# Patient Record
Sex: FEMALE | Race: WHITE | NOT HISPANIC OR LATINO | Employment: OTHER | URBAN - METROPOLITAN AREA
[De-identification: names, ages, dates, MRNs, and addresses within clinical notes are randomized per-mention and may not be internally consistent; named-entity substitution may affect disease eponyms.]

---

## 2018-04-30 ASSESSMENT — MIFFLIN-ST. JEOR: SCORE: 1014.36

## 2018-05-01 ENCOUNTER — ANESTHESIA - HEALTHEAST (OUTPATIENT)
Dept: SURGERY | Facility: CLINIC | Age: 68
End: 2018-05-01

## 2018-05-02 ENCOUNTER — SURGERY - HEALTHEAST (OUTPATIENT)
Dept: SURGERY | Facility: CLINIC | Age: 68
End: 2018-05-02

## 2018-05-02 ASSESSMENT — MIFFLIN-ST. JEOR: SCORE: 1041.13

## 2018-05-03 ASSESSMENT — MIFFLIN-ST. JEOR: SCORE: 1055.19

## 2018-05-04 ASSESSMENT — MIFFLIN-ST. JEOR: SCORE: 1074.69

## 2018-05-05 ASSESSMENT — MIFFLIN-ST. JEOR: SCORE: 1055.64

## 2018-05-07 ENCOUNTER — COMMUNICATION - HEALTHEAST (OUTPATIENT)
Dept: NEUROSURGERY | Facility: CLINIC | Age: 68
End: 2018-05-07

## 2018-05-07 ENCOUNTER — RECORDS - HEALTHEAST (OUTPATIENT)
Dept: LAB | Facility: CLINIC | Age: 68
End: 2018-05-07

## 2018-05-07 DIAGNOSIS — I60.9 SAH (SUBARACHNOID HEMORRHAGE) (H): ICD-10-CM

## 2018-05-07 LAB
HGB BLD-MCNC: 10.5 G/DL (ref 12–16)
MAGNESIUM SERPL-MCNC: 1.7 MG/DL (ref 1.8–2.6)
PHOSPHATE SERPL-MCNC: 3.9 MG/DL (ref 2.5–4.5)
POTASSIUM BLD-SCNC: 4 MMOL/L (ref 3.5–5)

## 2018-05-10 ENCOUNTER — RECORDS - HEALTHEAST (OUTPATIENT)
Dept: LAB | Facility: CLINIC | Age: 68
End: 2018-05-10

## 2018-05-10 LAB
ANION GAP SERPL CALCULATED.3IONS-SCNC: 10 MMOL/L (ref 5–18)
BUN SERPL-MCNC: 14 MG/DL (ref 8–28)
CALCIUM SERPL-MCNC: 9.1 MG/DL (ref 8.5–10.5)
CHLORIDE BLD-SCNC: 104 MMOL/L (ref 98–107)
CO2 SERPL-SCNC: 24 MMOL/L (ref 22–31)
CREAT SERPL-MCNC: 0.57 MG/DL (ref 0.6–1.1)
ERYTHROCYTE [DISTWIDTH] IN BLOOD BY AUTOMATED COUNT: 13.4 % (ref 11–14.5)
GFR SERPL CREATININE-BSD FRML MDRD: >60 ML/MIN/1.73M2
GLUCOSE BLD-MCNC: 75 MG/DL (ref 70–125)
HCT VFR BLD AUTO: 31.1 % (ref 35–47)
HGB BLD-MCNC: 10.6 G/DL (ref 12–16)
MAGNESIUM SERPL-MCNC: 2 MG/DL (ref 1.8–2.6)
MCH RBC QN AUTO: 33.5 PG (ref 27–34)
MCHC RBC AUTO-ENTMCNC: 34.1 G/DL (ref 32–36)
MCV RBC AUTO: 98 FL (ref 80–100)
PLATELET # BLD AUTO: 614 THOU/UL (ref 140–440)
PMV BLD AUTO: 9 FL (ref 8.5–12.5)
POTASSIUM BLD-SCNC: 4.7 MMOL/L (ref 3.5–5)
RBC # BLD AUTO: 3.16 MILL/UL (ref 3.8–5.4)
SODIUM SERPL-SCNC: 138 MMOL/L (ref 136–145)
WBC: 8.2 THOU/UL (ref 4–11)

## 2018-05-11 ENCOUNTER — SURGERY - HEALTHEAST (OUTPATIENT)
Dept: NEUROLOGY | Facility: CLINIC | Age: 68
End: 2018-05-11

## 2018-05-15 ENCOUNTER — HOSPITAL ENCOUNTER (OUTPATIENT)
Dept: MRI IMAGING | Facility: HOSPITAL | Age: 68
Discharge: HOME | End: 2018-05-15
Attending: SURGERY

## 2018-05-15 ENCOUNTER — RECORDS - HEALTHEAST (OUTPATIENT)
Dept: ADMINISTRATIVE | Facility: OTHER | Age: 68
End: 2018-05-15

## 2018-05-15 DIAGNOSIS — I60.9 SAH (SUBARACHNOID HEMORRHAGE) (H): ICD-10-CM

## 2018-05-18 ENCOUNTER — OFFICE VISIT - HEALTHEAST (OUTPATIENT)
Dept: NEUROSURGERY | Facility: CLINIC | Age: 68
End: 2018-05-18

## 2018-05-18 DIAGNOSIS — H71.90 CHOLESTEATOMA: ICD-10-CM

## 2018-05-18 ASSESSMENT — MIFFLIN-ST. JEOR: SCORE: 1018.45

## 2018-05-29 ENCOUNTER — OFFICE VISIT - HEALTHEAST (OUTPATIENT)
Dept: FAMILY MEDICINE | Facility: CLINIC | Age: 68
End: 2018-05-29

## 2018-05-29 DIAGNOSIS — S72.009A HIP FRACTURE (H): ICD-10-CM

## 2018-05-29 DIAGNOSIS — R00.0 TACHYCARDIA: ICD-10-CM

## 2018-05-29 DIAGNOSIS — K21.9 ACID REFLUX: ICD-10-CM

## 2018-05-29 DIAGNOSIS — F10.10 ALCOHOL ABUSE: ICD-10-CM

## 2018-05-29 DIAGNOSIS — E83.42 HYPOMAGNESEMIA: ICD-10-CM

## 2018-05-29 DIAGNOSIS — R05.9 COUGH: ICD-10-CM

## 2018-05-29 DIAGNOSIS — Z78.0 POST-MENOPAUSAL: ICD-10-CM

## 2018-05-29 DIAGNOSIS — F17.200 NICOTINE DEPENDENCE: ICD-10-CM

## 2018-05-29 LAB
ALBUMIN SERPL-MCNC: 3.7 G/DL (ref 3.5–5)
ALP SERPL-CCNC: 164 U/L (ref 45–120)
ALT SERPL W P-5'-P-CCNC: 10 U/L (ref 0–45)
ANION GAP SERPL CALCULATED.3IONS-SCNC: 13 MMOL/L (ref 5–18)
AST SERPL W P-5'-P-CCNC: 18 U/L (ref 0–40)
BASOPHILS # BLD AUTO: 0 THOU/UL (ref 0–0.2)
BASOPHILS NFR BLD AUTO: 1 % (ref 0–2)
BILIRUB SERPL-MCNC: 0.3 MG/DL (ref 0–1)
BUN SERPL-MCNC: 16 MG/DL (ref 8–28)
CALCIUM SERPL-MCNC: 10.4 MG/DL (ref 8.5–10.5)
CHLORIDE BLD-SCNC: 103 MMOL/L (ref 98–107)
CO2 SERPL-SCNC: 24 MMOL/L (ref 22–31)
CREAT SERPL-MCNC: 0.79 MG/DL (ref 0.6–1.1)
EOSINOPHIL # BLD AUTO: 0.1 THOU/UL (ref 0–0.4)
EOSINOPHIL NFR BLD AUTO: 2 % (ref 0–6)
ERYTHROCYTE [DISTWIDTH] IN BLOOD BY AUTOMATED COUNT: 11.7 % (ref 11–14.5)
FERRITIN SERPL-MCNC: 753 NG/ML (ref 10–130)
GFR SERPL CREATININE-BSD FRML MDRD: >60 ML/MIN/1.73M2
GLUCOSE BLD-MCNC: 87 MG/DL (ref 70–125)
HCT VFR BLD AUTO: 35.8 % (ref 35–47)
HGB BLD-MCNC: 12.1 G/DL (ref 12–16)
LYMPHOCYTES # BLD AUTO: 1.2 THOU/UL (ref 0.8–4.4)
LYMPHOCYTES NFR BLD AUTO: 26 % (ref 20–40)
MAGNESIUM SERPL-MCNC: 1.7 MG/DL (ref 1.8–2.6)
MCH RBC QN AUTO: 32.8 PG (ref 27–34)
MCHC RBC AUTO-ENTMCNC: 33.9 G/DL (ref 32–36)
MCV RBC AUTO: 97 FL (ref 80–100)
MONOCYTES # BLD AUTO: 0.5 THOU/UL (ref 0–0.9)
MONOCYTES NFR BLD AUTO: 11 % (ref 2–10)
NEUTROPHILS # BLD AUTO: 2.9 THOU/UL (ref 2–7.7)
NEUTROPHILS NFR BLD AUTO: 61 % (ref 50–70)
PLATELET # BLD AUTO: 353 THOU/UL (ref 140–440)
PMV BLD AUTO: 7.2 FL (ref 7–10)
POTASSIUM BLD-SCNC: 4.6 MMOL/L (ref 3.5–5)
PROT SERPL-MCNC: 7.4 G/DL (ref 6–8)
RBC # BLD AUTO: 3.7 MILL/UL (ref 3.8–5.4)
SODIUM SERPL-SCNC: 140 MMOL/L (ref 136–145)
VIT B12 SERPL-MCNC: 703 PG/ML (ref 213–816)
WBC: 4.8 THOU/UL (ref 4–11)

## 2018-06-05 ENCOUNTER — COMMUNICATION - HEALTHEAST (OUTPATIENT)
Dept: FAMILY MEDICINE | Facility: CLINIC | Age: 68
End: 2018-06-05

## 2018-06-06 ENCOUNTER — RECORDS - HEALTHEAST (OUTPATIENT)
Dept: ADMINISTRATIVE | Facility: OTHER | Age: 68
End: 2018-06-06

## 2018-06-12 ENCOUNTER — RECORDS - HEALTHEAST (OUTPATIENT)
Dept: ADMINISTRATIVE | Facility: OTHER | Age: 68
End: 2018-06-12

## 2018-06-19 ENCOUNTER — OFFICE VISIT - HEALTHEAST (OUTPATIENT)
Dept: FAMILY MEDICINE | Facility: CLINIC | Age: 68
End: 2018-06-19

## 2018-06-19 DIAGNOSIS — F10.10 ALCOHOL ABUSE: ICD-10-CM

## 2018-06-19 DIAGNOSIS — E83.42 HYPOMAGNESEMIA: ICD-10-CM

## 2018-06-19 DIAGNOSIS — G25.81 RESTLESS LEG SYNDROME: ICD-10-CM

## 2018-06-19 DIAGNOSIS — R79.89 ELEVATED FERRITIN: ICD-10-CM

## 2018-06-19 DIAGNOSIS — F17.200 NICOTINE DEPENDENCE, UNCOMPLICATED, UNSPECIFIED NICOTINE PRODUCT TYPE: ICD-10-CM

## 2018-06-19 DIAGNOSIS — K21.9 GASTROESOPHAGEAL REFLUX DISEASE, ESOPHAGITIS PRESENCE NOT SPECIFIED: ICD-10-CM

## 2018-06-19 DIAGNOSIS — Z12.31 VISIT FOR SCREENING MAMMOGRAM: ICD-10-CM

## 2018-06-19 DIAGNOSIS — R74.8 ALKALINE PHOSPHATASE ELEVATION: ICD-10-CM

## 2018-06-19 LAB
FERRITIN SERPL-MCNC: 359 NG/ML (ref 10–130)
IRON SATN MFR SERPL: 37 % (ref 20–50)
IRON SERPL-MCNC: 83 UG/DL (ref 42–175)
MAGNESIUM SERPL-MCNC: 1.7 MG/DL (ref 1.8–2.6)
TIBC SERPL-MCNC: 222 UG/DL (ref 313–563)
TRANSFERRIN SERPL-MCNC: 177 MG/DL (ref 212–360)

## 2018-06-22 LAB
ALP BONE SERPL-CCNC: 68 U/L (ref 0–55)
ALP LIVER SERPL-CCNC: 48 U/L (ref 0–94)
ALP OTHER SERPL-CCNC: 0 U/L
ALP SERPL-CCNC: 116 U/L (ref 40–120)

## 2018-07-01 ENCOUNTER — AMBULATORY - HEALTHEAST (OUTPATIENT)
Dept: FAMILY MEDICINE | Facility: CLINIC | Age: 68
End: 2018-07-01

## 2018-07-01 DIAGNOSIS — R79.89 ELEVATED FERRITIN: ICD-10-CM

## 2018-07-02 ENCOUNTER — COMMUNICATION - HEALTHEAST (OUTPATIENT)
Dept: FAMILY MEDICINE | Facility: CLINIC | Age: 68
End: 2018-07-02

## 2018-07-06 ENCOUNTER — COMMUNICATION - HEALTHEAST (OUTPATIENT)
Dept: FAMILY MEDICINE | Facility: CLINIC | Age: 68
End: 2018-07-06

## 2018-07-08 ENCOUNTER — COMMUNICATION - HEALTHEAST (OUTPATIENT)
Dept: FAMILY MEDICINE | Facility: CLINIC | Age: 68
End: 2018-07-08

## 2018-07-31 ENCOUNTER — RECORDS - HEALTHEAST (OUTPATIENT)
Dept: ADMINISTRATIVE | Facility: OTHER | Age: 68
End: 2018-07-31

## 2018-10-18 ENCOUNTER — OFFICE VISIT - HEALTHEAST (OUTPATIENT)
Dept: FAMILY MEDICINE | Facility: CLINIC | Age: 68
End: 2018-10-18

## 2018-10-18 DIAGNOSIS — Z72.0 TOBACCO ABUSE: ICD-10-CM

## 2018-10-18 DIAGNOSIS — J01.00 ACUTE MAXILLARY SINUSITIS, RECURRENCE NOT SPECIFIED: ICD-10-CM

## 2018-10-18 DIAGNOSIS — J02.9 SORE THROAT: ICD-10-CM

## 2018-10-18 LAB — DEPRECATED S PYO AG THROAT QL EIA: NORMAL

## 2018-10-19 LAB — GROUP A STREP BY PCR: NORMAL

## 2019-02-09 ENCOUNTER — COMMUNICATION - HEALTHEAST (OUTPATIENT)
Dept: FAMILY MEDICINE | Facility: CLINIC | Age: 69
End: 2019-02-09

## 2019-02-09 DIAGNOSIS — J32.9 SINUSITIS: ICD-10-CM

## 2019-03-06 ENCOUNTER — COMMUNICATION - HEALTHEAST (OUTPATIENT)
Dept: SCHEDULING | Facility: CLINIC | Age: 69
End: 2019-03-06

## 2019-03-06 ENCOUNTER — PATIENT OUTREACH (OUTPATIENT)
Dept: CARE COORDINATION | Facility: CLINIC | Age: 69
End: 2019-03-06

## 2019-03-06 ASSESSMENT — ACTIVITIES OF DAILY LIVING (ADL): DEPENDENT_IADLS:: CLEANING;COOKING;LAUNDRY;SHOPPING;MEAL PREPARATION;TRANSPORTATION

## 2019-03-06 NOTE — PROGRESS NOTES
Clinic Care Coordination Contact    Clinic Care Coordination Contact  OUTREACH    Referral Information:  Referral Source: ED Follow-Up  From chart review:  Namita Viera is a 69 y.o. female with a history of subarachnoid hemorrhage, alcohol abuse, tobacco abuse, and left hip fracture surgery who presents to the ED via walk-in for evaluation of a fall and shoulder pain or mechanical fall last evening.  No head trauma, loss of consciousness or anticoagulation.     Differential diagnosis includes but is not limited to contusion, hematoma, humeral head fracture, shoulder dislocation, proximal humerus fracture, others.     On examination, she is comfortable appearing in no acute distress with stable vital signs.  There is significant amount of ecchymosis over her right anterior aspect of upper extremity.  She does have no obvious deformity but there is palpable bony tenderness over the glenohumeral joint.  Distal CMS is intact.  No tenderness over the wrist, hand, digits, forearm, elbow or distal humerus.  Obtained x-ray of the right shoulder to evaluate for any acute process of the glenohumeral joint and proximal humerus.  Results show mildly impacted acute transverse 1 part fracture through the cervical neck of the humerus with probable right shoulder joint effusion..  I spoke with [facility] to discuss the imaging.  Placed patient in a sling and recommend that she follow-up with [facility] in the next several days.  Under my care, she received 10 mg of oxycodone for pain control.  Center with prescription for 10 tablets of oxycodone and discussed safety precautions which she voiced understanding of.  She agrees to follow-up, does voiced understanding of all the return precautions that we discussed as compared with the plan for discharge at this time.     At the conclusion of the encounter I discussed the results of all of the tests and the disposition. The questions were answered. The patient or  family acknowledged understanding and was agreeable with the care plan.      10:28 AM I met with the patient to gather history and perform an initial exam.  12:11 PM I rechecked the patient, updated them on x-ray results, placed her in a sling, and discussed plan for discharge.      Pertinent Labs & Imaging studies reviewed. (See chart for details)  FINAL IMPRESSION    1. Closed nondisplaced fracture of surgical neck of right humerus, unspecified fracture morphology, initial encounter          Primary Diagnosis: Injury/Fall    Chief Complaint   Patient presents with     Clinic Care Coordination - Post Hospital     DC'd from [facility] on 3/5/19        Clinical Concerns:  Current Medical Concerns:  Talked to patient who is having significant pain. Got a sling at ED, but feels it should have been something more substantial to immobilize the fracture.  She has set up appointment with [facility] on Friday. Is taking the pain medications sparingly and also taking Bufferin.  She has enough to get her through until Friday.       She tripped of a bag of recycling with a crockpot full of pork and beans. She is grateful she did not get burned.    Current Behavioral Concerns: None identified.     Education Provided to patient: Reviewed care coordination   Pain  Pain (GOAL):: No  Health Maintenance Reviewed: Due/Overdue       Medication Management:  Independent, no concerns    Functional Status:  Dependent ADLs:: Independent  Dependent IADLs:: Cleaning, Cooking, Laundry, Shopping, Meal Preparation, Transportation  Bed or wheelchair confined:: No  Mobility Status: Independent  Fallen 2 or more times in the past year?: No  Any fall with injury in the past year?: Yes    Living Situation:  Current living arrangement:: I live in a private home with family  Type of residence:: Private home - stairs    Diet/Exercise/Sleep:  Diet:: Regular  Inadequate nutrition (GOAL):: No  Food Insecurity: No  Tube Feeding: No  Exercise::  Currently not exercising  Inadequate activity/exercise (GOAL):: No  Significant changes in sleep pattern (GOAL): No    Transportation:  Transportation concerns (GOAL):: No  Transportation means:: Regular car, Family     Psychosocial:  Mental health DX:: No  Mental health management concern (GOAL):: No  Informal Support system:: Family, Spouse     Financial/Insurance:   Financial/Insurance concerns (GOAL):: No  UCARE for seniors, no financial concerns.      Resources and Interventions:  Current Resources:    ;   Community Resources: None  Supplies used at home:: None  Equipment Currently Used at Home: none    Advance Care Plan/Directive  Advanced Care Plans/Directives on file:: No  Advanced Care Plan/Directive Status: Declined Further Information    Patient/Caregiver understanding: patient and her  will with son and his ten kids.  Has the help she needs. Has support from daughter who works at [employer].  Patient will call clinic with any concerns.  Denies need for care coordination at this time.    Plan: No further outreach by this CC.    Alanna Rodriguez,   [facility]  [email or web address]  [phone]

## 2019-03-08 ENCOUNTER — RECORDS - HEALTHEAST (OUTPATIENT)
Dept: ADMINISTRATIVE | Facility: OTHER | Age: 69
End: 2019-03-08

## 2019-03-12 ENCOUNTER — RECORDS - HEALTHEAST (OUTPATIENT)
Dept: ADMINISTRATIVE | Facility: OTHER | Age: 69
End: 2019-03-12

## 2019-03-27 ENCOUNTER — RECORDS - HEALTHEAST (OUTPATIENT)
Dept: ADMINISTRATIVE | Facility: OTHER | Age: 69
End: 2019-03-27

## 2019-04-24 ENCOUNTER — RECORDS - HEALTHEAST (OUTPATIENT)
Dept: ADMINISTRATIVE | Facility: OTHER | Age: 69
End: 2019-04-24

## 2019-06-05 ENCOUNTER — RECORDS - HEALTHEAST (OUTPATIENT)
Dept: ADMINISTRATIVE | Facility: OTHER | Age: 69
End: 2019-06-05

## 2019-11-07 ENCOUNTER — COMMUNICATION - HEALTHEAST (OUTPATIENT)
Dept: FAMILY MEDICINE | Facility: CLINIC | Age: 69
End: 2019-11-07

## 2019-11-07 DIAGNOSIS — R12 HEARTBURN: ICD-10-CM

## 2019-11-27 ENCOUNTER — OFFICE VISIT - HEALTHEAST (OUTPATIENT)
Dept: FAMILY MEDICINE | Facility: CLINIC | Age: 69
End: 2019-11-27

## 2019-11-27 DIAGNOSIS — F10.11 HISTORY OF ALCOHOL ABUSE: ICD-10-CM

## 2019-11-27 DIAGNOSIS — Z12.31 VISIT FOR SCREENING MAMMOGRAM: ICD-10-CM

## 2019-11-27 DIAGNOSIS — Z23 NEED FOR INFLUENZA VACCINATION: ICD-10-CM

## 2019-11-27 DIAGNOSIS — H25.9 AGE-RELATED CATARACT OF BOTH EYES, UNSPECIFIED AGE-RELATED CATARACT TYPE: ICD-10-CM

## 2019-11-27 DIAGNOSIS — Z01.818 PREOPERATIVE EXAMINATION: ICD-10-CM

## 2019-11-27 ASSESSMENT — MIFFLIN-ST. JEOR: SCORE: 942.01

## 2019-12-05 ENCOUNTER — COMMUNICATION - HEALTHEAST (OUTPATIENT)
Dept: FAMILY MEDICINE | Facility: CLINIC | Age: 69
End: 2019-12-05

## 2020-01-08 ENCOUNTER — COMMUNICATION - HEALTHEAST (OUTPATIENT)
Dept: FAMILY MEDICINE | Facility: CLINIC | Age: 70
End: 2020-01-08

## 2020-07-06 ENCOUNTER — COMMUNICATION - HEALTHEAST (OUTPATIENT)
Dept: FAMILY MEDICINE | Facility: CLINIC | Age: 70
End: 2020-07-06

## 2020-07-06 DIAGNOSIS — R12 HEARTBURN: ICD-10-CM

## 2021-01-18 ENCOUNTER — COMMUNICATION - HEALTHEAST (OUTPATIENT)
Dept: FAMILY MEDICINE | Facility: CLINIC | Age: 71
End: 2021-01-18

## 2021-01-18 DIAGNOSIS — R12 HEARTBURN: ICD-10-CM

## 2021-01-20 ENCOUNTER — MEDICAL CORRESPONDENCE (OUTPATIENT)
Dept: HEALTH INFORMATION MANAGEMENT | Facility: CLINIC | Age: 71
End: 2021-01-20
Payer: COMMERCIAL

## 2021-04-29 ENCOUNTER — COMMUNICATION - HEALTHEAST (OUTPATIENT)
Dept: FAMILY MEDICINE | Facility: CLINIC | Age: 71
End: 2021-04-29

## 2021-05-04 ENCOUNTER — RECORDS - HEALTHEAST (OUTPATIENT)
Dept: GENERAL RADIOLOGY | Facility: CLINIC | Age: 71
End: 2021-05-04

## 2021-05-04 ENCOUNTER — OFFICE VISIT - HEALTHEAST (OUTPATIENT)
Dept: FAMILY MEDICINE | Facility: CLINIC | Age: 71
End: 2021-05-04

## 2021-05-04 DIAGNOSIS — F10.20 UNCOMPLICATED ALCOHOL DEPENDENCE (H): ICD-10-CM

## 2021-05-04 DIAGNOSIS — F17.210 CIGARETTE NICOTINE DEPENDENCE WITHOUT COMPLICATION: ICD-10-CM

## 2021-05-04 DIAGNOSIS — Z00.00 ENCOUNTER FOR ANNUAL WELLNESS EXAM IN MEDICARE PATIENT: ICD-10-CM

## 2021-05-04 DIAGNOSIS — R07.81 RIB PAIN ON LEFT SIDE: ICD-10-CM

## 2021-05-04 DIAGNOSIS — M89.9 DISORDER OF BONE AND CARTILAGE: ICD-10-CM

## 2021-05-04 DIAGNOSIS — Z13.220 LIPID SCREENING: ICD-10-CM

## 2021-05-04 DIAGNOSIS — Z72.0 TOBACCO ABUSE: ICD-10-CM

## 2021-05-04 DIAGNOSIS — Z12.11 COLON CANCER SCREENING: ICD-10-CM

## 2021-05-04 DIAGNOSIS — R05.9 COUGH: ICD-10-CM

## 2021-05-04 DIAGNOSIS — E55.9 VITAMIN D DEFICIENCY: ICD-10-CM

## 2021-05-04 DIAGNOSIS — I10 BENIGN ESSENTIAL HYPERTENSION: ICD-10-CM

## 2021-05-04 DIAGNOSIS — K21.9 GASTROESOPHAGEAL REFLUX DISEASE WITHOUT ESOPHAGITIS: ICD-10-CM

## 2021-05-04 DIAGNOSIS — M94.9 DISORDER OF BONE AND CARTILAGE: ICD-10-CM

## 2021-05-04 DIAGNOSIS — R07.81 PLEURODYNIA: ICD-10-CM

## 2021-05-04 DIAGNOSIS — R05.3 CHRONIC COUGH: ICD-10-CM

## 2021-05-04 DIAGNOSIS — Z11.59 ENCOUNTER FOR HEPATITIS C SCREENING TEST FOR LOW RISK PATIENT: ICD-10-CM

## 2021-05-04 DIAGNOSIS — Z78.0 POSTMENOPAUSAL STATUS: ICD-10-CM

## 2021-05-04 DIAGNOSIS — Z12.31 VISIT FOR SCREENING MAMMOGRAM: ICD-10-CM

## 2021-05-04 LAB
ALBUMIN SERPL-MCNC: 3.7 G/DL (ref 3.5–5)
ALP SERPL-CCNC: 103 U/L (ref 45–120)
ALT SERPL W P-5'-P-CCNC: 23 U/L (ref 0–45)
ANION GAP SERPL CALCULATED.3IONS-SCNC: 15 MMOL/L (ref 5–18)
AST SERPL W P-5'-P-CCNC: 41 U/L (ref 0–40)
BILIRUB SERPL-MCNC: 0.3 MG/DL (ref 0–1)
BUN SERPL-MCNC: 12 MG/DL (ref 8–28)
CALCIUM SERPL-MCNC: 9.4 MG/DL (ref 8.5–10.5)
CALCIUM SERPL-MCNC: 9.6 MG/DL (ref 8.5–10.5)
CHLORIDE BLD-SCNC: 100 MMOL/L (ref 98–107)
CHOLEST SERPL-MCNC: 226 MG/DL
CO2 SERPL-SCNC: 23 MMOL/L (ref 22–31)
CREAT SERPL-MCNC: 0.71 MG/DL (ref 0.6–1.1)
CREAT SERPL-MCNC: 0.74 MG/DL (ref 0.6–1.1)
ERYTHROCYTE [DISTWIDTH] IN BLOOD BY AUTOMATED COUNT: 14.4 % (ref 11–14.5)
FASTING STATUS PATIENT QL REPORTED: YES
GFR SERPL CREATININE-BSD FRML MDRD: >60 ML/MIN/1.73M2
GFR SERPL CREATININE-BSD FRML MDRD: >60 ML/MIN/1.73M2
GLUCOSE BLD-MCNC: 85 MG/DL (ref 70–125)
HCT VFR BLD AUTO: 42.4 % (ref 35–47)
HDLC SERPL-MCNC: 122 MG/DL
HGB BLD-MCNC: 14.3 G/DL (ref 12–16)
LDLC SERPL CALC-MCNC: 92 MG/DL
MCH RBC QN AUTO: 32.1 PG (ref 27–34)
MCHC RBC AUTO-ENTMCNC: 33.7 G/DL (ref 32–36)
MCV RBC AUTO: 95 FL (ref 80–100)
PHOSPHATE SERPL-MCNC: 3.6 MG/DL (ref 2.5–4.5)
PLATELET # BLD AUTO: 283 THOU/UL (ref 140–440)
PMV BLD AUTO: 9.1 FL (ref 7–10)
POTASSIUM BLD-SCNC: 4.8 MMOL/L (ref 3.5–5)
PROT SERPL-MCNC: 7.6 G/DL (ref 6–8)
PTH-INTACT SERPL-MCNC: 71 PG/ML (ref 10–86)
RBC # BLD AUTO: 4.45 MILL/UL (ref 3.8–5.4)
SODIUM SERPL-SCNC: 138 MMOL/L (ref 136–145)
TRIGL SERPL-MCNC: 58 MG/DL
WBC: 7.1 THOU/UL (ref 4–11)

## 2021-05-04 ASSESSMENT — MIFFLIN-ST. JEOR: SCORE: 892.23

## 2021-05-05 ENCOUNTER — COMMUNICATION - HEALTHEAST (OUTPATIENT)
Dept: FAMILY MEDICINE | Facility: CLINIC | Age: 71
End: 2021-05-05

## 2021-05-05 LAB
25(OH)D3 SERPL-MCNC: 4.8 NG/ML (ref 30–80)
ALBUMIN PERCENT: 56.2 % (ref 51–67)
ALBUMIN SERPL ELPH-MCNC: 4.3 G/DL (ref 3.2–4.7)
ALPHA 1 PERCENT: 3.4 % (ref 2–4)
ALPHA 2 PERCENT: 13 % (ref 5–13)
ALPHA1 GLOB SERPL ELPH-MCNC: 0.3 G/DL (ref 0.1–0.3)
ALPHA2 GLOB SERPL ELPH-MCNC: 1 G/DL (ref 0.4–0.9)
B-GLOBULIN SERPL ELPH-MCNC: 0.9 G/DL (ref 0.7–1.2)
BETA PERCENT: 11.4 % (ref 10–17)
GAMMA GLOB SERPL ELPH-MCNC: 1.2 G/DL (ref 0.6–1.4)
GAMMA GLOBULIN PERCENT: 16 % (ref 9–20)
HCV AB SERPL QL IA: NEGATIVE
PATH ICD:: ABNORMAL
PROT PATTERN SERPL ELPH-IMP: ABNORMAL
PROT SERPL-MCNC: 7.6 G/DL (ref 6–8)
REVIEWING PATHOLOGIST: ABNORMAL

## 2021-05-06 ENCOUNTER — COMMUNICATION - HEALTHEAST (OUTPATIENT)
Dept: FAMILY MEDICINE | Facility: CLINIC | Age: 71
End: 2021-05-06

## 2021-05-06 RX ORDER — ERGOCALCIFEROL 1.25 MG/1
50000 CAPSULE ORAL WEEKLY
Qty: 12 CAPSULE | Refills: 4 | Status: SHIPPED | OUTPATIENT
Start: 2021-05-06 | End: 2021-10-12

## 2021-05-13 ENCOUNTER — COMMUNICATION - HEALTHEAST (OUTPATIENT)
Dept: LAB | Facility: CLINIC | Age: 71
End: 2021-05-13

## 2021-05-13 DIAGNOSIS — I10 BENIGN ESSENTIAL HYPERTENSION: ICD-10-CM

## 2021-05-20 ENCOUNTER — RECORDS - HEALTHEAST (OUTPATIENT)
Dept: ADMINISTRATIVE | Facility: OTHER | Age: 71
End: 2021-05-20

## 2021-05-20 ENCOUNTER — AMBULATORY - HEALTHEAST (OUTPATIENT)
Dept: NURSING | Facility: CLINIC | Age: 71
End: 2021-05-20

## 2021-05-20 ENCOUNTER — AMBULATORY - HEALTHEAST (OUTPATIENT)
Dept: LAB | Facility: CLINIC | Age: 71
End: 2021-05-20

## 2021-05-20 DIAGNOSIS — I10 BENIGN ESSENTIAL HYPERTENSION: ICD-10-CM

## 2021-05-20 LAB
ANION GAP SERPL CALCULATED.3IONS-SCNC: 12 MMOL/L (ref 5–18)
BUN SERPL-MCNC: 18 MG/DL (ref 8–28)
CALCIUM SERPL-MCNC: 9.5 MG/DL (ref 8.5–10.5)
CHLORIDE BLD-SCNC: 100 MMOL/L (ref 98–107)
CO2 SERPL-SCNC: 25 MMOL/L (ref 22–31)
CREAT SERPL-MCNC: 0.8 MG/DL (ref 0.6–1.1)
GFR SERPL CREATININE-BSD FRML MDRD: >60 ML/MIN/1.73M2
GLUCOSE BLD-MCNC: 89 MG/DL (ref 70–125)
POTASSIUM BLD-SCNC: 5.4 MMOL/L (ref 3.5–5)
SODIUM SERPL-SCNC: 137 MMOL/L (ref 136–145)

## 2021-05-21 ENCOUNTER — AMBULATORY - HEALTHEAST (OUTPATIENT)
Dept: FAMILY MEDICINE | Facility: CLINIC | Age: 71
End: 2021-05-21

## 2021-05-21 DIAGNOSIS — I10 BENIGN ESSENTIAL HYPERTENSION: ICD-10-CM

## 2021-05-21 DIAGNOSIS — E87.5 HYPERKALEMIA: ICD-10-CM

## 2021-05-21 RX ORDER — LOSARTAN POTASSIUM AND HYDROCHLOROTHIAZIDE 12.5; 1 MG/1; MG/1
1 TABLET ORAL DAILY
Qty: 30 TABLET | Refills: 0 | Status: SHIPPED | OUTPATIENT
Start: 2021-05-21 | End: 2021-10-12

## 2021-05-24 ENCOUNTER — RECORDS - HEALTHEAST (OUTPATIENT)
Dept: ADMINISTRATIVE | Facility: CLINIC | Age: 71
End: 2021-05-24

## 2021-05-25 ENCOUNTER — RECORDS - HEALTHEAST (OUTPATIENT)
Dept: ADMINISTRATIVE | Facility: CLINIC | Age: 71
End: 2021-05-25

## 2021-05-26 ENCOUNTER — RECORDS - HEALTHEAST (OUTPATIENT)
Dept: ADMINISTRATIVE | Facility: CLINIC | Age: 71
End: 2021-05-26

## 2021-05-27 ENCOUNTER — RECORDS - HEALTHEAST (OUTPATIENT)
Dept: ADMINISTRATIVE | Facility: CLINIC | Age: 71
End: 2021-05-27

## 2021-05-27 ENCOUNTER — HOSPITAL ENCOUNTER (OUTPATIENT)
Dept: CT IMAGING | Facility: HOSPITAL | Age: 71
Discharge: HOME | End: 2021-05-27
Attending: FAMILY MEDICINE

## 2021-05-27 VITALS
DIASTOLIC BLOOD PRESSURE: 82 MMHG | DIASTOLIC BLOOD PRESSURE: 85 MMHG | SYSTOLIC BLOOD PRESSURE: 163 MMHG | SYSTOLIC BLOOD PRESSURE: 158 MMHG | HEART RATE: 90 BPM | HEART RATE: 98 BPM

## 2021-05-27 DIAGNOSIS — F17.210 CIGARETTE NICOTINE DEPENDENCE WITHOUT COMPLICATION: ICD-10-CM

## 2021-05-28 ENCOUNTER — RECORDS - HEALTHEAST (OUTPATIENT)
Dept: ADMINISTRATIVE | Facility: CLINIC | Age: 71
End: 2021-05-28

## 2021-05-29 ENCOUNTER — COMMUNICATION - HEALTHEAST (OUTPATIENT)
Dept: FAMILY MEDICINE | Facility: CLINIC | Age: 71
End: 2021-05-29

## 2021-05-30 ENCOUNTER — RECORDS - HEALTHEAST (OUTPATIENT)
Dept: ADMINISTRATIVE | Facility: CLINIC | Age: 71
End: 2021-05-30

## 2021-05-31 ENCOUNTER — RECORDS - HEALTHEAST (OUTPATIENT)
Dept: ADMINISTRATIVE | Facility: CLINIC | Age: 71
End: 2021-05-31

## 2021-06-01 VITALS — WEIGHT: 108.6 LBS | BODY MASS INDEX: 19.24 KG/M2

## 2021-06-01 VITALS — WEIGHT: 111 LBS | BODY MASS INDEX: 19.66 KG/M2

## 2021-06-01 VITALS — HEIGHT: 63 IN | BODY MASS INDEX: 21.26 KG/M2 | WEIGHT: 120 LBS

## 2021-06-01 VITALS — BODY MASS INDEX: 19.4 KG/M2 | WEIGHT: 109.5 LBS

## 2021-06-01 VITALS — WEIGHT: 128.2 LBS | BODY MASS INDEX: 22.71 KG/M2 | HEIGHT: 63 IN

## 2021-06-03 VITALS
DIASTOLIC BLOOD PRESSURE: 74 MMHG | HEART RATE: 91 BPM | HEIGHT: 62 IN | SYSTOLIC BLOOD PRESSURE: 140 MMHG | BODY MASS INDEX: 19.95 KG/M2 | RESPIRATION RATE: 16 BRPM | WEIGHT: 108.4 LBS | TEMPERATURE: 97.1 F

## 2021-06-03 NOTE — TELEPHONE ENCOUNTER
RN cannot approve Refill Request    RN can NOT refill this medication PCP messaged that patient is overdue for Office Visit. Last office visit: 6/19/2018 Aib Tavera MD Last Physical: Visit date not found Last MTM visit: Visit date not found Last visit same specialty: 10/18/2018 Lizet Broderick MD.  Next visit within 3 mo: Visit date not found  Next physical within 3 mo: Visit date not found      Suzy Chino, [facility] Triage/Med Refill 11/7/2019    Requested Prescriptions   Pending Prescriptions Disp Refills     omeprazole (PRILOSEC) 20 MG capsule [Pharmacy Med Name: OMEPRAZOLE DR 20 MG CAPSULE] 90 capsule 0     Sig: TAKE 1 CAPSULE BY MOUTH DAILY       GI Medications Refill Protocol Failed - 11/7/2019 11:40 AM        Failed - PCP or prescribing provider visit in last 12 or next 3 months.     Last office visit with prescriber/PCP: 6/19/2018 Abi Tavera MD OR same dept: Visit date not found OR same specialty: 10/18/2018 Lizet Broderick MD  Last physical: Visit date not found Last MTM visit: Visit date not found   Next visit within 3 mo: Visit date not found  Next physical within 3 mo: Visit date not found  Prescriber OR PCP: Abi Tavera MD  Last diagnosis associated with med order: There are no diagnoses linked to this encounter.  If protocol passes may refill for 12 months if within 3 months of last provider visit (or a total of 15 months).

## 2021-06-03 NOTE — PROGRESS NOTES
Preoperative Exam    Scheduled Procedure: Cataract Surgery  Surgery Date:  Left Eye: 12/06/19 Right Eye: 12/19/19  Surgery Location: [facility], [place], fax [phone]    Surgeon:  Dr. Adkins    Assessment/Plan:     1. Preoperative examination  Surgery scheduled for December bilateral eyes    2. Age-related cataract of both eyes, unspecified age-related cataract type    3. Visit for screening mammogram  - Mammo Screening Bilateral; Future    4. Need for influenza vaccination  - Influenza High Dose, Seasonal 65+ yrs    5. History of alcohol abuse  States she only has a drink occasionally   No current abuse     Surgical Procedure Risk: Low (reported cardiac risk generally < 1%)  Have you had prior anesthesia?: Yes  Have you or any family members had a previous anesthesia reaction:  No  Do you or any family members have a history of a clotting or bleeding disorder?: No  Cardiac Risk Assessment: no increased risk for major cardiac complications    APPROVAL GIVEN to proceed with proposed procedure, without further diagnostic evaluation    Please Note: tobacco abuse.    Functional Status: Independent  Patient plans to recover at home with family.     Subjective:      Namita Viera is a 69 y.o. female who presents for a preoperative consultation.  Has a history of cataracts in bilateral eyes; will have the surgery on 12/6 and 12/19    All other systems reviewed and are negative, other than those listed in the HPI.    Pertinent History  Do you have difficulty breathing or chest pain after walking up a flight of stairs: No  History of obstructive sleep apnea: No  Steroid use in the last 6 months: No  Frequent Aspirin/NSAID use: No  Prior Blood Transfusion: No  Prior Blood Transfusion Reaction: No  If for some reason prior to, during or after the procedure, if it is medically indicated, would you be willing to have a blood transfusion?:  There is no transfusion refusal.    Current Outpatient Medications    Medication Sig Dispense Refill     omeprazole (PRILOSEC) 20 MG capsule TAKE 1 CAPSULE BY MOUTH DAILY 90 capsule 0     fluticasone (FLONASE) 50 mcg/actuation nasal spray 2 sprays into each nostril daily. 16 g 1     gabapentin (NEURONTIN) 100 MG capsule Take 100mg (one capsule) approximately 2 hours before bedtime. 30 capsule 2     loratadine (CLARITIN) 10 mg tablet Take 1 tablet (10 mg total) by mouth daily. 30 tablet 7     metoprolol tartrate (LOPRESSOR) 25 MG tablet Take 0.5 tablets (12.5 mg total) by mouth 2 (two) times a day. 45 tablet 3     oxyCODONE (ROXICODONE) 5 MG immediate release tablet Take 1 tablet (5 mg total) by mouth every 4 (four) hours as needed for pain. 10 tablet 0     No current facility-administered medications for this visit.         No Known Allergies    Patient Active Problem List   Diagnosis     Esophageal reflux     Tobacco abuse     Ear mass     History of alcohol abuse     Age-related cataract of both eyes, unspecified age-related cataract type       Past Medical History:   Diagnosis Date     Acid reflux      Alcohol abuse      Alcohol use      Closed fracture of left femur (H)      Hip fracture requiring operative repair (H)      SAH (subarachnoid hemorrhage) (H)      Traumatic closed displaced fracture of distal end of radius        Past Surgical History:   Procedure Laterality Date     BLADDER SUSPENSION  2008     HIP FRACTURE SURGERY Left      HIP PINNING Left 5/2/2018    Procedure: INTERNAL FIXATION, FRACTURE, TROCHANTERIC, LEFT HIP, USING NAILS;  Surgeon: Adria Lambert MD;  Location: [facility];  Service:      hysterctomy  2008     TOTAL ABDOMINAL HYSTERECTOMY W/ BILATERAL SALPINGOOPHORECTOMY  2008       Social History     Socioeconomic History     Marital status:      Spouse name: Not on file     Number of children: Not on file     Years of education: Not on file     Highest education level: Not on file   Occupational History     Not on file   Social Needs      "Financial resource strain: Not on file     Food insecurity:     Worry: Not on file     Inability: Not on file     Transportation needs:     Medical: Not on file     Non-medical: Not on file   Tobacco Use     Smoking status: Current Every Day Smoker     Packs/day: 1.50     Years: 60.00     Pack years: 90.00     Last attempt to quit: 2018     Years since quittin.4     Smokeless tobacco: Never Used   Substance and Sexual Activity     Alcohol use: Yes     Alcohol/week: 28.0 standard drinks     Types: 28 Glasses of wine per week     Drug use: No     Sexual activity: Not on file   Lifestyle     Physical activity:     Days per week: Not on file     Minutes per session: Not on file     Stress: Not on file   Relationships     Social connections:     Talks on phone: Not on file     Gets together: Not on file     Attends Christian service: Not on file     Active member of club or organization: Not on file     Attends meetings of clubs or organizations: Not on file     Relationship status: Not on file     Intimate partner violence:     Fear of current or ex partner: Not on file     Emotionally abused: Not on file     Physically abused: Not on file     Forced sexual activity: Not on file   Other Topics Concern     Not on file   Social History Narrative    Lives with . Moved in with oldest son and family ( 10 kids0.        Lizet Broderick MD  10/18/2018           Patient Care Team:  Abi Tavera MD as PCP - General  Lizet Broderick MD as Assigned PCP          Objective:     Vitals:    19 0922   BP: 140/74   Pulse: 91   Resp: 16   Temp: 97.1  F (36.2  C)   TempSrc: Oral   Weight: 108 lb 6.4 oz (49.2 kg)   Height: 5' 1.5\" (1.562 m)         Physical Exam:  General Appearance:  Alert, cooperative, no distress, appears stated age   Head:  Normocephalic, without obvious abnormality, atraumatic   Eyes:  PERRL, conjunctiva/corneas clear, EOM's intact   Ears:  Normal TM's and external ear canals, both " ears   Nose: Nares normal, septum midline, mucosa normal, no drainage   Throat: Lips, mucosa, and tongue normal; teeth and gums normal   Neck: Supple, symmetrical, trachea midline, no adenopathy, thyroid: not enlarged, symmetric, no tenderness/mass/nodules   Back:   Symmetric, no curvature, ROM normal, no CVA tenderness   Lungs:   Clear to auscultation bilaterally, respirations unlabored   Chest Wall:  No tenderness or deformity   Heart:  Regular rate and rhythm, S1, S2 normal, no murmur, rub or gallop   Abdomen:   Soft, non-tender, bowel sounds active all four quadrants,  no masses, no organomegaly   Extremities: Extremities normal, atraumatic, no cyanosis or edema   Skin: Skin color, texture, turgor normal, no rashes or lesions   Lymph nodes: Cervical and supraclavicular normal   Neurologic: Normal,Coordinated, smooth gait, balance, rapid alternating movements, sensory functioning, and cranial nerves II-XII grossly intact. DTR's+2 bilaterally brachioradialis, knee, ankle.            There are no Patient Instructions on file for this visit.    No EKG    Labs:  No labs were ordered during this visit    Immunization History   Administered Date(s) Administered     Influenza Whole 11/06/2014     Influenza high dose,seasonal,PF, 65+ yrs 11/27/2019     Influenza,seasonal, Inj IIV3 10/28/2013     Pneumo Conj 13-V (2010&after) 06/24/2016     Pneumo Polysac 23-V 08/27/2013     Tdap 11/12/2007, 08/27/2013           Electronically signed by Diandra Dye CNP 11/27/19 9:25 AM

## 2021-06-04 ENCOUNTER — RECORDS - HEALTHEAST (OUTPATIENT)
Dept: ADMINISTRATIVE | Facility: OTHER | Age: 71
End: 2021-06-04

## 2021-06-04 ENCOUNTER — AMBULATORY - HEALTHEAST (OUTPATIENT)
Dept: NURSING | Facility: CLINIC | Age: 71
End: 2021-06-04

## 2021-06-04 DIAGNOSIS — I10 BENIGN ESSENTIAL HYPERTENSION: ICD-10-CM

## 2021-06-08 ENCOUNTER — COMMUNICATION - HEALTHEAST (OUTPATIENT)
Dept: FAMILY MEDICINE | Facility: CLINIC | Age: 71
End: 2021-06-08

## 2021-06-08 DIAGNOSIS — M81.0 AGE-RELATED OSTEOPOROSIS WITHOUT CURRENT PATHOLOGICAL FRACTURE: ICD-10-CM

## 2021-06-08 RX ORDER — ALENDRONATE SODIUM 70 MG/1
70 TABLET ORAL
Qty: 12 TABLET | Refills: 11 | Status: ON HOLD | OUTPATIENT
Start: 2021-06-08 | End: 2021-10-13

## 2021-06-09 NOTE — TELEPHONE ENCOUNTER
Former patient of Liang & has not established care with another provider.  Please assign refill request to covering provider per Clinic standard process.  Refill Approved    Rx renewed per Medication Renewal Policy. Medication was last renewed on 11/8/19.    Deirdre Hanson, [facility] Triage/Med Refill 7/9/2020     Requested Prescriptions   Pending Prescriptions Disp Refills     omeprazole (PRILOSEC) 20 MG capsule [Pharmacy Med Name: OMEPRAZOLE DR 20 MG CAPSULE] 90 capsule 0     Sig: TAKE 1 CAPSULE BY MOUTH DAILY       GI Medications Refill Protocol Passed - 7/6/2020 11:34 AM        Passed - PCP or prescribing provider visit in last 12 or next 3 months.     Last office visit with prescriber/PCP: 6/19/2018 Abi Tavera MD OR same dept: Visit date not found OR same specialty: 10/18/2018 Lizet Broderick MD  Last physical: Visit date not found Last MTM visit: Visit date not found   Next visit within 3 mo: Visit date not found  Next physical within 3 mo: Visit date not found  Prescriber OR PCP: Abi Tavera MD  Last diagnosis associated with med order: 1. Heartburn  - omeprazole (PRILOSEC) 20 MG capsule [Pharmacy Med Name: OMEPRAZOLE DR 20 MG CAPSULE]; TAKE 1 CAPSULE BY MOUTH DAILY  Dispense: 90 capsule; Refill: 0    If protocol passes may refill for 12 months if within 3 months of last provider visit (or a total of 15 months).

## 2021-06-14 NOTE — TELEPHONE ENCOUNTER
Former patient of Tavera & has not established care with another provider.  Please assign refill request to covering provider per Clinic standard process.      RN cannot approve Refill Request    RN can NOT refill this medication Protocol failed and NO refill given. Last office visit: Visit date not found Last Physical: Visit date not found Last MTM visit: Visit date not found Last visit same specialty: 10/18/2018 Lizet Broderick MD.  Next visit within 3 mo: Visit date not found  Next physical within 3 mo: Visit date not found      Deirdre Hanson, [facility] Connection Triage/Med Refill 1/18/2021    Requested Prescriptions   Pending Prescriptions Disp Refills     omeprazole (PRILOSEC) 20 MG capsule [Pharmacy Med Name: OMEPRAZOLE 20 MG CPDR 20 Capsule] 90 capsule 0     Sig: TAKE 1 CAPSULE BY MOUTH DAILY       GI Medications Refill Protocol Failed - 1/18/2021  9:27 AM        Failed - PCP or prescribing provider visit in last 12 or next 3 months.     Last office visit with prescriber/PCP: Visit date not found OR same dept: Visit date not found OR same specialty: 10/18/2018 Lizet Broderick MD  Last physical: Visit date not found Last MTM visit: Visit date not found   Next visit within 3 mo: Visit date not found  Next physical within 3 mo: Visit date not found  Prescriber OR PCP: Galen Peñaloza MD  Last diagnosis associated with med order: 1. Heartburn  - omeprazole (PRILOSEC) 20 MG capsule [Pharmacy Med Name: OMEPRAZOLE 20 MG CPDR 20 Capsule]; TAKE 1 CAPSULE BY MOUTH DAILY  Dispense: 90 capsule; Refill: 0    If protocol passes may refill for 12 months if within 3 months of last provider visit (or a total of 15 months).

## 2021-06-15 NOTE — TELEPHONE ENCOUNTER
Third Attempt: I mailed a letter to the patient to please call our office to schedule an [facility] Care Annual Wellness Visit or Est Car Med Check. Closing until the patient responds. OK to schedule when the patient calls back.

## 2021-06-16 PROBLEM — H93.8X9 EAR MASS: Status: ACTIVE | Noted: 2018-06-15

## 2021-06-16 PROBLEM — F10.20 ALCOHOL DEPENDENCE (H): Status: ACTIVE | Noted: 2021-05-06

## 2021-06-16 PROBLEM — R05.3 CHRONIC COUGH: Status: ACTIVE | Noted: 2021-05-06

## 2021-06-16 PROBLEM — E55.9 VITAMIN D DEFICIENCY: Status: ACTIVE | Noted: 2021-05-06

## 2021-06-16 PROBLEM — R07.81 RIB PAIN ON LEFT SIDE: Status: ACTIVE | Noted: 2021-05-06

## 2021-06-16 PROBLEM — F10.11 HISTORY OF ALCOHOL ABUSE: Status: ACTIVE | Noted: 2019-11-27

## 2021-06-16 PROBLEM — Z72.0 TOBACCO ABUSE: Chronic | Status: ACTIVE | Noted: 2018-05-01

## 2021-06-16 PROBLEM — H25.9 AGE-RELATED CATARACT OF BOTH EYES, UNSPECIFIED AGE-RELATED CATARACT TYPE: Status: ACTIVE | Noted: 2019-11-27

## 2021-06-17 NOTE — TELEPHONE ENCOUNTER
Patient would like Dr. Castro to be her primary because she see's her .  Patient has appointment with Dr. Benoit on 5/5/2021 for AWV.  Patient is ok seeing Dr. Benoit for her Annual Wellness Visit.   Patient said she got her first COVID vaccine on 4/27 or 4/28 from [facility].     Please call patient at [phone] and let her know if Dr. Castro would be her primary.

## 2021-06-17 NOTE — TELEPHONE ENCOUNTER
Patient coming in 5/20/2021 for BP check and lab. You noted:    12. Benign essential hypertension  Blood pressure elevated today.  Could be induced by tobacco use and caffeine.  I advised starting losartan.  Avoiding HCTZ for now due to urinary symptoms as well as lisinopril due to her persistent cough.  Follow-up in 2 weeks with a nurse visit for blood pressure check  - losartan (COZAAR) 50 MG tablet; Take 1 tablet (50 mg total) by mouth daily. For blood pressure  Dispense: 30 tablet; Refill: 1    Do you need just the BP check? Please place order for labs if needed, thanks!

## 2021-06-17 NOTE — PROGRESS NOTES
Assessment and Plan:      Patient has been advised of split billing requirements and indicates understanding: Yes  1. Encounter for annual wellness exam in Medicare patient  Reviewed entire health history today.  She plans on filling out advance care directive and bringing in.  Fasting lab work obtained today.  See below.  Immunizations reviewed.  Patient does not plan to do COVID-19 immunization at this time  - Comprehensive Metabolic Panel    2. Encounter for hepatitis C screening test for low risk patient     - Hepatitis C Antibody (Anti-HCV)    3. Lipid screening  -Likely has some coronary artery disease with her history of smoking.  Could consider more aggressive screening in the future  - Lipid Cascade FASTING    4. Gastroesophageal reflux disease without esophagitis  Doing well on omeprazole 20 mg daily continue current dosing  - omeprazole (PRILOSEC) 20 MG capsule; TAKE 1 CAPSULE BY MOUTH DAILY  Dispense: 90 capsule; Refill: 3    5. Visit for screening mammogram     - Mammo Screening Bilateral; Future    6. Tobacco abuse/ Cigarette nicotine dependence without complication  With patient's long-term tobacco use which she is not yet ready to quit I did advise low-dose CT for lung cancer screening and counseled patient.  She has over 70-pack-year history of smoking.  She has cut back  - CT Low Dose Lung Screening Chest; Future    8. Colon cancer screening  Reviewed last colonoscopy and last one was in 2013 so she would be due for a colonoscopy at this time which may be her last since she was supposed to follow-up in 5 years in 2018 but that was around the time of her hip fracture  - Ambulatory referral for Colonoscopy    9. Postmenopausal status   -  - DXA Bone Density Scan; Future    10. Chronic cough  No pathology was seen on chest x-ray but advised the CT scan.  Chronic cough likely due to smoking and potentially even postnasal drip.  She is interested in trialing the fluticasone and so that prescription  has been called in  - XR Ribs Left W PA Chest; Future  - fluticasone propionate (FLONASE) 50 mcg/actuation nasal spray; Instill 1 spray in each nostril bid  Dispense: 17 g; Refill: 0    11. Rib pain on left side  -No fracture seen on x-ray.  Discussed through a letter that we could consider chiropractic like adjustments or physical therapy through [facility].  If patient like a referral she should let me know  - XR Ribs Left W PA Chest; Future    12. Benign essential hypertension  Blood pressure elevated today.  Could be induced by tobacco use and caffeine.  I advised starting losartan.  Avoiding HCTZ for now due to urinary symptoms as well as lisinopril due to her persistent cough.  Follow-up in 2 weeks with a nurse visit for blood pressure check  - losartan (COZAAR) 50 MG tablet; Take 1 tablet (50 mg total) by mouth daily. For blood pressure  Dispense: 30 tablet; Refill: 1    13. Disorder of bone and cartilage  Given body stature and history of hip fracture she likely has osteoporosis.  Following labs were ordered as well as bone density scan  - HM2(CBC w/o Differential)  - Parathyroid Hormone Intact with Minerals  - Electrophoresis, Protein, Serum  - Vitamin D, Total (25-Hydroxy)    14. Uncomplicated alcohol dependence (H)   -History of alcohol dependence.  If patient is being on the she has cut back her use in half from 28 drinks per week to 14 where she has 2 glasses of wine per night.  Continue to monitor    15. Vitamin D deficiency  Patient severely deficient in vitamin D.  Her level is 4.  Recommended high-dose weekly vitamin D supplementation which we will call in  - ergocalciferol (ERGOCALCIFEROL) 1,250 mcg (50,000 unit) capsule; Take 1 capsule (50,000 Units total) by mouth once a week.  Dispense: 12 capsule; Refill: 4        The patient's current medical problems were reviewed.    I have had an Advance Directives discussion with the patient.  I have counseled the patient for tobacco cessation and the  follow up will occur  at the next visit.  The following health maintenance schedule was reviewed with the patient and provided in printed form in the after visit summary:   Health Maintenance   Topic Date Due     DEXA SCAN  Never done     COVID-19 Vaccine (1) Never done     ZOSTER VACCINES (1 of 2) Never done     MAMMOGRAM  09/10/2015     INFLUENZA VACCINE RULE BASED (Season Ended) 08/01/2021     MEDICARE ANNUAL WELLNESS VISIT  05/04/2022     FALL RISK ASSESSMENT  05/04/2022     ADVANCE CARE PLANNING  06/19/2023     TD 18+ HE  08/27/2023     COLORECTAL CANCER SCREENING  10/17/2023     LIPID  05/04/2026     HEPATITIS C SCREENING  Completed     Pneumococcal Vaccine: Pediatrics (0 to 5 Years) and At-Risk Patients (6 to 64 Years)  Completed     Pneumococcal Vaccine: 65+ Years  Completed     HEPATITIS B VACCINES  Aged Out       Subjective:   Chief Complaint: Namita Viera is an 71 y.o. female here for an Annual Wellness visit.   HPI:  Patient is here today to establish care. Previously saw Dr. Tavera but not for some time.     Pertinent hx per patientHX hip fracture after fall. TCU after.  Also hx. Also during that, suffered SAH from hitting head. No concussive symptoms. Was living with son and daughter in law and basement bathroom was a disaster- stumbled into and fell. No LOC. Says was not related to ETOH use    Also indicates had Hysterectomy for fibroids and falling bladder.     Currently she indicates ETOH is not an issue-couple glasses a wine while dinner.   - coffee in the morning couple cups.  Blood pressure is very high today    - still smokes far less than used to < 1/2 ppd. Used to be 2ppd. Wasn't hard to cut back. 70 pack year history    Moved into senior rental unit and cant smoke in house. Car 1/2 block a way.  Not sure how feels about quitting.     -has persistent cough- smoking aggravates , mild coughing. Doesn't take meds for allergies. Hauling stuff in and out of house is mildly taxing- uses cart for  bringing things in and out. She takes out garbage. No chest pain. No coughing up blood. Phlegmy clear cough.  Never worked up for copd    -had first covid shot, 2nd scheduled.   No regular exercise.   - does all the cooking   -some weight loss 7 lb but not sure why. Eats often.   - no bowel changes  -had acid reflux all life. prilosec really helps.       -no bone scan for many years.     -blood pressures been high doesn't remember being on metoprolol. Chart says was on it for etoh withdrawal    -mom hx esophageal cancer.       More recently Rib been hurting - 3 mo ago lifted giant laundry detergent containers. [employer] snap and felt it go. On left side. -was very painful and still lump there. Was taking advil 800mg every 4 hours in the morning. Now not taking any advil.   Had wrist and shoulder fracture.   Doesn't take calcium or vitamin D. Never did dexa    Having some urinary issues where doesn't make it in time.   Start dribbling. Familiar keagle exercises.  No burning or foul smell       Working on living will    Review of Systems:     Please see above.  The rest of the review of systems are negative for all systems.    Patient Care Team:  Chasity Castro DO as PCP - General (Family Medicine)  Lizet Broderick MD as Assigned PCP     Patient Active Problem List   Diagnosis     Esophageal reflux     Tobacco abuse     Ear mass     History of alcohol abuse     Age-related cataract of both eyes, unspecified age-related cataract type     Alcohol dependence (H)     Chronic cough     Rib pain on left side     Benign essential hypertension     Vitamin D deficiency     Past Medical History:   Diagnosis Date     Acid reflux      Alcohol abuse      Alcohol use      Closed fracture of left femur (H)      Hip fracture requiring operative repair (H)      SAH (subarachnoid hemorrhage) (H)      Traumatic closed displaced fracture of distal end of radius       Past Surgical History:   Procedure Laterality Date     BLADDER  SUSPENSION  2008     HIP FRACTURE SURGERY Left      HIP PINNING Left 2018    Procedure: INTERNAL FIXATION, FRACTURE, TROCHANTERIC, LEFT HIP, USING NAILS;  Surgeon: Adria Lambert MD;  Location: [facility];  Service:      hysterctomy       TOTAL ABDOMINAL HYSTERECTOMY W/ BILATERAL SALPINGOOPHORECTOMY        Family History   Problem Relation Age of Onset     Esophageal cancer Mother      Cancer Mother      Rectal cancer Sister      Rheum arthritis Brother      Cancer Sister         lump in neck.       Social History     Socioeconomic History     Marital status:      Spouse name: Not on file     Number of children: Not on file     Years of education: Not on file     Highest education level: Not on file   Occupational History     Not on file   Social Needs     Financial resource strain: Not on file     Food insecurity     Worry: Not on file     Inability: Not on file     Transportation needs     Medical: Not on file     Non-medical: Not on file   Tobacco Use     Smoking status: Current Every Day Smoker     Packs/day: 1.50     Years: 60.00     Pack years: 90.00     Last attempt to quit: 2018     Years since quittin.9     Smokeless tobacco: Never Used   Substance and Sexual Activity     Alcohol use: Yes     Alcohol/week: 14.0 standard drinks     Types: 14 Glasses of wine per week     Drug use: No     Sexual activity: Not on file   Lifestyle     Physical activity     Days per week: Not on file     Minutes per session: Not on file     Stress: Not on file   Relationships     Social connections     Talks on phone: Not on file     Gets together: Not on file     Attends [employer] service: Not on file     Active member of club or organization: Not on file     Attends meetings of clubs or organizations: Not on file     Relationship status: Not on file     Intimate partner violence     Fear of current or ex partner: Not on file     Emotionally abused: Not on file     Physically abused: Not on  "file     Forced sexual activity: Not on file   Other Topics Concern     Not on file   Social History Narrative    Lives with . Moved in with oldest son and family ( 10 kids0.        Lizet Broderick MD  10/18/2018          Current Outpatient Medications   Medication Sig Dispense Refill     omeprazole (PRILOSEC) 20 MG capsule TAKE 1 CAPSULE BY MOUTH DAILY 90 capsule 3     ergocalciferol (ERGOCALCIFEROL) 1,250 mcg (50,000 unit) capsule Take 1 capsule (50,000 Units total) by mouth once a week. 12 capsule 4     fluticasone propionate (FLONASE) 50 mcg/actuation nasal spray Instill 1 spray in each nostril bid 17 g 0     losartan (COZAAR) 50 MG tablet Take 1 tablet (50 mg total) by mouth daily. For blood pressure 30 tablet 1     No current facility-administered medications for this visit.       Objective:   Vital Signs:   Visit Vitals  /90   Pulse 99   Temp 97  F (36.1  C)   Resp 28   Ht 5' 0.25\" (1.53 m)   Wt 101 lb 12.8 oz (46.2 kg)   SpO2 96%   BMI 19.72 kg/m           VisionScreening:  No exam data present     PHYSICAL EXAM     General Appearance:    Alert, cooperative, no distress, appears older than stated age, small and thin   Head:    Normocephalic, without obvious abnormality, atraumatic   Eyes:    PERRL, conjunctiva/corneas clear, EOM's intact, fundi     benign, both eyes   Ears:    Normal TM's and external ear canals, both ears   Nose:   Nares normal, septum midline, mucosa normal, no drainage    or sinus tenderness   Throat:   Lips, mucosa, and tongue normal; teeth and gums normal   Neck:   Supple, symmetrical, trachea midline, no adenopathy;     thyroid:  no enlargement/tenderness/nodules; no carotid    bruit or JVD   Back:     Symmetric, mild kyphosis curvature, ROM normal, no CVA tenderness   Lungs:     Frequent cough (phlegm) Clear to auscultation bilaterally, respirations unlabored   Chest Wall:     tenderness and prominent deformity lower left chest wall    Heart:    Regular rate and rhythm, " S1 and S2 normal, no murmur, rub   or gallop   Breast Exam:     deferred   Abdomen:     Soft, non-tender, bowel sounds active all four quadrants,     no masses, no organomegaly   Genitalia:   deferred   Rectal:     Extremities:   Extremities normal, atraumatic, no cyanosis or edema   Pulses:   2+ and symmetric all extremities   Skin:   Skin color, texture, turgor normal, no rashes or lesions   Lymph nodes:   Cervical, supraclavicular, and axillary nodes normal   Neurologic:   CNII-XII intact, normal strength, sensation and reflexes     throughout       Assessment Results 5/4/2021   Activities of Daily Living No help needed   Instrumental Activities of Daily Living No help needed   Get Up and Go Score Less than 12 seconds   Mini Cog Total Score 5   Some recent data might be hidden     A Mini-Cog score of 0-2 suggests the possibility of dementia, score of 3-5 suggests no dementia    Identified Health Risks:     She is at risk for lack of exercise and has been provided with information to increase physical activity for the benefit of her well-being.  The patient reports that she drinks more than one alcoholic drink per day but denies binge or excessive drinking. She was counseled and given information about possible harmful effects of excessive alcohol intake.  Information on urinary incontinence and treatment options given to patient.  She is at risk for falling and has been provided with information to reduce the risk of falling at home.  Information regarding advance directives (living jiménez), including where she can download the appropriate form, was provided to the patient via the AVS.       Lung Cancer Screening pre-scan counseling Visit    The patient fits the risk profile of patients who benefit from this screening:  -The patient is >55 years old and <80 years old (55-77 years for Medicare patients)  -The patient has 70 pack year history (over 30)  -The patient has smoked within the past 15 years  -The patient  has no medical comorbidity severe enough that it would cause mortality prior to mortality due to the lung cancer attempting to be detected.    Discussion with patient regarding the harms associated with LDCT screening include false-negative and false-positive results, incidental findings, overdiagnosis, and radiation exposure were reviewed at length.   The patient understands that pursuing this screening test may result in a biopsy that was not necessary. It may also produce added stress over a nodule that is likely not cancer.    Of 100 patients who get screening, 25 will have a positive scan. Of those 25, only 1 will have cancer.  Overdiagnosis is estimated at 10% of patients-- they would not have been detected in the patient's lifetime without screening. Less than 1% of patients likely had death related to radiation exposure increase.   Average low-dose CT associated with 0.61 to 1.5 mSv. Annual background radiation exposure in the [place] averages 2.4 mSv; mammogram is 0.7mSv.    The benefits are reduction in risk of death from lung cancer. The number needed to treat is 320 (for every 320 patients who undergo screening, 1 patient will have a benefit in mortality from early detection from the screening).    Undergoing this screening implies willingness to pursue further potentially invasive testing to discover potential cancer.    All questions were answered.    The patient was counseled regarding smoking cessation and its risk for lung cancer.

## 2021-06-17 NOTE — TELEPHONE ENCOUNTER
Please call pt - I had discussed with her that Im happy for her to establish care and was going to reach out to her to see if she would like to change her appt to me on Tuesday 5/4 at 1040 or 2pm  For annual wellness visit

## 2021-06-17 NOTE — TELEPHONE ENCOUNTER
----- Message from Chasity Castro DO sent at 5/6/2021  1:26 PM CDT -----  Please call patient and let her know her vitamin D is severely deficient. I called in a prescription for high dose vitamin D capsules that she should take only ONE capsule ONCE a week.  Letter sent regarding other labs

## 2021-06-17 NOTE — PROGRESS NOTES
I met with Namita Viera at the request of Dr. Castro to recheck her blood pressure.  Blood pressure medications on the MAR were reviewed with patient.    Patient has taken all medications as per usual regimen: Yes  Patient reports tolerating them without any issues or concerns: Yes    Vitals:    05/20/21 1058 05/20/21 1105   BP: 163/85 158/82   Pulse: 90 98       After 5 minutes, the patient's blood pressure remained greater than or equal to 140/90.    Is the patient currently having any chest pain?  No  Does the patient currently have a headache?   No  Does the patient currently have any vision changes? No  Does the patient currently have any nausea? No  Does the patient currently have any abdominal pain? No    The previous encounter was reviewed.  The patient was discharged and the note will be sent to the provider for final review.      She would prefer a phone call, she does not have Internet at home.

## 2021-06-17 NOTE — ANESTHESIA CARE TRANSFER NOTE
Last vitals:   Vitals:    05/02/18 1336   BP: 128/74   Pulse: (!) 114   Resp: 20   Temp: 36.7  C (98  F)   SpO2: 98%     Patient's level of consciousness is drowsy  Spontaneous respirations: yes  Maintains airway independently: yes  Dentition unchanged: yes  Oropharynx: oropharynx clear of all foreign objects    QCDR Measures:  ASA# 20 - Surgical Safety Checklist: WHO surgical safety checklist completed prior to induction  PQRS# 430 - Adult PONV Prevention: NA - Not adult patient, not GA or 3 or more risk factors NOT present  ASA# 8 - Peds PONV Prevention: NA - Not pediatric patient, not GA or 2 or more risk factors NOT present  PQRS# 424 - Lucia-op Temp Management: 4559F - At least one body temp DOCUMENTED => 35.5C or 95.9F within required timeframe  PQRS# 426 - PACU Transfer Protocol: - Transfer of care checklist used  ASA# 14 - Acute Post-op Pain: ASA14B - Patient did NOT experience pain >= 7 out of 10

## 2021-06-17 NOTE — ANESTHESIA POSTPROCEDURE EVALUATION
Patient: Namita Viera  INTERNAL FIXATION, FRACTURE, TROCHANTERIC, LEFT HIP, USING NAILS  Anesthesia type: general    Patient location: PACU  Last vitals:   Vitals:    05/02/18 1422   BP:    Pulse: (!) 118   Resp: 16   Temp:    SpO2: 95%     Post vital signs: stable  Level of consciousness: awake and responds to simple questions  Post-anesthesia pain: pain controlled  Post-anesthesia nausea and vomiting: no  Pulmonary: unassisted, spontaneous ventilation, nasal cannula  Cardiovascular: stable and blood pressure at baseline  Hydration: adequate  Anesthetic events: no    QCDR Measures:  ASA# 11 - Lucia-op Cardiac Arrest: ASA11B - Patient did NOT experience unanticipated cardiac arrest  ASA# 12 - Lucia-op Mortality Rate: ASA12B - Patient did NOT die  ASA# 13 - PACU Re-Intubation Rate: ASA13B - Patient did NOT require a new airway mgmt  ASA# 10 - Composite Anes Safety: ASA10A - No serious adverse event    Additional Notes:

## 2021-06-18 NOTE — PATIENT INSTRUCTIONS - HE
Patient Instructions by Chasity Castro DO at 5/4/2021 10:40 AM     Author: Chasity Castro DO Service: -- Author Type: Physician    Filed: 5/4/2021 11:23 AM Encounter Date: 5/4/2021 Status: Addendum    : Chasity Castro DO (Physician)    Related Notes: Original Note by Chasity Castro DO (Physician) filed at 5/4/2021 11:21 AM       Lung Cancer Screening pre-scan counseling Visit    The patient fits the risk profile of patients who benefit from this screening:  -The patient is >55 years old and <80 years old (55-77 years for Medicare patients)  -The patient has 70  pack year history (over 30)  -The patient has smoked within the past 15 years  -The patient has no medical comorbidity severe enough that it would cause mortality prior to mortality due to the lung cancer attempting to be detected.    Discussion with patient regarding the harms associated with LDCT screening include false-negative and false-positive results, incidental findings, overdiagnosis, and radiation exposure were reviewed at length.   The patient understands that pursuing this screening test may result in a biopsy that was not necessary. It may also produce added stress over a nodule that is likely not cancer.    Of 100 patients who get screening, 25 will have a positive scan. Of those 25, only 1 will have cancer.  Overdiagnosis is estimated at 10% of patients-- they would not have been detected in the patient's lifetime without screening. Less than 1% of patients likely had death related to radiation exposure increase.   Average low-dose CT associated with 0.61 to 1.5 mSv. Annual background radiation exposure in the [place] averages 2.4 mSv; mammogram is 0.7mSv.    The benefits are reduction in risk of death from lung cancer. The number needed to treat is 320 (for every 320 patients who undergo screening, 1 patient will have a benefit in mortality from early detection from the screening).    Undergoing this  screening implies willingness to pursue further potentially invasive testing to discover potential cancer.    All questions were answered.    The patient was counseled regarding smoking cessation and its risk for lung cancer.        Recommend vitamin D 2000IU daily      Starting a new medication for blood pressure called losartan 50mg take once daily and come back for nurse visit and lab visit to repeat blood pressure check and non fasting bloodwork with your  in 2 weeks .       Start flonase 1 spray each nostril twice daily for chronic cough if from allergies    Consider magnesium at bedtime for cramps 400mg        Patient Education     Exercise for a Healthier Heart  You may wonder how you can improve the health of your heart. If youre thinking about exercise, youre on the right track. You dont need to become an athlete, but you do need a certain amount of brisk exercise to help strengthen your heart. If you have been diagnosed with a heart condition, your doctor may recommend exercise to help stabilize your condition. To help make exercise a habit, choose safe, fun activities.       Be sure to check with your health care provider before starting an exercise program.    Why exercise?  Exercising regularly offers many healthy rewards. It can help you do all of the following:    Improve your blood cholesterol levels to help prevent further heart trouble    Lower your blood pressure to help prevent a stroke or heart attack    Control diabetes, or reduce your risk of getting this disease    Improve your heart and lung function    Reach and maintain a healthy weight    Make your muscles stronger and more limber so you can stay active    Prevent falls and fractures by slowing the loss of bone mass (osteoporosis)    Manage stress better  Exercise tips  Ease into your routine. Set small goals. Then build on them.  Exercise on most days. Aim for a total of 150 or more minutes of moderate to  vigorous intensity  activity each week. Consider 40 minutes, 3 to 4 times a week. For best results, activity should last for 40 minutes on average. It is OK to work up to the 40 minute period over time. Examples of moderate-intensity activity is walking one mile in 15 minutes or 30 to 45 minutes of yard work.  Step up your daily activity level. Along with your exercise program, try being more active throughout the day. Walk instead of drive. Do more household tasks or yard work.  Choose one or more activities you enjoy. Walking is one of the easiest things you can do. You can also try swimming, riding a bike, or taking an exercise class.  Stop exercising and call your doctor if you:    Have chest pain or feel dizzy or lightheaded    Feel burning, tightness, pressure, or heaviness in your chest, neck, shoulders, back, or arms    Have unusual shortness of breath    Have increased joint or muscle pain    Have palpitations or an irregular heartbeat      [id number] [employer] [address] [place] [address]. All rights reserved. This information is not intended as a substitute for professional medical care. Always follow your healthcare professional's instructions.         Patient Education   Alcohol Use   Many people can enjoy a glass of wine or beer without any negative consequences to their health. According to the Centers for Disease Control and Prevention (CDC), having one or fewer drinks per day for women and two or fewer per day for men is considered moderate drinking.     When people drink more than moderately, it can become concerning. Excessive drinking is defined as consuming 15 drinks or more per week for men and 8 drinks or more per week for women. There are various health problems associated with excessive drinking, which include:    Damage to vital organs like the heart, brain, liver and pancreas    Harm to the digestive tract    Weaken the immune system    Higher risk for heart disease and cancer        Patient Education   Urinary Incontinence, Female (Adult)  Urinary incontinence means loss of control of the bladder. This problem affects many women, especially as they get older. If you have incontinence, you may be embarrassed to ask for help. But know that this problem can be treated.  Types of Incontinence  There are different types of incontinence. Two of the main types are described here. You can have more than one type.    Stress incontinence. With this type, urine leaks when pressure (stress) is put on the bladder. This may happen when you cough, sneeze, or laugh. Stress incontinence most often occurs because the pelvic floor muscles that support the bladder and urethra are weak. This can happen after pregnancy and vaginal childbirth or a hysterectomy. It can also be due to excess body weight or hormone changes.    Urge incontinence (also called overactive bladder). With this type, a sudden urge to urinate is felt often. This may happen even though there may not be much urine in the bladder. The need to urinate often during the night is common. Urge incontinence most often occurs because of bladder spasms. This may be due to bladder irritation or infection. Damage to bladder nerves or pelvic muscles, constipation, and certain medicines can also lead to urge incontinence.  Treatment of urinary incontinence depends on the cause. Further evaluation is needed to find the type you have. This will likely include an exam and certain tests. Based on the results, you and your healthcare provider can then plan treatment. Until a diagnosis is made, the home care tips below can help relieve symptoms.  Home care    Do pelvic floor muscle exercises, if they are prescribed. The pelvic floor muscles help support the bladder and urethra. Many women find that their symptoms improve when doing special exercises that strengthen these muscles. To do the exercises contract the muscles you would use to stop your stream of urine,  but do this when youre not urinating. Hold for 10 seconds, then relax. Repeat 10 to 20 times in a row, at least 3 times a day. Your provider may give you other instructions for how to do the exercises and how often.    Keep a bladder diary. This helps track how often and how much you urinate over a set period of time. Bring this diary with you to your next visit with the provider. The information can help your provider learn more about your bladder problem.    Lose weight, if advised to by your provider. Excess weight puts pressure on the bladder. Your provider can help you create a weight-loss plan thats right for you. This may include exercising more and making certain diet changes.    Don't consume foods and drinks that may irritate the bladder. These can include alcohol and caffeinated drinks.    Quit smoking. Smoking and other tobacco use can lead to chronic cough that strains the pelvic floor muscles. Smoking may also damage the bladder and urethra. Talk with your provider about treatments or methods you can use to quit smoking.    If drinking large amounts of fluid causes you to have symptoms, you may be advised to limit your fluid intake. You may also be advised to drink most of your fluids during the day and to limit fluids at night.    If youre worried about urine leakage or accidents, you may wear absorbent pads to catch urine. Change the pads often. This helps reduce discomfort. It may also reduce the risk of skin or bladder infections.  Follow-up care  Follow up with your healthcare provider, or as directed. It may take some to find the right treatment for your problem. Your treatment plan may include special therapies or medicines. Certain procedures or surgery may also be options. Be sure to discuss any questions you have with your provider.  When to seek medical advice  Call the healthcare provider right away if any of these occur:    Fever of 100.4 F (38 C) or higher, or as directed by your  provider    Bladder pain or fullness    Abdominal swelling    Nausea or vomiting    Back pain    Weakness, dizziness or fainting  Date Last Reviewed: 10/1/2017    [id number] [employer] [address] [place] [address]. All rights reserved. This information is not intended as a substitute for professional medical care. Always follow your healthcare professional's instructions.     Patient Education   Preventing Falls in the Home  As you get older, falls are more likely. Thats because your reaction time slows. Your muscles and joints may also get stiffer, making them less flexible. Illness, medications, and vision changes can also affect your balance. A fall could leave you unable to live on your own. To make your home safer, follow these tips:    Floors    Put nonskid pads under area rugs.    Remove throw rugs.    Replace worn floor coverings.    Tack carpets firmly to each step on carpeted stairs. Put nonskid strips on the edges of uncarpeted stairs.    Keep floors and stairs free of clutter and cords.    Arrange furniture so there are clear pathways.    Clean up any spills right away.    Bathrooms    Install grab bars in the tub or shower.    Apply nonskid strips or put a nonskid rubber mat in the tub or shower.    Sit on a bath chair to bathe.    Use bathmats with nonskid backing.    Lighting    Keep a flashlight in each room.    Put a nightlight along the pathway between the bedroom and the bathroom.    [id number] [employer] [address] [place] [address]. All rights reserved. This information is not intended as a substitute for professional medical care. Always follow your healthcare professional's instructions.         Patient Education   Understanding Advance Care Planning  Advance care planning is the process of deciding ones own future medical care. It helps ensure that if you cant speak for yourself, your wishes can still be carried out. The plan is a series  of legal documents that note a persons wishes. The documents vary by state. Advance care planning may be done when a person has a serious illness that is expected to get worse. It may be done before major surgery. And it can help you and your family be prepared in case of a major illness or injury. Advance care planning helps with making decisions at these times.       A health care proxy is a person who acts as the voice of a patient when the patient cant speak for himself or herself. The name of this role varies by state. It may be called a Durable Medical Power of  or Durable Power of  for Healthcare. It may be called an agent, surrogate, or advocate. Or it may be called a representative or decision maker. It is an official duty that is identified by a legal document. The document also varies by state.    Why Is Advance Care Planning Important?  If a person communicates their healthcare wishes:    They will be given medical care that matches their values and goals.    Their family members will not be forced to make decisions in a crisis with no guidance.  Creating a Plan  Making an advance care plan is often done in 3 steps:    Thinking about ones wishes. To create an advance care plan, you should think about what kind of medical treatment you would want if you lose the ability to communicate. Are there any situations in which you would refuse or stop treatment? Are there therapies you would want or not want? And whom do you want to make decisions for you? There are many places to learn more about how to plan for your care. Ask your doctor or  for resources.    Picking a health care proxy. This means choosing a trusted person to speak for you only when you cant speak for yourself. When you cannot make medical decisions, your proxy makes sure the instructions in your advance care plan are followed. A proxy does not make decisions based on his or her own opinions. They must put aside  those opinions and values if needed, and carry out your wishes.    Filling out the legal documents. There are several kinds of legal documents for advance care planning. Each one tells health care providers your wishes. The documents may vary by state. They must be signed and may need to be witnessed or notarized. You can cancel or change them whenever you wish. Depending on your state, the documents may include a Healthcare Proxy form, Living Will, Durable Medical Power of , Advance Directive, or others.  The Familys Role  The best help a family can give is to support their loved ones wishes. Open and honest communication is vital. Family should express any concerns they have about the patients choices while the patient can still make decisions.    7154-7013 [employer]. [address] [place] [address]. All rights reserved. This information is not intended as a substitute for professional medical care. Always follow your healthcare professional's instructions.         Also, [facility] offers a free, downloadable health care directive that allows you to share your treatment choices and personal preferences if you cannot communicate your wishes. It also allows you to appoint another person (called a health care agent) to make health care decisions if you are unable to do so. You can download an advance directive by going here: [email or web address]     Patient Education   Personalized Prevention Plan  You are due for the preventive services outlined below.  Your care team is available to assist you in scheduling these services.  If you have already completed any of these items, please share that information with your care team to update in your medical record.  Health Maintenance Due   Topic Date Due   ? HEPATITIS C SCREENING  Never done   ? DEXA SCAN  Never done   ? COVID-19 Vaccine (1) Never done   ? LIPID  Never done   ? ZOSTER VACCINES (1  of 2) Never done   ? MAMMOGRAM  09/10/2015

## 2021-06-18 NOTE — PROGRESS NOTES
ASSESSMENT & PLAN:  Patient here for hospital follow-up after a major recent hospitalization for intracranial bleeding, femur fracture, wrist fracture, all related to a fall while intoxicated, and noted to have a history of alcohol abuse.  Unfortunately, she has not been here for about 2 years, so we tried to address all the hospital follow-up issues as well as other needed issues today.  1. Nicotine dependence  Currently smoking E cigarettes.  Long smoking history 1-2 packs per day since teenage years.  Reports she was having a chronic cough that completely resolved since she stopped smoking cigarettes a couple weeks ago.  Discussed CT lung cancer screening.  Likely is going to need pulmonology consult for COPD, so defer to pulmonary.  PFTs ordered and follow-up based on those results.  - PFT Complete; Future    2. Post-menopausal  Recommend she have a bone density scan and she will likely need medication for osteoporosis.  Advised her to check with her orthopedic specialist who she will see in a couple of weeks to make sure there is no contra indication to starting the Fosamax in light of recent fractures.  - DXA Bone Density Scan; Future    3. Alcohol abuse  Offered assistance or referral to chemical dependency treatment but she declines.  She has not been drinking any alcohol since her hospitalization.  Family is supportive.  She is living with several family members who are keeping a close eye on her.  She would like to continue Seroquel as needed for sleep, prescription sent.  Liver labs during her hospitalization were normal.  Magnesium and potassium were low so we will recheck that.  She has normal renal function and she could continue with a magnesium supplement if she feels this helps with leg cramps.  - QUEtiapine (SEROQUEL) 25 MG tablet; Take 0.5 tablets (12.5 mg total) by mouth at bedtime as needed (agitation , insomnia).  Dispense: 45 tablet; Refill: 3  - Ambulatory Referral for Upper GI Endoscopy  -  HM1(CBC and Differential)  - Magnesium  - Ferritin  - Vitamin B12  - Comprehensive Metabolic Panel  - HM1 (CBC with Diff)    4. Tachycardia  Likely related to alcohol withdrawal, possibly also pain or anxiety during her hospital stay.  Heart rate is normal on a very low-dose of metoprolol.  She asks if she could try discontinuing it, which I think would be reasonable.  She lives with a relative that is a nurse who could check her pulse periodically, and if greater than 90 at rest would recommend she restart the beta-blocker.  Alternatively, if she prefers to continue on it, with a normal heart rate and blood pressure today, she could do that.  - metoprolol tartrate (LOPRESSOR) 25 MG tablet; Take 0.5 tablets (12.5 mg total) by mouth 2 (two) times a day.  Dispense: 45 tablet; Refill: 3    5. Acid reflux  Long history of acid reflux as well as smoking cigarettes and drinking alcohol on a regular basis.  Recommend she have a screening endoscopy, referral placed.  We discussed that she is also due for colonoscopy in October, so recommend she schedule both at this time.  - Ambulatory Referral for Upper GI Endoscopy    6.  Hypomagnesemia   Recheck labs today    Regarding lesion on CT and MRI, possible cholesteatoma, and patient has information to schedule with ENT and plans to do so.  She has follow-up with orthopedics for her hip fracture and follow-up with orthopedics for her wrist fracture.  Regarding intracranial bleed, she has had follow-up with neurosurgery and does not need to further follow with them.    All medications were reviewed and reconciled.    Patient Instructions   Schedule PFTs (lungs). Likely next step will be lung doctor referral, and CT lung screening can be discussed with them.    Schedule DEXA (bone density scan). Ask your orthopedic Dr. If it would be ok to start fosamax (once we know bone density scan results).      You can stop metoprolol and have your relative take your pulse (heart rate)  periodically, and restart if running greater than 90 frequently.     You will be due for a colonoscopy in the fall of 2018; last 10/17/13.    Watch your weight - if not coming back up, return here to further evaluate.     You can continue magnesium if it helps with leg cramps.      Orders Placed This Encounter   Procedures     DXA Bone Density Scan     Standing Status:   Future     Standing Expiration Date:   5/30/2019     Scheduling Instructions:      PATIENT WILL SCHEDULE?  Yes            [facility]       [phone]- [place]       [phone]- [place]       [phone]- [place]      [phone]- [place]     Order Specific Question:   Reason for Exam (Describe Symptoms):     Answer:   postmenopausal     Order Specific Question:   Can the procedure be changed per Radiologist protocol?     Answer:   Yes     Magnesium     Ferritin     Vitamin B12     Comprehensive Metabolic Panel     HM1 (CBC with Diff)     Ambulatory Referral for Upper GI Endoscopy     Referral Priority:   Routine     Referral Type:   Colon & Rectal     Referral Reason:   Evaluation and Treatment     Requested Specialty:   Gastroenterology     Number of Visits Requested:   1     PFT Complete     Standing Status:   Future     Standing Expiration Date:   5/29/2019     Medications Discontinued During This Encounter   Medication Reason     QUEtiapine (SEROQUEL) 25 MG tablet Reorder     metoprolol tartrate (LOPRESSOR) 25 MG tablet Reorder     omeprazole (PRILOSEC) 20 MG capsule Reorder       No Follow-up on file.    CHIEF COMPLAINT:  Chief Complaint   Patient presents with     Hospital Visit Follow Up     Pt here today for hospital follow up- was seen at [facility] on 4/30/18 after fall, Dx with LT hip fracture, LT wrist fracture and head bleed.- states is doing ok        HISTORY OF PRESENT ILLNESS:  Namita is a 68 y.o. female presenting to the clinic today for inpatient follow up for a fall. Namita was seen at [facility] from  4/30/18 to 5/5/18. She presents with her daughter-in-law. She fell on 4/30 while intoxicated and sustained a left hip, left wrist fracture, and SAH. After surgical repair of the left hip on 5/2, she was sent to transitional care at [facility] and later discharged home. She is doing well. Brain MRI on 5/7/18 showed reduced area of SAH and expansile lytic lesion along the right temporal bone. She has followed up with a hand surgeon and plans to schedule a follow up appointment with neurosurgery. She continues on 50 mg metoprolol daily, tolerating well.     Nicotine dependence: She has a history of smoking 1-2 ppd since her teenage years. She was told during her hospitalization at  [facility] that she may have undiagnosed COPD. No longer has a chronic cough since she quit smoking.     Alcohol abuse: She has not alcohol since the fall; had been consuming alcohol daily prior.  Daughter-in-law with her today states the family is very supportive, and watching her closely.    Low Bone Mass: She recalls having had a DXA in the past. Has not been on a medication for low bone mass before.  X-rays are in hospital stay showed demineralization, and she did just have fractures related to fall.  Likely needs to be on a medication for bone density    GERD: She does not have heartburn everyday. She takes 20 mg Prilosec as needed. Finds that it seems to helps more when she takes it daily.    Leg Cramps: She notes that since starting on a magnesium supplement, she has had fewer leg cramps. She frequently wakes up at night with leg cramps.     Health Maintenance: She is up to date on colonoscopy; last 10/17/13,  repeat every 5 years.    REVIEW OF SYSTEMS:   She lives with her family. She would like a refill of her Prilosec and Seroquel. Denies any hearing changes. Weight 2 weeks ago was 120, today 109. All other systems are negative.    PFSH:  Social: Former smoker, previously 1-2 ppd since teenage years. Currently smoking  e-cigarette.  Family: Mom with esophageal cancer, sister with anal rectal cancer.     TOBACCO USE:  History   Smoking Status     Current Every Day Smoker     Packs/day: 1.50     Years: 60.00   Smokeless Tobacco     Never Used     Comment: USES ECIG        VITALS:  Vitals:    05/29/18 0937   BP: 114/62   Patient Site: Left Arm   Patient Position: Sitting   Cuff Size: Adult Regular   Pulse: 78   Resp: 16   Temp: 97.9  F (36.6  C)   TempSrc: Oral   Weight: 109 lb 8 oz (49.7 kg)     Wt Readings from Last 3 Encounters:   05/29/18 109 lb 8 oz (49.7 kg)   05/18/18 120 lb (54.4 kg)   05/05/18 128 lb 3.2 oz (58.2 kg)     Body mass index is 19.4 kg/(m^2).    PHYSICAL EXAM:  GENERAL: Pleasant, well-appearing patient in no acute distress.  HEENT: Pupils equal round reactive to light. Sclerae and conjunctivae clear. TM clear on the right, left cannot be well visualized due to cerumen.. Oropharynx is clear with moist mucous membranes.  NECK: Supple without lymphadenopathy, no carotid bruits  CARDIOVASCULAR: Heart regular rate and rhythm without murmur normal S1-S2  ABDOMEN: Soft, nontender, nondistended.  No guarding or rebound.  No organomegaly or masses appreciated.  LUNGS: Clear to auscultation bilaterally, good air movement throughout  EXTREMITIES Warm and well-perfused without edema. Pedal pulses palpable and symmetric bilaterally  NEURO: Alert and oriented. Grossly nonfocal.  PSYCHIATRIC: Presents on time and well groomed. Normal speech and thought content. Full affect. No abnormal movements or behaviors noted.      ADDITIONAL HISTORY SUMMARIZED (2): Reviewed discharge summary from 5/5/18, reviewed 6/24/16 note regarding shoulder and hip pain.  DECISION TO OBTAIN EXTRA INFORMATION (1): None.   RADIOLOGY TESTS (1): Ordered DXA today.  LABS (1): Labs were ordered today, Reviewed CMP from 4/30/18.  MEDICINE TESTS (1): None.  INDEPENDENT REVIEW (2 each): None.      The visit lasted a total of 22 minutes face to face with the  patient. Over 50% of the time was spent counseling and educating the patient about fall.    I, Milana Casper, am scribing for and in the presence of, Dr. Tavera.    I, Dr. Tavera, personally performed the services described in this documentation, as scribed by Milana Casper in my presence, and it is both accurate and complete.    MEDICATIONS:  Current Outpatient Prescriptions   Medication Sig Dispense Refill     acetaminophen (TYLENOL) 500 MG tablet Take 2 tablets (1,000 mg total) by mouth 3 (three) times a day.  0     metoprolol tartrate (LOPRESSOR) 25 MG tablet Take 0.5 tablets (12.5 mg total) by mouth 2 (two) times a day. 45 tablet 3     omeprazole (PRILOSEC) 20 MG capsule Take 1 capsule (20 mg total) by mouth daily. 90 capsule 3     QUEtiapine (SEROQUEL) 25 MG tablet Take 0.5 tablets (12.5 mg total) by mouth at bedtime as needed (agitation , insomnia). 45 tablet 3     folic acid (FOLVITE) 1 MG tablet Take 1 tablet (1 mg total) by mouth daily.  0     nicotine (NICODERM CQ) 21 mg/24 hr Place 1 patch on the skin daily.  0     oxyCODONE (ROXICODONE) 5 MG immediate release tablet Take 1-2 tablets (5-10 mg total) by mouth every 4 (four) hours as needed for pain. 40 tablet 0     senna-docusate (PERICOLACE) 8.6-50 mg tablet Take 1 tablet by mouth 2 (two) times a day.  0     thiamine 100 MG tablet Take 1 tablet (100 mg total) by mouth daily.  0     No current facility-administered medications for this visit.        Total data points: 4

## 2021-06-18 NOTE — PROGRESS NOTES
Namita is here with family members to discuss results of MRI of brain. Pt was seen in ED on 4/30/18 for fall. Pt had a SAH and found a lystic lesion. Dr. Garnica ordered MRI of head and f/u apt. Pt here today and states she denies any HA, hearing and vision are good, speech is fluent, denies cognitive issues. She does have issues with gait but due to hip surgery on 5/2/18.   Joe,CMA

## 2021-06-18 NOTE — PROGRESS NOTES
Patient is here with a daughter and a daughter-in-law to follow-up on her head injury with subarachnoid hemorrhage.  She is doing well.  On the original CT she had a lytic lesion in the temporal bone on the right.  She is here with a follow-up MRI.  The MRI suggests that the lesion is a cholesteatoma or mucocele.  Plan referral to ENT.  The subarachnoid blood has turned into hemosiderin on the MRI.  I think the patient does not need routine follow-up visits here.  We discussed chemical dependency treatment.  Total time 15 minutes, more than 50% spent counseling and/or coordinating care.

## 2021-06-18 NOTE — PROGRESS NOTES
ASSESSMENT & PLAN:  1. Visit for screening mammogram  - Mammo Screening Bilateral; Future    2. Elevated ferritin  Follow up labs from last check  - Ferritin  - Iron and Transferrin Iron Binding Capacity    3. Hypomagnesemia  - Magnesium    4. Alcohol abuse  Continues to abstain. Encouraged to remain alcohol free.    5. Anxiety/insomnia  Continue seroquel as started inpatient, but if has RLS, would not necessarily expect it to help, Can switch to gabapentin for RLS if continued symptoms.   - QUEtiapine (SEROQUEL) 25 MG tablet; Take 1 tablet (25 mg total) by mouth at bedtime as needed (agitation , insomnia).  Dispense: 90 tablet; Refill: 3    6. Alkaline phosphatase elevation  Further characterization with lab below  - Alkaline Phosphatase, Total & Isoenzymes, Serum or Plasma    7. Nicotine dependence  Again encouraged CT lung cancer screening and discussed in detail today. I can place order when she is ready. PFTs for suspected COPD.     8. GERD, nicotine and alcohol abuse history  Again discussed that I recommend EGD. She agrees to schedule.     9. Post menopausal  Schedule dexa    Lung Cancer Screening pre-scan counseling Visit    The patient fits the risk profile of patients who benefit from this screening:  -The patient is >55 years old and <80 years old  -The patient has >75 pack year history (over 30)  -The patient has smoked within the past 15 years  -The patient has no medical comorbidity severe enough that it would cause mortality prior to mortality due to the lung cancer attempting to be detected.    Discussion with patient regarding the harms associated with LDCT screening include false-negative and false-positive results, incidental findings, overdiagnosis, and radiation exposure were reviewed at length.   The patient understands that pursuing this screening test may result in a biopsy that was not necessary. It may also produce added stress over a nodule that is likely not cancer.    Of 100 patients who  get screening, 25 will have a positive scan. Of those 25, only 1 will have cancer.  Overdiagnosis is estimated at 10% of patients-- they would not have been detected in the patient's lifetime without screening. Less than 1% of patients likely had death related to radiation exposure increase.   Average low-dose CT associated with 0.61 to 1.5 mSv. Annual background radiation exposure in the [place] averages 2.4 mSv; mammogram is 0.7mSv.    The benefits are reduction in risk of death from lung cancer. The number needed to treat is 320 (for every 320 patients who undergo screening, 1 patient will have a benefit in mortality from early detection from the screening).    Undergoing this screening implies willingness to pursue further potentially invasive testing to discover potential cancer.    All questions were answered.    The patient was counseled regarding smoking cessation and its risk for lung cancer.    The patient's results will be followed in our pulmonary registry and will be recalled based on the findings of the CT scan.    Patient Instructions   Schedule an upper endoscopy with [facility]. Also schedule your bone density scan (DXA) and mammogram.     Increase seroquel to 50mg at bedtime. Call me if this is not helping enough. We can try gabapentin 100 to 300mg 2 hours before bedtime.    I recommend you establish care with .    I recommend CT lung cancer screening, call me when you are ready to do this.      Orders Placed This Encounter   Procedures     Mammo Screening Bilateral     Standing Status:   Future     Standing Expiration Date:   9/19/2019     Order Specific Question:   Can the procedure be changed per Radiologist protocol?     Answer:   Yes     Ferritin     Iron and Transferrin Iron Binding Capacity     Magnesium     Alkaline Phosphatase, Total & Isoenzymes, Serum or Plasma     Medications Discontinued During This Encounter   Medication Reason     acetaminophen (TYLENOL) 500 MG tablet  "Therapy completed     folic acid (FOLVITE) 1 MG tablet Therapy completed     nicotine (NICODERM CQ) 21 mg/24 hr Therapy completed     senna-docusate (PERICOLACE) 8.6-50 mg tablet Therapy completed     QUEtiapine (SEROQUEL) 25 MG tablet Reorder       No Follow-up on file.    CHIEF COMPLAINT:  Chief Complaint   Patient presents with     Follow-up     Here today to discuss lab results and further testing, having trouble swallowing the Magnesium tablets        HISTORY OF PRESENT ILLNESS:  Namita is a 68 y.o. female presenting to the clinic today for fall follow up. She notes that her hip pain continues to bother her. She has been seen by orthopedics, ENT, and physical therapy since her last visit.    Nicotine dependence: She continues to smoke e-cigarettes, quit smoking cigarettes since her hospitalization. Plans to schedule with pulmonology and complete PFT. She would like to wait on lung cancer screening.     Alcohol Dependence: Still has not had alcohol since the fall.    Restless Legs: She continues on Seroquel daily, which she thought was for RLS, but started in the hospital for insomnia/anxiety/agitation. She reports she cannot \"settle down\" at night. She has urges to move her legs and walk around. She recalls having been on gabapentin for shoulder issues in the pasta dn tolerated it.    Tachycardia: This was started in the hospital. May have been secondary to alcohol withdrawal. She continues on 25 mg metoprolol daily. No side effects. At her last visit, we discussed that she could discontinue it and monitor pulse, but she decided to stay on it.     REVIEW OF SYSTEMS:   She has been having issues taking magnesium tablets as they are too chalky. She reports having issues with anemia in the past. She continues to feel dizzy with positional changes since the fall. All other systems are negative.    PFSH:  Family: Denies a family history of hemochromatosis.    TOBACCO USE:  History   Smoking Status     Former Smoker "     Packs/day: 1.50     Years: 60.00     Quit date: 5/30/2018   Smokeless Tobacco     Never Used     Comment: USES ECIG        VITALS:  Vitals:    06/19/18 0841   BP: 124/60   Patient Site: Right Arm   Patient Position: Sitting   Cuff Size: Adult Regular   Pulse: 64   Resp: 16   Temp: 97.5  F (36.4  C)   TempSrc: Oral   Weight: 111 lb (50.3 kg)     Wt Readings from Last 3 Encounters:   06/19/18 111 lb (50.3 kg)   05/29/18 109 lb 8 oz (49.7 kg)   05/18/18 120 lb (54.4 kg)     Body mass index is 19.66 kg/(m^2).    PHYSICAL EXAM:  GENERAL: Pleasant, well-appearing patient in no acute distress.   HEENT: Pupils equal round reactive to light. Sclerae and conjunctivae clear.   NECK: Supple without lymphadenopathy, no carotid bruits   CARDIOVASCULAR: Heart regular rate and rhythm without murmur normal S1-S2   ABDOMEN: Soft, nontender, nondistended.  No guarding or rebound.  No organomegaly or masses appreciated.  LUNGS: Clear to auscultation bilaterally, good air movement throughout   EXTREMITIES Warm and well-perfused without edema. Pedal pulses palpable and symmetric bilaterally   NEURO: Alert and oriented. Grossly nonfocal.   PSYCHIATRIC: Presents on time and well groomed. Normal speech and thought content. Full affect. No abnormal movements or behaviors noted.      ADDITIONAL HISTORY SUMMARIZED (2): Reviewed 6/12/18 ortho note regarding wrist fracture.  DECISION TO OBTAIN EXTRA INFORMATION (1): None.   RADIOLOGY TESTS (1): Ordered mammogram today.  LABS (1): Labs were ordered today.   MEDICINE TESTS (1): None.  INDEPENDENT REVIEW (2 each): None.       The visit lasted a total of 23 minutes face to face with the patient. Over 50% of the time was spent counseling and educating the patient about fall follow up.    IMilana, am scribing for and in the presence of, Dr. Tavera.    I, Dr. Tavera, personally performed the services described in this documentation, as scribed by Milana Casper in my presence, and it is both accurate  and complete.    MEDICATIONS:  Current Outpatient Prescriptions   Medication Sig Dispense Refill     metoprolol tartrate (LOPRESSOR) 25 MG tablet Take 0.5 tablets (12.5 mg total) by mouth 2 (two) times a day. 45 tablet 3     omeprazole (PRILOSEC) 20 MG capsule Take 1 capsule (20 mg total) by mouth daily. 90 capsule 3     oxyCODONE (ROXICODONE) 5 MG immediate release tablet Take 1-2 tablets (5-10 mg total) by mouth every 4 (four) hours as needed for pain. 40 tablet 0     QUEtiapine (SEROQUEL) 25 MG tablet Take 1 tablet (25 mg total) by mouth at bedtime as needed (agitation , insomnia). 90 tablet 3     thiamine 100 MG tablet Take 1 tablet (100 mg total) by mouth daily.  0     No current facility-administered medications for this visit.        Total data points: 4

## 2021-06-19 ENCOUNTER — COMMUNICATION - HEALTHEAST (OUTPATIENT)
Dept: FAMILY MEDICINE | Facility: CLINIC | Age: 71
End: 2021-06-19

## 2021-06-19 DIAGNOSIS — I10 BENIGN ESSENTIAL HYPERTENSION: ICD-10-CM

## 2021-06-19 RX ORDER — LOSARTAN POTASSIUM AND HYDROCHLOROTHIAZIDE 12.5; 1 MG/1; MG/1
1 TABLET ORAL DAILY
Qty: 90 TABLET | Refills: 3 | Status: ON HOLD | OUTPATIENT
Start: 2021-06-19 | End: 2021-10-13

## 2021-06-19 NOTE — LETTER
Letter by Abi Tavera MD at      Author: Abi Tavera MD Service: -- Author Type: --    Filed:  Encounter Date: 12/5/2019 Status: Signed           December 5, 2019        Namita RAMSEY Viera  [address] [place] [address]        Dear Namita,    Breast cancer is the most common type of cancer among American women. The earlier breast cancer is detected, the better the survival rate. Your best defense against breast cancer is having a screening mammogram on a regular basis. The American Cancer Society recommends that women with an average risk of breast cancer should undergo regular screening mammography starting at age 45 years.         Women aged 45 to 54 years should have a mammogram annually.     Women 55 years and older should have a mammogram at least every other year.     There may be important reasons for you to follow a more frequent screening plan or an earlier start for breast cancer screening, so please discuss your health history and your family health history with your primary care provider.       We reviewed our records and we do not see that you had a mammogram within the past two years. If you have not had a mammogram in the past two years, we strongly encourage you to call for your appointment today. For your convenience, we have included a phone number below for you to schedule your mammogram at a nearby [facility] location.      If you have had a recent mammogram or have questions about why you are receiving this letter, please contact your primary care clinic at [phone] or send a [employer] message  [email or web address]). Your clinic will answer any questions or help obtain recent mammogram reports for your chart at [facility] so we can keep record of your preventive care.       Sincerely,    Abi Tavera MD    and your primary care team at [facility]  Schedule your mammogram today at one of our 7 locations  Please call   [phone]

## 2021-06-20 ENCOUNTER — HEALTH MAINTENANCE LETTER (OUTPATIENT)
Age: 71
End: 2021-06-20

## 2021-06-20 NOTE — LETTER
Letter by Lizet Broderick MD at      Author: Lizet Broderick MD Service: -- Author Type: --    Filed:  Encounter Date: 2020 Status: Signed         Namita Viera  [address] [place] [address]      2020      Dear Namita Viera,   : 1950      This letter is in regards to the appointment that you had scheduled on 2020 at the [facility] with Dr. Lizet Broderick.     The [facility] strives to see all patients in a timely manner and we need your help to achieve this.  The above-mentioned appointment was missed and we do not have record of a cancellation by you.  Whenever possible, we request appointment cancellations at least 72 hours in advance.  This time allows us to offer the appointment to another patient in need.      If you feel you have received this letter in error, or if you need to reschedule this appointment, please call our office so that we may update our records.      Sincerely,    [facility]

## 2021-06-21 NOTE — PROGRESS NOTES
Assessment:     1. Sore throat  Rapid Strep A Screen- Throat Swab    Group A Strep, RNA Direct Detection, Throat    CANCELED: Group A Strep, RNA Direct Detection, Throat   2. Acute maxillary sinusitis, recurrence not specified     3. Tobacco abuse          Acute pharyngitis, likely   Sinusitis with post nasal drip.      About 3-5 minutes spent counseling to quit tobacco.    Plan:      Patient placed on antibiotics.  Use of OTC analgesics recommended as well as salt water gargles.  Follow up as needed.     Subjective:      Chief Complaint   Patient presents with     Sinus Problem     Pt here today to evaluate facial pain/ pressure, nasal drainage, slight productive cough(clear phlegm), ST, swollen glands x 3 d        Namita Viera is a 68 y.o. female who presents for evaluation of sore throat. Associated symptoms include clear nasal discharge, post nasal drip, sinus and nasal congestion, bilateral sinus pain and sore throat. Onset of symptoms was 4 days ago, and have been unchanged since that time. She is drinking plenty of fluids. She has not had a recent close exposure to someone with proven streptococcal pharyngitis.    She feels that it started with a sore throat that has now gone into her sinuses.  Her sinuses hurt.  She has had no fever or chills.  She does  smoke and wape.    The following portions of the patient's history were reviewed and updated as appropriate: allergies, current medications, past family history, past medical history, past social history, past surgical history and problem list.  No Known Allergies    Current Outpatient Prescriptions on File Prior to Visit   Medication Sig Dispense Refill     metoprolol tartrate (LOPRESSOR) 25 MG tablet Take 0.5 tablets (12.5 mg total) by mouth 2 (two) times a day. 45 tablet 3     omeprazole (PRILOSEC) 20 MG capsule Take 1 capsule (20 mg total) by mouth daily. 90 capsule 3     gabapentin (NEURONTIN) 100 MG capsule Take 100mg (one capsule) approximately 2  hours before bedtime. 30 capsule 2     oxyCODONE (ROXICODONE) 5 MG immediate release tablet Take 1-2 tablets (5-10 mg total) by mouth every 4 (four) hours as needed for pain. 40 tablet 0     QUEtiapine (SEROQUEL) 25 MG tablet Take 1 tablet (25 mg total) by mouth at bedtime as needed (agitation , insomnia). 90 tablet 3     thiamine 100 MG tablet Take 1 tablet (100 mg total) by mouth daily.  0     No current facility-administered medications on file prior to visit.        Patient Active Problem List   Diagnosis     Esophageal Reflux     SAH (subarachnoid hemorrhage) (H)     Closed nondisplaced intertrochanteric fracture of left femur, initial encounter (H)     Alcohol abuse     Tobacco abuse     Closed fracture of distal end of left radius     Closed hip fracture (H)     Ear mass       Past Medical History:   Diagnosis Date     Acid reflux      Alcohol use      Hip fracture requiring operative repair (H)      SAH (subarachnoid hemorrhage) (H)        Past Surgical History:   Procedure Laterality Date     BLADDER SUSPENSION  2008     HIP FRACTURE SURGERY Left      HIP PINNING Left 5/2/2018    Procedure: INTERNAL FIXATION, FRACTURE, TROCHANTERIC, LEFT HIP, USING NAILS;  Surgeon: Adria Lambert MD;  Location: [facility];  Service:      hysterctomy  2008     TOTAL ABDOMINAL HYSTERECTOMY W/ BILATERAL SALPINGOOPHORECTOMY  2008       Family History   Problem Relation Age of Onset     Esophageal cancer Mother      Rectal cancer Sister      Rheum arthritis Brother      Cancer Sister        Social History     Social History     Marital status:      Spouse name: N/A     Number of children: N/A     Years of education: N/A     Social History Main Topics     Smoking status: Former Smoker     Packs/day: 1.50     Years: 60.00     Quit date: 5/30/2018     Smokeless tobacco: Never Used      Comment: USES [employer]      Alcohol use 16.8 oz/week     28 Glasses of wine per week     Drug use: No     Sexual activity: Not Asked      Other Topics Concern     None     Social History Narrative    Lives with . Moved in with oldest son and family ( 10 kids0.        Lizet Broderick MD  10/18/2018               Review of Systems  Constitutional: negative  Cardiovascular: negative  Gastrointestinal: negative  Hematologic/lymphatic: negative      Objective:   /79 (Patient Site: Left Arm, Patient Position: Sitting, Cuff Size: Adult Regular)  Pulse 93  Temp 97.4  F (36.3  C) (Oral)   Resp 20  Wt 108 lb 9.6 oz (49.3 kg)  SpO2 97%  BMI 19.24 kg/m2      General:Healthy, alert and in no acute distress  Head:  NCAT w/o lesions or tenderness  Eyes: conjunctivae/corneas clear. PERRL, EOM's intact. Fundi benign  Ears: normal TM's and external ear canals bilateral  Sinus tender: negative  Nose: Enlarged and erythematous turbinates  Mouth: lips, mucosa, and tongue normal. Teeth and gums normal. No tonsillar endangerment ,  Mild erythema of pharynx  Neck: supple, symmetrical, trachea midline.  Lungs: clear to auscultation bilaterally  Heart: RRR, No murmurs  Abdomen: Soft NTND, No HSM, No peritoneal signs, Rebound negative, Mc Aldair's sign negative, No CVA tenderness.  Skin: No rashes        Laboratory  Strep test done. Results:negative.

## 2021-06-21 NOTE — LETTER
Letter by Chasity Castro DO at      Author: Chasity Castro DO Service: -- Author Type: --    Filed:  Encounter Date: 5/5/2021 Status: (Other)         Namita Viera  [facility] [address] [place] [address]             May 5, 2021         Dear MsEnrique Viera,    Below are the results from your recent visit:    Resulted Orders   Comprehensive Metabolic Panel   Result Value Ref Range    Sodium 138 136 - 145 mmol/L    Potassium 4.8 3.5 - 5.0 mmol/L    Chloride 100 98 - 107 mmol/L    CO2 23 22 - 31 mmol/L    Anion Gap, Calculation 15 5 - 18 mmol/L    Glucose 85 70 - 125 mg/dL    BUN 12 8 - 28 mg/dL    Creatinine 0.71 0.60 - 1.10 mg/dL    GFR MDRD Af Amer >60 >60 mL/min/1.73m2    GFR MDRD Non Af Amer >60 >60 mL/min/1.73m2    Bilirubin, Total 0.3 0.0 - 1.0 mg/dL    Calcium 9.4 8.5 - 10.5 mg/dL    Protein, Total 7.6 6.0 - 8.0 g/dL    Albumin 3.7 3.5 - 5.0 g/dL    Alkaline Phosphatase 103 45 - 120 U/L    AST 41 (H) 0 - 40 U/L    ALT 23 0 - 45 U/L    Narrative    Fasting Glucose reference range is 70-99 mg/dL per  American Diabetes Association (ADA) guidelines.   Lipid Haakon FASTING   Result Value Ref Range    Cholesterol 226 (H) <=199 mg/dL    Triglycerides 58 <=149 mg/dL    HDL Cholesterol 122 >=50 mg/dL    LDL Calculated 92 <=129 mg/dL    Patient Fasting > 8hrs? Yes    Hepatitis C Antibody (Anti-HCV)   Result Value Ref Range    Hepatitis C Ab Negative Negative   HM2(CBC w/o Differential)   Result Value Ref Range    WBC 7.1 4.0 - 11.0 thou/uL    RBC 4.45 3.80 - 5.40 mill/uL    Hemoglobin 14.3 12.0 - 16.0 g/dL    Hematocrit 42.4 35.0 - 47.0 %    MCV 95 80 - 100 fL    MCH 32.1 27.0 - 34.0 pg    MCHC 33.7 32.0 - 36.0 g/dL    RDW 14.4 11.0 - 14.5 %    Platelets 283 140 - 440 thou/uL    MPV 9.1 7.0 - 10.0 fL   Parathyroid Hormone Intact with Minerals   Result Value Ref Range    PTH 71 10 - 86 pg/mL    Calcium 9.6 8.5 - 10.5 mg/dL    Phosphorus 3.6 2.5 - 4.5 mg/dL    Creatinine 0.74 0.60 - 1.10 mg/dL    GFR MDRD  Af Amer >60 >60 mL/min/1.73m2    GFR MDRD Non Af Amer >60 >60 mL/min/1.73m2     XR Ribs Left W PA Chest  Narrative: EXAM: XR RIBS LEFT W PA CHEST  LOCATION: [facility]  DATE/TIME: 5/4/2021 11:47 AM    INDICATION: injury to left rib 3 mo ago- with ongoing pain lower ribs. eval for fx. also chronic persistent cough -eval for concerning nodules, copd changes  COMPARISON: 04/30/2018  Impression: The visualized heart and lungs are stable. Calcified granulomata are again noted in the right lower chest. No rib fractures.      Blood work is all stable and there is no evidence of any fracture of your ribs at this time.  It is possible that you significantly strained the rib and that is why it is giving you ongoing pain.  I typically would recommend doing some type of chiropractic or physical therapy like care to try to alleviate this discomfort.  I usually will put a recommendation for people to be seen at [facility] and [facility].  Please check to see if this is covered under your insurance and if so I will place the referral and you can call to schedule.  They do more chiropractic like adjustments to help readjust where the ribs need to be with gentle maneuvers  [facility]  [address], [address]  [place], [place] [address] [phone]    Please call with questions or contact us using [employer].    Sincerely,        Electronically signed by Chasity Castro,

## 2021-06-23 ENCOUNTER — HOSPITAL ENCOUNTER (OUTPATIENT)
Dept: MAMMOGRAPHY | Facility: CLINIC | Age: 71
Discharge: HOME | End: 2021-06-23
Attending: FAMILY MEDICINE
Payer: COMMERCIAL

## 2021-06-23 DIAGNOSIS — Z12.31 VISIT FOR SCREENING MAMMOGRAM: ICD-10-CM

## 2021-06-23 NOTE — TELEPHONE ENCOUNTER
Refill Approved    Rx renewed per Medication Renewal Policy. Medication was last renewed on 10/18/2018 with 2 refills.  Last office visit: 10/18/2018 with Dr LIANG Broderick @ [facility]    Sariah Mccall, [facility] Triage/Med Refill 2/9/2019     Requested Prescriptions   Pending Prescriptions Disp Refills     loratadine (CLARITIN) 10 mg tablet 30 tablet 2     Sig: Take 1 tablet (10 mg total) by mouth daily.    Antihistamine Refill Protocol Passed - 2/9/2019  9:25 PM       Passed - Patient has had office visit/physical in last year    Last office visit with prescriber/PCP: 6/19/2018 Abi Tavera MD OR same dept: 10/18/2018 Lizet Broderick MD OR same specialty: 10/18/2018 Lizet Broderick MD  Last physical: Visit date not found Last MTM visit: Visit date not found   Next visit within 3 mo: Visit date not found  Next physical within 3 mo: Visit date not found  Prescriber OR PCP: Abi Tavera MD  Last diagnosis associated with med order: There are no diagnoses linked to this encounter.  If protocol passes may refill for 12 months if within 3 months of last provider visit (or a total of 15 months).

## 2021-06-24 NOTE — TELEPHONE ENCOUNTER
Clinic Care Coordination Contact    Clinic Care Coordination Contact  OUTREACH    Referral Information:  Referral Source: ED Follow-Up  From chart review:  Namita Viera is a 69 y.o. female with a history of subarachnoid hemorrhage, alcohol abuse, tobacco abuse, and left hip fracture surgery who presents to the ED via walk-in for evaluation of a fall and shoulder pain or mechanical fall last evening.  No head trauma, loss of consciousness or anticoagulation.     Differential diagnosis includes but is not limited to contusion, hematoma, humeral head fracture, shoulder dislocation, proximal humerus fracture, others.     On examination, she is comfortable appearing in no acute distress with stable vital signs.  There is significant amount of ecchymosis over her right anterior aspect of upper extremity.  She does have no obvious deformity but there is palpable bony tenderness over the glenohumeral joint.  Distal CMS is intact.  No tenderness over the wrist, hand, digits, forearm, elbow or distal humerus.  Obtained x-ray of the right shoulder to evaluate for any acute process of the glenohumeral joint and proximal humerus.  Results show mildly impacted acute transverse 1 part fracture through the cervical neck of the humerus with probable right shoulder joint effusion..  I spoke with [facility] to discuss the imaging.  Placed patient in a sling and recommend that she follow-up with [facility] in the next several days.  Under my care, she received 10 mg of oxycodone for pain control.  Center with prescription for 10 tablets of oxycodone and discussed safety precautions which she voiced understanding of.  She agrees to follow-up, does voiced understanding of all the return precautions that we discussed as compared with the plan for discharge at this time.     At the conclusion of the encounter I discussed the results of all of the tests and the disposition. The questions were answered. The patient or  family acknowledged understanding and was agreeable with the care plan.      10:28 AM I met with the patient to gather history and perform an initial exam.  12:11 PM I rechecked the patient, updated them on x-ray results, placed her in a sling, and discussed plan for discharge.      Pertinent Labs & Imaging studies reviewed. (See chart for details)  FINAL IMPRESSION    1. Closed nondisplaced fracture of surgical neck of right humerus, unspecified fracture morphology, initial encounter          Primary Diagnosis: Injury/Fall    Chief Complaint   Patient presents with     Clinic Care Coordination - Post Hospital     DC'd from [facility] on 3/5/19        Clinical Concerns:  Current Medical Concerns:  Talked to patient who is having significant pain. Got a sling at ED, but feels it should have been something more substantial to immobilize the fracture.  She has set up appointment with [facility] on Friday. Is taking the pain medications sparingly and also taking Bufferin.  She has enough to get her through until Friday.       She tripped of a bag of recycling with a crockpot full of pork and beans. She is grateful she did not get burned.    Current Behavioral Concerns: None identified.     Education Provided to patient: Reviewed care coordination   Pain  Pain (GOAL):: No  Health Maintenance Reviewed: Due/Overdue       Medication Management:  Independent, no concerns    Functional Status:  Dependent ADLs:: Independent  Dependent IADLs:: Cleaning, Cooking, Laundry, Shopping, Meal Preparation, Transportation  Bed or wheelchair confined:: No  Mobility Status: Independent  Fallen 2 or more times in the past year?: No  Any fall with injury in the past year?: Yes    Living Situation:  Current living arrangement:: I live in a private home with family  Type of residence:: Private home - stairs    Diet/Exercise/Sleep:  Diet:: Regular  Inadequate nutrition (GOAL):: No  Food Insecurity: No  Tube Feeding: No  Exercise::  Currently not exercising  Inadequate activity/exercise (GOAL):: No  Significant changes in sleep pattern (GOAL): No    Transportation:  Transportation concerns (GOAL):: No  Transportation means:: Regular car, Family     Psychosocial:  Mental health DX:: No  Mental health management concern (GOAL):: No  Informal Support system:: Family, Spouse     Financial/Insurance:   Financial/Insurance concerns (GOAL):: No  [employer] for seniors, no financial concerns.      Resources and Interventions:  Current Resources:    ;   Community Resources: None  Supplies used at home:: None  Equipment Currently Used at Home: none    Advance Care Plan/Directive  Advanced Care Plans/Directives on file:: No  Advanced Care Plan/Directive Status: Declined Further Information    Patient/Caregiver understanding: patient and her  will with son and his ten kids.  Has the help she needs. Has support from daughter who works at [employer].  Patient will call clinic with any concerns.  Denies need for care coordination at this time.    Plan: No further outreach by this CC.    Alanna Rodriguez,   [facility]  [email or web address]  [phone]

## 2021-06-25 NOTE — TELEPHONE ENCOUNTER
Incoming call from patient wanting to talk to nurse that called her. I offered to relayed message, patient would like the nurse to call her back.

## 2021-06-25 NOTE — TELEPHONE ENCOUNTER
----- Message from Chasity Castro DO sent at 6/8/2021  7:13 AM CDT -----  Please call patient and let her know that her bone density scan shows very severe osteoporosis.  We already have put her on high-dose vitamin D that she should be taking once weekly but please ask if she would also consider taking a medication such as oral Fosamax which is a tablet she would take once weekly as well to hopefully prevent worsening of her bone density and then we would repeat a bone density scan in the next 1 to 2 years

## 2021-06-25 NOTE — TELEPHONE ENCOUNTER
Relayed results to pt, she agree's with taking another medication weekly if Dr. Castro feels this will help. Please send prescription to [facility] pharmacy on [address]

## 2021-06-25 NOTE — TELEPHONE ENCOUNTER
Left message to call back for: Results  Information to relay to patient:  LMTCB, please see message below.

## 2021-06-26 NOTE — PROGRESS NOTES
Follow Up Blood Pressure Check    Namita Viera is a 71 y.o. female recommended to follow up for blood pressure check by Chasity Castro DO. Anihypertensive medications and adherence were verified: Yes.     Reason for visit: Medication change at last visit.     Medication change at last visit: Medicaiton changed from Losartan 50 mg to Losartan-hydrochlorothiazide 100-12.5.    Today's Vitals:   Vitals:    06/04/21 1041   BP: 138/78   Pulse: 89       Home blood pressure readings brought in today:   None    Lowest blood pressure today is less than 140/90 and they deny signs or symptoms of new onset    Suzy Wynn    Current Outpatient Medications   Medication Sig Dispense Refill     losartan-hydrochlorothiazide (HYZAAR) 100-12.5 mg per tablet Take 1 tablet by mouth daily. For blood pressure 30 tablet 0     ergocalciferol (ERGOCALCIFEROL) 1,250 mcg (50,000 unit) capsule Take 1 capsule (50,000 Units total) by mouth once a week. 12 capsule 4     fluticasone propionate (FLONASE) 50 mcg/actuation nasal spray Instill 1 spray in each nostril bid 17 g 0     omeprazole (PRILOSEC) 20 MG capsule TAKE 1 CAPSULE BY MOUTH DAILY 90 capsule 3     No current facility-administered medications for this visit.

## 2021-06-26 NOTE — TELEPHONE ENCOUNTER
Refill Approved    Rx renewed per Medication Renewal Policy. Medication was last renewed on 5/21/21, last OV 5/4/21.    Judy Covington, [facility] Triage/Med Refill 6/19/2021     Requested Prescriptions   Pending Prescriptions Disp Refills     losartan-hydrochlorothiazide (HYZAAR) 100-12.5 mg per tablet [Pharmacy Med Name: LOSARTAN-HCTZ 100-12.5 MG T 100-12.5 Tablet] 30 tablet 0     Sig: TAKE 1 TABLET BY MOUTH DAILY FOR BLOOD PRESSURE       Diuretics/Combination Diuretics Refill Protocol  Passed - 6/19/2021 10:41 AM        Passed - Visit with PCP or prescribing provider visit in past 12 months     Last office visit with prescriber/PCP: Visit date not found OR same dept: Visit date not found OR same specialty: 10/18/2018 Lizet Broderick MD  Last physical: 5/4/2021 Last MTM visit: Visit date not found   Next visit within 3 mo: Visit date not found  Next physical within 3 mo: Visit date not found  Prescriber OR PCP: Chasity Castro DO  Last diagnosis associated with med order: 1. Benign essential hypertension  - losartan-hydrochlorothiazide (HYZAAR) 100-12.5 mg per tablet [Pharmacy Med Name: LOSARTAN-HCTZ 100-12.5 MG T 100-12.5 Tablet]; Take 1 tablet by mouth daily. For blood pressure  Dispense: 30 tablet; Refill: 0    If protocol passes may refill for 12 months if within 3 months of last provider visit (or a total of 15 months).             Passed - Serum Potassium in past 12 months      Lab Results   Component Value Date    Potassium 5.4 (H) 05/20/2021             Passed - Serum Sodium in past 12 months      Lab Results   Component Value Date    Sodium 137 05/20/2021             Passed - Blood pressure on file in past 12 months     BP Readings from Last 1 Encounters:   06/04/21 138/78             Passed - Serum Creatinine in past 12 months      Creatinine   Date Value Ref Range Status   05/20/2021 0.80 0.60 - 1.10 mg/dL Final

## 2021-07-03 NOTE — ANESTHESIA PREPROCEDURE EVALUATION
Anesthesia Preprocedure Evaluation by Meg Juárez MD at 5/1/2018  6:20 PM     Author: Meg Juárez MD Service: -- Author Type: Physician    Filed: 5/1/2018  9:27 PM Date of Service: 5/1/2018  6:20 PM Status: Addendum    : Meg Juárez MD (Physician)    Related Notes: Original Note by Meg Juárez MD (Physician) filed at 5/1/2018  9:26 PM       Anesthesia Evaluation      Patient summary reviewed   No history of anesthetic complications     Airway   Mallampati: II  Neck ROM: full   Pulmonary    (+) wheezes (mild expiratory wheeze left base), a smoker                         Cardiovascular - negative ROS  Exercise tolerance: > or = 4 METS  (-) murmur  ECG reviewed  Rhythm: regular  Rate: normal,    no murmur      Neuro/Psych      Comments: Post traumatic SAH, no midline shift      Endo/Other - negative ROS      GI/Hepatic/Renal    (+) GERD well controlled,        Other findings: Fell while intoxicated. Left distal radius fracture, left nondisplaced intertrochanteric fracture of left femur and small frontal SAH.    EtOH dependence, daily drinker      04/30/18 2015 CT Cervical Spine Without Contrast View Image  Impression:   CONCLUSION:  HEAD CT:  1. A small amount of presumed posttraumatic subarachnoid hemorrhage is located in the right frontal region.  2. No convincing skull fracture.  3. Mild age-related changes.  4. Indeterminate 14 mm lytic lesion involving the upper aspect of the mastoid segment of the right temporal bone.    CERVICAL SPINE CT:  1. No fracture or subluxation.  2. Degenerative changes as highlighted above.             Dental    (+) chipped, loose and poor dentition                           Anesthesia Plan  Planned anesthetic: general endotracheal  GETA because of acute SAH from head injury during fall      Consider Ketamine 0.5 mg/kg for pain, Magnesium for pain  EtOH withdrawal protocol  ASA 3   Induction: intravenous   Anesthetic plan and risks discussed with:  patient  Anesthesia plan special considerations: antiemetics,   Post-op plan: routine recovery

## 2021-07-04 NOTE — LETTER
Letter by Chasity Castro DO at      Author: Chasity Castro DO Service: -- Author Type: --    Filed:  Encounter Date: 5/29/2021 Status: (Other)         Namita Viera  [address] [place] [address]             May 29, 2021         Dear MsEnrique Viera,    Below are the results from your recent visit:    Resulted Orders   CT Low Dose Lung Screening Chest    Narrative    EXAM: LOW DOSE LUNG CANCER SCREENING CT CHEST  LOCATION: [facility]  DATE/TIME: 5/27/2021 1:20 PM    INDICATION: Lung cancer screening. History of smoking. High risk patient with greater than 30 pack year smoking history.  COMPARISON: None.    TECHNIQUE: Low-dose lung cancer screening non-contrast CT chest. Dose reduction techniques were used.     FINDINGS:  NODULES: No significant lung nodule.    LUNGS AND PLEURA: Mild emphysema. Benign calcified granulomas right lower lobe. A few tiny adjacent calcified granulomas right upper lobe anterior laterally all of no significance.    MEDIASTINUM: Normal.    CORONARY ARTERY CALCIFICATION: Moderate.    LIMITED UPPER ABDOMEN: Normal.    MUSCULOSKELETAL: Normal.      Impression    1.  Negative for lung cancer screening purposes.    LungRADS CATEGORY: 1: Negative.    RADIOLOGIST RECOMMENDATION: Continue annual screening with low-dose CT chest in 12 months .        Namita your lung cancer screening does not show any lung cancer but does show some emphysema from your smoking. This could get worse if you do not quit. If you are still coughing regularly, we could consider an inhaler that could be helpful for your daily breathing. You also have some moderate plaque on your heart arteries which is another reason to quit. If you have worsening shortness of breath or chest pain you should be evaluated right away    Please call with questions or contact us using [employer].    Sincerely,        Electronically signed by Chasity Castro DO

## 2021-07-09 ENCOUNTER — TRANSFERRED RECORDS (OUTPATIENT)
Dept: HEALTH INFORMATION MANAGEMENT | Facility: CLINIC | Age: 71
End: 2021-07-09

## 2021-07-12 ENCOUNTER — TRANSFERRED RECORDS (OUTPATIENT)
Dept: HEALTH INFORMATION MANAGEMENT | Facility: CLINIC | Age: 71
End: 2021-07-12

## 2021-07-14 PROBLEM — I60.9 SAH (SUBARACHNOID HEMORRHAGE) (H): Status: RESOLVED | Noted: 2018-04-30 | Resolved: 2019-11-27

## 2021-07-16 ENCOUNTER — AMBULATORY - HEALTHEAST (OUTPATIENT)
Dept: SCHEDULING | Facility: CLINIC | Age: 71
End: 2021-07-16

## 2021-07-16 DIAGNOSIS — Z78.0 POSTMENOPAUSAL STATUS: ICD-10-CM

## 2021-07-16 DIAGNOSIS — Z78.0 ASYMPTOMATIC MENOPAUSAL STATE: ICD-10-CM

## 2021-08-03 PROBLEM — S72.009A CLOSED HIP FRACTURE (H): Status: RESOLVED | Noted: 2018-04-30 | Resolved: 2019-11-27

## 2021-08-03 PROBLEM — S72.145A CLOSED NONDISPLACED INTERTROCHANTERIC FRACTURE OF LEFT FEMUR, INITIAL ENCOUNTER (H): Status: RESOLVED | Noted: 2018-05-01 | Resolved: 2019-11-27

## 2021-10-04 ENCOUNTER — TRANSFERRED RECORDS (OUTPATIENT)
Dept: HEALTH INFORMATION MANAGEMENT | Facility: CLINIC | Age: 71
End: 2021-10-04

## 2021-10-10 ENCOUNTER — HEALTH MAINTENANCE LETTER (OUTPATIENT)
Age: 71
End: 2021-10-10

## 2021-10-12 ENCOUNTER — HOSPITAL ENCOUNTER (OUTPATIENT)
Facility: HOSPITAL | Age: 71
Setting detail: OBSERVATION
Discharge: NURSING, REHAB OR LONG-TERM CARE | End: 2021-10-13
Attending: STUDENT IN AN ORGANIZED HEALTH CARE EDUCATION/TRAINING PROGRAM | Admitting: STUDENT IN AN ORGANIZED HEALTH CARE EDUCATION/TRAINING PROGRAM
Payer: COMMERCIAL

## 2021-10-12 ENCOUNTER — APPOINTMENT (OUTPATIENT)
Dept: CT IMAGING | Facility: HOSPITAL | Age: 71
End: 2021-10-12
Attending: STUDENT IN AN ORGANIZED HEALTH CARE EDUCATION/TRAINING PROGRAM
Payer: COMMERCIAL

## 2021-10-12 DIAGNOSIS — N30.00 ACUTE CYSTITIS WITHOUT HEMATURIA: ICD-10-CM

## 2021-10-12 DIAGNOSIS — Z72.0 TOBACCO ABUSE: ICD-10-CM

## 2021-10-12 DIAGNOSIS — S32.10XA CLOSED FRACTURE OF SACRUM, UNSPECIFIED PORTION OF SACRUM, INITIAL ENCOUNTER (H): ICD-10-CM

## 2021-10-12 DIAGNOSIS — I10 BENIGN ESSENTIAL HYPERTENSION: ICD-10-CM

## 2021-10-12 DIAGNOSIS — S32.10XD CLOSED FRACTURE OF SACRUM WITH ROUTINE HEALING, UNSPECIFIED PORTION OF SACRUM, SUBSEQUENT ENCOUNTER: Primary | ICD-10-CM

## 2021-10-12 PROBLEM — R82.81 PYURIA: Status: ACTIVE | Noted: 2021-10-12

## 2021-10-12 PROBLEM — M54.9 BACK PAIN: Status: ACTIVE | Noted: 2021-10-12

## 2021-10-12 LAB
ALBUMIN UR-MCNC: NEGATIVE MG/DL
ANION GAP SERPL CALCULATED.3IONS-SCNC: 8 MMOL/L (ref 5–18)
APPEARANCE UR: CLEAR
BACTERIA #/AREA URNS HPF: ABNORMAL /HPF
BILIRUB UR QL STRIP: NEGATIVE
BUN SERPL-MCNC: 38 MG/DL (ref 8–28)
CALCIUM SERPL-MCNC: 9.6 MG/DL (ref 8.5–10.5)
CHLORIDE BLD-SCNC: 103 MMOL/L (ref 98–107)
CO2 SERPL-SCNC: 26 MMOL/L (ref 22–31)
COLOR UR AUTO: ABNORMAL
CREAT SERPL-MCNC: 1.03 MG/DL (ref 0.6–1.1)
ERYTHROCYTE [DISTWIDTH] IN BLOOD BY AUTOMATED COUNT: 13.3 % (ref 10–15)
ETHANOL SERPL-MCNC: <10 MG/DL
GFR SERPL CREATININE-BSD FRML MDRD: 54 ML/MIN/1.73M2
GLUCOSE BLD-MCNC: 103 MG/DL (ref 70–125)
GLUCOSE UR STRIP-MCNC: NEGATIVE MG/DL
HCT VFR BLD AUTO: 33.2 % (ref 35–47)
HGB BLD-MCNC: 11 G/DL (ref 11.7–15.7)
HGB UR QL STRIP: ABNORMAL
HYALINE CASTS: 5 /LPF
INTERNAL QC CHECK POCT: NORMAL
KETONES UR STRIP-MCNC: NEGATIVE MG/DL
LEUKOCYTE ESTERASE UR QL STRIP: NEGATIVE
MCH RBC QN AUTO: 33 PG (ref 26.5–33)
MCHC RBC AUTO-ENTMCNC: 33.1 G/DL (ref 31.5–36.5)
MCV RBC AUTO: 100 FL (ref 78–100)
NITRATE UR QL: POSITIVE
OCCULT BLOOD POCT: NEGATIVE
PH UR STRIP: 5 [PH] (ref 5–7)
PLATELET # BLD AUTO: 405 10E3/UL (ref 150–450)
POTASSIUM BLD-SCNC: 3.5 MMOL/L (ref 3.5–5)
RBC # BLD AUTO: 3.33 10E6/UL (ref 3.8–5.2)
RBC URINE: <1 /HPF
SARS-COV-2 RNA RESP QL NAA+PROBE: NEGATIVE
SODIUM SERPL-SCNC: 137 MMOL/L (ref 136–145)
SP GR UR STRIP: 1.02 (ref 1–1.03)
SQUAMOUS EPITHELIAL: 1 /HPF
TEST CARD EXPIRATION DATE: NORMAL
TEST CARD LOT NUMBER: NORMAL
UROBILINOGEN UR STRIP-MCNC: <2 MG/DL
WBC # BLD AUTO: 8.2 10E3/UL (ref 4–11)
WBC URINE: 4 /HPF

## 2021-10-12 PROCEDURE — 82077 ASSAY SPEC XCP UR&BREATH IA: CPT | Performed by: STUDENT IN AN ORGANIZED HEALTH CARE EDUCATION/TRAINING PROGRAM

## 2021-10-12 PROCEDURE — 99219 PR INITIAL OBSERVATION CARE,LEVEL II: CPT | Performed by: INTERNAL MEDICINE

## 2021-10-12 PROCEDURE — G0378 HOSPITAL OBSERVATION PER HR: HCPCS

## 2021-10-12 PROCEDURE — 250N000011 HC RX IP 250 OP 636: Performed by: STUDENT IN AN ORGANIZED HEALTH CARE EDUCATION/TRAINING PROGRAM

## 2021-10-12 PROCEDURE — 36415 COLL VENOUS BLD VENIPUNCTURE: CPT | Performed by: STUDENT IN AN ORGANIZED HEALTH CARE EDUCATION/TRAINING PROGRAM

## 2021-10-12 PROCEDURE — 96375 TX/PRO/DX INJ NEW DRUG ADDON: CPT

## 2021-10-12 PROCEDURE — 87086 URINE CULTURE/COLONY COUNT: CPT | Performed by: STUDENT IN AN ORGANIZED HEALTH CARE EDUCATION/TRAINING PROGRAM

## 2021-10-12 PROCEDURE — 81001 URINALYSIS AUTO W/SCOPE: CPT | Performed by: STUDENT IN AN ORGANIZED HEALTH CARE EDUCATION/TRAINING PROGRAM

## 2021-10-12 PROCEDURE — 250N000013 HC RX MED GY IP 250 OP 250 PS 637: Performed by: INTERNAL MEDICINE

## 2021-10-12 PROCEDURE — 250N000013 HC RX MED GY IP 250 OP 250 PS 637: Performed by: STUDENT IN AN ORGANIZED HEALTH CARE EDUCATION/TRAINING PROGRAM

## 2021-10-12 PROCEDURE — 72131 CT LUMBAR SPINE W/O DYE: CPT

## 2021-10-12 PROCEDURE — 70450 CT HEAD/BRAIN W/O DYE: CPT

## 2021-10-12 PROCEDURE — 99285 EMERGENCY DEPT VISIT HI MDM: CPT | Mod: 25

## 2021-10-12 PROCEDURE — 87635 SARS-COV-2 COVID-19 AMP PRB: CPT | Performed by: STUDENT IN AN ORGANIZED HEALTH CARE EDUCATION/TRAINING PROGRAM

## 2021-10-12 PROCEDURE — 250N000011 HC RX IP 250 OP 636: Performed by: INTERNAL MEDICINE

## 2021-10-12 PROCEDURE — 96365 THER/PROPH/DIAG IV INF INIT: CPT

## 2021-10-12 PROCEDURE — 72125 CT NECK SPINE W/O DYE: CPT

## 2021-10-12 PROCEDURE — 96372 THER/PROPH/DIAG INJ SC/IM: CPT | Mod: XU | Performed by: INTERNAL MEDICINE

## 2021-10-12 PROCEDURE — C9803 HOPD COVID-19 SPEC COLLECT: HCPCS

## 2021-10-12 PROCEDURE — 85027 COMPLETE CBC AUTOMATED: CPT | Performed by: STUDENT IN AN ORGANIZED HEALTH CARE EDUCATION/TRAINING PROGRAM

## 2021-10-12 PROCEDURE — 80048 BASIC METABOLIC PNL TOTAL CA: CPT | Performed by: STUDENT IN AN ORGANIZED HEALTH CARE EDUCATION/TRAINING PROGRAM

## 2021-10-12 PROCEDURE — 82272 OCCULT BLD FECES 1-3 TESTS: CPT | Performed by: STUDENT IN AN ORGANIZED HEALTH CARE EDUCATION/TRAINING PROGRAM

## 2021-10-12 RX ORDER — GABAPENTIN 100 MG/1
100 CAPSULE ORAL 2 TIMES DAILY
Status: DISCONTINUED | OUTPATIENT
Start: 2021-10-12 | End: 2021-10-13

## 2021-10-12 RX ORDER — NICOTINE 21 MG/24HR
1 PATCH, TRANSDERMAL 24 HOURS TRANSDERMAL EVERY 24 HOURS
Status: DISCONTINUED | OUTPATIENT
Start: 2021-10-12 | End: 2021-10-13 | Stop reason: HOSPADM

## 2021-10-12 RX ORDER — FENTANYL CITRATE 50 UG/ML
50 INJECTION, SOLUTION INTRAMUSCULAR; INTRAVENOUS ONCE
Status: COMPLETED | OUTPATIENT
Start: 2021-10-12 | End: 2021-10-12

## 2021-10-12 RX ORDER — POLYETHYLENE GLYCOL 3350 17 G/17G
17 POWDER, FOR SOLUTION ORAL DAILY
Status: DISCONTINUED | OUTPATIENT
Start: 2021-10-12 | End: 2021-10-13 | Stop reason: HOSPADM

## 2021-10-12 RX ORDER — AMOXICILLIN 250 MG
1 CAPSULE ORAL 2 TIMES DAILY PRN
Status: DISCONTINUED | OUTPATIENT
Start: 2021-10-12 | End: 2021-10-13 | Stop reason: HOSPADM

## 2021-10-12 RX ORDER — OXYCODONE HYDROCHLORIDE 5 MG/1
5-10 TABLET ORAL EVERY 4 HOURS PRN
Status: DISCONTINUED | OUTPATIENT
Start: 2021-10-12 | End: 2021-10-13 | Stop reason: HOSPADM

## 2021-10-12 RX ORDER — CEFTRIAXONE 1 G/1
1 INJECTION, POWDER, FOR SOLUTION INTRAMUSCULAR; INTRAVENOUS EVERY 24 HOURS
Status: DISCONTINUED | OUTPATIENT
Start: 2021-10-12 | End: 2021-10-13

## 2021-10-12 RX ORDER — PANTOPRAZOLE SODIUM 20 MG/1
40 TABLET, DELAYED RELEASE ORAL
Status: DISCONTINUED | OUTPATIENT
Start: 2021-10-13 | End: 2021-10-13 | Stop reason: HOSPADM

## 2021-10-12 RX ORDER — ACETAMINOPHEN 325 MG/1
975 TABLET ORAL 4 TIMES DAILY
Status: DISCONTINUED | OUTPATIENT
Start: 2021-10-12 | End: 2021-10-13 | Stop reason: HOSPADM

## 2021-10-12 RX ORDER — MULTIVIT-MIN/IRON/FOLIC ACID/K 18-600-40
1000 CAPSULE ORAL DAILY
COMMUNITY

## 2021-10-12 RX ORDER — BISACODYL 10 MG
10 SUPPOSITORY, RECTAL RECTAL DAILY PRN
Status: DISCONTINUED | OUTPATIENT
Start: 2021-10-12 | End: 2021-10-13 | Stop reason: HOSPADM

## 2021-10-12 RX ORDER — LANOLIN ALCOHOL/MO/W.PET/CERES
3 CREAM (GRAM) TOPICAL
Status: DISCONTINUED | OUTPATIENT
Start: 2021-10-12 | End: 2021-10-13 | Stop reason: HOSPADM

## 2021-10-12 RX ORDER — MAGNESIUM HYDROXIDE/ALUMINUM HYDROXICE/SIMETHICONE 120; 1200; 1200 MG/30ML; MG/30ML; MG/30ML
30 SUSPENSION ORAL EVERY 4 HOURS PRN
Status: DISCONTINUED | OUTPATIENT
Start: 2021-10-12 | End: 2021-10-13 | Stop reason: HOSPADM

## 2021-10-12 RX ORDER — OXYCODONE HYDROCHLORIDE 5 MG/1
5 TABLET ORAL ONCE
Status: COMPLETED | OUTPATIENT
Start: 2021-10-12 | End: 2021-10-12

## 2021-10-12 RX ADMIN — POLYETHYLENE GLYCOL 3350 17 G: 17 POWDER, FOR SOLUTION ORAL at 20:38

## 2021-10-12 RX ADMIN — OXYCODONE HYDROCHLORIDE 5 MG: 5 TABLET ORAL at 20:40

## 2021-10-12 RX ADMIN — ACETAMINOPHEN 975 MG: 325 TABLET ORAL at 14:50

## 2021-10-12 RX ADMIN — NICOTINE 1 PATCH: 21 PATCH, EXTENDED RELEASE TRANSDERMAL at 14:52

## 2021-10-12 RX ADMIN — CEFTRIAXONE SODIUM 1 G: 1 INJECTION, POWDER, FOR SOLUTION INTRAMUSCULAR; INTRAVENOUS at 14:50

## 2021-10-12 RX ADMIN — GABAPENTIN 100 MG: 100 CAPSULE ORAL at 14:50

## 2021-10-12 RX ADMIN — GABAPENTIN 100 MG: 100 CAPSULE ORAL at 20:38

## 2021-10-12 RX ADMIN — FENTANYL CITRATE 50 MCG: 50 INJECTION INTRAMUSCULAR; INTRAVENOUS at 16:32

## 2021-10-12 RX ADMIN — OXYCODONE HYDROCHLORIDE 5 MG: 5 TABLET ORAL at 14:00

## 2021-10-12 RX ADMIN — ACETAMINOPHEN 975 MG: 325 TABLET ORAL at 20:39

## 2021-10-12 RX ADMIN — ENOXAPARIN SODIUM 40 MG: 40 INJECTION SUBCUTANEOUS at 20:40

## 2021-10-12 ASSESSMENT — ENCOUNTER SYMPTOMS
FEVER: 0
VOMITING: 0
BACK PAIN: 1
CONSTIPATION: 1
LIGHT-HEADEDNESS: 0
APPETITE CHANGE: 1
DIARRHEA: 0
WEAKNESS: 0
DIFFICULTY URINATING: 0
NUMBNESS: 0
COUGH: 1
SHORTNESS OF BREATH: 0
ABDOMINAL PAIN: 0

## 2021-10-12 ASSESSMENT — ACTIVITIES OF DAILY LIVING (ADL): DEPENDENT_IADLS:: INDEPENDENT

## 2021-10-12 ASSESSMENT — MIFFLIN-ST. JEOR
SCORE: 918.66
SCORE: 918.66

## 2021-10-12 NOTE — ED PROVIDER NOTES
NAME: Namita Viera  AGE: 71 year old female  YOB: 1950  MRN: [id number]  EVALUATION DATE & TIME: 10/12/2021  9:21 AM    PCP: Chasity Castro    ED PROVIDER: Barbie Rivas MD.      Chief Complaint   Patient presents with     Fall     Back Pain         FINAL IMPRESSION:  1. Closed fracture of sacrum, unspecified portion of sacrum, initial encounter (H)      MEDICAL DECISION MAKIN:24 AM I met with the patient, obtained history, performed an initial exam, and discussed options and plan for diagnostics and treatment here in the ED.   Pertinent Labs & Imaging studies reviewed. (See chart for details)      9:53 AM I paged [facility] for results of MRI of 10/11/2021.  9:56 AM I talked with Dr. Joseluis Valderrama, from [facility]. He was unable to see the MRI results at this time. The results may be back later today.  10:59 AM I rechecked the patient.  11:24 PM I spoke with Dr. Valderrama, [facility].  12:40 PM I spoke with Dr. Moody, hospitalist.    70 y/o F who presents with back pain. She has been struggling with this for several weeks, has been seeing [facility] and had MRI last night (results not yet back, confirmed with Dr. Valderrama on phone, results should be back later today). She had a fall in the parking lot after MRI visit and landed on her bottom, was unwitnessed but she reports not hitting her head, is not on anticoagulation. Denies lightheadedness, chest pain or other symptoms proceeding the fall. Trauma exam was performed and notable for small abrasion to right knee and some lumbar tenderness. No neurologic deficits and history/exam not suggestive of cauda equina or other emergent spinal cord process requiring repeat MRI right now. Will get CT head, L spine and C spine to make sure no new traumatic injuries. Of note patient is struggling overall at home. Her  of over 50 years recently committed suicide, she is staying with son and per daughter has been declining since then.  She has not been eating much and has difficulty getting around due to the back pain. CBC with mild drop in Hgb from prior. BMP with mild increase in creat and BUN is elevated. Did do rectal exam and no melena/blood, stool occult testing negative. CT did show bilateral sacral fracture, will need ortho consult during stay. Plan for admission for pain control, PT/OT, possible placement. Did consult care manager in the ED as well. Patient and daughter have been in agreement with plan. Discussed with hospitalist who recommends med/surg observation admission.      PPE: Provider wore gloves, N95 mask, eye protection  MEDICATIONS GIVEN IN THE EMERGENCY:  Medications   omeprazole (priLOSEC) CR capsule 20 mg (has no administration in time range)   acetaminophen (TYLENOL) tablet 975 mg (has no administration in time range)   alum & mag hydroxide-simethicone (MAALOX) suspension 30 mL (has no administration in time range)   bisacodyl (DULCOLAX) Suppository 10 mg (has no administration in time range)   magnesium hydroxide (MILK OF MAGNESIA) suspension 30 mL (has no administration in time range)   melatonin tablet 3 mg (has no administration in time range)   senna-docusate (SENOKOT-S/PERICOLACE) 8.6-50 MG per tablet 1 tablet (has no administration in time range)   sodium phosphate (FLEET ENEMA) 1 enema (has no administration in time range)   polyethylene glycol (MIRALAX) Packet 17 g (has no administration in time range)   oxyCODONE IR (ROXICODONE) half-tab 2.5-5 mg (has no administration in time range)   cefTRIAXone (ROCEPHIN) 1 g vial to attach to  mL bag for ADULTS or NS 50 mL bag for PEDS (has no administration in time range)   enoxaparin ANTICOAGULANT (LOVENOX) injection 40 mg (has no administration in time range)   oxyCODONE (ROXICODONE) tablet 5 mg (5 mg Oral Given 10/12/21 1400)       NEW PRESCRIPTIONS STARTED AT TODAY'S ER VISIT:  New Prescriptions    No medications on file       "    =================================================================    HPI    Patient information was obtained from: Patient    Use of : N/A        Namita Viera is a 71 year old female with a past medical history of chronic cough, HTN, alcohol use, and polyp of colon who presents to the ED via private car for evaluation of a fall and back pain.    Recently, the patient's  committed suicide and afterward she moved into her son's house. Since then, there has been a decline in function and increase in \"intractable pain\" to her back for the past 2 weeks. She has been having difficulty getting out of her chair and going to the bathroom due to the back pain. The pain is located in the lower back and radiates to both legs, but is intolerable in her left leg. Twice she has visited [facility] and received pain medications, and yesterday she received a MRI but results are pending. In the parking lot on the way back from [place], the patient \"tried to move\" and fell on her right shoulder and right knee. She states that she felt fine afterwards, and denies hitting her head. Previously, she has never been on medications other than omeprazole, and in the past 6 months was put on blood pressure medication. She has a chronic chunky cough. Additionally, she has a decreased appetite, and due to the blood pressure medications is having stool backed up. The patient denies lightheadedness, fever, vomiting, diarrhea, chest pain, abdominal pain, trouble urinating, leg or groin numbness, focal weakness, melena/bloody stool, urinary incontinence, or any other complaints at this time.      REVIEW OF SYSTEMS   Review of Systems   Constitutional: Positive for appetite change (loss of appetite). Negative for fever.   Respiratory: Positive for cough (chronic). Negative for shortness of breath.    Cardiovascular: Negative for chest pain.   Gastrointestinal: Positive for constipation. Negative for abdominal pain, " diarrhea and vomiting.   Genitourinary: Negative for difficulty urinating.   Musculoskeletal: Positive for back pain (that shoots down left leg).   Neurological: Negative for weakness, light-headedness and numbness (negative for numbness to legs or groin).   All other systems reviewed and are negative.       PAST MEDICAL HISTORY:  Past Medical History:   Diagnosis Date     Acid reflux      Alcohol abuse      Alcohol use      Closed fracture of left femur (H)      Hip fracture requiring operative repair (H)      SAH (subarachnoid hemorrhage) (H)      Traumatic closed displaced fracture of distal end of radius        PAST SURGICAL HISTORY:  Past Surgical History:   Procedure Laterality Date     BLADDER SUSPENSION  2008     HIP FRACTURE SURGERY Left      HIP PINNING Left 5/2/2018    Procedure: INTERNAL FIXATION, FRACTURE, TROCHANTERIC, LEFT HIP, USING NAILS;  Surgeon: Adria Lambert MD;  Location: [facility];  Service:      HYSTERECTOMY TOTAL ABDOMINAL, BILATERAL SALPINGO-OOPHORECTOMY, COMBINED  2008     OTHER SURGICAL HISTORY  2008    hysterctomy       CURRENT MEDICATIONS:      Current Facility-Administered Medications:      acetaminophen (TYLENOL) tablet 975 mg, 975 mg, Oral, 4x Daily, Josefina Moody MD     alum & mag hydroxide-simethicone (MAALOX) suspension 30 mL, 30 mL, Oral, Q4H PRN, Josefina Moody MD     bisacodyl (DULCOLAX) Suppository 10 mg, 10 mg, Rectal, Daily PRN, Josefina Moody MD     cefTRIAXone (ROCEPHIN) 1 g vial to attach to  mL bag for ADULTS or NS 50 mL bag for PEDS, 1 g, Intravenous, Q24H, Josefina Moody MD     enoxaparin ANTICOAGULANT (LOVENOX) injection 40 mg, 40 mg, Subcutaneous, Q24H, Josefina Moody MD     magnesium hydroxide (MILK OF MAGNESIA) suspension 30 mL, 30 mL, Oral, Daily PRN, Josefina Moody MD     melatonin tablet 3 mg, 3 mg, Oral, At Bedtime PRN, Josefina Moody MD     [START ON 10/13/2021] omeprazole (priLOSEC) CR capsule 20 mg, 20  mg, Oral, QAM AC, Josefina Moody MD     oxyCODONE IR (ROXICODONE) half-tab 2.5-5 mg, 2.5-5 mg, Oral, Q4H PRN, Josefina Moody MD     polyethylene glycol (MIRALAX) Packet 17 g, 17 g, Oral, Daily, Josefina Moody MD     senna-docusate (SENOKOT-S/PERICOLACE) 8.6-50 MG per tablet 1 tablet, 1 tablet, Oral, BID PRN, Josefina Moody MD     sodium phosphate (FLEET ENEMA) 1 enema, 1 enema, Rectal, Daily PRN, Josefina Moody MD    Current Outpatient Medications:      alendronate (FOSAMAX) 70 MG tablet, [ALENDRONATE (FOSAMAX) 70 MG TABLET] Take 1 tablet (70 mg total) by mouth every 7 days. Take in the morning on an empty stomach with a full glass of water 30 minutes before food, Disp: 12 tablet, Rfl: 11     losartan-hydrochlorothiazide (HYZAAR) 100-12.5 mg per tablet, [LOSARTAN-HYDROCHLOROTHIAZIDE (HYZAAR) 100-12.5 MG PER TABLET] Take 1 tablet by mouth daily. For blood pressure, Disp: 90 tablet, Rfl: 3     omeprazole (PRILOSEC) 20 MG capsule, [OMEPRAZOLE (PRILOSEC) 20 MG CAPSULE] TAKE 1 CAPSULE BY MOUTH DAILY, Disp: 90 capsule, Rfl: 3     Vitamin D, Cholecalciferol, 25 MCG (1000 UT) TABS, Take 1,000 Units by mouth daily, Disp: , Rfl:     ALLERGIES:  No Known Allergies    FAMILY HISTORY:  Family History   Problem Relation Age of Onset     Esophageal Cancer Mother      Cancer Mother      Rectal Cancer Sister      Rheumatoid Arthritis Brother      Cancer Sister         lump in neck.        SOCIAL HISTORY:   Social History     Socioeconomic History     Marital status:      Spouse name: Not on file     Number of children: Not on file     Years of education: Not on file     Highest education level: Not on file   Occupational History     Not on file   Tobacco Use     Smoking status: Current Every Day Smoker     Packs/day: 1.50     Years: 60.00     Pack years: 90.00     Last attempt to quit: 5/30/2018     Years since quitting: 3.3     Smokeless tobacco: Never Used   Substance and Sexual Activity     Alcohol  "use: Yes     Alcohol/week: 14.0 standard drinks     Drug use: No     Sexual activity: Not on file   Other Topics Concern     Not on file   Social History Narrative    Lives with . Moved in with oldest son and family ( 10 kids0.    Lizet Broderick MD  10/18/2018         Social Determinants of Health     Financial Resource Strain:      Difficulty of Paying Living Expenses:    Food Insecurity:      Worried About Running Out of Food in the Last Year:      Ran Out of Food in the Last Year:    Transportation Needs:      Lack of Transportation (Medical):      Lack of Transportation (Non-Medical):    Physical Activity:      Days of Exercise per Week:      Minutes of Exercise per Session:    Stress:      Feeling of Stress :    Social Connections:      Frequency of Communication with Friends and Family:      Frequency of Social Gatherings with Friends and Family:      Attends Mosque Services:      Active Member of Clubs or Organizations:      Attends Club or Organization Meetings:      Marital Status:    Intimate Partner Violence:      Fear of Current or Ex-Partner:      Emotionally Abused:      Physically Abused:      Sexually Abused:        PHYSICAL EXAM:    Vitals: /58   Pulse 79   Temp 98.3  F (36.8  C) (Oral)   Resp 18   Ht 1.549 m (5' 1\")   Wt 47.6 kg (105 lb)   SpO2 92%   BMI 19.84 kg/m     Constitutional: Well developed, well nourished. Comfortable appearing.   HENT: Atraumatic, no painful lumps, bumps to scalp. Mucous membranes moist, nose midline  Eyes: PERRL, mid-range pupils, conjunctiva normal.  Neck: No midline cervical spine tenderness. No midline pain with active flexion, extension, or bilateral rotation.  Respiratory: CTAB. Normal work of breathing. No abrasions, contusion or tenderness to chest wall.  Cardiovascular: Regular rate and rhythm.   Musculoskeletal: Small abrasion to the right knee. Full active pain-free ROM to bilateral shoulders, elbows, wrists, hands, hips, knees, and " ankles. No pain with palpation along joints and long bones. No pain or laxity with A/P and lateral compression of the pelvis.  Abdomen: Soft. Non-tender in all quadrants. No guarding.  Back: No midline C,T tenderness or deformity. No abrasions or bony stepoffs. Mild midline lumbar tenderness.  Neurologic: Alert & oriented x 3, cranial nerves grossly intact. Normal gross coordination. Strength and sensation intact and equal in extremities.  Rectal: done with nurse in room, normal tone, skin tags, no blood or melena seen  Psychiatric: Affect normal, cooperative.       LAB:  All pertinent labs reviewed and interpreted.  Labs Ordered and Resulted from Time of ED Arrival Up to the Time of Departure from the ED   CBC WITH PLATELETS - Abnormal; Notable for the following components:       Result Value    RBC Count 3.33 (*)     Hemoglobin 11.0 (*)     Hematocrit 33.2 (*)     All other components within normal limits   BASIC METABOLIC PANEL - Abnormal; Notable for the following components:    Urea Nitrogen 38 (*)     GFR Estimate 54 (*)     All other components within normal limits   ROUTINE UA WITH MICROSCOPIC REFLEX TO CULTURE - Abnormal; Notable for the following components:    Blood Urine 0.06 mg/dL (*)     Nitrite Urine Positive (*)     Bacteria Urine Moderate (*)     Hyaline Casts Urine 5 (*)     All other components within normal limits    Narrative:     Urine Culture ordered based on laboratory criteria   COVID-19 VIRUS (CORONAVIRUS) BY PCR - Normal    Narrative:     Testing was performed using the yadiel  SARS-CoV-2 & Influenza A/B Assay on the yadiel  Marsha  System.  This test should be ordered for the detection of SARS-COV-2 in individuals who meet SARS-CoV-2 clinical and/or epidemiological criteria. Test performance is unknown in asymptomatic patients.  This test is for in vitro diagnostic use under the FDA EUA for laboratories certified under CLIA to perform moderate and/or high complexity testing. This test has not  been FDA cleared or approved.  A negative test does not rule out the presence of PCR inhibitors in the specimen or target RNA in concentration below the limit of detection for the assay. The possibility of a false negative should be considered if the patient's recent exposure or clinical presentation suggests COVID-19.  [facility] are certified under the Clinical Laboratory Improvement Amendments of 1988 (CLIA-88) as qualified to perform moderate and/or high complexity laboratory testing.   ETHYL ALCOHOL LEVEL - Normal   OCCULT BLOOD STOOL POCT - Normal   CALL   CALL   CALL   CALL   URINE CULTURE       RADIOLOGY:  Lumbar spine CT w/o contrast   Final Result   IMPRESSION:   1.  Comminuted bilateral sacral fracture with mild presacral edema.   2.  No lumbar compression fracture. No significant spinal canal or foraminal stenosis.      Cervical spine CT w/o contrast   Final Result   IMPRESSION:   1.  No fracture or posttraumatic subluxation.   2.  No high-grade spinal canal or neural foraminal stenosis.      Head CT w/o contrast   Final Result   IMPRESSION:   1.  No CT evidence for acute intracranial process.   2.  Brain atrophy and presumed chronic microvascular ischemic changes as above.          EKG:   N/A    PROCEDURES:   Procedures       I, Abbie Lindsey, am serving as a scribe to document services personally performed by Dr. Barbie Rivas based on my observation and the provider's statements to me. I, Barbie Rivas MD attest that Abbie Lindsey is acting in a scribe capacity, has observed my performance of the services and has documented them in accordance with my direction.      Barbie Rivas M.D.  Emergency Medicine  [facility] EMERGENCY DEPARTMENT  [facility] [facility] [address]  [phone]  Dept: [phone]       Barbie Rivas MD  10/12/21 4345

## 2021-10-12 NOTE — PHARMACY-ADMISSION MEDICATION HISTORY
Pharmacy Note - Admission Medication History    Pertinent Provider Information: Patient recently received short courses of Flexeril 5 mg, Norco 5/325 mg, and a Medrol DosePak for back pain -- all have been finished over the past couple of days. Also received Ativan for MRI.     ______________________________________________________________________    Prior To Admission (PTA) med list completed and updated in EMR.       PTA Med List   Medication Sig Note Last Dose     alendronate (FOSAMAX) 70 MG tablet [ALENDRONATE (FOSAMAX) 70 MG TABLET] Take 1 tablet (70 mg total) by mouth every 7 days. Take in the morning on an empty stomach with a full glass of water 30 minutes before food 10/12/2021: Saturdays 10/9/2021 at Unknown time     losartan-hydrochlorothiazide (HYZAAR) 100-12.5 mg per tablet [LOSARTAN-HYDROCHLOROTHIAZIDE (HYZAAR) 100-12.5 MG PER TABLET] Take 1 tablet by mouth daily. For blood pressure  10/11/2021 at Unknown time     omeprazole (PRILOSEC) 20 MG capsule [OMEPRAZOLE (PRILOSEC) 20 MG CAPSULE] TAKE 1 CAPSULE BY MOUTH DAILY  10/12/2021 at Unknown time     Vitamin D, Cholecalciferol, 25 MCG (1000 UT) TABS Take 1,000 Units by mouth daily  10/12/2021 at Unknown time       Information source(s): Patient and CareEverywhere/[facility]  Method of interview communication: in-person    Summary of Changes to PTA Med List  New: cholecalciferol  Discontinued: ergocalciferol, Flonase (ineffective)    Patient was asked about OTC/herbal products specifically.  PTA med list reflects this.    Allergies were reviewed, assessed, and updated with the patient.      Patient does not use any multi-dose medications prior to admission.    The information provided in this note is only as accurate as the sources available at the time of the update(s).    Thank you for the opportunity to participate in the care of this patient.    Eula Dueñas [facility]  10/12/2021 12:20 PM

## 2021-10-12 NOTE — ED TRIAGE NOTES
Pt had fall 3 weeks ago. Back pain since then. Took vicodin this AM at home. MRI yesterday, no results yet. Pt was given 100mcg of fentanyl by EMS en route.

## 2021-10-12 NOTE — H&P
Admission History and Physical   Namita Viera, 1950, [id number]  [facility]  Back pain [M54.9]  PCP:Chasity Castro, [phone]   Admitting provider: Josefina Moody MD.    Code status:  Full Code          Extended Emergency Contact Information  Primary Emergency Contact: Garrett Viera  Address: [address] [place] [address] [id number] [place] [address]  Home Phone: [phone]  Mobile Phone: [phone]  Relation: Spouse  Secondary Emergency Contact: Tiara Slaughter   [facility]  Home Phone: [phone]  Relation: Other       Assessment and Plan  Active Problems:    Benign essential hypertension    Back pain    Closed fracture of sacrum with routine healing    Pyuria    Namita Viera is a 71 year old old female presenting with fall and back pain     Back pain worsening after fall. MRI from 10/11 still pending.   - CT today shows sacral fracture possibly new vs old.   - [facility] consulted   - prn oxycodone, scheduled tylenol   - trial of gabapentin given what sounds like radicular pain possible nerve impingement??  - PT/OT   - discussed with son and in agreement for placement TCU will tag SW    Pyuria may be adding to weakness   - Ucx pending  - will treat ceftriaxone for now     HTN pressures stable  - hold home BP meds for now   - trend vitals     COVID STATUS: negative Date:10/12/2021     VTE prophylaxis:  Enoxaparin (Lovenox) SQ  DIET: Orders Placed This Encounter      Regular Diet Adult    Drains/Lines: none  Weight bearing status: WBAT  Disposition/Barriers to discharge: pending therapy and possible placement needed   Code Status:Full Code    HPI: Namita Viera is a 71 year old old female with h/o chronic cough hypertension alcohol dependence, and polyp of colon who presents to the ED via private car for evaluation of a fall and back pain.     Recently, the patient's  committed suicide and afterward she moved into her son's house. Since then, there has been  "a decline in function and increase in \"intractable pain\" to her back for the past 2 weeks. She has been having difficulty getting out of her chair and going to the bathroom due to the back pain. The pain is located in the lower back and radiates to both legs, but is intolerable in her left leg. Twice she has visited [facility] and received pain medications, and yesterday she received a MRI. In the parking lot on the way back from [place], the patient \"tried to move\" and fell on her right shoulder and right knee. She states that she felt fine afterwards, and denies hitting her head. However today her pain is much more severe.  Previously, she has never been on medications other than omeprazole, and in the past 6 months was put on blood pressure medication. She has a chronic chunky cough. Additionally, she has a decreased appetite, and due to the blood pressure medications is having stool backed up. The patient denies lightheadedness, fever, vomiting, diarrhea, chest pain, abdominal pain, trouble urinating, urinary incontinence.  She pan shave numbness down both legs L>R and shooting pain down both as well.      Of note, the patient has a emerson in left leg.  Patients son is present and she has not had alcohol for at least 36 hours and he has been trying to wean her off.     Past Medical History:   Diagnosis Date     Acid reflux      Alcohol abuse      Alcohol use      Closed fracture of left femur (H)      Hip fracture requiring operative repair (H)      SAH (subarachnoid hemorrhage) (H)      Traumatic closed displaced fracture of distal end of radius      Past Surgical History:   Procedure Laterality Date     BLADDER SUSPENSION  2008     HIP FRACTURE SURGERY Left      HIP PINNING Left 5/2/2018    Procedure: INTERNAL FIXATION, FRACTURE, TROCHANTERIC, LEFT HIP, USING NAILS;  Surgeon: Adria Lambert MD;  Location: [facility] OR;  Service:      HYSTERECTOMY TOTAL ABDOMINAL, BILATERAL SALPINGO-OOPHORECTOMY, " COMBINED  2008     OTHER SURGICAL HISTORY  2008    hysterctomy     Family History   Problem Relation Age of Onset     Esophageal Cancer Mother      Cancer Mother      Rectal Cancer Sister      Rheumatoid Arthritis Brother      Cancer Sister         lump in neck.      Social History     Socioeconomic History     Marital status:      Spouse name: Not on file     Number of children: Not on file     Years of education: Not on file     Highest education level: Not on file   Occupational History     Not on file   Tobacco Use     Smoking status: Current Every Day Smoker     Packs/day: 1.50     Years: 60.00     Pack years: 90.00     Last attempt to quit: 5/30/2018     Years since quitting: 3.3     Smokeless tobacco: Never Used   Substance and Sexual Activity     Alcohol use: Yes     Alcohol/week: 14.0 standard drinks     Drug use: No     Sexual activity: Not on file   Other Topics Concern     Not on file   Social History Narrative    Lives with . Moved in with oldest son and family ( 10 kids0.    Lizet Broderick MD  10/18/2018         Social Determinants of Health     Financial Resource Strain:      Difficulty of Paying Living Expenses:    Food Insecurity:      Worried About Running Out of Food in the Last Year:      Ran Out of Food in the Last Year:    Transportation Needs:      Lack of Transportation (Medical):      Lack of Transportation (Non-Medical):    Physical Activity:      Days of Exercise per Week:      Minutes of Exercise per Session:    Stress:      Feeling of Stress :    Social Connections:      Frequency of Communication with Friends and Family:      Frequency of Social Gatherings with Friends and Family:      Attends Jehovah's witness Services:      Active Member of Clubs or Organizations:      Attends Club or Organization Meetings:      Marital Status:    Intimate Partner Violence:      Fear of Current or Ex-Partner:      Emotionally Abused:      Physically Abused:      Sexually Abused:      No Known  Allergies    PRIOR TO ADMISSION MEDICATIONS   (Not in a hospital admission)       REVIEW OF SYSTEMS:  12 point reviewed pertinent negatives and positives in HPI all others negative     PHYSICAL EXAM  Temp:  [98.3  F (36.8  C)] 98.3  F (36.8  C)  Pulse:  [78-96] 79  Resp:  [16-18] 18  BP: (105-178)/(52-74) 128/58  SpO2:  [92 %-100 %] 92 %  Wt Readings from Last 1 Encounters:   10/12/21 47.6 kg (105 lb)     Body mass index is 19.84 kg/m .  Physical Exam  Constitutional:       Appearance: Normal appearance.   HENT:      Head: Normocephalic and atraumatic.      Mouth/Throat:      Mouth: Mucous membranes are dry.      Pharynx: Oropharynx is clear.      Comments: Poor dentition  Eyes:      Extraocular Movements: Extraocular movements intact.      Conjunctiva/sclera: Conjunctivae normal.   Cardiovascular:      Rate and Rhythm: Normal rate and regular rhythm.      Pulses: Normal pulses.      Heart sounds: Normal heart sounds.   Pulmonary:      Effort: Pulmonary effort is normal.      Breath sounds: Normal breath sounds.      Comments: Harsh wet cough   Abdominal:      General: Bowel sounds are normal.      Palpations: Abdomen is soft.   Musculoskeletal:      Comments: No leg swelling, b/l pelvis/hip pain.  Decreased sensation bilaterally left>R   Skin:     General: Skin is warm and dry.   Neurological:      General: No focal deficit present.      Mental Status: She is alert and oriented to person, place, and time. Mental status is at baseline.   Psychiatric:         Mood and Affect: Mood normal.         Behavior: Behavior normal.         PERTINENT LABS and RADIOLOGY   Results for orders placed or performed during the hospital encounter of 10/12/21   Lumbar spine CT w/o contrast    Impression    IMPRESSION:  1.  Comminuted bilateral sacral fracture with mild presacral edema.  2.  No lumbar compression fracture. No significant spinal canal or foraminal stenosis.   Cervical spine CT w/o contrast    Impression     IMPRESSION:  1.  No fracture or posttraumatic subluxation.  2.  No high-grade spinal canal or neural foraminal stenosis.   Head CT w/o contrast    Impression    IMPRESSION:  1.  No CT evidence for acute intracranial process.  2.  Brain atrophy and presumed chronic microvascular ischemic changes as above.     Most Recent 3 CBC's:  Recent Labs   Lab Test 10/12/21  0956 05/04/21  1127 05/29/18  1050   WBC 8.2 7.1 4.8   HGB 11.0* 14.3 12.1    95 97    283 353     Most Recent 3 BMP's:  Recent Labs   Lab Test 10/12/21  0956 05/20/21  1112 05/04/21  1127    137 138   POTASSIUM 3.5 5.4* 4.8   CHLORIDE 103 100 100   CO2 26 25 23   BUN 38* 18 12   CR 1.03 0.80 0.71  0.74   ANIONGAP 8 12 15   PETER 9.6 9.5 9.4  9.6    89 85     Most Recent 2 LFT's:  Recent Labs   Lab Test 05/04/21  1127 05/29/18  1050   AST 41* 18   ALT 23 10   ALKPHOS 103 164*   BILITOTAL 0.3 0.3     Most Recent 3 INR's:  Recent Labs   Lab Test 04/30/18  1926   INR 0.95           Josefina Moody MD  [facility] Medicine Service  [phone]

## 2021-10-12 NOTE — CONSULTS
ORTHOPEDIC CONSULTATION    Consultation  Namita Viera,  1950, MRN [id number]    [facility]  Back pain [M54.9]    PCP: Chasity Castro, [phone]   Code status:  Full Code       Extended Emergency Contact Information  Primary Emergency Contact: Garrett Viera  Address: [address] [place] [address] [facility]  Home Phone: [phone]  Mobile Phone: [phone]  Relation: Spouse  Secondary Emergency Contact: Tiara Slaughter   [facility]  Home Phone: [phone]  Relation: Other         IMPRESSION:  Nondisplaced, comminuted bilateral sacral insufficiency fractures, left greater than right     PLAN:  This patient was discussed with Jesse Mckeon MD, on-call surgeon for [facility] and they are in agreement with the following plan.     No surgery is recommended at this time. The patient should remain non-weightbearing of the bilateral lower extremities and bed to chair transfers only. Anticipate continued non-operative management and progressive weight-bearing over the next 2-3 months. Anticipate recovery and rehabilitation at TCU following PT/OT recommendations.     Continue gabapentin for nerve pain symptoms. Recommend limited use of narcotic pain medications given patient's psychosocial and medical history. She has active oxycodone orders. Will order tramadol as another option. Patient believes she has tolerated this medication in the past. Can consider NSAIDs too. Ortho to sign off and follow as outpatient unless her neurovascular state changes.     We appreciate hospitalist's recommendations of myeloproliferative workup given the MRI findings. Communicated the MRI finding with on-call hospitalist Dr. Sandoval at 1825 and discussed potential work-up while patient is inpatient. He stated he will communicate this with the [facility] hospitalist tomorrow morning.     Thank you for including [facility] in the care of Namita Viera. It has been a pleasure  "participating in her care.        CHIEF COMPLAINT: Pelvis Fractures s/p fall    HISTORY OF PRESENT ILLNESS:  The patient is seen in orthopedic consultation at the request of Dr. Josefina Moody.  The patient is a pleasant 71 year old female with minimal pain with inactivity, but severe lower back and left worse than right lower extremity pain with activity, weightbearing, or lower extremity movements.  The patient reports that she had progressive symptoms over the past couple of weeks and [facility] yesterday evening. She reports difficulty ambulating yesterday evening and \"slightly fell\" on her right shoulder and knee last night, but doesn't feel she made her back worse. She describes this as a minimal incident.  The patient describes moderate discomfort to palpation of her sacrum. Additionally, she has aching and burning pain down her lateral thigh towards her calf. She denies numbness or tingling down her legs, saddle anesthesia, or new onset incontinence. The patient reports that their pain is alleviated by rest and avoiding active hip and lower extremity ROM and weightbearing and is exacerbated by weightbearing and LE ROM.      PAST MEDICAL HISTORY:    Acid reflux       Alcohol abuse       Alcohol use       Closed fracture of left femur (H)       Hip fracture requiring operative repair (H)       SAH (subarachnoid hemorrhage) (H)       Traumatic closed displaced fracture of distal end of radius        ALLERGIES:   Review of patient's allergies indicates No Known Allergies      MEDICATIONS UPON ADMISSION:  Medications were reviewed.  They include:   (Not in a hospital admission)        SOCIAL HISTORY:   she  reports that she has been smoking. She has a 90.00 pack-year smoking history. She has never used smokeless tobacco. She reports current alcohol use of about 14.0 standard drinks of alcohol per week. She reports that she does not use drugs.    FAMILY " HISTORY:  family history includes Cancer in her mother and sister; Esophageal Cancer in her mother; Rectal Cancer in her sister; Rheumatoid Arthritis in her brother.      REVIEW OF SYSTEMS:   Reviewed with patient. See HPI, otherwise negative       PHYSICAL EXAMINATION:  Vitals: Temp:  [98.3  F (36.8  C)] 98.3  F (36.8  C)  Pulse:  [] 85  Resp:  [16-18] 18  BP: (105-178)/(52-74) 130/68  SpO2:  [92 %-100 %] 98 %  General: On examination, the patient is resting comfortably, NAD, awake and alert and oriented to person, place, time, and and general circumstances   SKIN: Skin is grossly intact. There is no evidence of abnormal masses, rashes. She does have a small dime-sized abrasion to the lateral right knee.   Pulses:  dorsalis pedis and posterior tibial pulse is intact and equal bilaterally  Sensation: intact and equal bilaterally to the distal lower extremities to light touch.   Tenderness: moderate to direct palpation of the sacrum bilaterally. Non-tender groin, greater trochanter, knees, calves.   ROM: Passive hip ROM is full and non-tender bilaterally. Pt cannot tolerate active hip ROM testing due to discomfort. Passive knee, ankle ROM is full and painless. Lower extremities are stable to varus and valgus stress.   Motor: 5/5 dorsi- and plantarflexion bilaterally.         RADIOGRAPHIC EVALUATION:  Personally reviewed    EXAM: CT LUMBAR SPINE W/O CONTRAST  LOCATION: [facility]  DATE/TIME: 10/12/2021 10:07 AM     INDICATION: Chronic back pain, fall.  COMPARISON: Lumbar MRI 10/11/2021.  TECHNIQUE: Routine CT Lumbar Spine without IV contrast. Multiplanar reformats. Dose reduction techniques were used.      FINDINGS:  VERTEBRA: Mild posterior spondylolisthesis at L2-L3 with otherwise normal alignment. Diffuse osteopenia. Normal vertebral body heights. No lumbar compression fracture. Bilateral sacral fractures with indistinct fracture planes and cortical irregularity   along the ventral  sacral ala. Mild edema within the presacral soft tissues.      CANAL/FORAMINA: No canal or neural foraminal stenosis.     PARASPINAL: Calcified plaque within the abdominal aorta. Left lower lobe atelectasis.                                                                      IMPRESSION:  1.  Comminuted bilateral sacral fracture with mild presacral edema.  2.  No lumbar compression fracture. No significant spinal canal or foraminal stenosis.        MR Lumbar Spine ([id number]) Final    No Documents Attached       EXAM: MR Lumbar Spine  LOCATION: [facility]  DATE/TIME: 10/11/2021 12:00 AM    INDICATION: Low back pain.  COMPARISON: CT abdomen and pelvis 7/9/2021.  TECHNIQUE: Routine Lumbar Spine MRI without IV contrast.    FINDINGS:  Nomenclature is based on 5 lumbar type vertebral bodies. Subtle degenerative retrolisthesis L2 on L3. Otherwise normal alignment. Allowing for slight central superior endplate lower lumbar vertebral body height loss, vertebral body heights are grossly preserved. No pars defects. Mild Modic type II changes anterior superior corners of L3 and L4. Generalized heterogeneity of the marrow space without focal mass lesions. As a conservative measure, myeloproliferative workup is suggested to exclude a marrow packing replacing process.    There is STIR hyperintensity and low signal on T1-weighted images within the sacral element, left greater than right and compatible with insufficiency-type fractures. These appear new compared to 7/9/2021 CT of the abdomen and pelvis.    Generalized atrophy of the dorsal paraspinal musculature and psoas muscles, left greater than right. Visualized aorta is normal in caliber. Sigmoid diverticulosis.    Distal spinal cord is unremarkable. Conus terminates at the L1-L2 level.    T12-L1: Normal disc height. Mild loss of normal disc T2 prolongation. No disc herniation. No facet arthropathy. No spinal canal or neural foraminal stenosis. Axial  images do not extend to this level.    L1-L2: Normal disc height. Loss of normal disc T2 prolongation. No disc herniation. No facet arthropathy. No spinal canal or neural foraminal stenosis.    L2-L3: Slight retrolisthesis L2 on L3. Normal disc height. Mild loss of normal disc T2 prolongation. Trace posterior disc bulge. No facet arthropathy. No spinal canal or neural foraminal stenosis.    L3-L4: Normal disc height. Loss of normal disc T2 prolongation. Trace foraminal disc bulging. Mild facet arthropathy with shallow left-sided facet effusion. No spinal canal or neural foraminal stenosis.    L4-L5: Normal disc height. Loss of normal disc T2 prolongation. Shallow posterior disc bulge. Moderate left sided hypertrophic facet arthropathy with shallow left-sided facet effusion. No spinal canal or neural foraminal stenosis.    L5-S1: Normal disc height. Loss of normal disc T2 prolongation. Shallow posterior disc bulge. Moderate right-sided facet arthropathy. No spinal canal or neural foraminal stenosis.    IMPRESSION:  1.  Bilateral sacral insufficiency-type fractures, more evident on the left.    2.  Mild to moderate lumbar spondylosis with low-grade retrolisthesis of L2 on L3 and no significant spinal canal or neural foraminal stenosis.    3.  Generalized herniated marrow space without focal mass lesions. Although marrow heterogeneity may relate to osteoporosis, as a conservative measure, myeloproliferative workup is suggested to exclude a marrow packing replacing process.    KEY IMAGE(S):  Sagittal T2-weighted image(s): 6, 10, and 13.  Coronal T1-weighted image(s): 13.    Presence of sacral insufficiency fractures were discussed with physician assistant Michelle Malin at 10:24 AM.  This report was electronically interpreted by: Tom Downs MD on 10/12/2021 at 11:41     Ordered by: Michelle Malin Collected/Examined: 11Oct2021 12:00AM   Verification Required Stage: Final Resulted: 11Oct2021 12:00AM Last Updated:  [id number] 11:43AM Accession: [id number]     PERTINENT LABS:  Lab Results: personally reviewed.   Lab Results   Component Value Date     10/12/2021    CO2 26 10/12/2021    BUN 38 10/12/2021     Lab Results   Component Value Date    WBC 8.2 10/12/2021    HGB 11.0 10/12/2021    HCT 33.2 10/12/2021     10/12/2021     10/12/2021         Linda Hendrix PA-C  Date: 10/12/2021  Time: 5:26 PM    CC1:   Josefina Moody MD    CC2:   Chasity Castro

## 2021-10-12 NOTE — CONSULTS
Care Management Initial Consult    General Information  Assessment completed with: Patient, Children, pt, Jamie, Tiara  Type of CM/SW Visit: Initial Assessment    Primary Care Provider verified and updated as needed: Yes   Readmission within the last 30 days: no previous admission in last 30 days   Return Category: Progression of disease  Reason for Consult: discharge planning, length of stay, facility placement  Advance Care Planning: Advance Care Planning Reviewed: no concerns identified          Communication Assessment  Patient's communication style: spoken language (English or Bilingual)    Hearing Difficulty or Deaf: no   Wear Glasses or Blind: no    Cognitive  Cognitive/Neuro/Behavioral: WDL                      Living Environment:   People in home: child(hussein), adult     Current living Arrangements: house      Able to return to prior arrangements: yes       Family/Social Support:  Care provided by: self, child(hussein)  Provides care for: no one  Marital Status:   Children          Description of Support System: Supportive    Support Assessment: Adequate family and caregiver support    Current Resources:   Patient receiving home care services: No     Community Resources: DME  Equipment currently used at home: cane, straight, shower chair, wheelchair, power, walker, rolling  Supplies currently used at home: None    Employment/Financial:  Employment Status:          Financial Concerns: No concerns identified   Referral to Financial Counselor: No       Lifestyle & Psychosocial Needs:  Social Determinants of Health     Tobacco Use: High Risk     Smoking Tobacco Use: Current Every Day Smoker     Smokeless Tobacco Use: Never Used   Alcohol Use:      Frequency of Alcohol Consumption:      Average Number of Drinks:      Frequency of Binge Drinking:    Financial Resource Strain:      Difficulty of Paying Living Expenses:    Food Insecurity:      Worried About Running Out of Food in the Last Year:      Ran Out of  Food in the Last Year:    Transportation Needs:      Lack of Transportation (Medical):      Lack of Transportation (Non-Medical):    Physical Activity:      Days of Exercise per Week:      Minutes of Exercise per Session:    Stress:      Feeling of Stress :    Social Connections:      Frequency of Communication with Friends and Family:      Frequency of Social Gatherings with Friends and Family:      Attends Evangelical Services:      Active Member of Clubs or Organizations:      Attends Club or Organization Meetings:      Marital Status:    Intimate Partner Violence:      Fear of Current or Ex-Partner:      Emotionally Abused:      Physically Abused:      Sexually Abused:    Depression: Not at risk     PHQ-2 Score: 0   Housing Stability:      Unable to Pay for Housing in the Last Year:      Number of Places Lived in the Last Year:      Unstable Housing in the Last Year:        Functional Status:  Prior to admission patient needed assistance:   Dependent ADLs:: Independent, Ambulation-walker  Dependent IADLs:: Independent  Assesssment of Functional Status: Not at baseline with ADL Functioning, Not at baseline with mobility, Needs placement in a SNF/TCF for rehabilitation    Mental Health Status:  Mental Health Status: No Current Concerns       Chemical Dependency Status:                Values/Beliefs:  Spiritual, Cultural Beliefs, Evangelical Practices, Values that affect care:                 Additional Information:  MICHELLE assessed, spoke to pt, dtr, Tiara and son, Jamie. Pt lives w/sonSwapnil [phone], he will transport at discharge pending progress, referrrals sent to [facility], [facility] and [facility] for potential rehab. Pt was independent up to 2 months ago, still drives and manages household. Goal is to rehab and be able to be alone at home while son and family is at work. Discharge planning to include both Jamie and Tiara. Discussed LAURA.      Tom Anne  RN

## 2021-10-12 NOTE — PLAN OF CARE
I personally reviewed the case and discussed with Dr. Mckeon. Patient sustained bilateral sacral fractures. Patient is to remain NWB bilaterally and bed to chair transfers only. Formal consult to follow.       Linda Hendrix PA-C  [facility]  [phone]

## 2021-10-12 NOTE — ED NOTES
Bed: JNED-11  Expected date: 10/12/21  Expected time:   Means of arrival: Ambulance  Comments:  Quinn, back pain

## 2021-10-13 ENCOUNTER — APPOINTMENT (OUTPATIENT)
Dept: PHYSICAL THERAPY | Facility: HOSPITAL | Age: 71
End: 2021-10-13
Attending: INTERNAL MEDICINE
Payer: COMMERCIAL

## 2021-10-13 ENCOUNTER — LAB REQUISITION (OUTPATIENT)
Dept: LAB | Facility: CLINIC | Age: 71
End: 2021-10-13

## 2021-10-13 ENCOUNTER — APPOINTMENT (OUTPATIENT)
Dept: OCCUPATIONAL THERAPY | Facility: HOSPITAL | Age: 71
End: 2021-10-13
Attending: INTERNAL MEDICINE
Payer: COMMERCIAL

## 2021-10-13 VITALS
TEMPERATURE: 98.2 F | RESPIRATION RATE: 20 BRPM | SYSTOLIC BLOOD PRESSURE: 119 MMHG | BODY MASS INDEX: 19.83 KG/M2 | HEART RATE: 83 BPM | WEIGHT: 105 LBS | OXYGEN SATURATION: 96 % | DIASTOLIC BLOOD PRESSURE: 58 MMHG | HEIGHT: 61 IN

## 2021-10-13 DIAGNOSIS — Z20.828 CONTACT WITH AND (SUSPECTED) EXPOSURE TO OTHER VIRAL COMMUNICABLE DISEASES: ICD-10-CM

## 2021-10-13 PROBLEM — N30.00 ACUTE CYSTITIS WITHOUT HEMATURIA: Status: ACTIVE | Noted: 2021-10-13

## 2021-10-13 LAB
ANION GAP SERPL CALCULATED.3IONS-SCNC: 10 MMOL/L (ref 5–18)
BUN SERPL-MCNC: 27 MG/DL (ref 8–28)
CALCIUM SERPL-MCNC: 9.4 MG/DL (ref 8.5–10.5)
CHLORIDE BLD-SCNC: 104 MMOL/L (ref 98–107)
CO2 SERPL-SCNC: 24 MMOL/L (ref 22–31)
CREAT SERPL-MCNC: 0.77 MG/DL (ref 0.6–1.1)
ERYTHROCYTE [DISTWIDTH] IN BLOOD BY AUTOMATED COUNT: 13.2 % (ref 10–15)
GFR SERPL CREATININE-BSD FRML MDRD: 77 ML/MIN/1.73M2
GLUCOSE BLD-MCNC: 70 MG/DL (ref 70–125)
HCT VFR BLD AUTO: 33.4 % (ref 35–47)
HGB BLD-MCNC: 11.1 G/DL (ref 11.7–15.7)
INTERPRETATION: NORMAL
LACTATE SERPL-SCNC: 0.8 MMOL/L (ref 0.7–2)
MCH RBC QN AUTO: 33.1 PG (ref 26.5–33)
MCHC RBC AUTO-ENTMCNC: 33.2 G/DL (ref 31.5–36.5)
MCV RBC AUTO: 100 FL (ref 78–100)
PLATELET # BLD AUTO: 429 10E3/UL (ref 150–450)
POTASSIUM BLD-SCNC: 3.7 MMOL/L (ref 3.5–5)
RBC # BLD AUTO: 3.35 10E6/UL (ref 3.8–5.2)
SIGNIFICANT RESULTS: NORMAL
SODIUM SERPL-SCNC: 138 MMOL/L (ref 136–145)
SPECIMEN DESCRIPTION: NORMAL
TEST DETAILS, MDL: NORMAL
VIT B12 SERPL-MCNC: 547 PG/ML (ref 213–816)
WBC # BLD AUTO: 6.5 10E3/UL (ref 4–11)

## 2021-10-13 PROCEDURE — 97166 OT EVAL MOD COMPLEX 45 MIN: CPT | Mod: GO | Performed by: OCCUPATIONAL THERAPIST

## 2021-10-13 PROCEDURE — 82746 ASSAY OF FOLIC ACID SERUM: CPT | Performed by: INTERNAL MEDICINE

## 2021-10-13 PROCEDURE — G0008 ADMIN INFLUENZA VIRUS VAC: HCPCS | Performed by: INTERNAL MEDICINE

## 2021-10-13 PROCEDURE — 82607 VITAMIN B-12: CPT | Performed by: INTERNAL MEDICINE

## 2021-10-13 PROCEDURE — G0452 MOLECULAR PATHOLOGY INTERPR: HCPCS | Mod: 26 | Performed by: STUDENT IN AN ORGANIZED HEALTH CARE EDUCATION/TRAINING PROGRAM

## 2021-10-13 PROCEDURE — 250N000013 HC RX MED GY IP 250 OP 250 PS 637: Performed by: INTERNAL MEDICINE

## 2021-10-13 PROCEDURE — 80048 BASIC METABOLIC PNL TOTAL CA: CPT | Performed by: STUDENT IN AN ORGANIZED HEALTH CARE EDUCATION/TRAINING PROGRAM

## 2021-10-13 PROCEDURE — 90662 IIV NO PRSV INCREASED AG IM: CPT | Performed by: INTERNAL MEDICINE

## 2021-10-13 PROCEDURE — 36415 COLL VENOUS BLD VENIPUNCTURE: CPT | Performed by: STUDENT IN AN ORGANIZED HEALTH CARE EDUCATION/TRAINING PROGRAM

## 2021-10-13 PROCEDURE — 84165 PROTEIN E-PHORESIS SERUM: CPT | Mod: TC | Performed by: INTERNAL MEDICINE

## 2021-10-13 PROCEDURE — 81403 MOPATH PROCEDURE LEVEL 4: CPT | Performed by: INTERNAL MEDICINE

## 2021-10-13 PROCEDURE — G0378 HOSPITAL OBSERVATION PER HR: HCPCS

## 2021-10-13 PROCEDURE — 258N000003 HC RX IP 258 OP 636: Performed by: STUDENT IN AN ORGANIZED HEALTH CARE EDUCATION/TRAINING PROGRAM

## 2021-10-13 PROCEDURE — 97162 PT EVAL MOD COMPLEX 30 MIN: CPT | Mod: GP

## 2021-10-13 PROCEDURE — 99217 PR OBSERVATION CARE DISCHARGE: CPT | Performed by: INTERNAL MEDICINE

## 2021-10-13 PROCEDURE — 97535 SELF CARE MNGMENT TRAINING: CPT | Mod: GO | Performed by: OCCUPATIONAL THERAPIST

## 2021-10-13 PROCEDURE — 97110 THERAPEUTIC EXERCISES: CPT | Mod: GP

## 2021-10-13 PROCEDURE — 85027 COMPLETE CBC AUTOMATED: CPT | Performed by: STUDENT IN AN ORGANIZED HEALTH CARE EDUCATION/TRAINING PROGRAM

## 2021-10-13 PROCEDURE — 86334 IMMUNOFIX E-PHORESIS SERUM: CPT | Mod: TC | Performed by: INTERNAL MEDICINE

## 2021-10-13 PROCEDURE — 81270 JAK2 GENE: CPT | Performed by: INTERNAL MEDICINE

## 2021-10-13 PROCEDURE — 96361 HYDRATE IV INFUSION ADD-ON: CPT

## 2021-10-13 PROCEDURE — 84425 ASSAY OF VITAMIN B-1: CPT | Performed by: INTERNAL MEDICINE

## 2021-10-13 PROCEDURE — 83605 ASSAY OF LACTIC ACID: CPT | Performed by: STUDENT IN AN ORGANIZED HEALTH CARE EDUCATION/TRAINING PROGRAM

## 2021-10-13 PROCEDURE — 250N000011 HC RX IP 250 OP 636: Performed by: INTERNAL MEDICINE

## 2021-10-13 PROCEDURE — 84155 ASSAY OF PROTEIN SERUM: CPT | Performed by: INTERNAL MEDICINE

## 2021-10-13 PROCEDURE — 82306 VITAMIN D 25 HYDROXY: CPT | Performed by: INTERNAL MEDICINE

## 2021-10-13 PROCEDURE — 97530 THERAPEUTIC ACTIVITIES: CPT | Mod: GP

## 2021-10-13 PROCEDURE — 83883 ASSAY NEPHELOMETRY NOT SPEC: CPT | Performed by: INTERNAL MEDICINE

## 2021-10-13 PROCEDURE — 36415 COLL VENOUS BLD VENIPUNCTURE: CPT | Performed by: INTERNAL MEDICINE

## 2021-10-13 RX ORDER — CEFDINIR 300 MG/1
300 CAPSULE ORAL 2 TIMES DAILY
Qty: 6 CAPSULE | Refills: 0 | Status: SHIPPED | OUTPATIENT
Start: 2021-10-13 | End: 2021-10-23

## 2021-10-13 RX ORDER — GABAPENTIN 100 MG/1
100 CAPSULE ORAL 3 TIMES DAILY
Qty: 90 CAPSULE | Refills: 0 | Status: SHIPPED | OUTPATIENT
Start: 2021-10-13 | End: 2021-11-10

## 2021-10-13 RX ORDER — NICOTINE 21 MG/24HR
1 PATCH, TRANSDERMAL 24 HOURS TRANSDERMAL EVERY 24 HOURS
Qty: 7 PATCH | Refills: 0 | Status: SHIPPED | OUTPATIENT
Start: 2021-10-13 | End: 2021-10-20

## 2021-10-13 RX ORDER — OXYCODONE HYDROCHLORIDE 5 MG/1
5-10 TABLET ORAL EVERY 4 HOURS PRN
Qty: 20 TABLET | Refills: 0 | Status: SHIPPED | OUTPATIENT
Start: 2021-10-13 | End: 2021-11-10

## 2021-10-13 RX ORDER — ACETAMINOPHEN 325 MG/1
975 TABLET ORAL 4 TIMES DAILY
Qty: 168 TABLET | Refills: 0 | Status: SHIPPED | OUTPATIENT
Start: 2021-10-13 | End: 2021-10-23

## 2021-10-13 RX ORDER — POLYETHYLENE GLYCOL 3350 17 G/17G
17 POWDER, FOR SOLUTION ORAL DAILY
Qty: 510 G | Refills: 0 | Status: SHIPPED | OUTPATIENT
Start: 2021-10-13 | End: 2021-11-10

## 2021-10-13 RX ORDER — AMOXICILLIN 250 MG
1 CAPSULE ORAL 2 TIMES DAILY PRN
COMMUNITY
Start: 2021-10-13 | End: 2021-11-10

## 2021-10-13 RX ORDER — GABAPENTIN 100 MG/1
100 CAPSULE ORAL 3 TIMES DAILY
Status: DISCONTINUED | OUTPATIENT
Start: 2021-10-13 | End: 2021-10-13 | Stop reason: HOSPADM

## 2021-10-13 RX ORDER — ASPIRIN 81 MG/1
81 TABLET, CHEWABLE ORAL 2 TIMES DAILY
Qty: 28 TABLET | Refills: 0 | Status: SHIPPED | OUTPATIENT
Start: 2021-10-13 | End: 2021-10-27

## 2021-10-13 RX ORDER — CEFDINIR 300 MG/1
300 CAPSULE ORAL 2 TIMES DAILY
Qty: 6 CAPSULE | Refills: 0 | Status: SHIPPED | OUTPATIENT
Start: 2021-10-14 | End: 2021-10-13

## 2021-10-13 RX ORDER — CEFTRIAXONE 1 G/1
1 INJECTION, POWDER, FOR SOLUTION INTRAMUSCULAR; INTRAVENOUS ONCE
Status: DISCONTINUED | OUTPATIENT
Start: 2021-10-13 | End: 2021-10-13 | Stop reason: ALTCHOICE

## 2021-10-13 RX ADMIN — PANTOPRAZOLE SODIUM 40 MG: 20 TABLET, DELAYED RELEASE ORAL at 06:09

## 2021-10-13 RX ADMIN — INFLUENZA A VIRUS A/VICTORIA/2570/2019 IVR-215 (H1N1) ANTIGEN (FORMALDEHYDE INACTIVATED), INFLUENZA A VIRUS A/TASMANIA/503/2020 IVR-221 (H3N2) ANTIGEN (FORMALDEHYDE INACTIVATED), INFLUENZA B VIRUS B/PHUKET/3073/2013 ANTIGEN (FORMALDEHYDE INACTIVATED), AND INFLUENZA B VIRUS B/WASHINGTON/02/2019 ANTIGEN (FORMALDEHYDE INACTIVATED) 0.7 ML: 60; 60; 60; 60 INJECTION, SUSPENSION INTRAMUSCULAR at 12:49

## 2021-10-13 RX ADMIN — OXYCODONE HYDROCHLORIDE 5 MG: 5 TABLET ORAL at 10:44

## 2021-10-13 RX ADMIN — ACETAMINOPHEN 975 MG: 325 TABLET ORAL at 08:29

## 2021-10-13 RX ADMIN — ACETAMINOPHEN 975 MG: 325 TABLET ORAL at 12:37

## 2021-10-13 RX ADMIN — POLYETHYLENE GLYCOL 3350 17 G: 17 POWDER, FOR SOLUTION ORAL at 08:30

## 2021-10-13 RX ADMIN — GABAPENTIN 100 MG: 100 CAPSULE ORAL at 08:30

## 2021-10-13 RX ADMIN — ACETAMINOPHEN 975 MG: 325 TABLET ORAL at 15:20

## 2021-10-13 RX ADMIN — DOCUSATE SODIUM 50 MG AND SENNOSIDES 8.6 MG 1 TABLET: 8.6; 5 TABLET, FILM COATED ORAL at 12:49

## 2021-10-13 RX ADMIN — NICOTINE 1 PATCH: 21 PATCH, EXTENDED RELEASE TRANSDERMAL at 14:02

## 2021-10-13 RX ADMIN — OXYCODONE HYDROCHLORIDE 5 MG: 5 TABLET ORAL at 09:56

## 2021-10-13 RX ADMIN — GABAPENTIN 100 MG: 100 CAPSULE ORAL at 14:01

## 2021-10-13 RX ADMIN — OXYCODONE HYDROCHLORIDE 10 MG: 5 TABLET ORAL at 15:17

## 2021-10-13 RX ADMIN — SODIUM CHLORIDE, POTASSIUM CHLORIDE, SODIUM LACTATE AND CALCIUM CHLORIDE 500 ML: 600; 310; 30; 20 INJECTION, SOLUTION INTRAVENOUS at 05:02

## 2021-10-13 ASSESSMENT — ACTIVITIES OF DAILY LIVING (ADL): PREVIOUS_RESPONSIBILITIES: MEAL PREP;HOUSEKEEPING;LAUNDRY;SHOPPING;MEDICATION MANAGEMENT;FINANCES;DRIVING

## 2021-10-13 NOTE — PLAN OF CARE
PRIMARY DIAGNOSIS: GENERALIZED WEAKNESS    OUTPATIENT/OBSERVATION GOALS TO BE MET BEFORE DISCHARGE  1. Orthostatic performed: No    2. Tolerating PO medications: Yes    3. Return to near baseline physical activity: No    4. Cleared for discharge by consultants (if involved): No    Discharge Planner Nurse   Safe discharge environment identified: No  Barriers to discharge: Yes  PT/OT eval. Pain control       Entered by: Cori Daniels 10/13/2021 8:36 AM     Please review provider order for any additional goals.   Nurse to notify provider when observation goals have been met and patient is ready for discharge.

## 2021-10-13 NOTE — DISCHARGE SUMMARY
[facility] MEDICINE  DISCHARGE SUMMARY     Primary Care Physician: Chasity Castro  Admission Date: 10/12/2021   Discharge Provider: Josefina Moody MD Discharge Date: 10/13/2021   Diet:   Active Diet and Nourishment Order   Procedures     Regular Diet Adult     Advance Diet as Tolerated       Code Status: Full Code   Activity: NWB for 1-2 months, [place] [facility] to determine how long        Condition at Discharge: Stable     REASON FOR PRESENTATION(See Admission Note for Details)   Sacral fracture     PRINCIPAL & ACTIVE DISCHARGE DIAGNOSES     Active Problems:    Benign essential hypertension    Back pain    Closed fracture of sacrum with routine healing    Pyuria    Acute cystitis without hematuria      PENDING LABS     Unresulted Labs Ordered in the Past 30 Days of this Admission     Date and Time Order Name Status Description    10/13/2021  9:18 AM Protein Electrophoresis, Serum In process     10/13/2021  9:18 AM Total Protein, Serum In process     10/13/2021  8:55 AM Vitamin D Deficiency In process     10/13/2021  8:55 AM Vitamin B1 whole blood In process     10/13/2021  8:55 AM Folate In process     10/13/2021  8:55 AM Vitamin B12 In process     10/13/2021  8:55 AM Protein Immunofixation Serum In process     10/13/2021  8:55 AM Peletier and lambda light chain In process     10/12/2021  1:30 PM Urine Culture In process             PROCEDURES ( this hospitalization only)          RECOMMENDATIONS TO OUTPATIENT PROVIDER FOR F/U VISIT     Follow-up Appointments     Follow Up and recommended labs and tests      Follow up with Nursing home physician. Reassess blood pressure and   further medication management, follow-up with pending lab tests at   discharge.  The following labs/tests are recommended: BMP and CBC  [place] [facility] 1-2 weeks                 DISPOSITION     Skilled Nursing Facility    SUMMARY OF HOSPITAL COURSE:      Namita Viera is a 71 year old old female  presenting with fall and back pain      Back pain worsening after fall. MRI from 10/11 still pending.   - CT shows sacral fracture and was seen on MRI down day prior   - [facility] consulted appreciate recs, NWB ok for transfers   - prn oxycodone, scheduled tylenol   - trial of gabapentin which was beneficial and continue 100mg TID   - PT/OT   - outpatient f/u with [facility], [facility] called and updated daughter  - discussed with Daughter MRI findings and what [facility]. Afshin Harvey and recommended ordered Jak2 with reflex, Fee light chains, SPEP, serum immunofixation, Vit- D, b12, folate and did thiamine level as well.   - patient to f/u with PCP and to follow-up on labs.   - sent out on ASA 81mg BID for now given NWB with minimal activity  and reassess at TCU and with [facility].      Pyuria with positive cx, UTI   - sensitivities pending  - was started on ceftriaxone and received one dose and planned to give one more IV prior to discharge but IV removed and change to PO but this can be started at TCU this evening     HTN pressures stable off BP meds and last night pressures were lower.  - hold BP meds  - reassess at TCU and NH MD to determine if to add medications back      COVID STATUS: negative Date:10/12/2021     Discharge Medications with Med changes:     Current Discharge Medication List      START taking these medications    Details   acetaminophen (TYLENOL) 325 MG tablet Take 3 tablets (975 mg) by mouth 4 times daily for 14 days  Qty: 168 tablet, Refills: 0    Associated Diagnoses: Closed fracture of sacrum with routine healing, unspecified portion of sacrum, subsequent encounter      aspirin (ASA) 81 MG chewable tablet Take 1 tablet (81 mg) by mouth 2 times daily for 14 days  Qty: 28 tablet, Refills: 0    Comments: Further VTE prophylaxis TBD by [facility].  Associated Diagnoses: Closed fracture of sacrum with routine healing, unspecified portion of sacrum, subsequent encounter       cefdinir (OMNICEF) 300 MG capsule Take 1 capsule (300 mg) by mouth 2 times daily  Qty: 6 capsule, Refills: 0    Associated Diagnoses: Acute cystitis without hematuria      gabapentin (NEURONTIN) 100 MG capsule Take 1 capsule (100 mg) by mouth 3 times daily  Qty: 90 capsule, Refills: 0    Associated Diagnoses: Closed fracture of sacrum with routine healing, unspecified portion of sacrum, subsequent encounter      nicotine (NICODERM CQ) 21 MG/24HR 24 hr patch Place 1 patch onto the skin every 24 hours for 7 days  Qty: 7 patch, Refills: 0    Associated Diagnoses: Tobacco abuse      oxyCODONE (ROXICODONE) 5 MG tablet Take 1-2 tablets (5-10 mg) by mouth every 4 hours as needed for moderate to severe pain (for pain score 1-5 give 5mg and score of 6-10 give 10mg)  Qty: 20 tablet, Refills: 0    Associated Diagnoses: Closed fracture of sacrum with routine healing, unspecified portion of sacrum, subsequent encounter      polyethylene glycol (MIRALAX) 17 GM/Dose powder Take 17 g by mouth daily  Qty: 510 g, Refills: 0    Associated Diagnoses: Closed fracture of sacrum with routine healing, unspecified portion of sacrum, subsequent encounter      senna-docusate (SENOKOT-S/PERICOLACE) 8.6-50 MG tablet Take 1 tablet by mouth 2 times daily as needed for constipation    Associated Diagnoses: Closed fracture of sacrum with routine healing, unspecified portion of sacrum, subsequent encounter         CONTINUE these medications which have NOT CHANGED    Details   omeprazole (PRILOSEC) 20 MG capsule [OMEPRAZOLE (PRILOSEC) 20 MG CAPSULE] TAKE 1 CAPSULE BY MOUTH DAILY  Qty: 90 capsule, Refills: 3    Associated Diagnoses: Gastroesophageal reflux disease without esophagitis      Vitamin D, Cholecalciferol, 25 MCG (1000 UT) TABS Take 1,000 Units by mouth daily         STOP taking these medications       alendronate (FOSAMAX) 70 MG tablet Comments:   Reason for Stopping:         losartan-hydrochlorothiazide (HYZAAR) 100-12.5 mg per tablet  Comments:   Reason for Stopping:                     Rationale for medication changes:      See above        Consults       ORTHOPEDIC SURGERY IP CONSULT  SOCIAL WORK IP CONSULT  OCCUPATIONAL THERAPY ADULT IP CONSULT  PHYSICAL THERAPY ADULT IP CONSULT  ORTHOPEDIC SURGERY IP CONSULT  SOCIAL WORK IP CONSULT  PHYSICAL THERAPY ADULT IP CONSULT  OCCUPATIONAL THERAPY ADULT IP CONSULT  PHYSICAL THERAPY ADULT IP CONSULT  OCCUPATIONAL THERAPY ADULT IP CONSULT    Immunizations given this encounter     Most Recent Immunizations   Administered Date(s) Administered     COVID-19,PF,Pfizer 05/18/2021     Influenza (High Dose) 3 valent vaccine 11/27/2019     Influenza (IIV3) PF 10/28/2013     Influenza, Quad, High Dose, Pf, 65yr+ (Fluzone HD) 10/13/2021     Influenza, Whole Virus 11/06/2014     Pneumo Conj 13-V (2010&after) 06/24/2016     Pneumococcal 23 valent 08/27/2013     Tdap (Adacel,Boostrix) 08/27/2013           Anticoagulation Information      Recent INR results: No results for input(s): INR in the last 168 hours.  Warfarin doses (if applicable) or name of other anticoagulant: non      SIGNIFICANT IMAGING FINDINGS     Results for orders placed or performed during the hospital encounter of 10/12/21   Lumbar spine CT w/o contrast    Impression    IMPRESSION:  1.  Comminuted bilateral sacral fracture with mild presacral edema.  2.  No lumbar compression fracture. No significant spinal canal or foraminal stenosis.   Cervical spine CT w/o contrast    Impression    IMPRESSION:  1.  No fracture or posttraumatic subluxation.  2.  No high-grade spinal canal or neural foraminal stenosis.   Head CT w/o contrast    Impression    IMPRESSION:  1.  No CT evidence for acute intracranial process.  2.  Brain atrophy and presumed chronic microvascular ischemic changes as above.       SIGNIFICANT LABORATORY FINDINGS     Most Recent 3 CBC's:Recent Labs   Lab Test 10/13/21  0657 10/12/21  0956 05/04/21  1127   WBC 6.5 8.2 7.1   HGB 11.1* 11.0*  14.3    100 95    405 283     Most Recent 3 BMP's:Recent Labs   Lab Test 10/13/21  0657 10/12/21  0956 05/20/21  1112    137 137   POTASSIUM 3.7 3.5 5.4*   CHLORIDE 104 103 100   CO2 24 26 25   BUN 27 38* 18   CR 0.77 1.03 0.80   ANIONGAP 10 8 12   PETER 9.4 9.6 9.5   GLC 70 103 89     Most Recent 2 LFT's:Recent Labs   Lab Test 05/04/21  1127 05/29/18  1050   AST 41* 18   ALT 23 10   ALKPHOS 103 164*   BILITOTAL 0.3 0.3     Most Recent 3 INR's:Recent Labs   Lab Test 04/30/18  1926   INR 0.95     Most Recent 6 Bacteria Isolates From Any Culture (See EPIC Reports for Culture Details):No lab results found.        Discharge Orders        General info for SNF    Length of Stay Estimate: Short Term Care: Estimated # of Days <30  Condition at Discharge: Stable  Level of care:skilled   Rehabilitation Potential: Good  Admission H&P remains valid and up-to-date: Yes  Recent Chemotherapy: N/A  Use Nursing Home Standing Orders: Yes     Mantoux instructions    Give two-step Mantoux (PPD) Per Facility Policy Yes     Reason for your hospital stay    Sacral fracture     Intake and output    Every shift     Daily weights    Call Provider for weight gain of more than 2 pounds per day or 5 pounds per week.     Activity - Up with nursing assistance     Weight bearing status    NWB until [facility] clears for WB     Brief Discharge Instructions    Hold Blood pressure medication due to pressures stable if not low. Reassess at TCU  Hold fosamax with bone healing.    NH physician to follow-up with lab tests pending at the time of discharge.     Follow Up and recommended labs and tests    Follow up with Nursing home physician. Reassess blood pressure and further medication management, follow-up with pending lab tests at discharge.  The following labs/tests are recommended: BMP and CBC  [facility] 1-2 weeks     Full Code     Physical Therapy Adult Consult    Evaluate and treat as clinically indicated.    Reason:  Sacral  fracture, NWB assist with transfers     Occupational Therapy Adult Consult    Evaluate and treat as clinically indicated.    Reason:  NWB transfers     Fall precautions     Advance Diet as Tolerated    Follow this diet upon discharge: Orders Placed This Encounter      Regular Diet Adult       Examination   Physical Exam   Temp:  [96.3  F (35.7  C)-97.8  F (36.6  C)] 97.5  F (36.4  C)  Pulse:  [] 105  Resp:  [16-20] 16  BP: ()/(47-68) 113/61  SpO2:  [93 %-100 %] 97 %  Wt Readings from Last 1 Encounters:   10/12/21 47.6 kg (105 lb)     Physical Exam  Constitutional:       Appearance: Normal appearance.   HENT:      Head: Normocephalic and atraumatic.      Mouth/Throat:      Mouth: Mucous membranes are moist.      Comments: Poor dentition   Cardiovascular:      Rate and Rhythm: Normal rate and regular rhythm.      Pulses: Normal pulses.      Heart sounds: Normal heart sounds.   Pulmonary:      Effort: Pulmonary effort is normal.      Breath sounds: Normal breath sounds.   Abdominal:      General: Bowel sounds are normal.      Palpations: Abdomen is soft.   Musculoskeletal:      Right lower leg: No edema.      Left lower leg: No edema.      Comments: Sacral tenderness    Skin:     General: Skin is warm and dry.   Neurological:      General: No focal deficit present.      Mental Status: She is alert and oriented to person, place, and time. Mental status is at baseline.           Please see EMR for more detailed significant labs, imaging, consultant notes etc.        Josefina Moody MD  [facility]    CC:Chasity Castro

## 2021-10-13 NOTE — PROGRESS NOTES
PRIMARY DIAGNOSIS: ACUTE PAIN  OUTPATIENT/OBSERVATION GOALS TO BE MET BEFORE DISCHARGE:  1. Pain Status: 7/10    2. Return to near baseline physical activity: No    3. Cleared for discharge by consultants (if involved): No     Discharge Planner Nurse   Safe discharge environment identified:No         Entered by: Abi Evans 10/12/2021 9:38 PM     Please review provider order for any additional goals.   Nurse to notify provider when observation goals have been met and patient is ready for discharge.

## 2021-10-13 NOTE — SIGNIFICANT EVENT
"Significant Event Note    Time of event: 4:36 AM October 13, 2021    Description of event:  Called with a few blood pressures with maps less than 65 overnight.  Patient is asymptomatic according to nurse.  Did have a one-time low temp yesterday.     71-year-old woman admitted for fall and back pain.    BP (!) 89/55 (BP Location: Right arm)   Pulse 85   Temp 97.7  F (36.5  C) (Axillary)   Resp 16   Ht 1.549 m (5' 1\")   Wt 47.6 kg (105 lb)   SpO2 95%   BMI 19.84 kg/m      UA positive for signs of infection with nitrite, blood, bacteria. UCx pending.     Plan:  - LR bolus 500 ml x 1   - AM labs - CBC, BMP, lactate   - Continue IV Rocephin for now - would not adjust at this time.     Discussed with: bedside nurse    Keanu Barnes, DO     "

## 2021-10-13 NOTE — PROGRESS NOTES
[facility]      OUTPATIENT PHYSICAL THERAPY EVALUATION  PLAN OF TREATMENT FOR OUTPATIENT REHABILITATION  (COMPLETE FOR INITIAL CLAIMS ONLY)  Patient's Last Name, First Name, M.I.  YOB: 1950  Namita Viera                        Provider's Name  [facility] Medical Record No.  [id number]                               Onset Date:  10/12/21   Start of Care Date:  10/13/21      Type:     _X_PT   ___OT   ___SLP Medical Diagnosis:  Bilat sacral insufficiency fx                        PT Diagnosis:  decreased mobility 2/2 pain   Visits from SOC:  1   _________________________________________________________________________________  Plan of Treatment/Functional Goals    Planned Interventions: bed mobility training, strengthening, ROM (range of motion), transfer training, wheelchair management/propulsion training     Goals: See Physical Therapy Goals on Care Plan in [employer] electronic health record.    Therapy Frequency: Daily  Predicted Duration of Therapy Intervention: 4 days  _________________________________________________________________________________    I CERTIFY THE NEED FOR THESE SERVICES FURNISHED UNDER        THIS PLAN OF TREATMENT AND WHILE UNDER MY CARE     (Physician co-signature of this document indicates review and certification of the therapy plan).                Certification date from: 10/13/21, Certification date to: 10/20/21    Referring Physician: EDGAR Moody            Initial Assessment        See Physical Therapy evaluation dated 10/13/21 in Epic electronic health record.

## 2021-10-13 NOTE — PROGRESS NOTES
"Occupational Therapy    10/13/21 0920   Quick Adds   Type of Visit Initial Occupational Therapy Evaluation   Living Environment   People in home child(hussein), adult  (Son, DIL - both work )   Current Living Arrangements house   Home Accessibility stairs to enter home   Number of Stairs, Main Entrance 2  (Small steps )   Stair Railings, Main Entrance none   Living Environment Comments Tub/shower, TTB, GB    Self-Care   Usual Activity Tolerance good   Current Activity Tolerance moderate   Equipment Currently Used at Home tub bench;grab bar, tub/shower;walker, rolling  (FWW )   Activity/Exercise/Self-Care Comment Pt was independent with all ADL and IADL prior to fall/injury - since then she has required assist dressing and bathing from DIL.     Instrumental Activities of Daily Living (IADL)   Previous Responsibilities meal prep;housekeeping;laundry;shopping;medication management;finances;driving   Disability/Function   Hearing Difficulty or Deaf no   Concentrating, Remembering or Making Decisions Difficulty no   Difficulty Communicating no   Difficulty Eating/Swallowing no   Walking or Climbing Stairs Difficulty yes   Walking or Climbing Stairs ambulation difficulty, requires equipment   Dressing/Bathing Difficulty yes   Dressing/Bathing bathing difficulty, assistance 1 person;dressing difficulty, assistance 1 person   Toileting issues yes   Toileting Assistance   (Pain with sitting on toilet )   Doing Errands Independently Difficulty (such as shopping) yes   Fall history within last six months yes   Number of times patient has fallen within last six months 1   Change in Functional Status Since Onset of Current Illness/Injury yes   General Information   Onset of Illness/Injury or Date of Surgery 10/13/21   Referring Physician Josefina Moody MD   Patient/Family Therapy Goal Statement (OT) \"I want to drive\"   Existing Precautions/Restrictions fall  (Chair and BSC pivot t/fs only per nursing )   Cognitive Status " Examination   Orientation Status orientation to person, place and time   Affect/Mental Status (Cognitive) WFL   Follows Commands WFL   Visual Perception   Visual Impairment/Limitations WFL   Sensory   Sensory Comments Paresthesia at times in BLE    Pain Assessment   Patient Currently in Pain   (Yes, none to severe at times with mobility. )   Range of Motion Comprehensive   General Range of Motion bilateral upper extremity ROM WNL   Strength Comprehensive (MMT)   General Manual Muscle Testing (MMT) Assessment upper extremity strength deficits identified   Comment, General Manual Muscle Testing (MMT) Assessment BUE mild weakness    Coordination   Upper Extremity Coordination No deficits were identified   Bed Mobility   Bed Mobility supine-sit   Supine-Sit [place] (Bed Mobility) moderate assist (50% patient effort)   Transfers   Transfers bed-chair transfer   Transfer Skill: Bed to Chair/Chair to Bed   Bed-Chair [place] (Transfers) minimum assist (75% patient effort)  (+2 for safety )   Transfer Comments VC/TC    Balance   Balance Assessment sitting balance: static;sitting balance: dynamic;sit to stand balance;standing balance: static;standing balance: dynamic   Sitting Balance: Static WFL   Sitting Balance: Dynamic mild impairment   Sit-to-Stand Balance fair balance   Standing Balance: Static fair balance   Standing Balance: Dynamic fair balance   Activities of Daily Living   BADL Assessment lower body dressing;grooming   Lower Body Dressing Assessment   [place] Level (Lower Body Dressing) dependent (less than 25% patient effort)   Grooming Assessment   [place] Level (Grooming) supervision;set up   Clinical Impression   Criteria for Skilled Therapeutic Interventions Met (OT) yes;skilled treatment is necessary   OT Diagnosis Pain and decreased endurance for ADL    OT Problem List-Impairments impacting ADL problems related to;activity tolerance impaired;balance;mobility;range of motion  (ROM);sensation;strength;pain   Assessment of Occupational Performance 3-5 Performance Deficits   Identified Performance Deficits Pain and decreased endurance for ADL    Planned Therapy Interventions (OT) ADL retraining;IADL retraining;balance training;bed mobility training;ROM;strengthening;stretching;transfer training;home program guidelines;progressive activity/exercise;risk factor education   Clinical Decision Making Complexity (OT) moderate complexity   Therapy Frequency (OT) Daily   Predicted Duration of Therapy 7 days    Anticipated Equipment Needs Upon Discharge (OT) hip kit;reacher;raised toilet seat;commode chair;dressing equipment  (Pad/cushion for TTB and BSC or toilet )   Risk & Benefits of therapy have been explained evaluation/treatment results reviewed   OT Discharge Planning    OT Discharge Recommendation (DC Rec) Transitional Care Facility;Home with assist   OT Rationale for DC Rec TCU recommended to address barriers and promote safety and independence with ADL/IADL - If home with assist pt would require up to 2 person assist with all mobility and ADL.    Therapy Certification   Start of Care Date 10/13/21   Certification date from 10/13/21   Certification date to 10/20/21   Medical Diagnosis Nondisplaced, comminuted bilateral sacral insufficiency fractures   Total Evaluation Time (Minutes)   Total Evaluation Time (Minutes) 20

## 2021-10-13 NOTE — PROGRESS NOTES
10/13/21 1100   Quick Adds   Quick Adds Certification   Type of Visit Initial PT Evaluation   Living Environment   Living Environment Comments per OT   Self-Care   Activity/Exercise/Self-Care Comment per OT   General Information   Onset of Illness/Injury or Date of Surgery 10/12/21   Referring Physician EDGAR Moody   Patient/Family Therapy Goals Statement (PT) get back to driving   Pertinent History of Current Problem (include personal factors and/or comorbidities that impact the POC) bialt sacral insufficiency fx, L>R   Weight-Bearing Status - LLE nonweight-bearing   Weight-Bearing Status - RLE nonweight-bearing   General Observations bed/chair transfers only at this time   Cognition   Orientation Status (Cognition) oriented x 4   Affect/Mental Status (Cognition) WFL   Follows Commands (Cognition) WFL   Pain Assessment   Patient Currently in Pain Yes, see Vital Sign flowsheet   Range of Motion (ROM)   ROM Comment LE ROM tested in supine is WFL   Strength   Strength Comments BLE strength limited by pain, able to perform ROM indep   Bed Mobility   Bed Mobility rolling right;supine-sit   Rolling Right [place] (Bed Mobility) modified independence   Supine-Sit [place] (Bed Mobility) moderate assist (50% patient effort)   Bed Mobility Limitations other (see comments)  (2/2 pain)   Impairments Contributing to Impaired Bed Mobility pain   Assistive Device (Bed Mobility) bed rails   Comment (Bed Mobility) pain increases when coming from sidelying to sit, needs mod assist to come to a full sit   Transfers   Transfers sit-stand transfer;bed-chair transfer   Sit-Stand Transfer   Sit-Stand [place] (Transfers) contact guard;2 person assist   Assistive Device (Sit-Stand Transfers) walker, front-wheeled   Sit/Stand Transfer Comments pain with coming to stand, pain much less once standing   Gait/Stairs (Locomotion)   [place] Level (Gait) supervision   Assistive Device (Gait) walker, front-wheeled   Distance  in Feet (Required for LE Total Joints) 4 small steps bed to chair for transfer, no further walking at this time   Pattern (Gait) step-to   Comment (Gait/Stairs) steady, no LOB, minimal pain with standing   Balance   Balance no deficits were identified   Clinical Impression   Criteria for Skilled Therapeutic Intervention yes, treatment indicated   PT Diagnosis (PT) decreased mobility 2/2 pain   Influenced by the following impairments pain   Functional limitations due to impairments pain and WB status   Clinical Presentation Stable/Uncomplicated   Clinical Presentation Rationale presents as medically diagnosed   Clinical Decision Making (Complexity) moderate complexity   Therapy Frequency (PT) Daily   Predicted Duration of Therapy Intervention (days/wks) 4 days   Planned Therapy Interventions (PT) bed mobility training;strengthening;ROM (range of motion);transfer training;wheelchair management/propulsion training   Anticipated Equipment Needs at Discharge (PT) walker, rolling;wheelchair   Risk & Benefits of therapy have been explained care plan/treatment goals reviewed;patient   PT Discharge Planning    PT Discharge Recommendation (DC Rec) Transitional Care Facility;home with assist;home with home care physical therapy   PT Rationale for DC Rec If returning home will need assist with all OOB mobility, a wheel chair, walker and bedside commode as well as home PT   PT Brief overview of current status  pt very motivated to participate, tolerated PT well   Therapy Certification   Start of care date 10/13/21   Certification date from 10/13/21   Certification date to 10/20/21   Medical Diagnosis Bilat sacral insufficiency fx   Total Evaluation Time   Total Evaluation Time (Minutes) 10

## 2021-10-13 NOTE — PLAN OF CARE
Physical Therapy Discharge Summary    Reason for therapy discharge:    Discharged to transitional care facility.    Progress towards therapy goal(s). See goals on Care Plan in [facility] electronic health record for goal details.  Eval only    Therapy recommendation(s):    Continued therapy is recommended.  Rationale/Recommendations:  to increase strength and activity tolerance.

## 2021-10-13 NOTE — PLAN OF CARE
Problem: Adult Inpatient Plan of Care  Goal: Plan of Care Review  Outcome: Adequate for Discharge   Pt is Discharging to TCU this evening via [facility].  All questions/concerns answered.      Problem: Mobility Impairment (Orthopaedic Rehabilitation)  Goal: Optimal Mobility [place] and Safety  Outcome: Adequate for Discharge   Continue NWB to bilateral legs. Assist x 2 with walker.    Abi Evans RN

## 2021-10-13 NOTE — PLAN OF CARE
Problem: Adult Inpatient Plan of Care  Goal: Plan of Care Review  10/12/2021 2134 by Abi Evans RN  Outcome: Improving  10/12/2021 2133 by Abi Evans RN  Outcome: Improving   Pt verbalizes plan of care. Pain Control, PT/OT Eval, [facility] consult.      Problem: Mobility Impairment (Orthopaedic Rehabilitation)  Goal: Optimal Mobility Door and Safety  Outcome: Improving   Bilateral pelvic fx, NWB, ok for transfers to bed/chair with assistance. Lovenox started tonight.     T/R, pt tolerates lying on Right side as Left side is more painful. Oxycodone, Tylenol, Gabapentin with relief.    Abi Evans, CHANTELLE

## 2021-10-13 NOTE — PLAN OF CARE
Problem: Adult Inpatient Plan of Care  Goal: Absence of Hospital-Acquired Illness or Injury  Intervention: Identify and Manage Fall Risk  Recent Flowsheet Documentation  Taken 10/13/2021 0430 by Taylor Weiss, RN  Safety Promotion/Fall Prevention:   bed alarm on   clutter free environment maintained   patient and family education   room door open   safety round/check completed  Taken 10/13/2021 0030 by Taylor Weiss, RN  Safety Promotion/Fall Prevention:   bed alarm on   clutter free environment maintained   patient and family education   room door open   safety round/check completed   Patient on bedrest with commode and chair pivot transfers.  Does have an external catheter in use.  BP low over night.  HO called and gave 500 ml LR bolus, just finished and will check BP with morning vitals.

## 2021-10-13 NOTE — PLAN OF CARE
PRIMARY DIAGNOSIS: GENERALIZED WEAKNESS    OUTPATIENT/OBSERVATION GOALS TO BE MET BEFORE DISCHARGE  1. Orthostatic performed: No    2. Tolerating PO medications: Yes    3. Return to near baseline physical activity: No    4. Cleared for discharge by consultants (if involved): Yes    Discharge Planner Nurse   Safe discharge environment identified: Yes  Barriers to discharge: No  Discharging today at 5:45pm to TCU via [facility] transportation       Entered by: Cori Daniels 10/13/2021 12:45 PM     Please review provider order for any additional goals.   Nurse to notify provider when observation goals have been met and patient is ready for discharge.

## 2021-10-13 NOTE — PROGRESS NOTES
[facility]      OUTPATIENT OCCUPATIONAL THERAPY  EVALUATION  PLAN OF TREATMENT FOR OUTPATIENT REHABILITATION  (COMPLETE FOR INITIAL CLAIMS ONLY)  Patient's Last Name, First Name, M.I.  YOB: 1950  Namita Viera                          Provider's Name  [facility] Medical Record No.  [id number]                               Onset Date:  (P) 10/13/21   Start of Care Date:  10/13/21     Type:     ___PT   _X_OT   ___SLP Medical Diagnosis:  Nondisplaced, comminuted bilateral sacral insufficiency fractures                        OT Diagnosis:      Visits from SOC:  1   _________________________________________________________________________________  Plan of Treatment/Functional Goals    Planned Interventions:     Goals: See Occupational Therapy Goals on Care Plan in [employer] electronic health record.    Therapy Frequency:    Predicted Duration of Therapy Intervention:    _________________________________________________________________________________    I CERTIFY THE NEED FOR THESE SERVICES FURNISHED UNDER        THIS PLAN OF TREATMENT AND WHILE UNDER MY CARE     (Physician co-signature of this document indicates review and certification of the therapy plan).                Certification date from: 10/13/21, Certification date to: 10/20/21    Referring Physician: (P) Josefina Moody MD            Initial Assessment        See Occupational Therapy evaluation dated 10/13/21 in Epic electronic health record.

## 2021-10-13 NOTE — PROGRESS NOTES
Care Management Discharge Note    Discharge Date: 10/13/2021       Discharge Disposition: Transitional Care    Discharge Services:  TCU    Discharge DME:  n/a    Discharge Transportation: agency    Private pay costs discussed:  Transportation    PAS Confirmation Code: [id number]  Patient/family educated on Medicare website which has current facility and service quality ratings: yes    Education Provided on the Discharge Plan:  yes  Persons Notified of Discharge Plans: yes  Patient/Family in Agreement with the Plan:  yes    Handoff Referral Completed: yes    Additional Information:  Confirmed discharge plan with patient, daughter-Tiara and TCU admissions.  Patient to discharge to [facility]. PAS completed. [facility] scheduled for [id number].        Yeimy Sorto RN

## 2021-10-14 ENCOUNTER — TRANSITIONAL CARE UNIT VISIT (OUTPATIENT)
Dept: GERIATRICS | Facility: CLINIC | Age: 71
End: 2021-10-14
Payer: COMMERCIAL

## 2021-10-14 VITALS
HEART RATE: 89 BPM | HEIGHT: 60 IN | WEIGHT: 98 LBS | TEMPERATURE: 96.6 F | BODY MASS INDEX: 19.24 KG/M2 | RESPIRATION RATE: 20 BRPM | SYSTOLIC BLOOD PRESSURE: 114 MMHG | OXYGEN SATURATION: 95 % | DIASTOLIC BLOOD PRESSURE: 64 MMHG

## 2021-10-14 DIAGNOSIS — M54.50 LOW BACK PAIN, UNSPECIFIED BACK PAIN LATERALITY, UNSPECIFIED CHRONICITY, UNSPECIFIED WHETHER SCIATICA PRESENT: Primary | ICD-10-CM

## 2021-10-14 DIAGNOSIS — S32.10XD CLOSED FRACTURE OF SACRUM WITH ROUTINE HEALING, UNSPECIFIED PORTION OF SACRUM, SUBSEQUENT ENCOUNTER: ICD-10-CM

## 2021-10-14 LAB
BACTERIA UR CULT: ABNORMAL
DEPRECATED CALCIDIOL+CALCIFEROL SERPL-MC: 71 UG/L (ref 30–80)
FOLATE SERPL-MCNC: 9.6 NG/ML
KAPPA LC FREE SER-MCNC: 3.01 MG/DL (ref 0.33–1.94)
KAPPA LC FREE/LAMBDA FREE SER NEPH: 0.86 {RATIO} (ref 0.26–1.65)
LAMBDA LC FREE SERPL-MCNC: 3.48 MG/DL (ref 0.57–2.63)

## 2021-10-14 PROCEDURE — 86481 TB AG RESPONSE T-CELL SUSP: CPT

## 2021-10-14 PROCEDURE — 36415 COLL VENOUS BLD VENIPUNCTURE: CPT

## 2021-10-14 PROCEDURE — 99310 SBSQ NF CARE HIGH MDM 45: CPT | Performed by: NURSE PRACTITIONER

## 2021-10-14 RX ORDER — BISACODYL 10 MG
10 SUPPOSITORY, RECTAL RECTAL DAILY PRN
COMMUNITY
End: 2021-11-10

## 2021-10-14 RX ORDER — ACETAMINOPHEN 500 MG
1000 TABLET ORAL 4 TIMES DAILY
COMMUNITY
End: 2021-10-26

## 2021-10-14 ASSESSMENT — MIFFLIN-ST. JEOR: SCORE: 875

## 2021-10-14 NOTE — LETTER
10/14/2021        RE: Namita Viera  [address] [facility] [address]        [facility] GERIATRIC SERVICES  Chief Complaint   Patient presents with     [facility] F/U     [place] Medical Record Number:  [id number]  Place of Service where encounter took place:  [facility] ([facility]) [[id number]]  Code Status:  Full Code     HISTORY:      HPI:  Namita Viera  is 71 year old (1950) undergoing physical and occupational therapy.     Patient seen today to review labs, vital signs, pain management and to establish care.  Patient denied chest pain shortness of breath cough or congestion.  She did report constipation and no BM for approximately 1 week.  Her abdomen is soft nontender she is passing flatus and bowel sounds are active x4 quadrants.  Senna S was changed to scheduled twice daily and bisacodyl suppository every 3 days as needed if no BM.  Patient's current activity order is nonweightbearing bilateral lower extremities and she reports she is unable to follow this activity restriction.  Per staff she was up ambulating.  Staff to update orthopedics regarding her activity.  Patient was encouraged to please not ambulate until her activity restrictions were clarified.  She does report her pain 5 out of 10 with sitting and increases to 9 out of 10 when she gets up.  Her vital signs were stable.    ALLERGIES:Patient has no known allergies.    PAST MEDICAL HISTORY:   Past Medical History:   Diagnosis Date     Acid reflux      Alcohol abuse      Alcohol use      Closed fracture of left femur (H)      Hip fracture requiring operative repair (H)      SAH (subarachnoid hemorrhage) (H)      Traumatic closed displaced fracture of distal end of radius        PAST SURGICAL HISTORY:   has a past surgical history that includes other surgical history (2008); Hysterectomy total abdominal, bilateral salpingo-oophorectomy, combined (2008); Bladder Suspension (2008); Hip Fracture Surgery (Left); and Hip Pinning (Left,  5/2/2018).    FAMILY HISTORY: family history includes Cancer in her mother and sister; Esophageal Cancer in her mother; Rectal Cancer in her sister; Rheumatoid Arthritis in her brother.    SOCIAL HISTORY:  reports that she has been smoking. She has a 90.00 pack-year smoking history. She has never used smokeless tobacco. She reports current alcohol use of about 14.0 standard drinks of alcohol per week. She reports that she does not use drugs.    ROS:  Constitutional: Negative for activity change, appetite change, fatigue and fever.   HENT: Negative for congestion.    Respiratory: Negative for cough, shortness of breath and wheezing.    Cardiovascular: Negative for chest pain and leg swelling.   Gastrointestinal: Negative for abdominal distention, abdominal pain, constipation, diarrhea and nausea.   Genitourinary: Negative for dysuria.   Musculoskeletal: Negative for arthralgia. Negative for back pain.  Patient with a sacral fracture  Skin: Negative for color change and wound.   Neurological: Negative for dizziness.   Psychiatric/Behavioral: Negative for agitation, behavioral problems and confusion.     Physical Exam:  Constitutional:       Appearance: Patient is well-developed.   HENT:      Head: Normocephalic.   Eyes:      Conjunctiva/sclera: Conjunctivae normal.   Neck:      Musculoskeletal: Normal range of motion.   Cardiovascular:      Rate and Rhythm: Normal rate and regular rhythm.      Heart sounds: Normal heart sounds. No murmur.   Pulmonary:      Effort: No respiratory distress.      Breath sounds: Normal breath sounds. No wheezing or rales.   Abdominal:      General: Bowel sounds are normal. There is no distension.      Palpations: Abdomen is soft.      Tenderness: There is no abdominal tenderness.   Musculoskeletal:       Normal range of motion.     Current orders nonweightbearing bilateral lower extremities, patient noncompliant with following this activity order, orthopedics to be updated  Skin:General:  "       Skin is warm.   Neurological:         Mental Status: Patient is alert and oriented to person, place, and time.   Psychiatric:         Behavior: Behavior normal.     Vitals:/64   Pulse 89   Temp (!) 96.6  F (35.9  C)   Resp 20   Ht 1.53 m (5' 0.25\")   Wt 44.5 kg (98 lb)   SpO2 95%   BMI 18.98 kg/m   and Body mass index is 18.98 kg/m .    Lab/Diagnostic data:   Recent Results (from the past 240 hour(s))   CBC (+ platelets, no diff)    Collection Time: 10/12/21  9:56 AM   Result Value Ref Range    WBC Count 8.2 4.0 - 11.0 10e3/uL    RBC Count 3.33 (L) 3.80 - 5.20 10e6/uL    Hemoglobin 11.0 (L) 11.7 - 15.7 g/dL    Hematocrit 33.2 (L) 35.0 - 47.0 %     78 - 100 fL    MCH 33.0 26.5 - 33.0 pg    MCHC 33.1 31.5 - 36.5 g/dL    RDW 13.3 10.0 - 15.0 %    Platelet Count 405 150 - 450 10e3/uL   Basic metabolic panel    Collection Time: 10/12/21  9:56 AM   Result Value Ref Range    Sodium 137 136 - 145 mmol/L    Potassium 3.5 3.5 - 5.0 mmol/L    Chloride 103 98 - 107 mmol/L    Carbon Dioxide (CO2) 26 22 - 31 mmol/L    Anion Gap 8 5 - 18 mmol/L    Urea Nitrogen 38 (H) 8 - 28 mg/dL    Creatinine 1.03 0.60 - 1.10 mg/dL    Calcium 9.6 8.5 - 10.5 mg/dL    Glucose 103 70 - 125 mg/dL    GFR Estimate 54 (L) >60 mL/min/1.73m2   Asymptomatic COVID-19 Virus (Coronavirus) by PCR Nasopharyngeal    Collection Time: 10/12/21  9:56 AM    Specimen: Nasopharyngeal; Swab   Result Value Ref Range    SARS CoV2 PCR Negative Negative   Alcohol level blood    Collection Time: 10/12/21 10:22 AM   Result Value Ref Range    Alcohol, Blood <10 None detected mg/dL   Occult blood stool POCT    Collection Time: 10/12/21 11:00 AM   Result Value Ref Range    Occult Blood POCT Negative Negative    Internal QC Check POCT Valid Valid    Test Card Lot Number [id number]     Test Card Expiration Date 11/2,023    UA with Microscopic reflex to Culture    Collection Time: 10/12/21  1:05 PM    Specimen: Urine, Clean Catch   Result Value Ref Range    " Color Urine Light Yellow Colorless, Straw, Light Yellow, Yellow    Appearance Urine Clear Clear    Glucose Urine Negative Negative mg/dL    Bilirubin Urine Negative Negative    Ketones Urine Negative Negative mg/dL    Specific Gravity Urine 1.018 1.001 - 1.030    Blood Urine 0.06 mg/dL (A) Negative    pH Urine 5.0 5.0 - 7.0    Protein Albumin Urine Negative Negative mg/dL    Urobilinogen Urine <2.0 <2.0 mg/dL    Nitrite Urine Positive (A) Negative    Leukocyte Esterase Urine Negative Negative    Bacteria Urine Moderate (A) None Seen /HPF    RBC Urine <1 <=2 /HPF    WBC Urine 4 <=5 /HPF    Squamous Epithelials Urine 1 <=1 /HPF    Hyaline Casts Urine 5 (H) <=2 /LPF   Urine Culture    Collection Time: 10/12/21  1:05 PM    Specimen: Urine, Clean Catch   Result Value Ref Range    Culture >100,000 CFU/mL Escherichia coli (A)        Susceptibility    Escherichia coli - MALLORIE     Ampicillin <=4 Susceptible ug/mL     Cefazolin 8 Susceptible ug/mL     Cefepime <=1 Susceptible ug/mL     Ceftriaxone <=1 Susceptible ug/mL     Ciprofloxacin <=0.25 Susceptible ug/mL     Gentamicin <=2 Susceptible ug/mL     Levofloxacin <=0.5 Susceptible ug/mL     Meropenem <=0.5 Susceptible ug/mL     Nitrofurantoin <=16 Susceptible ug/mL     Tetracycline <=2 Susceptible ug/mL     Tobramycin <=2 Susceptible ug/mL     Trimethoprim/Sulfamethoxazole <=0.5 Susceptible ug/mL   CBC with platelets    Collection Time: 10/13/21  6:57 AM   Result Value Ref Range    WBC Count 6.5 4.0 - 11.0 10e3/uL    RBC Count 3.35 (L) 3.80 - 5.20 10e6/uL    Hemoglobin 11.1 (L) 11.7 - 15.7 g/dL    Hematocrit 33.4 (L) 35.0 - 47.0 %     78 - 100 fL    MCH 33.1 (H) 26.5 - 33.0 pg    MCHC 33.2 31.5 - 36.5 g/dL    RDW 13.2 10.0 - 15.0 %    Platelet Count 429 150 - 450 10e3/uL   Basic metabolic panel    Collection Time: 10/13/21  6:57 AM   Result Value Ref Range    Sodium 138 136 - 145 mmol/L    Potassium 3.7 3.5 - 5.0 mmol/L    Chloride 104 98 - 107 mmol/L    Carbon Dioxide  (CO2) 24 22 - 31 mmol/L    Anion Gap 10 5 - 18 mmol/L    Urea Nitrogen 27 8 - 28 mg/dL    Creatinine 0.77 0.60 - 1.10 mg/dL    Calcium 9.4 8.5 - 10.5 mg/dL    Glucose 70 70 - 125 mg/dL    GFR Estimate 77 >60 mL/min/1.73m2   Lactic acid whole blood    Collection Time: 10/13/21  6:57 AM   Result Value Ref Range    Lactic Acid 0.8 0.7 - 2.0 mmol/L   Kappa and lambda light chain    Collection Time: 10/13/21  9:18 AM   Result Value Ref Range    Kappa Free Light Chains 3.01 (H) 0.33 - 1.94 mg/dL    Lambda Free Light Chains 3.48 (H) 0.57 - 2.63 mg/dL    Kappa /Lambda Ratio 0.86 0.26 - 1.65   Vitamin B12    Collection Time: 10/13/21  9:18 AM   Result Value Ref Range    Vitamin B12 547 213 - 816 pg/mL   Folate    Collection Time: 10/13/21  9:18 AM   Result Value Ref Range    Folic Acid 9.6 >=3.5 ng/mL   Vitamin D Deficiency    Collection Time: 10/13/21  9:18 AM   Result Value Ref Range    Vitamin D, Total (25-Hydroxy) 71 30 - 80 ug/L     MEDICATIONS:     Review of your medicines          Accurate as of October 14, 2021 12:24 PM. If you have any questions, ask your nurse or doctor.            CONTINUE these medicines which have NOT CHANGED      Dose / Directions   * acetaminophen 500 MG tablet  Commonly known as: TYLENOL      Dose: 1,000 mg  Take 1,000 mg by mouth every 6 hours as needed for mild pain  Refills: 0     * acetaminophen 325 MG tablet  Commonly known as: TYLENOL  Used for: Closed fracture of sacrum with routine healing, unspecified portion of sacrum, subsequent encounter      Dose: 975 mg  Take 3 tablets (975 mg) by mouth 4 times daily for 14 days  Quantity: 168 tablet  Refills: 0     aspirin 81 MG chewable tablet  Commonly known as: ASA  Used for: Closed fracture of sacrum with routine healing, unspecified portion of sacrum, subsequent encounter      Dose: 81 mg  Take 1 tablet (81 mg) by mouth 2 times daily for 14 days  Quantity: 28 tablet  Refills: 0     bisacodyl 10 MG suppository  Commonly known as: DULCOLAX       Dose: 10 mg  Place 10 mg rectally daily as needed for constipation  Refills: 0     cefdinir 300 MG capsule  Commonly known as: OMNICEF  Used for: Acute cystitis without hematuria      Dose: 300 mg  Take 1 capsule (300 mg) by mouth 2 times daily  Quantity: 6 capsule  Refills: 0     gabapentin 100 MG capsule  Commonly known as: NEURONTIN  Used for: Closed fracture of sacrum with routine healing, unspecified portion of sacrum, subsequent encounter      Dose: 100 mg  Take 1 capsule (100 mg) by mouth 3 times daily  Quantity: 90 capsule  Refills: 0     nicotine 21 MG/24HR 24 hr patch  Commonly known as: NICODERM CQ  Used for: Tobacco abuse      Dose: 1 patch  Place 1 patch onto the skin every 24 hours for 7 days  Quantity: 7 patch  Refills: 0     omeprazole 20 MG DR capsule  Commonly known as: priLOSEC  Used for: Gastroesophageal reflux disease without esophagitis      [OMEPRAZOLE (PRILOSEC) 20 MG CAPSULE] TAKE 1 CAPSULE BY MOUTH DAILY  Quantity: 90 capsule  Refills: 3     oxyCODONE 5 MG tablet  Commonly known as: ROXICODONE  Used for: Closed fracture of sacrum with routine healing, unspecified portion of sacrum, subsequent encounter      Dose: 5-10 mg  Take 1-2 tablets (5-10 mg) by mouth every 4 hours as needed for moderate to severe pain (for pain score 1-5 give 5mg and score of 6-10 give 10mg)  Quantity: 20 tablet  Refills: 0     polyethylene glycol 17 GM/Dose powder  Commonly known as: MIRALAX  Used for: Closed fracture of sacrum with routine healing, unspecified portion of sacrum, subsequent encounter      Dose: 17 g  Take 17 g by mouth daily  Quantity: 510 g  Refills: 0     senna-docusate 8.6-50 MG tablet  Commonly known as: SENOKOT-S/PERICOLACE  Used for: Closed fracture of sacrum with routine healing, unspecified portion of sacrum, subsequent encounter      Dose: 1 tablet  Take 1 tablet by mouth 2 times daily as needed for constipation  Refills: 0     Vitamin D (Cholecalciferol) 25 MCG (1000 UT) Tabs      Dose:  1,000 Units  Take 1,000 Units by mouth daily  Refills: 0         * This list has 2 medication(s) that are the same as other medications prescribed for you. Read the directions carefully, and ask your doctor or other care provider to review them with you.                ASSESSMENT/PLAN  Encounter Diagnoses   Name Primary?     Low back pain, unspecified back pain laterality, unspecified chronicity, unspecified whether sciatica present Yes     Closed fracture of sacrum with routine healing, unspecified portion of sacrum, subsequent encounter      Low back pain pain control change Tylenol to extra strength Tylenol scheduled every 6 hours, oxycodone as needed, gabapentin    GERD on omeprazole    Constipation bowel meds adjusted, monitor bowel status    Physical deconditioning PT OT        Electronically signed by: Sade Andersen CNP          Sincerely,        Sade Andersen CNP

## 2021-10-14 NOTE — PROGRESS NOTES
[facility] GERIATRIC SERVICES  Chief Complaint   Patient presents with     [facility]     [place] Medical Record Number:  [id number]  Place of Service where encounter took place:  [facility] ([facility]) [[id number]]  Code Status:  Full Code     HISTORY:      HPI:  Namita Viera  is 71 year old (1950) undergoing physical and occupational therapy.     Patient seen today to review labs, vital signs, pain management and to establish care.  Patient denied chest pain shortness of breath cough or congestion.  She did report constipation and no BM for approximately 1 week.  Her abdomen is soft nontender she is passing flatus and bowel sounds are active x4 quadrants.  Senna S was changed to scheduled twice daily and bisacodyl suppository every 3 days as needed if no BM.  Patient's current activity order is nonweightbearing bilateral lower extremities and she reports she is unable to follow this activity restriction.  Per staff she was up ambulating.  Staff to update orthopedics regarding her activity.  Patient was encouraged to please not ambulate until her activity restrictions were clarified.  She does report her pain 5 out of 10 with sitting and increases to 9 out of 10 when she gets up.  Her vital signs were stable.    ALLERGIES:Patient has no known allergies.    PAST MEDICAL HISTORY:   Past Medical History:   Diagnosis Date     Acid reflux      Alcohol abuse      Alcohol use      Closed fracture of left femur (H)      Hip fracture requiring operative repair (H)      SAH (subarachnoid hemorrhage) (H)      Traumatic closed displaced fracture of distal end of radius        PAST SURGICAL HISTORY:   has a past surgical history that includes other surgical history (2008); Hysterectomy total abdominal, bilateral salpingo-oophorectomy, combined (2008); Bladder Suspension (2008); Hip Fracture Surgery (Left); and Hip Pinning (Left, 5/2/2018).    FAMILY HISTORY: family history includes Cancer in her mother and sister;  Esophageal Cancer in her mother; Rectal Cancer in her sister; Rheumatoid Arthritis in her brother.    SOCIAL HISTORY:  reports that she has been smoking. She has a 90.00 pack-year smoking history. She has never used smokeless tobacco. She reports current alcohol use of about 14.0 standard drinks of alcohol per week. She reports that she does not use drugs.    ROS:  Constitutional: Negative for activity change, appetite change, fatigue and fever.   HENT: Negative for congestion.    Respiratory: Negative for cough, shortness of breath and wheezing.    Cardiovascular: Negative for chest pain and leg swelling.   Gastrointestinal: Negative for abdominal distention, abdominal pain, constipation, diarrhea and nausea.   Genitourinary: Negative for dysuria.   Musculoskeletal: Negative for arthralgia. Negative for back pain.  Patient with a sacral fracture  Skin: Negative for color change and wound.   Neurological: Negative for dizziness.   Psychiatric/Behavioral: Negative for agitation, behavioral problems and confusion.     Physical Exam:  Constitutional:       Appearance: Patient is well-developed.   HENT:      Head: Normocephalic.   Eyes:      Conjunctiva/sclera: Conjunctivae normal.   Neck:      Musculoskeletal: Normal range of motion.   Cardiovascular:      Rate and Rhythm: Normal rate and regular rhythm.      Heart sounds: Normal heart sounds. No murmur.   Pulmonary:      Effort: No respiratory distress.      Breath sounds: Normal breath sounds. No wheezing or rales.   Abdominal:      General: Bowel sounds are normal. There is no distension.      Palpations: Abdomen is soft.      Tenderness: There is no abdominal tenderness.   Musculoskeletal:       Normal range of motion.     Current orders nonweightbearing bilateral lower extremities, patient noncompliant with following this activity order, orthopedics to be updated  Skin:General:        Skin is warm.   Neurological:         Mental Status: Patient is alert and  "oriented to person, place, and time.   Psychiatric:         Behavior: Behavior normal.     Vitals:/64   Pulse 89   Temp (!) 96.6  F (35.9  C)   Resp 20   Ht 1.53 m (5' 0.25\")   Wt 44.5 kg (98 lb)   SpO2 95%   BMI 18.98 kg/m   and Body mass index is 18.98 kg/m .    Lab/Diagnostic data:   Recent Results (from the past 240 hour(s))   CBC (+ platelets, no diff)    Collection Time: 10/12/21  9:56 AM   Result Value Ref Range    WBC Count 8.2 4.0 - 11.0 10e3/uL    RBC Count 3.33 (L) 3.80 - 5.20 10e6/uL    Hemoglobin 11.0 (L) 11.7 - 15.7 g/dL    Hematocrit 33.2 (L) 35.0 - 47.0 %     78 - 100 fL    MCH 33.0 26.5 - 33.0 pg    MCHC 33.1 31.5 - 36.5 g/dL    RDW 13.3 10.0 - 15.0 %    Platelet Count 405 150 - 450 10e3/uL   Basic metabolic panel    Collection Time: 10/12/21  9:56 AM   Result Value Ref Range    Sodium 137 136 - 145 mmol/L    Potassium 3.5 3.5 - 5.0 mmol/L    Chloride 103 98 - 107 mmol/L    Carbon Dioxide (CO2) 26 22 - 31 mmol/L    Anion Gap 8 5 - 18 mmol/L    Urea Nitrogen 38 (H) 8 - 28 mg/dL    Creatinine 1.03 0.60 - 1.10 mg/dL    Calcium 9.6 8.5 - 10.5 mg/dL    Glucose 103 70 - 125 mg/dL    GFR Estimate 54 (L) >60 mL/min/1.73m2   Asymptomatic COVID-19 Virus (Coronavirus) by PCR Nasopharyngeal    Collection Time: 10/12/21  9:56 AM    Specimen: Nasopharyngeal; Swab   Result Value Ref Range    SARS CoV2 PCR Negative Negative   Alcohol level blood    Collection Time: 10/12/21 10:22 AM   Result Value Ref Range    Alcohol, Blood <10 None detected mg/dL   Occult blood stool POCT    Collection Time: 10/12/21 11:00 AM   Result Value Ref Range    Occult Blood POCT Negative Negative    Internal QC Check POCT Valid Valid    Test Card Lot Number [id number]     Test Card Expiration Date 11/2,023    UA with Microscopic reflex to Culture    Collection Time: 10/12/21  1:05 PM    Specimen: Urine, Clean Catch   Result Value Ref Range    Color Urine Light Yellow Colorless, Straw, Light Yellow, Yellow    Appearance " Urine Clear Clear    Glucose Urine Negative Negative mg/dL    Bilirubin Urine Negative Negative    Ketones Urine Negative Negative mg/dL    Specific Gravity Urine 1.018 1.001 - 1.030    Blood Urine 0.06 mg/dL (A) Negative    pH Urine 5.0 5.0 - 7.0    Protein Albumin Urine Negative Negative mg/dL    Urobilinogen Urine <2.0 <2.0 mg/dL    Nitrite Urine Positive (A) Negative    Leukocyte Esterase Urine Negative Negative    Bacteria Urine Moderate (A) None Seen /HPF    RBC Urine <1 <=2 /HPF    WBC Urine 4 <=5 /HPF    Squamous Epithelials Urine 1 <=1 /HPF    Hyaline Casts Urine 5 (H) <=2 /LPF   Urine Culture    Collection Time: 10/12/21  1:05 PM    Specimen: Urine, Clean Catch   Result Value Ref Range    Culture >100,000 CFU/mL Escherichia coli (A)        Susceptibility    Escherichia coli - MALLORIE     Ampicillin <=4 Susceptible ug/mL     Cefazolin 8 Susceptible ug/mL     Cefepime <=1 Susceptible ug/mL     Ceftriaxone <=1 Susceptible ug/mL     Ciprofloxacin <=0.25 Susceptible ug/mL     Gentamicin <=2 Susceptible ug/mL     Levofloxacin <=0.5 Susceptible ug/mL     Meropenem <=0.5 Susceptible ug/mL     Nitrofurantoin <=16 Susceptible ug/mL     Tetracycline <=2 Susceptible ug/mL     Tobramycin <=2 Susceptible ug/mL     Trimethoprim/Sulfamethoxazole <=0.5 Susceptible ug/mL   CBC with platelets    Collection Time: 10/13/21  6:57 AM   Result Value Ref Range    WBC Count 6.5 4.0 - 11.0 10e3/uL    RBC Count 3.35 (L) 3.80 - 5.20 10e6/uL    Hemoglobin 11.1 (L) 11.7 - 15.7 g/dL    Hematocrit 33.4 (L) 35.0 - 47.0 %     78 - 100 fL    MCH 33.1 (H) 26.5 - 33.0 pg    MCHC 33.2 31.5 - 36.5 g/dL    RDW 13.2 10.0 - 15.0 %    Platelet Count 429 150 - 450 10e3/uL   Basic metabolic panel    Collection Time: 10/13/21  6:57 AM   Result Value Ref Range    Sodium 138 136 - 145 mmol/L    Potassium 3.7 3.5 - 5.0 mmol/L    Chloride 104 98 - 107 mmol/L    Carbon Dioxide (CO2) 24 22 - 31 mmol/L    Anion Gap 10 5 - 18 mmol/L    Urea Nitrogen 27 8 -  28 mg/dL    Creatinine 0.77 0.60 - 1.10 mg/dL    Calcium 9.4 8.5 - 10.5 mg/dL    Glucose 70 70 - 125 mg/dL    GFR Estimate 77 >60 mL/min/1.73m2   Lactic acid whole blood    Collection Time: 10/13/21  6:57 AM   Result Value Ref Range    Lactic Acid 0.8 0.7 - 2.0 mmol/L   Kappa and lambda light chain    Collection Time: 10/13/21  9:18 AM   Result Value Ref Range    Kappa Free Light Chains 3.01 (H) 0.33 - 1.94 mg/dL    Lambda Free Light Chains 3.48 (H) 0.57 - 2.63 mg/dL    Kappa /Lambda Ratio 0.86 0.26 - 1.65   Vitamin B12    Collection Time: 10/13/21  9:18 AM   Result Value Ref Range    Vitamin B12 547 213 - 816 pg/mL   Folate    Collection Time: 10/13/21  9:18 AM   Result Value Ref Range    Folic Acid 9.6 >=3.5 ng/mL   Vitamin D Deficiency    Collection Time: 10/13/21  9:18 AM   Result Value Ref Range    Vitamin D, Total (25-Hydroxy) 71 30 - 80 ug/L     MEDICATIONS:     Review of your medicines          Accurate as of October 14, 2021 12:24 PM. If you have any questions, ask your nurse or doctor.            CONTINUE these medicines which have NOT CHANGED      Dose / Directions   * acetaminophen 500 MG tablet  Commonly known as: TYLENOL      Dose: 1,000 mg  Take 1,000 mg by mouth every 6 hours as needed for mild pain  Refills: 0     * acetaminophen 325 MG tablet  Commonly known as: TYLENOL  Used for: Closed fracture of sacrum with routine healing, unspecified portion of sacrum, subsequent encounter      Dose: 975 mg  Take 3 tablets (975 mg) by mouth 4 times daily for 14 days  Quantity: 168 tablet  Refills: 0     aspirin 81 MG chewable tablet  Commonly known as: ASA  Used for: Closed fracture of sacrum with routine healing, unspecified portion of sacrum, subsequent encounter      Dose: 81 mg  Take 1 tablet (81 mg) by mouth 2 times daily for 14 days  Quantity: 28 tablet  Refills: 0     bisacodyl 10 MG suppository  Commonly known as: DULCOLAX      Dose: 10 mg  Place 10 mg rectally daily as needed for  constipation  Refills: 0     cefdinir 300 MG capsule  Commonly known as: OMNICEF  Used for: Acute cystitis without hematuria      Dose: 300 mg  Take 1 capsule (300 mg) by mouth 2 times daily  Quantity: 6 capsule  Refills: 0     gabapentin 100 MG capsule  Commonly known as: NEURONTIN  Used for: Closed fracture of sacrum with routine healing, unspecified portion of sacrum, subsequent encounter      Dose: 100 mg  Take 1 capsule (100 mg) by mouth 3 times daily  Quantity: 90 capsule  Refills: 0     nicotine 21 MG/24HR 24 hr patch  Commonly known as: NICODERM CQ  Used for: Tobacco abuse      Dose: 1 patch  Place 1 patch onto the skin every 24 hours for 7 days  Quantity: 7 patch  Refills: 0     omeprazole 20 MG DR capsule  Commonly known as: priLOSEC  Used for: Gastroesophageal reflux disease without esophagitis      [OMEPRAZOLE (PRILOSEC) 20 MG CAPSULE] TAKE 1 CAPSULE BY MOUTH DAILY  Quantity: 90 capsule  Refills: 3     oxyCODONE 5 MG tablet  Commonly known as: ROXICODONE  Used for: Closed fracture of sacrum with routine healing, unspecified portion of sacrum, subsequent encounter      Dose: 5-10 mg  Take 1-2 tablets (5-10 mg) by mouth every 4 hours as needed for moderate to severe pain (for pain score 1-5 give 5mg and score of 6-10 give 10mg)  Quantity: 20 tablet  Refills: 0     polyethylene glycol 17 GM/Dose powder  Commonly known as: MIRALAX  Used for: Closed fracture of sacrum with routine healing, unspecified portion of sacrum, subsequent encounter      Dose: 17 g  Take 17 g by mouth daily  Quantity: 510 g  Refills: 0     senna-docusate 8.6-50 MG tablet  Commonly known as: SENOKOT-S/PERICOLACE  Used for: Closed fracture of sacrum with routine healing, unspecified portion of sacrum, subsequent encounter      Dose: 1 tablet  Take 1 tablet by mouth 2 times daily as needed for constipation  Refills: 0     Vitamin D (Cholecalciferol) 25 MCG (1000 UT) Tabs      Dose: 1,000 Units  Take 1,000 Units by mouth daily  Refills:  0         * This list has 2 medication(s) that are the same as other medications prescribed for you. Read the directions carefully, and ask your doctor or other care provider to review them with you.                ASSESSMENT/PLAN  Encounter Diagnoses   Name Primary?     Low back pain, unspecified back pain laterality, unspecified chronicity, unspecified whether sciatica present Yes     Closed fracture of sacrum with routine healing, unspecified portion of sacrum, subsequent encounter      Low back pain pain control change Tylenol to extra strength Tylenol scheduled every 6 hours, oxycodone as needed, gabapentin    GERD on omeprazole    Constipation bowel meds adjusted, monitor bowel status    Physical deconditioning PT OT        Electronically signed by: Sade Andersen CNP

## 2021-10-14 NOTE — PLAN OF CARE
Occupational Therapy Discharge Summary    Reason for therapy discharge:    Discharged to transitional care facility.    Progress towards therapy goal(s). See goals on Care Plan in [facility] electronic health record for goal details.  Goals partially met.  Barriers to achieving goals:   discharge from facility.    Therapy recommendation(s):    Continued therapy is recommended.  Rationale/Recommendations:  Progressing toward goals/ADL limited.

## 2021-10-14 NOTE — PROGRESS NOTES
Pt discharged via [facility] at 1745. Paperwork given & belongings returned.    Abi Evans RN

## 2021-10-15 ENCOUNTER — TRANSFERRED RECORDS (OUTPATIENT)
Dept: HEALTH INFORMATION MANAGEMENT | Facility: CLINIC | Age: 71
End: 2021-10-15

## 2021-10-15 LAB
GAMMA INTERFERON BACKGROUND BLD IA-ACNC: 0.08 IU/ML
M TB IFN-G BLD-IMP: NEGATIVE
M TB IFN-G CD4+ BCKGRND COR BLD-ACNC: 2.9 IU/ML
MITOGEN IGNF BCKGRD COR BLD-ACNC: 0 IU/ML
MITOGEN IGNF BCKGRD COR BLD-ACNC: 0 IU/ML
QUANTIFERON MITOGEN: 2.98 IU/ML
QUANTIFERON NIL TUBE: 0.08 IU/ML
QUANTIFERON TB1 TUBE: 0.08 IU/ML
QUANTIFERON TB2 TUBE: 0.08
TOTAL PROTEIN SERUM FOR ELP: 6.6 G/DL (ref 6–8)

## 2021-10-18 ENCOUNTER — TELEPHONE (OUTPATIENT)
Dept: FAMILY MEDICINE | Facility: CLINIC | Age: 71
End: 2021-10-18

## 2021-10-18 DIAGNOSIS — E51.9 THIAMINE DEFICIENCY: Primary | ICD-10-CM

## 2021-10-18 LAB — VIT B1 PYROPHOSHATE BLD-SCNC: 29 NMOL/L

## 2021-10-18 RX ORDER — THIAMINE HCL 50 MG
50 TABLET ORAL DAILY
Qty: 30 TABLET | Refills: 0 | Status: SHIPPED | OUTPATIENT
Start: 2021-10-18 | End: 2021-11-10

## 2021-10-18 NOTE — PROGRESS NOTES
[facility] GERIATRIC SERVICES       Patient Namita Viera  MRN: [id number]  [facility]        Reason for Visit     Chief Complaint   Patient presents with     RECHECK     TCU F/U       Code Status     CPR/Full code     Assessment     B/L SACRAL FRACTURE COMMINUITED  PAIN MANAGEMENT  FALL  UTI ACUTE  HTN WITH HYPOTENSION  NICOTINE USE  Generalized weakness    Plan     Pt is admitted to TCU for strengthening and rehab.  Patient was evaluated by orthopedics for bilateral comminuted sacral fractures left greater than right.  Nonsurgical management recommended  Patient to be nonweightbearing bilateral lower extremities  Patient to have bed to chair transfers only  Anticipate progressive weightbearing over the next 2 to 3 months-walking with a walker  Pain management optimized.  Tylenol and oxycodone added-using oxycodone as pain is severe per pt  Can consider NSAIDs  Gabapentin for nerve pain symptoms recommended  Given MRI findings myeloproliferative work-up has been initiated  Patient may require outpatient follow-up with medical oncology for follow-up of these results versus seeing her own primary care physician  Blood pressure management done and patient taken off antihypertensives as blood pressures were stable to low in the hospital.  Would like to monitor trends.  UTI was treated with antibiotics.  Recheck labs  Continue with PT/OT    History     Patient is a very pleasant 71 year old female who is admitted to TCU  Patient came with back pain with fall.  Unfortunately CT showed comminuted bilateral sacral fractures.  Orthopedics has consulted.  They have recommended conservative treatment.  Patient will be nonweightbearing but can transfer.  Pain management optimized.  Gabapentin was added to the regimen      Past Medical & Surgical History     PAST MEDICAL HISTORY:   Past Medical History:   Diagnosis Date     Acid reflux      Alcohol abuse      Alcohol use      Closed fracture of left femur (H)      Hip  fracture requiring operative repair (H)      SAH (subarachnoid hemorrhage) (H)      Traumatic closed displaced fracture of distal end of radius       PAST SURGICAL HISTORY:   has a past surgical history that includes other surgical history (2008); Hysterectomy total abdominal, bilateral salpingo-oophorectomy, combined (2008); Bladder Suspension (2008); Hip Fracture Surgery (Left); and Hip Pinning (Left, 5/2/2018).      Past Social History     Reviewed,  reports that she has been smoking. She has a 90.00 pack-year smoking history. She has never used smokeless tobacco. She reports current alcohol use of about 14.0 standard drinks of alcohol per week. She reports that she does not use drugs.    Family History     Reviewed, and family history includes Cancer in her mother and sister; Esophageal Cancer in her mother; Rectal Cancer in her sister; Rheumatoid Arthritis in her brother.    Medication List   P  Current Outpatient Medications   Medication     acetaminophen (TYLENOL) 500 MG tablet     aspirin (ASA) 81 MG chewable tablet     bisacodyl (DULCOLAX) 10 MG suppository     gabapentin (NEURONTIN) 100 MG capsule     nicotine (NICODERM CQ) 21 MG/24HR 24 hr patch     omeprazole (PRILOSEC) 20 MG capsule     oxyCODONE (ROXICODONE) 5 MG tablet     polyethylene glycol (MIRALAX) 17 GM/Dose powder     senna-docusate (SENOKOT-S/PERICOLACE) 8.6-50 MG tablet     vitamin B1 (THIAMINE) 50 MG tablet     Vitamin D, Cholecalciferol, 25 MCG (1000 UT) TABS     acetaminophen (TYLENOL) 325 MG tablet     cefdinir (OMNICEF) 300 MG capsule     No current facility-administered medications for this visit.       Allergies     No Known Allergies    Review of Systems   A comprehensive review of 14 systems was done. Pertinent findings noted here and in history of present illness. All the rest negative.  Constitutional: Negative.  Negative for fever, chills, she has  activity change, appetite change and fatigue.   HENT: Negative for congestion and  "facial swelling.    Eyes: Negative for photophobia, redness and visual disturbance.   Respiratory: Negative for cough and chest tightness.    Cardiovascular: Negative for chest pain, palpitations and leg swelling.   Gastrointestinal: Negative for nausea, diarrhea, constipation, blood in stool and abdominal distention.   Genitourinary: Negative.    Musculoskeletal: having pain in pelvis  Skin: Negative.    Neurological: Negative for dizziness, tremors, syncope, weakness, light-headedness and headaches.   Hematological: Does not bruise/bleed easily.   Psychiatric/Behavioral: Negative.        Physical Exam   Blood pressure (!) 162/85, pulse 108, temperature 97.7  F (36.5  C), resp. rate 22, height 1.53 m (5' 0.25\"), weight 43.7 kg (96 lb 6.4 oz), SpO2 96 %.      Constitutional: Oriented to person, place, and time and appears well-developed.   HEENT:  Normocephalic and atraumatic.  Eyes: Conjunctivae and EOM are normal. Pupils are equal, round, and reactive to light. No discharge.  No scleral icterus. Nose normal. Mouth/Throat: Oropharynx is clear and moist. No oropharyngeal exudate.    NECK: Normal range of motion. Neck supple. No JVD present. No tracheal deviation present. No thyromegaly present.   CARDIOVASCULAR: Normal rate, regular rhythm and intact distal pulses.  Exam reveals no gallop and no friction rub.  Systolic murmur present.  PULMONARY: Effort normal and breath sounds normal. No respiratory distress.No Wheezing or rales.  ABDOMEN: Soft. Bowel sounds are normal. No distension and no mass.  There is no tenderness. There is no rebound and no guarding. No HSM.  MUSCULOSKELETAL: Normal range of motion. No edema and no tenderness. Mild kyphosis, no tenderness.  LYMPH NODES: Has no cervical, supraclavicular, axillary and groin adenopathy.   NEUROLOGICAL: Alert and oriented to person, place, and time. No cranial nerve deficit.  Normal muscle tone. Coordination normal.   GENITOURINARY: Deferred exam.  SKIN: Skin is " warm and dry. No rash noted. No erythema. No pallor.   EXTREMITIES: No cyanosis, no clubbing, no edema. No Deformity.  PSYCHIATRIC: Normal mood, affect and behavior.      Lab Results     Recent Results (from the past 240 hour(s))   CBC (+ platelets, no diff)    Collection Time: 10/12/21  9:56 AM   Result Value Ref Range    WBC Count 8.2 4.0 - 11.0 10e3/uL    RBC Count 3.33 (L) 3.80 - 5.20 10e6/uL    Hemoglobin 11.0 (L) 11.7 - 15.7 g/dL    Hematocrit 33.2 (L) 35.0 - 47.0 %     78 - 100 fL    MCH 33.0 26.5 - 33.0 pg    MCHC 33.1 31.5 - 36.5 g/dL    RDW 13.3 10.0 - 15.0 %    Platelet Count 405 150 - 450 10e3/uL   Basic metabolic panel    Collection Time: 10/12/21  9:56 AM   Result Value Ref Range    Sodium 137 136 - 145 mmol/L    Potassium 3.5 3.5 - 5.0 mmol/L    Chloride 103 98 - 107 mmol/L    Carbon Dioxide (CO2) 26 22 - 31 mmol/L    Anion Gap 8 5 - 18 mmol/L    Urea Nitrogen 38 (H) 8 - 28 mg/dL    Creatinine 1.03 0.60 - 1.10 mg/dL    Calcium 9.6 8.5 - 10.5 mg/dL    Glucose 103 70 - 125 mg/dL    GFR Estimate 54 (L) >60 mL/min/1.73m2   Asymptomatic COVID-19 Virus (Coronavirus) by PCR Nasopharyngeal    Collection Time: 10/12/21  9:56 AM    Specimen: Nasopharyngeal; Swab   Result Value Ref Range    SARS CoV2 PCR Negative Negative   Alcohol level blood    Collection Time: 10/12/21 10:22 AM   Result Value Ref Range    Alcohol, Blood <10 None detected mg/dL   Occult blood stool POCT    Collection Time: 10/12/21 11:00 AM   Result Value Ref Range    Occult Blood POCT Negative Negative    Internal QC Check POCT Valid Valid    Test Card Lot Number [id number]     Test Card Expiration Date 11/2,023    UA with Microscopic reflex to Culture    Collection Time: 10/12/21  1:05 PM    Specimen: Urine, Clean Catch   Result Value Ref Range    Color Urine Light Yellow Colorless, Straw, Light Yellow, Yellow    Appearance Urine Clear Clear    Glucose Urine Negative Negative mg/dL    Bilirubin Urine Negative Negative    Ketones Urine  Negative Negative mg/dL    Specific Gravity Urine 1.018 1.001 - 1.030    Blood Urine 0.06 mg/dL (A) Negative    pH Urine 5.0 5.0 - 7.0    Protein Albumin Urine Negative Negative mg/dL    Urobilinogen Urine <2.0 <2.0 mg/dL    Nitrite Urine Positive (A) Negative    Leukocyte Esterase Urine Negative Negative    Bacteria Urine Moderate (A) None Seen /HPF    RBC Urine <1 <=2 /HPF    WBC Urine 4 <=5 /HPF    Squamous Epithelials Urine 1 <=1 /HPF    Hyaline Casts Urine 5 (H) <=2 /LPF   Urine Culture    Collection Time: 10/12/21  1:05 PM    Specimen: Urine, Clean Catch   Result Value Ref Range    Culture >100,000 CFU/mL Escherichia coli (A)        Susceptibility    Escherichia coli - MALLORIE     Ampicillin <=4 Susceptible ug/mL     Cefazolin 8 Susceptible ug/mL     Cefepime <=1 Susceptible ug/mL     Ceftriaxone <=1 Susceptible ug/mL     Ciprofloxacin <=0.25 Susceptible ug/mL     Gentamicin <=2 Susceptible ug/mL     Levofloxacin <=0.5 Susceptible ug/mL     Meropenem <=0.5 Susceptible ug/mL     Nitrofurantoin <=16 Susceptible ug/mL     Tetracycline <=2 Susceptible ug/mL     Tobramycin <=2 Susceptible ug/mL     Trimethoprim/Sulfamethoxazole <=0.5 Susceptible ug/mL   CBC with platelets    Collection Time: 10/13/21  6:57 AM   Result Value Ref Range    WBC Count 6.5 4.0 - 11.0 10e3/uL    RBC Count 3.35 (L) 3.80 - 5.20 10e6/uL    Hemoglobin 11.1 (L) 11.7 - 15.7 g/dL    Hematocrit 33.4 (L) 35.0 - 47.0 %     78 - 100 fL    MCH 33.1 (H) 26.5 - 33.0 pg    MCHC 33.2 31.5 - 36.5 g/dL    RDW 13.2 10.0 - 15.0 %    Platelet Count 429 150 - 450 10e3/uL   Basic metabolic panel    Collection Time: 10/13/21  6:57 AM   Result Value Ref Range    Sodium 138 136 - 145 mmol/L    Potassium 3.7 3.5 - 5.0 mmol/L    Chloride 104 98 - 107 mmol/L    Carbon Dioxide (CO2) 24 22 - 31 mmol/L    Anion Gap 10 5 - 18 mmol/L    Urea Nitrogen 27 8 - 28 mg/dL    Creatinine 0.77 0.60 - 1.10 mg/dL    Calcium 9.4 8.5 - 10.5 mg/dL    Glucose 70 70 - 125 mg/dL    GFR  Estimate 77 >60 mL/min/1.73m2   Lactic acid whole blood    Collection Time: 10/13/21  6:57 AM   Result Value Ref Range    Lactic Acid 0.8 0.7 - 2.0 mmol/L   Kappa and lambda light chain    Collection Time: 10/13/21  9:18 AM   Result Value Ref Range    Kappa Free Light Chains 3.01 (H) 0.33 - 1.94 mg/dL    Lambda Free Light Chains 3.48 (H) 0.57 - 2.63 mg/dL    Kappa /Lambda Ratio 0.86 0.26 - 1.65   Vitamin B12    Collection Time: 10/13/21  9:18 AM   Result Value Ref Range    Vitamin B12 547 213 - 816 pg/mL   Folate    Collection Time: 10/13/21  9:18 AM   Result Value Ref Range    Folic Acid 9.6 >=3.5 ng/mL   Vitamin B1 whole blood    Collection Time: 10/13/21  9:18 AM   Result Value Ref Range    Vitamin B1 Whole Blood Level 29 (L) 70 - 180 nmol/L   Vitamin D Deficiency    Collection Time: 10/13/21  9:18 AM   Result Value Ref Range    Vitamin D, Total (25-Hydroxy) 71 30 - 80 ug/L   Total Protein, Serum    Collection Time: 10/13/21  9:18 AM   Result Value Ref Range    Total Protein Serum for ELP 6.6 6.0 - 8.0 g/dL             Imaging Results     Cervical spine CT w/o contrast    Result Date: 10/12/2021  EXAM: CT CERVICAL SPINE W/O CONTRAST LOCATION: [facility] DATE/TIME: 10/12/2021 10:06 AM INDICATION: Neck trauma (Age >= 65y) COMPARISON: 04/30/2018. TECHNIQUE: Routine CT Cervical Spine without IV contrast. Multiplanar reformats. Dose reduction techniques were used. FINDINGS: VERTEBRA: Minimal anterior spondylolisthesis at C3-C4 and C4-C5 with otherwise normal alignment. Normal vertebral body heights. Mild disc space narrowing C5-C6. Severe facet arthropathy on the left at C2-C3 and C3-C4 and on the right at C4-C5. No fracture or posttraumatic subluxation. CANAL/FORAMINA: No spinal canal stenosis. Mild left foraminal stenosis at C5-C6. No high-grade foraminal stenosis. PARASPINAL: No extraspinal abnormality.     IMPRESSION: 1.  No fracture or posttraumatic subluxation. 2.  No high-grade spinal  canal or neural foraminal stenosis.    Head CT w/o contrast    Result Date: 10/12/2021  EXAM: CT HEAD W/O CONTRAST LOCATION: [facility] DATE/TIME: 10/12/2021 10:06 AM INDICATION: Head trauma, minor (Age >= 65y). COMPARISON: Brain MRI 05/15/2018. TECHNIQUE: Routine CT Head without IV contrast. Multiplanar reformats. Dose reduction techniques were used. FINDINGS: INTRACRANIAL CONTENTS: No intracranial hemorrhage, extraaxial collection, or mass effect.  No CT evidence of acute infarct. Moderate presumed chronic small vessel ischemic changes. Mild to moderate generalized volume loss. No hydrocephalus. VISUALIZED ORBITS/SINUSES/MASTOIDS: Prior bilateral cataract surgery. Visualized portions of the orbits are otherwise unremarkable. No paranasal sinus mucosal disease. No middle ear or mastoid effusion. BONES/SOFT TISSUES: No acute abnormality.     IMPRESSION: 1.  No CT evidence for acute intracranial process. 2.  Brain atrophy and presumed chronic microvascular ischemic changes as above.    Lumbar spine CT w/o contrast    Result Date: 10/12/2021  EXAM: CT LUMBAR SPINE W/O CONTRAST LOCATION: [facility] DATE/TIME: 10/12/2021 10:07 AM INDICATION: Chronic back pain, fall. COMPARISON: Lumbar MRI 10/11/2021. TECHNIQUE: Routine CT Lumbar Spine without IV contrast. Multiplanar reformats. Dose reduction techniques were used. FINDINGS: VERTEBRA: Mild posterior spondylolisthesis at L2-L3 with otherwise normal alignment. Diffuse osteopenia. Normal vertebral body heights. No lumbar compression fracture. Bilateral sacral fractures with indistinct fracture planes and cortical irregularity along the ventral sacral ala. Mild edema within the presacral soft tissues. CANAL/FORAMINA: No canal or neural foraminal stenosis. PARASPINAL: Calcified plaque within the abdominal aorta. Left lower lobe atelectasis.     IMPRESSION: 1.  Comminuted bilateral sacral fracture with mild presacral edema. 2.   No lumbar compression fracture. No significant spinal canal or foraminal stenosis.          Electronically signed by    Jeniffer Cannon MD

## 2021-10-18 NOTE — CONFIDENTIAL NOTE
Ordered vitamin B1. Updated daughter Tiara on plan. [facility] in re[facility] and updated them on plan. Result note sent to PCP.

## 2021-10-19 ENCOUNTER — TRANSITIONAL CARE UNIT VISIT (OUTPATIENT)
Dept: GERIATRICS | Facility: CLINIC | Age: 71
End: 2021-10-19
Payer: COMMERCIAL

## 2021-10-19 VITALS
DIASTOLIC BLOOD PRESSURE: 85 MMHG | HEART RATE: 108 BPM | OXYGEN SATURATION: 96 % | RESPIRATION RATE: 22 BRPM | HEIGHT: 60 IN | BODY MASS INDEX: 18.93 KG/M2 | TEMPERATURE: 97.7 F | SYSTOLIC BLOOD PRESSURE: 162 MMHG | WEIGHT: 96.4 LBS

## 2021-10-19 DIAGNOSIS — M54.50 LOW BACK PAIN, UNSPECIFIED BACK PAIN LATERALITY, UNSPECIFIED CHRONICITY, UNSPECIFIED WHETHER SCIATICA PRESENT: Primary | ICD-10-CM

## 2021-10-19 DIAGNOSIS — I10 BENIGN ESSENTIAL HYPERTENSION: ICD-10-CM

## 2021-10-19 DIAGNOSIS — Z72.0 TOBACCO ABUSE: ICD-10-CM

## 2021-10-19 DIAGNOSIS — S32.10XD CLOSED FRACTURE OF SACRUM WITH ROUTINE HEALING, UNSPECIFIED PORTION OF SACRUM, SUBSEQUENT ENCOUNTER: ICD-10-CM

## 2021-10-19 LAB
ALBUMIN PERCENT: 56.4 % (ref 51–67)
ALBUMIN SERPL ELPH-MCNC: 3.7 G/DL (ref 3.2–4.7)
ALPHA 1 PERCENT: 3.4 % (ref 2–4)
ALPHA 2 PERCENT: 13.7 % (ref 5–13)
ALPHA1 GLOB SERPL ELPH-MCNC: 0.2 G/DL (ref 0.1–0.3)
ALPHA2 GLOB SERPL ELPH-MCNC: 0.9 G/DL (ref 0.4–0.9)
B-GLOBULIN SERPL ELPH-MCNC: 0.8 G/DL (ref 0.7–1.2)
BETA PERCENT: 12.6 % (ref 10–17)
GAMMA GLOB SERPL ELPH-MCNC: 0.9 G/DL (ref 0.6–1.4)
GAMMA GLOBULIN PERCENT: 13.9 % (ref 9–20)
PATH ICD:: ABNORMAL
PATH ICD:: NORMAL
PROT PATTERN SERPL ELPH-IMP: ABNORMAL
PROT PATTERN SERPL IFE-IMP: NORMAL
REVIEWING PATHOLOGIST: ABNORMAL
REVIEWING PATHOLOGIST: NORMAL
TOTAL PROTEIN SERUM FOR ELP (SYNCED VALUE): 6.6 G/DL

## 2021-10-19 PROCEDURE — 99305 1ST NF CARE MODERATE MDM 35: CPT | Performed by: FAMILY MEDICINE

## 2021-10-19 ASSESSMENT — MIFFLIN-ST. JEOR: SCORE: 867.74

## 2021-10-19 NOTE — LETTER
10/19/2021        RE: Namita Viera  [address] [facility] [address]        [facility] GERIATRIC SERVICES       Patient Namita Viera  MRN: [id number]  [facility]        Reason for Visit   No chief complaint on file.      Code Status     {CODE STATUS:329943}    Assessment     B/L SACRAL FRACTURE COMMINUITED  PAIN MANAGEMENT  FALL  UTI ACUTE  HTN WITH HYPOTENSION  NICOTINE USE  Generalized weakness    Plan     Pt is admitted to TCU for strengthening and rehab.  Patient was evaluated by orthopedics for bilateral comminuted sacral fractures left greater than right.  Nonsurgical management recommended  Patient to be nonweightbearing bilateral lower extremities  Patient to have bed to chair transfers only  Anticipate progressive weightbearing over the next 2 to 3 months  Pain management optimized.  Tylenol and oxycodone added  Can consider NSAIDs  Gabapentin for nerve pain symptoms recommended  Given MRI findings myeloproliferative work-up has been initiated  Patient may require outpatient follow-up with medical oncology for follow-up of these results versus seeing her own primary care physician  Blood pressure management done and patient taken off antihypertensives as blood pressures were stable to low in the hospital.  Would like to monitor trends.  UTI was treated with antibiotics.    Recheck labs  Continue with PT/OT    History     Patient is a very pleasant 71 year old female who is admitted to TCU  Patient came with back pain with fall.  Unfortunately CT showed comminuted bilateral sacral fractures.  Orthopedics has consulted.  They have recommended conservative treatment.  Patient will be nonweightbearing but can transfer.  Pain management optimized.  Gabapentin was added to the regimen      Past Medical & Surgical History     PAST MEDICAL HISTORY:   Past Medical History:   Diagnosis Date     Acid reflux      Alcohol abuse      Alcohol use      Closed fracture of left femur (H)      Hip fracture  requiring operative repair (H)      SAH (subarachnoid hemorrhage) (H)      Traumatic closed displaced fracture of distal end of radius       PAST SURGICAL HISTORY:   has a past surgical history that includes other surgical history (2008); Hysterectomy total abdominal, bilateral salpingo-oophorectomy, combined (2008); Bladder Suspension (2008); Hip Fracture Surgery (Left); and Hip Pinning (Left, 5/2/2018).      Past Social History     Reviewed,  reports that she has been smoking. She has a 90.00 pack-year smoking history. She has never used smokeless tobacco. She reports current alcohol use of about 14.0 standard drinks of alcohol per week. She reports that she does not use drugs.    Family History     Reviewed, and family history includes Cancer in her mother and sister; Esophageal Cancer in her mother; Rectal Cancer in her sister; Rheumatoid Arthritis in her brother.    Medication List   Post Discharge Medication Reconciliation Status: Post Discharge Medication Reconciliation Status: {ACO Med Rec (Provider):400602}.  Current Outpatient Medications   Medication     acetaminophen (TYLENOL) 325 MG tablet     acetaminophen (TYLENOL) 500 MG tablet     aspirin (ASA) 81 MG chewable tablet     bisacodyl (DULCOLAX) 10 MG suppository     cefdinir (OMNICEF) 300 MG capsule     gabapentin (NEURONTIN) 100 MG capsule     nicotine (NICODERM CQ) 21 MG/24HR 24 hr patch     omeprazole (PRILOSEC) 20 MG capsule     oxyCODONE (ROXICODONE) 5 MG tablet     polyethylene glycol (MIRALAX) 17 GM/Dose powder     senna-docusate (SENOKOT-S/PERICOLACE) 8.6-50 MG tablet     vitamin B1 (THIAMINE) 50 MG tablet     Vitamin D, Cholecalciferol, 25 MCG (1000 UT) TABS     No current facility-administered medications for this visit.       Allergies     No Known Allergies    Review of Systems   A comprehensive review of 14 systems was done. Pertinent findings noted here and in history of present illness. All the rest negative.  Constitutional: Negative.   Negative for fever, chills, she has  activity change, appetite change and fatigue.   HENT: Negative for congestion and facial swelling.    Eyes: Negative for photophobia, redness and visual disturbance.   Respiratory: Negative for cough and chest tightness.    Cardiovascular: Negative for chest pain, palpitations and leg swelling.   Gastrointestinal: Negative for nausea, diarrhea, constipation, blood in stool and abdominal distention.   Genitourinary: Negative.    Musculoskeletal: Negative.    Skin: Negative.    Neurological: Negative for dizziness, tremors, syncope, weakness, light-headedness and headaches.   Hematological: Does not bruise/bleed easily.   Psychiatric/Behavioral: Negative.        Physical Exam   There were no vitals taken for this visit.      Constitutional: Oriented to person, place, and time and appears well-developed.   HEENT:  Normocephalic and atraumatic.  Eyes: Conjunctivae and EOM are normal. Pupils are equal, round, and reactive to light. No discharge.  No scleral icterus. Nose normal. Mouth/Throat: Oropharynx is clear and moist. No oropharyngeal exudate.    NECK: Normal range of motion. Neck supple. No JVD present. No tracheal deviation present. No thyromegaly present.   CARDIOVASCULAR: Normal rate, regular rhythm and intact distal pulses.  Exam reveals no gallop and no friction rub.  Systolic murmur present.  PULMONARY: Effort normal and breath sounds normal. No respiratory distress.No Wheezing or rales.  ABDOMEN: Soft. Bowel sounds are normal. No distension and no mass.  There is no tenderness. There is no rebound and no guarding. No HSM.  MUSCULOSKELETAL: Normal range of motion. No edema and no tenderness. Mild kyphosis, no tenderness.  LYMPH NODES: Has no cervical, supraclavicular, axillary and groin adenopathy.   NEUROLOGICAL: Alert and oriented to person, place, and time. No cranial nerve deficit.  Normal muscle tone. Coordination normal.   GENITOURINARY: Deferred exam.  SKIN: Skin is  warm and dry. No rash noted. No erythema. No pallor.   EXTREMITIES: No cyanosis, no clubbing, no edema. No Deformity.  PSYCHIATRIC: Normal mood, affect and behavior.      Lab Results     Recent Results (from the past 240 hour(s))   CBC (+ platelets, no diff)    Collection Time: 10/12/21  9:56 AM   Result Value Ref Range    WBC Count 8.2 4.0 - 11.0 10e3/uL    RBC Count 3.33 (L) 3.80 - 5.20 10e6/uL    Hemoglobin 11.0 (L) 11.7 - 15.7 g/dL    Hematocrit 33.2 (L) 35.0 - 47.0 %     78 - 100 fL    MCH 33.0 26.5 - 33.0 pg    MCHC 33.1 31.5 - 36.5 g/dL    RDW 13.3 10.0 - 15.0 %    Platelet Count 405 150 - 450 10e3/uL   Basic metabolic panel    Collection Time: 10/12/21  9:56 AM   Result Value Ref Range    Sodium 137 136 - 145 mmol/L    Potassium 3.5 3.5 - 5.0 mmol/L    Chloride 103 98 - 107 mmol/L    Carbon Dioxide (CO2) 26 22 - 31 mmol/L    Anion Gap 8 5 - 18 mmol/L    Urea Nitrogen 38 (H) 8 - 28 mg/dL    Creatinine 1.03 0.60 - 1.10 mg/dL    Calcium 9.6 8.5 - 10.5 mg/dL    Glucose 103 70 - 125 mg/dL    GFR Estimate 54 (L) >60 mL/min/1.73m2   Asymptomatic COVID-19 Virus (Coronavirus) by PCR Nasopharyngeal    Collection Time: 10/12/21  9:56 AM    Specimen: Nasopharyngeal; Swab   Result Value Ref Range    SARS CoV2 PCR Negative Negative   Alcohol level blood    Collection Time: 10/12/21 10:22 AM   Result Value Ref Range    Alcohol, Blood <10 None detected mg/dL   Occult blood stool POCT    Collection Time: 10/12/21 11:00 AM   Result Value Ref Range    Occult Blood POCT Negative Negative    Internal QC Check POCT Valid Valid    Test Card Lot Number [id number]     Test Card Expiration Date 11/2,023    UA with Microscopic reflex to Culture    Collection Time: 10/12/21  1:05 PM    Specimen: Urine, Clean Catch   Result Value Ref Range    Color Urine Light Yellow Colorless, Straw, Light Yellow, Yellow    Appearance Urine Clear Clear    Glucose Urine Negative Negative mg/dL    Bilirubin Urine Negative Negative    Ketones Urine  Negative Negative mg/dL    Specific Gravity Urine 1.018 1.001 - 1.030    Blood Urine 0.06 mg/dL (A) Negative    pH Urine 5.0 5.0 - 7.0    Protein Albumin Urine Negative Negative mg/dL    Urobilinogen Urine <2.0 <2.0 mg/dL    Nitrite Urine Positive (A) Negative    Leukocyte Esterase Urine Negative Negative    Bacteria Urine Moderate (A) None Seen /HPF    RBC Urine <1 <=2 /HPF    WBC Urine 4 <=5 /HPF    Squamous Epithelials Urine 1 <=1 /HPF    Hyaline Casts Urine 5 (H) <=2 /LPF   Urine Culture    Collection Time: 10/12/21  1:05 PM    Specimen: Urine, Clean Catch   Result Value Ref Range    Culture >100,000 CFU/mL Escherichia coli (A)        Susceptibility    Escherichia coli - MALLORIE     Ampicillin <=4 Susceptible ug/mL     Cefazolin 8 Susceptible ug/mL     Cefepime <=1 Susceptible ug/mL     Ceftriaxone <=1 Susceptible ug/mL     Ciprofloxacin <=0.25 Susceptible ug/mL     Gentamicin <=2 Susceptible ug/mL     Levofloxacin <=0.5 Susceptible ug/mL     Meropenem <=0.5 Susceptible ug/mL     Nitrofurantoin <=16 Susceptible ug/mL     Tetracycline <=2 Susceptible ug/mL     Tobramycin <=2 Susceptible ug/mL     Trimethoprim/Sulfamethoxazole <=0.5 Susceptible ug/mL   CBC with platelets    Collection Time: 10/13/21  6:57 AM   Result Value Ref Range    WBC Count 6.5 4.0 - 11.0 10e3/uL    RBC Count 3.35 (L) 3.80 - 5.20 10e6/uL    Hemoglobin 11.1 (L) 11.7 - 15.7 g/dL    Hematocrit 33.4 (L) 35.0 - 47.0 %     78 - 100 fL    MCH 33.1 (H) 26.5 - 33.0 pg    MCHC 33.2 31.5 - 36.5 g/dL    RDW 13.2 10.0 - 15.0 %    Platelet Count 429 150 - 450 10e3/uL   Basic metabolic panel    Collection Time: 10/13/21  6:57 AM   Result Value Ref Range    Sodium 138 136 - 145 mmol/L    Potassium 3.7 3.5 - 5.0 mmol/L    Chloride 104 98 - 107 mmol/L    Carbon Dioxide (CO2) 24 22 - 31 mmol/L    Anion Gap 10 5 - 18 mmol/L    Urea Nitrogen 27 8 - 28 mg/dL    Creatinine 0.77 0.60 - 1.10 mg/dL    Calcium 9.4 8.5 - 10.5 mg/dL    Glucose 70 70 - 125 mg/dL    GFR  Estimate 77 >60 mL/min/1.73m2   Lactic acid whole blood    Collection Time: 10/13/21  6:57 AM   Result Value Ref Range    Lactic Acid 0.8 0.7 - 2.0 mmol/L   Kappa and lambda light chain    Collection Time: 10/13/21  9:18 AM   Result Value Ref Range    Kappa Free Light Chains 3.01 (H) 0.33 - 1.94 mg/dL    Lambda Free Light Chains 3.48 (H) 0.57 - 2.63 mg/dL    Kappa /Lambda Ratio 0.86 0.26 - 1.65   Vitamin B12    Collection Time: 10/13/21  9:18 AM   Result Value Ref Range    Vitamin B12 547 213 - 816 pg/mL   Folate    Collection Time: 10/13/21  9:18 AM   Result Value Ref Range    Folic Acid 9.6 >=3.5 ng/mL   Vitamin B1 whole blood    Collection Time: 10/13/21  9:18 AM   Result Value Ref Range    Vitamin B1 Whole Blood Level 29 (L) 70 - 180 nmol/L   Vitamin D Deficiency    Collection Time: 10/13/21  9:18 AM   Result Value Ref Range    Vitamin D, Total (25-Hydroxy) 71 30 - 80 ug/L   Total Protein, Serum    Collection Time: 10/13/21  9:18 AM   Result Value Ref Range    Total Protein Serum for ELP 6.6 6.0 - 8.0 g/dL             Imaging Results     Cervical spine CT w/o contrast    Result Date: 10/12/2021  EXAM: CT CERVICAL SPINE W/O CONTRAST LOCATION: [facility] DATE/TIME: 10/12/2021 10:06 AM INDICATION: Neck trauma (Age >= 65y) COMPARISON: 04/30/2018. TECHNIQUE: Routine CT Cervical Spine without IV contrast. Multiplanar reformats. Dose reduction techniques were used. FINDINGS: VERTEBRA: Minimal anterior spondylolisthesis at C3-C4 and C4-C5 with otherwise normal alignment. Normal vertebral body heights. Mild disc space narrowing C5-C6. Severe facet arthropathy on the left at C2-C3 and C3-C4 and on the right at C4-C5. No fracture or posttraumatic subluxation. CANAL/FORAMINA: No spinal canal stenosis. Mild left foraminal stenosis at C5-C6. No high-grade foraminal stenosis. PARASPINAL: No extraspinal abnormality.     IMPRESSION: 1.  No fracture or posttraumatic subluxation. 2.  No high-grade spinal  canal or neural foraminal stenosis.    Head CT w/o contrast    Result Date: 10/12/2021  EXAM: CT HEAD W/O CONTRAST LOCATION: [facility] DATE/TIME: 10/12/2021 10:06 AM INDICATION: Head trauma, minor (Age >= 65y). COMPARISON: Brain MRI 05/15/2018. TECHNIQUE: Routine CT Head without IV contrast. Multiplanar reformats. Dose reduction techniques were used. FINDINGS: INTRACRANIAL CONTENTS: No intracranial hemorrhage, extraaxial collection, or mass effect.  No CT evidence of acute infarct. Moderate presumed chronic small vessel ischemic changes. Mild to moderate generalized volume loss. No hydrocephalus. VISUALIZED ORBITS/SINUSES/MASTOIDS: Prior bilateral cataract surgery. Visualized portions of the orbits are otherwise unremarkable. No paranasal sinus mucosal disease. No middle ear or mastoid effusion. BONES/SOFT TISSUES: No acute abnormality.     IMPRESSION: 1.  No CT evidence for acute intracranial process. 2.  Brain atrophy and presumed chronic microvascular ischemic changes as above.    Lumbar spine CT w/o contrast    Result Date: 10/12/2021  EXAM: CT LUMBAR SPINE W/O CONTRAST LOCATION: [facility] DATE/TIME: 10/12/2021 10:07 AM INDICATION: Chronic back pain, fall. COMPARISON: Lumbar MRI 10/11/2021. TECHNIQUE: Routine CT Lumbar Spine without IV contrast. Multiplanar reformats. Dose reduction techniques were used. FINDINGS: VERTEBRA: Mild posterior spondylolisthesis at L2-L3 with otherwise normal alignment. Diffuse osteopenia. Normal vertebral body heights. No lumbar compression fracture. Bilateral sacral fractures with indistinct fracture planes and cortical irregularity along the ventral sacral ala. Mild edema within the presacral soft tissues. CANAL/FORAMINA: No canal or neural foraminal stenosis. PARASPINAL: Calcified plaque within the abdominal aorta. Left lower lobe atelectasis.     IMPRESSION: 1.  Comminuted bilateral sacral fracture with mild presacral edema. 2.   No lumbar compression fracture. No significant spinal canal or foraminal stenosis.          Electronically signed by    Jeniffer Cannon MD                             Sincerely,        SHOSHANA Cornelius

## 2021-10-21 LAB
INTERPRETATION: NORMAL
SIGNIFICANT RESULTS: NORMAL
SPECIMEN DESCRIPTION: NORMAL
TEST DETAILS, MDL: NORMAL

## 2021-10-22 ENCOUNTER — TRANSITIONAL CARE UNIT VISIT (OUTPATIENT)
Dept: GERIATRICS | Facility: CLINIC | Age: 71
End: 2021-10-22
Payer: COMMERCIAL

## 2021-10-22 VITALS
OXYGEN SATURATION: 98 % | SYSTOLIC BLOOD PRESSURE: 148 MMHG | RESPIRATION RATE: 20 BRPM | HEART RATE: 99 BPM | TEMPERATURE: 97.5 F | DIASTOLIC BLOOD PRESSURE: 73 MMHG | BODY MASS INDEX: 18.85 KG/M2 | HEIGHT: 60 IN | WEIGHT: 96 LBS

## 2021-10-22 DIAGNOSIS — S32.10XD CLOSED FRACTURE OF SACRUM WITH ROUTINE HEALING, UNSPECIFIED PORTION OF SACRUM, SUBSEQUENT ENCOUNTER: ICD-10-CM

## 2021-10-22 DIAGNOSIS — M54.50 LOW BACK PAIN, UNSPECIFIED BACK PAIN LATERALITY, UNSPECIFIED CHRONICITY, UNSPECIFIED WHETHER SCIATICA PRESENT: Primary | ICD-10-CM

## 2021-10-22 DIAGNOSIS — I10 BENIGN ESSENTIAL HYPERTENSION: ICD-10-CM

## 2021-10-22 PROCEDURE — 99309 SBSQ NF CARE MODERATE MDM 30: CPT | Performed by: NURSE PRACTITIONER

## 2021-10-22 RX ORDER — ACETAMINOPHEN 500 MG
1000 TABLET ORAL 4 TIMES DAILY
COMMUNITY
End: 2021-11-10

## 2021-10-22 ASSESSMENT — MIFFLIN-ST. JEOR: SCORE: 865.92

## 2021-10-22 NOTE — LETTER
10/22/2021        RE: Namita Viera  [address] [facility] [address]        [facility] GERIATRIC SERVICES  Chief Complaint   Patient presents with     RECHECK     TCU F/U     [place] Medical Record Number:  [id number]  Place of Service where encounter took place:  [facility] ([facility]) [[id number]]  Code Status:  Full Code     HISTORY:      HPI:  Namita Viera  is 71 year old (1950) undergoing physical and occupational therapy.  She is with past medical history chronic cough, GERD, back pain, essential hypertension, who presented to the emergency department with back pain and found to have a sacral fracture following a fall    Patient seen today to review labs, vital signs, pain management.   Patient denied chest pain shortness of breath cough or congestion.  She is now moving her bowels.  She was taken off her blood pressure medications during her last hospitalization due to stable blood pressures however has been intermittently tachycardic at 101-108.  She reported her back pain 8.5-9 out of 10 and staff updated to bring her pain medications.  She denied any numbness or tingling.  Her activity level has been increased.  Her vital signs were stable.  Labs were reviewed from 10/13/2021.  BMP within normal limits and hemoglobin 11.1.    ALLERGIES:Patient has no known allergies.    PAST MEDICAL HISTORY:   Past Medical History:   Diagnosis Date     Acid reflux      Alcohol abuse      Alcohol use      Closed fracture of left femur (H)      Hip fracture requiring operative repair (H)      SAH (subarachnoid hemorrhage) (H)      Traumatic closed displaced fracture of distal end of radius        PAST SURGICAL HISTORY:   has a past surgical history that includes other surgical history (2008); Hysterectomy total abdominal, bilateral salpingo-oophorectomy, combined (2008); Bladder Suspension (2008); Hip Fracture Surgery (Left); and Hip Pinning (Left, 5/2/2018).    FAMILY HISTORY: family history includes Cancer  in her mother and sister; Esophageal Cancer in her mother; Rectal Cancer in her sister; Rheumatoid Arthritis in her brother.    SOCIAL HISTORY:  reports that she has been smoking. She has a 90.00 pack-year smoking history. She has never used smokeless tobacco. She reports current alcohol use of about 14.0 standard drinks of alcohol per week. She reports that she does not use drugs.    ROS:  Constitutional: Negative for activity change, appetite change, fatigue and fever.   HENT: Negative for congestion.    Respiratory: Negative for cough, shortness of breath and wheezing.    Cardiovascular: Negative for chest pain and leg swelling.   Gastrointestinal: Negative for abdominal distention, abdominal pain, constipation, diarrhea and nausea.   Genitourinary: Negative for dysuria.   Musculoskeletal: Negative for arthralgia. Negative for back pain.  Patient with a sacral fracture  Skin: Negative for color change and wound.   Neurological: Negative for dizziness.   Psychiatric/Behavioral: Negative for agitation, behavioral problems and confusion.     Physical Exam:  Constitutional:       Appearance: Patient is well-developed.   HENT:      Head: Normocephalic.   Eyes:      Conjunctiva/sclera: Conjunctivae normal.   Neck:      Musculoskeletal: Normal range of motion.   Cardiovascular:      Rate and Rhythm: Normal rate and regular rhythm.      Heart sounds: Normal heart sounds. No murmur.   Pulmonary:      Effort: No respiratory distress.      Breath sounds: Normal breath sounds. No wheezing or rales.   Abdominal:      General: Bowel sounds are normal. There is no distension.      Palpations: Abdomen is soft.      Tenderness: There is no abdominal tenderness.   Musculoskeletal:       Normal range of motion.       Skin:General:        Skin is warm.   Neurological:         Mental Status: Patient is alert and oriented to person, place, and time.   Psychiatric:         Behavior: Behavior normal.     Vitals:BP (!) 148/73   Pulse 99  "  Temp 97.5  F (36.4  C)   Resp 20   Ht 1.53 m (5' 0.25\")   Wt 43.5 kg (96 lb)   SpO2 98%   BMI 18.59 kg/m   and Body mass index is 18.59 kg/m .    Lab/Diagnostic data:   Recent Results (from the past 240 hour(s))   Quantiferon TB Gold Plus Grey Tube    Collection Time: 10/14/21  6:23 AM    Specimen: Peripheral Blood   Result Value Ref Range    Quantiferon Nil Tube 0.08 IU/mL   Quantiferon TB Gold Plus Green Tube    Collection Time: 10/14/21  6:23 AM    Specimen: Peripheral Blood   Result Value Ref Range    Quantiferon TB1 Tube 0.08 IU/mL   Quantiferon TB Gold Plus Yellow Tube    Collection Time: 10/14/21  6:23 AM    Specimen: Peripheral Blood   Result Value Ref Range    Quantiferon TB2 Tube 0.08    Quantiferon TB Gold Plus Purple Tube    Collection Time: 10/14/21  6:23 AM    Specimen: Peripheral Blood   Result Value Ref Range    Quantiferon Mitogen 2.98 IU/mL   Quantiferon TB Gold Plus    Collection Time: 10/14/21  6:23 AM    Specimen: Peripheral Blood   Result Value Ref Range    Quantiferon-TB Gold Plus Negative Negative    TB1 Ag minus Nil Value 0.00 IU/mL    TB2 Ag minus Nil Value 0.00 IU/mL    Mitogen minus Nil Result 2.90 IU/mL    Nil Result 0.08 IU/mL     MEDICATIONS:     Review of your medicines          Accurate as of October 22, 2021 11:59 PM. If you have any questions, ask your nurse or doctor.            CONTINUE these medicines which may have CHANGED, or have new prescriptions. If we are uncertain of the size of tablets/capsules you have at home, strength may be listed as something that might have changed.      Dose / Directions   * acetaminophen 500 MG tablet  Commonly known as: TYLENOL  This may have changed: Another medication with the same name was removed. Continue taking this medication, and follow the directions you see here.  Changed by: Sade Andersen CNP      Dose: 1,000 mg  Take 1,000 mg by mouth 4 times daily  Refills: 0     * acetaminophen 500 MG tablet  Commonly known as: " TYLENOL  This may have changed: Another medication with the same name was removed. Continue taking this medication, and follow the directions you see here.  Changed by: Sade Andersen CNP      Dose: 1,000 mg  Take 1,000 mg by mouth 4 times daily  Refills: 0         * This list has 2 medication(s) that are the same as other medications prescribed for you. Read the directions carefully, and ask your doctor or other care provider to review them with you.            CONTINUE these medicines which have NOT CHANGED      Dose / Directions   aspirin 81 MG chewable tablet  Commonly known as: ASA  Used for: Closed fracture of sacrum with routine healing, unspecified portion of sacrum, subsequent encounter      Dose: 81 mg  Take 1 tablet (81 mg) by mouth 2 times daily for 14 days  Quantity: 28 tablet  Refills: 0     bisacodyl 10 MG suppository  Commonly known as: DULCOLAX      Dose: 10 mg  Place 10 mg rectally daily as needed for constipation  Refills: 0     gabapentin 100 MG capsule  Commonly known as: NEURONTIN  Used for: Closed fracture of sacrum with routine healing, unspecified portion of sacrum, subsequent encounter      Dose: 100 mg  Take 1 capsule (100 mg) by mouth 3 times daily  Quantity: 90 capsule  Refills: 0     omeprazole 20 MG DR capsule  Commonly known as: priLOSEC  Used for: Gastroesophageal reflux disease without esophagitis      [OMEPRAZOLE (PRILOSEC) 20 MG CAPSULE] TAKE 1 CAPSULE BY MOUTH DAILY  Quantity: 90 capsule  Refills: 3     oxyCODONE 5 MG tablet  Commonly known as: ROXICODONE  Used for: Closed fracture of sacrum with routine healing, unspecified portion of sacrum, subsequent encounter      Dose: 5-10 mg  Take 1-2 tablets (5-10 mg) by mouth every 4 hours as needed for moderate to severe pain (for pain score 1-5 give 5mg and score of 6-10 give 10mg)  Quantity: 20 tablet  Refills: 0     polyethylene glycol 17 GM/Dose powder  Commonly known as: MIRALAX  Used for: Closed fracture of sacrum with routine  healing, unspecified portion of sacrum, subsequent encounter      Dose: 17 g  Take 17 g by mouth daily  Quantity: 510 g  Refills: 0     senna-docusate 8.6-50 MG tablet  Commonly known as: SENOKOT-S/PERICOLACE  Used for: Closed fracture of sacrum with routine healing, unspecified portion of sacrum, subsequent encounter      Dose: 1 tablet  Take 1 tablet by mouth 2 times daily as needed for constipation  Refills: 0     vitamin B1 50 MG tablet  Commonly known as: THIAMINE  Used for: Thiamine deficiency      Dose: 50 mg  Take 1 tablet (50 mg) by mouth daily  Quantity: 30 tablet  Refills: 0     Vitamin D (Cholecalciferol) 25 MCG (1000 UT) Tabs      Dose: 1,000 Units  Take 1,000 Units by mouth daily  Refills: 0        STOP taking    cefdinir 300 MG capsule  Commonly known as: OMNICEF  Stopped by: Sade Andersen CNP               ASSESSMENT/PLAN  Encounter Diagnoses   Name Primary?     Low back pain, unspecified back pain laterality, unspecified chronicity, unspecified whether sciatica present Yes     Benign essential hypertension      Closed fracture of sacrum with routine healing, unspecified portion of sacrum, subsequent encounter      Low back pain pain Extra strength Tylenol scheduled every 6 hours, oxycodone as needed, gabapentin    GERD on omeprazole    Constipation bowel meds adjusted, monitor bowel status- resolved    Physical deconditioning PT OT        Electronically signed by: Sade Andersen CNP          Sincerely,        Sade Andersen CNP

## 2021-10-23 NOTE — PROGRESS NOTES
[facility] GERIATRIC SERVICES  Chief Complaint   Patient presents with     RECHECK     TCU F/U     [place] [facility] Record Number:  [id number]  Place of Service where encounter took place:  [facility] ([facility]) [[id number]]  Code Status:  Full Code     HISTORY:      HPI:  Namita Viera  is 71 year old (1950) undergoing physical and occupational therapy.  She is with past medical history chronic cough, GERD, back pain, essential hypertension, who presented to the emergency department with back pain and found to have a sacral fracture following a fall    Patient seen today to review labs, vital signs, pain management.   Patient denied chest pain shortness of breath cough or congestion.  She is now moving her bowels.  She was taken off her blood pressure medications during her last hospitalization due to stable blood pressures however has been intermittently tachycardic at 101-108.  She reported her back pain 8.5-9 out of 10 and staff updated to bring her pain medications.  She denied any numbness or tingling.  Her activity level has been increased.  Her vital signs were stable.  Labs were reviewed from 10/13/2021.  BMP within normal limits and hemoglobin 11.1.    ALLERGIES:Patient has no known allergies.    PAST MEDICAL HISTORY:   Past Medical History:   Diagnosis Date     Acid reflux      Alcohol abuse      Alcohol use      Closed fracture of left femur (H)      Hip fracture requiring operative repair (H)      SAH (subarachnoid hemorrhage) (H)      Traumatic closed displaced fracture of distal end of radius        PAST SURGICAL HISTORY:   has a past surgical history that includes other surgical history (2008); Hysterectomy total abdominal, bilateral salpingo-oophorectomy, combined (2008); Bladder Suspension (2008); Hip Fracture Surgery (Left); and Hip Pinning (Left, 5/2/2018).    FAMILY HISTORY: family history includes Cancer in her mother and sister; Esophageal Cancer in her mother; Rectal Cancer in her  "sister; Rheumatoid Arthritis in her brother.    SOCIAL HISTORY:  reports that she has been smoking. She has a 90.00 pack-year smoking history. She has never used smokeless tobacco. She reports current alcohol use of about 14.0 standard drinks of alcohol per week. She reports that she does not use drugs.    ROS:  Constitutional: Negative for activity change, appetite change, fatigue and fever.   HENT: Negative for congestion.    Respiratory: Negative for cough, shortness of breath and wheezing.    Cardiovascular: Negative for chest pain and leg swelling.   Gastrointestinal: Negative for abdominal distention, abdominal pain, constipation, diarrhea and nausea.   Genitourinary: Negative for dysuria.   Musculoskeletal: Negative for arthralgia. Negative for back pain.  Patient with a sacral fracture  Skin: Negative for color change and wound.   Neurological: Negative for dizziness.   Psychiatric/Behavioral: Negative for agitation, behavioral problems and confusion.     Physical Exam:  Constitutional:       Appearance: Patient is well-developed.   HENT:      Head: Normocephalic.   Eyes:      Conjunctiva/sclera: Conjunctivae normal.   Neck:      Musculoskeletal: Normal range of motion.   Cardiovascular:      Rate and Rhythm: Normal rate and regular rhythm.      Heart sounds: Normal heart sounds. No murmur.   Pulmonary:      Effort: No respiratory distress.      Breath sounds: Normal breath sounds. No wheezing or rales.   Abdominal:      General: Bowel sounds are normal. There is no distension.      Palpations: Abdomen is soft.      Tenderness: There is no abdominal tenderness.   Musculoskeletal:       Normal range of motion.       Skin:General:        Skin is warm.   Neurological:         Mental Status: Patient is alert and oriented to person, place, and time.   Psychiatric:         Behavior: Behavior normal.     Vitals:BP (!) 148/73   Pulse 99   Temp 97.5  F (36.4  C)   Resp 20   Ht 1.53 m (5' 0.25\")   Wt 43.5 kg (96 " lb)   SpO2 98%   BMI 18.59 kg/m   and Body mass index is 18.59 kg/m .    Lab/Diagnostic data:   Recent Results (from the past 240 hour(s))   Quantiferon TB Gold Plus Grey Tube    Collection Time: 10/14/21  6:23 AM    Specimen: Peripheral Blood   Result Value Ref Range    Quantiferon Nil Tube 0.08 IU/mL   Quantiferon TB Gold Plus Green Tube    Collection Time: 10/14/21  6:23 AM    Specimen: Peripheral Blood   Result Value Ref Range    Quantiferon TB1 Tube 0.08 IU/mL   Quantiferon TB Gold Plus Yellow Tube    Collection Time: 10/14/21  6:23 AM    Specimen: Peripheral Blood   Result Value Ref Range    Quantiferon TB2 Tube 0.08    Quantiferon TB Gold Plus Purple Tube    Collection Time: 10/14/21  6:23 AM    Specimen: Peripheral Blood   Result Value Ref Range    Quantiferon Mitogen 2.98 IU/mL   Quantiferon TB Gold Plus    Collection Time: 10/14/21  6:23 AM    Specimen: Peripheral Blood   Result Value Ref Range    Quantiferon-TB Gold Plus Negative Negative    TB1 Ag minus Nil Value 0.00 IU/mL    TB2 Ag minus Nil Value 0.00 IU/mL    Mitogen minus Nil Result 2.90 IU/mL    Nil Result 0.08 IU/mL     MEDICATIONS:     Review of your medicines          Accurate as of October 22, 2021 11:59 PM. If you have any questions, ask your nurse or doctor.            CONTINUE these medicines which may have CHANGED, or have new prescriptions. If we are uncertain of the size of tablets/capsules you have at home, strength may be listed as something that might have changed.      Dose / Directions   * acetaminophen 500 MG tablet  Commonly known as: TYLENOL  This may have changed: Another medication with the same name was removed. Continue taking this medication, and follow the directions you see here.  Changed by: Sade Andersen CNP      Dose: 1,000 mg  Take 1,000 mg by mouth 4 times daily  Refills: 0     * acetaminophen 500 MG tablet  Commonly known as: TYLENOL  This may have changed: Another medication with the same name was removed. Continue  taking this medication, and follow the directions you see here.  Changed by: Sade Andersen CNP      Dose: 1,000 mg  Take 1,000 mg by mouth 4 times daily  Refills: 0         * This list has 2 medication(s) that are the same as other medications prescribed for you. Read the directions carefully, and ask your doctor or other care provider to review them with you.            CONTINUE these medicines which have NOT CHANGED      Dose / Directions   aspirin 81 MG chewable tablet  Commonly known as: ASA  Used for: Closed fracture of sacrum with routine healing, unspecified portion of sacrum, subsequent encounter      Dose: 81 mg  Take 1 tablet (81 mg) by mouth 2 times daily for 14 days  Quantity: 28 tablet  Refills: 0     bisacodyl 10 MG suppository  Commonly known as: DULCOLAX      Dose: 10 mg  Place 10 mg rectally daily as needed for constipation  Refills: 0     gabapentin 100 MG capsule  Commonly known as: NEURONTIN  Used for: Closed fracture of sacrum with routine healing, unspecified portion of sacrum, subsequent encounter      Dose: 100 mg  Take 1 capsule (100 mg) by mouth 3 times daily  Quantity: 90 capsule  Refills: 0     omeprazole 20 MG DR capsule  Commonly known as: priLOSEC  Used for: Gastroesophageal reflux disease without esophagitis      [OMEPRAZOLE (PRILOSEC) 20 MG CAPSULE] TAKE 1 CAPSULE BY MOUTH DAILY  Quantity: 90 capsule  Refills: 3     oxyCODONE 5 MG tablet  Commonly known as: ROXICODONE  Used for: Closed fracture of sacrum with routine healing, unspecified portion of sacrum, subsequent encounter      Dose: 5-10 mg  Take 1-2 tablets (5-10 mg) by mouth every 4 hours as needed for moderate to severe pain (for pain score 1-5 give 5mg and score of 6-10 give 10mg)  Quantity: 20 tablet  Refills: 0     polyethylene glycol 17 GM/Dose powder  Commonly known as: MIRALAX  Used for: Closed fracture of sacrum with routine healing, unspecified portion of sacrum, subsequent encounter      Dose: 17 g  Take 17 g by  mouth daily  Quantity: 510 g  Refills: 0     senna-docusate 8.6-50 MG tablet  Commonly known as: SENOKOT-S/PERICOLACE  Used for: Closed fracture of sacrum with routine healing, unspecified portion of sacrum, subsequent encounter      Dose: 1 tablet  Take 1 tablet by mouth 2 times daily as needed for constipation  Refills: 0     vitamin B1 50 MG tablet  Commonly known as: THIAMINE  Used for: Thiamine deficiency      Dose: 50 mg  Take 1 tablet (50 mg) by mouth daily  Quantity: 30 tablet  Refills: 0     Vitamin D (Cholecalciferol) 25 MCG (1000 UT) Tabs      Dose: 1,000 Units  Take 1,000 Units by mouth daily  Refills: 0        STOP taking    cefdinir 300 MG capsule  Commonly known as: OMNICEF  Stopped by: Sade Andersen CNP               ASSESSMENT/PLAN  Encounter Diagnoses   Name Primary?     Low back pain, unspecified back pain laterality, unspecified chronicity, unspecified whether sciatica present Yes     Benign essential hypertension      Closed fracture of sacrum with routine healing, unspecified portion of sacrum, subsequent encounter      Low back pain pain Extra strength Tylenol scheduled every 6 hours, oxycodone as needed, gabapentin    GERD on omeprazole    Constipation bowel meds adjusted, monitor bowel status- resolved    Physical deconditioning PT OT        Electronically signed by: Sade Andersen CNP

## 2021-10-25 ENCOUNTER — MYC MEDICAL ADVICE (OUTPATIENT)
Dept: FAMILY MEDICINE | Facility: CLINIC | Age: 71
End: 2021-10-25

## 2021-10-26 ENCOUNTER — TELEPHONE (OUTPATIENT)
Dept: GERIATRICS | Facility: CLINIC | Age: 71
End: 2021-10-26

## 2021-10-26 ENCOUNTER — DISCHARGE SUMMARY NURSING HOME (OUTPATIENT)
Dept: GERIATRICS | Facility: CLINIC | Age: 71
End: 2021-10-26
Payer: COMMERCIAL

## 2021-10-26 VITALS
RESPIRATION RATE: 18 BRPM | WEIGHT: 96 LBS | HEART RATE: 72 BPM | DIASTOLIC BLOOD PRESSURE: 72 MMHG | TEMPERATURE: 98.2 F | HEIGHT: 60 IN | SYSTOLIC BLOOD PRESSURE: 136 MMHG | OXYGEN SATURATION: 96 % | BODY MASS INDEX: 18.85 KG/M2

## 2021-10-26 DIAGNOSIS — M54.50 LOW BACK PAIN, UNSPECIFIED BACK PAIN LATERALITY, UNSPECIFIED CHRONICITY, UNSPECIFIED WHETHER SCIATICA PRESENT: Primary | ICD-10-CM

## 2021-10-26 PROCEDURE — 99316 NF DSCHRG MGMT 30 MIN+: CPT | Performed by: NURSE PRACTITIONER

## 2021-10-26 RX ORDER — NICOTINE 21 MG/24HR
1 PATCH, TRANSDERMAL 24 HOURS TRANSDERMAL EVERY 24 HOURS
COMMUNITY
Start: 2021-10-26 | End: 2021-12-07

## 2021-10-26 ASSESSMENT — MIFFLIN-ST. JEOR: SCORE: 861.95

## 2021-10-26 NOTE — PROGRESS NOTES
[facility] Geriatric Services    Chief Complaint   Patient presents with     Discharge Summary Nursing Home     [place] Medical Record Number:  [id number]  Place of Service where encounter took place:  [facility] (SNF) [[id number]]  Code Status:  Full Code     HISTORY:      HPI:  Namita Viera  is 71 year old (1950) undergoing physical and occupational therapy.    She is with past medical history chronic cough, GERD, back pain, essential hypertension, who presented to the emergency department with back pain and found to have a sacral fracture following a fall     Patient seen today to review labs, vital signs, pain management and a face-to-face for discharge.  Patient will discharge to home on 10/28/2021 with current medications and treatments.  [facility]  Patient denied chest pain shortness of breath cough or congestion.  She is  moving her bowels.  She was taken off her blood pressure medications during her last hospitalization due to stable blood pressures however has been intermittently tachycardic at 101-108. Today she was stable at 72  Her back pain was controlled today per her report She denied any numbness or tingling.  Her activity level has been increased.    Labs reviewed    ALLERGIES:Patient has no known allergies.    PAST MEDICAL HISTORY:   Past Medical History:   Diagnosis Date     Acid reflux      Alcohol abuse      Alcohol use      Closed fracture of left femur (H)      Hip fracture requiring operative repair (H)      SAH (subarachnoid hemorrhage) (H)      Traumatic closed displaced fracture of distal end of radius        PAST SURGICAL HISTORY:   has a past surgical history that includes other surgical history (2008); Hysterectomy total abdominal, bilateral salpingo-oophorectomy, combined (2008); Bladder Suspension (2008); Hip Fracture Surgery (Left); and Hip Pinning (Left, 5/2/2018).    FAMILY HISTORY: family history includes Cancer in  her mother and sister; Esophageal Cancer in her mother; Rectal Cancer in her sister; Rheumatoid Arthritis in her brother.    SOCIAL HISTORY:  reports that she has been smoking. She has a 90.00 pack-year smoking history. She has never used smokeless tobacco. She reports current alcohol use of about 14.0 standard drinks of alcohol per week. She reports that she does not use drugs.    ROS:  Constitutional: Negative for activity change, appetite change, fatigue and fever.   HENT: Negative for congestion.    Respiratory: Negative for cough, shortness of breath and wheezing.    Cardiovascular: Negative for chest pain and leg swelling.   Gastrointestinal: Negative for abdominal distention, abdominal pain, constipation, diarrhea and nausea.   Genitourinary: Negative for dysuria.   Musculoskeletal: Negative for arthralgia. Positive  for back pain.   Skin: Negative for color change and wound.   Neurological: Negative for dizziness.   Psychiatric/Behavioral: Negative for agitation, behavioral problems and confusion.     Physical Exam:  Constitutional:       Appearance: Patient is well-developed.   HENT:      Head: Normocephalic.   Eyes:      Conjunctiva/sclera: Conjunctivae normal.   Neck:      Musculoskeletal: Normal range of motion.   Cardiovascular:      Rate and Rhythm: Normal rate and regular rhythm.      Heart sounds: Normal heart sounds. No murmur.   Pulmonary:      Effort: No respiratory distress.      Breath sounds: Normal breath sounds. No wheezing or rales.   Abdominal:      General: Bowel sounds are normal. There is no distension.      Palpations: Abdomen is soft.      Tenderness: There is no abdominal tenderness.   Musculoskeletal:       Normal range of motion.     Skin:General:        Skin is warm.   Neurological:         Mental Status: Patient is alert and oriented to person, place, and time.   Psychiatric:         Behavior: Behavior normal.     Vitals:  /72   Pulse 72   Temp 98.2  F (36.8  C)   Resp 18    Ht 1.524 m (5')   Wt 43.5 kg (96 lb)   SpO2 96%   BMI 18.75 kg/m    Body mass index is 18.75 kg/m .      MEDICATIONS:     Review of your medicines          Accurate as of October 26, 2021 11:36 AM. If you have any questions, ask your nurse or doctor.            CONTINUE these medicines which have NOT CHANGED      Dose / Directions   * acetaminophen 500 MG tablet  Commonly known as: TYLENOL      Dose: 1,000 mg  Take 1,000 mg by mouth 4 times daily  Refills: 0     * acetaminophen 500 MG tablet  Commonly known as: TYLENOL      Dose: 1,000 mg  Take 1,000 mg by mouth 4 times daily  Refills: 0     aspirin 81 MG chewable tablet  Commonly known as: ASA  Used for: Closed fracture of sacrum with routine healing, unspecified portion of sacrum, subsequent encounter      Dose: 81 mg  Take 1 tablet (81 mg) by mouth 2 times daily for 14 days  Quantity: 28 tablet  Refills: 0     bisacodyl 10 MG suppository  Commonly known as: DULCOLAX      Dose: 10 mg  Place 10 mg rectally daily as needed for constipation  Refills: 0     gabapentin 100 MG capsule  Commonly known as: NEURONTIN  Used for: Closed fracture of sacrum with routine healing, unspecified portion of sacrum, subsequent encounter      Dose: 100 mg  Take 1 capsule (100 mg) by mouth 3 times daily  Quantity: 90 capsule  Refills: 0     omeprazole 20 MG DR capsule  Commonly known as: priLOSEC  Used for: Gastroesophageal reflux disease without esophagitis      [OMEPRAZOLE (PRILOSEC) 20 MG CAPSULE] TAKE 1 CAPSULE BY MOUTH DAILY  Quantity: 90 capsule  Refills: 3     oxyCODONE 5 MG tablet  Commonly known as: ROXICODONE  Used for: Closed fracture of sacrum with routine healing, unspecified portion of sacrum, subsequent encounter      Dose: 5-10 mg  Take 1-2 tablets (5-10 mg) by mouth every 4 hours as needed for moderate to severe pain (for pain score 1-5 give 5mg and score of 6-10 give 10mg)  Quantity: 20 tablet  Refills: 0     polyethylene glycol 17 GM/Dose powder  Commonly known  as: MIRALAX  Used for: Closed fracture of sacrum with routine healing, unspecified portion of sacrum, subsequent encounter      Dose: 17 g  Take 17 g by mouth daily  Quantity: 510 g  Refills: 0     senna-docusate 8.6-50 MG tablet  Commonly known as: SENOKOT-S/PERICOLACE  Used for: Closed fracture of sacrum with routine healing, unspecified portion of sacrum, subsequent encounter      Dose: 1 tablet  Take 1 tablet by mouth 2 times daily as needed for constipation  Refills: 0     vitamin B1 50 MG tablet  Commonly known as: THIAMINE  Used for: Thiamine deficiency      Dose: 50 mg  Take 1 tablet (50 mg) by mouth daily  Quantity: 30 tablet  Refills: 0     Vitamin D (Cholecalciferol) 25 MCG (1000 UT) Tabs      Dose: 1,000 Units  Take 1,000 Units by mouth daily  Refills: 0         * This list has 2 medication(s) that are the same as other medications prescribed for you. Read the directions carefully, and ask your doctor or other care provider to review them with you.                 DISCHARGE DIAGNOSIS:  Encounter Diagnosis   Name Primary?     Low back pain, unspecified back pain laterality, unspecified chronicity, unspecified whether sciatica present Yes       MEDICAL EQUIPMENT NEEDS:  None    DISCHARGE PLAN/FACE TO FACE:  I certify that services are/were furnished while this patient was under the care of a physician and that a physician or an allowed non-physician practitioner (NPP), had a face-to-face encounter that meets the physician face-to-face encounter requirements. The encounter was in whole, or in part, related to the primary reason for home health. The patient is confined to his/her home and needs intermittent skilled nursing, physical therapy, speech-language pathology, or the continued need for occupational therapy. A plan of care has been established by a physician and is periodically reviewed by a physician.  Date of Face-to-Face Encounter: 10/26/2021    I certify that, based on my findings, the following  services are medically necessary home health services: PT OT RN    My clinical findings support the need for the above skilled services because: PT OT for continued strength and endurance following recent sacral fracture, RN for vital signs medication management    This patient is homebound because: She is deconditioned easily fatigued following recent hospitalization for sacral fracture    The patient is, or has been, under my care and I have initiated the establishment of the plan of care. This patient will be followed by a physician who will periodically review the plan of care.    Schedule follow up visit with primary care provider within 7 days to reestablish care.    Electronically signed by: Sade Andersen CNP

## 2021-10-26 NOTE — LETTER
10/26/2021        RE: Namita Viera  [address]  [facility] [address]        [facility]    Chief Complaint   Patient presents with     Discharge Summary [facility] Medical Record Number:  [id number]  Place of Service where encounter took place:  [facility] () [[id number]]  Code Status:  Full Code     HISTORY:      HPI:  Namita Viera  is 71 year old (1950) undergoing physical and occupational therapy.    She is with past medical history chronic cough, GERD, back pain, essential hypertension, who presented to the emergency department with back pain and found to have a sacral fracture following a fall     Patient seen today to review labs, vital signs, pain management and a face-to-face for discharge.  Patient will discharge to home on 10/28/2021 with current medications and treatments.  [facility]  Patient denied chest pain shortness of breath cough or congestion.  She is  moving her bowels.  She was taken off her blood pressure medications during her last hospitalization due to stable blood pressures however has been intermittently tachycardic at 101-108. Today she was stable at 72  Her back pain was controlled today per her report She denied any numbness or tingling.  Her activity level has been increased.    Labs reviewed    ALLERGIES:Patient has no known allergies.    PAST MEDICAL HISTORY:   Past Medical History:   Diagnosis Date     Acid reflux      Alcohol abuse      Alcohol use      Closed fracture of left femur (H)      Hip fracture requiring operative repair (H)      SAH (subarachnoid hemorrhage) (H)      Traumatic closed displaced fracture of distal end of radius        PAST SURGICAL HISTORY:   has a past surgical history that includes other surgical history (2008); Hysterectomy total abdominal, bilateral salpingo-oophorectomy, combined (2008); Bladder Suspension (2008); Hip Fracture Surgery (Left); and  Hip Pinning (Left, 5/2/2018).    FAMILY HISTORY: family history includes Cancer in her mother and sister; Esophageal Cancer in her mother; Rectal Cancer in her sister; Rheumatoid Arthritis in her brother.    SOCIAL HISTORY:  reports that she has been smoking. She has a 90.00 pack-year smoking history. She has never used smokeless tobacco. She reports current alcohol use of about 14.0 standard drinks of alcohol per week. She reports that she does not use drugs.    ROS:  Constitutional: Negative for activity change, appetite change, fatigue and fever.   HENT: Negative for congestion.    Respiratory: Negative for cough, shortness of breath and wheezing.    Cardiovascular: Negative for chest pain and leg swelling.   Gastrointestinal: Negative for abdominal distention, abdominal pain, constipation, diarrhea and nausea.   Genitourinary: Negative for dysuria.   Musculoskeletal: Negative for arthralgia. Positive  for back pain.   Skin: Negative for color change and wound.   Neurological: Negative for dizziness.   Psychiatric/Behavioral: Negative for agitation, behavioral problems and confusion.     Physical Exam:  Constitutional:       Appearance: Patient is well-developed.   HENT:      Head: Normocephalic.   Eyes:      Conjunctiva/sclera: Conjunctivae normal.   Neck:      Musculoskeletal: Normal range of motion.   Cardiovascular:      Rate and Rhythm: Normal rate and regular rhythm.      Heart sounds: Normal heart sounds. No murmur.   Pulmonary:      Effort: No respiratory distress.      Breath sounds: Normal breath sounds. No wheezing or rales.   Abdominal:      General: Bowel sounds are normal. There is no distension.      Palpations: Abdomen is soft.      Tenderness: There is no abdominal tenderness.   Musculoskeletal:       Normal range of motion.     Skin:General:        Skin is warm.   Neurological:         Mental Status: Patient is alert and oriented to person, place, and time.   Psychiatric:         Behavior:  Behavior normal.     Vitals:  /72   Pulse 72   Temp 98.2  F (36.8  C)   Resp 18   Ht 1.524 m (5')   Wt 43.5 kg (96 lb)   SpO2 96%   BMI 18.75 kg/m    Body mass index is 18.75 kg/m .      MEDICATIONS:     Review of your medicines          Accurate as of October 26, 2021 11:36 AM. If you have any questions, ask your nurse or doctor.            CONTINUE these medicines which have NOT CHANGED      Dose / Directions   * acetaminophen 500 MG tablet  Commonly known as: TYLENOL      Dose: 1,000 mg  Take 1,000 mg by mouth 4 times daily  Refills: 0     * acetaminophen 500 MG tablet  Commonly known as: TYLENOL      Dose: 1,000 mg  Take 1,000 mg by mouth 4 times daily  Refills: 0     aspirin 81 MG chewable tablet  Commonly known as: ASA  Used for: Closed fracture of sacrum with routine healing, unspecified portion of sacrum, subsequent encounter      Dose: 81 mg  Take 1 tablet (81 mg) by mouth 2 times daily for 14 days  Quantity: 28 tablet  Refills: 0     bisacodyl 10 MG suppository  Commonly known as: DULCOLAX      Dose: 10 mg  Place 10 mg rectally daily as needed for constipation  Refills: 0     gabapentin 100 MG capsule  Commonly known as: NEURONTIN  Used for: Closed fracture of sacrum with routine healing, unspecified portion of sacrum, subsequent encounter      Dose: 100 mg  Take 1 capsule (100 mg) by mouth 3 times daily  Quantity: 90 capsule  Refills: 0     omeprazole 20 MG DR capsule  Commonly known as: priLOSEC  Used for: Gastroesophageal reflux disease without esophagitis      [OMEPRAZOLE (PRILOSEC) 20 MG CAPSULE] TAKE 1 CAPSULE BY MOUTH DAILY  Quantity: 90 capsule  Refills: 3     oxyCODONE 5 MG tablet  Commonly known as: ROXICODONE  Used for: Closed fracture of sacrum with routine healing, unspecified portion of sacrum, subsequent encounter      Dose: 5-10 mg  Take 1-2 tablets (5-10 mg) by mouth every 4 hours as needed for moderate to severe pain (for pain score 1-5 give 5mg and score of 6-10 give  10mg)  Quantity: 20 tablet  Refills: 0     polyethylene glycol 17 GM/Dose powder  Commonly known as: MIRALAX  Used for: Closed fracture of sacrum with routine healing, unspecified portion of sacrum, subsequent encounter      Dose: 17 g  Take 17 g by mouth daily  Quantity: 510 g  Refills: 0     senna-docusate 8.6-50 MG tablet  Commonly known as: SENOKOT-S/PERICOLACE  Used for: Closed fracture of sacrum with routine healing, unspecified portion of sacrum, subsequent encounter      Dose: 1 tablet  Take 1 tablet by mouth 2 times daily as needed for constipation  Refills: 0     vitamin B1 50 MG tablet  Commonly known as: THIAMINE  Used for: Thiamine deficiency      Dose: 50 mg  Take 1 tablet (50 mg) by mouth daily  Quantity: 30 tablet  Refills: 0     Vitamin D (Cholecalciferol) 25 MCG (1000 UT) Tabs      Dose: 1,000 Units  Take 1,000 Units by mouth daily  Refills: 0         * This list has 2 medication(s) that are the same as other medications prescribed for you. Read the directions carefully, and ask your doctor or other care provider to review them with you.                 DISCHARGE DIAGNOSIS:  Encounter Diagnosis   Name Primary?     Low back pain, unspecified back pain laterality, unspecified chronicity, unspecified whether sciatica present Yes       MEDICAL EQUIPMENT NEEDS:  None    DISCHARGE PLAN/FACE TO FACE:  I certify that services are/were furnished while this patient was under the care of a physician and that a physician or an allowed non-physician practitioner (NPP), had a face-to-face encounter that meets the physician face-to-face encounter requirements. The encounter was in whole, or in part, related to the primary reason for home health. The patient is confined to his/her home and needs intermittent skilled nursing, physical therapy, speech-language pathology, or the continued need for occupational therapy. A plan of care has been established by a physician and is periodically reviewed by a  physician.  Date of Face-to-Face Encounter: 10/26/2021    I certify that, based on my findings, the following services are medically necessary home health services: PT OT RN    My clinical findings support the need for the above skilled services because: PT OT for continued strength and endurance following recent sacral fracture, RN for vital signs medication management    This patient is homebound because: She is deconditioned easily fatigued following recent hospitalization for sacral fracture    The patient is, or has been, under my care and I have initiated the establishment of the plan of care. This patient will be followed by a physician who will periodically review the plan of care.    Schedule follow up visit with primary care provider within 7 days to reestablish care.    Electronically signed by: Sade Andersen CNP        Sincerely,        Sade Andersen CNP

## 2021-10-26 NOTE — TELEPHONE ENCOUNTER
I believe this message is meant for the ordering provider.  I will send this on to them for review.  Thank you.  Michelle Salomn MD

## 2021-10-26 NOTE — TELEPHONE ENCOUNTER
[facility] Nurse Triage Telephone Note    Provider: ESTELLE Aragon  Facility: [facility]  Facility Type:  TCU    Caller: Tavon  Call Back Number: [phone]    Allergies:  No Known Allergies     Reason for call: Nurse reporting that patient had been on a nicotine patch 21mg/hr for 7 days which ended on 10/18/21.  Patient has not had any other nicotine cessation products since then and is request another patch.      Verbal Order/Direction given by Provider: Nicotine patch 14mg/hr---apply one patch daily(remove the old patch) x 6 weeks, then start a 7mg/hr patch---apply one patch daily(remove the old patch) x 2 weeks then discontinue.      Provider giving Order:  ESTELLE Aragon    Verbal Order given to: Marily Pathak RN

## 2021-10-26 NOTE — TELEPHONE ENCOUNTER
Please see my discharge summary. I had spoke with Dr Harvey, mike and not a formal consult who had recommended ordering these tests based on Ortho recs from the imaging performed. I had her following up with you to review these results for Myeloproliferative disorder and if further work-up is needed. I can not make further orders after they are discharged from the hospital or referrals. If they are suggestive of myeloproliferative disorder or it is unclear I would recommend have her follow-up with Dr. Harvey.   Please let me know if you have questions and how else I can help.

## 2021-10-29 ENCOUNTER — TRANSFERRED RECORDS (OUTPATIENT)
Dept: HEALTH INFORMATION MANAGEMENT | Facility: CLINIC | Age: 71
End: 2021-10-29
Payer: COMMERCIAL

## 2021-10-29 ENCOUNTER — MEDICAL CORRESPONDENCE (OUTPATIENT)
Dept: HEALTH INFORMATION MANAGEMENT | Facility: CLINIC | Age: 71
End: 2021-10-29
Payer: COMMERCIAL

## 2021-10-29 PROCEDURE — G0180 MD CERTIFICATION HHA PATIENT: HCPCS | Performed by: FAMILY MEDICINE

## 2021-11-03 ENCOUNTER — MEDICAL CORRESPONDENCE (OUTPATIENT)
Dept: HEALTH INFORMATION MANAGEMENT | Facility: CLINIC | Age: 71
End: 2021-11-03
Payer: COMMERCIAL

## 2021-11-09 ENCOUNTER — MEDICAL CORRESPONDENCE (OUTPATIENT)
Dept: HEALTH INFORMATION MANAGEMENT | Facility: CLINIC | Age: 71
End: 2021-11-09
Payer: COMMERCIAL

## 2021-11-10 ENCOUNTER — OFFICE VISIT (OUTPATIENT)
Dept: FAMILY MEDICINE | Facility: CLINIC | Age: 71
End: 2021-11-10
Payer: COMMERCIAL

## 2021-11-10 VITALS
OXYGEN SATURATION: 96 % | HEART RATE: 75 BPM | BODY MASS INDEX: 18.85 KG/M2 | DIASTOLIC BLOOD PRESSURE: 80 MMHG | SYSTOLIC BLOOD PRESSURE: 142 MMHG | HEIGHT: 60 IN | WEIGHT: 96 LBS

## 2021-11-10 DIAGNOSIS — S32.10XD CLOSED FRACTURE OF SACRUM WITH ROUTINE HEALING, UNSPECIFIED PORTION OF SACRUM, SUBSEQUENT ENCOUNTER: ICD-10-CM

## 2021-11-10 DIAGNOSIS — E51.9 VITAMIN B1 DEFICIENCY: ICD-10-CM

## 2021-11-10 DIAGNOSIS — M80.80XD OTHER OSTEOPOROSIS WITH CURRENT PATHOLOGICAL FRACTURE WITH ROUTINE HEALING, SUBSEQUENT ENCOUNTER: ICD-10-CM

## 2021-11-10 DIAGNOSIS — R76.8 ELEVATED SERUM IMMUNOGLOBULIN FREE LIGHT CHAINS: ICD-10-CM

## 2021-11-10 DIAGNOSIS — Z09 HOSPITAL DISCHARGE FOLLOW-UP: Primary | ICD-10-CM

## 2021-11-10 PROCEDURE — 99214 OFFICE O/P EST MOD 30 MIN: CPT | Performed by: FAMILY MEDICINE

## 2021-11-10 RX ORDER — METHION/INOS/CHOL BT/B COM/LIV 110MG-86MG
100 CAPSULE ORAL DAILY
Qty: 90 TABLET | Refills: 0 | Status: SHIPPED | OUTPATIENT
Start: 2021-11-10 | End: 2022-01-10

## 2021-11-10 RX ORDER — ACETAMINOPHEN 500 MG
1000 TABLET ORAL 3 TIMES DAILY PRN
Start: 2021-11-10 | End: 2023-05-16

## 2021-11-10 RX ORDER — OXYCODONE HYDROCHLORIDE 5 MG/1
5-10 TABLET ORAL 2 TIMES DAILY PRN
Qty: 14 TABLET | Refills: 0 | Status: SHIPPED | OUTPATIENT
Start: 2021-11-10 | End: 2022-01-10

## 2021-11-10 RX ORDER — GABAPENTIN 100 MG/1
CAPSULE ORAL
Qty: 90 CAPSULE | Refills: 0 | Status: SHIPPED | OUTPATIENT
Start: 2021-11-10 | End: 2022-09-06

## 2021-11-10 ASSESSMENT — MIFFLIN-ST. JEOR: SCORE: 861.95

## 2021-11-10 NOTE — PROGRESS NOTES
SUBJECTIVE: Namita Viera is a 71 year old female comes in for hospital follow up with her [facility]   Chief Complaint   Patient presents with     Hospital F/U     Pt was seen at [facility] and then transferred , good days and bad days, mornings are bad     DATE OF ADMISSION: 10/12/2021  DATE OF DISCHARGE: 10/13/2021  DISCHARGE DIAGNOSIS: Sacral fracture  IMPORTANT PENDING RESULTS: Jak2 with reflex, Fee light chains, SPEP, serum immunofixation, Vit- D, b12, folate and thiamine   BRIEF HOSPITAL COURSE:      Patient admitted for back pain after a fall.  CT scan showed a sacral fracture.  [facility] was consulted and recommended nonweightbearing but okay for transfers.  She was given pain control and scheduled for outpatient follow-up with [facility].  She was discharged on gabapentin, aspirin for VTE prophylaxis, as needed oxycodone along with MiraLAX and senna/docusate.  Her alendronate was stopped. Believes she is scheduled with [facility] ortho. Takes 2 oxycodones in the morning and maybe one at night. Typically uses a walker. Tylenol a couple-3 times per day.  Continues to have significant pain in the morning and is requesting more oxycodone.    She was also found to have a UTI, was started on ceftriaxone and transition to oral cefdinir.  She denies current urinary symptoms.    Hypertension: Blood pressures were normal/low while off blood pressure meds so these were held and recommended reassessment at TCU.  Discharge without losartan and hydrochlorothiazide.    Pending blood work revealed : Vitamin B1 was low, kappa free light chains and lambda free light chains were elevated    She continues to smoke cigarettes but less than 1 pack/day.  She continues to use his nicotine patches.    Reviewed and updated past medical, social, surgical hx, and famhx in EMR as appropriate.   Social History     Social History Narrative    Lives with . Moved in with oldest son and family ( 10 kids0.     Lizet Broderick MD  10/18/2018         Patient Active Problem List   Diagnosis     Esophageal reflux     Tobacco abuse     Ear mass     History of alcohol abuse     Age-related cataract of both eyes, unspecified age-related cataract type     Alcohol dependence (H)     Chronic cough     Rib pain on left side     Benign essential hypertension     Vitamin D deficiency     Back pain     Closed fracture of sacrum with routine healing     Pyuria     Acute cystitis without hematuria     Past Medical History:   Diagnosis Date     Acid reflux      Alcohol abuse      Alcohol use      Closed fracture of left femur (H)      Hip fracture requiring operative repair (H)      SAH (subarachnoid hemorrhage) (H)      Traumatic closed displaced fracture of distal end of radius      Current Outpatient Medications   Medication Sig Dispense Refill     acetaminophen (TYLENOL) 500 MG tablet Take 2 tablets (1,000 mg) by mouth 3 times daily as needed for mild pain       aspirin (ASA) 81 MG EC tablet Take 1 tablet (81 mg) by mouth daily       gabapentin (NEURONTIN) 100 MG capsule Take 300 mg in the morning, and then 100 mg in the afternoon and 100 mg at night 90 capsule 0     nicotine (NICODERM CQ) 14 MG/24HR 24 hr patch Place 1 patch onto the skin every 24 hours X 6 weeks       omeprazole (PRILOSEC) 20 MG capsule [OMEPRAZOLE (PRILOSEC) 20 MG CAPSULE] TAKE 1 CAPSULE BY MOUTH DAILY 90 capsule 3     oxyCODONE (ROXICODONE) 5 MG tablet Take 1-2 tablets (5-10 mg) by mouth 2 times daily as needed for moderate to severe pain 14 tablet 0     Thiamine HCl (VITAMIN B1) 100 MG TABS Take 100 mg by mouth daily 90 tablet 0     Vitamin D, Cholecalciferol, 25 MCG (1000 UT) TABS Take 1,000 Units by mouth daily       No Known Allergies      OBJECTIVE:     BP (!) 142/80 (BP Location: Left arm, Patient Position: Sitting, Cuff Size: Adult Regular)   Pulse 75   Ht 1.524 m (5')   Wt 43.5 kg (96 lb)   SpO2 96%   BMI 18.75 kg/m    General: Alert, mild  distress  Lungs: Good aeration bilaterally. Clear to auscultation without wheezes, rales or rhonci.  Occasional cough  Heart: regular rate and rhythm, normal S1 and S2, no murmurs  Neuro: alert, interactive moving all extremities equally  Psych: normal mood, appropriate affect    Lumbar spine CT w/o contrast     Impression     IMPRESSION:  1.  Comminuted bilateral sacral fracture with mild presacral edema.  2.  No lumbar compression fracture. No significant spinal canal or foraminal stenosis.   Cervical spine CT w/o contrast     Impression     IMPRESSION:  1.  No fracture or posttraumatic subluxation.  2.  No high-grade spinal canal or neural foraminal stenosis.   Head CT w/o contrast     Impression     IMPRESSION:  1.  No CT evidence for acute intracranial process.  2.  Brain atrophy and presumed chronic microvascular ischemic changes as above.       ASSESSMENT/ PLAN:  1. Hospital discharge follow-up  2. Closed fracture of sacrum with routine healing, unspecified portion of sacrum, subsequent encounter  Admitted after a fall and found to have a sacral fracture. Hospital notes, lab results, imagings were reviewed. Post Discharge Medication Reconciliation Status: performed.  Continues to have significant pain in the mornings so a short course of oxycodone was refilled, her morning gabapentin was also increased and we discussed scheduled Tylenol dosing for now.  Reviewed goal to transition off of oxycodone completely.  She is scheduled for follow-up with [facility].  - aspirin (ASA) 81 MG EC tablet; Take 1 tablet (81 mg) by mouth daily  - acetaminophen (TYLENOL) 500 MG tablet; Take 2 tablets (1,000 mg) by mouth 3 times daily as needed for mild pain  - gabapentin (NEURONTIN) 100 MG capsule; Take 300 mg in the morning, and then 100 mg in the afternoon and 100 mg at night  Dispense: 90 capsule; Refill: 0  - oxyCODONE (ROXICODONE) 5 MG tablet; Take 1-2 tablets (5-10 mg) by mouth 2 times daily as needed for  moderate to severe pain  Dispense: 14 tablet; Refill: 0    3. Other osteoporosis with current pathological fracture with routine healing, subsequent encounter  Alendronate discontinued at discharge to allow bone healing of fracture above.    4. Vitamin B1 deficiency  History of prior alcohol abuse.  Recent thiamine level found to be low at 29 so we will increase her thiamine to 100 mg daily.  - Oncology/Hematology Adult Referral; Future  - Thiamine HCl (VITAMIN B1) 100 MG TABS; Take 100 mg by mouth daily  Dispense: 90 tablet; Refill: 0    5. Elevated serum immunoglobulin free light chains  Based on discharge summary, it appears she had some abnormal findings on outside MRI pelvis from [facility] that prompted a [facility] hematology/oncology consult.  These pending labs were reviewed and revealed elevated kappa free light chains and lambda free light chains concerning for possible hematologic malignancy.  Reviewed these findings with the patient and her granddaughter, will also plan to call patient's daughter to further discussion per her request.  Oncology/hematology referral placed.  Patient declined follow-up blood work today.  - Oncology/Hematology Adult Referral; Future      Anahi Andersen DO

## 2021-11-10 NOTE — PATIENT INSTRUCTIONS
Tylenol 1000 mg three times daily until we transition off of oxycodone then change to as needed.    We're going to increase your morning gabapentin to help with pain    Ideally oxycodone only for morning break through pain.    Referral placed for hematology/cncology for elevated kappa free light chains and lambda free light chains    Please ensure you are scheduled for follow up with [facility]

## 2021-11-11 ENCOUNTER — PATIENT OUTREACH (OUTPATIENT)
Dept: ONCOLOGY | Facility: CLINIC | Age: 71
End: 2021-11-11
Payer: COMMERCIAL

## 2021-11-11 NOTE — PROGRESS NOTES
"New Patient Hematology / Oncology Nurse Navigator Note     Referral Date: November 11, 2021    Referring provider:   Anahi Andersen DO [facility]     Referred to: Medical Oncology  Preferred Location:  [facility]    Evaluation for :   E51.9 (ICD-10-CM) - Vitamin B1 deficiency   R76.8 (ICD-10-CM) - Elevated serum immunoglobulin free light chains   \" low vitamin B1, elevated kappa free light chains and lambda free light chains \"    Clinical History (per Nurse review of records provided):      Ortho notes / 10/11/21 -- BOOKMARKED , MRI report partially pasted below     10/13/21 labs (SPEP, K/L light chains, JAK2, CBC) -- BOOKMARKED     recent [facility] discharge summary -- BOOKMARKED    Clinical Assessment / Barriers to Care (Per Nurse):   Home address: [place], [place]   Per chart review, daughter Tiara is primary contact / support    Records Location: [facility] Everywhere   Faxed - Media tab/Scanned     Referral updates and Plan:   November 11, 2021   OUTGOING CALL to pt's daughter, Tiara: no answer left detailed VM  Introduced my role as nurse navigator with [facility] Hematology/Oncology dept and that we have recd the referral to hem/onc, provided number below to schedule.  Intake team will call in next 1-2 business days as well.  Aib Valdez, RN, BSN, OCN  Hematology/Oncology Nurse Navigator  [facility]  [phone]       "

## 2021-11-12 ENCOUNTER — MEDICAL CORRESPONDENCE (OUTPATIENT)
Dept: HEALTH INFORMATION MANAGEMENT | Facility: CLINIC | Age: 71
End: 2021-11-12
Payer: COMMERCIAL

## 2021-11-15 ENCOUNTER — TRANSFERRED RECORDS (OUTPATIENT)
Dept: HEALTH INFORMATION MANAGEMENT | Facility: CLINIC | Age: 71
End: 2021-11-15
Payer: COMMERCIAL

## 2021-11-15 ENCOUNTER — MEDICAL CORRESPONDENCE (OUTPATIENT)
Dept: HEALTH INFORMATION MANAGEMENT | Facility: CLINIC | Age: 71
End: 2021-11-15
Payer: COMMERCIAL

## 2021-11-16 NOTE — PROGRESS NOTES
RECORDS STATUS - ALL OTHER DIAGNOSIS      RECORDS RECEIVED FROM: [facility]   DATE RECEIVED: 1/10/2022   NOTES STATUS DETAILS   OFFICE NOTE from referring provider Complete [facility]   Referred by Anahi Jurado DO    OFFICE NOTE from medical oncologist N/A    DISCHARGE SUMMARY from hospital     DISCHARGE REPORT from the ER     OPERATIVE REPORT Complete 7/9/2021 Colonoscopy    MEDICATION LIST Complete [facility]   CLINICAL TRIAL TREATMENTS TO DATE     LABS     PATHOLOGY REPORTS     ANYTHING RELATED TO DIAGNOSIS Complete Labs last updated on 10/14/2021   GENONOMIC TESTING     TYPE:     IMAGING (NEED IMAGES & REPORT)     CT SCANS Complete CT Lumbar Spine 10/12/2021    CT Head 10/12/2021    CT Chest lung 5/27/2021   MRI     MAMMO     ULTRASOUND     PET

## 2021-11-17 DIAGNOSIS — Z53.9 DIAGNOSIS NOT YET DEFINED: Primary | ICD-10-CM

## 2021-11-18 ENCOUNTER — MEDICAL CORRESPONDENCE (OUTPATIENT)
Dept: HEALTH INFORMATION MANAGEMENT | Facility: CLINIC | Age: 71
End: 2021-11-18
Payer: COMMERCIAL

## 2021-11-22 ENCOUNTER — MEDICAL CORRESPONDENCE (OUTPATIENT)
Dept: HEALTH INFORMATION MANAGEMENT | Facility: CLINIC | Age: 71
End: 2021-11-22
Payer: COMMERCIAL

## 2021-11-23 ENCOUNTER — MEDICAL CORRESPONDENCE (OUTPATIENT)
Dept: HEALTH INFORMATION MANAGEMENT | Facility: CLINIC | Age: 71
End: 2021-11-23
Payer: COMMERCIAL

## 2021-11-29 ENCOUNTER — TELEPHONE (OUTPATIENT)
Dept: FAMILY MEDICINE | Facility: CLINIC | Age: 71
End: 2021-11-29
Payer: COMMERCIAL

## 2021-11-29 NOTE — TELEPHONE ENCOUNTER
kyle with [employer] faxing form that needs to be signed and faxed back to [phone].  Any physician can sign and fax.    Please update and fax

## 2021-11-30 NOTE — TELEPHONE ENCOUNTER
Incoming call from Norbert with [employer] [facility] calling to follow up on the form she refaxed yesterday for provider to sign. They are needing the form to be sign and fax back ASAP f) [phone] or if there are questions, OK to call [phone].

## 2021-12-01 ENCOUNTER — MEDICAL CORRESPONDENCE (OUTPATIENT)
Dept: HEALTH INFORMATION MANAGEMENT | Facility: CLINIC | Age: 71
End: 2021-12-01
Payer: COMMERCIAL

## 2021-12-01 NOTE — TELEPHONE ENCOUNTER
I never saw the form yesterday.  Can someone get their hands on it and have a covering provider sign

## 2021-12-03 ENCOUNTER — MEDICAL CORRESPONDENCE (OUTPATIENT)
Dept: HEALTH INFORMATION MANAGEMENT | Facility: CLINIC | Age: 71
End: 2021-12-03
Payer: COMMERCIAL

## 2022-01-10 ENCOUNTER — LAB (OUTPATIENT)
Dept: INFUSION THERAPY | Facility: HOSPITAL | Age: 72
End: 2022-01-10
Attending: FAMILY MEDICINE
Payer: COMMERCIAL

## 2022-01-10 ENCOUNTER — PRE VISIT (OUTPATIENT)
Dept: ONCOLOGY | Facility: HOSPITAL | Age: 72
End: 2022-01-10

## 2022-01-10 ENCOUNTER — MYC REFILL (OUTPATIENT)
Dept: FAMILY MEDICINE | Facility: CLINIC | Age: 72
End: 2022-01-10
Payer: COMMERCIAL

## 2022-01-10 ENCOUNTER — ONCOLOGY VISIT (OUTPATIENT)
Dept: ONCOLOGY | Facility: HOSPITAL | Age: 72
End: 2022-01-10
Attending: FAMILY MEDICINE
Payer: COMMERCIAL

## 2022-01-10 VITALS
BODY MASS INDEX: 17.55 KG/M2 | OXYGEN SATURATION: 99 % | RESPIRATION RATE: 18 BRPM | WEIGHT: 89.4 LBS | SYSTOLIC BLOOD PRESSURE: 129 MMHG | TEMPERATURE: 97.8 F | DIASTOLIC BLOOD PRESSURE: 70 MMHG | HEIGHT: 60 IN | HEART RATE: 84 BPM

## 2022-01-10 DIAGNOSIS — K21.9 GASTROESOPHAGEAL REFLUX DISEASE WITHOUT ESOPHAGITIS: ICD-10-CM

## 2022-01-10 DIAGNOSIS — R76.8 ELEVATED SERUM IMMUNOGLOBULIN FREE LIGHT CHAINS: Primary | ICD-10-CM

## 2022-01-10 DIAGNOSIS — E51.9 VITAMIN B1 DEFICIENCY: ICD-10-CM

## 2022-01-10 LAB
IGA SERPL-MCNC: 221 MG/DL (ref 65–400)
IGG SERPL-MCNC: 1049 MG/DL (ref 700–1700)
IGM SERPL-MCNC: 83 MG/DL (ref 60–280)

## 2022-01-10 PROCEDURE — G0463 HOSPITAL OUTPT CLINIC VISIT: HCPCS

## 2022-01-10 PROCEDURE — 86335 IMMUNFIX E-PHORSIS/URINE/CSF: CPT | Mod: 26 | Performed by: PATHOLOGY

## 2022-01-10 PROCEDURE — 99204 OFFICE O/P NEW MOD 45 MIN: CPT | Performed by: INTERNAL MEDICINE

## 2022-01-10 PROCEDURE — 86335 IMMUNFIX E-PHORSIS/URINE/CSF: CPT | Performed by: INTERNAL MEDICINE

## 2022-01-10 PROCEDURE — 83883 ASSAY NEPHELOMETRY NOT SPEC: CPT | Performed by: INTERNAL MEDICINE

## 2022-01-10 PROCEDURE — 36415 COLL VENOUS BLD VENIPUNCTURE: CPT | Performed by: INTERNAL MEDICINE

## 2022-01-10 PROCEDURE — 82784 ASSAY IGA/IGD/IGG/IGM EACH: CPT | Performed by: INTERNAL MEDICINE

## 2022-01-10 RX ORDER — HYDROCHLOROTHIAZIDE 25 MG/1
25 TABLET ORAL
COMMUNITY
Start: 2021-12-06 | End: 2022-02-04

## 2022-01-10 RX ORDER — MULTIVIT-MIN/IRON/FOLIC ACID/K 18-600-40
1 CAPSULE ORAL DAILY
COMMUNITY
End: 2022-09-06

## 2022-01-10 ASSESSMENT — MIFFLIN-ST. JEOR: SCORE: 832.02

## 2022-01-10 ASSESSMENT — PAIN SCALES - GENERAL: PAINLEVEL: EXTREME PAIN (8)

## 2022-01-10 NOTE — LETTER
1/10/2022         RE: Namita Viera  [address] [facility] [address]        Dear Colleague,    Thank you for referring your patient, Namita Viera, to the [facility] [place]. Please see a copy of my visit note below.    Oncology Rooming Note    January 10, 2022 3:06 PM   Namita Viera is a 71 year old female who presents for:    Chief Complaint   Patient presents with     Hematology     New Patient - Elevated serum immunoglobulin free light chains, Vitamin B1 deficiency     Initial Vitals: /70 (BP Location: Left arm, Patient Position: Sitting, Cuff Size: Adult Small)   Pulse 84   Temp 97.8  F (36.6  C) (Oral)   Resp 18   Ht 1.524 m (5')   Wt 40.6 kg (89 lb 6.4 oz)   SpO2 99%   BMI 17.46 kg/m   Estimated body mass index is 17.46 kg/m  as calculated from the following:    Height as of this encounter: 1.524 m (5').    Weight as of this encounter: 40.6 kg (89 lb 6.4 oz). Body surface area is 1.31 meters squared.  Extreme Pain (8) Comment: Data Unavailable   No LMP recorded. Patient is postmenopausal.  Allergies reviewed: Yes  Medications reviewed: Yes    Medications: Medication refills not needed today.  Pharmacy name entered into [employer]: [facility] - [address]    Clinical concerns New Patient - Elevated serum immunoglobulin free light chains, Vitamin B1 deficiency.       Elena Gallegos, [facility] Hematology and Oncology Consult Note    Patient: Namita Viera  MRN: [id number]  Date of Service: Js 10, 2022       Reason for Visit    I was consulted by [employer] for elevated serum free light chains    Assessment/Plan    Elevated kappa and lambda free light chains with normal kappa lambda ratio  Sacral insufficiency fracture      Patient had work-up with concern for a plasma cell dyscrasia versus osteoporosis after CT scans done and hospitalization for sacral insufficiency fracture.  Has recovered from the fall with  complete resolution of pain.    Review of labs shows normal kappa lambda ratio so this does not raise concern for a plasma cell dyscrasia.  Also SPEP and serum immunofixation are normal.  No other signs or symptoms of malignancy.    Reviewed all of this with patient and daughter and reassured them.    Will check quantitative immunoglobulins and urine tests to rule out a monoclonal protein.  If these are normal then I do not recommend any additional testing.    Questions answered.    Plan: Check serum quantitative immunoglobulins and urine immunofixation and urine free light chains  No follow-up in hematology                ECOG Performance    1 - Can't do physically strenuous work, but fully ambyulatory and can do light sedentary work    Encounter Diagnoses:    Problem List Items Addressed This Visit     None      Visit Diagnoses     Elevated serum immunoglobulin free light chains    -  Primary    Relevant Orders    Protein immunofixation urine    Immunoglobulin Total Light Chains, Urine    Immunoglobulins A G and M    Check Out Appointment Request    Vitamin B1 deficiency                  ______________________________________________________________________________    Staging History  Cancer Staging  No matching staging information was found for the patient.      History    Ms. Namita Viera is a 71 year old with past history of osteoporosis, hypertension, GERD who was referred for evaluation of abnormal lab test showing elevated kappa lambda free light chains.    Patient had a fall and was hospitalized for sacral insufficiency fracture.  Previously had CT scan raising some concern for either osteoporosis or malignancy.  Lab work done showed elevated kappa lambda free light chains but normal ratio.    She is feeling well now with no symptoms of concern.  Has had some weight loss.  No headaches shortness of breath or cough.  No new bone or abdominal pain.  No change with bowels.  Has had recent mammogram and  colonoscopy.  CT of the lung earlier in the year because of her history of smoking.    No personal history of malignancy.  Sister had anal cancer and mother had throat cancer.    Denies any bone pain.  Denies any numbness or tingling.  No cardiac issues.  ECOG status is 1.            Review of systems.  No fever or night sweats.  No loss of weight.  No lumps or bumps anywhere.  No unusual headaches or eyesight issues.  No dizziness.  No bleeding from the nose.  No sores in the mouth. No problems with swallowing.  No chest pain. No shortness of breath. No cough.  No abdominal pain. No nausea or vomiting.  No diarrhea or constipation.  No blood in stool or black colored stools.  No problems passing urine.  No numbness or tingling in hands or feet.  No skin rashes.  A 14 point review of systems is otherwise negative.        Past History    Past Medical History:   Diagnosis Date     Acid reflux      Alcohol abuse      Alcohol use      Closed fracture of left femur (H)      Hip fracture requiring operative repair (H)      SAH (subarachnoid hemorrhage) (H)      Traumatic closed displaced fracture of distal end of radius      Past Surgical History:   Procedure Laterality Date     BLADDER SUSPENSION  2008     HIP FRACTURE SURGERY Left      HIP PINNING Left 5/2/2018    Procedure: INTERNAL FIXATION, FRACTURE, TROCHANTERIC, LEFT HIP, USING NAILS;  Surgeon: Adria Lambert MD;  Location: [facility];  Service:      HYSTERECTOMY TOTAL ABDOMINAL, BILATERAL SALPINGO-OOPHORECTOMY, COMBINED  2008     OTHER SURGICAL HISTORY  2008    hysterctomy     Family History   Problem Relation Age of Onset     Esophageal Cancer Mother      Cancer Mother      Rectal Cancer Sister      Rheumatoid Arthritis Brother      Cancer Sister         lump in neck.      Social History     Socioeconomic History     Marital status:      Spouse name: None     Number of children: None     Years of education: None     Highest education level: None    Occupational History     None   Tobacco Use     Smoking status: Current Every Day Smoker     Packs/day: 1.50     Years: 60.00     Pack years: 90.00     Last attempt to quit: 5/30/2018     Years since quitting: 3.6     Smokeless tobacco: Never Used   Substance and Sexual Activity     Alcohol use: Yes     Alcohol/week: 14.0 standard drinks     Drug use: No     Sexual activity: None   Other Topics Concern     None   Social History Narrative    Lives with . Moved in with oldest son and family ( 10 kids0.    Lizet Broderick MD  10/18/2018         Social Determinants of Health     Financial Resource Strain: Not on file   Food Insecurity: Not on file   Transportation Needs: Not on file   Physical Activity: Not on file   Stress: Not on file   Social Connections: Not on file   Intimate Partner Violence: Not on file   Housing Stability: Not on file         Allergies    No Known Allergies        Physical Exam    /70 (BP Location: Left arm, Patient Position: Sitting, Cuff Size: Adult Small)   Pulse 84   Temp 97.8  F (36.6  C) (Oral)   Resp 18   Ht 1.524 m (5')   Wt 40.6 kg (89 lb 6.4 oz)   SpO2 99%   BMI 17.46 kg/m        GENERAL: Alert and oriented to time place and person. Seated comfortably. In no distress.    HEAD: Atraumatic and normocephalic.    EYES: RADHA, EOMI.  No pallor.  No icterus.    Oral cavity: no mucosal lesion or tonsillar enlargement.    NECK: supple. JVP normal.  No thyroid enlargement.    LYMPH NODES: No palpable, cervical, axillary or inguinal lymphadenopathy.    CHEST: clear to auscultation bilaterally.  Resonant to percussion throughout bilaterally.  Symmetrical breath movements bilaterally.    CVS: S1 and S2 are heard. Regular rate and rhythm.  No murmur or gallop or rub heard.  No peripheral edema.    ABDOMEN: Soft. Not tender. Not distended.  No palpable hepatomegaly or splenomegaly.  No other mass palpable.  Bowel sounds heard.    EXTREMITIES: Warm.    SKIN: no rash, or  bruising or purpura.  Has a full head of hair.    CNS: Nonfocal    Lab Results    DVT White count 6.5 hemoglobin 11.1 platelets 429, BMP normal serum kappa lambda light chains 3.01/3.48 with normal ratio at 0.86, SPEP and serum immunofixation are normal, B12 and folate are normal, vitamin B1 was low, CMP a few months ago was normal  Imaging Results    CT chest from May shows no evidence of malignancy.    Signed by: Jose Francisco Laurent MD      Again, thank you for allowing me to participate in the care of your patient.        Sincerely,        Jose Francisco Laurent MD

## 2022-01-10 NOTE — PROGRESS NOTES
Oncology Rooming Note    January 10, 2022 3:06 PM   Namita Viera is a 71 year old female who presents for:    Chief Complaint   Patient presents with     Hematology     New Patient - Elevated serum immunoglobulin free light chains, Vitamin B1 deficiency     Initial Vitals: /70 (BP Location: Left arm, Patient Position: Sitting, Cuff Size: Adult Small)   Pulse 84   Temp 97.8  F (36.6  C) (Oral)   Resp 18   Ht 1.524 m (5')   Wt 40.6 kg (89 lb 6.4 oz)   SpO2 99%   BMI 17.46 kg/m   Estimated body mass index is 17.46 kg/m  as calculated from the following:    Height as of this encounter: 1.524 m (5').    Weight as of this encounter: 40.6 kg (89 lb 6.4 oz). Body surface area is 1.31 meters squared.  Extreme Pain (8) Comment: Data Unavailable   No LMP recorded. Patient is postmenopausal.  Allergies reviewed: Yes  Medications reviewed: Yes    Medications: Medication refills not needed today.  Pharmacy name entered into [employer]: [facility] - [place], [place] - [address] [place] [address]    Clinical concerns New Patient - Elevated serum immunoglobulin free light chains, Vitamin B1 deficiency.       Elena Gallegos, [facility]

## 2022-01-10 NOTE — PROGRESS NOTES
[facility] Hematology and Oncology Consult Note    Patient: Namita Viera  MRN: [id number]  Date of Service: Js 10, 2022       Reason for Visit    I was consulted by [employer] for elevated serum free light chains    Assessment/Plan    Elevated kappa and lambda free light chains with normal kappa lambda ratio  Sacral insufficiency fracture      Patient had work-up with concern for a plasma cell dyscrasia versus osteoporosis after CT scans done and hospitalization for sacral insufficiency fracture.  Has recovered from the fall with complete resolution of pain.    Review of labs shows normal kappa lambda ratio so this does not raise concern for a plasma cell dyscrasia.  Also SPEP and serum immunofixation are normal.  No other signs or symptoms of malignancy.    Reviewed all of this with patient and daughter and reassured them.    Will check quantitative immunoglobulins and urine tests to rule out a monoclonal protein.  If these are normal then I do not recommend any additional testing.    Questions answered.    Plan: Check serum quantitative immunoglobulins and urine immunofixation and urine free light chains  No follow-up in hematology                ECOG Performance    1 - Can't do physically strenuous work, but fully ambyulatory and can do light sedentary work    Encounter Diagnoses:    Problem List Items Addressed This Visit     None      Visit Diagnoses     Elevated serum immunoglobulin free light chains    -  Primary    Relevant Orders    Protein immunofixation urine    Immunoglobulin Total Light Chains, Urine    Immunoglobulins A G and M    Check Out Appointment Request    Vitamin B1 deficiency                  ______________________________________________________________________________    Staging History  Cancer Staging  No matching staging information was found for the patient.      History    Ms. Namita Viera is a 71 year old with past history of osteoporosis, hypertension, GERD who was  referred for evaluation of abnormal lab test showing elevated kappa lambda free light chains.    Patient had a fall and was hospitalized for sacral insufficiency fracture.  Previously had CT scan raising some concern for either osteoporosis or malignancy.  Lab work done showed elevated kappa lambda free light chains but normal ratio.    She is feeling well now with no symptoms of concern.  Has had some weight loss.  No headaches shortness of breath or cough.  No new bone or abdominal pain.  No change with bowels.  Has had recent mammogram and colonoscopy.  CT of the lung earlier in the year because of her history of smoking.    No personal history of malignancy.  Sister had anal cancer and mother had throat cancer.    Denies any bone pain.  Denies any numbness or tingling.  No cardiac issues.  ECOG status is 1.            Review of systems.  No fever or night sweats.  No loss of weight.  No lumps or bumps anywhere.  No unusual headaches or eyesight issues.  No dizziness.  No bleeding from the nose.  No sores in the mouth. No problems with swallowing.  No chest pain. No shortness of breath. No cough.  No abdominal pain. No nausea or vomiting.  No diarrhea or constipation.  No blood in stool or black colored stools.  No problems passing urine.  No numbness or tingling in hands or feet.  No skin rashes.  A 14 point review of systems is otherwise negative.        Past History    Past Medical History:   Diagnosis Date     Acid reflux      Alcohol abuse      Alcohol use      Closed fracture of left femur (H)      Hip fracture requiring operative repair (H)      SAH (subarachnoid hemorrhage) (H)      Traumatic closed displaced fracture of distal end of radius      Past Surgical History:   Procedure Laterality Date     BLADDER SUSPENSION  2008     HIP FRACTURE SURGERY Left      HIP PINNING Left 5/2/2018    Procedure: INTERNAL FIXATION, FRACTURE, TROCHANTERIC, LEFT HIP, USING NAILS;  Surgeon: Adria Lambert MD;  Location:  [facility];  Service:      HYSTERECTOMY TOTAL ABDOMINAL, BILATERAL SALPINGO-OOPHORECTOMY, COMBINED  2008     OTHER SURGICAL HISTORY  2008    hysterctomy     Family History   Problem Relation Age of Onset     Esophageal Cancer Mother      Cancer Mother      Rectal Cancer Sister      Rheumatoid Arthritis Brother      Cancer Sister         lump in neck.      Social History     Socioeconomic History     Marital status:      Spouse name: None     Number of children: None     Years of education: None     Highest education level: None   Occupational History     None   Tobacco Use     Smoking status: Current Every Day Smoker     Packs/day: 1.50     Years: 60.00     Pack years: 90.00     Last attempt to quit: 5/30/2018     Years since quitting: 3.6     Smokeless tobacco: Never Used   Substance and Sexual Activity     Alcohol use: Yes     Alcohol/week: 14.0 standard drinks     Drug use: No     Sexual activity: None   Other Topics Concern     None   Social History Narrative    Lives with . Moved in with oldest son and family ( 10 kids0.    Lizet Broderick MD  10/18/2018         Social Determinants of Health     Financial Resource Strain: Not on file   Food Insecurity: Not on file   Transportation Needs: Not on file   Physical Activity: Not on file   Stress: Not on file   Social Connections: Not on file   Intimate Partner Violence: Not on file   Housing Stability: Not on file         Allergies    No Known Allergies        Physical Exam    /70 (BP Location: Left arm, Patient Position: Sitting, Cuff Size: Adult Small)   Pulse 84   Temp 97.8  F (36.6  C) (Oral)   Resp 18   Ht 1.524 m (5')   Wt 40.6 kg (89 lb 6.4 oz)   SpO2 99%   BMI 17.46 kg/m        GENERAL: Alert and oriented to time place and person. Seated comfortably. In no distress.    HEAD: Atraumatic and normocephalic.    EYES: RADHA, EOMI.  No pallor.  No icterus.    Oral cavity: no mucosal lesion or tonsillar enlargement.    NECK:  supple. JVP normal.  No thyroid enlargement.    LYMPH NODES: No palpable, cervical, axillary or inguinal lymphadenopathy.    CHEST: clear to auscultation bilaterally.  Resonant to percussion throughout bilaterally.  Symmetrical breath movements bilaterally.    CVS: S1 and S2 are heard. Regular rate and rhythm.  No murmur or gallop or rub heard.  No peripheral edema.    ABDOMEN: Soft. Not tender. Not distended.  No palpable hepatomegaly or splenomegaly.  No other mass palpable.  Bowel sounds heard.    EXTREMITIES: Warm.    SKIN: no rash, or bruising or purpura.  Has a full head of hair.    CNS: Nonfocal    Lab Results    DVT White count 6.5 hemoglobin 11.1 platelets 429, BMP normal serum kappa lambda light chains 3.01/3.48 with normal ratio at 0.86, SPEP and serum immunofixation are normal, B12 and folate are normal, vitamin B1 was low, CMP a few months ago was normal  Imaging Results    CT chest from May shows no evidence of malignancy.    Signed by: Jose Francisco Laurent MD

## 2022-01-12 LAB
KAPPA LC UR-MCNC: <0.9 MG/DL
KAPPA LC/LAMBDA UR: NORMAL {RATIO} (ref 0.7–6.2)
LAMBDA LC UR-MCNC: <0.7 MG/DL

## 2022-01-14 LAB
PATH ICD:: NORMAL
PROT ELPH PNL UR ELPH: NORMAL
REVIEWING PATHOLOGIST: NORMAL

## 2022-01-18 ENCOUNTER — DOCUMENTATION ONLY (OUTPATIENT)
Dept: ONCOLOGY | Facility: CLINIC | Age: 72
End: 2022-01-18
Payer: COMMERCIAL

## 2022-01-18 VITALS
DIASTOLIC BLOOD PRESSURE: 90 MMHG | HEIGHT: 60 IN | OXYGEN SATURATION: 96 % | SYSTOLIC BLOOD PRESSURE: 159 MMHG | TEMPERATURE: 97 F | HEART RATE: 98 BPM | RESPIRATION RATE: 28 BRPM | WEIGHT: 101.8 LBS | DIASTOLIC BLOOD PRESSURE: 82 MMHG | SYSTOLIC BLOOD PRESSURE: 158 MMHG | HEART RATE: 99 BPM | BODY MASS INDEX: 19.99 KG/M2

## 2022-01-18 VITALS — DIASTOLIC BLOOD PRESSURE: 78 MMHG | HEART RATE: 89 BPM | SYSTOLIC BLOOD PRESSURE: 138 MMHG

## 2022-01-18 NOTE — PROGRESS NOTES
Recent labs are reviewed and are all normal with no evidence of monoclonal protein in the urine and normal quantitative immunoglobulins.  Reviewed with patient's daughter and no additional work-up is recommended and no follow-up in oncology.

## 2022-02-04 ENCOUNTER — OFFICE VISIT (OUTPATIENT)
Dept: FAMILY MEDICINE | Facility: CLINIC | Age: 72
End: 2022-02-04
Payer: COMMERCIAL

## 2022-02-04 VITALS
HEART RATE: 77 BPM | SYSTOLIC BLOOD PRESSURE: 115 MMHG | WEIGHT: 86.4 LBS | DIASTOLIC BLOOD PRESSURE: 69 MMHG | BODY MASS INDEX: 16.87 KG/M2 | RESPIRATION RATE: 18 BRPM

## 2022-02-04 DIAGNOSIS — Z53.9 DIAGNOSIS NOT YET DEFINED: Primary | ICD-10-CM

## 2022-02-04 DIAGNOSIS — R35.1 NOCTURIA: Primary | ICD-10-CM

## 2022-02-04 DIAGNOSIS — S32.10XD CLOSED FRACTURE OF SACRUM WITH ROUTINE HEALING, UNSPECIFIED PORTION OF SACRUM, SUBSEQUENT ENCOUNTER: ICD-10-CM

## 2022-02-04 DIAGNOSIS — J43.9 PULMONARY EMPHYSEMA, UNSPECIFIED EMPHYSEMA TYPE (H): ICD-10-CM

## 2022-02-04 DIAGNOSIS — I10 BENIGN ESSENTIAL HYPERTENSION: ICD-10-CM

## 2022-02-04 DIAGNOSIS — M81.0 AGE-RELATED OSTEOPOROSIS WITHOUT CURRENT PATHOLOGICAL FRACTURE: ICD-10-CM

## 2022-02-04 DIAGNOSIS — R05.9 COUGH: ICD-10-CM

## 2022-02-04 DIAGNOSIS — D64.9 ANEMIA, UNSPECIFIED TYPE: ICD-10-CM

## 2022-02-04 DIAGNOSIS — E83.52 HYPERCALCEMIA: ICD-10-CM

## 2022-02-04 DIAGNOSIS — R05.3 CHRONIC COUGH: ICD-10-CM

## 2022-02-04 DIAGNOSIS — R25.2 LEG CRAMPING: ICD-10-CM

## 2022-02-04 DIAGNOSIS — E55.9 VITAMIN D DEFICIENCY: ICD-10-CM

## 2022-02-04 DIAGNOSIS — E51.9 VITAMIN B1 DEFICIENCY: ICD-10-CM

## 2022-02-04 DIAGNOSIS — K21.9 GASTROESOPHAGEAL REFLUX DISEASE WITHOUT ESOPHAGITIS: Chronic | ICD-10-CM

## 2022-02-04 DIAGNOSIS — Z72.0 TOBACCO ABUSE: Chronic | ICD-10-CM

## 2022-02-04 DIAGNOSIS — F10.21 ALCOHOL DEPENDENCE IN REMISSION (H): ICD-10-CM

## 2022-02-04 DIAGNOSIS — R63.4 WEIGHT LOSS: ICD-10-CM

## 2022-02-04 LAB
ERYTHROCYTE [DISTWIDTH] IN BLOOD BY AUTOMATED COUNT: 12.4 % (ref 10–15)
HCT VFR BLD AUTO: 39.8 % (ref 35–47)
HGB BLD-MCNC: 12.8 G/DL (ref 11.7–15.7)
MCH RBC QN AUTO: 32.2 PG (ref 26.5–33)
MCHC RBC AUTO-ENTMCNC: 32.2 G/DL (ref 31.5–36.5)
MCV RBC AUTO: 100 FL (ref 78–100)
PLATELET # BLD AUTO: 272 10E3/UL (ref 150–450)
RBC # BLD AUTO: 3.98 10E6/UL (ref 3.8–5.2)
WBC # BLD AUTO: 7.1 10E3/UL (ref 4–11)

## 2022-02-04 PROCEDURE — 83735 ASSAY OF MAGNESIUM: CPT | Performed by: FAMILY MEDICINE

## 2022-02-04 PROCEDURE — 82550 ASSAY OF CK (CPK): CPT | Performed by: FAMILY MEDICINE

## 2022-02-04 PROCEDURE — 83970 ASSAY OF PARATHORMONE: CPT | Performed by: FAMILY MEDICINE

## 2022-02-04 PROCEDURE — 99215 OFFICE O/P EST HI 40 MIN: CPT | Mod: 25 | Performed by: FAMILY MEDICINE

## 2022-02-04 PROCEDURE — 83550 IRON BINDING TEST: CPT | Performed by: FAMILY MEDICINE

## 2022-02-04 PROCEDURE — 91305 COVID-19,PF,PFIZER (12+ YRS): CPT | Performed by: FAMILY MEDICINE

## 2022-02-04 PROCEDURE — 0054A COVID-19,PF,PFIZER (12+ YRS): CPT | Performed by: FAMILY MEDICINE

## 2022-02-04 PROCEDURE — 80053 COMPREHEN METABOLIC PANEL: CPT | Performed by: FAMILY MEDICINE

## 2022-02-04 PROCEDURE — A4627 SPACER BAG/RESERVOIR: HCPCS | Performed by: FAMILY MEDICINE

## 2022-02-04 PROCEDURE — 36415 COLL VENOUS BLD VENIPUNCTURE: CPT | Performed by: FAMILY MEDICINE

## 2022-02-04 PROCEDURE — 85027 COMPLETE CBC AUTOMATED: CPT | Performed by: FAMILY MEDICINE

## 2022-02-04 RX ORDER — LOSARTAN POTASSIUM AND HYDROCHLOROTHIAZIDE 12.5; 1 MG/1; MG/1
1 TABLET ORAL DAILY
Qty: 90 TABLET | Refills: 4 | Status: SHIPPED | OUTPATIENT
Start: 2022-02-04 | End: 2023-05-16

## 2022-02-04 RX ORDER — ALBUTEROL SULFATE 90 UG/1
1-2 AEROSOL, METERED RESPIRATORY (INHALATION) EVERY 6 HOURS
Qty: 18 G | Refills: 11 | Status: SHIPPED | OUTPATIENT
Start: 2022-02-04 | End: 2023-02-27

## 2022-02-04 NOTE — PATIENT INSTRUCTIONS
-try cutting back on gabapentin morning dose to 100mg and continue 200mg at night. If you need extra dose at night you can take one for restless legs.    -if increased pain during day you can go back to 200mg.    -if doing fine on 1 pill in the morning, can consider stopping.     Keep an eye on weight, if still going down despite increased calories, consider upper endoscopy and CT abdomen    Will order CT scan in June for annual screening lungs

## 2022-02-04 NOTE — PROGRESS NOTES
ASSESSMENT and PLAN:     Assessment & Plan   Problem List Items Addressed This Visit     Esophageal reflux (Chronic)     Continue on omeprazole 20 mg daily.  Reflux likely not contributing to her daily cough         Tobacco abuse (Chronic)     Patient continues to smoke regularly and is not ready to quit.  We will continue to encourage at every follow-up visit         Alcohol dependence (H)     No longer endorses any alcohol use since moving in with her children.  Vitamin deficiencies were evaluated in the hospital and supplementation was recommended  -Currently on thiamine and magnesium for vitamin deficiencies as well as multivitamin.  Would advise continuing.  Has some hypomagnesemia and I would consider increasing dose         Chronic cough     Patient had no benefit from utilizing fluticasone nasal spray.  I have recommended that she start albuterol and trial that as needed for cough.  We also prescribed an AeroChamber for her to use.  With some of her mild emphysema she may benefit from a controller medication like Anoro Ellipta especially if she is needing to use her albuterol regularly         Relevant Medications    albuterol (PROAIR HFA/PROVENTIL HFA/VENTOLIN HFA) 108 (90 Base) MCG/ACT inhaler    Benign essential hypertension     Currently on losartan 100 mg with 12.5 mg of hydrochlorothiazide.  With her weight loss we will continue to monitor her blood pressure and consider dose decrease if needed         Relevant Medications    losartan-hydrochlorothiazide (HYZAAR) 100-12.5 MG tablet    Vitamin D deficiency    Relevant Orders    Vitamin D Deficiency    Closed fracture of sacrum with routine healing     Patient seems to have been healing appropriately at this time given her decreased pain.  She has already resumed her Fosamax again we discussed that this was stopped due to concern of decreased healing.  See instructions but we discussed trialing a decrease in her gabapentin that she might not be needing.  (Has mild peripheral edema that could be due to her gabapentin.  This has improved)         Hypercalcemia     Elevated calcium incidentally noted.  We will check parathyroid level.  Could be due to hydrochlorothiazide so we may need to discontinue that.           Relevant Orders    Parathyroid Hormone Intact    Anemia, unspecified type    Relevant Orders    CBC with platelets (Completed)    Iron and iron binding capacity (Completed)    Leg cramping     Encouraged increased hydration.  We will be checking iron levels which could be contributing since she does have some history of anemia.  Magnesium level will also be assessed  -Addendum magnesium low consider increased dose but waiting for them to get back to me on her current medication dosing she is taking           Relevant Orders    Comprehensive metabolic panel (BMP + Alb, Alk Phos, ALT, AST, Total. Bili, TP) (Completed)    CBC with platelets (Completed)    Magnesium (Completed)    CK total (Completed)    Age-related osteoporosis without current pathological fracture     Continue on alendronate 70 mg weekly and consider bone density scan within the next 12 months         Nocturia - Primary     Will check urinalysis again.  Discussed with family that in the hospital she was treated for a urinary tract infection so we will evaluate if this is still present and contributing to her nighttime symptoms         Relevant Orders    UA Macro with Reflex to Micro and Culture - lab collect    Emphysema of lung (H)     -Mild seen on CT imaging.  Encourage smoking cessation.  Trialing albuterol inhaler and will consider adding in Anoro Ellipta.  Does not endorse any shortness of breath with exertion          Relevant Medications    albuterol (PROAIR HFA/PROVENTIL HFA/VENTOLIN HFA) 108 (90 Base) MCG/ACT inhaler    Weight loss     Will monitor and try to add calories into diet and if continues to lose weight I have low threshold for ordering imaging given her long history of  tobacco dependence.  Does not endorse any dysphagia but does indicate she has some early satiety and an upper endoscopy may be needed           Other Visit Diagnoses     Cough        Relevant Medications    albuterol (PROAIR HFA/PROVENTIL HFA/VENTOLIN HFA) 108 (90 Base) MCG/ACT inhaler    Other Relevant Orders    SPACER BAG/RESERVOIR (Completed)           Ordering of each unique test  Prescription drug management  45 minutes spent on the date of the encounter doing chart review, history and exam, documentation and further activities per the note        Tobacco Cessation:   reports that she has been smoking cigarettes. She started smoking about 60 years ago. She has a 60.00 pack-year smoking history. She has never used smokeless tobacco.  Tobacco Cessation Action Plan: Information offered: Patient not interested at this time    See Patient Instructions  Patient Instructions   -try cutting back on gabapentin morning dose to 100mg and continue 200mg at night. If you need extra dose at night you can take one for restless legs.    -if increased pain during day you can go back to 200mg.    -if doing fine on 1 pill in the morning, can consider stopping.     Keep an eye on weight, if still going down despite increased calories, consider upper endoscopy and CT abdomen    Will order CT scan in June for annual screening lungs       No follow-ups on file.  Recommended follow-up within the next 3 to 6 months    Chasity Castro, [facility]    There are no Patient Instructions on file for this visit.    Orders Placed This Encounter   Procedures     SPACER BAG/RESERVOIR     REVIEW OF HEALTH MAINTENANCE PROTOCOL ORDERS     COVID-19,PF,PFIZER (12+ Yrs GRAY LABEL)     UA Macro with Reflex to Micro and Culture - lab collect     Comprehensive metabolic panel (BMP + Alb, Alk Phos, ALT, AST, Total. Bili, TP)     CBC with platelets     Magnesium     CK total     Iron and iron binding capacity      Parathyroid Hormone Intact     Vitamin D Deficiency     Medications Discontinued During This Encounter   Medication Reason     hydrochlorothiazide (HYDRODIURIL) 25 MG tablet        No follow-ups on file.    DATA REVIEWED:  I have reviewed most recent office notes in the chart/care everywhere as well as most recent labs and imaging that is available.      45 minutes spent on the date of the encounter doing chart review, history and exam, documentation and further activities per the note    CHIEF COMPLAINT:  Patient presents with:  RECHECK: is having some occasionally swelling in her feet       HISTORY OF PRESENT ILLNESS:  Namita Viera is a 71 year old female presents with her daughter Marcie Slaughter for ongoing evaluation of chronic medical issues and follow-up from hospitalizations.  Living with son and daughter in law. Fell  10/12/21 and broke tail bone. Thinks tripped on dog toys- was sent to [facility] No longer having tail bone pain, had a lot of pain down leg and in calves   Advil before goes to bed. Restless legs for years - advil settles down and gabapentin -2 twice daily no drowsiness.   Rarely advil during day    Pain now just in calves. Tries to stretch out.   Up 3x times  Per night to use bathroom . Some urgency during day  . Sometimes dribbles.   Dr. Laurent -elevated serum immunoglobulin. No follow up needed for plasma cell dyscrasia     -had UTI in hospital.     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 Had egg and half piece toast breakfast and salad for lunch. Taco for dinner.   Gets full quickly. Doesn't eat as much. No nausea. Takes prilosec for acid reflux.   No diarrhea or constipation. Not a sweet eater.   -stopped drinking alcohol. -was couple glasses wine a night.   -doesn't like nutritional shakes.   Wt Readings from Last 3 Encounters:   02/04/22 39.2 kg (86 lb 6.4 oz)   01/10/22  40.6 kg (89 lb 6.4 oz)   11/10/21 43.5 kg (96 lb)     Coughing a little- was given a nasal spray to help with that.   Phlegmy cough. Mostly in the morning.   No shortness of breath with exertion.   Climbing stairs hard- weakness legs. Had PT.   A lot of swelling of feet in urgent care [place] of [place].   Now on losartan with hydrochlorothiazide. 100 mg- 12.5mg    Swelling much better than was   No orthopnea    -back on fosamax now        Current Outpatient Medications:      acetaminophen (TYLENOL) 500 MG tablet, Take 2 tablets (1,000 mg) by mouth 3 times daily as needed for mild pain, Disp: , Rfl:      albuterol (PROAIR HFA/PROVENTIL HFA/VENTOLIN HFA) 108 (90 Base) MCG/ACT inhaler, Inhale 1-2 puffs into the lungs every 6 hours As needed for cough or shortness of breath or wheeze, Disp: 18 g, Rfl: 11     alendronate (FOSAMAX) 70 MG tablet, Take 70 mg by mouth every 7 days, Disp: , Rfl:      Ascorbic Acid (VITAMIN C) 500 MG CAPS, Take 1 capsule by mouth daily, Disp: , Rfl:      aspirin (ASA) 81 MG EC tablet, Take 1 tablet (81 mg) by mouth daily, Disp: , Rfl:      calcium carb-cholecalciferol (CALCIUM 500 + D3) 500-200 MG-UNIT tablet, Take by mouth daily, Disp: , Rfl:      gabapentin (NEURONTIN) 100 MG capsule, Take 300 mg in the morning, and then 100 mg in the afternoon and 100 mg at night (Patient taking differently: Taking 2 in am and 2 at night), Disp: 90 capsule, Rfl: 0     losartan-hydrochlorothiazide (HYZAAR) 100-12.5 MG tablet, Take 1 tablet by mouth daily, Disp: 90 tablet, Rfl: 4     MAGNESIUM PO, Take 1 capsule by mouth daily, Disp: , Rfl:      Multiple Vitamin (MULTI-VITAMIN DAILY PO),  multi vitamin, 0 Refill(s), Type: Maintenance, Disp: , Rfl:      omeprazole (PRILOSEC) 20 MG DR capsule, Take 1 capsule (20 mg) by mouth daily, Disp: 90 capsule, Rfl: 3     Thiamine HCl (B-1) 100 MG TABS, Take 100 mg by mouth daily, Disp: 90 tablet, Rfl: 4     Vitamin D, Cholecalciferol, 25 MCG (1000 UT) TABS, Take 1,000 Units by  mouth daily, Disp: , Rfl:     REVIEW OF SYSTEMS:    Comprehensive review of systems is negative except as noted in the HPI.     PFSH:  Tobacco use and medications reviewed below.     TOBACCO USE:  History   Smoking Status     Current Every Day Smoker     Packs/day: 1.00     Years: 60.00     Types: Cigarettes     Start date: 1/1/1962     Last attempt to quit: 5/30/2018   Smokeless Tobacco     Never Used       VITALS:  Vitals:    02/04/22 1108   BP: 115/69   Pulse: 77   Resp: 18   Weight: 39.2 kg (86 lb 6.4 oz)     Wt Readings from Last 3 Encounters:   02/04/22 39.2 kg (86 lb 6.4 oz)   01/10/22 40.6 kg (89 lb 6.4 oz)   11/10/21 43.5 kg (96 lb)       PHYSICAL EXAM:  Constitutional:  Reveals a 71 year old female in NAD.  Vitals:  Per nursing notes.  Neck:  Supple, thyroid not palpable.  Cardiac:  Regular rate and rhythm without murmurs, rubs, or gallops. Carotids without bruits. Legs with trace edema. Palpation of the radial pulse regular.  Lungs: Clear.  Respiratory effort normal.  Skin:   Without rash, bruise, or palpable lesions.  Rheumatologic: Normal joints and nails of the hands.  Neurologic:  Cranial nerves II-XII intact.     Psychiatric:  Mood appropriate, memory intact.          MEDICATIONS:  Current Outpatient Medications   Medication Sig Dispense Refill     acetaminophen (TYLENOL) 500 MG tablet Take 2 tablets (1,000 mg) by mouth 3 times daily as needed for mild pain       albuterol (PROAIR HFA/PROVENTIL HFA/VENTOLIN HFA) 108 (90 Base) MCG/ACT inhaler Inhale 1-2 puffs into the lungs every 6 hours As needed for cough or shortness of breath or wheeze 18 g 11     alendronate (FOSAMAX) 70 MG tablet Take 70 mg by mouth every 7 days       Ascorbic Acid (VITAMIN C) 500 MG CAPS Take 1 capsule by mouth daily       aspirin (ASA) 81 MG EC tablet Take 1 tablet (81 mg) by mouth daily       calcium carb-cholecalciferol (CALCIUM 500 + D3) 500-200 MG-UNIT tablet Take by mouth daily       gabapentin (NEURONTIN) 100 MG capsule  Take 300 mg in the morning, and then 100 mg in the afternoon and 100 mg at night (Patient taking differently: Taking 2 in am and 2 at night) 90 capsule 0     losartan-hydrochlorothiazide (HYZAAR) 100-12.5 MG tablet Take 1 tablet by mouth daily 90 tablet 4     MAGNESIUM PO Take 1 capsule by mouth daily       Multiple Vitamin (MULTI-VITAMIN DAILY PO)   multi vitamin, 0 Refill(s), Type: Maintenance       omeprazole (PRILOSEC) 20 MG DR capsule Take 1 capsule (20 mg) by mouth daily 90 capsule 3     Thiamine HCl (B-1) 100 MG TABS Take 100 mg by mouth daily 90 tablet 4     Vitamin D, Cholecalciferol, 25 MCG (1000 UT) TABS Take 1,000 Units by mouth daily

## 2022-02-05 LAB
ALBUMIN SERPL-MCNC: 4.3 G/DL (ref 3.5–5)
ALP SERPL-CCNC: 69 U/L (ref 45–120)
ALT SERPL W P-5'-P-CCNC: 20 U/L (ref 0–45)
ANION GAP SERPL CALCULATED.3IONS-SCNC: 13 MMOL/L (ref 5–18)
AST SERPL W P-5'-P-CCNC: 28 U/L (ref 0–40)
BILIRUB SERPL-MCNC: 0.3 MG/DL (ref 0–1)
BUN SERPL-MCNC: 10 MG/DL (ref 8–28)
CALCIUM SERPL-MCNC: 10.6 MG/DL (ref 8.5–10.5)
CHLORIDE BLD-SCNC: 98 MMOL/L (ref 98–107)
CK SERPL-CCNC: 67 U/L (ref 30–190)
CO2 SERPL-SCNC: 24 MMOL/L (ref 22–31)
CREAT SERPL-MCNC: 0.71 MG/DL (ref 0.6–1.1)
GFR SERPL CREATININE-BSD FRML MDRD: 89 ML/MIN/1.73M2
GLUCOSE BLD-MCNC: 92 MG/DL (ref 70–125)
MAGNESIUM SERPL-MCNC: 1.4 MG/DL (ref 1.8–2.6)
POTASSIUM BLD-SCNC: 4.2 MMOL/L (ref 3.5–5)
PROT SERPL-MCNC: 7.5 G/DL (ref 6–8)
SODIUM SERPL-SCNC: 135 MMOL/L (ref 136–145)

## 2022-02-06 LAB
IRON SATN MFR SERPL: 22 % (ref 15–46)
IRON SERPL-MCNC: 62 UG/DL (ref 35–180)
TIBC SERPL-MCNC: 283 UG/DL (ref 240–430)

## 2022-02-07 ENCOUNTER — LAB (OUTPATIENT)
Dept: LAB | Facility: CLINIC | Age: 72
End: 2022-02-07
Payer: COMMERCIAL

## 2022-02-07 DIAGNOSIS — R35.1 NOCTURIA: ICD-10-CM

## 2022-02-07 PROBLEM — D64.9 ANEMIA, UNSPECIFIED TYPE: Status: ACTIVE | Noted: 2022-02-07

## 2022-02-07 PROBLEM — R25.2 LEG CRAMPING: Status: ACTIVE | Noted: 2022-02-07

## 2022-02-07 PROBLEM — E83.52 HYPERCALCEMIA: Status: ACTIVE | Noted: 2022-02-07

## 2022-02-07 PROBLEM — J43.9 EMPHYSEMA OF LUNG (H): Status: ACTIVE | Noted: 2022-02-07

## 2022-02-07 PROBLEM — F10.11 HISTORY OF ALCOHOL ABUSE: Status: RESOLVED | Noted: 2019-11-27 | Resolved: 2022-02-07

## 2022-02-07 PROBLEM — N30.00 ACUTE CYSTITIS WITHOUT HEMATURIA: Status: RESOLVED | Noted: 2021-10-13 | Resolved: 2022-02-07

## 2022-02-07 PROBLEM — R63.4 WEIGHT LOSS: Status: ACTIVE | Noted: 2022-02-07

## 2022-02-07 PROBLEM — M54.9 BACK PAIN: Status: RESOLVED | Noted: 2021-10-12 | Resolved: 2022-02-07

## 2022-02-07 PROBLEM — R82.81 PYURIA: Status: RESOLVED | Noted: 2021-10-12 | Resolved: 2022-02-07

## 2022-02-07 PROBLEM — R07.81 RIB PAIN ON LEFT SIDE: Status: RESOLVED | Noted: 2021-05-06 | Resolved: 2022-02-07

## 2022-02-07 PROBLEM — M81.0 AGE-RELATED OSTEOPOROSIS WITHOUT CURRENT PATHOLOGICAL FRACTURE: Status: ACTIVE | Noted: 2022-02-07

## 2022-02-07 LAB
ALBUMIN UR-MCNC: NEGATIVE MG/DL
APPEARANCE UR: CLEAR
BILIRUB UR QL STRIP: NEGATIVE
COLOR UR AUTO: YELLOW
GLUCOSE UR STRIP-MCNC: NEGATIVE MG/DL
HGB UR QL STRIP: NEGATIVE
KETONES UR STRIP-MCNC: NEGATIVE MG/DL
LEUKOCYTE ESTERASE UR QL STRIP: NEGATIVE
NITRATE UR QL: NEGATIVE
PH UR STRIP: 6 [PH] (ref 5–8)
PTH-INTACT SERPL-MCNC: 43 PG/ML (ref 10–86)
SP GR UR STRIP: 1.01 (ref 1–1.03)
UROBILINOGEN UR STRIP-ACNC: 0.2 E.U./DL

## 2022-02-07 PROCEDURE — 81003 URINALYSIS AUTO W/O SCOPE: CPT

## 2022-02-07 PROCEDURE — 36415 COLL VENOUS BLD VENIPUNCTURE: CPT | Performed by: FAMILY MEDICINE

## 2022-02-07 PROCEDURE — 82306 VITAMIN D 25 HYDROXY: CPT | Performed by: FAMILY MEDICINE

## 2022-02-07 NOTE — ASSESSMENT & PLAN NOTE
Patient had no benefit from utilizing fluticasone nasal spray.  I have recommended that she start albuterol and trial that as needed for cough.  We also prescribed an AeroChamber for her to use.  With some of her mild emphysema she may benefit from a controller medication like Anoro Ellipta especially if she is needing to use her albuterol regularly

## 2022-02-07 NOTE — ASSESSMENT & PLAN NOTE
Patient seems to have been healing appropriately at this time given her decreased pain.  She has already resumed her Fosamax again we discussed that this was stopped due to concern of decreased healing.  See instructions but we discussed trialing a decrease in her gabapentin that she might not be needing. (Has mild peripheral edema that could be due to her gabapentin.  This has improved)

## 2022-02-07 NOTE — ASSESSMENT & PLAN NOTE
Patient continues to smoke regularly and is not ready to quit.  We will continue to encourage at every follow-up visit

## 2022-02-07 NOTE — ASSESSMENT & PLAN NOTE
Will monitor and try to add calories into diet and if continues to lose weight I have low threshold for ordering imaging given her long history of tobacco dependence.  Does not endorse any dysphagia but does indicate she has some early satiety and an upper endoscopy may be needed

## 2022-02-07 NOTE — ASSESSMENT & PLAN NOTE
Will check urinalysis again.  Discussed with family that in the hospital she was treated for a urinary tract infection so we will evaluate if this is still present and contributing to her nighttime symptoms

## 2022-02-07 NOTE — ASSESSMENT & PLAN NOTE
Elevated calcium incidentally noted.  We will check parathyroid level.  Could be due to hydrochlorothiazide so we may need to discontinue that.

## 2022-02-07 NOTE — ASSESSMENT & PLAN NOTE
Currently on losartan 100 mg with 12.5 mg of hydrochlorothiazide.  With her weight loss we will continue to monitor her blood pressure and consider dose decrease if needed

## 2022-02-07 NOTE — ASSESSMENT & PLAN NOTE
-Mild seen on CT imaging.  Encourage smoking cessation.  Trialing albuterol inhaler and will consider adding in Anoro Ellipta.  Does not endorse any shortness of breath with exertion

## 2022-02-07 NOTE — ASSESSMENT & PLAN NOTE
Encouraged increased hydration.  We will be checking iron levels which could be contributing since she does have some history of anemia.  Magnesium level will also be assessed  -Addendum magnesium low consider increased dose but waiting for them to get back to me on her current medication dosing she is taking

## 2022-02-07 NOTE — ASSESSMENT & PLAN NOTE
No longer endorses any alcohol use since moving in with her children.  Vitamin deficiencies were evaluated in the hospital and supplementation was recommended  -Currently on thiamine and magnesium for vitamin deficiencies as well as multivitamin.  Would advise continuing.  Has some hypomagnesemia and I would consider increasing dose

## 2022-02-08 LAB — DEPRECATED CALCIDIOL+CALCIFEROL SERPL-MC: 73 UG/L (ref 30–80)

## 2022-07-14 DIAGNOSIS — M81.0 AGE-RELATED OSTEOPOROSIS WITHOUT CURRENT PATHOLOGICAL FRACTURE: Primary | ICD-10-CM

## 2022-07-15 RX ORDER — ALENDRONATE SODIUM 70 MG/1
TABLET ORAL
Qty: 12 TABLET | Refills: 2 | Status: SHIPPED | OUTPATIENT
Start: 2022-07-15 | End: 2023-06-26

## 2022-07-15 NOTE — TELEPHONE ENCOUNTER
"Outpatient Medication Detail     Disp Refills Start End VIDAL   alendronate (FOSAMAX) 70 MG tablet 12 tablet 11 6/8/2021  --   Sig - Route: Take 1 tablet (70 mg total) by mouth every 7 days. Take in the morning on an empty stomach with a full glass of water 30 minutes before food - Oral   Sent to pharmacy as: alendronate 70 mg tablet (Fosamax)   E-Prescribing Status: Receipt confirmed by pharmacy (6/8/2021  9:55 AM CDT)     Last office visit provider:  2/4/22     Requested Prescriptions   Pending Prescriptions Disp Refills     alendronate (FOSAMAX) 70 MG tablet [Pharmacy Med Name: ALENDRONATE NA 70 MG TAB 70 Tablet] 12 tablet 11     Sig: TAKE 1 TABLET BY MOUTH EVERY 7 DAYS. TAKE IN THE MORNING ON AN EMPTY STOMACH WITH A FULL GLASS OF WATER 30 MINUTES BEFORE FOOD       Bisphosphonates Passed - 7/15/2022  8:01 AM        Passed - Recent (12 mo) or future (30 days) visit within the authorizing provider's specialty     Patient has had an office visit with the authorizing provider or a provider within the authorizing providers department within the previous 12 mos or has a future within next 30 days. See \"Patient Info\" tab in inbasket, or \"Choose Columns\" in Meds & Orders section of the refill encounter.              Passed - Dexa on file within past 2 years     Please review last Dexa result.           Passed - Medication is active on med list        Passed - Patient is age 18 or older        Passed - Normal serum creatinine on file within past 12 months     Recent Labs   Lab Test 02/04/22  1211   CR 0.71       Ok to refill medication if creatinine is low               Leandro Hickey RN 07/15/22 8:01 AM  "

## 2022-07-16 ENCOUNTER — HEALTH MAINTENANCE LETTER (OUTPATIENT)
Age: 72
End: 2022-07-16

## 2022-09-06 ENCOUNTER — OFFICE VISIT (OUTPATIENT)
Dept: FAMILY MEDICINE | Facility: CLINIC | Age: 72
End: 2022-09-06
Payer: COMMERCIAL

## 2022-09-06 VITALS
WEIGHT: 92.4 LBS | TEMPERATURE: 98.9 F | HEART RATE: 98 BPM | DIASTOLIC BLOOD PRESSURE: 83 MMHG | SYSTOLIC BLOOD PRESSURE: 149 MMHG | BODY MASS INDEX: 18.05 KG/M2 | OXYGEN SATURATION: 97 % | RESPIRATION RATE: 24 BRPM

## 2022-09-06 DIAGNOSIS — D64.9 ANEMIA, UNSPECIFIED TYPE: ICD-10-CM

## 2022-09-06 DIAGNOSIS — J43.9 PULMONARY EMPHYSEMA, UNSPECIFIED EMPHYSEMA TYPE (H): ICD-10-CM

## 2022-09-06 DIAGNOSIS — R25.2 LEG CRAMPING: ICD-10-CM

## 2022-09-06 DIAGNOSIS — M81.0 AGE-RELATED OSTEOPOROSIS WITHOUT CURRENT PATHOLOGICAL FRACTURE: Primary | ICD-10-CM

## 2022-09-06 DIAGNOSIS — I10 BENIGN ESSENTIAL HYPERTENSION: ICD-10-CM

## 2022-09-06 DIAGNOSIS — Z87.891 PERSONAL HISTORY OF TOBACCO USE: ICD-10-CM

## 2022-09-06 LAB
ALBUMIN SERPL BCG-MCNC: 4.4 G/DL (ref 3.5–5.2)
ALP SERPL-CCNC: 96 U/L (ref 35–104)
ALT SERPL W P-5'-P-CCNC: 17 U/L (ref 10–35)
ANION GAP SERPL CALCULATED.3IONS-SCNC: 11 MMOL/L (ref 7–15)
AST SERPL W P-5'-P-CCNC: 34 U/L (ref 10–35)
BILIRUB SERPL-MCNC: 0.3 MG/DL
BUN SERPL-MCNC: 21.7 MG/DL (ref 8–23)
CALCIUM SERPL-MCNC: 10.3 MG/DL (ref 8.8–10.2)
CHLORIDE SERPL-SCNC: 103 MMOL/L (ref 98–107)
CREAT SERPL-MCNC: 0.77 MG/DL (ref 0.51–0.95)
DEPRECATED HCO3 PLAS-SCNC: 24 MMOL/L (ref 22–29)
ERYTHROCYTE [DISTWIDTH] IN BLOOD BY AUTOMATED COUNT: 14.6 % (ref 10–15)
GFR SERPL CREATININE-BSD FRML MDRD: 80 ML/MIN/1.73M2
GLUCOSE SERPL-MCNC: 108 MG/DL (ref 70–99)
HCT VFR BLD AUTO: 34.7 % (ref 35–47)
HGB BLD-MCNC: 11.9 G/DL (ref 11.7–15.7)
MAGNESIUM SERPL-MCNC: 1.6 MG/DL (ref 1.7–2.3)
MCH RBC QN AUTO: 32.2 PG (ref 26.5–33)
MCHC RBC AUTO-ENTMCNC: 34.3 G/DL (ref 31.5–36.5)
MCV RBC AUTO: 94 FL (ref 78–100)
PLATELET # BLD AUTO: 308 10E3/UL (ref 150–450)
POTASSIUM SERPL-SCNC: 4.9 MMOL/L (ref 3.4–5.3)
PROT SERPL-MCNC: 7.6 G/DL (ref 6.4–8.3)
RBC # BLD AUTO: 3.69 10E6/UL (ref 3.8–5.2)
SODIUM SERPL-SCNC: 138 MMOL/L (ref 136–145)
WBC # BLD AUTO: 6.2 10E3/UL (ref 4–11)

## 2022-09-06 PROCEDURE — 85027 COMPLETE CBC AUTOMATED: CPT | Performed by: FAMILY MEDICINE

## 2022-09-06 PROCEDURE — 99214 OFFICE O/P EST MOD 30 MIN: CPT | Performed by: FAMILY MEDICINE

## 2022-09-06 PROCEDURE — 83735 ASSAY OF MAGNESIUM: CPT | Performed by: FAMILY MEDICINE

## 2022-09-06 PROCEDURE — 80053 COMPREHEN METABOLIC PANEL: CPT | Performed by: FAMILY MEDICINE

## 2022-09-06 PROCEDURE — G0296 VISIT TO DETERM LDCT ELIG: HCPCS | Performed by: FAMILY MEDICINE

## 2022-09-06 PROCEDURE — 36415 COLL VENOUS BLD VENIPUNCTURE: CPT | Performed by: FAMILY MEDICINE

## 2022-09-06 NOTE — PROGRESS NOTES
Lung Cancer Screening Shared Decision Making Visit     Namita Viera, a 72 year old female, is eligible for lung cancer screening    History   Smoking Status     Current Every Day Smoker     Packs/day: 1.00     Years: 60.00     Types: Cigarettes     Start date: 1/1/1962     Last attempt to quit: 5/30/2018   Smokeless Tobacco     Never Used       I have discussed with patient the risks and benefits of screening for lung cancer with low-dose CT.     The risks include:    radiation exposure: one low dose chest CT has as much ionizing radiation as about 15 chest x-rays, or 6 months of background radiation living in [place]      false positives: most findings/nodules are NOT cancer, but some might still require additional diagnostic evaluation, including biopsy    over-diagnosis: some slow growing cancers that might never have been clinically significant will be detected and treated unnecessarily     The benefit of early detection of lung cancer is contingent upon adherence to annual screening or more frequent follow up if indicated.     Furthermore, to benefit from screening, Namita must be willing and able to undergo diagnostic procedures, if indicated. Although no specific guide is available for determining severity of comorbidities, it is reasonable to withhold screening in patients who have greater mortality risk from other diseases.     We did discuss that the best way to prevent lung cancer is to not smoke.    Some patients may value a numeric estimation of lung cancer risk when evaluating if lung cancer screening is right for them, here is one calculator:    ShouldIScreen  Answers for HPI/ROS submitted by the patient on 9/6/2022  What is the reason for your visit today? : Check up.  How many servings of fruits and vegetables do you eat daily?: 2-3  On average, how many sweetened beverages do you drink each day (Examples: soda, juice, sweet tea, etc.  Do NOT count diet or artificially sweetened  beverages)?: 0  How many minutes a day do you exercise enough to make your heart beat faster?: 9 or less  How many days a week do you exercise enough to make your heart beat faster?: 3 or less  How many days per week do you miss taking your medication?: 0

## 2022-09-06 NOTE — PROGRESS NOTES
"  Assessment & Plan     Age-related osteoporosis without current pathological fracture  -Tolerating Fosamax well.  We discussed repeating bone density next year 2023.  History of fracture    Benign essential hypertension  -Blood pressure minimally elevated.  We are going to increase her losartan to 100 mg with 25 mg of HCTZ rather than 12.5 mg and follow-up with a lab nurse appointment in a month  - Magnesium; Future  - Comprehensive metabolic panel (BMP + Alb, Alk Phos, ALT, AST, Total. Bili, TP); Future  - Magnesium  - Comprehensive metabolic panel (BMP + Alb, Alk Phos, ALT, AST, Total. Bili, TP)    Anemia, unspecified type  -Patient has had previous history of anemia.  May have been nutritional deficiencies.  We will repeat levels today  - CBC with platelets; Future  - CBC with platelets    Pulmonary emphysema, unspecified emphysema type (H)  -Very well controlled per patient at this time on Spiriva and has her albuterol for as needed use.  - tiotropium (SPIRIVA RESPIMAT) 2.5 MCG/ACT inhaler; Inhale 2 puffs into the lungs daily (controller)    Personal history of tobacco use  -Patient is due for annual lung cancer screening.  Shared decision making was made see note below.  Order placed.  - Prof fee: Shared Decision Making for Lung Cancer Screening  - CT Chest Lung Cancer Scrn Low Dose wo; Future    Leg cramping  -Increase water intake.  She thinks she is taking magnesium.    Patient instructions  Go home and check if your blood pressure is hydrochlorothiazide or losartan with hydrochlorothiazide and if it is the latter then we need to increase the dose. Send me a [employer] message or Marcie      Start Spiriva inhaler unless expensive or not covered.   You will do 2 puffs every morning and eventually you should notice your breathing improving. You can use the albuterol still following if necessary but that will be your rescue inhaler that you use only if needed. Eventually you \"might\" not need it.      Consider " "condensing vitamins to one centrum silver instead but restart D if you can      Low dose lung cancer screen ordered      Tdap was ordered but I believe that we held off on this vaccine today.  It was not documented in the MAR.  Consider for next visit  Ordering of each unique test  Prescription drug management  35 minutes spent on the date of the encounter doing chart review, history and exam, documentation and further activities per the note            No follow-ups on file.    Chasity Castro [facility]    Edwin Breaux is a 72 year old, presenting for the following health issues: daughter Marcie sick.   Follow Up (Cough is better with the inhaler now.  Uses it in the morning and is good most of the day.  Has tried to increase calories.  )      History of Present Illness       Reason for visit:  Check up.    She eats 2-3 servings of fruits and vegetables daily.She consumes 0 sweetened beverage(s) daily.She exercises with enough effort to increase her heart rate 9 or less minutes per day.  She exercises with enough effort to increase her heart rate 3 or less days per week.   She is taking medications regularly.       Says doing well overall- \"Im doing fine\" .   Weight coming back up. Living alone and is cooking for self. Doing ok on own. Moved in the [facility] in [place] .  Seems to be doing okay since her 's passing last year.  Wt Readings from Last 3 Encounters:   09/06/22 41.9 kg (92 lb 6.4 oz)   02/04/22 39.2 kg (86 lb 6.4 oz)   01/10/22 40.6 kg (89 lb 6.4 oz)     Says uses inhaler every morning 3x. Is wonderful and works well and usually good for the day.     Symptoms in legs still crampy at times- 3-4 x per week . Swelling better since going down on gabapentin. Will cramp up and has to get out of bed and stand up and rock back and forth .  Doesn't drink enough water     No  Urinary symptoms but has weak bladder. Wears pad all the time.    done pelvic floor " therapy in the past and would be interested in restarting-   Not taking all her vitamins since move.   Magnesium gave diarrhea     Review of Systems   Review of Systems  Complete ROS reviewed in HPI or otherwise negative        Objective    BP (!) 149/83   Pulse 98   Temp 98.9  F (37.2  C) (Oral)   Resp 24   Wt 41.9 kg (92 lb 6.4 oz)   SpO2 97%   BMI 18.05 kg/m    Body mass index is 18.05 kg/m .  Physical Exam   GENERAL: healthy, alert and no distress. Thin.   EYES: Eyes grossly normal to inspection, PERRL and conjunctivae and sclerae normal  HENT: ear canals and TM's normal, nose and mouth without ulcers or lesions  NECK: no adenopathy, no asymmetry, masses, or scars and thyroid normal to palpation  RESP: lungs clear to auscultation - no rales, rhonchi or wheezes  CV: regular rate and rhythm, normal S1 S2, no S3 or S4, no murmur, click or rub, no peripheral edema and peripheral pulses strong  ABDOMEN: soft, nontender, no hepatosplenomegaly, no masses and bowel sounds normal  MS: no gross musculoskeletal defects noted, no edema  SKIN: no suspicious lesions or rashes  NEURO: Normal strength and tone, mentation intact and speech normal  PSYCH: mentation appears normal, affect normal/bright

## 2022-09-06 NOTE — PATIENT INSTRUCTIONS
"Go home and check if your blood pressure is hydrochlorothiazide or losartan with hydrochlorothiazide and if it is the latter then we need to increase the dose. Send me a [employer] message or Marcie     Start Spiriva inhaler unless expensive or not covered.   You will do 2 puffs every morning and eventually you should notice your breathing improving. You can use the albuterol still following if necessary but that will be your rescue inhaler that you use only if needed. Eventually you \"might\" not need it.     Consider condensing vitamins to one centrum silver instead but restart D if you can     Low dose lung cancer screen ordered         Lung Cancer Screening   Frequently Asked Questions  If you are at high-risk for lung cancer, getting screened with low-dose computed tomography (LDCT) every year can help save your life. This handout offers answers to some of the most common questions about lung cancer screening. If you have other questions, please call [phone] ([phone]).     What is it?  Lung cancer screening uses special X-ray technology to create an image of your lung tissue. The exam is quick and easy and takes less than 10 seconds. We don t give you any medicine or use any needles. You can eat before and after the exam. You don t need to change your clothes as long as the clothing on your chest doesn t contain metal. But, you do need to be able to hold your breath for at least 6 seconds during the exam.    What is the goal of lung cancer screening?  The goal of lung cancer screening is to save lives. Many times, lung cancer is not found until a person starts having physical symptoms. Lung cancer screening can help detect lung cancer in the earliest stages when it may be easier to treat.    Who should be screened for lung cancer?  We suggest lung cancer screening for anyone who is at high-risk for lung cancer. You are in the high-risk group if you:     are between the ages of 55 and 79, and   have " smoked at least 1 pack of cigarettes a day for 20 or more years, and   still smoke or have quit within the past 15 years.    However, if you have a new cough or shortness of breath, you should talk to your doctor before being screened.    Why does it matter if I have symptoms?  Certain symptoms can be a sign that you have a condition in your lungs that should be checked and treated by your doctor. These symptoms include fever, chest pain, a new or changing cough, shortness of breath that you have never felt before, coughing up blood or unexplained weight loss. Having any of these symptoms can greatly affect the results of lung cancer screening.       Should all smokers get an LDCT lung cancer screening exam?  It depends. Lung cancer screening is for a very specific group of men and women who have a history of heavy smoking over a long period of time (see  Who should be screened for lung cancer  above).  I am in the high-risk group, but have been diagnosed with cancer in the past. Is LDCT lung cancer screening right for me?  In some cases, you should not have LDCT lung screening, such as when your doctor is already following your cancer with CT scan studies. Your doctor will help you decide if LDCT lung screening is right for you.  Do I need to have a screening exam every year?  Yes. If you are in the high-risk group described earlier, you should get an LDCT lung cancer screening exam every year until you are 79, or are no longer willing or able to undergo screening and possible procedures to diagnose and treat lung cancer.  How effective is LDCT at preventing death from lung cancer?  Studies have shown that LDCT lung cancer screening can lower the risk of death from lung cancer by 20 percent in people who are at high-risk.  What are the risks?  There are some risks and limitations of LDCT lung cancer screening. We want to make sure you understand the risks and benefits, so please let us know if you have any  questions. Your doctor may want to talk with you more about these risks.   Radiation exposure: As with any exam that uses radiation, there is a very small increased risk of cancer. The amount of radiation in LDCT is small--about the same amount a person would get from a mammogram. Your doctor orders the exam when he or she feels the potential benefits outweigh the risks.   False negatives: No test is perfect, including LDCT. It is possible that you may have a medical condition, including lung cancer, that is not found during your exam. This is called a false negative result.   False positives and more testing: LDCT very often finds something in the lung that could be cancer, but in fact is not. This is called a false positive result. False positive tests often cause anxiety. To make sure these findings are not cancer, you may need to have more tests. These tests will be done only if you give us permission. Sometimes patients need a treatment that can have side effects, such as a biopsy. For more information on false positives, see  What can I expect from the results?    Findings not related to lung cancer: Your LDCT exam also takes pictures of areas of your body next to your lungs. In a very small number of cases, the CT scan will show an abnormal finding in one of these areas, such as your kidneys, adrenal glands, liver or thyroid. This finding may not be serious, but you may need more tests. Your doctor can help you decide what other tests you may need, if any.  What can I expect from the results?  About 1 out of 4 LDCT exams will find something that may need more tests. Most of the time, these findings are lung nodules. Lung nodules are very small collections of tissue in the lung. These nodules are very common, and the vast majority--more than 97 percent--are not cancer (benign). Most are normal lymph nodes or small areas of scarring from past infections.  But, if a small lung nodule is found to be cancer, the  cancer can be cured more than 90 percent of the time. To know if the nodule is cancer, we may need to get more images before your next yearly screening exam. If the nodule has suspicious features (for example, it is large, has an odd shape or grows over time), we will refer you to a specialist for further testing.  Will my doctor also get the results?  Yes. Your doctor will get a copy of your results.  Is it okay to keep smoking now that there s a cancer screening exam?  No. Tobacco is one of the strongest cancer-causing agents. It causes not only lung cancer, but other cancers and cardiovascular (heart) diseases as well. The damage caused by smoking builds over time. This means that the longer you smoke, the higher your risk of disease. While it is never too late to quit, the sooner you quit, the better.  Where can I find help to quit smoking?  The best way to prevent lung cancer is to stop smoking. If you have already quit smoking, congratulations and keep it up! For help on quitting smoking, please call [employer] at 5-724-QUITNOW (1-921.936.7186) or the [employer] at 1-691.133.5889 to find local resources near you.  One-on-one health coaching:  If you d prefer to work individually with a health care provider on tobacco cessation, we offer:     Medication Therapy Management:  Our specially trained pharmacists work closely with you and your doctor to help you quit smoking.  Call [phone] or [phone] (toll free).

## 2022-09-18 ENCOUNTER — HEALTH MAINTENANCE LETTER (OUTPATIENT)
Age: 72
End: 2022-09-18

## 2022-09-28 PROBLEM — S32.10XD CLOSED FRACTURE OF SACRUM WITH ROUTINE HEALING: Status: RESOLVED | Noted: 2021-10-12 | Resolved: 2022-09-28

## 2022-09-28 PROBLEM — R63.4 WEIGHT LOSS: Status: RESOLVED | Noted: 2022-02-07 | Resolved: 2022-09-28

## 2022-10-09 DIAGNOSIS — I10 BENIGN ESSENTIAL HYPERTENSION: ICD-10-CM

## 2022-10-10 RX ORDER — LOSARTAN POTASSIUM AND HYDROCHLOROTHIAZIDE 12.5; 1 MG/1; MG/1
1 TABLET ORAL DAILY
Qty: 90 TABLET | Refills: 4 | OUTPATIENT
Start: 2022-10-10

## 2022-10-13 ENCOUNTER — HOSPITAL ENCOUNTER (OUTPATIENT)
Dept: CT IMAGING | Facility: HOSPITAL | Age: 72
Discharge: HOME | End: 2022-10-13
Attending: FAMILY MEDICINE | Admitting: FAMILY MEDICINE
Payer: COMMERCIAL

## 2022-10-13 DIAGNOSIS — Z87.891 PERSONAL HISTORY OF TOBACCO USE: ICD-10-CM

## 2022-10-13 PROCEDURE — 71271 CT THORAX LUNG CANCER SCR C-: CPT

## 2023-02-25 DIAGNOSIS — R05.9 COUGH: ICD-10-CM

## 2023-02-27 RX ORDER — ALBUTEROL SULFATE 90 UG/1
1-2 AEROSOL, METERED RESPIRATORY (INHALATION) EVERY 6 HOURS
Qty: 18 G | Refills: 11 | Status: SHIPPED | OUTPATIENT
Start: 2023-02-27 | End: 2024-03-18

## 2023-02-27 NOTE — TELEPHONE ENCOUNTER
"Former patient of [employer] & has not established care with another provider.  Please assign refill request to covering provider per clinic standard process.    Last Written Prescription Date:  2/4/22  Last Fill Quantity: 18,  # refills: 11   Last office visit provider:  9/6/22     Requested Prescriptions   Pending Prescriptions Disp Refills     albuterol (PROAIR HFA/PROVENTIL HFA/VENTOLIN HFA) 108 (90 Base) MCG/ACT inhaler [Pharmacy Med Name: ALBUTEROL SULFATE  ( 108 (90 BAS Aerosol] 18 g 11     Sig: INHALE 1-2 PUFFS INTO THE LUNGS EVERY 6 HOURS AS NEEDED FOR COUGH OR SHORTNESS OF BREATH OR WHEEZE       Asthma Maintenance Inhalers - Anticholinergics Failed - 2/25/2023 11:36 AM        Failed - Recent (12 mo) or future (30 days) visit within the authorizing provider's specialty     Patient has had an office visit with the authorizing provider or a provider within the authorizing providers department within the previous 12 mos or has a future within next 30 days. See \"Patient Info\" tab in inbasket, or \"Choose Columns\" in Meds & Orders section of the refill encounter.              Passed - Patient is age 12 years or older        Passed - Medication is active on med list       Short-Acting Beta Agonist Inhalers Protocol  Failed - 2/25/2023 11:36 AM        Failed - Recent (12 mo) or future (30 days) visit within the authorizing provider's specialty     Patient has had an office visit with the authorizing provider or a provider within the authorizing providers department within the previous 12 mos or has a future within next 30 days. See \"Patient Info\" tab in inbasket, or \"Choose Columns\" in Meds & Orders section of the refill encounter.              Passed - Patient is age 12 or older        Passed - Medication is active on med list             Deirdre Hanson RN 02/26/23 8:27 PM  "

## 2023-05-02 ENCOUNTER — TELEPHONE (OUTPATIENT)
Dept: FAMILY MEDICINE | Facility: CLINIC | Age: 73
End: 2023-05-02
Payer: COMMERCIAL

## 2023-05-02 NOTE — TELEPHONE ENCOUNTER
Called and left message to return call. Patients appointment on 6/5 needs to be rescheduled due to provider not in clinic that afternoon. Please help pt reschedule appt. Ok to use held spot.

## 2023-05-04 NOTE — TELEPHONE ENCOUNTER
Called no answer.    Pt called back regarding reschedule - she is having her daughter call back to reschedule appt 5/2 5:20pm GILA**

## 2023-05-15 ENCOUNTER — HOSPITAL ENCOUNTER (EMERGENCY)
Facility: HOSPITAL | Age: 73
Discharge: HOME | End: 2023-05-15
Attending: EMERGENCY MEDICINE | Admitting: EMERGENCY MEDICINE
Payer: COMMERCIAL

## 2023-05-15 VITALS
TEMPERATURE: 98.2 F | OXYGEN SATURATION: 98 % | RESPIRATION RATE: 16 BRPM | WEIGHT: 96.78 LBS | BODY MASS INDEX: 18.9 KG/M2 | DIASTOLIC BLOOD PRESSURE: 64 MMHG | SYSTOLIC BLOOD PRESSURE: 136 MMHG | HEART RATE: 79 BPM

## 2023-05-15 DIAGNOSIS — N30.00 ACUTE CYSTITIS WITHOUT HEMATURIA: ICD-10-CM

## 2023-05-15 DIAGNOSIS — E87.1 HYPONATREMIA: ICD-10-CM

## 2023-05-15 DIAGNOSIS — R41.0 CONFUSION ASSOCIATED WITH INFECTION: ICD-10-CM

## 2023-05-15 DIAGNOSIS — B99.9 CONFUSION ASSOCIATED WITH INFECTION: ICD-10-CM

## 2023-05-15 LAB
ALBUMIN UR-MCNC: 10 MG/DL
ANION GAP SERPL CALCULATED.3IONS-SCNC: 14 MMOL/L (ref 7–15)
APPEARANCE UR: ABNORMAL
BACTERIA #/AREA URNS HPF: ABNORMAL /HPF
BILIRUB UR QL STRIP: NEGATIVE
BUN SERPL-MCNC: 21.9 MG/DL (ref 8–23)
CALCIUM SERPL-MCNC: 10.2 MG/DL (ref 8.8–10.2)
CHLORIDE SERPL-SCNC: 89 MMOL/L (ref 98–107)
COLOR UR AUTO: YELLOW
CREAT SERPL-MCNC: 0.65 MG/DL (ref 0.51–0.95)
DEPRECATED HCO3 PLAS-SCNC: 23 MMOL/L (ref 22–29)
ERYTHROCYTE [DISTWIDTH] IN BLOOD BY AUTOMATED COUNT: 13 % (ref 10–15)
GFR SERPL CREATININE-BSD FRML MDRD: >90 ML/MIN/1.73M2
GLUCOSE SERPL-MCNC: 123 MG/DL (ref 70–99)
GLUCOSE UR STRIP-MCNC: NEGATIVE MG/DL
HCT VFR BLD AUTO: 35 % (ref 35–47)
HGB BLD-MCNC: 12.7 G/DL (ref 11.7–15.7)
HGB UR QL STRIP: NEGATIVE
HOLD SPECIMEN: NORMAL
KETONES UR STRIP-MCNC: 20 MG/DL
LEUKOCYTE ESTERASE UR QL STRIP: ABNORMAL
MCH RBC QN AUTO: 34.4 PG (ref 26.5–33)
MCHC RBC AUTO-ENTMCNC: 36.3 G/DL (ref 31.5–36.5)
MCV RBC AUTO: 95 FL (ref 78–100)
MUCOUS THREADS #/AREA URNS LPF: PRESENT /LPF
NITRATE UR QL: POSITIVE
PH UR STRIP: 6 [PH] (ref 5–7)
PLATELET # BLD AUTO: 291 10E3/UL (ref 150–450)
POTASSIUM SERPL-SCNC: 3.9 MMOL/L (ref 3.4–5.3)
RBC # BLD AUTO: 3.69 10E6/UL (ref 3.8–5.2)
RBC URINE: 1 /HPF
SODIUM SERPL-SCNC: 126 MMOL/L (ref 136–145)
SP GR UR STRIP: 1.02 (ref 1–1.03)
SQUAMOUS EPITHELIAL: 1 /HPF
UROBILINOGEN UR STRIP-MCNC: <2 MG/DL
WBC # BLD AUTO: 5.2 10E3/UL (ref 4–11)
WBC URINE: 10 /HPF

## 2023-05-15 PROCEDURE — 85027 COMPLETE CBC AUTOMATED: CPT | Performed by: EMERGENCY MEDICINE

## 2023-05-15 PROCEDURE — 250N000011 HC RX IP 250 OP 636: Performed by: EMERGENCY MEDICINE

## 2023-05-15 PROCEDURE — 36415 COLL VENOUS BLD VENIPUNCTURE: CPT | Performed by: EMERGENCY MEDICINE

## 2023-05-15 PROCEDURE — 87186 SC STD MICRODIL/AGAR DIL: CPT | Performed by: EMERGENCY MEDICINE

## 2023-05-15 PROCEDURE — 96365 THER/PROPH/DIAG IV INF INIT: CPT

## 2023-05-15 PROCEDURE — 81001 URINALYSIS AUTO W/SCOPE: CPT | Performed by: EMERGENCY MEDICINE

## 2023-05-15 PROCEDURE — 80048 BASIC METABOLIC PNL TOTAL CA: CPT | Performed by: EMERGENCY MEDICINE

## 2023-05-15 PROCEDURE — 99284 EMERGENCY DEPT VISIT MOD MDM: CPT | Mod: 25

## 2023-05-15 RX ORDER — CEFDINIR 300 MG/1
300 CAPSULE ORAL 2 TIMES DAILY
Qty: 14 CAPSULE | Refills: 0 | Status: ON HOLD | OUTPATIENT
Start: 2023-05-15 | End: 2023-05-19

## 2023-05-15 RX ORDER — CEFTRIAXONE 1 G/1
1 INJECTION, POWDER, FOR SOLUTION INTRAMUSCULAR; INTRAVENOUS ONCE
Status: COMPLETED | OUTPATIENT
Start: 2023-05-15 | End: 2023-05-15

## 2023-05-15 RX ADMIN — CEFTRIAXONE SODIUM 1 G: 1 INJECTION, POWDER, FOR SOLUTION INTRAMUSCULAR; INTRAVENOUS at 13:35

## 2023-05-15 ASSESSMENT — ACTIVITIES OF DAILY LIVING (ADL): ADLS_ACUITY_SCORE: 33

## 2023-05-15 NOTE — ED NOTES
Patient and family member reviewed discharge.  Discussed printed discharge information.  Follow-up appts reviewed.   What s/s warrant return to the ER.  Prescriptions and how to take them.  Importance of social distancing, good hand hygiene, and proper primary care follow-up to maintain health.

## 2023-05-15 NOTE — ED TRIAGE NOTES
Arrival from home, pt became confused on Friday got lost on way to daughters house, has remained confused over weekend. Pt endorses bladder sx, burning while urination and increased frequency.      Triage Assessment     Row Name 05/15/23 1121       Triage Assessment (Adult)    Airway WDL WDL       Respiratory WDL    Respiratory WDL WDL

## 2023-05-15 NOTE — ED PROVIDER NOTES
EMERGENCY DEPARTMENT ENCOUNTER      NAME: Namita Viera  AGE: 73 year old female  YOB: 1950  MRN: [id number]  EVALUATION DATE & TIME: 5/15/2023 11:26 AM    PCP: System, Provider Not In    ED PROVIDER: Elis Sandhu MD    Chief Complaint   Patient presents with     Altered Mental Status         FINAL IMPRESSION:  1. Acute cystitis without hematuria    2. Hyponatremia    3. Confusion associated with infection          ED COURSE & MEDICAL DECISION MAKING:    Pertinent Labs & Imaging studies reviewed. (See chart for details)  73 year old female with history of HTN, emphysema who presents to the Emergency Department for evaluation of confusion over the course of the weekend with urinary frequency.  Patient has a nonfocal neuro examination.  With the urinary frequency favor simple cystitis with some associated delirium from her infection.  She does not have focal deficit to suspect CVA, TIA, mass lesion.  No referral or falls or trauma to warrant neuroimaging for ICH.  Concern for dehydration or electrolyte abnormality.    Patient placed on monitor, IV established and blood obtained.  CBC and BMP notable for sodium 126 which is new.  She is not on any new medications that may be contributing to this.  Urinalysis is nitrite positive.  Based on previous urine sample was given dose of Rocephin here.  Findings discussed with patient and family at bedside.  Due to the hyponatremia I do think she warrants close follow-up as an outpatient with her confusion though she is neurologically intact and does not appear confused here.  Will discharge to home with cefdinir and close outpatient follow-up for repeat electrolyte panel later this week.  Warning signs return to ED discussed.      ED Course as of 05/15/23 1318   Mon May 15, 2023   1234 Nitrite Urine(!): Positive   1235 Sodium(!): 126     11:30 AM I met with the patient to gather history and to perform my initial exam. I discussed the plan for care while in  the Emergency Department. PPE was worn during patient encounters.   12:47 PM I re-evaluated the patient. I updated patient and her family on lab results.   Medical Decision Making    History:    Supplemental history from: Family Member/Significant Other    External Record(s) reviewed: Prior Labs: 9/6/2022 sodium level    Work Up:    Chart documentation includes differential considered and any EKGs or imaging independently interpreted by provider, where specified.    In additional to work up documented, I considered the following work up: See MDM    External consultation:    Discussion of management with another provider: na    Complicating factors:    Care impacted by chronic illness: Chronic Lung Disease and Hypertension    Care affected by social determinants of health: Access to Medical Care and Access to Affordable Health Care    Disposition considerations: Discharge. I prescribed additional prescription strength medication(s) as charted. I considered admission, but ultimately discharged patient Neurologically intact and not confused here.        At the conclusion of the encounter I discussed the results of all of the tests and the disposition. The questions were answered. The patient or family acknowledged understanding and was agreeable with the care plan.      MEDICATIONS GIVEN IN THE EMERGENCY:  Medications   cefTRIAXone (ROCEPHIN) 1 g vial to attach to  mL bag for ADULTS or NS 50 mL bag for PEDS (has no administration in time range)       NEW PRESCRIPTIONS STARTED AT TODAY'S ER VISIT  New Prescriptions    CEFDINIR (OMNICEF) 300 MG CAPSULE    Take 1 capsule (300 mg) by mouth 2 times daily for 7 days          =================================================================    HPI    Patient information was obtained from: patient, patient's children    Use of Intrepreter: N/A       Namita Viera is a 73 year old female with pertinent medical history of tobacco abuse, alcohol dependence, emphysema of  "lung, HTN, who presents to the ED by private vehicle with family for evaluation of altered mental status. Patient reports that she got confused, went the wrong way on the highway on Saturday (2 days ago) and got lost. She states that she feels fine now. Patient reports that she lives alone and is independent. She states that she drives. Patient reports increased urinary frequency and increased urgency (which has been ongoing for \"a while.) Patient denies hematuria, abdominal pain, flank pain, back pain, speech changes, or additional medical concerns or complaints at this time.      Per patient's children, the patient is having difficulty with tracking and processing, which is new as of 2 days ago. She got lost in the neighborhood she grew up in, and is slow to process things. She seems to have different reactions to conversations than she normally would. Patient does not seem to be having delusions or hallucinations. This morning, the patient was trying to light an albuterol inhaler instead of a cigarette, and kept doing this for quite some time, until she was stopped. She also put her dinner (which she had just made) in the fridge, and said \"I don't know\" when asked why.       PAST MEDICAL HISTORY:  Past Medical History:   Diagnosis Date     Acid reflux      Alcohol abuse      Alcohol use      Closed fracture of left femur (H)      Closed fracture of sacrum with routine healing 10/12/2021     Emphysema of lung (H) 2/7/2022     Hip fracture requiring operative repair (H)      Hypertension Jan 2021     Rib pain on left side 5/6/2021     SAH (subarachnoid hemorrhage) (H)      Traumatic closed displaced fracture of distal end of radius        PAST SURGICAL HISTORY:  Past Surgical History:   Procedure Laterality Date     BLADDER SUSPENSION  2008     HIP FRACTURE SURGERY Left      HIP PINNING Left 5/2/2018    Procedure: INTERNAL FIXATION, FRACTURE, TROCHANTERIC, LEFT HIP, USING NAILS;  Surgeon: Adria Lambert MD;  " Location: [facility] OR;  Service:      HYSTERECTOMY TOTAL ABDOMINAL, BILATERAL SALPINGO-OOPHORECTOMY, COMBINED  2008     ORTHOPEDIC SURGERY  2018     OTHER SURGICAL HISTORY  2008    hysterctomy       CURRENT MEDICATIONS:    Prior to Admission Medications   Prescriptions Last Dose Informant Patient Reported? Taking?   Multiple Vitamin (MULTI-VITAMIN DAILY PO)   Yes No   Sig:   multi vitamin, 0 Refill(s), Type: Maintenance   Patient not taking: Reported on 9/6/2022   Vitamin D, Cholecalciferol, 25 MCG (1000 UT) TABS   Yes No   Sig: Take 1,000 Units by mouth daily   Patient not taking: Reported on 9/6/2022   acetaminophen (TYLENOL) 500 MG tablet   No No   Sig: Take 2 tablets (1,000 mg) by mouth 3 times daily as needed for mild pain   albuterol (PROAIR HFA/PROVENTIL HFA/VENTOLIN HFA) 108 (90 Base) MCG/ACT inhaler   No No   Sig: INHALE 1-2 PUFFS INTO THE LUNGS EVERY 6 HOURS AS NEEDED FOR COUGH OR SHORTNESS OF BREATH OR WHEEZE   alendronate (FOSAMAX) 70 MG tablet   No No   Sig: TAKE 1 TABLET BY MOUTH EVERY 7 DAYS. TAKE IN THE MORNING ON AN EMPTY STOMACH WITH A FULL GLASS OF WATER 30 MINUTES BEFORE FOOD   aspirin (ASA) 81 MG EC tablet   No No   Sig: Take 1 tablet (81 mg) by mouth daily   losartan-hydrochlorothiazide (HYZAAR) 100-12.5 MG tablet   No No   Sig: Take 1 tablet by mouth daily   losartan-hydrochlorothiazide (HYZAAR) 100-25 MG tablet   No No   Sig: Take 1 tablet by mouth daily   nicotine (NICODERM CQ) 21 MG/24HR 24 hr patch   No No   Sig: Place 1 patch onto the skin every 24 hours   omeprazole (PRILOSEC) 20 MG DR capsule   No No   Sig: TAKE 1 CAPSULE (20 MG) BY MOUTH DAILY   tiotropium (SPIRIVA RESPIMAT) 2.5 MCG/ACT inhaler   No No   Sig: Inhale 2 puffs into the lungs daily (controller)      Facility-Administered Medications: None       ALLERGIES:  No Known Allergies    FAMILY HISTORY:  Family History   Problem Relation Age of Onset     Esophageal Cancer Mother      Cancer Mother      Rectal Cancer Sister       Rheumatoid Arthritis Brother      Cancer Sister         lump in neck.        SOCIAL HISTORY:  Social History     Tobacco Use     Smoking status: Every Day     Packs/day: 1.00     Years: 60.00     Pack years: 60.00     Types: Cigarettes     Start date: 1962     Last attempt to quit: 2018     Years since quittin.9     Smokeless tobacco: Never   Substance Use Topics     Alcohol use: Yes     Alcohol/week: 14.0 standard drinks of alcohol     Comment: significantly decreased alcohol consumption in past 6 months     Drug use: No        VITALS:  Patient Vitals for the past 24 hrs:   BP Temp Temp src Pulse Resp SpO2 Weight   05/15/23 1121 (!) 172/80 98.2  F (36.8  C) Oral 78 18 99 % 43.9 kg (96 lb 12.5 oz)       PHYSICAL EXAM    General Appearance: Well-appearing, well-nourished, no acute distress, petite    Head:  Normocephalic, atraumatic  Eyes:  PERRL, conjunctiva/corneas clear, EOM's intact  ENT:  membranes are moist without pallor  Cardio:  Regular rate and rhythm  Pulm:  No respiratory distress  Back:  No CVA tenderness, normal ROM  Abdomen:  Soft, non-tender, non distended,no rebound or guarding.  Extremities: Moves extremities normally, normal gait  Skin:  Skin warm, dry, no rashes  Neuro:  Alert and oriented ×3, moving all extremities, no gross sensory defects. CN 3-12 intact. 5/5 strength bilaterally to upper and lower extremities. Alert, oriented, answers questions appropriately here.     RADIOLOGY/LABS:  Reviewed all pertinent imaging. Please see official radiology report. All pertinent labs reviewed and interpreted.    Results for orders placed or performed during the hospital encounter of 05/15/23   UA with Microscopic reflex to Culture    Specimen: Urine, Clean Catch   Result Value Ref Range    Color Urine Yellow Colorless, Straw, Light Yellow, Yellow    Appearance Urine Turbid (A) Clear    Glucose Urine Negative Negative mg/dL    Bilirubin Urine Negative Negative    Ketones Urine 20 (A)  Negative mg/dL    Specific Gravity Urine 1.020 1.001 - 1.030    Blood Urine Negative Negative    pH Urine 6.0 5.0 - 7.0    Protein Albumin Urine 10 (A) Negative mg/dL    Urobilinogen Urine <2.0 <2.0 mg/dL    Nitrite Urine Positive (A) Negative    Leukocyte Esterase Urine 250 Jenny/uL (A) Negative    Bacteria Urine Moderate (A) None Seen /HPF    Mucus Urine Present (A) None Seen /LPF    RBC Urine 1 <=2 /HPF    WBC Urine 10 (H) <=5 /HPF    Squamous Epithelials Urine 1 <=1 /HPF   CBC (+ platelets, no diff)   Result Value Ref Range    WBC Count 5.2 4.0 - 11.0 10e3/uL    RBC Count 3.69 (L) 3.80 - 5.20 10e6/uL    Hemoglobin 12.7 11.7 - 15.7 g/dL    Hematocrit 35.0 35.0 - 47.0 %    MCV 95 78 - 100 fL    MCH 34.4 (H) 26.5 - 33.0 pg    MCHC 36.3 31.5 - 36.5 g/dL    RDW 13.0 10.0 - 15.0 %    Platelet Count 291 150 - 450 10e3/uL   Basic metabolic panel   Result Value Ref Range    Sodium 126 (L) 136 - 145 mmol/L    Potassium 3.9 3.4 - 5.3 mmol/L    Chloride 89 (L) 98 - 107 mmol/L    Carbon Dioxide (CO2) 23 22 - 29 mmol/L    Anion Gap 14 7 - 15 mmol/L    Urea Nitrogen 21.9 8.0 - 23.0 mg/dL    Creatinine 0.65 0.51 - 0.95 mg/dL    Calcium 10.2 8.8 - 10.2 mg/dL    Glucose 123 (H) 70 - 99 mg/dL    GFR Estimate >90 >60 mL/min/1.73m2   Extra Red Top Tube   Result Value Ref Range    Hold Specimen JIC    Extra Green Top (Lithium Heparin) Tube   Result Value Ref Range    Hold Specimen JIC    Extra Purple Top Tube   Result Value Ref Range    Hold Specimen JIC    Extra Blue Top Tube   Result Value Ref Range    Hold Specimen JIC          The creation of this record is based on the scribe s observations of the work being performed by Elis Sandhu MD and the provider s statements to them. It was created on her behalf by Elise Gryler, a trained medical scribe. This document has been checked and approved by the attending provider.    Elis Sandhu MD  Emergency Medicine  [facility] EMERGENCY  DEPARTMENT  [address] [place] [id number]  [phone]  Dept: [phone]       Elis Sandhu MD  05/15/23 5558

## 2023-05-16 ENCOUNTER — APPOINTMENT (OUTPATIENT)
Dept: CT IMAGING | Facility: HOSPITAL | Age: 73
DRG: 480 | End: 2023-05-16
Attending: STUDENT IN AN ORGANIZED HEALTH CARE EDUCATION/TRAINING PROGRAM
Payer: COMMERCIAL

## 2023-05-16 ENCOUNTER — HOSPITAL ENCOUNTER (INPATIENT)
Facility: HOSPITAL | Age: 73
LOS: 3 days | Discharge: NURSING, REHAB OR LONG-TERM CARE | DRG: 480 | End: 2023-05-19
Attending: EMERGENCY MEDICINE | Admitting: INTERNAL MEDICINE
Payer: COMMERCIAL

## 2023-05-16 ENCOUNTER — APPOINTMENT (OUTPATIENT)
Dept: CT IMAGING | Facility: HOSPITAL | Age: 73
DRG: 480 | End: 2023-05-16
Payer: COMMERCIAL

## 2023-05-16 ENCOUNTER — APPOINTMENT (OUTPATIENT)
Dept: RADIOLOGY | Facility: HOSPITAL | Age: 73
DRG: 480 | End: 2023-05-16
Payer: COMMERCIAL

## 2023-05-16 ENCOUNTER — APPOINTMENT (OUTPATIENT)
Dept: CT IMAGING | Facility: HOSPITAL | Age: 73
DRG: 480 | End: 2023-05-16
Attending: EMERGENCY MEDICINE
Payer: COMMERCIAL

## 2023-05-16 ENCOUNTER — APPOINTMENT (OUTPATIENT)
Dept: RADIOLOGY | Facility: HOSPITAL | Age: 73
DRG: 480 | End: 2023-05-16
Attending: EMERGENCY MEDICINE
Payer: COMMERCIAL

## 2023-05-16 DIAGNOSIS — D64.9 ANEMIA, UNSPECIFIED TYPE: ICD-10-CM

## 2023-05-16 DIAGNOSIS — J43.9 PULMONARY EMPHYSEMA, UNSPECIFIED EMPHYSEMA TYPE (H): ICD-10-CM

## 2023-05-16 DIAGNOSIS — F17.200 SMOKER: ICD-10-CM

## 2023-05-16 DIAGNOSIS — E87.1 HYPONATREMIA: Primary | ICD-10-CM

## 2023-05-16 DIAGNOSIS — N30.00 ACUTE CYSTITIS WITHOUT HEMATURIA: ICD-10-CM

## 2023-05-16 DIAGNOSIS — S72.001A HIP FRACTURE, RIGHT, CLOSED, INITIAL ENCOUNTER (H): ICD-10-CM

## 2023-05-16 DIAGNOSIS — N39.0 ACUTE UTI: ICD-10-CM

## 2023-05-16 PROBLEM — J44.9 COPD (CHRONIC OBSTRUCTIVE PULMONARY DISEASE) (H): Status: ACTIVE | Noted: 2022-02-07

## 2023-05-16 PROBLEM — I10 BENIGN ESSENTIAL HYPERTENSION: Status: ACTIVE | Noted: 2021-05-06

## 2023-05-16 LAB
ANION GAP SERPL CALCULATED.3IONS-SCNC: 15 MMOL/L (ref 7–15)
BASOPHILS # BLD AUTO: 0 10E3/UL (ref 0–0.2)
BASOPHILS NFR BLD AUTO: 0 %
BUN SERPL-MCNC: 30.8 MG/DL (ref 8–23)
CALCIUM SERPL-MCNC: 9.6 MG/DL (ref 8.8–10.2)
CHLORIDE SERPL-SCNC: 97 MMOL/L (ref 98–107)
CREAT SERPL-MCNC: 0.92 MG/DL (ref 0.51–0.95)
DEPRECATED HCO3 PLAS-SCNC: 22 MMOL/L (ref 22–29)
EOSINOPHIL # BLD AUTO: 0 10E3/UL (ref 0–0.7)
EOSINOPHIL NFR BLD AUTO: 0 %
ERYTHROCYTE [DISTWIDTH] IN BLOOD BY AUTOMATED COUNT: 13.3 % (ref 10–15)
GFR SERPL CREATININE-BSD FRML MDRD: 65 ML/MIN/1.73M2
GLUCOSE SERPL-MCNC: 111 MG/DL (ref 70–99)
HCT VFR BLD AUTO: 29.3 % (ref 35–47)
HGB BLD-MCNC: 10.2 G/DL (ref 11.7–15.7)
IMM GRANULOCYTES # BLD: 0.1 10E3/UL
IMM GRANULOCYTES NFR BLD: 1 %
LYMPHOCYTES # BLD AUTO: 0.5 10E3/UL (ref 0.8–5.3)
LYMPHOCYTES NFR BLD AUTO: 5 %
MCH RBC QN AUTO: 34.3 PG (ref 26.5–33)
MCHC RBC AUTO-ENTMCNC: 34.8 G/DL (ref 31.5–36.5)
MCV RBC AUTO: 99 FL (ref 78–100)
MONOCYTES # BLD AUTO: 0.6 10E3/UL (ref 0–1.3)
MONOCYTES NFR BLD AUTO: 6 %
NEUTROPHILS # BLD AUTO: 8.8 10E3/UL (ref 1.6–8.3)
NEUTROPHILS NFR BLD AUTO: 88 %
NRBC # BLD AUTO: 0 10E3/UL
NRBC BLD AUTO-RTO: 0 /100
PLATELET # BLD AUTO: 235 10E3/UL (ref 150–450)
POTASSIUM SERPL-SCNC: 3.8 MMOL/L (ref 3.4–5.3)
RBC # BLD AUTO: 2.97 10E6/UL (ref 3.8–5.2)
SODIUM SERPL-SCNC: 134 MMOL/L (ref 136–145)
WBC # BLD AUTO: 9.9 10E3/UL (ref 4–11)

## 2023-05-16 PROCEDURE — 72170 X-RAY EXAM OF PELVIS: CPT

## 2023-05-16 PROCEDURE — 250N000011 HC RX IP 250 OP 636: Performed by: INTERNAL MEDICINE

## 2023-05-16 PROCEDURE — 250N000013 HC RX MED GY IP 250 OP 250 PS 637: Performed by: INTERNAL MEDICINE

## 2023-05-16 PROCEDURE — 82310 ASSAY OF CALCIUM: CPT

## 2023-05-16 PROCEDURE — 70450 CT HEAD/BRAIN W/O DYE: CPT

## 2023-05-16 PROCEDURE — 120N000001 HC R&B MED SURG/OB

## 2023-05-16 PROCEDURE — 250N000013 HC RX MED GY IP 250 OP 250 PS 637

## 2023-05-16 PROCEDURE — 73552 X-RAY EXAM OF FEMUR 2/>: CPT | Mod: RT

## 2023-05-16 PROCEDURE — 99285 EMERGENCY DEPT VISIT HI MDM: CPT | Mod: 25

## 2023-05-16 PROCEDURE — 73700 CT LOWER EXTREMITY W/O DYE: CPT | Mod: RT

## 2023-05-16 PROCEDURE — 96374 THER/PROPH/DIAG INJ IV PUSH: CPT

## 2023-05-16 PROCEDURE — 93010 ELECTROCARDIOGRAM REPORT: CPT | Performed by: INTERNAL MEDICINE

## 2023-05-16 PROCEDURE — 85025 COMPLETE CBC W/AUTO DIFF WBC: CPT

## 2023-05-16 PROCEDURE — 250N000011 HC RX IP 250 OP 636

## 2023-05-16 PROCEDURE — 86850 RBC ANTIBODY SCREEN: CPT | Performed by: STUDENT IN AN ORGANIZED HEALTH CARE EDUCATION/TRAINING PROGRAM

## 2023-05-16 PROCEDURE — 93005 ELECTROCARDIOGRAM TRACING: CPT

## 2023-05-16 PROCEDURE — 73562 X-RAY EXAM OF KNEE 3: CPT | Mod: RT

## 2023-05-16 PROCEDURE — 258N000003 HC RX IP 258 OP 636: Performed by: INTERNAL MEDICINE

## 2023-05-16 PROCEDURE — 72131 CT LUMBAR SPINE W/O DYE: CPT

## 2023-05-16 PROCEDURE — 86923 COMPATIBILITY TEST ELECTRIC: CPT

## 2023-05-16 PROCEDURE — 93005 ELECTROCARDIOGRAM TRACING: CPT | Performed by: INTERNAL MEDICINE

## 2023-05-16 PROCEDURE — 99223 1ST HOSP IP/OBS HIGH 75: CPT | Performed by: INTERNAL MEDICINE

## 2023-05-16 PROCEDURE — 36415 COLL VENOUS BLD VENIPUNCTURE: CPT

## 2023-05-16 RX ORDER — CEFDINIR 300 MG/1
300 CAPSULE ORAL 2 TIMES DAILY
Status: DISCONTINUED | OUTPATIENT
Start: 2023-05-16 | End: 2023-05-17

## 2023-05-16 RX ORDER — FENTANYL CITRATE 50 UG/ML
50 INJECTION, SOLUTION INTRAMUSCULAR; INTRAVENOUS
Status: COMPLETED | OUTPATIENT
Start: 2023-05-16 | End: 2023-05-16

## 2023-05-16 RX ORDER — LIDOCAINE 40 MG/G
CREAM TOPICAL
Status: DISCONTINUED | OUTPATIENT
Start: 2023-05-16 | End: 2023-05-18

## 2023-05-16 RX ORDER — ACETAMINOPHEN 650 MG/1
650 SUPPOSITORY RECTAL EVERY 6 HOURS PRN
Status: DISCONTINUED | OUTPATIENT
Start: 2023-05-16 | End: 2023-05-19 | Stop reason: HOSPADM

## 2023-05-16 RX ORDER — POLYETHYLENE GLYCOL 3350 17 G/17G
17 POWDER, FOR SOLUTION ORAL DAILY PRN
Status: DISCONTINUED | OUTPATIENT
Start: 2023-05-16 | End: 2023-05-19 | Stop reason: HOSPADM

## 2023-05-16 RX ORDER — ALBUTEROL SULFATE 90 UG/1
2 AEROSOL, METERED RESPIRATORY (INHALATION) EVERY 4 HOURS PRN
Status: DISCONTINUED | OUTPATIENT
Start: 2023-05-16 | End: 2023-05-19 | Stop reason: HOSPADM

## 2023-05-16 RX ORDER — ACETAMINOPHEN 325 MG/1
650 TABLET ORAL EVERY 6 HOURS PRN
Status: DISCONTINUED | OUTPATIENT
Start: 2023-05-16 | End: 2023-05-19 | Stop reason: HOSPADM

## 2023-05-16 RX ORDER — PANTOPRAZOLE SODIUM 40 MG/1
40 TABLET, DELAYED RELEASE ORAL
Status: DISCONTINUED | OUTPATIENT
Start: 2023-05-17 | End: 2023-05-19 | Stop reason: HOSPADM

## 2023-05-16 RX ORDER — ONDANSETRON 2 MG/ML
4 INJECTION INTRAMUSCULAR; INTRAVENOUS EVERY 6 HOURS PRN
Status: DISCONTINUED | OUTPATIENT
Start: 2023-05-16 | End: 2023-05-19 | Stop reason: HOSPADM

## 2023-05-16 RX ORDER — ASCORBIC ACID 500 MG
500 TABLET ORAL DAILY
COMMUNITY

## 2023-05-16 RX ORDER — IBUPROFEN 200 MG
200 TABLET ORAL EVERY 6 HOURS PRN
COMMUNITY

## 2023-05-16 RX ORDER — IPRATROPIUM BROMIDE AND ALBUTEROL SULFATE 2.5; .5 MG/3ML; MG/3ML
3 SOLUTION RESPIRATORY (INHALATION) EVERY 4 HOURS PRN
Status: DISCONTINUED | OUTPATIENT
Start: 2023-05-16 | End: 2023-05-16

## 2023-05-16 RX ORDER — ONDANSETRON 4 MG/1
4 TABLET, ORALLY DISINTEGRATING ORAL EVERY 6 HOURS PRN
Status: DISCONTINUED | OUTPATIENT
Start: 2023-05-16 | End: 2023-05-19 | Stop reason: HOSPADM

## 2023-05-16 RX ORDER — SODIUM CHLORIDE 9 MG/ML
INJECTION, SOLUTION INTRAVENOUS CONTINUOUS
Status: DISCONTINUED | OUTPATIENT
Start: 2023-05-16 | End: 2023-05-18

## 2023-05-16 RX ORDER — OXYCODONE HYDROCHLORIDE 5 MG/1
5 TABLET ORAL EVERY 4 HOURS PRN
Status: DISCONTINUED | OUTPATIENT
Start: 2023-05-16 | End: 2023-05-17

## 2023-05-16 RX ORDER — HYDROMORPHONE HCL IN WATER/PF 6 MG/30 ML
.2-.3 PATIENT CONTROLLED ANALGESIA SYRINGE INTRAVENOUS
Status: DISCONTINUED | OUTPATIENT
Start: 2023-05-16 | End: 2023-05-19 | Stop reason: HOSPADM

## 2023-05-16 RX ORDER — AMOXICILLIN 250 MG
2 CAPSULE ORAL 2 TIMES DAILY PRN
Status: DISCONTINUED | OUTPATIENT
Start: 2023-05-16 | End: 2023-05-19 | Stop reason: HOSPADM

## 2023-05-16 RX ORDER — IBUPROFEN 200 MG
200 TABLET ORAL EVERY 6 HOURS PRN
Status: DISCONTINUED | OUTPATIENT
Start: 2023-05-16 | End: 2023-05-19 | Stop reason: HOSPADM

## 2023-05-16 RX ORDER — BISACODYL 10 MG
10 SUPPOSITORY, RECTAL RECTAL DAILY PRN
Status: DISCONTINUED | OUTPATIENT
Start: 2023-05-16 | End: 2023-05-17

## 2023-05-16 RX ORDER — AMOXICILLIN 250 MG
1 CAPSULE ORAL 2 TIMES DAILY PRN
Status: DISCONTINUED | OUTPATIENT
Start: 2023-05-16 | End: 2023-05-19 | Stop reason: HOSPADM

## 2023-05-16 RX ORDER — LEVALBUTEROL INHALATION SOLUTION 1.25 MG/3ML
1.25 SOLUTION RESPIRATORY (INHALATION) EVERY 4 HOURS PRN
Status: DISCONTINUED | OUTPATIENT
Start: 2023-05-16 | End: 2023-05-19 | Stop reason: HOSPADM

## 2023-05-16 RX ORDER — IBUPROFEN 600 MG/1
600 TABLET, FILM COATED ORAL ONCE
Status: COMPLETED | OUTPATIENT
Start: 2023-05-16 | End: 2023-05-16

## 2023-05-16 RX ORDER — MULTIVITAMIN,THERAPEUTIC
1 TABLET ORAL DAILY
COMMUNITY

## 2023-05-16 RX ORDER — ACETAMINOPHEN 500 MG
1000 TABLET ORAL ONCE
Status: COMPLETED | OUTPATIENT
Start: 2023-05-16 | End: 2023-05-16

## 2023-05-16 RX ADMIN — HYDROMORPHONE HYDROCHLORIDE 0.2 MG: 0.2 INJECTION, SOLUTION INTRAMUSCULAR; INTRAVENOUS; SUBCUTANEOUS at 20:50

## 2023-05-16 RX ADMIN — IBUPROFEN 600 MG: 600 TABLET, FILM COATED ORAL at 13:22

## 2023-05-16 RX ADMIN — SODIUM CHLORIDE: 9 INJECTION, SOLUTION INTRAVENOUS at 19:48

## 2023-05-16 RX ADMIN — ACETAMINOPHEN 1000 MG: 500 TABLET ORAL at 13:22

## 2023-05-16 RX ADMIN — OXYCODONE HYDROCHLORIDE 5 MG: 5 TABLET ORAL at 19:26

## 2023-05-16 RX ADMIN — FENTANYL CITRATE 50 MCG: 50 INJECTION, SOLUTION INTRAMUSCULAR; INTRAVENOUS at 16:08

## 2023-05-16 RX ADMIN — CEFDINIR 300 MG: 300 CAPSULE ORAL at 20:46

## 2023-05-16 ASSESSMENT — ENCOUNTER SYMPTOMS
CONFUSION: 1
SORE THROAT: 0
ABDOMINAL PAIN: 0
SHORTNESS OF BREATH: 0
NECK PAIN: 0
COUGH: 1
COUGH: 0
HEADACHES: 0
CHILLS: 0
NUMBNESS: 0
BRUISES/BLEEDS EASILY: 0
DIARRHEA: 0
SPEECH DIFFICULTY: 1
NAUSEA: 0
ARTHRALGIAS: 1
LIGHT-HEADEDNESS: 0
VOMITING: 0
DIZZINESS: 0
BACK PAIN: 0
NECK STIFFNESS: 0
JOINT SWELLING: 0
WOUND: 0
FEVER: 0
WEAKNESS: 0
RHINORRHEA: 0

## 2023-05-16 ASSESSMENT — ACTIVITIES OF DAILY LIVING (ADL)
TOILETING_ISSUES: YES
TRANSFERRING: 1-->ASSISTANCE (EQUIPMENT/PERSON) NEEDED
CONCENTRATING,_REMEMBERING_OR_MAKING_DECISIONS_DIFFICULTY: NO
WALKING_OR_CLIMBING_STAIRS: OTHER (SEE COMMENTS)
EQUIPMENT_CURRENTLY_USED_AT_HOME: WALKER, ROLLING
CHANGE_IN_FUNCTIONAL_STATUS_SINCE_ONSET_OF_CURRENT_ILLNESS/INJURY: YES
DRESSING/BATHING: BATHING DIFFICULTY, ASSISTANCE 1 PERSON
DRESSING/BATHING_DIFFICULTY: YES
TOILETING_ASSISTANCE: TOILETING DIFFICULTY, ASSISTANCE 1 PERSON
DRESS: 0-->ASSISTANCE NEEDED (DEVELOPMENTALLY APPROPRIATE)
TOILETING: 1-->ASSISTANCE (EQUIPMENT/PERSON) NEEDED
DOING_ERRANDS_INDEPENDENTLY_DIFFICULTY: YES
ADLS_ACUITY_SCORE: 41
ADLS_ACUITY_SCORE: 33
ADLS_ACUITY_SCORE: 35
TRANSFERRING: 1-->ASSISTANCE (EQUIPMENT/PERSON) NEEDED (NOT DEVELOPMENTALLY APPROPRIATE)
NUMBER_OF_TIMES_PATIENT_HAS_FALLEN_WITHIN_LAST_SIX_MONTHS: 1
WEAR_GLASSES_OR_BLIND: NO
BATHING: 1-->ASSISTANCE NEEDED
FALL_HISTORY_WITHIN_LAST_SIX_MONTHS: YES
ADLS_ACUITY_SCORE: 35
ADLS_ACUITY_SCORE: 35
DIFFICULTY_EATING/SWALLOWING: NO
DRESS: 1-->ASSISTANCE (EQUIPMENT/PERSON) NEEDED
TOILETING: 1-->ASSISTANCE (EQUIPMENT/PERSON) NEEDED (NOT DEVELOPMENTALLY APPROPRIATE)
WALKING_OR_CLIMBING_STAIRS_DIFFICULTY: YES
ADLS_ACUITY_SCORE: 41

## 2023-05-16 NOTE — ED TRIAGE NOTES
Here yesterday with UTI causing lots of confusion. This morning got out of bed, fell hitting hip. Did not hit head, no LOC. Was not dizzy or light headed. Patient states she thought there was a wall in front of her that she went to lean on but there was no wall and she fell forward on to right hip. Son found her down. Patient reports she was down for about 1 hour. Painful to move right leg, unable to stand. Denies numbness/tingling. Not on thinners.      
Walk in

## 2023-05-16 NOTE — PHARMACY-ADMISSION MEDICATION HISTORY
Pharmacist Admission Medication History    Admission medication history is complete. The information provided in this note is only as accurate as the sources available at the time of the update.    Medication reconciliation/reorder completed by provider prior to medication history? No    Information Source(s): Patient, Family member and CareEverywhere/SureScripts via in-person    Pertinent Information: PTA Spiriva is too costly - consider ipratropium inhaler? Would have to use more often but cost could be lower?    Changes made to PTA medication list:    Added: ibu, ca, vit c    Deleted: nicotine    Changed: none    Medication Affordability:  Not including over the counter (OTC) medications, was there a time in the past 3 months when you did not take your medications as prescribed because of cost?: Yes (with inhaler costs)    Allergies reviewed with patient and updates made in EHR: yes    Medication History Completed By: Lyla Renteria [facility] 5/16/2023 3:07 PM    Prior to Admission medications    Medication Sig Last Dose Taking? Auth Provider Long Term End Date   albuterol (PROAIR HFA/PROVENTIL HFA/VENTOLIN HFA) 108 (90 Base) MCG/ACT inhaler INHALE 1-2 PUFFS INTO THE LUNGS EVERY 6 HOURS AS NEEDED FOR COUGH OR SHORTNESS OF BREATH OR WHEEZE 5/16/2023 at am Yes Denisse Klein MD Yes    alendronate (FOSAMAX) 70 MG tablet TAKE 1 TABLET BY MOUTH EVERY 7 DAYS. TAKE IN THE MORNING ON AN EMPTY STOMACH WITH A FULL GLASS OF WATER 30 MINUTES BEFORE FOOD Past Week at Sat Yes Chasity Castro MD Yes    aspirin (ASA) 81 MG EC tablet Take 1 tablet (81 mg) by mouth daily 5/15/2023 at am Yes Anahi Andersen,      calcium carbonate (OS-PETER) 1500 (600 Ca) MG tablet Take 600 mg by mouth daily 5/15/2023 at am Yes Unknown, Entered By History     cefdinir (OMNICEF) 300 MG capsule Take 1 capsule (300 mg) by mouth 2 times daily for 7 days 5/16/2023 at am Yes Elis Sandhu MD  5/22/23   ibuprofen (ADVIL/MOTRIN) 200 MG tablet Take 200  mg by mouth every 6 hours as needed for pain Unknown at prn Yes Unknown, Entered By History     losartan-hydrochlorothiazide (HYZAAR) 100-25 MG tablet Take 1 tablet by mouth daily 5/15/2023 at am Yes Chasity Castro MD Yes    multivitamin, therapeutic (THERA-VIT) TABS tablet Take 1 tablet by mouth daily 5/15/2023 at am Yes Unknown, Entered By History     omeprazole (PRILOSEC) 20 MG DR capsule TAKE 1 CAPSULE (20 MG) BY MOUTH DAILY 5/15/2023 at am Yes Rachna Benoit, DO     tiotropium (SPIRIVA RESPIMAT) 2.5 MCG/ACT inhaler Inhale 2 puffs into the lungs daily (controller) More than a month at too costly Yes Chasity Castro MD Yes    vitamin C (ASCORBIC ACID) 500 MG tablet Take 500 mg by mouth daily 5/15/2023 at am Yes Unknown, Entered By History     Vitamin D, Cholecalciferol, 25 MCG (1000 UT) TABS Take 1,000 Units by mouth daily 5/15/2023 at am Yes Unknown, Entered By History

## 2023-05-16 NOTE — ED PROVIDER NOTES
EMERGENCY DEPARTMENT ENCOUNTER      NAME: Namita Viera  AGE: 73 year old female  YOB: 1950  MRN: [id number]  EVALUATION DATE & TIME: 5/16/2023 12:35 PM    PCP: Marcel Miller    ED PROVIDER: Eula Moore M.D.        Chief Complaint   Patient presents with     Fall         FINAL IMPRESSION:    1. Hip fracture, right, closed, initial encounter (H)    2. Anemia, unspecified type    3. Acute UTI            MEDICAL DECISION MAKING:    Namita Viera is a 73 year old female with history of HTN, emphysema, and subarachnoid hemorrhage, who presents to the ER with complaints of injuries sustained during a mechanical fall.     Patient was seen in the emergency department yesterday for some confusion and found to have a UTI.  She is better today but was walking around when she went to push against the wall but unfortunate the wall was not actually where she thought it was and fell.  She sustained a right intertrochanteric fracture.  Neurovascular tact distally.  No other signs of traumatic injury and head CT unremarkable.  She denies any neck or back pain.  Plan at this time is admission to the hospital for ongoing antibiotics for UTI and surgical intervention for her hip.  Orthopedics is aware of this patient and the plan is to take her to the OR tomorrow.  She will be made n.p.o. after midnight.    Please see Jeanette Swartz PA-C note for further details.      ED COURSE:  1:59 PM I met with the patient to gather history and perform my exam. ED course and treatment discussed.    3:04 PM  Imaging consistent with a right femoral intertrochanteric fracture.  She is neurovascular tact distally on my examination.  Plan at this time will be discussion with orthopedics, admission to the hospital for likely surgical repair tomorrow.  She is otherwise hemodynamically stable.  Obvious signs of intracranial injury and patient denies hitting her head.    4:11 PM  She has been accepted to the  hospital for her hip fracture.  Orthopedics is aware and plans to take her to the OR tomorrow.  She will be made n.p.o. after midnight.  At this time reports that her pain is controlled.  Please see Jeanette Swartz PA-C note for further details.    COVID-19 PPE worn during patient evaluation:  Mask: surgical  Eye Protection: none   Gown: none   Hair cover: yes  Face shield: none  Patient wearing a mask: none      At the conclusion of the encounter the results of all of the tests and the disposition were discussed. Their questions were answered. The patient (and any family present) acknowledged understanding and were agreeable with the care plan.    CONSULTANTS:  [facility] - see Jeanette WHALEN note for details        MEDICATIONS GIVEN IN THE EMERGENCY:  Medications   ibuprofen (ADVIL/MOTRIN) tablet 600 mg (600 mg Oral $Given 5/16/23 1322)   acetaminophen (TYLENOL) tablet 1,000 mg (1,000 mg Oral $Given 5/16/23 1322)   fentaNYL (PF) (SUBLIMAZE) injection 50 mcg (50 mcg Intravenous $Given 5/16/23 1608)           NEW PRESCRIPTIONS STARTED AT TODAY'S ER VISIT:     Medication List      ASK your doctor about these medications    losartan-hydrochlorothiazide 100-25 MG tablet  Commonly known as: HYZAAR  1 tablet, Oral, DAILY  Ask about: Which instructions should I use?     multivitamin, therapeutic Tabs tablet  Ask about: Which instructions should I use?                CONDITION:  stable        DISPOSITION:  Med surg admission         =================================================================  =================================================================  TRIAGE ASSESSMENT:  Here yesterday with UTI causing lots of confusion. This morning got out of bed, fell hitting hip. Did not hit head, no LOC. Was not dizzy or light headed. Patient states she thought there was a wall in front of her that she went to lean on but there was no wall and she fell forward on to right hip. Son found her down. Patient reports she was down  "for about 1 hour. Painful to move right leg, unable to stand. Denies numbness/tingling. Not on thinners.      ED Triage Vitals   Enc Vitals Group      BP 05/16/23 1233 138/61      Pulse 05/16/23 1233 100      Resp 05/16/23 1233 24      Temp 05/16/23 1235 98.2  F (36.8  C)      Temp src 05/16/23 1235 Oral      SpO2 05/16/23 1233 98 %        ================================================================  ================================================================          I am seeing this patient along with Jeanette Swartz PAC    I, Eula Moore MD have reviewed the documentation, personally taken the patient's history, performed an exam and agree with the physical findings, diagnosis and management plan.      HPI    Patient information was obtained from: the patient    Use of Intrepreter: N/A      Namita Viera is a 73 year old female with history of HTN, emphysema, and subarachnoid hemorrhage, who presents to the ER with complaints of injuries sustained during a mechanical fall.     The patient was getting out of bed this morning, when she had an unwitnessed fall after \"reaching for a wall that was not there.\" She denies lightheadedness, dizziness, or heart palpitations prior to the fall. The patient reports landing on her right hip and knee onto a carpeted floor. She did not hit her head or lose consciousness. The patient states that it is harder for her to find the \"right words,\" but not more so than usual. No pain medications prior to ED arrival. Patient has a personal history of left hip surgery in 2008. She is currently on antibiotics for a UTI. She states that her symptoms have improved since starting this course of antibiotics.    Patient was seen in the emergency department yesterday found to have some mild hyponatremia UTI.  She was being seen for confusion.  Ultimately sent home with antibiotics.  This morning then had this mechanical fall.  Patient is alert and oriented at this time " and states she did not hit her head but landed on the right hip.  Complaining of severe right lateral hip pain.  She states she did not feel dizzy or lightheaded associated with the fall.  She did not lose consciousness before or after the fall.    Family states that she still has a little bit confusion but is deftly better than yesterday.  She has been taking her antibiotics as directed.    REVIEW OF SYSTEMS  Review of Systems   Constitutional: Negative for fever.   Respiratory: Negative for cough and shortness of breath.    Cardiovascular: Negative for chest pain.   Gastrointestinal: Negative for abdominal pain, diarrhea, nausea and vomiting.   Musculoskeletal: Negative for back pain and neck stiffness.        +right hip pain    Allergic/Immunologic: Negative for immunocompromised state.   Neurological: Negative for dizziness, weakness, light-headedness, numbness and headaches.   All other systems reviewed and are negative.        PAST MEDICAL HISTORY:  Past Medical History:   Diagnosis Date     Acid reflux      Alcohol abuse      Alcohol use      Closed fracture of left femur (H)      Closed fracture of sacrum with routine healing 10/12/2021     Emphysema of lung (H) 2/7/2022     Hip fracture requiring operative repair (H)      Hypertension Jan 2021     Rib pain on left side 5/6/2021     SAH (subarachnoid hemorrhage) (H)      Traumatic closed displaced fracture of distal end of radius          PAST SURGICAL HISTORY:  Past Surgical History:   Procedure Laterality Date     BLADDER SUSPENSION  2008     HIP FRACTURE SURGERY Left      HIP PINNING Left 5/2/2018    Procedure: INTERNAL FIXATION, FRACTURE, TROCHANTERIC, LEFT HIP, USING NAILS;  Surgeon: Adria Lambert MD;  Location: [facility];  Service:      HYSTERECTOMY TOTAL ABDOMINAL, BILATERAL SALPINGO-OOPHORECTOMY, COMBINED  2008     ORTHOPEDIC SURGERY  2018     OTHER SURGICAL HISTORY  2008    hysterctomy         CURRENT MEDICATIONS:    Prior to Admission  medications    Medication Sig Start Date End Date Taking? Authorizing Provider   acetaminophen (TYLENOL) 500 MG tablet Take 2 tablets (1,000 mg) by mouth 3 times daily as needed for mild pain 11/10/21   Anahi Andersen DO   albuterol (PROAIR HFA/PROVENTIL HFA/VENTOLIN HFA) 108 (90 Base) MCG/ACT inhaler INHALE 1-2 PUFFS INTO THE LUNGS EVERY 6 HOURS AS NEEDED FOR COUGH OR SHORTNESS OF BREATH OR WHEEZE 2/27/23   Denisse Klein MD   alendronate (FOSAMAX) 70 MG tablet TAKE 1 TABLET BY MOUTH EVERY 7 DAYS. TAKE IN THE MORNING ON AN EMPTY STOMACH WITH A FULL GLASS OF WATER 30 MINUTES BEFORE FOOD 7/15/22   Chasity Castro MD   aspirin (ASA) 81 MG EC tablet Take 1 tablet (81 mg) by mouth daily 11/10/21   Anahi Andersen DO   cefdinir (OMNICEF) 300 MG capsule Take 1 capsule (300 mg) by mouth 2 times daily for 7 days 5/15/23 5/22/23  Elis Sandhu MD   losartan-hydrochlorothiazide (HYZAAR) 100-12.5 MG tablet Take 1 tablet by mouth daily 2/4/22   Chasity Castro MD   losartan-hydrochlorothiazide (HYZAAR) 100-25 MG tablet Take 1 tablet by mouth daily 9/22/22   Chasity Castro MD   Multiple Vitamin (MULTI-VITAMIN DAILY PO)   multi vitamin, 0 Refill(s), Type: Maintenance  Patient not taking: Reported on 9/6/2022 12/6/21   Reported, Patient   nicotine (NICODERM CQ) 21 MG/24HR 24 hr patch Place 1 patch onto the skin every 24 hours 11/17/22   Chasity Castro MD   omeprazole (PRILOSEC) 20 MG DR capsule TAKE 1 CAPSULE (20 MG) BY MOUTH DAILY 1/20/23   Rachna Benoit,    tiotropium (SPIRIVA RESPIMAT) 2.5 MCG/ACT inhaler Inhale 2 puffs into the lungs daily (controller) 9/6/22   Chasity Castro MD   Vitamin D, Cholecalciferol, 25 MCG (1000 UT) TABS Take 1,000 Units by mouth daily  Patient not taking: Reported on 9/6/2022    Unknown, Entered By History         ALLERGIES:  No Known Allergies      FAMILY HISTORY:  Family History   Problem Relation Age of Onset     Esophageal Cancer Mother      Cancer Mother       Rectal Cancer Sister      Rheumatoid Arthritis Brother      Cancer Sister         lump in neck.          SOCIAL HISTORY:  Social History     Socioeconomic History     Marital status:    Tobacco Use     Smoking status: Every Day     Packs/day: 1.00     Years: 60.00     Pack years: 60.00     Types: Cigarettes     Start date: 1962     Last attempt to quit: 2018     Years since quittin.9     Smokeless tobacco: Never   Substance and Sexual Activity     Alcohol use: Yes     Alcohol/week: 14.0 standard drinks of alcohol     Comment: significantly decreased alcohol consumption in past 6 months     Drug use: No     Sexual activity: Not Currently   Other Topics Concern     Parent/sibling w/ CABG, MI or angioplasty before 65F 55M? No   Social History Narrative    Lives with . Moved in with oldest son and family ( 10 kids0.    Lizet Broderick MD  10/18/2018             VITALS:  Patient Vitals for the past 24 hrs:   BP Temp Temp src Pulse Resp SpO2   23 1500 132/64 -- -- 93 -- 97 %   23 1235 -- 98.2  F (36.8  C) Oral -- -- --   23 1233 138/61 -- -- 100 24 98 %       Wt Readings from Last 3 Encounters:   05/15/23 43.9 kg (96 lb 12.5 oz)   22 41.9 kg (92 lb 6.4 oz)   22 39.2 kg (86 lb 6.4 oz)       CrCl cannot be calculated (Unknown ideal weight.).      PHYSICAL EXAM    Constitutional:  Well developed, Well nourished, NAD, GCS 15  HENT:  Normocephalic, Atraumatic, Bilateral external ears normal, Nose normal. Neck- Supple, No stridor.   Eyes:  PERRL, EOMI, Conjunctiva normal, No discharge.  Respiratory:  Normal breath sounds, No respiratory distress, No wheezing, Speaks full sentences easily. No cough.   Cardiovascular:  Normal heart rate, Regular rhythm, No rubs, No gallops. Chest wall nontender.   GI:  No excessive obesity.  Bowel sounds normal, Soft, No tenderness, No masses, No flank tenderness. No rebound or guarding.   : deferred  Musculoskeletal: 2+ DP pulses.  No edema. No cyanosis, No clubbing. No major deformities noted. No tenderness of the CTLS spine. + Tenderness to right lateral hip with palpation.  Increased pain with any type of movement.  Patient holds flexion at the knee and internally rotated slightly due to choice.  Integument:  Warm, Dry, No erythema, No rash.  No petechiae.  Neurologic:  Alert & oriented x 3, Normal motor function, Normal sensory function, No focal deficits noted.   Psychiatric:  Affect normal, Cooperative            LAB:  All pertinent labs reviewed and interpreted.  Recent Results (from the past 24 hour(s))   Basic metabolic panel    Collection Time: 05/16/23  3:02 PM   Result Value Ref Range    Sodium 134 (L) 136 - 145 mmol/L    Potassium 3.8 3.4 - 5.3 mmol/L    Chloride 97 (L) 98 - 107 mmol/L    Carbon Dioxide (CO2) 22 22 - 29 mmol/L    Anion Gap 15 7 - 15 mmol/L    Urea Nitrogen 30.8 (H) 8.0 - 23.0 mg/dL    Creatinine 0.92 0.51 - 0.95 mg/dL    Calcium 9.6 8.8 - 10.2 mg/dL    Glucose 111 (H) 70 - 99 mg/dL    GFR Estimate 65 >60 mL/min/1.73m2   CBC with platelets and differential    Collection Time: 05/16/23  3:02 PM   Result Value Ref Range    WBC Count 9.9 4.0 - 11.0 10e3/uL    RBC Count 2.97 (L) 3.80 - 5.20 10e6/uL    Hemoglobin 10.2 (L) 11.7 - 15.7 g/dL    Hematocrit 29.3 (L) 35.0 - 47.0 %    MCV 99 78 - 100 fL    MCH 34.3 (H) 26.5 - 33.0 pg    MCHC 34.8 31.5 - 36.5 g/dL    RDW 13.3 10.0 - 15.0 %    Platelet Count 235 150 - 450 10e3/uL    % Neutrophils 88 %    % Lymphocytes 5 %    % Monocytes 6 %    % Eosinophils 0 %    % Basophils 0 %    % Immature Granulocytes 1 %    NRBCs per 100 WBC 0 <1 /100    Absolute Neutrophils 8.8 (H) 1.6 - 8.3 10e3/uL    Absolute Lymphocytes 0.5 (L) 0.8 - 5.3 10e3/uL    Absolute Monocytes 0.6 0.0 - 1.3 10e3/uL    Absolute Eosinophils 0.0 0.0 - 0.7 10e3/uL    Absolute Basophils 0.0 0.0 - 0.2 10e3/uL    Absolute Immature Granulocytes 0.1 <=0.4 10e3/uL    Absolute NRBCs 0.0 10e3/uL       Lab Results    Component Value Date    ABORH O POS 04/30/2018           RADIOLOGY:  Reviewed all pertinent imaging. Please see official radiology report.    Lumbar spine CT w/o contrast   Final Result   IMPRESSION:   1.  No fracture or posttraumatic subluxation.   2.  No high-grade spinal canal or neural foraminal stenosis.   3.  Old, healed sacral fractures.      XR Femur Right 2 Views   Final Result   IMPRESSION:    Right femur: Acute displaced intertrochanteric proximal right femur fracture, extending into the subtrochanteric region. There is mild superolateral and posterior displacement of main distal fragment and varus angulation.       Pelvis: No additional acute fracture. Stable old internal fixation of healed proximal left femur fracture. Diffuse bony demineralization. Degenerative changes in the spine.      XR Pelvis 1/2 Views   Final Result   IMPRESSION:    Right femur: Acute displaced intertrochanteric proximal right femur fracture, extending into the subtrochanteric region. There is mild superolateral and posterior displacement of main distal fragment and varus angulation.       Pelvis: No additional acute fracture. Stable old internal fixation of healed proximal left femur fracture. Diffuse bony demineralization. Degenerative changes in the spine.      Head CT w/o contrast   Final Result   IMPRESSION:   1.  No intracranial hemorrhage, mass lesions, hydrocephalus or CT evidence for an acute infarct.   2.  Mild diffuse cerebral parenchymal volume loss. Presumed chronic hypertensive/microvascular ischemic white matter changes.      XR Knee Right 3 Views    (Results Pending)         EKG:    none    PROCEDURES:  none    Medical Decision Making    History:    Supplemental history from: Documented in chart, if applicable and Family Member/Significant Other    External Record(s) reviewed: Documented in chart, if applicable.    Work Up:    Chart documentation includes differential considered and any EKGs or imaging  independently interpreted by provider, where specified.    In additional to work up documented, I considered the following work up: Documented in chart, if applicable.    External consultation:    Discussion of management with another provider: Documented in chart, if applicable    Complicating factors:    Care impacted by chronic illness: N/A    Care affected by social determinants of health: N/A    Disposition considerations: Admit.          I personally saw the patient and performed a substantive portion of the visit including all aspects of the medical decision making.      I, Cynthia Quinn, am serving as a scribe to document services personally performed by Dr. Eula Moore based on my observation and the provider's statements to me. I, Dr. Eula Moore MD attest that Cynthai Quinn is acting in a scribe capacity, has observed my performance of the services and has documented them in accordance with my direction.        Eula Moore M.D. [facility]  Emergency Medicine and Medical Toxicology  Formerly [facility] EMERGENCY DEPARTMENT  [facility] [facility] [address]  [phone]  Dept: [phone]         Eula Moore MD  05/16/23 1018

## 2023-05-16 NOTE — ED PROVIDER NOTES
EMERGENCY DEPARTMENT ENCOUNTER      NAME: Namita Viera  AGE: 73 year old female  YOB: 1950  MRN: [id number]  EVALUATION DATE & TIME: 2023 12:35 PM    PCP: System, Provider Not In    ED PROVIDER: Jeanette Swartz PA-C      Chief Complaint   Patient presents with     Fall         FINAL IMPRESSION:  1. Hip fracture, right, closed, initial encounter (H)    2. Anemia, unspecified type    3. Acute UTI          ED COURSE & MEDICAL DECISION MAKIN:44 PM I met with patient for initial interview and encounter. We also discussed plan for treatment and diagnostic interventions.   1:38 PM Staffed patient with Dr. Moore.  3:33 PM Spoke to Dr. White with [facility] regarding patient's plan for care.   4:00 PM Spoke with hospitalist, Dr. Garcia, who kindly accepts the admission.    Pertinent Labs & Imaging studies reviewed. (See chart for details)  73 year old female presents to the Emergency Department for evaluation of right hip and knee pain s/p mechanical fall earlier this afternoon. Per chart review, patient was evaluated in the ED yesterday secondary to confusion, found to have UTI as well as new hyponatremia, and discharged on oral antibiotic, urine culture still pending. Since then, patient's family reports confusion has improved, but still not back to baseline completely. Upon exam, patient is afebrile, hemodynamically stable, and in no acute distress. Lateral right hip tenderness to palpation with hip flexed at 90 degrees secondary to pain. Mild medial and lateral joint line tenderness to palpation of knee without obvious effusion or skin abnormalities. Neurovascularly intact distally. Differential diagnosis includes but not limited to fracture, dislocation, strain, contusion, osteoarthritic flare. Based on patient's presenting symptoms, laboratories and imaging were ordered.    CT head negative for acute findings. CT lumbar spine negative. XR revealed acute displaced intertrochanteric  proximal right femur fracture, extending into the subtrochanteric region. CBC revealed new normocytic anemia at 10.2, decreased from 12.7 yesterday. BMP revealed hyponatremia at 134, but much improved from 126 yesterday.     Patient was treated with ibuprofen and tylenol upon arrival with moderate improvement in symptoms. Patient later requesting stronger pain relief and was given Fentanyl with improvement. Patient was made aware of the above findings. Discussed findings with orthopedics who recommend admission and will plan for surgery in the AM. I spoke with Dr. Garcia, hospitalist, who kindly accepts the admission. The patient was stable throughout ER visit and upon transfer to the floor.    Medical Decision Making    History:    Supplemental history from: Documented in chart, if applicable and Family Member/Significant Other    External Record(s) reviewed: Documented in chart, if applicable.    Work Up:    Chart documentation includes differential considered and any EKGs or imaging independently interpreted by provider, where specified.    In additional to work up documented, I considered the following work up: Documented in chart, if applicable.    External consultation:    Discussion of management with another provider: Hospitalist and Orthopedics    Complicating factors:    Care impacted by chronic illness: Chronic Lung Disease, Hypertension and Smoking / Nicotine Use    Care affected by social determinants of health: Access to Medical Care, Access to Affordable Health Care and Alcohol Abuse and/or Recreational Drug Use    Disposition considerations: Admit.    MEDICATIONS GIVEN IN THE EMERGENCY:  Medications   lidocaine 1 % 0.1-1 mL (has no administration in time range)   lidocaine (LMX4) cream (has no administration in time range)   sodium chloride (PF) 0.9% PF flush 3 mL (has no administration in time range)   sodium chloride (PF) 0.9% PF flush 3 mL (has no administration in time range)   acetaminophen  (TYLENOL) tablet 650 mg (has no administration in time range)     Or   acetaminophen (TYLENOL) Suppository 650 mg (has no administration in time range)   melatonin tablet 1 mg (has no administration in time range)   senna-docusate (SENOKOT-S/PERICOLACE) 8.6-50 MG per tablet 1 tablet (has no administration in time range)     Or   senna-docusate (SENOKOT-S/PERICOLACE) 8.6-50 MG per tablet 2 tablet (has no administration in time range)   polyethylene glycol (MIRALAX) Packet 17 g (has no administration in time range)   bisacodyl (DULCOLAX) suppository 10 mg (has no administration in time range)   ondansetron (ZOFRAN ODT) ODT tab 4 mg (has no administration in time range)     Or   ondansetron (ZOFRAN) injection 4 mg (has no administration in time range)   oxyCODONE (ROXICODONE) tablet 5 mg (has no administration in time range)   HYDROmorphone (DILAUDID) injection 0.2-0.3 mg (has no administration in time range)   ibuprofen (ADVIL/MOTRIN) tablet 600 mg (600 mg Oral $Given 5/16/23 1322)   acetaminophen (TYLENOL) tablet 1,000 mg (1,000 mg Oral $Given 5/16/23 1322)   fentaNYL (PF) (SUBLIMAZE) injection 50 mcg (50 mcg Intravenous $Given 5/16/23 1608)       NEW PRESCRIPTIONS STARTED AT TODAY'S ER VISIT  New Prescriptions    No medications on file          =================================================================    HPI    Patient information was obtained from: Patient, Son, Daughter     Use of : N/A      Namita Viera is a 73 year old female with a pertinent history of SAH, osteopenia with left sided pelvic fracture, EtOH and tobacco dependence, emphysema of lung, and HTN who presents to this ED by private car for evaluation of a mechanical fall.    Per chart review, patient was seen at this ED yesterday for evaluation of dysuria and confusion. CBC and BMP notable for hyponatremia of 126. UA was nitrite positive. Given a dose of Rocephin while in the ED, but discharged in stable condition with outpatient  "follow-up instructions and cefdinir.     Patient reports that she was getting out of bed this morning when she had an unwitnessed fall due to \"reaching for a wall that was not there.\" She denies any lightheadedness, dizziness, or heart palpitations preceding the fall. Patient states that she landed on her right hip and knee onto a carpeted floor. She did not hit her head nor lose consciousness. Patient reports that it is harder for her to find the \"right words,\" but not more so than usual. No pain medications prior to arrival. Of note, patient underwent left hip surgery in 2008. She notes some mild improvement from her antibiotic course as well.     At present, patient denies any extremities numbness or tingling, headache, changes to vision, chest pain, shortness of breath, neck pain, back pain, worsening wheezing or cough, fever, chills, congestion, runny nose, or sore throat.    Per son and daughter, they note that patient's confusion was \"through the roof\" yesterday secondary to the UTI. However, at present, they note that the confusion has somewhat improved.     REVIEW OF SYSTEMS   Review of Systems   Constitutional: Negative for chills and fever.   HENT: Negative for congestion, rhinorrhea and sore throat.    Eyes: Negative for visual disturbance.   Respiratory: Positive for cough (at baseline). Negative for shortness of breath.    Cardiovascular: Negative for chest pain.   Musculoskeletal: Positive for arthralgias (right hip & knee). Negative for back pain, joint swelling and neck pain.   Skin: Negative for rash and wound.   Neurological: Positive for speech difficulty (\"harder to find the right words,\" at baseline). Negative for weakness, numbness and headaches.   Hematological: Does not bruise/bleed easily.   Psychiatric/Behavioral: Positive for confusion (per family, better today than yesterday).       PAST MEDICAL HISTORY:  Past Medical History:   Diagnosis Date     Acid reflux      Alcohol abuse      " Alcohol use      Closed fracture of left femur (H)      Closed fracture of sacrum with routine healing 10/12/2021     Emphysema of lung (H) 2/7/2022     Hip fracture requiring operative repair (H)      Hypertension Jan 2021     Rib pain on left side 5/6/2021     SAH (subarachnoid hemorrhage) (H)      Traumatic closed displaced fracture of distal end of radius        PAST SURGICAL HISTORY:  Past Surgical History:   Procedure Laterality Date     BLADDER SUSPENSION  2008     HIP FRACTURE SURGERY Left      HIP PINNING Left 5/2/2018    Procedure: INTERNAL FIXATION, FRACTURE, TROCHANTERIC, LEFT HIP, USING NAILS;  Surgeon: Adria Lambert MD;  Location: [facility];  Service:      HYSTERECTOMY TOTAL ABDOMINAL, BILATERAL SALPINGO-OOPHORECTOMY, COMBINED  2008     ORTHOPEDIC SURGERY  2018     OTHER SURGICAL HISTORY  2008    hysterctomy       CURRENT MEDICATIONS:    albuterol (PROAIR HFA/PROVENTIL HFA/VENTOLIN HFA) 108 (90 Base) MCG/ACT inhaler  alendronate (FOSAMAX) 70 MG tablet  aspirin (ASA) 81 MG EC tablet  calcium carbonate (OS-PETER) 1500 (600 Ca) MG tablet  cefdinir (OMNICEF) 300 MG capsule  ibuprofen (ADVIL/MOTRIN) 200 MG tablet  losartan-hydrochlorothiazide (HYZAAR) 100-25 MG tablet  multivitamin, therapeutic (THERA-VIT) TABS tablet  omeprazole (PRILOSEC) 20 MG DR capsule  tiotropium (SPIRIVA RESPIMAT) 2.5 MCG/ACT inhaler  vitamin C (ASCORBIC ACID) 500 MG tablet  Vitamin D, Cholecalciferol, 25 MCG (1000 UT) TABS        ALLERGIES:  No Known Allergies    FAMILY HISTORY:  Family History   Problem Relation Age of Onset     Esophageal Cancer Mother      Cancer Mother      Rectal Cancer Sister      Rheumatoid Arthritis Brother      Cancer Sister         lump in neck.        SOCIAL HISTORY:   Social History     Socioeconomic History     Marital status:    Tobacco Use     Smoking status: Every Day     Packs/day: 1.00     Years: 60.00     Pack years: 60.00     Types: Cigarettes     Start date: 1/1/1962     Last  attempt to quit: 2018     Years since quittin.9     Smokeless tobacco: Never   Substance and Sexual Activity     Alcohol use: Yes     Alcohol/week: 14.0 standard drinks of alcohol     Comment: significantly decreased alcohol consumption in past 6 months     Drug use: No     Sexual activity: Not Currently   Other Topics Concern     Parent/sibling w/ CABG, MI or angioplasty before 65F 55M? No   Social History Narrative    Lives with . Moved in with oldest son and family ( 10 kids0.    Lizet Broderick MD  10/18/2018           VITALS:  /57   Pulse 91   Temp 98.2  F (36.8  C) (Oral)   Resp 24   SpO2 90%     PHYSICAL EXAM    Constitutional:  Alert, in no acute distress. Cooperative.  EYES: Conjunctivae clear.   HENT:  Atraumatic, normocephalic.  Respiratory:  Respirations even, unlabored, in no acute respiratory distress. Lungs clear to auscultation bilaterally without wheeze, rhonchi, or rales. No cough. Speaks in full sentences easily.  Cardiovascular:  Regular rate and rhythm, good peripheral perfusion. No peripheral edema. No chest wall tenderness.  GI: Soft, flat, non-distended. No tenderness to palpation. No guarding, rebound, or other peritoneal signs.  Musculoskeletal: Right lower extremity: tenderness to palpation over lateral hip and greater trochanter. Medial and lateral joint line tenderness of right knee without obvious joint effusion. No overlying erythema, warmth, purulence, fluctuance, edema, ecchymosis, crepitus, or other skin changes or bony deformity. Hip held in 90 degrees flexion secondary to pain, increased pain with hip extension. ROM at knee limited secondary to pain in hip. Neurovascularly intact distally. Cap refill <2 seconds. 2+ posterior tibialis pulses bilaterally. Compartments soft.   Integument: Warm, Dry. No rash to visualized skin.   Neurologic:  Alert & oriented x4. No focal deficits noted.   Psych: Normal mood and affect.      LAB:  All pertinent labs  reviewed and interpreted.  Results for orders placed or performed during the hospital encounter of 05/16/23   Head CT w/o contrast    Impression    IMPRESSION:  1.  No intracranial hemorrhage, mass lesions, hydrocephalus or CT evidence for an acute infarct.  2.  Mild diffuse cerebral parenchymal volume loss. Presumed chronic hypertensive/microvascular ischemic white matter changes.   Lumbar spine CT w/o contrast    Impression    IMPRESSION:  1.  No fracture or posttraumatic subluxation.  2.  No high-grade spinal canal or neural foraminal stenosis.  3.  Old, healed sacral fractures.   XR Femur Right 2 Views    Impression    IMPRESSION:   Right femur: Acute displaced intertrochanteric proximal right femur fracture, extending into the subtrochanteric region. There is mild superolateral and posterior displacement of main distal fragment and varus angulation.     Pelvis: No additional acute fracture. Stable old internal fixation of healed proximal left femur fracture. Diffuse bony demineralization. Degenerative changes in the spine.   XR Pelvis 1/2 Views    Impression    IMPRESSION:   Right femur: Acute displaced intertrochanteric proximal right femur fracture, extending into the subtrochanteric region. There is mild superolateral and posterior displacement of main distal fragment and varus angulation.     Pelvis: No additional acute fracture. Stable old internal fixation of healed proximal left femur fracture. Diffuse bony demineralization. Degenerative changes in the spine.   XR Knee Right 3 Views    Impression    IMPRESSION:  1.  No evidence of fracture or joint malalignment.  2.  No significant joint effusion.  3.  Bone demineralization.   Basic metabolic panel   Result Value Ref Range    Sodium 134 (L) 136 - 145 mmol/L    Potassium 3.8 3.4 - 5.3 mmol/L    Chloride 97 (L) 98 - 107 mmol/L    Carbon Dioxide (CO2) 22 22 - 29 mmol/L    Anion Gap 15 7 - 15 mmol/L    Urea Nitrogen 30.8 (H) 8.0 - 23.0 mg/dL    Creatinine  0.92 0.51 - 0.95 mg/dL    Calcium 9.6 8.8 - 10.2 mg/dL    Glucose 111 (H) 70 - 99 mg/dL    GFR Estimate 65 >60 mL/min/1.73m2   CBC with platelets and differential   Result Value Ref Range    WBC Count 9.9 4.0 - 11.0 10e3/uL    RBC Count 2.97 (L) 3.80 - 5.20 10e6/uL    Hemoglobin 10.2 (L) 11.7 - 15.7 g/dL    Hematocrit 29.3 (L) 35.0 - 47.0 %    MCV 99 78 - 100 fL    MCH 34.3 (H) 26.5 - 33.0 pg    MCHC 34.8 31.5 - 36.5 g/dL    RDW 13.3 10.0 - 15.0 %    Platelet Count 235 150 - 450 10e3/uL    % Neutrophils 88 %    % Lymphocytes 5 %    % Monocytes 6 %    % Eosinophils 0 %    % Basophils 0 %    % Immature Granulocytes 1 %    NRBCs per 100 WBC 0 <1 /100    Absolute Neutrophils 8.8 (H) 1.6 - 8.3 10e3/uL    Absolute Lymphocytes 0.5 (L) 0.8 - 5.3 10e3/uL    Absolute Monocytes 0.6 0.0 - 1.3 10e3/uL    Absolute Eosinophils 0.0 0.0 - 0.7 10e3/uL    Absolute Basophils 0.0 0.0 - 0.2 10e3/uL    Absolute Immature Granulocytes 0.1 <=0.4 10e3/uL    Absolute NRBCs 0.0 10e3/uL       RADIOLOGY:  Reviewed all pertinent imaging. Please see official radiology report.  XR Knee Right 3 Views   Final Result   IMPRESSION:   1.  No evidence of fracture or joint malalignment.   2.  No significant joint effusion.   3.  Bone demineralization.      Lumbar spine CT w/o contrast   Final Result   IMPRESSION:   1.  No fracture or posttraumatic subluxation.   2.  No high-grade spinal canal or neural foraminal stenosis.   3.  Old, healed sacral fractures.      XR Femur Right 2 Views   Final Result   IMPRESSION:    Right femur: Acute displaced intertrochanteric proximal right femur fracture, extending into the subtrochanteric region. There is mild superolateral and posterior displacement of main distal fragment and varus angulation.       Pelvis: No additional acute fracture. Stable old internal fixation of healed proximal left femur fracture. Diffuse bony demineralization. Degenerative changes in the spine.      XR Pelvis 1/2 Views   Final Result    IMPRESSION:    Right femur: Acute displaced intertrochanteric proximal right femur fracture, extending into the subtrochanteric region. There is mild superolateral and posterior displacement of main distal fragment and varus angulation.       Pelvis: No additional acute fracture. Stable old internal fixation of healed proximal left femur fracture. Diffuse bony demineralization. Degenerative changes in the spine.      Head CT w/o contrast   Final Result   IMPRESSION:   1.  No intracranial hemorrhage, mass lesions, hydrocephalus or CT evidence for an acute infarct.   2.  Mild diffuse cerebral parenchymal volume loss. Presumed chronic hypertensive/microvascular ischemic white matter changes.      CT Hip Right w/o Contrast    (Results Pending)       I, Martin Washburn, am serving as a scribe to document services personally performed by Jeanette Swartz PA-C, based on my observation and the provider's statements to me. I, Jeanette Swartz PA-C, attest that Martin Washburn is acting in a scribe capacity, has observed my performance of the services and has documented them in accordance with my direction.      Jeanette Swartz PA-C  [facility] EMERGENCY DEPARTMENT  [address] [place] [id number]  [phone]      Jeanette Swartz PA-C  05/16/23 3325

## 2023-05-16 NOTE — ED NOTES
[facility] ED Handoff Report    ED Chief Complaint: Fall, right hip pain    ED Diagnosis:  (S72.001A) Hip fracture, right, closed, initial encounter (H)  Comment:   Plan:     (D64.9) Anemia, unspecified type  Comment:   Plan:     (N39.0) Acute UTI  Comment:   Plan:        PMH:    Past Medical History:   Diagnosis Date    Acid reflux     Alcohol abuse     Alcohol use     Closed fracture of left femur (H)     Closed fracture of sacrum with routine healing 10/12/2021    Emphysema of lung (H) 2/7/2022    Hip fracture requiring operative repair (H)     Hypertension Jan 2021    Rib pain on left side 5/6/2021    SAH (subarachnoid hemorrhage) (H)     Traumatic closed displaced fracture of distal end of radius         Code Status:  Full Code     Falls Risk: Yes Band: Applied    Current Living Situation/Residence: lives with their son or daughter     Elimination Status: Continent: Yes     Activity Level: Independent at baseline, now 2 assist/bedrest    Patients Preferred Language:  English     Needed: No    Vital Signs:  /57   Pulse 91   Temp 98.2  F (36.8  C) (Oral)   Resp 24   SpO2 90%      Pain Score: 8/10    Is the Patient Confused:  Yes    Last Food or Drink: 05/16/23    Focused Assessment:  Patient presents from home for fall and right hip pain. Patient has had increased confusion, she was seen and treated for UTI yesterday, family report the confusion has improved today but the patient fell this morning landing on her right hip. Patient reports she thought there was a wall to lean on and there. Patient has been alert and oriented x3, ABC's WNL. Family has been at bedside. Patient to be NPO at midnight, plan for surgery tomorrow, [facility].     Tests Performed: Done: Labs and Imaging    Treatments Provided:  Oral medications, IV pain medications,     Family Dynamics/Concerns: No    Family Updated On Visitor Policy: Yes    Plan of Care Communicated to Family: Yes    Who Was Updated about  Plan of Care: Son and Daughter present in ED    Belongings Checklist Done and Signed by Patient: Yes    Medications sent with patient: N/A    Covid: asymptomatic , N/A    Additional Information:     RN: Flora Todd RN   5/16/2023 5:19 PM

## 2023-05-16 NOTE — H&P
[facility]    History and Physical  Hospitalist       Date of Admission:  5/16/2023    Assessment & Plan   73 year old female with past medical hx of COPD and continues to smoke, who presents with fall and right hip pain.     Summary:    Principal Problem:    Right Intertroch Hip Fract   -- Ortho consult, surgery planned tomorrow, NPO at [place]      COPD (chronic obstructive pulmonary disease) (H)      UTI (urinary tract infection)   -- UA on 5/15/23 with 10 WBC's, is on Cefdinir, will continue   -- UC pending        Hyponatremia -- Sodium 126 on 5/15, 134 today   -- will hydrate with NS at 75 ml/hr      Smoker -- continues to smoke 1 PPD, discussed quitting   -- will give info on discharge      Benign essential hypertension      Anemia -- will check hgb in AM       Moderate Alcohol use   -- family aware of this, suggest limit to <=1 drink a day     Plan: as above, discussed with son and daughter.  Will get preop EKG, otherwise medical stable for surgery.  She is at increased risk for pneumonia given her continued smoking, but will encourage good pulm toilet postop to clear her sputum.      DVT Prophylaxis: Pneumatic Compression Devices  Code Status: Full Code    Disposition: Expected discharge in 3-4 days to TCU for rehab    Momo Roach MD  Pager: [phone]  Cell Phone:  [phone]     Primary Care Physician   [facility]    Chief Complaint   Mech fall, right hip pain    History is obtained from Patient    History of Present Illness   73 year old female with pertinent medical history of tobacco abuse, regular alcohol use, emphysema of lung, HTN -- brought to the ER after a fall, and could not get up because of right hip pain.  She was just in the ER yesterday with confusion -- sodium 126, UA with 10 WBC's and treated for a UTI and son says she is better today (confusion resolved).  She did have a left hip fracture in 2018 and did well after ORIF.       In ER, HR  104, afebrile, Hgb 10.2, sodium 134, glucose 111, and Pelvic xray shows an acute displaced intertrochanteric proximal right femur fracture.  Head and lumbar CT's unremarkable.     PAST MEDICAL HISTORY    Past Medical History:   Diagnosis Date     Acid reflux      Alcohol use      Closed fracture of left femur (H)      Closed fracture of sacrum with routine healing 10/12/2021     Emphysema of lung (H) 02/07/2022     Hip fracture requiring operative repair (H)      Hypertension 01/2021     Rib pain on left side 05/06/2021     SAH (subarachnoid hemorrhage) (H)      Traumatic closed displaced fracture of distal end of radius         PAST SURGICAL HISTORY    Past Surgical History:   Procedure Laterality Date     BLADDER SUSPENSION  2008     HYSTERECTOMY TOTAL ABDOMINAL, BILATERAL SALPINGO-OOPHORECTOMY, COMBINED N/A 2008     ORTHOPEDIC SURGERY Left 2018    Left Hip fracture, S/P ORIF        Prior to Admission Medications   Prior to Admission Medications   Prescriptions Last Dose Informant Patient Reported? Taking?   Vitamin D, Cholecalciferol, 25 MCG (1000 UT) TABS 5/15/2023 at am  Yes Yes   Sig: Take 1,000 Units by mouth daily   albuterol (PROAIR HFA/PROVENTIL HFA/VENTOLIN HFA) 108 (90 Base) MCG/ACT inhaler 5/16/2023 at am  No Yes   Sig: INHALE 1-2 PUFFS INTO THE LUNGS EVERY 6 HOURS AS NEEDED FOR COUGH OR SHORTNESS OF BREATH OR WHEEZE   alendronate (FOSAMAX) 70 MG tablet Past Week at Sat  No Yes   Sig: TAKE 1 TABLET BY MOUTH EVERY 7 DAYS. TAKE IN THE MORNING ON AN EMPTY STOMACH WITH A FULL GLASS OF WATER 30 MINUTES BEFORE FOOD   aspirin (ASA) 81 MG EC tablet 5/15/2023 at am  No Yes   Sig: Take 1 tablet (81 mg) by mouth daily   calcium carbonate (OS-PETER) 1500 (600 Ca) MG tablet 5/15/2023 at am  Yes Yes   Sig: Take 600 mg by mouth daily   cefdinir (OMNICEF) 300 MG capsule 5/16/2023 at am  No Yes   Sig: Take 1 capsule (300 mg) by mouth 2 times daily for 7 days   ibuprofen (ADVIL/MOTRIN) 200 MG tablet Unknown at prn  Yes Yes    Sig: Take 200 mg by mouth every 6 hours as needed for pain   losartan-hydrochlorothiazide (HYZAAR) 100-25 MG tablet 5/15/2023 at am  No Yes   Sig: Take 1 tablet by mouth daily   multivitamin, therapeutic (THERA-VIT) TABS tablet 5/15/2023 at am  Yes Yes   Sig: Take 1 tablet by mouth daily   omeprazole (PRILOSEC) 20 MG DR capsule 5/15/2023 at am  No Yes   Sig: TAKE 1 CAPSULE (20 MG) BY MOUTH DAILY   tiotropium (SPIRIVA RESPIMAT) 2.5 MCG/ACT inhaler More than a month at too costly  No Yes   Sig: Inhale 2 puffs into the lungs daily (controller)   vitamin C (ASCORBIC ACID) 500 MG tablet 5/15/2023 at am  Yes Yes   Sig: Take 500 mg by mouth daily      Facility-Administered Medications: None     Allergies   No Known Allergies    SOCIAL HISTORY    Social History     Social History Narrative    , 4 children, lives by herself in an apartment.  Worked as a seamstress and continues to sew.  Is full code.  (last updated 2023)       Social History     Tobacco Use     Smoking status: Every Day     Packs/day: 1.00     Years: 60.00     Pack years: 60.00     Types: Cigarettes     Start date: 1962     Last attempt to quit: 2018     Years since quittin.9     Smokeless tobacco: Never   Substance Use Topics     Alcohol use: Yes     Alcohol/week: 14.0 standard drinks of alcohol     Types: 14 Standard drinks or equivalent per week     Comment: admits to 2 glasses of wine a night     Drug use: No        FAMILY HISTORY    Family History   Problem Relation Age of Onset     Esophageal Cancer Mother      Heart Disease Father      Rectal Cancer Sister      Cancer Sister         lump in neck.      Rheumatoid Arthritis Brother         Review of Systems   The 10 point Review of Systems is negative other than noted in the HPI or here.       PHYSICAL EXAM     Temp: 98.2  F (36.8  C) Temp src: Oral BP: 106/59 Pulse: 104   Resp: 24 SpO2: 96 % O2 Device: None (Room air)    Vital Signs with Ranges  Temp:  [98.2  F (36.8  C)]  98.2  F (36.8  C)  Pulse:  [] 104  Resp:  [24] 24  BP: (106-138)/(57-96) 106/59  SpO2:  [90 %-99 %] 96 %  0 lbs 0 oz    Constitutional: Awake, alert, cooperative, no apparent distress.  Eyes: Conjunctiva and pupils examined and normal.  HEENT: Moist mucous membranes, normal dentition.  Respiratory: has loose cough and slight rhonchi bilaterally  Cardiovascular: Regular rate and rhythm, normal S1 and S2, and no murmur noted, no carotid bruits.  No ankle edema.   GI: Soft, non-distended, non-tender, normal bowel sounds.  Lymph/Hematologic: No anterior cervical, supraclavicular or axillary adenopathy.  Skin: No rashes, no cyanosis.   Musculoskeletal: No joint swelling, erythema or tenderness.  Pain with slight movement of right leg.   Neurologic: Alert, Ox3, Cranial nerves 2-12 intact, no focal weakness or numbness  Psychiatric:  No obvious anxiety or depression.    Data   Data reviewed today:  I personally reviewed the EKG tracing showing pending. .  Recent Labs   Lab 05/16/23  1502 05/15/23  1156 05/15/23  1148   WBC 9.9 5.2  --    HGB 10.2* 12.7  --    MCV 99 95  --     291  --    *  --  126*   POTASSIUM 3.8  --  3.9   CHLORIDE 97*  --  89*   CO2 22  --  23   BUN 30.8*  --  21.9   CR 0.92  --  0.65   ANIONGAP 15  --  14   PETER 9.6  --  10.2   *  --  123*       Imaging:  Recent Results (from the past 24 hour(s))   Head CT w/o contrast    Narrative    EXAM: CT HEAD W/O CONTRAST  LOCATION: [facility]  DATE/TIME: 5/16/2023 2:23 PM CDT    INDICATION: s p mechanical fall, confusion yesterday. Trauma, injury.  COMPARISON: Head CT 10/12/2021  TECHNIQUE: Routine CT Head without IV contrast. Multiplanar reformats. Dose reduction techniques were used.    FINDINGS:  INTRACRANIAL CONTENTS: No intracranial hemorrhage. Mild diffuse cerebral parenchymal volume loss. No midline shift. The basilar cisterns are patent. Mild periventricular and scattered foci of deep white matter  hypodensities involving both cerebral   hemispheres. No CT evidence for an acute infarct.    VISUALIZED ORBITS/SINUSES/MASTOIDS: Prior bilateral cataract surgery. Visualized portions of the orbits are otherwise unremarkable. No paranasal sinus mucosal disease. No middle ear or mastoid effusion.    BONES/SOFT TISSUES: No acute abnormality.      Impression    IMPRESSION:  1.  No intracranial hemorrhage, mass lesions, hydrocephalus or CT evidence for an acute infarct.  2.  Mild diffuse cerebral parenchymal volume loss. Presumed chronic hypertensive/microvascular ischemic white matter changes.   XR Pelvis 1/2 Views    Narrative    EXAM: XR PELVIS 1/2 VIEWS, XR FEMUR RIGHT 2 VIEWS  LOCATION: [facility]  DATE/TIME: 5/16/2023 2:48 PM CDT    INDICATION: s p mechanical fall, lateral hip pain  and  decreased ROM  COMPARISON: 10/04/2021      Impression    IMPRESSION:   Right femur: Acute displaced intertrochanteric proximal right femur fracture, extending into the subtrochanteric region. There is mild superolateral and posterior displacement of main distal fragment and varus angulation.     Pelvis: No additional acute fracture. Stable old internal fixation of healed proximal left femur fracture. Diffuse bony demineralization. Degenerative changes in the spine.   XR Femur Right 2 Views    Narrative    EXAM: XR PELVIS 1/2 VIEWS, XR FEMUR RIGHT 2 VIEWS  LOCATION: [facility]  DATE/TIME: 5/16/2023 2:48 PM CDT    INDICATION: s p mechanical fall, lateral hip pain  and  decreased ROM  COMPARISON: 10/04/2021      Impression    IMPRESSION:   Right femur: Acute displaced intertrochanteric proximal right femur fracture, extending into the subtrochanteric region. There is mild superolateral and posterior displacement of main distal fragment and varus angulation.     Pelvis: No additional acute fracture. Stable old internal fixation of healed proximal left femur fracture. Diffuse bony  demineralization. Degenerative changes in the spine.   Lumbar spine CT w/o contrast    Narrative    EXAM: CT LUMBAR SPINE W/O CONTRAST  LOCATION: [facility]  DATE/TIME: 5/16/2023 2:57 PM CDT    INDICATION: fall wtih pain  COMPARISON: 10/12/2021  TECHNIQUE: Routine CT Lumbar Spine without IV contrast. Multiplanar reformats. Dose reduction techniques were used.     FINDINGS:  VERTEBRA: Normal vertebral body heights and alignment. No acute fracture or posttraumatic subluxation. Chronic, remote appearing fracture deformity of the sacrum.    CANAL/FORAMINA: There is mild disc degenerative change. Moderate lumbar facet arthrosis, advanced at L5-S1. No severe spinal canal or osseous foraminal stenosis.    PARASPINAL: Partially imaged right lower lobe calcification, likely sequela of intracranial atheromatous disease.      Impression    IMPRESSION:  1.  No fracture or posttraumatic subluxation.  2.  No high-grade spinal canal or neural foraminal stenosis.  3.  Old, healed sacral fractures.   XR Knee Right 3 Views    Narrative    EXAM: XR KNEE RIGHT 3 VIEWS  LOCATION: [facility]  DATE/TIME: 5/16/2023 4:19 PM CDT    INDICATION: Right knee pain after a fall.  COMPARISON: None.      Impression    IMPRESSION:  1.  No evidence of fracture or joint malalignment.  2.  No significant joint effusion.  3.  Bone demineralization.

## 2023-05-16 NOTE — TREATMENT PLAN
Treatment Plan for Formal consult to follow:    72 yo female who presents to ED on 5/16 and found to have right intertrochanteric fracture following mechanical fall.    5/16/2023 pelvis and hip xray:  Acute displaced intertrochanteric proximal right femur fracture, extending into the subtrochanteric region. There is mild superolateral and posterior displacement of main distal fragment and varus angulation.     Admit to hospitalist service  - no OR availability tonight. Plan for right femur nail tomorrow @ 1230pm with Dr. Tucker  - medical optimization and documentation per Hospitalist team  - ok with diet, npo at midnight  - hold anticoagulation  - pain control  - formal orthopedic consult in am    Moi White MD  [facility]

## 2023-05-17 ENCOUNTER — ANESTHESIA EVENT (OUTPATIENT)
Dept: SURGERY | Facility: HOSPITAL | Age: 73
DRG: 480 | End: 2023-05-17
Payer: COMMERCIAL

## 2023-05-17 ENCOUNTER — ANESTHESIA (OUTPATIENT)
Dept: SURGERY | Facility: HOSPITAL | Age: 73
DRG: 480 | End: 2023-05-17
Payer: COMMERCIAL

## 2023-05-17 ENCOUNTER — ANCILLARY PROCEDURE (OUTPATIENT)
Dept: ULTRASOUND IMAGING | Facility: HOSPITAL | Age: 73
End: 2023-05-17
Attending: ANESTHESIOLOGY
Payer: COMMERCIAL

## 2023-05-17 ENCOUNTER — APPOINTMENT (OUTPATIENT)
Dept: RADIOLOGY | Facility: HOSPITAL | Age: 73
DRG: 480 | End: 2023-05-17
Attending: STUDENT IN AN ORGANIZED HEALTH CARE EDUCATION/TRAINING PROGRAM
Payer: COMMERCIAL

## 2023-05-17 ENCOUNTER — APPOINTMENT (OUTPATIENT)
Dept: RADIOLOGY | Facility: HOSPITAL | Age: 73
DRG: 480 | End: 2023-05-17
Payer: COMMERCIAL

## 2023-05-17 LAB
ABO/RH(D): NORMAL
ANION GAP SERPL CALCULATED.3IONS-SCNC: 13 MMOL/L (ref 7–15)
ANTIBODY SCREEN: NEGATIVE
BACTERIA UR CULT: ABNORMAL
BLD PROD TYP BPU: NORMAL
BLD PROD TYP BPU: NORMAL
BLOOD COMPONENT TYPE: NORMAL
BLOOD COMPONENT TYPE: NORMAL
BUN SERPL-MCNC: 28.6 MG/DL (ref 8–23)
CALCIUM SERPL-MCNC: 8.7 MG/DL (ref 8.8–10.2)
CHLORIDE SERPL-SCNC: 105 MMOL/L (ref 98–107)
CODING SYSTEM: NORMAL
CODING SYSTEM: NORMAL
CREAT SERPL-MCNC: 0.83 MG/DL (ref 0.51–0.95)
CROSSMATCH: NORMAL
CROSSMATCH: NORMAL
DEPRECATED HCO3 PLAS-SCNC: 20 MMOL/L (ref 22–29)
GFR SERPL CREATININE-BSD FRML MDRD: 73 ML/MIN/1.73M2
GLUCOSE SERPL-MCNC: 90 MG/DL (ref 70–99)
HGB BLD-MCNC: 7.5 G/DL (ref 11.7–15.7)
HGB BLD-MCNC: 7.9 G/DL (ref 11.7–15.7)
HOLD SPECIMEN: NORMAL
ISSUE DATE AND TIME: NORMAL
POTASSIUM SERPL-SCNC: 3.7 MMOL/L (ref 3.4–5.3)
SODIUM SERPL-SCNC: 138 MMOL/L (ref 136–145)
SPECIMEN EXPIRATION DATE: NORMAL
UNIT ABO/RH: NORMAL
UNIT ABO/RH: NORMAL
UNIT NUMBER: NORMAL
UNIT NUMBER: NORMAL
UNIT STATUS: NORMAL
UNIT STATUS: NORMAL
UNIT TYPE ISBT: 5100
UNIT TYPE ISBT: 5100

## 2023-05-17 PROCEDURE — 250N000009 HC RX 250: Performed by: ANESTHESIOLOGY

## 2023-05-17 PROCEDURE — P9045 ALBUMIN (HUMAN), 5%, 250 ML: HCPCS

## 2023-05-17 PROCEDURE — 250N000009 HC RX 250: Performed by: STUDENT IN AN ORGANIZED HEALTH CARE EDUCATION/TRAINING PROGRAM

## 2023-05-17 PROCEDURE — 250N000011 HC RX IP 250 OP 636

## 2023-05-17 PROCEDURE — 85018 HEMOGLOBIN: CPT | Performed by: INTERNAL MEDICINE

## 2023-05-17 PROCEDURE — 36415 COLL VENOUS BLD VENIPUNCTURE: CPT | Performed by: INTERNAL MEDICINE

## 2023-05-17 PROCEDURE — 360N000084 HC SURGERY LEVEL 4 W/ FLUORO, PER MIN: Performed by: STUDENT IN AN ORGANIZED HEALTH CARE EDUCATION/TRAINING PROGRAM

## 2023-05-17 PROCEDURE — 250N000011 HC RX IP 250 OP 636: Performed by: INTERNAL MEDICINE

## 2023-05-17 PROCEDURE — 999N000141 HC STATISTIC PRE-PROCEDURE NURSING ASSESSMENT: Performed by: STUDENT IN AN ORGANIZED HEALTH CARE EDUCATION/TRAINING PROGRAM

## 2023-05-17 PROCEDURE — 258N000003 HC RX IP 258 OP 636: Performed by: ANESTHESIOLOGY

## 2023-05-17 PROCEDURE — 258N000003 HC RX IP 258 OP 636

## 2023-05-17 PROCEDURE — 85018 HEMOGLOBIN: CPT

## 2023-05-17 PROCEDURE — C1713 ANCHOR/SCREW BN/BN,TIS/BN: HCPCS | Performed by: STUDENT IN AN ORGANIZED HEALTH CARE EDUCATION/TRAINING PROGRAM

## 2023-05-17 PROCEDURE — 999N000065 XR PELVIS PORT 1/2 VIEWS

## 2023-05-17 PROCEDURE — 82310 ASSAY OF CALCIUM: CPT | Performed by: INTERNAL MEDICINE

## 2023-05-17 PROCEDURE — 272N000001 HC OR GENERAL SUPPLY STERILE: Performed by: STUDENT IN AN ORGANIZED HEALTH CARE EDUCATION/TRAINING PROGRAM

## 2023-05-17 PROCEDURE — 250N000013 HC RX MED GY IP 250 OP 250 PS 637: Performed by: STUDENT IN AN ORGANIZED HEALTH CARE EDUCATION/TRAINING PROGRAM

## 2023-05-17 PROCEDURE — 250N000011 HC RX IP 250 OP 636: Performed by: STUDENT IN AN ORGANIZED HEALTH CARE EDUCATION/TRAINING PROGRAM

## 2023-05-17 PROCEDURE — 250N000013 HC RX MED GY IP 250 OP 250 PS 637

## 2023-05-17 PROCEDURE — 250N000013 HC RX MED GY IP 250 OP 250 PS 637: Performed by: HOSPITALIST

## 2023-05-17 PROCEDURE — 250N000013 HC RX MED GY IP 250 OP 250 PS 637: Performed by: INTERNAL MEDICINE

## 2023-05-17 PROCEDURE — 120N000001 HC R&B MED SURG/OB

## 2023-05-17 PROCEDURE — 999N000065 XR FEMUR PORT RIGHT 2 VIEWS: Mod: RT

## 2023-05-17 PROCEDURE — C1769 GUIDE WIRE: HCPCS | Performed by: STUDENT IN AN ORGANIZED HEALTH CARE EDUCATION/TRAINING PROGRAM

## 2023-05-17 PROCEDURE — 0QS606Z REPOSITION RIGHT UPPER FEMUR WITH INTRAMEDULLARY INTERNAL FIXATION DEVICE, OPEN APPROACH: ICD-10-PCS | Performed by: STUDENT IN AN ORGANIZED HEALTH CARE EDUCATION/TRAINING PROGRAM

## 2023-05-17 PROCEDURE — 250N000011 HC RX IP 250 OP 636: Performed by: ANESTHESIOLOGY

## 2023-05-17 PROCEDURE — 370N000017 HC ANESTHESIA TECHNICAL FEE, PER MIN: Performed by: STUDENT IN AN ORGANIZED HEALTH CARE EDUCATION/TRAINING PROGRAM

## 2023-05-17 PROCEDURE — 250N000009 HC RX 250

## 2023-05-17 PROCEDURE — 99232 SBSQ HOSP IP/OBS MODERATE 35: CPT | Performed by: HOSPITALIST

## 2023-05-17 PROCEDURE — 36415 COLL VENOUS BLD VENIPUNCTURE: CPT

## 2023-05-17 PROCEDURE — 710N000009 HC RECOVERY PHASE 1, LEVEL 1, PER MIN: Performed by: STUDENT IN AN ORGANIZED HEALTH CARE EDUCATION/TRAINING PROGRAM

## 2023-05-17 PROCEDURE — 999N000180 XR SURGERY CARM FLUORO LESS THAN 5 MIN

## 2023-05-17 DEVICE — 10MM/130 DEG TI CANN TFNA 340MM/RIGHT - STERILE
Type: IMPLANTABLE DEVICE | Site: LEG | Status: FUNCTIONAL
Brand: TFN-ADVANCE

## 2023-05-17 DEVICE — SCREW BN 36MM 5MM LCK X25 IM NL 04.045.036S: Type: IMPLANTABLE DEVICE | Site: LEG | Status: FUNCTIONAL

## 2023-05-17 DEVICE — IMP SCR SYN TFNA FENESTRATED LAG 90MM 04.038.190S: Type: IMPLANTABLE DEVICE | Site: LEG | Status: FUNCTIONAL

## 2023-05-17 RX ORDER — ONDANSETRON 4 MG/1
4 TABLET, ORALLY DISINTEGRATING ORAL EVERY 30 MIN PRN
Status: DISCONTINUED | OUTPATIENT
Start: 2023-05-17 | End: 2023-05-17 | Stop reason: HOSPADM

## 2023-05-17 RX ORDER — ACETAMINOPHEN 325 MG/1
650 TABLET ORAL EVERY 4 HOURS PRN
Status: DISCONTINUED | OUTPATIENT
Start: 2023-05-20 | End: 2023-05-17

## 2023-05-17 RX ORDER — MAGNESIUM HYDROXIDE 1200 MG/15ML
LIQUID ORAL PRN
Status: DISCONTINUED | OUTPATIENT
Start: 2023-05-17 | End: 2023-05-17 | Stop reason: HOSPADM

## 2023-05-17 RX ORDER — FENTANYL CITRATE 50 UG/ML
25 INJECTION, SOLUTION INTRAMUSCULAR; INTRAVENOUS EVERY 5 MIN PRN
Status: DISCONTINUED | OUTPATIENT
Start: 2023-05-17 | End: 2023-05-17 | Stop reason: HOSPADM

## 2023-05-17 RX ORDER — BUPIVACAINE HYDROCHLORIDE AND EPINEPHRINE 5; 5 MG/ML; UG/ML
INJECTION, SOLUTION PERINEURAL
Status: COMPLETED | OUTPATIENT
Start: 2023-05-17 | End: 2023-05-17

## 2023-05-17 RX ORDER — LIDOCAINE 40 MG/G
CREAM TOPICAL
Status: DISCONTINUED | OUTPATIENT
Start: 2023-05-17 | End: 2023-05-19 | Stop reason: HOSPADM

## 2023-05-17 RX ORDER — CEFTRIAXONE 1 G/1
1 INJECTION, POWDER, FOR SOLUTION INTRAMUSCULAR; INTRAVENOUS EVERY 24 HOURS
Status: DISCONTINUED | OUTPATIENT
Start: 2023-05-18 | End: 2023-05-19 | Stop reason: HOSPADM

## 2023-05-17 RX ORDER — SODIUM CHLORIDE, SODIUM LACTATE, POTASSIUM CHLORIDE, CALCIUM CHLORIDE 600; 310; 30; 20 MG/100ML; MG/100ML; MG/100ML; MG/100ML
INJECTION, SOLUTION INTRAVENOUS CONTINUOUS
Status: DISCONTINUED | OUTPATIENT
Start: 2023-05-17 | End: 2023-05-18

## 2023-05-17 RX ORDER — ONDANSETRON 2 MG/ML
4 INJECTION INTRAMUSCULAR; INTRAVENOUS EVERY 6 HOURS PRN
Status: DISCONTINUED | OUTPATIENT
Start: 2023-05-17 | End: 2023-05-17

## 2023-05-17 RX ORDER — ACETAMINOPHEN 325 MG/1
975 TABLET ORAL EVERY 8 HOURS
Status: DISCONTINUED | OUTPATIENT
Start: 2023-05-17 | End: 2023-05-19 | Stop reason: HOSPADM

## 2023-05-17 RX ORDER — TRANEXAMIC ACID 650 MG/1
1950 TABLET ORAL ONCE
Status: COMPLETED | OUTPATIENT
Start: 2023-05-17 | End: 2023-05-17

## 2023-05-17 RX ORDER — LEVALBUTEROL INHALATION SOLUTION 1.25 MG/3ML
1.25 SOLUTION RESPIRATORY (INHALATION) ONCE
Status: COMPLETED | OUTPATIENT
Start: 2023-05-17 | End: 2023-05-17

## 2023-05-17 RX ORDER — NALOXONE HYDROCHLORIDE 0.4 MG/ML
0.4 INJECTION, SOLUTION INTRAMUSCULAR; INTRAVENOUS; SUBCUTANEOUS
Status: DISCONTINUED | OUTPATIENT
Start: 2023-05-17 | End: 2023-05-19 | Stop reason: HOSPADM

## 2023-05-17 RX ORDER — BISACODYL 10 MG
10 SUPPOSITORY, RECTAL RECTAL DAILY PRN
Status: DISCONTINUED | OUTPATIENT
Start: 2023-05-17 | End: 2023-05-19 | Stop reason: HOSPADM

## 2023-05-17 RX ORDER — VASOPRESSIN IN 0.9 % NACL 2 UNIT/2ML
SYRINGE (ML) INTRAVENOUS PRN
Status: DISCONTINUED | OUTPATIENT
Start: 2023-05-17 | End: 2023-05-17

## 2023-05-17 RX ORDER — POLYETHYLENE GLYCOL 3350 17 G/17G
17 POWDER, FOR SOLUTION ORAL DAILY
Status: DISCONTINUED | OUTPATIENT
Start: 2023-05-18 | End: 2023-05-19 | Stop reason: HOSPADM

## 2023-05-17 RX ORDER — OXYCODONE HYDROCHLORIDE 5 MG/1
10 TABLET ORAL EVERY 4 HOURS PRN
Status: DISCONTINUED | OUTPATIENT
Start: 2023-05-17 | End: 2023-05-19 | Stop reason: HOSPADM

## 2023-05-17 RX ORDER — ONDANSETRON 4 MG/1
4 TABLET, ORALLY DISINTEGRATING ORAL EVERY 6 HOURS PRN
Status: DISCONTINUED | OUTPATIENT
Start: 2023-05-17 | End: 2023-05-17

## 2023-05-17 RX ORDER — LORAZEPAM 0.5 MG/1
0.5 TABLET ORAL 3 TIMES DAILY PRN
Status: DISCONTINUED | OUTPATIENT
Start: 2023-05-17 | End: 2023-05-19 | Stop reason: HOSPADM

## 2023-05-17 RX ORDER — NALOXONE HYDROCHLORIDE 0.4 MG/ML
0.2 INJECTION, SOLUTION INTRAMUSCULAR; INTRAVENOUS; SUBCUTANEOUS
Status: DISCONTINUED | OUTPATIENT
Start: 2023-05-17 | End: 2023-05-19 | Stop reason: HOSPADM

## 2023-05-17 RX ORDER — OXYCODONE HYDROCHLORIDE 5 MG/1
5 TABLET ORAL EVERY 4 HOURS PRN
Status: DISCONTINUED | OUTPATIENT
Start: 2023-05-17 | End: 2023-05-19 | Stop reason: HOSPADM

## 2023-05-17 RX ORDER — LIDOCAINE 40 MG/G
CREAM TOPICAL
Status: DISCONTINUED | OUTPATIENT
Start: 2023-05-17 | End: 2023-05-17 | Stop reason: HOSPADM

## 2023-05-17 RX ORDER — PROCHLORPERAZINE MALEATE 5 MG
5 TABLET ORAL EVERY 6 HOURS PRN
Status: DISCONTINUED | OUTPATIENT
Start: 2023-05-17 | End: 2023-05-19 | Stop reason: HOSPADM

## 2023-05-17 RX ORDER — CEFAZOLIN SODIUM/WATER 2 G/20 ML
2 SYRINGE (ML) INTRAVENOUS
Status: COMPLETED | OUTPATIENT
Start: 2023-05-17 | End: 2023-05-17

## 2023-05-17 RX ORDER — PROPOFOL 10 MG/ML
INJECTION, EMULSION INTRAVENOUS CONTINUOUS PRN
Status: DISCONTINUED | OUTPATIENT
Start: 2023-05-17 | End: 2023-05-17

## 2023-05-17 RX ORDER — ONDANSETRON 2 MG/ML
4 INJECTION INTRAMUSCULAR; INTRAVENOUS EVERY 30 MIN PRN
Status: DISCONTINUED | OUTPATIENT
Start: 2023-05-17 | End: 2023-05-17 | Stop reason: HOSPADM

## 2023-05-17 RX ORDER — KETAMINE HYDROCHLORIDE 10 MG/ML
INJECTION INTRAMUSCULAR; INTRAVENOUS PRN
Status: DISCONTINUED | OUTPATIENT
Start: 2023-05-17 | End: 2023-05-17

## 2023-05-17 RX ORDER — SODIUM CHLORIDE, SODIUM LACTATE, POTASSIUM CHLORIDE, CALCIUM CHLORIDE 600; 310; 30; 20 MG/100ML; MG/100ML; MG/100ML; MG/100ML
INJECTION, SOLUTION INTRAVENOUS CONTINUOUS
Status: DISCONTINUED | OUTPATIENT
Start: 2023-05-17 | End: 2023-05-17 | Stop reason: HOSPADM

## 2023-05-17 RX ORDER — FENTANYL CITRATE 50 UG/ML
INJECTION, SOLUTION INTRAMUSCULAR; INTRAVENOUS PRN
Status: DISCONTINUED | OUTPATIENT
Start: 2023-05-17 | End: 2023-05-17

## 2023-05-17 RX ORDER — NICOTINE 21 MG/24HR
1 PATCH, TRANSDERMAL 24 HOURS TRANSDERMAL DAILY
Status: DISCONTINUED | OUTPATIENT
Start: 2023-05-17 | End: 2023-05-19 | Stop reason: HOSPADM

## 2023-05-17 RX ORDER — FENTANYL CITRATE 50 UG/ML
25-100 INJECTION, SOLUTION INTRAMUSCULAR; INTRAVENOUS
Status: DISCONTINUED | OUTPATIENT
Start: 2023-05-17 | End: 2023-05-17 | Stop reason: HOSPADM

## 2023-05-17 RX ORDER — MEPERIDINE HYDROCHLORIDE 25 MG/ML
12.5 INJECTION INTRAMUSCULAR; INTRAVENOUS; SUBCUTANEOUS EVERY 5 MIN PRN
Status: DISCONTINUED | OUTPATIENT
Start: 2023-05-17 | End: 2023-05-17 | Stop reason: HOSPADM

## 2023-05-17 RX ORDER — IPRATROPIUM BROMIDE AND ALBUTEROL SULFATE 2.5; .5 MG/3ML; MG/3ML
3 SOLUTION RESPIRATORY (INHALATION)
Status: DISCONTINUED | OUTPATIENT
Start: 2023-05-17 | End: 2023-05-17 | Stop reason: ALTCHOICE

## 2023-05-17 RX ORDER — CEFAZOLIN SODIUM 1 G/3ML
1 INJECTION, POWDER, FOR SOLUTION INTRAMUSCULAR; INTRAVENOUS EVERY 8 HOURS
Status: COMPLETED | OUTPATIENT
Start: 2023-05-17 | End: 2023-05-18

## 2023-05-17 RX ORDER — BUPIVACAINE HYDROCHLORIDE 7.5 MG/ML
INJECTION, SOLUTION INTRASPINAL
Status: COMPLETED | OUTPATIENT
Start: 2023-05-17 | End: 2023-05-17

## 2023-05-17 RX ORDER — FENTANYL CITRATE 50 UG/ML
50 INJECTION, SOLUTION INTRAMUSCULAR; INTRAVENOUS EVERY 5 MIN PRN
Status: DISCONTINUED | OUTPATIENT
Start: 2023-05-17 | End: 2023-05-17 | Stop reason: HOSPADM

## 2023-05-17 RX ORDER — FOLIC ACID 1 MG/1
1 TABLET ORAL DAILY
Status: DISCONTINUED | OUTPATIENT
Start: 2023-05-17 | End: 2023-05-19 | Stop reason: HOSPADM

## 2023-05-17 RX ORDER — ENOXAPARIN SODIUM 100 MG/ML
40 INJECTION SUBCUTANEOUS EVERY 24 HOURS
Status: DISCONTINUED | OUTPATIENT
Start: 2023-05-18 | End: 2023-05-19 | Stop reason: HOSPADM

## 2023-05-17 RX ORDER — AMOXICILLIN 250 MG
1 CAPSULE ORAL 2 TIMES DAILY
Status: DISCONTINUED | OUTPATIENT
Start: 2023-05-17 | End: 2023-05-19 | Stop reason: HOSPADM

## 2023-05-17 RX ORDER — ACETAMINOPHEN 325 MG/1
975 TABLET ORAL ONCE
Status: COMPLETED | OUTPATIENT
Start: 2023-05-17 | End: 2023-05-17

## 2023-05-17 RX ORDER — BUPIVACAINE HYDROCHLORIDE AND EPINEPHRINE 2.5; 5 MG/ML; UG/ML
INJECTION, SOLUTION INFILTRATION; PERINEURAL PRN
Status: DISCONTINUED | OUTPATIENT
Start: 2023-05-17 | End: 2023-05-17 | Stop reason: HOSPADM

## 2023-05-17 RX ORDER — ONDANSETRON 2 MG/ML
INJECTION INTRAMUSCULAR; INTRAVENOUS PRN
Status: DISCONTINUED | OUTPATIENT
Start: 2023-05-17 | End: 2023-05-17

## 2023-05-17 RX ORDER — CEFTRIAXONE 1 G/1
1 INJECTION, POWDER, FOR SOLUTION INTRAMUSCULAR; INTRAVENOUS EVERY 24 HOURS
Status: DISCONTINUED | OUTPATIENT
Start: 2023-05-17 | End: 2023-05-17

## 2023-05-17 RX ORDER — CEFAZOLIN SODIUM/WATER 2 G/20 ML
2 SYRINGE (ML) INTRAVENOUS SEE ADMIN INSTRUCTIONS
Status: DISCONTINUED | OUTPATIENT
Start: 2023-05-17 | End: 2023-05-17 | Stop reason: HOSPADM

## 2023-05-17 RX ORDER — VANCOMYCIN HYDROCHLORIDE 1 G/20ML
INJECTION, POWDER, LYOPHILIZED, FOR SOLUTION INTRAVENOUS PRN
Status: DISCONTINUED | OUTPATIENT
Start: 2023-05-17 | End: 2023-05-17 | Stop reason: HOSPADM

## 2023-05-17 RX ADMIN — BUPIVACAINE HYDROCHLORIDE AND EPINEPHRINE BITARTRATE 20 ML: 5; .005 INJECTION, SOLUTION PERINEURAL at 11:11

## 2023-05-17 RX ADMIN — PHENYLEPHRINE HYDROCHLORIDE 0.5 MCG/KG/MIN: 10 INJECTION INTRAVENOUS at 13:03

## 2023-05-17 RX ADMIN — IPRATROPIUM BROMIDE 0.5 MG: 0.5 SOLUTION RESPIRATORY (INHALATION) at 10:36

## 2023-05-17 RX ADMIN — PHENYLEPHRINE HYDROCHLORIDE 100 MCG: 10 INJECTION INTRAVENOUS at 13:21

## 2023-05-17 RX ADMIN — ALBUTEROL SULFATE 2 PUFF: 90 AEROSOL, METERED RESPIRATORY (INHALATION) at 18:14

## 2023-05-17 RX ADMIN — FENTANYL CITRATE 50 MCG: 50 INJECTION, SOLUTION INTRAMUSCULAR; INTRAVENOUS at 12:52

## 2023-05-17 RX ADMIN — OXYCODONE HYDROCHLORIDE 5 MG: 5 TABLET ORAL at 03:44

## 2023-05-17 RX ADMIN — SODIUM CHLORIDE, POTASSIUM CHLORIDE, SODIUM LACTATE AND CALCIUM CHLORIDE: 600; 310; 30; 20 INJECTION, SOLUTION INTRAVENOUS at 15:35

## 2023-05-17 RX ADMIN — ACETAMINOPHEN 975 MG: 325 TABLET ORAL at 19:04

## 2023-05-17 RX ADMIN — FENTANYL CITRATE 25 MCG: 50 INJECTION, SOLUTION INTRAMUSCULAR; INTRAVENOUS at 16:48

## 2023-05-17 RX ADMIN — FENTANYL CITRATE 50 MCG: 50 INJECTION, SOLUTION INTRAMUSCULAR; INTRAVENOUS at 13:01

## 2023-05-17 RX ADMIN — HYDROMORPHONE HYDROCHLORIDE 0.2 MG: 0.2 INJECTION, SOLUTION INTRAMUSCULAR; INTRAVENOUS; SUBCUTANEOUS at 04:42

## 2023-05-17 RX ADMIN — Medication 2 G: at 12:53

## 2023-05-17 RX ADMIN — MIDAZOLAM 1 MG: 1 INJECTION INTRAMUSCULAR; INTRAVENOUS at 13:27

## 2023-05-17 RX ADMIN — MIDAZOLAM 1 MG: 1 INJECTION INTRAMUSCULAR; INTRAVENOUS at 13:00

## 2023-05-17 RX ADMIN — Medication 1 UNITS: at 13:55

## 2023-05-17 RX ADMIN — PANTOPRAZOLE SODIUM 40 MG: 40 TABLET, DELAYED RELEASE ORAL at 06:44

## 2023-05-17 RX ADMIN — OXYCODONE HYDROCHLORIDE 5 MG: 5 TABLET ORAL at 08:11

## 2023-05-17 RX ADMIN — SODIUM CHLORIDE, POTASSIUM CHLORIDE, SODIUM LACTATE AND CALCIUM CHLORIDE: 600; 310; 30; 20 INJECTION, SOLUTION INTRAVENOUS at 18:14

## 2023-05-17 RX ADMIN — ACETAMINOPHEN 975 MG: 325 TABLET ORAL at 09:54

## 2023-05-17 RX ADMIN — FENTANYL CITRATE 25 MCG: 50 INJECTION, SOLUTION INTRAMUSCULAR; INTRAVENOUS at 11:10

## 2023-05-17 RX ADMIN — ALBUMIN HUMAN: 0.05 INJECTION, SOLUTION INTRAVENOUS at 13:41

## 2023-05-17 RX ADMIN — SODIUM CHLORIDE, POTASSIUM CHLORIDE, SODIUM LACTATE AND CALCIUM CHLORIDE: 600; 310; 30; 20 INJECTION, SOLUTION INTRAVENOUS at 10:12

## 2023-05-17 RX ADMIN — KETAMINE HYDROCHLORIDE 10 MG: 10 INJECTION, SOLUTION INTRAMUSCULAR; INTRAVENOUS at 12:54

## 2023-05-17 RX ADMIN — PROPOFOL 30 MCG/KG/MIN: 10 INJECTION, EMULSION INTRAVENOUS at 13:02

## 2023-05-17 RX ADMIN — TRANEXAMIC ACID 1950 MG: 650 TABLET ORAL at 09:55

## 2023-05-17 RX ADMIN — KETAMINE HYDROCHLORIDE 20 MG: 10 INJECTION, SOLUTION INTRAMUSCULAR; INTRAVENOUS at 12:52

## 2023-05-17 RX ADMIN — MIDAZOLAM HYDROCHLORIDE 0.05 MG: 1 INJECTION, SOLUTION INTRAMUSCULAR; INTRAVENOUS at 11:10

## 2023-05-17 RX ADMIN — CEFDINIR 300 MG: 300 CAPSULE ORAL at 09:23

## 2023-05-17 RX ADMIN — FOLIC ACID 1 MG: 1 TABLET ORAL at 19:04

## 2023-05-17 RX ADMIN — Medication 50 MG: at 19:04

## 2023-05-17 RX ADMIN — SENNOSIDES AND DOCUSATE SODIUM 1 TABLET: 50; 8.6 TABLET ORAL at 20:31

## 2023-05-17 RX ADMIN — ONDANSETRON 4 MG: 2 INJECTION INTRAMUSCULAR; INTRAVENOUS at 15:42

## 2023-05-17 RX ADMIN — BUPIVACAINE HYDROCHLORIDE IN DEXTROSE 2 ML: 7.5 INJECTION, SOLUTION SUBARACHNOID at 13:10

## 2023-05-17 RX ADMIN — OXYCODONE HYDROCHLORIDE 5 MG: 5 TABLET ORAL at 22:17

## 2023-05-17 RX ADMIN — CEFAZOLIN 1 G: 1 INJECTION, POWDER, FOR SOLUTION INTRAMUSCULAR; INTRAVENOUS at 20:31

## 2023-05-17 ASSESSMENT — ACTIVITIES OF DAILY LIVING (ADL)
ADLS_ACUITY_SCORE: 45
ADLS_ACUITY_SCORE: 41
ADLS_ACUITY_SCORE: 39
ADLS_ACUITY_SCORE: 41
ADLS_ACUITY_SCORE: 39
ADLS_ACUITY_SCORE: 41
ADLS_ACUITY_SCORE: 41
ADLS_ACUITY_SCORE: 43
ADLS_ACUITY_SCORE: 41
ADLS_ACUITY_SCORE: 39
DEPENDENT_IADLS:: INDEPENDENT
ADLS_ACUITY_SCORE: 39
ADLS_ACUITY_SCORE: 39

## 2023-05-17 ASSESSMENT — COPD QUESTIONNAIRES: COPD: 1

## 2023-05-17 NOTE — CONSULTS
ORTHOPEDIC CONSULTATION    Consultation  Namita Viera,  1950, MRN [id number]    Acute UTI [N39.0]  Hip fracture, right, closed, initial encounter (H) [S72.001A]  Anemia, unspecified type [D64.9]    PCP: Marcel Miller, [phone]   Code status:  Full Code       Extended Emergency Contact Information  Primary Emergency Contact: Tiara Slaughter   [place]  Mobile Phone: [phone]  Relation: Daughter  Secondary Emergency Contact: Phillip Viera  Address: [address] [place] [address] [id number] [place] [address]  Home Phone: [phone]  Mobile Phone: [phone]  Relation: Spouse         IMPRESSION:  Acute displaced intertrochanteric proximal right femur fracture, DOI: 23     PLAN:  This patient was discussed with Dr. White and Dr. Tucker, on-call surgeon for [facility] and they are in agreement with the following plan.     -NWB, bedrest until surgery  -pain management  -plan for right femur IM nail by Dr. Tucker today at 12:30  -hgb drop today. Was 10.2 yesterday, 7.9 today. She is slightly anemic at baseline, lives around 11. Consent signed, PRBC 2 units ordered if necessary intra-operatively.  -NPO today.  -holding all anticoagulation  -she is medically optimized, EKG to be done, per medicine for surgery today.     Thank you for including [facility] in the care of Namita Viera. It has been a pleasure participating in Namita's care.      CHIEF COMPLAINT: Closed right hip fracture (H)    HISTORY OF PRESENT ILLNESS:  The patient is seen in orthopedic consultation at the request of Tiffanie Swartz ED PA-C. The patient is a 73-year-old female who presented to ED for evaluation after mechanical fall. Pertinent history of SAH, osteopenia with left sided pelvic fracture, EtOH and tobacco dependence, emphysema of lung, and HTN. She reports that she fell when getting out of bed at her son's house. She lives alone at baseline, but was staying with her son  temporarily. Denies LOC. No prior hip pain, injuries or surgeries. Today, she reports pain down the front of the leg. Denies paresthesias. She is a pack per day smoker. Daily alcohol use, ~2-3 drinks.    ALLERGIES:   Review of patient's allergies indicates No Known Allergies      MEDICATIONS UPON ADMISSION:  Medications were reviewed.  They include:   Medications Prior to Admission   Medication Sig Dispense Refill Last Dose     albuterol (PROAIR HFA/PROVENTIL HFA/VENTOLIN HFA) 108 (90 Base) MCG/ACT inhaler INHALE 1-2 PUFFS INTO THE LUNGS EVERY 6 HOURS AS NEEDED FOR COUGH OR SHORTNESS OF BREATH OR WHEEZE 18 g 11 5/16/2023 at am     alendronate (FOSAMAX) 70 MG tablet TAKE 1 TABLET BY MOUTH EVERY 7 DAYS. TAKE IN THE MORNING ON AN EMPTY STOMACH WITH A FULL GLASS OF WATER 30 MINUTES BEFORE FOOD 12 tablet 2 Past Week at Sat     aspirin (ASA) 81 MG EC tablet Take 1 tablet (81 mg) by mouth daily   5/15/2023 at am     calcium carbonate (OS-PETER) 1500 (600 Ca) MG tablet Take 600 mg by mouth daily   5/15/2023 at am     cefdinir (OMNICEF) 300 MG capsule Take 1 capsule (300 mg) by mouth 2 times daily for 7 days 14 capsule 0 5/16/2023 at am     ibuprofen (ADVIL/MOTRIN) 200 MG tablet Take 200 mg by mouth every 6 hours as needed for pain   Unknown at prn     losartan-hydrochlorothiazide (HYZAAR) 100-25 MG tablet Take 1 tablet by mouth daily 90 tablet 2 5/15/2023 at am     multivitamin, therapeutic (THERA-VIT) TABS tablet Take 1 tablet by mouth daily   5/15/2023 at am     omeprazole (PRILOSEC) 20 MG DR capsule TAKE 1 CAPSULE (20 MG) BY MOUTH DAILY 90 capsule 0 5/15/2023 at am     tiotropium (SPIRIVA RESPIMAT) 2.5 MCG/ACT inhaler Inhale 2 puffs into the lungs daily (controller) 4 g 11 More than a month at too costly     vitamin C (ASCORBIC ACID) 500 MG tablet Take 500 mg by mouth daily   5/15/2023 at am     Vitamin D, Cholecalciferol, 25 MCG (1000 UT) TABS Take 1,000 Units by mouth daily   5/15/2023 at am         SOCIAL HISTORY:   she   reports that she has been smoking cigarettes. She started smoking about 61 years ago. She has a 60.00 pack-year smoking history. She has never used smokeless tobacco. She reports current alcohol use of about 14.0 standard drinks of alcohol per week. She reports that she does not use drugs.      FAMILY HISTORY:  family history includes Cancer in her sister; Esophageal Cancer in her mother; Heart Disease in her father; Rectal Cancer in her sister; Rheumatoid Arthritis in her brother.      REVIEW OF SYSTEMS:   Reviewed with patient. See HPI, otherwise negative       PHYSICAL EXAMINATION:  Vitals: Temp:  [97.5  F (36.4  C)-98.6  F (37  C)] 97.5  F (36.4  C)  Pulse:  [] 105  Resp:  [18-24] 18  BP: (106-146)/(55-96) 146/64  SpO2:  [90 %-99 %] 98 %  General: On examination, the patient is resting comfortably, NAD, awake and alert and oriented to person, place, time, and and general circumstances   SKIN: There is no evidence of erythema, warmth, swelling, deformity, break to the skin, open wound.  Pulses:  dorsalis pedis and posterior tibial pulse is intact and equal bilaterally  Sensation: intact and equal bilaterally to the distal lower extremities.  Tenderness: TTP over anterior thigh and greater trochanter. NTTP of the femur, knee, tibia, fibula, ankle, foot. Compartment soft and non-tender.   ROM: hip and knee ROM deferred due to known injury.   Motor: TIB ANT, PF, ELH, QUAD, HF 5/5  Contralateral side= Full range of motion, Negative joint instability findings, 5/5 motor groups about the joint, Non-tender.       RADIOGRAPHIC EVALUATION:  Personally reviewed by myself and Dr. Tucker.     EXAM: CT HIP RIGHT W/O CONTRAST                                                       IMPRESSION:  1.  Acute mildly displaced comminuted intertrochanteric right femur fracture.      PERTINENT LABS:  Lab Results: personally reviewed.     Lab Results   Component Value Date    WBC 9.9 05/16/2023    HGB 7.9 05/17/2023    HCT 29.3  05/16/2023    MCV 99 05/16/2023     05/16/2023       Rolanda Kingston PA-C  Date: 5/17/2023  Time: 8:19 AM    CC1:   Momo Garcia,Hanna    CC2:   Marcel Miller

## 2023-05-17 NOTE — PLAN OF CARE
Problem: Orthopaedic Fracture  Goal: Fracture Stability  Outcome: Progressing   Goal Outcome Evaluation:       In with right hip fracture patient had been NPO since midnight, purewick in place. Patient went to surgery to repair hip around 0930

## 2023-05-17 NOTE — OP NOTE
Operative Note    Name:  Namita Viera  PCP:  Marcel Miller [place]  Procedure Date:  5/17/2023     Pre-Procedure Diagnosis:  1. Right comminuted, displaced intertrochanteric femur fracture     Post-Procedure Diagnosis:    1. Right comminuted, displaced intertrochanteric femur fracture     Procedure:  1. Open reduction, internal fixation right intertrochanteric femur fracture with long cephalomedullary nail    Surgeon: Ollie Tucker MD     Assistant: Betsey Pina PA-C    A PA-C was necessary to ensure safety of this patient and adequate progression of the procedure.    Anesthesia Type: Spinal anesthesia with fascia iliaca block + local anesthetic (10 mL 0.25% bupivacaine with epinephrine)    Estimated Blood Loss: 200 mL    Implants:   1. Synthes long TFNA 10 mm x 340 mm, 130 degree.  2. 90 mm lag screw.  3. 36 mm distal locking screw.    Complications: None apparent    Indications for procedure:   Namita is a very pleasant 73-year-old female who has a past medical history notable for COPD, ongoing tobacco use, hypertension, anemia, and history of fragility fractures including previous left intertrochanteric femur fracture status post short cephalomedullary nailing in 2018 who sustained a mechanical fall at home yesterday morning.  She states that she was recently diagnosed with a UTI (5/15/23), for which she is being treated on cefdinir.  She reports having confusion/dizziness related to her UTI, and was staying at her son's home when she sustained a mechanical fall landing onto the right hip.  She had onset of severe pain and inability to get up under her own power.  She was found down by her son, and brought to the emergency department for further evaluation.  X-rays and CT scan were obtained, which demonstrated displaced comminuted right intertrochanteric femur fracture with extension into the subtrochanteric region. She was admitted to the hospitalist team, underwent preoperative evaluation,  and has been deemed medically optimized for surgery today.    I reviewed with Namita her radiographic and clinical findings today.  We discussed the nature of her displaced right intertrochanteric femur fracture.  We discussed the pertinent anatomy around the hip and proximal femur. Preoperatively, the nature of the procedure, risks and benefits, as well as alternatives including nonsurgical management were discussed in detail with the patient. We discussed options for further evaluation and treatment, including conservative non-operative management versus surgical intervention.       From a conservative standpoint, the patient can pursue non-operative management, which would include protected weight bearing to the lower extremity, which carries a high risk of morbidity and mortality due to prolonged and diminished mobilization/ambulation and it's associated complications, which include but are not limited to blood clots (DVT/PE), skin ulcerations and pressure sores, and pneumonia. In the long term, there is also the risk of chronic pain, dysfunction, fracture nonunion, fracture malunion.     From a surgical standpoint, we discussed closed reduction vs open reduction and internal fixation with cephalomedullary nail. Given, the patient's fracture morphology with an unstable pattern involving greater trochanter and posteromedial cortex comminution with the lesser trochanteric fragment extending into the subtrochanteric region, I discussed my recommendation for placement of a long cephalomedullary nail.  I discussed the nature of surgery including the associated risks, benefits, limitations, anticipated postoperative recovery and timeline.  I did review risks of surgery including but not limited to risk of pain, bleeding, infection, blood clots (DVT, PE), wound issues, continued chronic pain in the hip, post-operative joint stiffness, painful arc of motion, difficulty with ambulation, iatrogenic fracture, damage to  nearby neurovascular structures, loss of reduction, malunion, nonunion, and need for potential future surgery. The possibility of intra-operative and/or post-operative medical complications such as anesthesia complications or reactions, respiratory and cardiovascular events, stroke, heart attack and/or death were also discussed.  We did review the typical postoperative course including postoperative stay in the hospital for pain control and work with therapy, potential need for rehab at a transitional care unit prior to discharge to home.  We did discuss given her underlying anemia and drop in hemoglobin to 7.9 today, potential need for blood products.  Reviewed rationale behind this as well as the risk and benefits, she was agreeable to blood transfusions if needed and we have 2 units available for surgery today.  We did discuss need for assistive device postoperatively and potentially long-term pending how she is progressing from a strength, mobility and stability standpoint with therapy.  The patient demonstrated an understanding of these risks as well as the benefits of surgical intervention. The patient demonstrated an understanding of the indications of surgery and all possible complications, and together we have decided to proceed with surgery. All of patients questions were answered preoperatively at which time informed consent was taken.        Procedure:   The patient was identified in the preoperative holding area. After a thorough discussion of the risks and benefits of surgery, the patient wished to proceed with surgery and written informed consent was obtained. The correct operative site was marked with indelible ink. The patient underwent a fascia iliaca nerve block by the anesthesia team with good effect. The patient was then brought to the operating room and spinal anesthesia was successfully induced by the anesthesiologist. Both feet were well padded with cast padding and placed into the traction  boots. The patient was then transferred to the fracture table and a well-padded perineal post was placed between the legs. Pre-operative antibiotics were administered prior to incision. A reduction maneuver consisting of traction, adduction and internal rotation of the left lower extremity was provided. Intraoperative x-ray were then utilized in both AP and lateral planes to assess reduction. Reduction demonstrated reasonable alignment on the AP plane, but persistent posterior displacement of shaft in relationship to the proximal fragment despite several reduction attempts involving traction, internal/external rotation, direct pressure/manipulation to the hip/thigh, and flexion through the fracture table. Given this, decision was made to proceed with open reduction to obtain an acceptable reduction. The entire femoral head was visible on provisional fluoroscopy images. The right lower extremity was then prepped and draped in the usual sterile fashion. Prior to the procedure, a timeout was performed with all operating room staff to confirm patient, procedure to be performed, laterality, and all equipment needs for surgery. All were in agreement without safety concerns.     Attention was first turned to performing initial proximal incision and dissection. A longitudinal incision was then made centered proximal to the tip of the left greater trochanter. Sharp dissection was carried down through skin and subcutaneous tissue. Deep dissection was performed with electrocautery down to the level of the iliotibial band. Hemostasis was achieved. The iliotibial band/gluteal fascia was then split in line over the tip of the greater trochanter. Dissection was carried down to the tip of the trochanter which was easily palpable. Next, a longitudinal incision was made in line with the femur centered over the level of the lesser trochanter planning to be used both for open reduction as well as lag screw placement. Skin was incised  with a 10 blade, and deep dissection was carried down to the level of the iliotibial band with electrocautery. Hemostasis was achieved. The iliotibial band was then split in line with the incision. The vastus lateralis was carefully elevated from posterior to anterior with a combination of electrocautery and a Alvarado elevator. Under fluoroscopic guidance, the Alvarado elevator was then used to mobilize soft tissue from the anterior femoral cortex at the level of the fracture site. The fracture line was then palpated, again demonstrating persistent step off with the distal fragment being posteriorly displaced. Interposed soft tissue was removed from the fracture line. A bone hook was carefully placed to over the femur and hooked onto the medial calcar. Next, the leg was manipulated with a combination of slight additional traction, external followed by internal rotation until the fracture was acceptably reduced. This was confirmed both by palpation as well as with AP and lateral fluoroscopic views. The fracture was then able to be control with direct manual manipulation with a finger over the anterior/anteromedial cortex and calcar during instrumentation.     Once satisfied with reduction, a guidepin was then placed in the greater trochanter and advanced with a wire  past the level of the lesser trochanter. AP and lateral views were used to confirm appropriate guidewire position. A hollow core reamer was then used to open the proximal canal to the level of the lesser trochanter.  The guidepin was then removed, and a ball tipped guide wire was advanced distally through the femoral medullary canal, and fluoroscopy was used to ensure appropriate positioning distally centered within the distal femur.  Given her femoral anatomy/bow, despite attempts to position the slightly bent ball tipped guide wire in a more posterior position in the distal femur, the guide wire remained anterior within the canal on lateral view, but  well centered on the AP view. To avoid potential distal nail abutment and breach of the anterior cortex, the nail was measured and intentionally shorted, with a 340 mm length felt to be most appropriate. Sequential reaming was then carefully performed with fluoroscopic guidance starting with a 8.5 mm reamer up to a 11.5 mm reamer with good cortical chatter appreciated. We then opened a 10 mm x 340 mm, 130 degree Synthes long intramedullary nail, which was assembled to the insertion jig on the back table.  We then carefully impacted the nail into position under fluoroscopic guidance.  Through the previous distal lateral incision, the barrel was advanced down to the bone. A threaded pin was then advanced with care taken to have the guide wire centered on both the AP and lateral planes, up the femoral neck, into the head. Our tip apex distance was then minimized for optimal placement. I then measured and reamed for a 90 mm lag screw and then the 90 mm lag screw was turned into position. Care was taken not to penetrate the femoral head. With direct palpation of the fracture site, as the screw was advanced, care was taken to ensure maintenance of reduction and directly controlled the proximal fragment to control head/neck rotation. Once screw was full advance, fracture was palpated and acceptable reduction was maintained. Compression was then applied. The nail set screw was then advanced, and loosened a 1/2 turn to allow for dynamic compression. Multiple AP, oblique and lateral views confirmed appropriate screw position within the femoral head, with maintenance of fracture reduction noted.     Attention was then turned to the distal femur for placement of distal interlocking screws.  Using fluoroscopy in a perfect [place] technique, a small percutaneous incision was made centered over the oblong interlocking hole.  The IT band was split with a small stab incision, and hemostat used to spread down to the lateral femoral  cortex.  With perfect [place] technique using fluoroscopy, the drill hole was then made found to be centered within the interlocking screw hole.  Screw length was then measured, and a 36 mm locking screw was placed with good purchase in the bone.  Final AP and lateral views at the distal aspect of the nail demonstrated appropriate screw positioning.     The proximal nail jig was then removed. Final AP and lateral images were obtained.The wounds were then copiously irrigated with normal sterile saline. Hemostasis was achieved. 1 gram of vancoymcin powder was placed into the wounds. The deep fascia was closed with a combination of interrupted and running 0 Vicryl. Deep adipose layer was closed with 0 Vicryl. The proximal incisions were injected with 10ml of 0.25% bupivacaine with epinephrine. Skin was closed with buried 2-0 Vicryl followed by running subcuticular Monocryl and skin glue. Sterile dressings were applied. The patient was then awakened from anesthesia without complication and transferred to a regular bed. The patient was then taken from the operating room to the PACU in stable condition. There were no apparent complications. All sponge and needle counts were correct.    POSTOPERATIVE PLAN  Activity: Up with assist and assistive device until independent  Weight bearing status: WBAT RLE.  Pain management: Transition from IV to PO as tolerated.    Antibiotics: Ancef x 24 hours, and continue with prior to admission cefdinir for UTI.  Diet: Begin with clear fluids and progress diet as tolerated.   DVT prophylaxis: lovenox 40 mg daily x 4 weeks and mechanical while in the hospital  Imaging: PACU, AP pelvis and AP/lateral femur.  Labs: Hgb this evening, repeat tomorrow.  Dressings: Keep clean, dry and intact x 3 days.   Elevation: Elevate RLE on pillows to keep above the level of the heart as much as possible.   Physical Therapy/Occupational Therapy: Eval and treat, appreciate assistance and  recommendations.  Consults: PT/OT, care coordination for disposition planning. Smoking cessation resources.   Follow-up: Clinic in 2 weeks for incision check and repeat x-rays needed.   Disposition: Pending progress with therapies, pain control on orals, and medical stability, anticipate discharge to [facility]         Ollie Tucker MD   Orthopedic Surgery   [facility]         Implant Name Type Inv. Item Serial No.  Lot No. LRB No. Used Action   IMP NAIL SYN CAN FEM PROX TFNA [id number] 130D RT 04.037.054S - [id number] [employer] IMP NAIL SYN CAN FEM PROX TFNA [id number] 130D RT 04.037.054S  [employer] [id number] Right 1 Implanted   IMP SCR SYN TFNA FENESTRATED LAG 90MM 04.038.190S - [id number] Met[employer] Hardware/[place] IMP SCR SYN TFNA FENESTRATED LAG 90MM 04.038.190S  [employer] [id number] Right 1 Implanted   SCREW BN 36MM 5MM LCK X25 IM NL 04.045.036S - [id number] Metallic Hardware/[place] SCREW BN 36MM 5MM LCK X25 IM NL 04.045.036S  [employer] [id number] Right 1 Implanted

## 2023-05-17 NOTE — ANESTHESIA PREPROCEDURE EVALUATION
Anesthesia Pre-Procedure Evaluation    Patient: Namita Viera   MRN: [id number] : 1950        Procedure : Procedure(s):  OPEN REDUCTION INTERNAL FIXATION, FRACTURE, FEMUR, USING INTRAMEDULLARY NOEL          Past Medical History:   Diagnosis Date     Acid reflux      Alcohol use      Closed fracture of left femur (H)      Closed fracture of sacrum with routine healing 10/12/2021     Emphysema of lung (H) 2022     Hip fracture requiring operative repair (H)      Hypertension 2021     Rib pain on left side 2021     SAH (subarachnoid hemorrhage) (H)      Traumatic closed displaced fracture of distal end of radius       Past Surgical History:   Procedure Laterality Date     BLADDER SUSPENSION       HYSTERECTOMY TOTAL ABDOMINAL, BILATERAL SALPINGO-OOPHORECTOMY, COMBINED N/A      ORTHOPEDIC SURGERY Left 2018    Left Hip fracture, S/P ORIF      No Known Allergies   Social History     Tobacco Use     Smoking status: Every Day     Packs/day: 1.00     Years: 60.00     Pack years: 60.00     Types: Cigarettes     Start date: 1962     Last attempt to quit: 2018     Years since quittin.9     Smokeless tobacco: Never   Vaping Use     Vaping status: Not on file   Substance Use Topics     Alcohol use: Yes     Alcohol/week: 14.0 standard drinks of alcohol     Types: 14 Standard drinks or equivalent per week     Comment: admits to 2 glasses of wine a night      Wt Readings from Last 1 Encounters:   23 45.5 kg (100 lb 5 oz)        Anesthesia Evaluation   Pt has had prior anesthetic.     No history of anesthetic complications       ROS/MED HX  ENT/Pulmonary:     (+) COPD,     Neurologic:       Cardiovascular:     (+) Dyslipidemia hypertension-----    METS/Exercise Tolerance:     Hematologic:  - neg hematologic  ROS     Musculoskeletal:       GI/Hepatic:     (+) GERD, Asymptomatic on medication,     Renal/Genitourinary:  - neg Renal ROS     Endo:  - neg endo ROS      Psychiatric/Substance Use:     (+) alcohol abuse     Infectious Disease:       Malignancy:       Other:            Physical Exam    Airway        Mallampati: II   TM distance: > 3 FB   Neck ROM: full     Respiratory Devices and Support         Dental       (+) Multiple visibly decayed, broken teeth      Cardiovascular          Rhythm and rate: regular     Pulmonary           breath sounds clear to auscultation           OUTSIDE LABS:  CBC:   Lab Results   Component Value Date    WBC 9.9 05/16/2023    WBC 5.2 05/15/2023    HGB 7.9 (L) 05/17/2023    HGB 10.2 (L) 05/16/2023    HCT 29.3 (L) 05/16/2023    HCT 35.0 05/15/2023     05/16/2023     05/15/2023     BMP:   Lab Results   Component Value Date     05/17/2023     (L) 05/16/2023    POTASSIUM 3.7 05/17/2023    POTASSIUM 3.8 05/16/2023    CHLORIDE 105 05/17/2023    CHLORIDE 97 (L) 05/16/2023    CO2 20 (L) 05/17/2023    CO2 22 05/16/2023    BUN 28.6 (H) 05/17/2023    BUN 30.8 (H) 05/16/2023    CR 0.83 05/17/2023    CR 0.92 05/16/2023    GLC 90 05/17/2023     (H) 05/16/2023     COAGS:   Lab Results   Component Value Date    PTT 26 04/30/2018    INR 0.95 04/30/2018     POC: No results found for: BGM, HCG, HCGS  HEPATIC:   Lab Results   Component Value Date    ALBUMIN 4.4 09/06/2022    PROTTOTAL 7.6 09/06/2022    ALT 17 09/06/2022    AST 34 09/06/2022    ALKPHOS 96 09/06/2022    BILITOTAL 0.3 09/06/2022     OTHER:   Lab Results   Component Value Date    LACT 0.8 10/13/2021    PETER 8.7 (L) 05/17/2023    PHOS 3.6 05/04/2021    MAG 1.6 (L) 09/06/2022    LIPASE 30 04/30/2018       Anesthesia Plan    ASA Status:  3   NPO Status:  NPO Appropriate    Anesthesia Type: Spinal.              Consents    Anesthesia Plan(s) and associated risks, benefits, and realistic alternatives discussed. Questions answered and patient/representative(s) expressed understanding.     - Discussed: Risks, Benefits and Alternatives for BOTH SEDATION and the PROCEDURE  were discussed     - Discussed with:  Patient         Postoperative Care    Pain management: Peripheral nerve block (Single Shot).        Comments:    Other Comments: Patient consented for a right fascia iliaca nerve block. Discussed risks (bleeding, infection, nerve injury) and benefits (pain relief), duration (16-24 hours), and process. Patient agreed to proceed with nerve block.             Pastora Gardiner MD

## 2023-05-17 NOTE — PLAN OF CARE
"  Problem: Plan of Care - These are the overarching goals to be used throughout the patient stay.    Goal: Plan of Care Review  Description: The Plan of Care Review/Shift note should be completed every shift.  The Outcome Evaluation is a brief statement about your assessment that the patient is improving, declining, or no change.  This information will be displayed automatically on your shift note.  Outcome: Progressing  Goal: Patient-Specific Goal (Individualized)  Description: You can add care plan individualizations to a care plan. Examples of Individualization might be:  \"Parent requests to be called daily at 9am for status\", \"I have a hard time hearing out of my right ear\", or \"Do not touch me to wake me up as it startles me\".  Outcome: Progressing  Goal: Absence of Hospital-Acquired Illness or Injury  Outcome: Progressing  Intervention: Identify and Manage Fall Risk  Recent Flowsheet Documentation  Taken 5/17/2023 0344 by Myrna Yang RN  Safety Promotion/Fall Prevention:   assistive device/personal items within reach   safety round/check completed   lighting adjusted   room door open  Taken 5/17/2023 0005 by Myrna Yang RN  Safety Promotion/Fall Prevention:   assistive device/personal items within reach   safety round/check completed   lighting adjusted   room door open  Intervention: Prevent Skin Injury  Recent Flowsheet Documentation  Taken 5/17/2023 0344 by Myrna Yang RN  Body Position:   left   weight shifting   tilted   foot of bed elevated  Taken 5/17/2023 0005 by Myrna Yang RN  Body Position:   tilted   left  Goal: Optimal Comfort and Wellbeing  Outcome: Progressing  Intervention: Monitor Pain and Promote Comfort  Recent Flowsheet Documentation  Taken 5/17/2023 0344 by Myrna Yang RN  Pain Management Interventions:   emotional support   medication (see MAR)  Taken 5/17/2023 0005 by Myrna Yang RN  Pain Management Interventions:   emotional support   medication " offered but refused  Goal: Readiness for Transition of Care  Outcome: Progressing     Problem: Risk for Delirium  Goal: Optimal Coping  Outcome: Progressing  Goal: Improved Behavioral Control  Outcome: Progressing  Goal: Improved Attention and Thought Clarity  Outcome: Progressing  Goal: Improved Sleep  Outcome: Progressing     Problem: Pain Acute  Goal: Optimal Pain Control and Function  Outcome: Progressing  Intervention: Develop Pain Management Plan  Recent Flowsheet Documentation  Taken 5/17/2023 0344 by Myrna Yang RN  Pain Management Interventions:   emotional support   medication (see MAR)  Taken 5/17/2023 0005 by Myrna Yang RN  Pain Management Interventions:   emotional support   medication offered but refused  Intervention: Prevent or Manage Pain  Recent Flowsheet Documentation  Taken 5/17/2023 0344 by Myrna Yang, RN  Medication Review/Management: medications reviewed  Taken 5/17/2023 0005 by Myrna Yang RN  Medication Review/Management: medications reviewed     Problem: Orthopaedic Fracture  Goal: Absence of Bleeding  Outcome: Progressing  Goal: Effective Bowel Elimination  Outcome: Progressing  Goal: Absence of Embolism Signs and Symptoms  Outcome: Progressing  Goal: Fracture Stability  Outcome: Progressing  Goal: Optimal Functional Ability  Outcome: Progressing  Intervention: Optimize Functional Ability  Recent Flowsheet Documentation  Taken 5/17/2023 0344 by Myrna Yang, RN  Activity Management: bedrest  Taken 5/17/2023 0005 by Myrna Yang RN  Activity Management: bedrest  Goal: Absence of Infection Signs and Symptoms  Outcome: Progressing  Goal: Effective Tissue Perfusion  Outcome: Progressing  Goal: Optimal Pain Control and Function  Outcome: Progressing  Intervention: Manage Acute Orthopaedic-Related Pain  Recent Flowsheet Documentation  Taken 5/17/2023 0344 by Myrna Yang RN  Pain Management Interventions:   emotional support   medication (see  MAR)  Taken 5/17/2023 0005 by Myrna Yang, RN  Pain Management Interventions:   emotional support   medication offered but refused  Goal: Effective Oxygenation and Ventilation  Outcome: Progressing  Intervention: Promote Airway Secretion Clearance  Recent Flowsheet Documentation  Taken 5/17/2023 0344 by Myrna Yang RN  Cough And Deep Breathing: done independently per patient  Activity Management: bedrest  Taken 5/17/2023 0005 by Myrna Yang, RN  Cough And Deep Breathing: done independently per patient  Activity Management: bedrest  Intervention: Optimize Oxygenation and Ventilation  Recent Flowsheet Documentation  Taken 5/17/2023 0344 by Myrna Yang, RN  Head of Bed (HOB) Positioning: HOB at 20-30 degrees  Taken 5/17/2023 0005 by Myrna Yang RN  Head of Bed (HOB) Positioning: HOB at 20 degrees   Goal Outcome Evaluation:       Pt a/o with VSS on room air. Pt NPO since midnight for surgery. Pt pain up to a 7, down to 6 after prn oxycodone. Prn dilaudid given with better relief. Will monitor.

## 2023-05-17 NOTE — PLAN OF CARE
Problem: Pain Acute  Goal: Optimal Pain Control and Function  Intervention: Develop Pain Management Plan  Recent Flowsheet Documentation  Taken 5/16/2023 1831 by Suzy Meek RN  Pain Management Interventions:   pillow support provided   repositioned  Intervention: Prevent or Manage Pain  Recent Flowsheet Documentation  Taken 5/16/2023 1831 by Suzy Meek, RN  Medication Review/Management: medications reviewed   Goal Outcome Evaluation:         Pain reported to right hip d/t fracture. CT to hip completed. PRN oxycodone given x1 and was effective. PRN IV dilaudid given after CT for pain 9/10 and was effective. Patient ate well for dinner. NPO after midnight tonight d/t orthopedic surgery in AM. IVF at 75mL/hr. Purwick in place.

## 2023-05-17 NOTE — ANESTHESIA POSTPROCEDURE EVALUATION
Patient: Namita Viera    Procedure: Procedure(s):  OPEN REDUCTION INTERNAL FIXATION, FRACTURE, FEMUR, USING INTRAMEDULLARY NOEL       Anesthesia Type:  Spinal    Note:  Disposition: Inpatient   Postop Pain Control: Uneventful            Sign Out: Well controlled pain   PONV: No   Neuro/Psych: Uneventful            Sign Out: Acceptable/Baseline neuro status   Airway/Respiratory: Uneventful            Sign Out: Acceptable/Baseline resp. status   CV/Hemodynamics: Uneventful            Sign Out: Acceptable CV status; No obvious hypovolemia; No obvious fluid overload   Other NRE: NONE   DID A NON-ROUTINE EVENT OCCUR? No           Last vitals:  Vitals Value Taken Time   /52 05/17/23 1715   Temp 36.2  C (97.1  F) 05/17/23 1636   Pulse 91 05/17/23 1723   Resp 23 05/17/23 1723   SpO2 100 % 05/17/23 1723   Vitals shown include unvalidated device data.    Electronically Signed By: Torin Fay MD  May 17, 2023  5:25 PM

## 2023-05-17 NOTE — BRIEF OP NOTE
[facility]    Brief Operative Note    Pre-operative diagnosis:  1. Right comminuted, displaced intertrochanteric femur fracture     Post-operative diagnosis:  1. Right comminuted, displaced intertrochanteric femur fracture     Procedure:  1. Open reduction, internal fixation right intertrochanteric femur fracture with long cephalomedullary nail     Surgeon: Ollie Tucker MD     Assistant: Betsey Cuello PA-C     Anesthesia: Spinal anesthesia with fascia iliaca block + local anesthetic (10 mL 0.25 bupivacaine with epinephrine)     Estimated Blood Loss: 200 mL     Drains: None    Specimens: None     Findings: Unstable intertrochanteric fracture pattern secondary to comminution involved greater trochanter and posteromedial cortex with large lesser trochanteric fragment with subtrochanteric extension. Closed reduction attempted, with lateral view demonstrating persistent posterior displacement of shaft in relationship to proximal fragment. Therefore, decision was made to proceed with open reduction. Interposed soft tissue was mobilized from fracture fragment, and acceptable reduction was achieved with a combination of bone hook and direct manual manipulation. Long cephalomedullary nail was placed with maintenance of acceptable reduction.     Complications: None apparent     Implants:   Implant Name Type Inv. Item Serial No.  Lot No. LRB No. Used Action   IMP NAIL SYN CAN FEM PROX TFNA [id number] 130D RT 04.037.054S - [id number] Metallic Hardware/[place] IMP NAIL SYN CAN FEM PROX TFNA [id number] 130D RT 04.037.054S  [employer] [id number] Right 1 Implanted   IMP SCR SYN TFNA FENESTRATED LAG 90MM 04.038.190S - [id number] Metallic Hardware/[place] IMP SCR SYN TFNA FENESTRATED LAG 90MM [id number]  [employer] [id number] Right 1 Implanted   SCREW BN 36MM 5MM LCK X25 IM NL 04.045.036S - [id number] Metallic Hardware/[place] SCREW BN 36MM 5MM LCK X25 IM NL [id number]  [employer]  [id number] Right 1 Implanted     Activity: Up with assist and assistive device until independent  Weight bearing status: WBAT RLE.  Pain management: Transition from IV to PO as tolerated.    Antibiotics: Ancef x 24 hours, and continue with prior to admission cefdinir for UTI.  Diet: Begin with clear fluids and progress diet as tolerated.   DVT prophylaxis: lovenox 40 mg daily x 4 weeks and mechanical while in the hospital  Imaging: PACU.  Labs: Hgb this evening, repeat tomorrow.  Dressings: Keep clean, dry and intact x 3 days.   Elevation: Elevate RLE on pillows to keep above the level of the heart as much as possible.   Physical Therapy/Occupational Therapy: Eval and treat, appreciate assistance and recommendations.  Consults: PT/OT, care coordination for disposition planning. Smoking cessation resources.   Follow-up: Clinic in 2 weeks for incision check and repeat x-rays needed.   Disposition: Pending progress with therapies, pain control on orals, and medical stability, anticipate discharge to [facility]      Ollie Tucker MD  Orthopedic Surgery

## 2023-05-17 NOTE — CONSULTS
Care Management Initial Consult    General Information  Assessment completed with: Patient, Children, Daughter  Type of CM/SW Visit: Initial Assessment    Primary Care Provider verified and updated as needed:     Readmission within the last 30 days:        Reason for Consult: discharge planning  Advance Care Planning:            Communication Assessment  Patient's communication style: spoken language (English or Bilingual)    Hearing Difficulty or Deaf: no   Wear Glasses or Blind: no    Cognitive  Cognitive/Neuro/Behavioral: WDL        Orientation: oriented x 4             Living Environment:   People in home: alone     Current living Arrangements: house      Able to return to prior arrangements:         Family/Social Support:  Care provided by: self, child(hussein)  Provides care for: no one                Description of Support System:           Current Resources:   Patient receiving home care services: No     Community Resources: None  Equipment currently used at home: walker, rolling  Supplies currently used at home: None    Employment/Financial:  Employment Status:          Financial Concerns:        Lifestyle & Psychosocial Needs:  Social Determinants of Health     Tobacco Use: High Risk (5/16/2023)    Patient History      Smoking Tobacco Use: Every Day      Smokeless Tobacco Use: Never      Passive Exposure: Not on file   Alcohol Use: Not on file   Financial Resource Strain: Not on file   Food Insecurity: Not on file   Transportation Needs: Not on file   Physical Activity: Not on file   Stress: Not on file   Social Connections: Not on file   Intimate Partner Violence: Not on file   Depression: Not at risk (9/6/2022)    PHQ-2      PHQ-2 Score: 0   Housing Stability: Not on file       Functional Status:  Prior to admission patient needed assistance:   Dependent ADLs:: Independent, Ambulation-walker  Dependent IADLs:: Independent       Mental Health Status:          Chemical Dependency Status:                 Values/Beliefs:  Spiritual, Cultural Beliefs, Religion Practices, Values that affect care:                 Additional Information:  RNCM met with patient's daughter, initial assessment completed.     Patient lives at home alone, drives, uses a walker. Family assists as needed.     Plan for surgery today.     If TCU is needed, [facility] is preferred.     CM will continue to follow care progression and aide in discharge planning as needed.     Sonal Gonzalez RN

## 2023-05-17 NOTE — ANESTHESIA CARE TRANSFER NOTE
Patient: Namita Viera    Procedure: Procedure(s):  OPEN REDUCTION INTERNAL FIXATION, FRACTURE, FEMUR, USING INTRAMEDULLARY NOEL       Diagnosis: Hip fracture, right, closed, initial encounter (H) [S72.001A]  Diagnosis Additional Information: No value filed.    Anesthesia Type:   Spinal     Note:    Oropharynx: oropharynx clear of all foreign objects and spontaneously breathing  Level of Consciousness: awake  Oxygen Supplementation: room air    Independent Airway: airway patency satisfactory and stable  Dentition: dentition unchanged  Vital Signs Stable: post-procedure vital signs reviewed and stable  Report to RN Given: handoff report given  Patient transferred to: PACU    Handoff Report: Identifed the Patient, Identified the Reponsible Provider, Reviewed the pertinent medical history, Discussed the surgical course, Reviewed Intra-OP anesthesia mangement and issues during anesthesia, Set expectations for post-procedure period and Allowed opportunity for questions and acknowledgement of understanding      Vitals:  Vitals Value Taken Time   /57    Temp 97F    Pulse 86    Resp 14    SpO2 100        Electronically Signed By: LONNIE Sharma CRNA  May 17, 2023  4:35 PM

## 2023-05-17 NOTE — INTERVAL H&P NOTE
I have reviewed the surgical (or preoperative) H&P that is linked to this encounter, reviewed the patient's labs, imaging, and independently examined the patient. There are no significant interval changes.     Subjective:   Namita is a very pleasant 73-year-old female who has a past medical history notable for COPD, ongoing tobacco use, hypertension, anemia, and history of fragility fractures including previous left intertrochanteric femur fracture status post short cephalomedullary nailing in 2018 who sustained a mechanical fall at home yesterday morning.  She states that she was recently diagnosed with a UTI (5/15/23), for which she is being treated on cefdinir.  She reports having confusion/dizziness related to her UTI, and was staying at her son's home when she sustained a mechanical fall landing onto the right hip.  She had onset of severe pain and inability to get up under her own power.  She was found by her son, and brought to the emergency department for further evaluation.  X-rays and CT scan were obtained, which demonstrated displaced right intertrochanteric femur fracture with extension into the subtrochanteric region.  She was admitted to the hospitalist team, underwent preoperative evaluation and has been deemed medically optimized for surgery today.  She was noted to have a drop in her hemoglobin from 10.2-7.9 this morning.  She denies any significant change in her symptoms since admission.  She denies history of previous surgery to the right hip.  She reports no numbness or tingling distally within the right lower extremity.  She denies issues with the right hip prior to her injury.  She states that overall she recovered well from her left intertrochanteric femur fracture, and has been living independently, not using assistive device for ambulation.    Physical Exam:   Patient is resting comfortably in the preoperative holding area.  Alert, answers questions appropriately, no acute distress.   Examination of the right lower extremity demonstrates externally rotated right lower extremity.  Skin is intact.  Mild swelling at the hip/proximal thigh, no significant ecchymosis.  Tenderness palpation over the proximal thigh/hip.  No significant tenderness distally at the thigh, knee, leg or foot/ankle.  She has normal sensation within the DP, SP, sural, saphenous, tibial nerve distributions.  She is able to actively plantarflex, dorsiflex, fire EHL and lesser toes.  Foot is warm and well-perfused with good palpable distal pulses.    Imaging:   X-rays AP pelvis and right femur from 5/16/2023, in addition to CT scan right hip from 5/6/2023 were personally reviewed.  These demonstrate acute displaced intertrochanteric proximal right femur fracture, with extension into the subtrochanteric region and displacement of the lesser trochanter.  There is mild/moderate varus angulation, shortening, and apex anterior angulation with shaft sitting posterior in relationship to proximal fragment. Comminution does extend into the greater trochanter. Left intertrochanteric femur fracture healed with short cephalomedullary nail in place.     Assessment:  73-year-old female who has a past medical history notable for COPD, ongoing tobacco use, hypertension, anemia, current UTI, and history of fragility fractures including previous left intertrochanteric femur fracture status post short cephalomedullary nailing in 2018 with displaced right intertrochanteric femur fracture from mechanical fall 5/16/23.    Plan:   I reviewed with Namita her radiographic and clinical findings today.  We discussed the nature of her displaced right intertrochanteric femur fracture.  We discussed the pertinent anatomy around the hip and proximal femur. Preoperatively, the nature of the procedure, risks and benefits, as well as alternatives including nonsurgical management were discussed in detail with the patient. We discussed options for further  evaluation and treatment, including conservative non-operative management versus surgical intervention.       From a conservative standpoint, the patient can pursue non-operative management, which would include protected weight bearing to the lower extremity, which carries a high risk of morbidity and mortality due to prolonged and diminished mobilization/ambulation and it's associated complications, which include but are not limited to blood clots (DVT/PE), skin ulcerations and pressure sores, and pneumonia. In the long term, there is also the risk of chronic pain, dysfunction, fracture nonunion, fracture malunion.     From a surgical standpoint, we discussed closed reduction vs open reduction and internal fixation with cephalomedullary nail. Given, the patient's fracture morphology with extension into the subtrochanteric region, I discussed my plan for placement of a long cephalomedullary nail.  I discussed the nature of surgery including the associated risks, benefits, limitations, anticipated postoperative recovery and timeline.  I did review risks of surgery including but not limited to risk of pain, bleeding, infection, blood clots (DVT, PE), wound issues, continued chronic pain in the hip, post-operative joint stiffness, painful arc of motion, difficulty with ambulation, iatrogenic fracture, damage to nearby neurovascular structures, loss of reduction, malunion, nonunion, and need for potential future surgery. The possibility of intra-operative and/or post-operative medical complications such as anesthesia complications or reactions, respiratory and cardiovascular events, stroke, heart attack and/or death were also discussed.  We did review the typical postoperative course including postoperative stay in the hospital for pain control and work with therapy, potential need for rehab at a transitional care unit prior to discharge to home.  We did discuss given her underlying anemia and drop in hemoglobin to 7.9  today, potential need for blood products.  Reviewed rationale behind this as well as the risk and benefits, she was agreeable to blood transfusions if needed and we have 2 units available for surgery today.  We did discuss need for assistive device postoperatively and potentially long-term pending how she is progressing from a strength, mobility and stability standpoint with therapy.  The patient demonstrated an understanding of these risks as well as the benefits of surgical intervention. The patient demonstrated an understanding of the indications of surgery and all possible complications, and together we have decided to proceed with surgery. All of patients questions were answered preoperatively at which time informed consent was taken.       Ollie Tucker MD   Orthopedic Surgery   [facility]

## 2023-05-17 NOTE — PROGRESS NOTES
Orthopedic Post Op Check:   Namita was seen in her room postoperatively.  She reports that overall she is doing well without concern, does not describe pain at this time.  She denies any numbness or tingling distally within the right lower extremity.  No reports of right knee pain, left hip/lower extremity discomfort.    Examination of the right lower extremity demonstrates dressings clean dry and intact.  No significant thigh swelling, soft compartments.  No tenderness palpation at the thigh or knee.  No effusion present at the right knee.  She demonstrates normal sensation within the DP, SP, sural, saphenous, tibial nerve distributions.  She is able to plantarflex, dorsiflex, fire EHL and lesser toes.  DP and PT pulses palpable, foot warm and well-perfused.    AP pelvis and AP/lateral femur postoperative films reviewed, demonstrates overall stable fracture alignment and implant position compared to intraoperative films.  No evidence of kelvin-implant fracture.    73-year-old female doing well postoperatively after open reduction internal fixation right intertrochanteric femur fracture with long cephalomedullary nail, no concerning findings on postoperative exam or radiographs.  Continue with plan previously outlined on postoperative note.    Ollie Tucker MD   Orthopedic Surgery   [facility]

## 2023-05-17 NOTE — PROGRESS NOTES
[facility]    Medicine Progress Note - Hospitalist Service    Date of Admission:  5/16/2023    Assessment & Plan     73-year-old female with history of COPD, hypertension and osteoporosis.  She presented to the emergency room 5/16 after falling and sustaining a right intertrochanteric fracture.  She is s/p Open reduction, internal fixation right intertrochanteric femur fracture with long cephalomedullary nail 5/17      Right hip intertrochanteric fracture  -Traumatic from fall  -S/p Open reduction, internal fixation right intertrochanteric femur fracture with long cephalomedullary nail 5/17  -Postop surgical management per orthopedics including pain control, DVT prophylaxis and activity    COPD  Tobacco use disorder  -Continue Spiriva and albuterol nebulizers as needed  -Scheduled DuoNebs  -Tobacco cessation reiterated as patient continues to smoke  -Nicotine patch offered    Anemia  -Hemoglobin 7.9 this am probably dilutional  -We will monitor hemoglobin postop at 8 PM  -Transfuse  packed red blood cells if hemoglobin less than 7    Alcohol use disorder  -Currently not exhibiting any signs of withdrawal  -We will monitor closely and initiate CIWA if any signs of withdrawal  -Thiamine and folic acid    Hyponatremia   -Resolved  -HCTZ likely contributing factor    Toxic metabolic encephalopathy  -Multifactorial from recent anesthesia and  UTI  -Urine cultures growing 100,000 CFU of gram-negative rods, previous urine cultures have grown pansensitive E. coli  -Continue ceftriaxone and await sensitivity    Osteoporosis  -Resume biphosphonate post discharge    Hypertension  -Presently controlled  -Resume PTA meds valsartan/hydrochlorothiazide         Diet:  Low-sodium diet  DVT Prophylaxis: Enoxaparin (Lovenox) SQ  Delgado Catheter: Not present  Lines: None     Cardiac Monitoring: ACTIVE order. Indication: Procedural area  Code Status: Full Code      Clinically Significant Risk Factors                   # Hypertension: Noted on problem list                 Disposition Plan      Expected Discharge Date: 05/19/2023                  Elvis Baer MD  Hospitalist Service  [facility]  Securely message with Stickybits (more info)  Text page via Brazzle[employer] Paging/Directory   ______________________________________________________________________    Interval History     Patient was seen and examined in PACU with daughter at bedside.  She stated that she felt confused and disoriented and was also hungry.  She has not recovered sensation to her right lower extremity therefore was not in any pain.  She appeared comfortable otherwise.    Physical Exam   Vital Signs: Temp: 97.5  F (36.4  C) Temp src: Oral BP: 111/54 Pulse: 88   Resp: 14 SpO2: 97 % O2 Device: None (Room air) Oxygen Delivery: 2 LPM  Weight: 100 lbs 4.95 oz      Physical Exam  HENT:      Head: Normocephalic.      Nose: Nose normal.      Mouth/Throat:      Mouth: Mucous membranes are moist.   Eyes:      Pupils: Pupils are equal, round, and reactive to light.   Cardiovascular:      Rate and Rhythm: Normal rate.   Pulmonary:      Effort: Pulmonary effort is normal.   Abdominal:      General: Abdomen is flat.   Musculoskeletal:      Cervical back: Normal range of motion.      Right hip: Tenderness present. Decreased range of motion.   Skin:     Capillary Refill: Capillary refill takes less than 2 seconds.   Neurological:      General: No focal deficit present.      Mental Status: She is alert and oriented to person, place, and time.         Medical Decision Making       45 MINUTES SPENT BY ME on the date of service doing chart review, history, exam, documentation & further activities per the note.      Data   ------------------------- PAST 24 HR DATA REVIEWED -----------------------------------------------    I have personally reviewed the following data over the past 24 hrs:    N/A  \   7.9 (L)   / N/A     138 105 28.6 (H) /  90   3.7 20 (L) 0.83  "\       Imaging results reviewed over the past 24 hrs:   Recent Results (from the past 24 hour(s))   CT Hip Right w/o Contrast    Narrative    EXAM: CT HIP RIGHT W/O CONTRAST  LOCATION: [facility]  DATE/TIME: 5/16/2023 8:32 PM CDT    INDICATION: Proximal femur fracture.  COMPARISON: Radiographs 05/16/2023.  TECHNIQUE: Noncontrast. Axial, sagittal and coronal thin-section reconstruction. Dose reduction techniques were used.     FINDINGS:     BONES:  -There is an acute mildly displaced comminuted intertrochanteric femur fracture involving the greater trochanter and lesser trochanter as well as extending into the subtrochanteric region. There is mild displacement of the lesser trochanter fracture   fragment.  - No additional fractures identified.  - Mild degenerative changes of the right hip. Additional degenerative changes in the lower lumbar spine, sacroiliac joints, and pubic symphysis.    SOFT TISSUES:  -Scattered colonic diverticuli.  - Vascular calcifications.        Impression    IMPRESSION:  1.  Acute mildly displaced comminuted intertrochanteric right femur fracture.     POC US Guidance Needle Placement    Narrative    Ultrasound was performed as guidance to an anesthesia procedure.  Click   \"PACS images\" hyperlink below to view any stored images.  For specific   procedure details, view procedure note authored by anesthesia.   XR Surgery WENDY Fluoro L/T 5 Min    Narrative    This exam was marked as non-reportable because it will not be read by a   radiologist or a [facility] non-radiologist provider.           "

## 2023-05-17 NOTE — ANESTHESIA PROCEDURE NOTES
"Intrathecal injection Procedure Note    Pre-Procedure   Staff -        Anesthesiologist:  Pastora Gardiner MD       Performed By: anesthesiologist       Location: OB       Procedure Start/Stop Times: 5/17/2023 2:44 PM and 5/17/2023 2:44 PM       Pre-Anesthestic Checklist: patient identified, IV checked, risks and benefits discussed, informed consent, monitors and equipment checked, pre-op evaluation, at physician/surgeon's request and post-op pain management  Timeout:       Correct Patient: Yes        Correct Procedure: Yes        Correct Site: Yes        Correct Position: Yes   Procedure Documentation  Procedure: intrathecal injection       Patient Position: sitting       Skin prep: Chloraprep       Insertion Site: L3-4. (left paramedian approach).       Needle Gauge: 20.        Needle Length (Inches): 4        Spinal Needle Type: Quincke       Introducer used       Introducer: 20 G       # of attempts: 1 and  # of redirects:  0    Assessment/Narrative         Paresthesias: No.       CSF fluid: clear.    Medication(s) Administered   0.75% Hyperbaric Bupivacaine (Intrathecal) - Intrathecal   2 mL - 5/17/2023 1:10:00 PM  Medication Administration Time: 5/17/2023 2:44 PM      FOR [facility] ([facility]) ONLY:   Pain Team Contact information: please page the Pain Team Via [employer]. Search \"Pain\". During daytime hours, please page the attending first. At night please page the resident first.      "

## 2023-05-18 ENCOUNTER — APPOINTMENT (OUTPATIENT)
Dept: PHYSICAL THERAPY | Facility: HOSPITAL | Age: 73
DRG: 480 | End: 2023-05-18
Payer: COMMERCIAL

## 2023-05-18 ENCOUNTER — APPOINTMENT (OUTPATIENT)
Dept: OCCUPATIONAL THERAPY | Facility: HOSPITAL | Age: 73
DRG: 480 | End: 2023-05-18
Payer: COMMERCIAL

## 2023-05-18 LAB
ANION GAP SERPL CALCULATED.3IONS-SCNC: 10 MMOL/L (ref 7–15)
ATRIAL RATE - MUSE: 101 BPM
BASOPHILS # BLD AUTO: 0 10E3/UL (ref 0–0.2)
BASOPHILS NFR BLD AUTO: 1 %
BUN SERPL-MCNC: 19.7 MG/DL (ref 8–23)
CALCIUM SERPL-MCNC: 8.5 MG/DL (ref 8.8–10.2)
CHLORIDE SERPL-SCNC: 102 MMOL/L (ref 98–107)
CREAT SERPL-MCNC: 0.74 MG/DL (ref 0.51–0.95)
DEPRECATED HCO3 PLAS-SCNC: 21 MMOL/L (ref 22–29)
DIASTOLIC BLOOD PRESSURE - MUSE: NORMAL MMHG
EOSINOPHIL # BLD AUTO: 0.1 10E3/UL (ref 0–0.7)
EOSINOPHIL NFR BLD AUTO: 2 %
ERYTHROCYTE [DISTWIDTH] IN BLOOD BY AUTOMATED COUNT: 13.4 % (ref 10–15)
GFR SERPL CREATININE-BSD FRML MDRD: 84 ML/MIN/1.73M2
GLUCOSE SERPL-MCNC: 82 MG/DL (ref 70–99)
HCT VFR BLD AUTO: 20.5 % (ref 35–47)
HGB BLD-MCNC: 6.8 G/DL (ref 11.7–15.7)
HGB BLD-MCNC: 8.8 G/DL (ref 11.7–15.7)
HOLD SPECIMEN: NORMAL
IMM GRANULOCYTES # BLD: 0 10E3/UL
IMM GRANULOCYTES NFR BLD: 0 %
INTERPRETATION ECG - MUSE: NORMAL
LYMPHOCYTES # BLD AUTO: 0.9 10E3/UL (ref 0.8–5.3)
LYMPHOCYTES NFR BLD AUTO: 15 %
MCH RBC QN AUTO: 34 PG (ref 26.5–33)
MCHC RBC AUTO-ENTMCNC: 33.2 G/DL (ref 31.5–36.5)
MCV RBC AUTO: 103 FL (ref 78–100)
MONOCYTES # BLD AUTO: 0.7 10E3/UL (ref 0–1.3)
MONOCYTES NFR BLD AUTO: 11 %
NEUTROPHILS # BLD AUTO: 4.2 10E3/UL (ref 1.6–8.3)
NEUTROPHILS NFR BLD AUTO: 71 %
NRBC # BLD AUTO: 0 10E3/UL
NRBC BLD AUTO-RTO: 0 /100
P AXIS - MUSE: 74 DEGREES
PLATELET # BLD AUTO: 179 10E3/UL (ref 150–450)
POTASSIUM SERPL-SCNC: 3.8 MMOL/L (ref 3.4–5.3)
PR INTERVAL - MUSE: 122 MS
QRS DURATION - MUSE: 80 MS
QT - MUSE: 342 MS
QTC - MUSE: 443 MS
R AXIS - MUSE: 83 DEGREES
RBC # BLD AUTO: 2 10E6/UL (ref 3.8–5.2)
SODIUM SERPL-SCNC: 133 MMOL/L (ref 136–145)
SYSTOLIC BLOOD PRESSURE - MUSE: NORMAL MMHG
T AXIS - MUSE: 70 DEGREES
VENTRICULAR RATE- MUSE: 101 BPM
WBC # BLD AUTO: 6 10E3/UL (ref 4–11)

## 2023-05-18 PROCEDURE — 94640 AIRWAY INHALATION TREATMENT: CPT

## 2023-05-18 PROCEDURE — 120N000001 HC R&B MED SURG/OB

## 2023-05-18 PROCEDURE — 99232 SBSQ HOSP IP/OBS MODERATE 35: CPT | Performed by: HOSPITALIST

## 2023-05-18 PROCEDURE — 250N000013 HC RX MED GY IP 250 OP 250 PS 637

## 2023-05-18 PROCEDURE — 85018 HEMOGLOBIN: CPT | Performed by: HOSPITALIST

## 2023-05-18 PROCEDURE — 36415 COLL VENOUS BLD VENIPUNCTURE: CPT | Performed by: HOSPITALIST

## 2023-05-18 PROCEDURE — 250N000013 HC RX MED GY IP 250 OP 250 PS 637: Performed by: HOSPITALIST

## 2023-05-18 PROCEDURE — 97535 SELF CARE MNGMENT TRAINING: CPT | Mod: GO

## 2023-05-18 PROCEDURE — 250N000011 HC RX IP 250 OP 636

## 2023-05-18 PROCEDURE — 97162 PT EVAL MOD COMPLEX 30 MIN: CPT | Mod: GP

## 2023-05-18 PROCEDURE — 250N000011 HC RX IP 250 OP 636: Performed by: INTERNAL MEDICINE

## 2023-05-18 PROCEDURE — 97166 OT EVAL MOD COMPLEX 45 MIN: CPT | Mod: GO

## 2023-05-18 PROCEDURE — 97110 THERAPEUTIC EXERCISES: CPT | Mod: GP

## 2023-05-18 PROCEDURE — P9016 RBC LEUKOCYTES REDUCED: HCPCS

## 2023-05-18 PROCEDURE — 80048 BASIC METABOLIC PNL TOTAL CA: CPT | Performed by: HOSPITALIST

## 2023-05-18 PROCEDURE — 999N000157 HC STATISTIC RCP TIME EA 10 MIN

## 2023-05-18 PROCEDURE — 250N000009 HC RX 250: Performed by: INTERNAL MEDICINE

## 2023-05-18 PROCEDURE — 94640 AIRWAY INHALATION TREATMENT: CPT | Mod: 76

## 2023-05-18 PROCEDURE — 97530 THERAPEUTIC ACTIVITIES: CPT | Mod: GP

## 2023-05-18 PROCEDURE — 250N000013 HC RX MED GY IP 250 OP 250 PS 637: Performed by: INTERNAL MEDICINE

## 2023-05-18 RX ORDER — IPRATROPIUM BROMIDE AND ALBUTEROL SULFATE 2.5; .5 MG/3ML; MG/3ML
3 SOLUTION RESPIRATORY (INHALATION)
Status: DISCONTINUED | OUTPATIENT
Start: 2023-05-18 | End: 2023-05-18

## 2023-05-18 RX ORDER — LEVALBUTEROL INHALATION SOLUTION 1.25 MG/3ML
1.25 SOLUTION RESPIRATORY (INHALATION)
Status: DISCONTINUED | OUTPATIENT
Start: 2023-05-18 | End: 2023-05-19 | Stop reason: HOSPADM

## 2023-05-18 RX ADMIN — LEVALBUTEROL HYDROCHLORIDE 1.25 MG: 1.25 SOLUTION RESPIRATORY (INHALATION) at 16:28

## 2023-05-18 RX ADMIN — CEFAZOLIN 1 G: 1 INJECTION, POWDER, FOR SOLUTION INTRAMUSCULAR; INTRAVENOUS at 05:54

## 2023-05-18 RX ADMIN — IPRATROPIUM BROMIDE 0.5 MG: 0.5 SOLUTION RESPIRATORY (INHALATION) at 16:28

## 2023-05-18 RX ADMIN — ACETAMINOPHEN 975 MG: 325 TABLET ORAL at 01:03

## 2023-05-18 RX ADMIN — FOLIC ACID 1 MG: 1 TABLET ORAL at 08:24

## 2023-05-18 RX ADMIN — ENOXAPARIN SODIUM 40 MG: 40 INJECTION SUBCUTANEOUS at 12:09

## 2023-05-18 RX ADMIN — ACETAMINOPHEN 975 MG: 325 TABLET ORAL at 17:25

## 2023-05-18 RX ADMIN — NICOTINE 1 PATCH: 21 PATCH, EXTENDED RELEASE TRANSDERMAL at 08:26

## 2023-05-18 RX ADMIN — PANTOPRAZOLE SODIUM 40 MG: 40 TABLET, DELAYED RELEASE ORAL at 05:54

## 2023-05-18 RX ADMIN — POLYETHYLENE GLYCOL 3350 17 G: 17 POWDER, FOR SOLUTION ORAL at 08:24

## 2023-05-18 RX ADMIN — OXYCODONE HYDROCHLORIDE 5 MG: 5 TABLET ORAL at 08:24

## 2023-05-18 RX ADMIN — HYDROMORPHONE HYDROCHLORIDE 0.2 MG: 0.2 INJECTION, SOLUTION INTRAMUSCULAR; INTRAVENOUS; SUBCUTANEOUS at 01:03

## 2023-05-18 RX ADMIN — CEFTRIAXONE SODIUM 1 G: 1 INJECTION, POWDER, FOR SOLUTION INTRAMUSCULAR; INTRAVENOUS at 08:24

## 2023-05-18 RX ADMIN — IPRATROPIUM BROMIDE 0.5 MG: 0.5 SOLUTION RESPIRATORY (INHALATION) at 20:56

## 2023-05-18 RX ADMIN — LEVALBUTEROL HYDROCHLORIDE 1.25 MG: 1.25 SOLUTION RESPIRATORY (INHALATION) at 20:56

## 2023-05-18 RX ADMIN — ACETAMINOPHEN 975 MG: 325 TABLET ORAL at 10:30

## 2023-05-18 RX ADMIN — OXYCODONE HYDROCHLORIDE 10 MG: 5 TABLET ORAL at 20:21

## 2023-05-18 RX ADMIN — SENNOSIDES AND DOCUSATE SODIUM 1 TABLET: 50; 8.6 TABLET ORAL at 08:25

## 2023-05-18 RX ADMIN — Medication 50 MG: at 08:26

## 2023-05-18 ASSESSMENT — ACTIVITIES OF DAILY LIVING (ADL)
ADLS_ACUITY_SCORE: 43
ADLS_ACUITY_SCORE: 43
ADLS_ACUITY_SCORE: 45
ADLS_ACUITY_SCORE: 43
ADLS_ACUITY_SCORE: 43
ADLS_ACUITY_SCORE: 45
ADLS_ACUITY_SCORE: 45
PREVIOUS_RESPONSIBILITIES: MEAL PREP;HOUSEKEEPING;LAUNDRY;SHOPPING;MEDICATION MANAGEMENT
ADLS_ACUITY_SCORE: 45
ADLS_ACUITY_SCORE: 43
ADLS_ACUITY_SCORE: 43
ADLS_ACUITY_SCORE: 45
ADLS_ACUITY_SCORE: 45

## 2023-05-18 NOTE — PROGRESS NOTES
05/18/23 9609   Appointment Info   Signing Clinician's Name / Credentials (PT) Pastora Talley DPT   Living Environment   People in Home alone   Current Living Arrangements house   Home Accessibility no concerns   Transportation Anticipated family or friend will provide;health plan transportation   Self-Care   Usual Activity Tolerance good   Current Activity Tolerance fair   Equipment Currently Used at Home walker, rolling  (FWW and 4WW)   Fall history within last six months yes   Number of times patient has fallen within last six months 1   General Information   Onset of Illness/Injury or Date of Surgery 05/16/23   Referring Physician Betsey Pina, MARLEE   Patient/Family Therapy Goals Statement (PT) go to TCU   Pertinent History of Current Problem (include personal factors and/or comorbidities that impact the POC) 73-year-old female with history of COPD, hypertension and osteoporosis.  She presented to the emergency room 5/16 after falling and sustaining a right intertrochanteric fracture.  She is s/p Open reduction, internal fixation right intertrochanteric femur fracture with long cephalomedullary nail 5/17   Existing Precautions/Restrictions fall   Strength (Manual Muscle Testing)   Strength (Manual Muscle Testing) Deficits observed during functional mobility   Bed Mobility   Bed Mobility supine-sit;sit-supine   Supine-Sit [place] (Bed Mobility) minimum assist (75% patient effort)   Sit-Supine [place] (Bed Mobility) moderate assist (50% patient effort)   Bed Mobility Limitations decreased ability to use arms for pushing/pulling;decreased ability to use legs for bridging/pushing;impaired ability to control trunk for mobility   Impairments Contributing to Impaired Bed Mobility pain   Transfers   Transfers sit-stand transfer   Sit-Stand Transfer   Sit-Stand [place] (Transfers) moderate assist (50% patient effort)   Assistive Device (Sit-Stand Transfers) walker, front-wheeled   Gait/Stairs  (Locomotion)   Comment, (Gait/Stairs) n/a- did not ambulate during eval due to fatigue.   Clinical Impression   Criteria for Skilled Therapeutic Intervention Yes, treatment indicated   PT Diagnosis (PT) Impaired functional mobility   Influenced by the following impairments Weakness, pain, fatigue, post-op   Functional limitations due to impairments Impaired strength, transfers, gait   Clinical Presentation (PT Evaluation Complexity) Stable/Uncomplicated   Clinical Presentation Rationale Presents as diagnosed   Clinical Decision Making (Complexity) moderate complexity   Planned Therapy Interventions (PT) bed mobility training;gait training;home exercise program;strengthening;transfer training   Anticipated Equipment Needs at Discharge (PT) walker, rolling   Risk & Benefits of therapy have been explained patient   PT Total Evaluation Time   PT Eval, Moderate Complexity Minutes (47105) 8   Physical Therapy Goals   PT Frequency 2x/day   PT Predicted Duration/Target Date for Goal Attainment 05/25/23   PT Goals Bed Mobility;Transfers;Gait   PT: Bed Mobility Supervision/stand-by assist;Supine to/from sit   PT: Transfers Sit to/from stand;Bed to/from chair;Assistive device  (CGA)   PT: Gait Minimal assist;Rolling walker;50 feet   PT Discharge Planning   PT Plan progress transfers, amb as rajwinder, LE ex   PT Discharge Recommendation (DC Rec) Transitional Care Facility   PT Rationale for DC Rec Unable to ambulate due to pain and fatigue.   PT Brief overview of current status Mod A with sit <> stand.   Total Session Time   Total Session Time (sum of timed and untimed services) 8       Pastora Talley, PT, DPT  5/18/2023

## 2023-05-18 NOTE — PROGRESS NOTES
[facility]    Medicine Progress Note - Hospitalist Service    Date of Admission:  5/16/2023    Assessment & Plan     73-year-old female with history of COPD, hypertension and osteoporosis.  She presented to the emergency room 5/16 after falling and sustaining a right intertrochanteric fracture.  She is s/p Open reduction, internal fixation right intertrochanteric femur fracture with long cephalomedullary nail 5/17      Right hip intertrochanteric fracture  -Traumatic from fall  -S/p Open reduction, internal fixation right intertrochanteric femur fracture with long cephalomedullary nail 5/17  -Postop surgical management per orthopedics including pain control, DVT prophylaxis and activity  -PT and OT are recommending TCU.    COPD  Tobacco use disorder  -Continue Spiriva and albuterol nebulizers as needed  -Scheduled DuoNebs  -Tobacco cessation reiterated as patient continues to smoke  -Nicotine patch offered    Acute blood loss anemia  -Multifactorial from recent surgery and possibly account component of dilutional anemia  -Hemoglobin dropped to 6.8 this morning.  -1 unit packed red blood cells 5/18  -Posttransfusion H&H  -Transfuse packed red blood cells if hemoglobin less than 7    Alcohol use disorder  -Currently not exhibiting any signs of withdrawal  -We will monitor closely and initiate CIWA if any signs of withdrawal  -Thiamine and folic acid    Hyponatremia   -Stable  -HCTZ likely contributing factor    Toxic metabolic encephalopathy  -Resolved.  -Multifactorial from recent anesthesia and  UTI    Osteoporosis  -Resume biphosphonate post discharge    Hypertension  -Presently controlled  -Resume PTA meds valsartan/hydrochlorothiazide         Diet: Advance Diet as Tolerated: Regular Diet Adult  Discharge Instruction - Regular Diet AdultLow-sodium diet  DVT Prophylaxis: Enoxaparin (Lovenox) SQ  Delgado Catheter: Not present  Lines: None     Cardiac Monitoring: None  Code Status: Full Code       Clinically Significant Risk Factors                  # Hypertension: Noted on problem list                 Disposition Plan  TBD     Expected Discharge Date: 05/20/2023    Discharge Delays: IV Medication - consider oral or Home Infusion  PT Disposition recs needed              Elvis Baer MD  Hospitalist Service  [facility]  Securely message with [employer] (more info)  Text page via [employer] Paging/Directory   ______________________________________________________________________    Interval History     Patient seen and examined this morning.  No headache, chest pain or shortness of breath.  She appears relatively comfortable.  Ambulated this morning and did relatively well although she said she was a bit shaky.  Pain is adequately managed.  Patient states that nicotine patch is helping.    Physical Exam   Vital Signs: Temp: 97.6  F (36.4  C) Temp src: Axillary BP: 122/65 Pulse: 90   Resp: 18 SpO2: 92 % O2 Device: None (Room air) Oxygen Delivery: 2 LPM  Weight: 100 lbs 4.95 oz      Physical Exam  HENT:      Head: Normocephalic.   Cardiovascular:      Rate and Rhythm: Normal rate.   Pulmonary:      Effort: Pulmonary effort is normal.   Abdominal:      General: Abdomen is flat.   Musculoskeletal:      Cervical back: Normal range of motion.      Right hip: Tenderness present. Decreased range of motion.   Neurological:      General: No focal deficit present.      Mental Status: She is alert and oriented to person, place, and time.         Medical Decision Making       45 MINUTES SPENT BY ME on the date of service doing chart review, history, exam, documentation & further activities per the note.      Data   ------------------------- PAST 24 HR DATA REVIEWED -----------------------------------------------    I have personally reviewed the following data over the past 24 hrs:    6.0  \   6.8 (LL)   / 179     133 (L) 102 19.7 /  82   3.8 21 (L) 0.74 \       Imaging results reviewed over the past  24 hrs:   Recent Results (from the past 24 hour(s))   XR Surgery WENDY Fluoro L/T 5 Min    Narrative    This exam was marked as non-reportable because it will not be read by a   radiologist or a [facility] non-radiologist provider.         XR Pelvis Port 1/2 Views    Narrative    EXAM: XR PELVIS PORT 1/2 VIEWS  LOCATION: [facility]  DATE/TIME: 5/17/2023 5:24 PM CDT    INDICATION: Status post hip surgery.  COMPARISON: None.      Impression    IMPRESSION: ORIF of right intertrochanteric fracture with gamma type nail. The similar procedure has been for performed in the past on the left. No evidence of complication.   XR Femur Port Right 2 Views    Narrative    EXAM: XR FEMUR PORT RIGHT 2 VIEWS  LOCATION: [facility]  DATE/TIME: 5/17/2023 5:26 PM CDT    INDICATION: Status post Hip surgery  COMPARISON: None.      Impression    IMPRESSION: Postoperative changes of IM emerson and screw fixation traversing an intertrochanteric fracture of the right hip. The fracture is in good anatomic alignment with some medial displacement of the fracture fragment involving the lesser trochanter.   No dislocation. Post procedural air surrounding the right hip joint.

## 2023-05-18 NOTE — PROGRESS NOTES
05/18/23 0915   Appointment Info   Signing Clinician's Name / Credentials (OT) Pao Padilla OTR/L   Living Environment   People in Home alone   Current Living Arrangements house  (1 level cottage)   Home Accessibility no concerns   Self-Care   Usual Activity Tolerance good   Current Activity Tolerance fair   Equipment Currently Used at Home grab bar, tub/shower;shower chair;walker, rolling  (also has 4WW too)   Fall history within last six months yes   Number of times patient has fallen within last six months 1   Activity/Exercise/Self-Care Comment pt I with all ADL's prior to admission   Instrumental Activities of Daily Living (IADL)   Previous Responsibilities meal prep;housekeeping;laundry;shopping;medication management   IADL Comments pt I with all household tasks prior to admission   General Information   Onset of Illness/Injury or Date of Surgery 05/16/23   Referring Physician Dr. Garcia   Patient/Family Therapy Goal Statement (OT) none stated   Additional Occupational Profile Info/Pertinent History of Current Problem Namita is a very pleasant 73-year-old female who has a past medical history notable for COPD, ongoing tobacco use, hypertension, anemia, and history of fragility fractures including previous left intertrochanteric femur fracture status post short cephalomedullary nailing in 2018 who sustained a mechanical fall at home yesterday morning.  She states that she was recently diagnosed with a UTI (5/15/23), for which she is being treated on cefdinir.  She reports having confusion/dizziness related to her UTI, and was staying at her son's home when she sustained a mechanical fall landing onto the right hip.  She had onset of severe pain and inability to get up under her own power.  She was found by her son, and brought to the emergency department for further evaluation.  X-rays and CT scan were obtained, which demonstrated displaced right intertrochanteric femur fracture with extension into  the subtrochanteric region.   Existing Precautions/Restrictions fall   Right Lower Extremity (Weight-bearing Status) weight-bearing as tolerated (WBAT)   Cognitive Status Examination   Orientation Status orientation to person, place and time   Affect/Mental Status (Cognitive) WNL   Follows Commands WNL   Pain Assessment   Patient Currently in Pain Yes, see Vital Sign flowsheet  (R leg)   Range of Motion Comprehensive   General Range of Motion no range of motion deficits identified   Strength Comprehensive (MMT)   General Manual Muscle Testing (MMT) Assessment no strength deficits identified   Transfers   Transfers bed-chair transfer;sit-stand transfer   Transfer Skill: Bed to Chair/Chair to Bed   Bed-Chair [place] (Transfers) minimum assist (75% patient effort)   Sit-Stand Transfer   Sit-Stand [place] (Transfers) contact guard;minimum assist (75% patient effort)   Sit/Stand Transfer Comments due to limited mobility at this time, pt likely to have deficits with most ADL tasks initially including toileting and dressing   Activities of Daily Living   BADL Assessment/Intervention lower body dressing;grooming   Lower Body Dressing Assessment/Training   [place] Level (Lower Body Dressing) dependent (less than 25% patient effort)  (without AE)   Grooming Assessment/Training   Position (Grooming) supported sitting   [place] Level (Grooming) supervision;set up   Clinical Impression   Criteria for Skilled Therapeutic Interventions Met (OT) Yes, treatment indicated   OT Diagnosis decreased ADL independence   Influenced by the following impairments pain, weakness   OT Problem List-Impairments impacting ADL problems related to;balance;mobility;strength;pain   Assessment of Occupational Performance 3-5 Performance Deficits   Identified Performance Deficits dressing, toileting, bed mob, trsfs   Planned Therapy Interventions (OT) ADL retraining;balance training;bed mobility training;transfer training   Clinical  Decision Making Complexity (OT) moderate complexity   Risk & Benefits of therapy have been explained evaluation/treatment results reviewed;patient   OT Total Evaluation Time   OT Eval, Moderate Complexity Minutes (67216) 14   OT Goals   Therapy Frequency (OT) Daily   OT Predicted Duration/Target Date for Goal Attainment 05/24/23   OT Goals Lower Body Dressing;Bed Mobility;Transfers;Toilet Transfer/Toileting   OT: Lower Body Dressing Minimal assist;using adaptive equipment;within precautions   OT: Bed Mobility Minimal assist   OT: Transfer Supervision/stand-by assist   OT: Toilet Transfer/Toileting Supervision/stand-by assist;cleaning and garment management   Interventions   Interventions Quick Adds Self-Care/Home Management   Self-Care/Home Management   Self-Care/Home Mgmt/ADL, Compensatory, Meal Prep Minutes (28037) 10   Symptoms Noted During/After Treatment (Meal Preparation/Planning Training) increased pain   Treatment Detail/Skilled Intervention Eval completed, treatment initiated.  Pt motivated to move and get better.  g/h completed with setup after initially being unable to complete at bedside table while standing.  Sit to stand from chair completed with multiple trials, min assist initially with increase in I'ence with initial attempts.  Only able to stand for ~ 20 sec at most with each stand, completed x 3 reps.  Dependent with LE dressing after initial attempt, gave cues for different tech but still needing total assist to correct.  Pt left in chair with call light and alarm activated.   OT Discharge Planning   OT Plan LE dressing with AE, trsfs, bed mob, mobility in room   OT Discharge Recommendation (DC Rec) Transitional Care Facility   OT Rationale for DC Rec Pt lives alone and is currently needing assist of 1 to stand, had not progressed to mobility with OT during this session.  If able to progress, would still likely need assist of 1 at all times for all tasks, mobility   OT Brief overview of current  status min assist standing, total assist LE dressin without AE, motivated   Total Session Time   Timed Code Treatment Minutes 10   Total Session Time (sum of timed and untimed services) 24

## 2023-05-18 NOTE — PLAN OF CARE
Problem: Pain Acute  Goal: Optimal Pain Control and Function  Outcome: Progressing     Problem: Orthopaedic Fracture  Goal: Absence of Bleeding  Outcome: Progressing   Goal Outcome Evaluation:                    Pt arrived to floor from surgery, eager to eat. Tray ordered.  Hip dressing CDI; CMS intact. Pt denies pain at this time.   Pt on room air, pulse ox beeping at times due to nail polish in place on patient's finger. Pt declined nicotine patch at this time.   Oncoming shift aware that pt needs to be bladder scanned as follow up from PACU bladder scan volume.

## 2023-05-18 NOTE — PROGRESS NOTES
Respiratory Therapy Note    Patient is receiving QID Atrovent/Xopenex. Patient is on RA, SpO2 94%. Pre-treatment , RR 18, Breath sounds clear. Post-treatment , RR 18 and breath sounds improved with some increased aeration, coarse in the upper lobes. Pt states using Incentive Spirometer independently. Albuterol Inhaler in room for PRN use. Continue to encourage deep breathing and coughing techniques.     Sury Palacios, RT

## 2023-05-18 NOTE — PROGRESS NOTES
Orthopaedic Surgery Progress Note   5/18/2023    Subjective: No acute events overnight.  Repeat hemoglobin this morning did drop from 7.5 yesterday evening postop to 6.8, and received 1 unit pRBC.  Repeat hemoglobin this afternoon was 8.8.  Namita reports overall she feels that she is doing well today.  Pain well controlled on current regimen, did need an oxycodone this afternoon with good response.  She reports most of her pain is up at the hip and some thigh discomfort.  No reports of knee pain or distal right lower extremity discomfort. Per her report and nursing, she has been mobilizing well, was able to work with physical therapy and ambulate with walker today.  Denies significant pain with this. Tolerating diet, no reports of nausea or vomiting. Voiding spontaneously.  Denies fever or chills, CP, SOB, lightheadedness/dizziness, numbness or tingling, motor dysfunction or weakness.  Overall no specific concerns today.     Objective: /75 (BP Location: Left arm)   Pulse 96   Temp 98.1  F (36.7  C) (Oral)   Resp 18   Wt 45.5 kg (100 lb 5 oz)   SpO2 94%   BMI 19.59 kg/m      General: NAD, alert and oriented, cooperative with exam, very pleasant.   Cardio: RRR, extremities wwp.   Respiratory: Non-labored breathing.  MSK: Focused examination of the RLE: Toes wwp, good palpable distal pulses.  Mild swelling at the hip and thigh, compartments soft and compressible.  Mild reports of soreness/tenderness to palpation over the thigh.  No significant appreciable ecchymosis, warmth or redness about the incisions or thigh.  No tenderness palpation at the knee, no knee effusion.  +EHL/FHL/GSC/TA with 5/5 strength. SILT SP/DP/Sa/Granda/T. Dressing c/d/i.     Labs:  Lab Results   Component Value Date    WBC 6.0 05/18/2023    HGB 8.8 (L) 05/18/2023     05/18/2023    INR 0.95 04/30/2018     Assessment and Plan: Namita Viera is a 73 year old female with PMH including COPD, ongoing tobacco use, hypertension,  anemia, osteoporosis on alendronate, previous left intertrochanteric femur fracture status post CMN 2018, who sustained right displaced intertrochanteric femur fracture after mechanical fall on 5/16/2023 now s/p open reduction internal fixation right intertrochanteric femur fracture with long cephalomedullary nail. Did have post operative anemia (hgb 7.5 -- 6.8 this AM) responsive to 1 unit pRBC. Otherwise doing well post-operatively.    Activity: Up with assist and assistive device until independent  Weight bearing status: WBAT RLE.  Pain management: Transition from IV to PO as tolerated.    Antibiotics: Continue with prior to admission cefdinir for UTI.  Diet: Begin with clear fluids and progress diet as tolerated.   DVT prophylaxis: lovenox 40 mg daily x 4 weeks and mechanical while in the hospital  Imaging: post op XR complete   Labs: Repeat CBC in AM, recommend transfusion if HGB <7  Dressings: Keep clean, dry and intact x 3 days.   Elevation: Elevate RLE on pillows to keep above the level of the heart as much as possible.   Physical Therapy/Occupational Therapy: Eval and treat, appreciate assistance and recommendations.  Consults: PT/OT, care coordination for disposition planning. Smoking cessation resources.  --Would recommend outpatient follow up with PCP or endocrinology for bone health management given multiple fragility fractures. Is currently on alendronate, but may require repeat DEXA and consideration for adjusting current medication regimen.    Follow-up: Clinic in 2 weeks for incision check and repeat x-rays needed.   Disposition: Pending progress with therapies, pain control on orals, and medical stability, anticipate discharge to TCU in 1-2 days.     Ollie Tucker MD  Orthopedic Surgery

## 2023-05-18 NOTE — ADDENDUM NOTE
Addendum  created 05/18/23 1426 by Pastora Gardiner MD    Clinical Note Signed, Diagnosis association updated, Intraprocedure Blocks edited, SmartForm saved

## 2023-05-18 NOTE — PROGRESS NOTES
Care Management Follow Up    Length of Stay (days): 2    Expected Discharge Date: 05/20/2023     Concerns to be Addressed:       Patient plan of care discussed at interdisciplinary rounds: Yes    Anticipated Discharge Disposition:       Anticipated Discharge Services:    Anticipated Discharge DME:      Patient/family educated on Medicare website which has current facility and service quality ratings:    Education Provided on the Discharge Plan:    Patient/Family in Agreement with the Plan:      Referrals Placed by CM/SW:    Private pay costs discussed: Not applicable    Additional Information:  RNCM met with patient at bedside. Discussed discharge recommendations. Agreeable to TCU. Strong preference for [facility], agreeable to others if unable to accept there.     Referrals sent.     CM will continue to follow care progression and aide in discharge planning as needed.     11:26 AM - [facility].     Sonal Gonzalez RN

## 2023-05-18 NOTE — PLAN OF CARE
"  Problem: Plan of Care - These are the overarching goals to be used throughout the patient stay.    Goal: Plan of Care Review  Description: The Plan of Care Review/Shift note should be completed every shift.  The Outcome Evaluation is a brief statement about your assessment that the patient is improving, declining, or no change.  This information will be displayed automatically on your shift note.  Outcome: Progressing  Goal: Patient-Specific Goal (Individualized)  Description: You can add care plan individualizations to a care plan. Examples of Individualization might be:  \"Parent requests to be called daily at 9am for status\", \"I have a hard time hearing out of my right ear\", or \"Do not touch me to wake me up as it startles me\".  Outcome: Progressing  Goal: Absence of Hospital-Acquired Illness or Injury  Outcome: Progressing  Intervention: Identify and Manage Fall Risk  Recent Flowsheet Documentation  Taken 5/18/2023 0400 by Maykel Soto RN  Safety Promotion/Fall Prevention:   assistive device/personal items within reach   safety round/check completed   lighting adjusted   room door open  Taken 5/18/2023 0001 by Maykel Soto RN  Safety Promotion/Fall Prevention:   assistive device/personal items within reach   safety round/check completed   lighting adjusted   room door open  Taken 5/17/2023 2054 by Maykel Soto RN  Safety Promotion/Fall Prevention:   assistive device/personal items within reach   safety round/check completed   lighting adjusted   room door open  Intervention: Prevent Skin Injury  Recent Flowsheet Documentation  Taken 5/18/2023 0400 by Maykel Soto RN  Body Position: position changed independently  Taken 5/18/2023 0001 by Maykel Soto RN  Body Position: position changed independently  Goal: Optimal Comfort and Wellbeing  Outcome: Progressing  Goal: Readiness for Transition of Care  Outcome: Progressing     Problem: Risk for Delirium  Goal: Optimal Coping  Outcome: " Progressing  Goal: Improved Behavioral Control  Outcome: Progressing  Goal: Improved Attention and Thought Clarity  Outcome: Progressing  Intervention: Maximize Cognitive Function  Recent Flowsheet Documentation  Taken 5/18/2023 0001 by Maykel Soto RN  Sensory Stimulation Regulation:   lighting decreased   care clustered  Reorientation Measures: clock in view  Taken 5/17/2023 2054 by Maykel Soto RN  Sensory Stimulation Regulation:   lighting decreased   care clustered  Reorientation Measures: clock in view  Goal: Improved Sleep  Outcome: Progressing     Problem: Pain Acute  Goal: Optimal Pain Control and Function  Outcome: Progressing  Intervention: Prevent or Manage Pain  Recent Flowsheet Documentation  Taken 5/18/2023 0400 by Maykel Soto RN  Medication Review/Management: medications reviewed  Taken 5/18/2023 0001 by Maykel Soto RN  Sensory Stimulation Regulation:   lighting decreased   care clustered  Medication Review/Management: medications reviewed  Taken 5/17/2023 2054 by Maykel Soto RN  Sensory Stimulation Regulation:   lighting decreased   care clustered  Medication Review/Management: medications reviewed     Problem: Orthopaedic Fracture  Goal: Absence of Bleeding  Outcome: Progressing  Goal: Effective Bowel Elimination  Outcome: Progressing  Goal: Absence of Embolism Signs and Symptoms  Outcome: Progressing  Goal: Fracture Stability  Outcome: Progressing  Goal: Optimal Functional Ability  Outcome: Progressing  Intervention: Optimize Functional Ability  Recent Flowsheet Documentation  Taken 5/18/2023 0400 by Maykel Soto RN  Activity Management: activity adjusted per tolerance  Positioning/Transfer Devices:   pillows   in use  Taken 5/18/2023 0001 by Maykel Soto RN  Activity Management: up to bedside commode  Positioning/Transfer Devices:   pillows   in use  Goal: Absence of Infection Signs and Symptoms  Outcome: Progressing  Goal: Effective Tissue  Perfusion  Outcome: Progressing  Goal: Optimal Pain Control and Function  Outcome: Progressing  Goal: Effective Oxygenation and Ventilation  Outcome: Progressing  Intervention: Promote Airway Secretion Clearance  Recent Flowsheet Documentation  Taken 5/18/2023 0400 by Maykel Soto RN  Activity Management: activity adjusted per tolerance  Taken 5/18/2023 0001 by Maykel Soto RN  Cough And Deep Breathing: done independently per patient  Activity Management: up to bedside commode  Taken 5/17/2023 2054 by Maykel Soto RN  Cough And Deep Breathing: done independently per patient  Intervention: Optimize Oxygenation and Ventilation  Recent Flowsheet Documentation  Taken 5/18/2023 0400 by Maykel Soto RN  Head of Bed (HOB) Positioning: HOB at 20-30 degrees  Taken 5/18/2023 0001 by Maykel Soto RN  Head of Bed (HOB) Positioning: HOB at 20-30 degrees   Goal Outcome Evaluation:  Namita remained hemodynamically stable overnight and pain was well managed with prn pain medications. She pivoted to the commode. Good urine output. Sites are well dressed without drainage.

## 2023-05-18 NOTE — PLAN OF CARE
Problem: Orthopaedic Fracture  Goal: Optimal Pain Control and Function  Intervention: Manage Acute Orthopaedic-Related Pain  Recent Flowsheet Documentation  Taken 5/18/2023 0824 by Abi Martinez RN  Pain Management Interventions: medication (see MAR)   Goal Outcome Evaluation:       Pain managed effectively with oxycodone and acetaminophen. Patient up with assist of one, gait belt and walker. Voiding well. Has good oral intake. Received one unit of blood due to Hgb of 6.8, re-check at 1600. Tolerated transfusion well.

## 2023-05-19 ENCOUNTER — APPOINTMENT (OUTPATIENT)
Dept: PHYSICAL THERAPY | Facility: HOSPITAL | Age: 73
DRG: 480 | End: 2023-05-19
Payer: COMMERCIAL

## 2023-05-19 VITALS
TEMPERATURE: 98.2 F | WEIGHT: 100.31 LBS | RESPIRATION RATE: 18 BRPM | BODY MASS INDEX: 19.59 KG/M2 | DIASTOLIC BLOOD PRESSURE: 76 MMHG | OXYGEN SATURATION: 95 % | SYSTOLIC BLOOD PRESSURE: 142 MMHG | HEART RATE: 94 BPM

## 2023-05-19 LAB
ANION GAP SERPL CALCULATED.3IONS-SCNC: 10 MMOL/L (ref 7–15)
BASOPHILS # BLD AUTO: 0 10E3/UL (ref 0–0.2)
BASOPHILS NFR BLD AUTO: 1 %
BUN SERPL-MCNC: 11.5 MG/DL (ref 8–23)
CALCIUM SERPL-MCNC: 8.9 MG/DL (ref 8.8–10.2)
CHLORIDE SERPL-SCNC: 104 MMOL/L (ref 98–107)
CREAT SERPL-MCNC: 0.72 MG/DL (ref 0.51–0.95)
DEPRECATED HCO3 PLAS-SCNC: 23 MMOL/L (ref 22–29)
EOSINOPHIL # BLD AUTO: 0.1 10E3/UL (ref 0–0.7)
EOSINOPHIL NFR BLD AUTO: 1 %
ERYTHROCYTE [DISTWIDTH] IN BLOOD BY AUTOMATED COUNT: 17 % (ref 10–15)
GFR SERPL CREATININE-BSD FRML MDRD: 87 ML/MIN/1.73M2
GLUCOSE SERPL-MCNC: 93 MG/DL (ref 70–99)
HCT VFR BLD AUTO: 23.3 % (ref 35–47)
HGB BLD-MCNC: 8.1 G/DL (ref 11.7–15.7)
IMM GRANULOCYTES # BLD: 0.1 10E3/UL
IMM GRANULOCYTES NFR BLD: 1 %
LYMPHOCYTES # BLD AUTO: 0.8 10E3/UL (ref 0.8–5.3)
LYMPHOCYTES NFR BLD AUTO: 12 %
MCH RBC QN AUTO: 32.8 PG (ref 26.5–33)
MCHC RBC AUTO-ENTMCNC: 34.8 G/DL (ref 31.5–36.5)
MCV RBC AUTO: 94 FL (ref 78–100)
MONOCYTES # BLD AUTO: 0.8 10E3/UL (ref 0–1.3)
MONOCYTES NFR BLD AUTO: 12 %
NEUTROPHILS # BLD AUTO: 5.1 10E3/UL (ref 1.6–8.3)
NEUTROPHILS NFR BLD AUTO: 73 %
NRBC # BLD AUTO: 0 10E3/UL
NRBC BLD AUTO-RTO: 0 /100
PLATELET # BLD AUTO: 195 10E3/UL (ref 150–450)
POTASSIUM SERPL-SCNC: 3.9 MMOL/L (ref 3.4–5.3)
RBC # BLD AUTO: 2.47 10E6/UL (ref 3.8–5.2)
SODIUM SERPL-SCNC: 137 MMOL/L (ref 136–145)
WBC # BLD AUTO: 6.9 10E3/UL (ref 4–11)

## 2023-05-19 PROCEDURE — 250N000011 HC RX IP 250 OP 636: Performed by: INTERNAL MEDICINE

## 2023-05-19 PROCEDURE — 97530 THERAPEUTIC ACTIVITIES: CPT | Mod: GP

## 2023-05-19 PROCEDURE — 94640 AIRWAY INHALATION TREATMENT: CPT | Mod: 76

## 2023-05-19 PROCEDURE — 99239 HOSP IP/OBS DSCHRG MGMT >30: CPT | Performed by: HOSPITALIST

## 2023-05-19 PROCEDURE — 250N000009 HC RX 250: Performed by: INTERNAL MEDICINE

## 2023-05-19 PROCEDURE — 250N000013 HC RX MED GY IP 250 OP 250 PS 637

## 2023-05-19 PROCEDURE — 36415 COLL VENOUS BLD VENIPUNCTURE: CPT | Performed by: HOSPITALIST

## 2023-05-19 PROCEDURE — 999N000157 HC STATISTIC RCP TIME EA 10 MIN

## 2023-05-19 PROCEDURE — 250N000013 HC RX MED GY IP 250 OP 250 PS 637: Performed by: HOSPITALIST

## 2023-05-19 PROCEDURE — 80048 BASIC METABOLIC PNL TOTAL CA: CPT | Performed by: HOSPITALIST

## 2023-05-19 PROCEDURE — 85025 COMPLETE CBC W/AUTO DIFF WBC: CPT | Performed by: HOSPITALIST

## 2023-05-19 PROCEDURE — 250N000013 HC RX MED GY IP 250 OP 250 PS 637: Performed by: INTERNAL MEDICINE

## 2023-05-19 PROCEDURE — 97116 GAIT TRAINING THERAPY: CPT | Mod: GP

## 2023-05-19 PROCEDURE — 94640 AIRWAY INHALATION TREATMENT: CPT

## 2023-05-19 RX ORDER — OXYCODONE HYDROCHLORIDE 5 MG/1
5 TABLET ORAL EVERY 6 HOURS PRN
Qty: 10 TABLET | Refills: 0 | Status: SHIPPED | OUTPATIENT
Start: 2023-05-19 | End: 2023-05-19

## 2023-05-19 RX ORDER — CEFDINIR 300 MG/1
300 CAPSULE ORAL 2 TIMES DAILY
Qty: 6 CAPSULE | Refills: 0 | Status: SHIPPED | OUTPATIENT
Start: 2023-05-19 | End: 2023-05-22

## 2023-05-19 RX ORDER — OXYCODONE HYDROCHLORIDE 5 MG/1
5 TABLET ORAL EVERY 6 HOURS PRN
Qty: 10 TABLET | Refills: 0 | Status: SHIPPED | OUTPATIENT
Start: 2023-05-19 | End: 2023-05-23

## 2023-05-19 RX ORDER — AMOXICILLIN 250 MG
1 CAPSULE ORAL 2 TIMES DAILY PRN
Qty: 30 TABLET | Refills: 0 | Status: SHIPPED | OUTPATIENT
Start: 2023-05-19 | End: 2023-06-26

## 2023-05-19 RX ORDER — ENOXAPARIN SODIUM 100 MG/ML
40 INJECTION SUBCUTANEOUS EVERY 24 HOURS
Qty: 11.2 ML | Refills: 0 | Status: SHIPPED | OUTPATIENT
Start: 2023-05-19 | End: 2024-06-27

## 2023-05-19 RX ORDER — POLYETHYLENE GLYCOL 3350 17 G/17G
17 POWDER, FOR SOLUTION ORAL DAILY
Qty: 30 EACH | Refills: 0 | Status: SHIPPED | OUTPATIENT
Start: 2023-05-20 | End: 2023-06-26

## 2023-05-19 RX ORDER — AMOXICILLIN 250 MG
1 CAPSULE ORAL 2 TIMES DAILY PRN
Qty: 30 TABLET | Refills: 0 | Status: SHIPPED | OUTPATIENT
Start: 2023-05-19 | End: 2023-05-19

## 2023-05-19 RX ORDER — ENOXAPARIN SODIUM 100 MG/ML
40 INJECTION SUBCUTANEOUS EVERY 24 HOURS
Qty: 11.2 ML | Refills: 0 | Status: SHIPPED | OUTPATIENT
Start: 2023-05-19 | End: 2023-05-19

## 2023-05-19 RX ORDER — ACETAMINOPHEN 325 MG/1
650 TABLET ORAL EVERY 6 HOURS PRN
Qty: 100 TABLET | Refills: 0 | Status: SHIPPED | OUTPATIENT
Start: 2023-05-19 | End: 2023-05-31

## 2023-05-19 RX ORDER — NICOTINE 21 MG/24HR
1 PATCH, TRANSDERMAL 24 HOURS TRANSDERMAL DAILY
Qty: 14 PATCH | Refills: 0 | Status: SHIPPED | OUTPATIENT
Start: 2023-05-20 | End: 2023-06-03

## 2023-05-19 RX ADMIN — IPRATROPIUM BROMIDE 0.5 MG: 0.5 SOLUTION RESPIRATORY (INHALATION) at 08:06

## 2023-05-19 RX ADMIN — LEVALBUTEROL HYDROCHLORIDE 1.25 MG: 1.25 SOLUTION RESPIRATORY (INHALATION) at 08:07

## 2023-05-19 RX ADMIN — ACETAMINOPHEN 975 MG: 325 TABLET ORAL at 09:41

## 2023-05-19 RX ADMIN — Medication 50 MG: at 09:41

## 2023-05-19 RX ADMIN — ACETAMINOPHEN 975 MG: 325 TABLET ORAL at 02:12

## 2023-05-19 RX ADMIN — IPRATROPIUM BROMIDE 0.5 MG: 0.5 SOLUTION RESPIRATORY (INHALATION) at 13:39

## 2023-05-19 RX ADMIN — FOLIC ACID 1 MG: 1 TABLET ORAL at 09:42

## 2023-05-19 RX ADMIN — LEVALBUTEROL HYDROCHLORIDE 1.25 MG: 1.25 SOLUTION RESPIRATORY (INHALATION) at 13:39

## 2023-05-19 RX ADMIN — OXYCODONE HYDROCHLORIDE 5 MG: 5 TABLET ORAL at 07:06

## 2023-05-19 RX ADMIN — NICOTINE 1 PATCH: 21 PATCH, EXTENDED RELEASE TRANSDERMAL at 09:40

## 2023-05-19 RX ADMIN — PANTOPRAZOLE SODIUM 40 MG: 40 TABLET, DELAYED RELEASE ORAL at 07:06

## 2023-05-19 ASSESSMENT — ACTIVITIES OF DAILY LIVING (ADL)
ADLS_ACUITY_SCORE: 45
ADLS_ACUITY_SCORE: 45
ADLS_ACUITY_SCORE: 43
ADLS_ACUITY_SCORE: 45
ADLS_ACUITY_SCORE: 43
ADLS_ACUITY_SCORE: 43
ADLS_ACUITY_SCORE: 45

## 2023-05-19 NOTE — PROGRESS NOTES
Physical Therapy Discharge Summary    Reason for therapy discharge:    Discharged to transitional care facility.    Progress towards therapy goal(s). See goals on Care Plan in [facility] electronic health record for goal details.  Goals partially met.  Barriers to achieving goals:   discharge from facility.    Therapy recommendation(s):    Continued therapy is recommended.  Rationale/Recommendations:  TCU for safety, IND, full return to PLOF.

## 2023-05-19 NOTE — PROGRESS NOTES
Orthopaedic Surgery Progress Note   5/19/2023    Subjective: No acute events overnight.  Repeat hemoglobin yesterday was 8.8, down to 8.1 this morning. She is asymptomatic.  Namita reports overall she feels that she is doing well today.  Pain well controlled on current regimen, oxycodone is sufficient she feels. Ambulating well with physical therapy, no specific concerns with this. Tolerating diet, no reports of nausea or vomiting. Voiding spontaneously.  Denies fever or chills, CP, SOB, lightheadedness/dizziness, numbness or tingling, motor dysfunction or weakness.  Overall no specific concerns today.     Objective: BP (!) 142/76 (BP Location: Left arm)   Pulse 87   Temp 98.2  F (36.8  C) (Oral)   Resp 18   Wt 45.5 kg (100 lb 5 oz)   SpO2 95%   BMI 19.59 kg/m      General: NAD, alert and oriented, cooperative with exam, very pleasant.   Cardio: RRR, extremities wwp.   Respiratory: Non-labored breathing.  MSK: Focused examination of the RLE: Toes wwp, good palpable distal pulses.  Mild swelling at the hip and thigh, compartments soft and compressible.  Mild reports of soreness/tenderness to palpation over the thigh.  No significant appreciable ecchymosis, warmth or redness about the incisions or thigh.  No tenderness palpation at the knee, no knee effusion.  +EHL/FHL/GSC/TA with 5/5 strength. SILT SP/DP/Sa/Granda/T. Dressing c/d/i.     Labs:  Lab Results   Component Value Date    WBC 6.9 05/19/2023    HGB 8.1 (L) 05/19/2023     05/19/2023    INR 0.95 04/30/2018     Assessment and Plan: Namita Viera is a 73 year old female with PMH including COPD, ongoing tobacco use, hypertension, anemia, osteoporosis on alendronate, previous left intertrochanteric femur fracture status post CMN 2018, who sustained right displaced intertrochanteric femur fracture after mechanical fall on 5/16/2023 now s/p open reduction internal fixation right intertrochanteric femur fracture with long cephalomedullary nail. Did have  post operative anemia (hgb 7.5 -- 6.8 on POD#1) responsive to 1 unit pRBC. Hemoglobin from yesterday afternoon (8.8) to this morning (8.1) is stable. Otherwise doing well post-operatively.    Activity: Up with assist and assistive device until independent  Weight bearing status: WBAT RLE.  Pain management: tolerating PO medications well.  Antibiotics: Continue with prior to admission cefdinir for UTI.  DVT prophylaxis: lovenox 40 mg daily x 4 weeks and mechanical while in the hospital  Imaging: post op XR complete   Labs: Repeat CBC in AM, recommend transfusion if HGB <7  Dressings: dressings changed today to be C/D/I. Remain in place until f/u with Dr. Tucker.   Elevation: Elevate RLE on pillows to keep above the level of the heart as much as possible.   Physical Therapy/Occupational Therapy: Eval and treat, appreciate assistance and recommendations.  Consults: PT/OT, care coordination for disposition planning. Smoking cessation resources.  --Would recommend outpatient follow up with PCP or endocrinology for bone health management given multiple fragility fractures. Is currently on alendronate, but may require repeat DEXA and consideration for adjusting current medication regimen.    Follow-up: Clinic in 2 weeks for incision check and repeat x-rays needed.   Disposition: stable to discharge to TCU today.    Rolanda Kingston PA-C /Dr. Garrett LU  [facility]  [phone]

## 2023-05-19 NOTE — PLAN OF CARE
"  Problem: Plan of Care - These are the overarching goals to be used throughout the patient stay.    Goal: Plan of Care Review  Description: The Plan of Care Review/Shift note should be completed every shift.  The Outcome Evaluation is a brief statement about your assessment that the patient is improving, declining, or no change.  This information will be displayed automatically on your shift note.  Outcome: Progressing  Goal: Patient-Specific Goal (Individualized)  Description: You can add care plan individualizations to a care plan. Examples of Individualization might be:  \"Parent requests to be called daily at 9am for status\", \"I have a hard time hearing out of my right ear\", or \"Do not touch me to wake me up as it startles me\".  Outcome: Progressing  Goal: Absence of Hospital-Acquired Illness or Injury  Outcome: Progressing  Intervention: Identify and Manage Fall Risk  Recent Flowsheet Documentation  Taken 5/19/2023 0212 by Lacy Yang, RN  Safety Promotion/Fall Prevention: activity supervised  Goal: Optimal Comfort and Wellbeing  Outcome: Progressing  Intervention: Monitor Pain and Promote Comfort  Recent Flowsheet Documentation  Taken 5/19/2023 0706 by Lacy Yang, RN  Pain Management Interventions: medication (see MAR)  Taken 5/19/2023 0212 by Lacy Yang, RN  Pain Management Interventions: medication (see MAR)  Goal: Readiness for Transition of Care  Outcome: Progressing   Goal Outcome Evaluation:         Pt with intermittent confusion. A/O x 4 on first assessment. Bed alarm sounding two hours later. Pt reports hearing a party and that she wants to grab her cigarettes and join them. Pt. Reorients easily. Oxycodone x 1 for 5/10 right hip pain.               "

## 2023-05-19 NOTE — PLAN OF CARE
The patient is receiving oxycodone and tylenol for pain control. Refused ice. Dressing is clean, dry, and intact. Hgb recheck was 8.8, recheck in the morning. Congested cough noted, incentive spirometer used. She is on a regular diet, tolerating well. Admitted passing gas.     Problem: Orthopaedic Fracture  Goal: Absence of Bleeding  Outcome: Progressing  Goal: Effective Bowel Elimination  Outcome: Progressing  Goal: Absence of Embolism Signs and Symptoms  Outcome: Progressing  Intervention: Prevent or Manage Embolism Risk  Recent Flowsheet Documentation  Taken 5/18/2023 2149 by Smitha Guerra RN  VTE Prevention/Management: SCDs (sequential compression devices) on  Goal: Fracture Stability  Outcome: Progressing  Goal: Optimal Functional Ability  Outcome: Progressing  Intervention: Optimize Functional Ability  Recent Flowsheet Documentation  Taken 5/18/2023 1731 by Smitha Guerra RN  Activity Management: (refused to get in chair for dinner) activity adjusted per tolerance  Positioning/Transfer Devices:   in use   pillows  Goal: Absence of Infection Signs and Symptoms  Outcome: Progressing  Goal: Effective Tissue Perfusion  Outcome: Progressing  Goal: Optimal Pain Control and Function  Outcome: Progressing  Intervention: Manage Acute Orthopaedic-Related Pain  Recent Flowsheet Documentation  Taken 5/18/2023 2021 by Smitha Guerra RN  Pain Management Interventions:   medication (see MAR)   emotional support  Taken 5/18/2023 1725 by Smitha Guerra RN  Pain Management Interventions:   medication (see MAR)   environmental changes   emotional support  Goal: Effective Oxygenation and Ventilation  Outcome: Progressing  Intervention: Promote Airway Secretion Clearance  Recent Flowsheet Documentation  Taken 5/18/2023 1731 by Smitha Guerra RN  Cough And Deep Breathing: done independently per patient  Activity Management: (refused to get in chair for dinner) activity adjusted per tolerance  Intervention: Optimize  Oxygenation and Ventilation  Recent Flowsheet Documentation  Taken 5/18/2023 1731 by Smitha Guerra, RN  Head of Bed (HOB) Positioning: HOB at 30-45 degrees   Goal Outcome Evaluation:

## 2023-05-19 NOTE — PROGRESS NOTES
Care Management Follow Up    Length of Stay (days): 3    Expected Discharge Date: 05/20/2023     Concerns to be Addressed:       Patient plan of care discussed at interdisciplinary rounds: Yes    Anticipated Discharge Disposition:       Anticipated Discharge Services:    Anticipated Discharge DME:      Patient/family educated on Medicare website which has current facility and service quality ratings:    Education Provided on the Discharge Plan:    Patient/Family in Agreement with the Plan:      Referrals Placed by CM/SW:    Private pay costs discussed: Not applicable    Additional Information:  Patient accepted to [facility].     Able to discharge per hospitalist, awaiting response from Ortho if patient able to discharge.     PAS Completed - [id number]    Sonal Gonzalez RN         Inpatient Rehabilitation Functional Measures Assessment    Functional Measures  KATI Eating:  [facility] Grooming: [facility] Bathing:  [facility] Upper Body Dressing:  [facility] Lower Body Dressing:  [facility] Toileting:  [facility] Bladder Management  Level of Assistance:  [place]  Frequency/Number of Accidents this Shift:  [facility] Bowel Management  Level of Assistance: [place]  Frequency/Number of Accidents this Shift: [facility] Bed/Chair/Wheelchair Transfer:  [facility] Toilet Transfer:  [facility] Tub/Shower Transfer:  [place]    Previously Documented Mode of Locomotion at Discharge: Field  KATI Expected Mode of Locomotion at Discharge: [facility] Walk/Wheelchair:  [facility] Stairs:  [facility] Comprehension:  Auditory comprehension is the usual mode. Patient does not  comprehend complex/abstract information in their primary language without  assistance from a helper. Comprehension Score = 3, Moderate Prompting. Patient  comprehends basic daily needs or ideas 50-74% of the time. Patient requires  moderate/some prompting. No assistive devices were required.  KATI Expression:  Vocal expression is the usual mode. Patient does not express  complex/abstract information in their primary language without a helper.  Expression Score = 3, Moderate Prompting. Patient expresses basic daily needs or  ideas 50-74% of the time. Patient requires moderate/some prompting. No assistive  devices were required.  KATI Social Interaction:  Social Interaction Score = 6, Modified Independent.  Patient is modified independent for social interaction, requiring:  KATI Problem Solving:  Activity was not observed.  KATI Memory:  Activity was not observed.    Therapy Mode Minutes  Occupational Therapy: [place]  Physical Therapy: [place]  Speech Language Pathology:  [place]    Signed by: Anne Nathan RN

## 2023-05-19 NOTE — DISCHARGE SUMMARY
[facility] MEDICINE  DISCHARGE SUMMARY     Primary Care Physician: Marcel Miller  Admission Date: 5/16/2023   Discharge Provider: Elvis Baer MD Discharge Date: 5/19/2023   Diet:   Active Diet and Nourishment Order   Procedures     Advance Diet as Tolerated: Regular Diet Adult     Discharge Instruction - Regular Diet Adult     Diet       Code Status: Full Code   Activity: DCACTIVITY: Activity as tolerated        Condition at Discharge: Stable       PRINCIPAL & ACTIVE DISCHARGE DIAGNOSES     Principal Problem:    Right Intertroch Hip Fract  Active Problems:    Smoker    Benign essential hypertension    Anemia, unspecified type    COPD (chronic obstructive pulmonary disease) (H)    UTI (urinary tract infection)    Acute UTI    Hip fracture, right, closed, initial encounter (H)    Hyponatremia      PENDING LABS     Unresulted Labs Ordered in the Past 30 Days of this Admission     Date and Time Order Name Status Description    5/17/2023  9:46 AM Prepare red blood cells (unit) Preliminary             PROCEDURES ( this hospitalization only)      Procedure(s):  OPEN REDUCTION INTERNAL FIXATION, FRACTURE, FEMUR, USING INTRAMEDULLARY NOEL    RECOMMENDATIONS TO OUTPATIENT PROVIDER FOR F/U VISIT     Follow-up Appointments     Follow Up Care      Please follow-up with Dr. Tucker/Betsey Pina PA-C 2 weeks   following surgery at [facility]. Call our scheduling line at   [phone] to make an appointment if you do not already have one   scheduled.         Follow Up and recommended labs and tests      Follow up with primary care provider in 7 days.  No follow up labs or   test are needed.                 DISPOSITION     Skilled Nursing Facility/TCU    SUMMARY OF HOSPITAL COURSE:      Initial presentation (Copied from [facility])    73 year old female with pertinent medical history of tobacco abuse, regular alcohol use, emphysema of lung, HTN -- brought to the ER after  a fall, and could not get up because of right hip pain.  She was just in the ER yesterday with confusion -- sodium 126, UA with 10 WBC's and treated for a UTI and son says she is better today (confusion resolved).  She did have a left hip fracture in 2018 and did well after ORIF.        In ER, , afebrile, Hgb 10.2, sodium 134, glucose 111, and Pelvic xray shows an acute displaced intertrochanteric proximal right femur fracture.  Head and lumbar CT's unremarkable    Problems addressed during hospital stay    Right hip intertrochanteric fracture  -Traumatic from fall  -S/p Open reduction, internal fixation right intertrochanteric femur fracture with long cephalomedullary nail 5/17  -Postop surgical management per orthopedics including pain control, DVT prophylaxis and activity  -Patient will be discharged to TCU today.     COPD  Tobacco use disorder  -Continue Spiriva and albuterol nebulizers as needed  -Scheduled DuoNebs  -Tobacco cessation reiterated as patient continues to smoke  -Nicotine patch offered     Acute blood loss anemia  -Multifactorial from recent surgery and possibly account component of dilutional anemia  -Hemoglobin dropped to 6.8 this morning.  -1 unit packed red blood cells 5/18, hemoglobin stable since    Alcohol use disorder  -Currently not exhibiting any signs of withdrawal  -We will monitor closely and initiate CIWA if any signs of withdrawal  -Continue prior to admission multivitamins     Hyponatremia   -Stable  -HCTZ likely contributing factor     Toxic metabolic encephalopathy  Urinary tract infection  -Resolved.  -Multifactorial from recent anesthesia and  UTI  -Urine cultures grew pansensitive E. coli  -Continue cefdinir for UTI to complete 5 to 7 days.    -Patient received IV ceftriaxone while in the hospital     Osteoporosis  -Resume biphosphonate post discharge     Hypertension  -Presently controlled  -Resume PTA meds valsartan/hydrochlorothiazide       Discharge Medications with  Med changes:     Current Discharge Medication List      START taking these medications    Details   enoxaparin ANTICOAGULANT (LOVENOX) 40 MG/0.4ML syringe Inject 0.4 mLs (40 mg) Subcutaneous every 24 hours for 28 days  Qty: 11.2 mL, Refills: 0    Associated Diagnoses: Hip fracture, right, closed, initial encounter (H)      melatonin 1 MG TABS tablet Take 1 tablet (1 mg) by mouth nightly as needed for sleep  Qty: 30 tablet, Refills: 0    Associated Diagnoses: Hip fracture, right, closed, initial encounter (H); Acute UTI; Hyponatremia; Acute cystitis without hematuria; Pulmonary emphysema, unspecified emphysema type (H); Smoker      nicotine (NICODERM CQ) 21 MG/24HR 24 hr patch Place 1 patch onto the skin daily for 14 days  Qty: 14 patch, Refills: 0    Associated Diagnoses: Hip fracture, right, closed, initial encounter (H); Acute UTI; Hyponatremia; Acute cystitis without hematuria; Pulmonary emphysema, unspecified emphysema type (H); Smoker      oxyCODONE (ROXICODONE) 5 MG tablet Take 1 tablet (5 mg) by mouth every 6 hours as needed for moderate pain  Qty: 10 tablet, Refills: 0    Associated Diagnoses: Hip fracture, right, closed, initial encounter (H); Acute UTI; Hyponatremia; Acute cystitis without hematuria; Pulmonary emphysema, unspecified emphysema type (H); Smoker      polyethylene glycol (MIRALAX) 17 g packet Take 17 g by mouth daily  Qty: 30 each, Refills: 0    Associated Diagnoses: Hip fracture, right, closed, initial encounter (H); Acute UTI; Hyponatremia; Acute cystitis without hematuria; Pulmonary emphysema, unspecified emphysema type (H); Smoker      senna-docusate (SENOKOT-S/PERICOLACE) 8.6-50 MG tablet Take 1 tablet by mouth 2 times daily as needed for constipation  Qty: 30 tablet, Refills: 0    Associated Diagnoses: Hip fracture, right, closed, initial encounter (H); Acute UTI; Hyponatremia; Acute cystitis without hematuria; Pulmonary emphysema, unspecified emphysema type (H); Smoker         CONTINUE  these medications which have CHANGED    Details   cefdinir (OMNICEF) 300 MG capsule Take 1 capsule (300 mg) by mouth 2 times daily for 3 days  Qty: 6 capsule, Refills: 0    Associated Diagnoses: Hip fracture, right, closed, initial encounter (H); Acute UTI; Hyponatremia; Acute cystitis without hematuria; Pulmonary emphysema, unspecified emphysema type (H); Smoker         CONTINUE these medications which have NOT CHANGED    Details   albuterol (PROAIR HFA/PROVENTIL HFA/VENTOLIN HFA) 108 (90 Base) MCG/ACT inhaler INHALE 1-2 PUFFS INTO THE LUNGS EVERY 6 HOURS AS NEEDED FOR COUGH OR SHORTNESS OF BREATH OR WHEEZE  Qty: 18 g, Refills: 11    Associated Diagnoses: Cough      alendronate (FOSAMAX) 70 MG tablet TAKE 1 TABLET BY MOUTH EVERY 7 DAYS. TAKE IN THE MORNING ON AN EMPTY STOMACH WITH A FULL GLASS OF WATER 30 MINUTES BEFORE FOOD  Qty: 12 tablet, Refills: 2    Comments: thank you  Associated Diagnoses: Age-related osteoporosis without current pathological fracture      aspirin (ASA) 81 MG EC tablet Take 1 tablet (81 mg) by mouth daily    Associated Diagnoses: Closed fracture of sacrum with routine healing, unspecified portion of sacrum, subsequent encounter      calcium carbonate (OS-PETER) 1500 (600 Ca) MG tablet Take 600 mg by mouth daily      ibuprofen (ADVIL/MOTRIN) 200 MG tablet Take 200 mg by mouth every 6 hours as needed for pain      losartan-hydrochlorothiazide (HYZAAR) 100-25 MG tablet Take 1 tablet by mouth daily  Qty: 90 tablet, Refills: 2    Comments: Dose change. Discontinue previous dose  Associated Diagnoses: Benign essential hypertension      multivitamin, therapeutic (THERA-VIT) TABS tablet Take 1 tablet by mouth daily      omeprazole (PRILOSEC) 20 MG DR capsule TAKE 1 CAPSULE (20 MG) BY MOUTH DAILY  Qty: 90 capsule, Refills: 0    Associated Diagnoses: Gastroesophageal reflux disease without esophagitis      tiotropium (SPIRIVA RESPIMAT) 2.5 MCG/ACT inhaler Inhale 2 puffs into the lungs daily  (controller)  Qty: 4 g, Refills: 11    Associated Diagnoses: Pulmonary emphysema, unspecified emphysema type (H)      vitamin C (ASCORBIC ACID) 500 MG tablet Take 500 mg by mouth daily      Vitamin D, Cholecalciferol, 25 MCG (1000 UT) TABS Take 1,000 Units by mouth daily                   Rationale for medication changes:              Consults       ORTHOPEDIC SURGERY IP CONSULT  CARE MANAGEMENT / SOCIAL WORK IP CONSULT  PHYSICAL THERAPY ADULT IP CONSULT  OCCUPATIONAL THERAPY ADULT IP CONSULT  PHYSICAL THERAPY ADULT IP CONSULT  OCCUPATIONAL THERAPY ADULT IP CONSULT    Immunizations given this encounter     Most Recent Immunizations   Administered Date(s) Administered     COVID-19 MONOVALENT 12+ (Pfizer) 05/18/2021     COVID-19 Monovalent 12+ (Pfizer 2022) 02/04/2022     Influenza (High Dose) 3 valent vaccine 11/27/2019     Influenza (IIV3) PF 10/28/2013     Influenza Vaccine 65+ (Fluzone HD) 10/13/2021     Influenza, Whole Virus 11/06/2014     Pneumo Conj 13-V (2010&after) 06/24/2016     Pneumococcal 23 valent 08/27/2013     TDAP (Adacel,Boostrix) 08/27/2013           Anticoagulation Information          SIGNIFICANT IMAGING FINDINGS     Results for orders placed or performed during the hospital encounter of 05/16/23   Head CT w/o contrast    Impression    IMPRESSION:  1.  No intracranial hemorrhage, mass lesions, hydrocephalus or CT evidence for an acute infarct.  2.  Mild diffuse cerebral parenchymal volume loss. Presumed chronic hypertensive/microvascular ischemic white matter changes.   Lumbar spine CT w/o contrast    Impression    IMPRESSION:  1.  No fracture or posttraumatic subluxation.  2.  No high-grade spinal canal or neural foraminal stenosis.  3.  Old, healed sacral fractures.   XR Femur Right 2 Views    Impression    IMPRESSION:   Right femur: Acute displaced intertrochanteric proximal right femur fracture, extending into the subtrochanteric region. There is mild superolateral and posterior displacement  of main distal fragment and varus angulation.     Pelvis: No additional acute fracture. Stable old internal fixation of healed proximal left femur fracture. Diffuse bony demineralization. Degenerative changes in the spine.   XR Pelvis 1/2 Views    Impression    IMPRESSION:   Right femur: Acute displaced intertrochanteric proximal right femur fracture, extending into the subtrochanteric region. There is mild superolateral and posterior displacement of main distal fragment and varus angulation.     Pelvis: No additional acute fracture. Stable old internal fixation of healed proximal left femur fracture. Diffuse bony demineralization. Degenerative changes in the spine.   XR Knee Right 3 Views    Impression    IMPRESSION:  1.  No evidence of fracture or joint malalignment.  2.  No significant joint effusion.  3.  Bone demineralization.   CT Hip Right w/o Contrast    Impression    IMPRESSION:  1.  Acute mildly displaced comminuted intertrochanteric right femur fracture.     XR Pelvis Port 1/2 Views    Impression    IMPRESSION: ORIF of right intertrochanteric fracture with gamma type nail. The similar procedure has been for performed in the past on the left. No evidence of complication.   XR Femur Port Right 2 Views    Impression    IMPRESSION: Postoperative changes of IM eemrson and screw fixation traversing an intertrochanteric fracture of the right hip. The fracture is in good anatomic alignment with some medial displacement of the fracture fragment involving the lesser trochanter.   No dislocation. Post procedural air surrounding the right hip joint.       SIGNIFICANT LABORATORY FINDINGS     Most Recent 3 CBC's:  Recent Labs   Lab Test 05/19/23  0549 05/18/23  1523 05/18/23  0548 05/17/23  0545 05/16/23  1502   WBC 6.9  --  6.0  --  9.9   HGB 8.1* 8.8* 6.8*   < > 10.2*   MCV 94  --  103*  --  99     --  179  --  235    < > = values in this interval not displayed.     Most Recent 3 BMP's:  Recent Labs   Lab Test  "05/19/23  0549 05/18/23  0548 05/17/23  0545    133* 138   POTASSIUM 3.9 3.8 3.7   CHLORIDE 104 102 105   CO2 23 21* 20*   BUN 11.5 19.7 28.6*   CR 0.72 0.74 0.83   ANIONGAP 10 10 13   PETER 8.9 8.5* 8.7*   GLC 93 82 90           Discharge Orders        Discharge Instructions    If interested in quitting smoking, there is free assistance available through the [place] [employer].  Please call them at [phone] ([phone]).  They will provide free meds, tools, and one to one coaching to help you quit smoking, and comes with free 24/7 support.     Reason for your hospital stay    Open reduction internal fixation for right femur fracture     When to call - Contact Surgeon Team    You may experience symptoms that require follow-up before your scheduled appointment. Refer to the \"Stoplight Tool\" for instructions on when to contact your Surgeon Team if you are concerned about pain control, blood clots, constipation, or if you are unable to urinate.     When to call - Reach out to Urgent Care    If you are not able to reach your Surgeon Team and you need immediate care, go to the Orthopedic Walk-in Clinic or Urgent Care at your Surgeon's office.  Do NOT go to the Emergency Room unless you have shortness of breath, chest pain, or other signs of a medical emergency.     When to call - Reasons to Call 911    Call 911 immediately if you experience sudden-onset chest pain, arm weakness/numbness, slurred speech, or shortness of breath     Discharge Instruction - Breathing exercises    Perform breathing exercises using your Incentive Spirometer 10 times per hour while awake for 2 weeks.     Symptoms - Fever Management    A low grade fever can be expected after surgery.  Use acetaminophen (TYLENOL) as needed for fever management.  Contact your Surgeon Team if you have a fever greater than 101.5 F, chills, and/or night sweats.     Symptoms - Constipation management    Constipation (hard, dry bowel " movements) is expected after surgery due to the combination of being less active, the anesthetic, and the opioid pain medication.  You can do the following to help reduce constipation:  ~  FLUIDS:  Drink clear liquids (water or Gatorade), or juice (apple/prune).  ~  DIET:  Eat a fiber rich diet.    ~  ACTIVITY:  Get up and move around several times a day.  Increase your activity as you are able.  MEDICATIONS:  Reduce the risk of constipation by starting medications before you are constipated.  You can take Miralax   (1 packet as directed) and/or a stool softener (Senokot 1-2 tablets 1-2 times a day).  If you already have constipation and these medications are not working, you can get magnesium citrate and use as directed.  If you continue to have constipation you can try an over the counter suppository or enema.  Call your Surgeon Team if it has been greater than 3 days since your last bowel movement.     Symptoms - Reduced Urine Output    Changes in the amount of fluids you drank before and after surgery may result in problems urinating.  It is important to stay well-hydrated after surgery and drink plenty of water. If it has been greater than 8 hours since you have urinated despite drinking plenty of water, call your Surgeon Team.     Activity - Exercises to prevent blood clots    Unless otherwise directed by your Surgeon team, perform the following exercises at least three times per day for the first four weeks after surgery to prevent blood clots in your legs: 1) Point and flex your feet (Ankle Pumps), 2) Move your ankle around in big circles, 3) Wiggle your toes, 4) Walk, even for short distances, several times a day, will help decrease the risk of blood clots.     Comfort and Pain Management - Pain after Surgery    Pain after surgery is normal and expected.  You will have some amount of pain for several weeks after surgery.  Your pain will improve with time.  There are several things you can do to help reduce  your pain including: rest, ice, elevation, and using pain medications as needed. Contact your Surgeon Team if you have pain that persists or worsens after surgery despite rest, ice, elevation, and taking your medication(s) as prescribed. Contact your Surgeon Team if you have new numbness, tingling, or weakness in your operative extremity.     Comfort and Pain Management - Swelling after Surgery    Swelling and/or bruising of the surgical extremity is common and may persist for several months after surgery. In addition to frequent icing and elevation, gentle compressive support with an ACE wrap or tubigrip may help with swelling. Apply compression regularly, removing at least twice daily to perform skin checks. Contact your Surgeon Team if your swelling increases and is NOT associated with an increase in your activity level, or if your swelling increases and is associated with redness and pain.     Comfort and Pain Management - Cold therapy    Ice can be used to control swelling and discomfort after surgery. Place a thin towel over your operative site and apply the ice pack overtop. Leave ice pack in place for 20 minutes, then remove for 20 minutes. Repeat this 20 minutes on/20 minutes off routine as often as tolerated.     Medication Instructions - Acetaminophen (TYLENOL) Instructions    You were discharged with acetaminophen (TYLENOL) for pain management after surgery. Acetaminophen most effectively manages pain symptoms when it is taken on a schedule without missing doses (every four, six, or eight hours). Your Provider will prescribe a safe daily dose between 3000 - 4000 mg.  Do NOT exceed this daily dose. Most patients use acetaminophen for pain control for the first four weeks after surgery.  You can wean from this medication as your pain decreases.     Medication Instructions - NSAID Instructions    You were discharged with an anti-inflammatory medication for pain management to use in combination with  "acetaminophen (TYLENOL) and the narcotic pain medication.  Take this medication exactly as directed.  You should only take one anti-inflammatory at a time.  Some common anti-inflammatories include: ibuprofen (ADVIL, MOTRIN), naproxen (ALEVE, NAPROSYN), celecoxib (CELEBREX), meloxicam (MOBIC), ketorolac (TORADOL).  Take this medication with food and water.     Medication Instructions - Opioids - Tapering Instructions    In the first three days following surgery, your symptoms may warrant use of the narcotic pain medication every four to six hours as prescribed. This is normal. As your pain symptoms improve, focus your efforts on decreasing (tapering) use of narcotic medications. The most successful tapering strategy is to first, decrease the number of tablets you take every 4-6 hours to the minimum prescribed. Then, increase the amount of time between doses.  For example:  First, taper to   or 1 tablet every 4-6 hours.  Then, taper to   or 1 tablet every 6-8 hours.  Then, taper to   or 1 tablet every 8-10 hours.  Then, taper to   or 1 tablet every 10-12 hours.  Then, taper to   or 1 tablet at bedtime.  The bedtime dose can help with comfort during sleep and is typically the last dose to be discontinued after surgery.     Follow Up Care    Please follow-up with Dr. Tucker/Betsey Pina PA-C 2 weeks following surgery at [facility]. Call our scheduling line at [phone] to make an appointment if you do not already have one scheduled.     Medication instructions - Anticoagulation - other    Take the Lovenox as prescribed for a total of 4 weeks after surgery.  This is given to help minimize your risk of blood clot     Comfort and Pain Management - LOWER Extremity Elevation    Swelling is expected for several months after surgery. This type of swelling is usually associated with gravity and activity, and can be improved with elevation.   The best way to do this is to get your \"toes above your nose\" by " "laying down and placing several pillows lengthwise under your calf and heel. When elevating your leg keep your knee completely straight. Perform this elevation as often as possible especially for the first two weeks after surgery.     Medication Instructions - Opioid Instructions (1 - 2 tablets Q 4-6 hours, MAX 6 tablets)    You were discharged with an opioid medication (hydromorphone, oxycodone, hydrocodone, or tramadol). This medication should only be taken for breakthrough pain that is not controlled with acetaminophen (TYLENOL). If you rate your pain less than 3 you do not need this medication.  Pain rating 0-3:  You do not need this medication.  Pain rating 4-6:  Take 1 tablet every 4-6 hours as needed  Pain rating 7-10:  Take 2 tablets every 4-6 hours as needed.  Do not exceed 6 tablets per day     Activity - Total Hip Arthroplasty    Refer to the [facility] \"Your Guide to Total Joint Replacement\" for recommendations on activities and Exercises.     Return to Driving    Return to driving - Driving is NOT permitted until directed by your provider. Under no circumstance are you permitted to drive while using narcotic pain medications.     Dressing / Wound Care - Wound    You have a clean dressing on your surgical wound. Dressing change instructions as follows: dressing will be removed at your follow-up appointment. Contact your Surgeon Team if you have increased redness, warmth around the surgical wound, and/or drainage from the surgical wound.     Dressing / Wound Care - NO Tub Bathing    Tub bathing, swimming, or any other activities that will cause your incision to be submerged in water should be avoided until allowed by your Surgeon.     No precautions    No precautions directed by your Provider.  You may perform range of motion activities as tolerated.     Weight bearing as tolerated    Weight bearing as tolerated on your operative extremity.     Dressing / Wound care - Shower with wound/dressing " covered    You must COVER your dressing or incision with saran wrap (or any other non-permeable covering) to allow the incision to remain dry while showering.  You may shower 3 days after surgery as long as the surgical wound stays dry. Continue to cover your dressing or incision for showering until your first office visit.  You are strictly prohibited from soaking   or submerging the surgical wound underwater.     General info for SNF    Length of Stay Estimate: Short Term Care: Estimated # of Days <30  Condition at Discharge: Stable  Level of care:skilled   Rehabilitation Potential: Good  Admission H&P remains valid and up-to-date: Yes  Recent Chemotherapy: N/A  Use Nursing Home Standing Orders: Yes     Mantoux instructions    Give two-step Mantoux (PPD) Per Facility Policy Yes     Follow Up and recommended labs and tests    Follow up with primary care provider in 7 days.  No follow up labs or test are needed.     Reason for your hospital stay    Right hip fracture     Full Code     Physical Therapy Adult Consult    Evaluate and treat as clinically indicated.    Reason: Right hip fracture     Occupational Therapy Adult Consult    Evaluate and treat as clinically indicated.    Reason: Right hip fracture     Fall precautions     Crutches DME    DME Documentation: Describe the reason for need to support medical necessity: Impaired gait status post hip surgery. I, the undersigned, certify that the above prescribed supplies are medically necessary for this patient and is both reasonable and necessary in reference to accepted standards of medical practice in the treatment of this patient's condition and is not prescribed as a convenience.     [facility] DME    DME Documentation: Describe the reason for need to support medical necessity: Impaired gait status post hip surgery. I, the undersigned, certify that the above prescribed supplies are medically necessary for this patient and is both reasonable and necessary in reference  to accepted standards of medical practice in the treatment of this patient's condition and is not prescribed as a convenience.     [facility]    : DME Documentation: Describe the reason for need to support medical necessity: Impaired gait status post hip surgery. I, the undersigned, certify that the above prescribed supplies are medically necessary for this patient and is both reasonable and necessary in reference to accepted standards of medical practice in the treatment of this patient's condition and is not prescribed as a convenience.     Discharge Instruction - Regular Diet Adult    Return to your pre-surgery diet unless instructed otherwise     Diet    Follow this diet upon discharge: Orders Placed This Encounter      Advance Diet as Tolerated: Regular Diet Adult      Discharge Instruction - Regular Diet Adult       Examination   Physical Exam   Temp:  [97.8  F (36.6  C)-98.3  F (36.8  C)] 98.2  F (36.8  C)  Pulse:  [] 87  Resp:  [16-18] 18  BP: (133-152)/(60-78) 142/76  FiO2 (%):  [21 %] 21 %  SpO2:  [90 %-97 %] 95 %  Wt Readings from Last 1 Encounters:   05/16/23 45.5 kg (100 lb 5 oz)       Physical Exam  HENT:      Head: Normocephalic.   Cardiovascular:      Rate and Rhythm: Normal rate.   Pulmonary:      Effort: Pulmonary effort is normal.   Abdominal:      General: Abdomen is flat.   Neurological:      General: No focal deficit present.      Mental Status: She is alert.             Please see EMR for more detailed significant labs, imaging, consultant notes etc.    I, Elvis Baer MD, personally saw the patient today and spent greater than 30 minutes discharging this patient.    Elvis Baer MD  [facility]    CC:Angela, Marcel Laguna

## 2023-05-19 NOTE — PROGRESS NOTES
Care Management Discharge Note    Discharge Date: 05/19/2023       Discharge Disposition: Transitional Care    Discharge Services: None    Discharge DME: None    Discharge Transportation: family or friend will provide, health plan transportation    Private pay costs discussed: Not applicable    Does the patient's insurance plan have a 3 day qualifying hospital stay waiver?  Yes   Will the waiver be used for post-acute placement? No    PAS Confirmation Code: [id number]  Patient/family educated on Medicare website which has current facility and service quality ratings: yes    Education Provided on the Discharge Plan: Yes  Persons Notified of Discharge Plans: Patient/ Family/ Care Team/ Facility  Patient/Family in Agreement with the Plan: yes    Handoff Referral Completed: Yes    Additional Information:  Patient to discharge to [facility] transport 230PM. Orders sent via Epic Fax.     Sonal Gonzalez RN

## 2023-05-19 NOTE — PROGRESS NOTES
RESPIRATORY CARE NOTE     Patient Name: Namita Viera  Today's Date: 5/19/2023       Pt is currently on RA and stable with adequate saturations. Bs: slightly rhonchi and decreased. No wheezes auscultated. Pt does have congested occasionally productive cough. Will continue to follow.     Rodrigo Braswell RRT

## 2023-05-19 NOTE — PLAN OF CARE
Occupational Therapy Discharge Summary    Reason for therapy discharge:    Discharged to transitional care facility.    Progress towards therapy goal(s). See goals on Care Plan in [facility] electronic health record for goal details.  Goals partially met.  Barriers to achieving goals:   discharge from facility.    Therapy recommendation(s):    Continued therapy is recommended.  Rationale/Recommendations:  to improve ADL independence.

## 2023-05-20 NOTE — PROGRESS NOTES
[facility] GERIATRIC SERVICES  Chief Complaint   Patient presents with     Hospitals in [place]/U     [place] Medical Record Number:  [id number]  Place of Service where encounter took place:  [facility] ([facility]) [[id number]]  Code Status:  Full Code     HISTORY:      HPI:  Namita Viera  is 73 year old (1950) undergoing physical and occupational therapy.  She  Is with pertinent medical history of tobacco abuse, regular alcohol use, emphysema of lung, HTN -- brought to the ER after a fall, and could not get up because of right hip pain.  She was just in the ER yesterday with confusion -- sodium 126, UA with 10 WBC's and treated for a UTI and son says she is better today (confusion resolved).  She did have a left hip fracture in 2018 and did well after ORIF.        In ER,  Pelvic xray shows an acute displaced intertrochanteric proximal right femur fracture and underwent a ORIF.  Head and lumbar CT's unremarkable    Today she was seen to review vital signs, labs, routine visit and to establish care.  She denies chest pain shortness of breath cough congestion constipation or diarrhea.  She did complete cefdinir for UTI.  Right hip with Tegaderm dressing and noted to have bruising down her thigh.  She reports her pain is comfortable with sitting but does report it gets up to 7-8 out of 10 with movement.  Labs reviewed last hemoglobin 8.1 which is currently down from 8 point 8 repeat ordered labs for 5/23/2023.  BMP within normal limits.  She is tolerating a regular diet    ALLERGIES:Patient has no known allergies.    PAST MEDICAL HISTORY:   Past Medical History:   Diagnosis Date     Acid reflux      Alcohol use      Closed fracture of left femur (H)      Closed fracture of sacrum with routine healing 10/12/2021     Emphysema of lung (H) 02/07/2022     Hip fracture requiring operative repair (H)      Hypertension 01/2021     Rib pain on left side 05/06/2021     SAH (subarachnoid hemorrhage) (H)      Traumatic closed  displaced fracture of distal end of radius        PAST SURGICAL HISTORY:   has a past surgical history that includes Hysterectomy total abdominal, bilateral salpingo-oophorectomy, combined (N/A, 2008); Bladder Suspension (2008); orthopedic surgery (Left, 2018); and Open reduction internal fixation rodding intramedullary femur (Right, 5/17/2023).    FAMILY HISTORY: family history includes Cancer in her sister; Esophageal Cancer in her mother; Heart Disease in her father; Rectal Cancer in her sister; Rheumatoid Arthritis in her brother.    SOCIAL HISTORY:  reports that she has been smoking cigarettes. She started smoking about 61 years ago. She has a 60.00 pack-year smoking history. She has never used smokeless tobacco. She reports current alcohol use of about 14.0 standard drinks of alcohol per week. She reports that she does not use drugs.    ROS:  Constitutional: Negative for activity change, appetite change, fatigue and fever.   HENT: Negative for congestion.    Respiratory: Negative for cough, shortness of breath and wheezing.    Cardiovascular: Negative for chest pain and leg swelling.   Gastrointestinal: Negative for abdominal distention, abdominal pain, constipation, diarrhea and nausea.   Genitourinary: Negative for dysuria.   Musculoskeletal: Negative for arthralgia. Negative for back pain.  Surgical incision right hip with bruising  Skin: Negative for color change and wound.  Surgical incision right hip  Neurological: Negative for dizziness.   Psychiatric/Behavioral: Negative for agitation, behavioral problems and confusion.     Physical Exam:  Constitutional:       Appearance: Patient is well-developed.   HENT:      Head: Normocephalic.   Eyes:      Conjunctiva/sclera: Conjunctivae normal.   Neck:      Musculoskeletal: Normal range of motion.   Cardiovascular:      Rate and Rhythm: Normal rate and regular rhythm.      Heart sounds: Normal heart sounds. No murmur.   Pulmonary:      Effort: No respiratory  distress.      Breath sounds: Normal breath sounds. No wheezing or rales.   Abdominal:      General: Bowel sounds are normal. There is no distension.      Palpations: Abdomen is soft.      Tenderness: There is no abdominal tenderness.   Musculoskeletal:       Normal range of motion.     Right hip surgical wound denies calf pain  Skin:General:        Skin is warm.   Neurological:         Mental Status: Patient is alert and oriented to person, place, and time.   Psychiatric:         Behavior: Behavior normal.     Vitals:/79   Pulse 109   Temp (!) 96.7  F (35.9  C)   Resp 18   Ht 1.524 m (5')   Wt 47.7 kg (105 lb 3.2 oz)   SpO2 97%   BMI 20.55 kg/m   and Body mass index is 20.55 kg/m .    Lab/Diagnostic data:   Recent Results (from the past 240 hour(s))   Basic metabolic panel    Collection Time: 05/15/23 11:48 AM   Result Value Ref Range    Sodium 126 (L) 136 - 145 mmol/L    Potassium 3.9 3.4 - 5.3 mmol/L    Chloride 89 (L) 98 - 107 mmol/L    Carbon Dioxide (CO2) 23 22 - 29 mmol/L    Anion Gap 14 7 - 15 mmol/L    Urea Nitrogen 21.9 8.0 - 23.0 mg/dL    Creatinine 0.65 0.51 - 0.95 mg/dL    Calcium 10.2 8.8 - 10.2 mg/dL    Glucose 123 (H) 70 - 99 mg/dL    GFR Estimate >90 >60 mL/min/1.73m2   Extra Red Top Tube    Collection Time: 05/15/23 11:48 AM   Result Value Ref Range    Hold Specimen J[facility]    Extra Green Top (Lithium Heparin) Tube    Collection Time: 05/15/23 11:48 AM   Result Value Ref Range    Hold Specimen JIC    Extra Purple Top Tube    Collection Time: 05/15/23 11:48 AM   Result Value Ref Range    Hold Specimen JIC    Extra Blue Top Tube    Collection Time: 05/15/23 11:48 AM   Result Value Ref Range    Hold Specimen JIC    UA with Microscopic reflex to Culture    Collection Time: 05/15/23 11:56 AM    Specimen: Urine, Clean Catch   Result Value Ref Range    Color Urine Yellow Colorless, Straw, Light Yellow, Yellow    Appearance Urine Turbid (A) Clear    Glucose Urine Negative Negative mg/dL     Bilirubin Urine Negative Negative    Ketones Urine 20 (A) Negative mg/dL    Specific Gravity Urine 1.020 1.001 - 1.030    Blood Urine Negative Negative    pH Urine 6.0 5.0 - 7.0    Protein Albumin Urine 10 (A) Negative mg/dL    Urobilinogen Urine <2.0 <2.0 mg/dL    Nitrite Urine Positive (A) Negative    Leukocyte Esterase Urine 250 Jenny/uL (A) Negative    Bacteria Urine Moderate (A) None Seen /HPF    Mucus Urine Present (A) None Seen /LPF    RBC Urine 1 <=2 /HPF    WBC Urine 10 (H) <=5 /HPF    Squamous Epithelials Urine 1 <=1 /HPF   CBC (+ platelets, no diff)    Collection Time: 05/15/23 11:56 AM   Result Value Ref Range    WBC Count 5.2 4.0 - 11.0 10e3/uL    RBC Count 3.69 (L) 3.80 - 5.20 10e6/uL    Hemoglobin 12.7 11.7 - 15.7 g/dL    Hematocrit 35.0 35.0 - 47.0 %    MCV 95 78 - 100 fL    MCH 34.4 (H) 26.5 - 33.0 pg    MCHC 36.3 31.5 - 36.5 g/dL    RDW 13.0 10.0 - 15.0 %    Platelet Count 291 150 - 450 10e3/uL   Urine Culture    Collection Time: 05/15/23 11:56 AM    Specimen: Urine, Clean Catch   Result Value Ref Range    Culture >100,000 CFU/mL Escherichia coli (A)        Susceptibility    Escherichia coli - MALLORIE     Ampicillin 4 Susceptible ug/mL     Ampicillin/ Sulbactam <=2 Susceptible ug/mL     Piperacillin/Tazobactam <=4 Susceptible ug/mL     Cefazolin* <=4 Susceptible ug/mL      * Cefazolin MALLORIE breakpoints are for the treatment of uncomplicated urinary tract infections. For the treatment of systemic infections, please contact the laboratory for additional testing.     Cefoxitin <=4 Susceptible ug/mL     Ceftazidime <=1 Susceptible ug/mL     Ceftriaxone <=1 Susceptible ug/mL     Cefepime <=1 Susceptible ug/mL     Gentamicin <=1 Susceptible ug/mL     Tobramycin <=1 Susceptible ug/mL     Ciprofloxacin <=0.25 Susceptible ug/mL     Levofloxacin <=0.12 Susceptible ug/mL     Nitrofurantoin <=16 Susceptible ug/mL     Trimethoprim/Sulfamethoxazole <=1/19 Susceptible ug/mL   Basic metabolic panel    Collection Time:  05/16/23  3:02 PM   Result Value Ref Range    Sodium 134 (L) 136 - 145 mmol/L    Potassium 3.8 3.4 - 5.3 mmol/L    Chloride 97 (L) 98 - 107 mmol/L    Carbon Dioxide (CO2) 22 22 - 29 mmol/L    Anion Gap 15 7 - 15 mmol/L    Urea Nitrogen 30.8 (H) 8.0 - 23.0 mg/dL    Creatinine 0.92 0.51 - 0.95 mg/dL    Calcium 9.6 8.8 - 10.2 mg/dL    Glucose 111 (H) 70 - 99 mg/dL    GFR Estimate 65 >60 mL/min/1.73m2   CBC with platelets and differential    Collection Time: 05/16/23  3:02 PM   Result Value Ref Range    WBC Count 9.9 4.0 - 11.0 10e3/uL    RBC Count 2.97 (L) 3.80 - 5.20 10e6/uL    Hemoglobin 10.2 (L) 11.7 - 15.7 g/dL    Hematocrit 29.3 (L) 35.0 - 47.0 %    MCV 99 78 - 100 fL    MCH 34.3 (H) 26.5 - 33.0 pg    MCHC 34.8 31.5 - 36.5 g/dL    RDW 13.3 10.0 - 15.0 %    Platelet Count 235 150 - 450 10e3/uL    % Neutrophils 88 %    % Lymphocytes 5 %    % Monocytes 6 %    % Eosinophils 0 %    % Basophils 0 %    % Immature Granulocytes 1 %    NRBCs per 100 WBC 0 <1 /100    Absolute Neutrophils 8.8 (H) 1.6 - 8.3 10e3/uL    Absolute Lymphocytes 0.5 (L) 0.8 - 5.3 10e3/uL    Absolute Monocytes 0.6 0.0 - 1.3 10e3/uL    Absolute Eosinophils 0.0 0.0 - 0.7 10e3/uL    Absolute Basophils 0.0 0.0 - 0.2 10e3/uL    Absolute Immature Granulocytes 0.1 <=0.4 10e3/uL    Absolute NRBCs 0.0 10e3/uL   Adult Type and Screen    Collection Time: 05/16/23  3:02 PM   Result Value Ref Range    ABO/RH(D) O POS     Antibody Screen Negative Negative    SPECIMEN EXPIRATION DATE [id number]    ECG 12-LEAD WITH MUSE (LHE)    Collection Time: 05/16/23  7:48 PM   Result Value Ref Range    Systolic Blood Pressure  mmHg    Diastolic Blood Pressure  mmHg    Ventricular Rate 101 BPM    Atrial Rate 101 BPM    CT Interval 122 ms    QRS Duration 80 ms     ms    QTc 443 ms    P Axis 74 degrees    R AXIS 83 degrees    T Axis 70 degrees    Interpretation ECG       Sinus tachycardia  Low voltage QRS  Possible Inferior infarct , age undetermined  Abnormal ECG  When  compared with ECG of 02-MAY-2018 11:48,  Borderline criteria for Inferior infarct are now Present     Basic metabolic panel    Collection Time: 05/17/23  5:45 AM   Result Value Ref Range    Sodium 138 136 - 145 mmol/L    Potassium 3.7 3.4 - 5.3 mmol/L    Chloride 105 98 - 107 mmol/L    Carbon Dioxide (CO2) 20 (L) 22 - 29 mmol/L    Anion Gap 13 7 - 15 mmol/L    Urea Nitrogen 28.6 (H) 8.0 - 23.0 mg/dL    Creatinine 0.83 0.51 - 0.95 mg/dL    Calcium 8.7 (L) 8.8 - 10.2 mg/dL    Glucose 90 70 - 99 mg/dL    GFR Estimate 73 >60 mL/min/1.73m2   Hemoglobin    Collection Time: 05/17/23  5:45 AM   Result Value Ref Range    Hemoglobin 7.9 (L) 11.7 - 15.7 g/dL   Prepare red blood cells (unit)    Collection Time: 05/17/23  9:46 AM   Result Value Ref Range    Blood Component Type Red Blood Cells     Product Code B3103E32     Unit Status Released     Unit Number [id number]     CROSSMATCH Compatible     CODING SYSTEM [id number]     UNIT ABO/RH O+     UNIT TYPE ISBT 5100    Prepare red blood cells (unit)    Collection Time: 05/17/23  9:46 AM   Result Value Ref Range    Blood Component Type Red Blood Cells     Product Code Q7344O77     Unit Status Transfused     Unit Number [id number]     CROSSMATCH Compatible     CODING SYSTEM [id number]     ISSUE DATE AND TIME [id number]     UNIT ABO/RH O+     UNIT TYPE ISBT 5100    Hemoglobin    Collection Time: 05/17/23  8:10 PM   Result Value Ref Range    Hemoglobin 7.5 (L) 11.7 - 15.7 g/dL   Extra Green Top (Lithium Heparin) Tube    Collection Time: 05/17/23  8:28 PM   Result Value Ref Range    Hold Specimen [facility]    Basic metabolic panel    Collection Time: 05/18/23  5:48 AM   Result Value Ref Range    Sodium 133 (L) 136 - 145 mmol/L    Potassium 3.8 3.4 - 5.3 mmol/L    Chloride 102 98 - 107 mmol/L    Carbon Dioxide (CO2) 21 (L) 22 - 29 mmol/L    Anion Gap 10 7 - 15 mmol/L    Urea Nitrogen 19.7 8.0 - 23.0 mg/dL    Creatinine 0.74 0.51 - 0.95 mg/dL    Calcium 8.5 (L) 8.8 - 10.2 mg/dL     Glucose 82 70 - 99 mg/dL    GFR Estimate 84 >60 mL/min/1.73m2   CBC with platelets and differential    Collection Time: 05/18/23  5:48 AM   Result Value Ref Range    WBC Count 6.0 4.0 - 11.0 10e3/uL    RBC Count 2.00 (L) 3.80 - 5.20 10e6/uL    Hemoglobin 6.8 (LL) 11.7 - 15.7 g/dL    Hematocrit 20.5 (L) 35.0 - 47.0 %     (H) 78 - 100 fL    MCH 34.0 (H) 26.5 - 33.0 pg    MCHC 33.2 31.5 - 36.5 g/dL    RDW 13.4 10.0 - 15.0 %    Platelet Count 179 150 - 450 10e3/uL    % Neutrophils 71 %    % Lymphocytes 15 %    % Monocytes 11 %    % Eosinophils 2 %    % Basophils 1 %    % Immature Granulocytes 0 %    NRBCs per 100 WBC 0 <1 /100    Absolute Neutrophils 4.2 1.6 - 8.3 10e3/uL    Absolute Lymphocytes 0.9 0.8 - 5.3 10e3/uL    Absolute Monocytes 0.7 0.0 - 1.3 10e3/uL    Absolute Eosinophils 0.1 0.0 - 0.7 10e3/uL    Absolute Basophils 0.0 0.0 - 0.2 10e3/uL    Absolute Immature Granulocytes 0.0 <=0.4 10e3/uL    Absolute NRBCs 0.0 10e3/uL   Hemoglobin    Collection Time: 05/18/23  3:23 PM   Result Value Ref Range    Hemoglobin 8.8 (L) 11.7 - 15.7 g/dL   Extra Red Top Tube    Collection Time: 05/18/23  3:23 PM   Result Value Ref Range    Hold Specimen [facility]    Basic metabolic panel    Collection Time: 05/19/23  5:49 AM   Result Value Ref Range    Sodium 137 136 - 145 mmol/L    Potassium 3.9 3.4 - 5.3 mmol/L    Chloride 104 98 - 107 mmol/L    Carbon Dioxide (CO2) 23 22 - 29 mmol/L    Anion Gap 10 7 - 15 mmol/L    Urea Nitrogen 11.5 8.0 - 23.0 mg/dL    Creatinine 0.72 0.51 - 0.95 mg/dL    Calcium 8.9 8.8 - 10.2 mg/dL    Glucose 93 70 - 99 mg/dL    GFR Estimate 87 >60 mL/min/1.73m2   CBC with platelets and differential    Collection Time: 05/19/23  5:49 AM   Result Value Ref Range    WBC Count 6.9 4.0 - 11.0 10e3/uL    RBC Count 2.47 (L) 3.80 - 5.20 10e6/uL    Hemoglobin 8.1 (L) 11.7 - 15.7 g/dL    Hematocrit 23.3 (L) 35.0 - 47.0 %    MCV 94 78 - 100 fL    MCH 32.8 26.5 - 33.0 pg    MCHC 34.8 31.5 - 36.5 g/dL    RDW 17.0 (H)  10.0 - 15.0 %    Platelet Count 195 150 - 450 10e3/uL    % Neutrophils 73 %    % Lymphocytes 12 %    % Monocytes 12 %    % Eosinophils 1 %    % Basophils 1 %    % Immature Granulocytes 1 %    NRBCs per 100 WBC 0 <1 /100    Absolute Neutrophils 5.1 1.6 - 8.3 10e3/uL    Absolute Lymphocytes 0.8 0.8 - 5.3 10e3/uL    Absolute Monocytes 0.8 0.0 - 1.3 10e3/uL    Absolute Eosinophils 0.1 0.0 - 0.7 10e3/uL    Absolute Basophils 0.0 0.0 - 0.2 10e3/uL    Absolute Immature Granulocytes 0.1 <=0.4 10e3/uL    Absolute NRBCs 0.0 10e3/uL       MEDICATIONS:     Review of your medicines          Accurate as of May 22, 2023  2:30 PM. If you have any questions, ask your nurse or doctor.            CONTINUE these medicines which have NOT CHANGED      Dose / Directions   acetaminophen 325 MG tablet  Commonly known as: TYLENOL  Used for: Hip fracture, right, closed, initial encounter (H)      Dose: 650 mg  Take 2 tablets (650 mg) by mouth every 6 hours as needed for mild pain or other (and adjunct with moderate or severe pain or per patient request)  Quantity: 100 tablet  Refills: 0     albuterol 108 (90 Base) MCG/ACT inhaler  Commonly known as: PROAIR HFA/PROVENTIL HFA/VENTOLIN HFA  Used for: Cough      Dose: 1-2 puff  INHALE 1-2 PUFFS INTO THE LUNGS EVERY 6 HOURS AS NEEDED FOR COUGH OR SHORTNESS OF BREATH OR WHEEZE  Quantity: 18 g  Refills: 11     alendronate 70 MG tablet  Commonly known as: FOSAMAX  Used for: Age-related osteoporosis without current pathological fracture      TAKE 1 TABLET BY MOUTH EVERY 7 DAYS. TAKE IN THE MORNING ON AN EMPTY STOMACH WITH A FULL GLASS OF WATER 30 MINUTES BEFORE FOOD  Quantity: 12 tablet  Refills: 2     aspirin 81 MG EC tablet  Commonly known as: ASA  Used for: Closed fracture of sacrum with routine healing, unspecified portion of sacrum, subsequent encounter      Dose: 81 mg  Take 1 tablet (81 mg) by mouth daily  Refills: 0     calcium carbonate 1500 (600 Ca) MG tablet  Commonly known as: OS-PETER       Dose: 600 mg  Take 600 mg by mouth daily  Refills: 0     enoxaparin ANTICOAGULANT 40 MG/0.4ML syringe  Commonly known as: LOVENOX  Used for: Hip fracture, right, closed, initial encounter (H)      Dose: 40 mg  Inject 0.4 mLs (40 mg) Subcutaneous every 24 hours  Quantity: 11.2 mL  Refills: 0     ibuprofen 200 MG tablet  Commonly known as: ADVIL/MOTRIN      Dose: 200 mg  Take 200 mg by mouth every 6 hours as needed for pain  Refills: 0     losartan-hydrochlorothiazide 100-25 MG tablet  Commonly known as: HYZAAR  Used for: Benign essential hypertension      Dose: 1 tablet  Take 1 tablet by mouth daily  Quantity: 90 tablet  Refills: 2     melatonin 1 MG Tabs tablet  Used for: Hip fracture, right, closed, initial encounter (H), Acute UTI, Hyponatremia, Acute cystitis without hematuria, Pulmonary emphysema, unspecified emphysema type (H), Smoker      Dose: 1 mg  Take 1 tablet (1 mg) by mouth nightly as needed for sleep  Quantity: 30 tablet  Refills: 0     multivitamin, therapeutic Tabs tablet      Dose: 1 tablet  Take 1 tablet by mouth daily  Refills: 0     nicotine 21 MG/24HR 24 hr patch  Commonly known as: NICODERM CQ  Used for: Hip fracture, right, closed, initial encounter (H), Acute UTI, Hyponatremia, Acute cystitis without hematuria, Pulmonary emphysema, unspecified emphysema type (H), Smoker      Dose: 1 patch  Place 1 patch onto the skin daily for 14 days  Quantity: 14 patch  Refills: 0     omeprazole 20 MG DR capsule  Commonly known as: priLOSEC  Used for: Gastroesophageal reflux disease without esophagitis      Dose: 20 mg  TAKE 1 CAPSULE (20 MG) BY MOUTH DAILY  Quantity: 90 capsule  Refills: 0     oxyCODONE 5 MG tablet  Commonly known as: ROXICODONE  Indication: Acute Pain, Chronic Pain  Used for: Hip fracture, right, closed, initial encounter (H), Acute UTI, Hyponatremia, Acute cystitis without hematuria, Pulmonary emphysema, unspecified emphysema type (H), Smoker      Dose: 5 mg  Take 1 tablet (5 mg) by mouth  every 6 hours as needed for moderate pain  Quantity: 10 tablet  Refills: 0     polyethylene glycol 17 g packet  Commonly known as: MIRALAX  Used for: Hip fracture, right, closed, initial encounter (H), Acute UTI, Hyponatremia, Acute cystitis without hematuria, Pulmonary emphysema, unspecified emphysema type (H), Smoker      Dose: 17 g  Take 17 g by mouth daily  Quantity: 30 each  Refills: 0     senna-docusate 8.6-50 MG tablet  Commonly known as: SENOKOT-S/PERICOLACE  Used for: Hip fracture, right, closed, initial encounter (H), Acute UTI, Hyponatremia, Acute cystitis without hematuria, Pulmonary emphysema, unspecified emphysema type (H), Smoker      Dose: 1 tablet  Take 1 tablet by mouth 2 times daily as needed for constipation  Quantity: 30 tablet  Refills: 0     tiotropium 2.5 MCG/ACT inhaler  Commonly known as: SPIRIVA RESPIMAT  Used for: Pulmonary emphysema, unspecified emphysema type (H)      Dose: 2 puff  Inhale 2 puffs into the lungs daily (controller)  Quantity: 4 g  Refills: 11     vitamin C 500 MG tablet  Commonly known as: ASCORBIC ACID      Dose: 500 mg  Take 500 mg by mouth daily  Refills: 0     Vitamin D3 25 mcg (1000 units) tablet  Commonly known as: CHOLECALCIFEROL      Dose: 1,000 Units  Take 1,000 Units by mouth daily  Refills: 0        STOP taking    cefdinir 300 MG capsule  Commonly known as: OMNICEF  Stopped by: Sade Andersen CNP               ASSESSMENT/PLAN  Encounter Diagnoses   Name Primary?     Hip fracture, right, closed, initial encounter (H) Yes     Anemia, unspecified type      Pain management      Physical deconditioning      Right femur fracture status post ORIF, follow-up with orthopedics pain control    Anemia last hemoglobin 8.1 monitor labs    Pain management scheduled Tylenol, as needed ibuprofen as needed oxycodone    Shortness of breath as needed albuterol inhaler    Osteoporosis on alendronate, continue calcium carbonate     hypertension on losartan  potassium-hydrochlorothiazide    Insomnia continue melatonin    Nicotine abuse currently on nicotine patches    GERD continue omeprazole    Physical deconditioning PT OT     anticoagulation management continue enoxaparin subcu      Electronically signed by: Sade Andersen CNP

## 2023-05-22 ENCOUNTER — LAB REQUISITION (OUTPATIENT)
Dept: LAB | Facility: CLINIC | Age: 73
End: 2023-05-22
Payer: COMMERCIAL

## 2023-05-22 ENCOUNTER — TRANSITIONAL CARE UNIT VISIT (OUTPATIENT)
Dept: GERIATRICS | Facility: CLINIC | Age: 73
End: 2023-05-22
Payer: COMMERCIAL

## 2023-05-22 VITALS
RESPIRATION RATE: 18 BRPM | TEMPERATURE: 96.7 F | BODY MASS INDEX: 20.65 KG/M2 | SYSTOLIC BLOOD PRESSURE: 133 MMHG | WEIGHT: 105.2 LBS | DIASTOLIC BLOOD PRESSURE: 79 MMHG | HEIGHT: 60 IN | HEART RATE: 109 BPM | OXYGEN SATURATION: 97 %

## 2023-05-22 DIAGNOSIS — R52 PAIN MANAGEMENT: ICD-10-CM

## 2023-05-22 DIAGNOSIS — S72.001A HIP FRACTURE, RIGHT, CLOSED, INITIAL ENCOUNTER (H): Primary | ICD-10-CM

## 2023-05-22 DIAGNOSIS — D64.9 ANEMIA, UNSPECIFIED: ICD-10-CM

## 2023-05-22 DIAGNOSIS — R53.81 PHYSICAL DECONDITIONING: ICD-10-CM

## 2023-05-22 DIAGNOSIS — D64.9 ANEMIA, UNSPECIFIED TYPE: ICD-10-CM

## 2023-05-22 PROCEDURE — 99310 SBSQ NF CARE HIGH MDM 45: CPT | Performed by: NURSE PRACTITIONER

## 2023-05-22 NOTE — PROGRESS NOTES
[facility] GERIATRIC SERVICES       Patient Namita Viera  MRN: [id number]        Reason for Visit     Chief Complaint   Patient presents with     RECHECK     INITIAL       Code Status     CPR/Full code     Assessment     RT hip intertrochanteric fracture s/p ORIF  H/o of mechanical fall/ recurrent falls  COPD  TOBACCO use disorder  ETOH use disorder  Hyponatremia  ABLA status post 1 unit of blood transfusion  Acute metabolic encephalopathy  Hypertension  Osteoporosis  Generalized weakness    Plan     Pt is admitted to TCU for strengthening and rehab.  Pt admitted to TCU after elective hip surgery  Date of procedure- 5/17/23  Continue with incisional cares  WBAT  Follow-up with orthopedics as scheduled  Cont PT / OT for strengthening/ gait retraining  Pain management optimized  Tylenol scheduled 1 g every 8 hours  Continue narcotics prn-on oxycodone q6hrs prn  Also has Motrin and sparing use advised in light of her being on Lovenox as well as aspirin  Advised aggressive icing  On lovenox for dvt prophylaxis  Duration to be determined by orthopedics  Tubigrip stockings recommended for management of swelling of the lower extremities     Recheck labs done today -cbc shows  Hg >8  This was a fragility fracture.  She is already on calcium vitamin D and Fosamax she will continue with the same  Outpatient follow-up with her primary and orthopedics to discuss what needs to be done further  She has ongoing nicotine addiction but has refused a nicotine patch  Advised patient to discuss this with nursing  Has COPD with an occasional wheeze on exam continue with her inhalers  Pressures are noted to be 100/69 hold parameters given for her lisinopril/HCTZ  Continue with PT/OT    History     Patient is a very pleasant 73 year old female who is admitted to TCU  Patient presented post fall and noted to have a right hip intertrochanteric fracture.  She underwent surgical repair on 5/17/2023.  Postoperatively has been discharged to  the TCU.  She has COPD and continues to smoke  Also has alcohol use disorder and has been recommended to not continue.  She has hyponatremia which was felt to be stable discharged to the TCU for strengthening    Past Medical & Surgical History     PAST MEDICAL HISTORY:   Past Medical History:   Diagnosis Date     Acid reflux      Alcohol use      Closed fracture of left femur (H)      Closed fracture of sacrum with routine healing 10/12/2021     Emphysema of lung (H) 02/07/2022     Hip fracture requiring operative repair (H)      Hypertension 01/2021     Rib pain on left side 05/06/2021     SAH (subarachnoid hemorrhage) (H)      Traumatic closed displaced fracture of distal end of radius       PAST SURGICAL HISTORY:   has a past surgical history that includes Hysterectomy total abdominal, bilateral salpingo-oophorectomy, combined (N/A, 2008); Bladder Suspension (2008); orthopedic surgery (Left, 2018); and Open reduction internal fixation rodding intramedullary femur (Right, 5/17/2023).      Past Social History     Reviewed,  reports that she has been smoking cigarettes. She started smoking about 61 years ago. She has a 60.00 pack-year smoking history. She has never used smokeless tobacco. She reports current alcohol use of about 14.0 standard drinks of alcohol per week. She reports that she does not use drugs.    Family History     Reviewed, and family history includes Cancer in her sister; Esophageal Cancer in her mother; Heart Disease in her father; Rectal Cancer in her sister; Rheumatoid Arthritis in her brother.    Medication List     Current Outpatient Medications   Medication     acetaminophen (TYLENOL) 325 MG tablet     albuterol (PROAIR HFA/PROVENTIL HFA/VENTOLIN HFA) 108 (90 Base) MCG/ACT inhaler     alendronate (FOSAMAX) 70 MG tablet     aspirin (ASA) 81 MG EC tablet     calcium carbonate (OS-PETER) 1500 (600 Ca) MG tablet     enoxaparin ANTICOAGULANT (LOVENOX) 40 MG/0.4ML syringe     ibuprofen  (ADVIL/MOTRIN) 200 MG tablet     losartan-hydrochlorothiazide (HYZAAR) 100-25 MG tablet     melatonin 1 MG TABS tablet     multivitamin, therapeutic (THERA-VIT) TABS tablet     nicotine (NICODERM CQ) 21 MG/24HR 24 hr patch     omeprazole (PRILOSEC) 20 MG DR capsule     oxyCODONE (ROXICODONE) 5 MG tablet     polyethylene glycol (MIRALAX) 17 g packet     senna-docusate (SENOKOT-S/PERICOLACE) 8.6-50 MG tablet     tiotropium (SPIRIVA RESPIMAT) 2.5 MCG/ACT inhaler     vitamin C (ASCORBIC ACID) 500 MG tablet     Vitamin D, Cholecalciferol, 25 MCG (1000 UT) TABS     No current facility-administered medications for this visit.      MED REC REQUIRED  Post Medication Reconciliation Status: discharge medications reconciled, continue medications without change       Allergies     No Known Allergies    Review of Systems   A comprehensive review of 14 systems was done. Pertinent findings noted here and in history of present illness. All the rest negative.  Constitutional: Negative.  Negative for fever, chills, she has  activity change, appetite change and fatigue.   HENT: Negative for congestion and facial swelling.    Eyes: Negative for photophobia, redness and visual disturbance.   Respiratory: Negative for cough and chest tightness.  Has an occasional wheeze from her COPD has ongoing smoking issues  Cardiovascular: Negative for chest pain, palpitations and leg swelling.   Gastrointestinal: Negative for nausea, diarrhea, constipation, blood in stool and abdominal distention.   Genitourinary: Negative.    Musculoskeletal: Reporting minimal pain at rest however she is noticing decreased endurance and increased fatigue with minimal ambulation  Skin: Negative.    Neurological: Negative for dizziness, tremors, syncope, weakness, light-headedness and headaches.   Hematological: Does not bruise/bleed easily.   Psychiatric/Behavioral: Negative.        Physical Exam   BP (!) 158/74   Pulse 94   Temp 97.3  F (36.3  C)   Resp 18   Ht  "1.549 m (5' 1\")   Wt 47.2 kg (104 lb)   SpO2 99%   BMI 19.65 kg/m       Constitutional: Oriented to person, place, and time and appears well-developed.   HEENT:  Normocephalic and atraumatic.  Eyes: Conjunctivae and EOM are normal. Pupils are equal, round, and reactive to light. No discharge.  No scleral icterus. Nose normal. Mouth/Throat: Oropharynx is clear and moist. No oropharyngeal exudate.    NECK: Normal range of motion. Neck supple. No JVD present. No tracheal deviation present. No thyromegaly present.   CARDIOVASCULAR: Normal rate, regular rhythm and intact distal pulses.  Exam reveals no gallop and no friction rub.  Systolic murmur present.  PULMONARY: Effort normal and breath sounds normal. No respiratory distress occasional expiratory  wheezing or rales.  ABDOMEN: Soft. Bowel sounds are normal. No distension and no mass.  There is no tenderness. There is no rebound and no guarding. No HSM.  MUSCULOSKELETAL: Normal range of motion. Mild kyphosis, no tenderness.  LYMPH NODES: Has no cervical, supraclavicular, axillary and groin adenopathy.   NEUROLOGICAL: Alert and oriented to person, place, and time. No cranial nerve deficit.  Normal muscle tone. Coordination normal.   GENITOURINARY: Deferred exam.  SKIN: Skin is warm and dry. No rash noted. No erythema. No pallor.   EXTREMITIES: No cyanosis, no clubbing, no edema. No Deformity.  Right hip surgical incision is intact  PSYCHIATRIC: Normal mood, affect and behavior.      Lab Results     Recent Results (from the past 240 hour(s))   Basic metabolic panel    Collection Time: 05/15/23 11:48 AM   Result Value Ref Range    Sodium 126 (L) 136 - 145 mmol/L    Potassium 3.9 3.4 - 5.3 mmol/L    Chloride 89 (L) 98 - 107 mmol/L    Carbon Dioxide (CO2) 23 22 - 29 mmol/L    Anion Gap 14 7 - 15 mmol/L    Urea Nitrogen 21.9 8.0 - 23.0 mg/dL    Creatinine 0.65 0.51 - 0.95 mg/dL    Calcium 10.2 8.8 - 10.2 mg/dL    Glucose 123 (H) 70 - 99 mg/dL    GFR Estimate >90 >60 " mL/min/1.73m2   Extra Red Top Tube    Collection Time: 05/15/23 11:48 AM   Result Value Ref Range    Hold Specimen [facility]    Extra Green Top (Lithium Heparin) Tube    Collection Time: 05/15/23 11:48 AM   Result Value Ref Range    Hold Specimen [facility]    Extra Purple Top Tube    Collection Time: 05/15/23 11:48 AM   Result Value Ref Range    Hold Specimen [facility]    Extra Blue Top Tube    Collection Time: 05/15/23 11:48 AM   Result Value Ref Range    Hold Specimen [facility]    UA with Microscopic reflex to Culture    Collection Time: 05/15/23 11:56 AM    Specimen: Urine, Clean Catch   Result Value Ref Range    Color Urine Yellow Colorless, Straw, Light Yellow, Yellow    Appearance Urine Turbid (A) Clear    Glucose Urine Negative Negative mg/dL    Bilirubin Urine Negative Negative    Ketones Urine 20 (A) Negative mg/dL    Specific Gravity Urine 1.020 1.001 - 1.030    Blood Urine Negative Negative    pH Urine 6.0 5.0 - 7.0    Protein Albumin Urine 10 (A) Negative mg/dL    Urobilinogen Urine <2.0 <2.0 mg/dL    Nitrite Urine Positive (A) Negative    Leukocyte Esterase Urine 250 Jenny/uL (A) Negative    Bacteria Urine Moderate (A) None Seen /HPF    Mucus Urine Present (A) None Seen /LPF    RBC Urine 1 <=2 /HPF    WBC Urine 10 (H) <=5 /HPF    Squamous Epithelials Urine 1 <=1 /HPF   CBC (+ platelets, no diff)    Collection Time: 05/15/23 11:56 AM   Result Value Ref Range    WBC Count 5.2 4.0 - 11.0 10e3/uL    RBC Count 3.69 (L) 3.80 - 5.20 10e6/uL    Hemoglobin 12.7 11.7 - 15.7 g/dL    Hematocrit 35.0 35.0 - 47.0 %    MCV 95 78 - 100 fL    MCH 34.4 (H) 26.5 - 33.0 pg    MCHC 36.3 31.5 - 36.5 g/dL    RDW 13.0 10.0 - 15.0 %    Platelet Count 291 150 - 450 10e3/uL   Urine Culture    Collection Time: 05/15/23 11:56 AM    Specimen: Urine, Clean Catch   Result Value Ref Range    Culture >100,000 CFU/mL Escherichia coli (A)        Susceptibility    Escherichia coli - MALLORIE     Ampicillin 4 Susceptible ug/mL     Ampicillin/ Sulbactam <=2 Susceptible  ug/mL     Piperacillin/Tazobactam <=4 Susceptible ug/mL     Cefazolin* <=4 Susceptible ug/mL      * Cefazolin MALLORIE breakpoints are for the treatment of uncomplicated urinary tract infections. For the treatment of systemic infections, please contact the laboratory for additional testing.     Cefoxitin <=4 Susceptible ug/mL     Ceftazidime <=1 Susceptible ug/mL     Ceftriaxone <=1 Susceptible ug/mL     Cefepime <=1 Susceptible ug/mL     Gentamicin <=1 Susceptible ug/mL     Tobramycin <=1 Susceptible ug/mL     Ciprofloxacin <=0.25 Susceptible ug/mL     Levofloxacin <=0.12 Susceptible ug/mL     Nitrofurantoin <=16 Susceptible ug/mL     Trimethoprim/Sulfamethoxazole <=1/19 Susceptible ug/mL   Basic metabolic panel    Collection Time: 05/16/23  3:02 PM   Result Value Ref Range    Sodium 134 (L) 136 - 145 mmol/L    Potassium 3.8 3.4 - 5.3 mmol/L    Chloride 97 (L) 98 - 107 mmol/L    Carbon Dioxide (CO2) 22 22 - 29 mmol/L    Anion Gap 15 7 - 15 mmol/L    Urea Nitrogen 30.8 (H) 8.0 - 23.0 mg/dL    Creatinine 0.92 0.51 - 0.95 mg/dL    Calcium 9.6 8.8 - 10.2 mg/dL    Glucose 111 (H) 70 - 99 mg/dL    GFR Estimate 65 >60 mL/min/1.73m2   CBC with platelets and differential    Collection Time: 05/16/23  3:02 PM   Result Value Ref Range    WBC Count 9.9 4.0 - 11.0 10e3/uL    RBC Count 2.97 (L) 3.80 - 5.20 10e6/uL    Hemoglobin 10.2 (L) 11.7 - 15.7 g/dL    Hematocrit 29.3 (L) 35.0 - 47.0 %    MCV 99 78 - 100 fL    MCH 34.3 (H) 26.5 - 33.0 pg    MCHC 34.8 31.5 - 36.5 g/dL    RDW 13.3 10.0 - 15.0 %    Platelet Count 235 150 - 450 10e3/uL    % Neutrophils 88 %    % Lymphocytes 5 %    % Monocytes 6 %    % Eosinophils 0 %    % Basophils 0 %    % Immature Granulocytes 1 %    NRBCs per 100 WBC 0 <1 /100    Absolute Neutrophils 8.8 (H) 1.6 - 8.3 10e3/uL    Absolute Lymphocytes 0.5 (L) 0.8 - 5.3 10e3/uL    Absolute Monocytes 0.6 0.0 - 1.3 10e3/uL    Absolute Eosinophils 0.0 0.0 - 0.7 10e3/uL    Absolute Basophils 0.0 0.0 - 0.2 10e3/uL     Absolute Immature Granulocytes 0.1 <=0.4 10e3/uL    Absolute NRBCs 0.0 10e3/uL   Adult Type and Screen    Collection Time: 05/16/23  3:02 PM   Result Value Ref Range    ABO/RH(D) O POS     Antibody Screen Negative Negative    SPECIMEN EXPIRATION DATE [id number]    ECG 12-LEAD WITH MUSE (L[facility])    Collection Time: 05/16/23  7:48 PM   Result Value Ref Range    Systolic Blood Pressure  mmHg    Diastolic Blood Pressure  mmHg    Ventricular Rate 101 BPM    Atrial Rate 101 BPM    MA Interval 122 ms    QRS Duration 80 ms     ms    QTc 443 ms    P Axis 74 degrees    R AXIS 83 degrees    T Axis 70 degrees    Interpretation ECG       Sinus tachycardia  Low voltage QRS  Possible Inferior infarct , age undetermined  Abnormal ECG  When compared with ECG of 02-MAY-2018 11:48,  Borderline criteria for Inferior infarct are now Present     Basic metabolic panel    Collection Time: 05/17/23  5:45 AM   Result Value Ref Range    Sodium 138 136 - 145 mmol/L    Potassium 3.7 3.4 - 5.3 mmol/L    Chloride 105 98 - 107 mmol/L    Carbon Dioxide (CO2) 20 (L) 22 - 29 mmol/L    Anion Gap 13 7 - 15 mmol/L    Urea Nitrogen 28.6 (H) 8.0 - 23.0 mg/dL    Creatinine 0.83 0.51 - 0.95 mg/dL    Calcium 8.7 (L) 8.8 - 10.2 mg/dL    Glucose 90 70 - 99 mg/dL    GFR Estimate 73 >60 mL/min/1.73m2   Hemoglobin    Collection Time: 05/17/23  5:45 AM   Result Value Ref Range    Hemoglobin 7.9 (L) 11.7 - 15.7 g/dL   Prepare red blood cells (unit)    Collection Time: 05/17/23  9:46 AM   Result Value Ref Range    Blood Component Type Red Blood Cells     Product Code U2736I64     Unit Status Released     Unit Number [id number]     CROSSMATCH Compatible     CODING SYSTEM [id number]     UNIT ABO/RH O+     UNIT TYPE ISBT 5100    Prepare red blood cells (unit)    Collection Time: 05/17/23  9:46 AM   Result Value Ref Range    Blood Component Type Red Blood Cells     Product Code C1284E40     Unit Status Transfused     Unit Number [id number]     CROSSMATCH  Compatible     CODING SYSTEM [id number]     ISSUE DATE AND TIME [id number]     UNIT ABO/RH O+     UNIT TYPE ISBT 5100    Hemoglobin    Collection Time: 05/17/23  8:10 PM   Result Value Ref Range    Hemoglobin 7.5 (L) 11.7 - 15.7 g/dL   Extra Green Top (Lithium Heparin) Tube    Collection Time: 05/17/23  8:28 PM   Result Value Ref Range    Hold Specimen [facility]    Basic metabolic panel    Collection Time: 05/18/23  5:48 AM   Result Value Ref Range    Sodium 133 (L) 136 - 145 mmol/L    Potassium 3.8 3.4 - 5.3 mmol/L    Chloride 102 98 - 107 mmol/L    Carbon Dioxide (CO2) 21 (L) 22 - 29 mmol/L    Anion Gap 10 7 - 15 mmol/L    Urea Nitrogen 19.7 8.0 - 23.0 mg/dL    Creatinine 0.74 0.51 - 0.95 mg/dL    Calcium 8.5 (L) 8.8 - 10.2 mg/dL    Glucose 82 70 - 99 mg/dL    GFR Estimate 84 >60 mL/min/1.73m2   CBC with platelets and differential    Collection Time: 05/18/23  5:48 AM   Result Value Ref Range    WBC Count 6.0 4.0 - 11.0 10e3/uL    RBC Count 2.00 (L) 3.80 - 5.20 10e6/uL    Hemoglobin 6.8 (LL) 11.7 - 15.7 g/dL    Hematocrit 20.5 (L) 35.0 - 47.0 %     (H) 78 - 100 fL    MCH 34.0 (H) 26.5 - 33.0 pg    MCHC 33.2 31.5 - 36.5 g/dL    RDW 13.4 10.0 - 15.0 %    Platelet Count 179 150 - 450 10e3/uL    % Neutrophils 71 %    % Lymphocytes 15 %    % Monocytes 11 %    % Eosinophils 2 %    % Basophils 1 %    % Immature Granulocytes 0 %    NRBCs per 100 WBC 0 <1 /100    Absolute Neutrophils 4.2 1.6 - 8.3 10e3/uL    Absolute Lymphocytes 0.9 0.8 - 5.3 10e3/uL    Absolute Monocytes 0.7 0.0 - 1.3 10e3/uL    Absolute Eosinophils 0.1 0.0 - 0.7 10e3/uL    Absolute Basophils 0.0 0.0 - 0.2 10e3/uL    Absolute Immature Granulocytes 0.0 <=0.4 10e3/uL    Absolute NRBCs 0.0 10e3/uL   Hemoglobin    Collection Time: 05/18/23  3:23 PM   Result Value Ref Range    Hemoglobin 8.8 (L) 11.7 - 15.7 g/dL   Extra Red Top Tube    Collection Time: 05/18/23  3:23 PM   Result Value Ref Range    Hold Specimen [facility]    Basic metabolic panel    Collection  Time: 05/19/23  5:49 AM   Result Value Ref Range    Sodium 137 136 - 145 mmol/L    Potassium 3.9 3.4 - 5.3 mmol/L    Chloride 104 98 - 107 mmol/L    Carbon Dioxide (CO2) 23 22 - 29 mmol/L    Anion Gap 10 7 - 15 mmol/L    Urea Nitrogen 11.5 8.0 - 23.0 mg/dL    Creatinine 0.72 0.51 - 0.95 mg/dL    Calcium 8.9 8.8 - 10.2 mg/dL    Glucose 93 70 - 99 mg/dL    GFR Estimate 87 >60 mL/min/1.73m2   CBC with platelets and differential    Collection Time: 05/19/23  5:49 AM   Result Value Ref Range    WBC Count 6.9 4.0 - 11.0 10e3/uL    RBC Count 2.47 (L) 3.80 - 5.20 10e6/uL    Hemoglobin 8.1 (L) 11.7 - 15.7 g/dL    Hematocrit 23.3 (L) 35.0 - 47.0 %    MCV 94 78 - 100 fL    MCH 32.8 26.5 - 33.0 pg    MCHC 34.8 31.5 - 36.5 g/dL    RDW 17.0 (H) 10.0 - 15.0 %    Platelet Count 195 150 - 450 10e3/uL    % Neutrophils 73 %    % Lymphocytes 12 %    % Monocytes 12 %    % Eosinophils 1 %    % Basophils 1 %    % Immature Granulocytes 1 %    NRBCs per 100 WBC 0 <1 /100    Absolute Neutrophils 5.1 1.6 - 8.3 10e3/uL    Absolute Lymphocytes 0.8 0.8 - 5.3 10e3/uL    Absolute Monocytes 0.8 0.0 - 1.3 10e3/uL    Absolute Eosinophils 0.1 0.0 - 0.7 10e3/uL    Absolute Basophils 0.0 0.0 - 0.2 10e3/uL    Absolute Immature Granulocytes 0.1 <=0.4 10e3/uL    Absolute NRBCs 0.0 10e3/uL   CBC with platelets    Collection Time: 05/23/23  5:41 AM   Result Value Ref Range    WBC Count 5.5 4.0 - 11.0 10e3/uL    RBC Count 2.70 (L) 3.80 - 5.20 10e6/uL    Hemoglobin 8.6 (L) 11.7 - 15.7 g/dL    Hematocrit 26.4 (L) 35.0 - 47.0 %    MCV 98 78 - 100 fL    MCH 31.9 26.5 - 33.0 pg    MCHC 32.6 31.5 - 36.5 g/dL    RDW 16.8 (H) 10.0 - 15.0 %    Platelet Count 465 (H) 150 - 450 10e3/uL                 Electronically signed by    Jeniffer Cannon MD

## 2023-05-22 NOTE — LETTER
5/22/2023        RE: Namita Viera  [address]  [place] [address]        [facility]  Chief Complaint   Patient presents with     [facility] F/U     [facility] Medical Record Number:  [id number]  Place of Service where encounter took place:  [facility] ([facility]) [[id number]]  Code Status:  Full Code     HISTORY:      HPI:  Namita Viera  is 73 year old (1950) undergoing physical and occupational therapy.  She  Is with pertinent medical history of tobacco abuse, regular alcohol use, emphysema of lung, HTN -- brought to the ER after a fall, and could not get up because of right hip pain.  She was just in the ER yesterday with confusion -- sodium 126, UA with 10 WBC's and treated for a UTI and son says she is better today (confusion resolved).  She did have a left hip fracture in 2018 and did well after ORIF.        In ER,  Pelvic xray shows an acute displaced intertrochanteric proximal right femur fracture and underwent a ORIF.  Head and lumbar CT's unremarkable    Today she was seen to review vital signs, labs, routine visit and to establish care.  She denies chest pain shortness of breath cough congestion constipation or diarrhea.  She did complete cefdinir for UTI.  Right hip with Tegaderm dressing and noted to have bruising down her thigh.  She reports her pain is comfortable with sitting but does report it gets up to 7-8 out of 10 with movement.  Labs reviewed last hemoglobin 8.1 which is currently down from 8 point 8 repeat ordered labs for 5/23/2023.  BMP within normal limits.  She is tolerating a regular diet    ALLERGIES:Patient has no known allergies.    PAST MEDICAL HISTORY:   Past Medical History:   Diagnosis Date     Acid reflux      Alcohol use      Closed fracture of left femur (H)      Closed fracture of sacrum with routine healing 10/12/2021     Emphysema of lung (H) 02/07/2022     Hip fracture requiring operative repair (H)      Hypertension 01/2021     Rib  pain on left side 05/06/2021     SAH (subarachnoid hemorrhage) (H)      Traumatic closed displaced fracture of distal end of radius        PAST SURGICAL HISTORY:   has a past surgical history that includes Hysterectomy total abdominal, bilateral salpingo-oophorectomy, combined (N/A, 2008); Bladder Suspension (2008); orthopedic surgery (Left, 2018); and Open reduction internal fixation rodding intramedullary femur (Right, 5/17/2023).    FAMILY HISTORY: family history includes Cancer in her sister; Esophageal Cancer in her mother; Heart Disease in her father; Rectal Cancer in her sister; Rheumatoid Arthritis in her brother.    SOCIAL HISTORY:  reports that she has been smoking cigarettes. She started smoking about 61 years ago. She has a 60.00 pack-year smoking history. She has never used smokeless tobacco. She reports current alcohol use of about 14.0 standard drinks of alcohol per week. She reports that she does not use drugs.    ROS:  Constitutional: Negative for activity change, appetite change, fatigue and fever.   HENT: Negative for congestion.    Respiratory: Negative for cough, shortness of breath and wheezing.    Cardiovascular: Negative for chest pain and leg swelling.   Gastrointestinal: Negative for abdominal distention, abdominal pain, constipation, diarrhea and nausea.   Genitourinary: Negative for dysuria.   Musculoskeletal: Negative for arthralgia. Negative for back pain.  Surgical incision right hip with bruising  Skin: Negative for color change and wound.  Surgical incision right hip  Neurological: Negative for dizziness.   Psychiatric/Behavioral: Negative for agitation, behavioral problems and confusion.     Physical Exam:  Constitutional:       Appearance: Patient is well-developed.   HENT:      Head: Normocephalic.   Eyes:      Conjunctiva/sclera: Conjunctivae normal.   Neck:      Musculoskeletal: Normal range of motion.   Cardiovascular:      Rate and Rhythm: Normal rate and regular rhythm.       Heart sounds: Normal heart sounds. No murmur.   Pulmonary:      Effort: No respiratory distress.      Breath sounds: Normal breath sounds. No wheezing or rales.   Abdominal:      General: Bowel sounds are normal. There is no distension.      Palpations: Abdomen is soft.      Tenderness: There is no abdominal tenderness.   Musculoskeletal:       Normal range of motion.     Right hip surgical wound denies calf pain  Skin:General:        Skin is warm.   Neurological:         Mental Status: Patient is alert and oriented to person, place, and time.   Psychiatric:         Behavior: Behavior normal.     Vitals:/79   Pulse 109   Temp (!) 96.7  F (35.9  C)   Resp 18   Ht 1.524 m (5')   Wt 47.7 kg (105 lb 3.2 oz)   SpO2 97%   BMI 20.55 kg/m   and Body mass index is 20.55 kg/m .    Lab/Diagnostic data:   Recent Results (from the past 240 hour(s))   Basic metabolic panel    Collection Time: 05/15/23 11:48 AM   Result Value Ref Range    Sodium 126 (L) 136 - 145 mmol/L    Potassium 3.9 3.4 - 5.3 mmol/L    Chloride 89 (L) 98 - 107 mmol/L    Carbon Dioxide (CO2) 23 22 - 29 mmol/L    Anion Gap 14 7 - 15 mmol/L    Urea Nitrogen 21.9 8.0 - 23.0 mg/dL    Creatinine 0.65 0.51 - 0.95 mg/dL    Calcium 10.2 8.8 - 10.2 mg/dL    Glucose 123 (H) 70 - 99 mg/dL    GFR Estimate >90 >60 mL/min/1.73m2   Extra Red Top Tube    Collection Time: 05/15/23 11:48 AM   Result Value Ref Range    Hold Specimen [facility]    Extra Green Top (Lithium Heparin) Tube    Collection Time: 05/15/23 11:48 AM   Result Value Ref Range    Hold Specimen [facility]    Extra Purple Top Tube    Collection Time: 05/15/23 11:48 AM   Result Value Ref Range    Hold Specimen [facility]    Extra Blue Top Tube    Collection Time: 05/15/23 11:48 AM   Result Value Ref Range    Hold Specimen J[facility]    UA with Microscopic reflex to Culture    Collection Time: 05/15/23 11:56 AM    Specimen: Urine, Clean Catch   Result Value Ref Range    Color Urine Yellow Colorless, Straw, Light Yellow, Yellow     Appearance Urine Turbid (A) Clear    Glucose Urine Negative Negative mg/dL    Bilirubin Urine Negative Negative    Ketones Urine 20 (A) Negative mg/dL    Specific Gravity Urine 1.020 1.001 - 1.030    Blood Urine Negative Negative    pH Urine 6.0 5.0 - 7.0    Protein Albumin Urine 10 (A) Negative mg/dL    Urobilinogen Urine <2.0 <2.0 mg/dL    Nitrite Urine Positive (A) Negative    Leukocyte Esterase Urine 250 Jenny/uL (A) Negative    Bacteria Urine Moderate (A) None Seen /HPF    Mucus Urine Present (A) None Seen /LPF    RBC Urine 1 <=2 /HPF    WBC Urine 10 (H) <=5 /HPF    Squamous Epithelials Urine 1 <=1 /HPF   CBC (+ platelets, no diff)    Collection Time: 05/15/23 11:56 AM   Result Value Ref Range    WBC Count 5.2 4.0 - 11.0 10e3/uL    RBC Count 3.69 (L) 3.80 - 5.20 10e6/uL    Hemoglobin 12.7 11.7 - 15.7 g/dL    Hematocrit 35.0 35.0 - 47.0 %    MCV 95 78 - 100 fL    MCH 34.4 (H) 26.5 - 33.0 pg    MCHC 36.3 31.5 - 36.5 g/dL    RDW 13.0 10.0 - 15.0 %    Platelet Count 291 150 - 450 10e3/uL   Urine Culture    Collection Time: 05/15/23 11:56 AM    Specimen: Urine, Clean Catch   Result Value Ref Range    Culture >100,000 CFU/mL Escherichia coli (A)        Susceptibility    Escherichia coli - MALLORIE     Ampicillin 4 Susceptible ug/mL     Ampicillin/ Sulbactam <=2 Susceptible ug/mL     Piperacillin/Tazobactam <=4 Susceptible ug/mL     Cefazolin* <=4 Susceptible ug/mL      * Cefazolin MALLORIE breakpoints are for the treatment of uncomplicated urinary tract infections. For the treatment of systemic infections, please contact the laboratory for additional testing.     Cefoxitin <=4 Susceptible ug/mL     Ceftazidime <=1 Susceptible ug/mL     Ceftriaxone <=1 Susceptible ug/mL     Cefepime <=1 Susceptible ug/mL     Gentamicin <=1 Susceptible ug/mL     Tobramycin <=1 Susceptible ug/mL     Ciprofloxacin <=0.25 Susceptible ug/mL     Levofloxacin <=0.12 Susceptible ug/mL     Nitrofurantoin <=16 Susceptible ug/mL      Trimethoprim/Sulfamethoxazole <=1/19 Susceptible ug/mL   Basic metabolic panel    Collection Time: 05/16/23  3:02 PM   Result Value Ref Range    Sodium 134 (L) 136 - 145 mmol/L    Potassium 3.8 3.4 - 5.3 mmol/L    Chloride 97 (L) 98 - 107 mmol/L    Carbon Dioxide (CO2) 22 22 - 29 mmol/L    Anion Gap 15 7 - 15 mmol/L    Urea Nitrogen 30.8 (H) 8.0 - 23.0 mg/dL    Creatinine 0.92 0.51 - 0.95 mg/dL    Calcium 9.6 8.8 - 10.2 mg/dL    Glucose 111 (H) 70 - 99 mg/dL    GFR Estimate 65 >60 mL/min/1.73m2   CBC with platelets and differential    Collection Time: 05/16/23  3:02 PM   Result Value Ref Range    WBC Count 9.9 4.0 - 11.0 10e3/uL    RBC Count 2.97 (L) 3.80 - 5.20 10e6/uL    Hemoglobin 10.2 (L) 11.7 - 15.7 g/dL    Hematocrit 29.3 (L) 35.0 - 47.0 %    MCV 99 78 - 100 fL    MCH 34.3 (H) 26.5 - 33.0 pg    MCHC 34.8 31.5 - 36.5 g/dL    RDW 13.3 10.0 - 15.0 %    Platelet Count 235 150 - 450 10e3/uL    % Neutrophils 88 %    % Lymphocytes 5 %    % Monocytes 6 %    % Eosinophils 0 %    % Basophils 0 %    % Immature Granulocytes 1 %    NRBCs per 100 WBC 0 <1 /100    Absolute Neutrophils 8.8 (H) 1.6 - 8.3 10e3/uL    Absolute Lymphocytes 0.5 (L) 0.8 - 5.3 10e3/uL    Absolute Monocytes 0.6 0.0 - 1.3 10e3/uL    Absolute Eosinophils 0.0 0.0 - 0.7 10e3/uL    Absolute Basophils 0.0 0.0 - 0.2 10e3/uL    Absolute Immature Granulocytes 0.1 <=0.4 10e3/uL    Absolute NRBCs 0.0 10e3/uL   Adult Type and Screen    Collection Time: 05/16/23  3:02 PM   Result Value Ref Range    ABO/RH(D) O POS     Antibody Screen Negative Negative    SPECIMEN EXPIRATION DATE [id number]    ECG 12-LEAD WITH MUSE ([facility])    Collection Time: 05/16/23  7:48 PM   Result Value Ref Range    Systolic Blood Pressure  mmHg    Diastolic Blood Pressure  mmHg    Ventricular Rate 101 BPM    Atrial Rate 101 BPM    AK Interval 122 ms    QRS Duration 80 ms     ms    QTc 443 ms    P Axis 74 degrees    R AXIS 83 degrees    T Axis 70 degrees    Interpretation ECG        Sinus tachycardia  Low voltage QRS  Possible Inferior infarct , age undetermined  Abnormal ECG  When compared with ECG of 02-MAY-2018 11:48,  Borderline criteria for Inferior infarct are now Present     Basic metabolic panel    Collection Time: 05/17/23  5:45 AM   Result Value Ref Range    Sodium 138 136 - 145 mmol/L    Potassium 3.7 3.4 - 5.3 mmol/L    Chloride 105 98 - 107 mmol/L    Carbon Dioxide (CO2) 20 (L) 22 - 29 mmol/L    Anion Gap 13 7 - 15 mmol/L    Urea Nitrogen 28.6 (H) 8.0 - 23.0 mg/dL    Creatinine 0.83 0.51 - 0.95 mg/dL    Calcium 8.7 (L) 8.8 - 10.2 mg/dL    Glucose 90 70 - 99 mg/dL    GFR Estimate 73 >60 mL/min/1.73m2   Hemoglobin    Collection Time: 05/17/23  5:45 AM   Result Value Ref Range    Hemoglobin 7.9 (L) 11.7 - 15.7 g/dL   Prepare red blood cells (unit)    Collection Time: 05/17/23  9:46 AM   Result Value Ref Range    Blood Component Type Red Blood Cells     Product Code U0506K61     Unit Status Released     Unit Number [id number]     CROSSMATCH Compatible     CODING SYSTEM UJHF566     UNIT ABO/RH O+     UNIT TYPE ISBT 5100    Prepare red blood cells (unit)    Collection Time: 05/17/23  9:46 AM   Result Value Ref Range    Blood Component Type Red Blood Cells     Product Code W4864A34     Unit Status Transfused     Unit Number [id number]     CROSSMATCH Compatible     CODING SYSTEM [id number]     ISSUE DATE AND TIME [id number]     UNIT ABO/RH O+     UNIT TYPE ISBT 5100    Hemoglobin    Collection Time: 05/17/23  8:10 PM   Result Value Ref Range    Hemoglobin 7.5 (L) 11.7 - 15.7 g/dL   Extra Green Top (Lithium Heparin) Tube    Collection Time: 05/17/23  8:28 PM   Result Value Ref Range    Hold Specimen [facility]    Basic metabolic panel    Collection Time: 05/18/23  5:48 AM   Result Value Ref Range    Sodium 133 (L) 136 - 145 mmol/L    Potassium 3.8 3.4 - 5.3 mmol/L    Chloride 102 98 - 107 mmol/L    Carbon Dioxide (CO2) 21 (L) 22 - 29 mmol/L    Anion Gap 10 7 - 15 mmol/L    Urea Nitrogen  19.7 8.0 - 23.0 mg/dL    Creatinine 0.74 0.51 - 0.95 mg/dL    Calcium 8.5 (L) 8.8 - 10.2 mg/dL    Glucose 82 70 - 99 mg/dL    GFR Estimate 84 >60 mL/min/1.73m2   CBC with platelets and differential    Collection Time: 05/18/23  5:48 AM   Result Value Ref Range    WBC Count 6.0 4.0 - 11.0 10e3/uL    RBC Count 2.00 (L) 3.80 - 5.20 10e6/uL    Hemoglobin 6.8 (LL) 11.7 - 15.7 g/dL    Hematocrit 20.5 (L) 35.0 - 47.0 %     (H) 78 - 100 fL    MCH 34.0 (H) 26.5 - 33.0 pg    MCHC 33.2 31.5 - 36.5 g/dL    RDW 13.4 10.0 - 15.0 %    Platelet Count 179 150 - 450 10e3/uL    % Neutrophils 71 %    % Lymphocytes 15 %    % Monocytes 11 %    % Eosinophils 2 %    % Basophils 1 %    % Immature Granulocytes 0 %    NRBCs per 100 WBC 0 <1 /100    Absolute Neutrophils 4.2 1.6 - 8.3 10e3/uL    Absolute Lymphocytes 0.9 0.8 - 5.3 10e3/uL    Absolute Monocytes 0.7 0.0 - 1.3 10e3/uL    Absolute Eosinophils 0.1 0.0 - 0.7 10e3/uL    Absolute Basophils 0.0 0.0 - 0.2 10e3/uL    Absolute Immature Granulocytes 0.0 <=0.4 10e3/uL    Absolute NRBCs 0.0 10e3/uL   Hemoglobin    Collection Time: 05/18/23  3:23 PM   Result Value Ref Range    Hemoglobin 8.8 (L) 11.7 - 15.7 g/dL   Extra Red Top Tube    Collection Time: 05/18/23  3:23 PM   Result Value Ref Range    Hold Specimen [facility]    Basic metabolic panel    Collection Time: 05/19/23  5:49 AM   Result Value Ref Range    Sodium 137 136 - 145 mmol/L    Potassium 3.9 3.4 - 5.3 mmol/L    Chloride 104 98 - 107 mmol/L    Carbon Dioxide (CO2) 23 22 - 29 mmol/L    Anion Gap 10 7 - 15 mmol/L    Urea Nitrogen 11.5 8.0 - 23.0 mg/dL    Creatinine 0.72 0.51 - 0.95 mg/dL    Calcium 8.9 8.8 - 10.2 mg/dL    Glucose 93 70 - 99 mg/dL    GFR Estimate 87 >60 mL/min/1.73m2   CBC with platelets and differential    Collection Time: 05/19/23  5:49 AM   Result Value Ref Range    WBC Count 6.9 4.0 - 11.0 10e3/uL    RBC Count 2.47 (L) 3.80 - 5.20 10e6/uL    Hemoglobin 8.1 (L) 11.7 - 15.7 g/dL    Hematocrit 23.3 (L) 35.0 - 47.0 %     MCV 94 78 - 100 fL    MCH 32.8 26.5 - 33.0 pg    MCHC 34.8 31.5 - 36.5 g/dL    RDW 17.0 (H) 10.0 - 15.0 %    Platelet Count 195 150 - 450 10e3/uL    % Neutrophils 73 %    % Lymphocytes 12 %    % Monocytes 12 %    % Eosinophils 1 %    % Basophils 1 %    % Immature Granulocytes 1 %    NRBCs per 100 WBC 0 <1 /100    Absolute Neutrophils 5.1 1.6 - 8.3 10e3/uL    Absolute Lymphocytes 0.8 0.8 - 5.3 10e3/uL    Absolute Monocytes 0.8 0.0 - 1.3 10e3/uL    Absolute Eosinophils 0.1 0.0 - 0.7 10e3/uL    Absolute Basophils 0.0 0.0 - 0.2 10e3/uL    Absolute Immature Granulocytes 0.1 <=0.4 10e3/uL    Absolute NRBCs 0.0 10e3/uL       MEDICATIONS:     Review of your medicines          Accurate as of May 22, 2023  2:30 PM. If you have any questions, ask your nurse or doctor.            CONTINUE these medicines which have NOT CHANGED      Dose / Directions   acetaminophen 325 MG tablet  Commonly known as: TYLENOL  Used for: Hip fracture, right, closed, initial encounter (H)      Dose: 650 mg  Take 2 tablets (650 mg) by mouth every 6 hours as needed for mild pain or other (and adjunct with moderate or severe pain or per patient request)  Quantity: 100 tablet  Refills: 0     albuterol 108 (90 Base) MCG/ACT inhaler  Commonly known as: PROAIR HFA/PROVENTIL HFA/VENTOLIN HFA  Used for: Cough      Dose: 1-2 puff  INHALE 1-2 PUFFS INTO THE LUNGS EVERY 6 HOURS AS NEEDED FOR COUGH OR SHORTNESS OF BREATH OR WHEEZE  Quantity: 18 g  Refills: 11     alendronate 70 MG tablet  Commonly known as: FOSAMAX  Used for: Age-related osteoporosis without current pathological fracture      TAKE 1 TABLET BY MOUTH EVERY 7 DAYS. TAKE IN THE MORNING ON AN EMPTY STOMACH WITH A FULL GLASS OF WATER 30 MINUTES BEFORE FOOD  Quantity: 12 tablet  Refills: 2     aspirin 81 MG EC tablet  Commonly known as: ASA  Used for: Closed fracture of sacrum with routine healing, unspecified portion of sacrum, subsequent encounter      Dose: 81 mg  Take 1 tablet (81 mg) by mouth  daily  Refills: 0     calcium carbonate 1500 (600 Ca) MG tablet  Commonly known as: OS-PETER      Dose: 600 mg  Take 600 mg by mouth daily  Refills: 0     enoxaparin ANTICOAGULANT 40 MG/0.4ML syringe  Commonly known as: LOVENOX  Used for: Hip fracture, right, closed, initial encounter (H)      Dose: 40 mg  Inject 0.4 mLs (40 mg) Subcutaneous every 24 hours  Quantity: 11.2 mL  Refills: 0     ibuprofen 200 MG tablet  Commonly known as: ADVIL/MOTRIN      Dose: 200 mg  Take 200 mg by mouth every 6 hours as needed for pain  Refills: 0     losartan-hydrochlorothiazide 100-25 MG tablet  Commonly known as: HYZAAR  Used for: Benign essential hypertension      Dose: 1 tablet  Take 1 tablet by mouth daily  Quantity: 90 tablet  Refills: 2     melatonin 1 MG Tabs tablet  Used for: Hip fracture, right, closed, initial encounter (H), Acute UTI, Hyponatremia, Acute cystitis without hematuria, Pulmonary emphysema, unspecified emphysema type (H), Smoker      Dose: 1 mg  Take 1 tablet (1 mg) by mouth nightly as needed for sleep  Quantity: 30 tablet  Refills: 0     multivitamin, therapeutic Tabs tablet      Dose: 1 tablet  Take 1 tablet by mouth daily  Refills: 0     nicotine 21 MG/24HR 24 hr patch  Commonly known as: NICODERM CQ  Used for: Hip fracture, right, closed, initial encounter (H), Acute UTI, Hyponatremia, Acute cystitis without hematuria, Pulmonary emphysema, unspecified emphysema type (H), Smoker      Dose: 1 patch  Place 1 patch onto the skin daily for 14 days  Quantity: 14 patch  Refills: 0     omeprazole 20 MG DR capsule  Commonly known as: priLOSEC  Used for: Gastroesophageal reflux disease without esophagitis      Dose: 20 mg  TAKE 1 CAPSULE (20 MG) BY MOUTH DAILY  Quantity: 90 capsule  Refills: 0     oxyCODONE 5 MG tablet  Commonly known as: ROXICODONE  Indication: Acute Pain, Chronic Pain  Used for: Hip fracture, right, closed, initial encounter (H), Acute UTI, Hyponatremia, Acute cystitis without hematuria, Pulmonary  emphysema, unspecified emphysema type (H), Smoker      Dose: 5 mg  Take 1 tablet (5 mg) by mouth every 6 hours as needed for moderate pain  Quantity: 10 tablet  Refills: 0     polyethylene glycol 17 g packet  Commonly known as: MIRALAX  Used for: Hip fracture, right, closed, initial encounter (H), Acute UTI, Hyponatremia, Acute cystitis without hematuria, Pulmonary emphysema, unspecified emphysema type (H), Smoker      Dose: 17 g  Take 17 g by mouth daily  Quantity: 30 each  Refills: 0     senna-docusate 8.6-50 MG tablet  Commonly known as: SENOKOT-S/PERICOLACE  Used for: Hip fracture, right, closed, initial encounter (H), Acute UTI, Hyponatremia, Acute cystitis without hematuria, Pulmonary emphysema, unspecified emphysema type (H), Smoker      Dose: 1 tablet  Take 1 tablet by mouth 2 times daily as needed for constipation  Quantity: 30 tablet  Refills: 0     tiotropium 2.5 MCG/ACT inhaler  Commonly known as: SPIRIVA RESPIMAT  Used for: Pulmonary emphysema, unspecified emphysema type (H)      Dose: 2 puff  Inhale 2 puffs into the lungs daily (controller)  Quantity: 4 g  Refills: 11     vitamin C 500 MG tablet  Commonly known as: ASCORBIC ACID      Dose: 500 mg  Take 500 mg by mouth daily  Refills: 0     Vitamin D3 25 mcg (1000 units) tablet  Commonly known as: CHOLECALCIFEROL      Dose: 1,000 Units  Take 1,000 Units by mouth daily  Refills: 0        STOP taking    cefdinir 300 MG capsule  Commonly known as: OMNICEF  Stopped by: Sade Andersen CNP               ASSESSMENT/PLAN  Encounter Diagnoses   Name Primary?     Hip fracture, right, closed, initial encounter (H) Yes     Anemia, unspecified type      Pain management      Physical deconditioning      Right femur fracture status post ORIF, follow-up with orthopedics pain control    Anemia last hemoglobin 8.1 monitor labs    Pain management scheduled Tylenol, as needed ibuprofen as needed oxycodone    Shortness of breath as needed albuterol inhaler    Osteoporosis on  alendronate, continue calcium carbonate     hypertension on losartan potassium-hydrochlorothiazide    Insomnia continue melatonin    Nicotine abuse currently on nicotine patches    GERD continue omeprazole    Physical deconditioning PT OT     anticoagulation management continue enoxaparin subcu      Electronically signed by: Sade Andersen CNP      Sincerely,        Sade Andersen CNP

## 2023-05-23 ENCOUNTER — TRANSITIONAL CARE UNIT VISIT (OUTPATIENT)
Dept: GERIATRICS | Facility: CLINIC | Age: 73
End: 2023-05-23
Payer: COMMERCIAL

## 2023-05-23 ENCOUNTER — TELEPHONE (OUTPATIENT)
Dept: GERIATRICS | Facility: CLINIC | Age: 73
End: 2023-05-23

## 2023-05-23 VITALS
OXYGEN SATURATION: 99 % | HEIGHT: 61 IN | HEART RATE: 94 BPM | TEMPERATURE: 97.3 F | SYSTOLIC BLOOD PRESSURE: 158 MMHG | WEIGHT: 104 LBS | RESPIRATION RATE: 18 BRPM | BODY MASS INDEX: 19.63 KG/M2 | DIASTOLIC BLOOD PRESSURE: 74 MMHG

## 2023-05-23 DIAGNOSIS — N39.0 ACUTE UTI: ICD-10-CM

## 2023-05-23 DIAGNOSIS — J43.9 PULMONARY EMPHYSEMA, UNSPECIFIED EMPHYSEMA TYPE (H): ICD-10-CM

## 2023-05-23 DIAGNOSIS — I10 BENIGN ESSENTIAL HYPERTENSION: ICD-10-CM

## 2023-05-23 DIAGNOSIS — Z72.0 TOBACCO ABUSE: ICD-10-CM

## 2023-05-23 DIAGNOSIS — R52 PAIN MANAGEMENT: ICD-10-CM

## 2023-05-23 DIAGNOSIS — E87.1 HYPONATREMIA: ICD-10-CM

## 2023-05-23 DIAGNOSIS — S72.001A HIP FRACTURE, RIGHT, CLOSED, INITIAL ENCOUNTER (H): Primary | ICD-10-CM

## 2023-05-23 DIAGNOSIS — F17.200 SMOKER: ICD-10-CM

## 2023-05-23 DIAGNOSIS — N30.00 ACUTE CYSTITIS WITHOUT HEMATURIA: ICD-10-CM

## 2023-05-23 DIAGNOSIS — F10.21 ALCOHOL DEPENDENCE IN REMISSION (H): ICD-10-CM

## 2023-05-23 DIAGNOSIS — D64.9 ANEMIA, UNSPECIFIED TYPE: ICD-10-CM

## 2023-05-23 DIAGNOSIS — S72.001A HIP FRACTURE, RIGHT, CLOSED, INITIAL ENCOUNTER (H): ICD-10-CM

## 2023-05-23 LAB
ERYTHROCYTE [DISTWIDTH] IN BLOOD BY AUTOMATED COUNT: 16.8 % (ref 10–15)
HCT VFR BLD AUTO: 26.4 % (ref 35–47)
HGB BLD-MCNC: 8.6 G/DL (ref 11.7–15.7)
MCH RBC QN AUTO: 31.9 PG (ref 26.5–33)
MCHC RBC AUTO-ENTMCNC: 32.6 G/DL (ref 31.5–36.5)
MCV RBC AUTO: 98 FL (ref 78–100)
PLATELET # BLD AUTO: 465 10E3/UL (ref 150–450)
RBC # BLD AUTO: 2.7 10E6/UL (ref 3.8–5.2)
WBC # BLD AUTO: 5.5 10E3/UL (ref 4–11)

## 2023-05-23 PROCEDURE — 85027 COMPLETE CBC AUTOMATED: CPT | Mod: ORL | Performed by: NURSE PRACTITIONER

## 2023-05-23 PROCEDURE — 99305 1ST NF CARE MODERATE MDM 35: CPT | Performed by: FAMILY MEDICINE

## 2023-05-23 PROCEDURE — P9604 ONE-WAY ALLOW PRORATED TRIP: HCPCS | Mod: ORL | Performed by: NURSE PRACTITIONER

## 2023-05-23 PROCEDURE — 36415 COLL VENOUS BLD VENIPUNCTURE: CPT | Mod: ORL | Performed by: NURSE PRACTITIONER

## 2023-05-23 NOTE — TELEPHONE ENCOUNTER
[facility] Note     Provider: Jeniffer Cannon MD  Facility: [facility]  Facility Type:  TCU    No Known Allergies    Labs Reviewed by provider: Heme 2     Verbal Order/Direction given by Provider: No new orders.      Provider giving Order:  Jeniffer Cannon MD    Verbal Order given to: Kary([phone])    Aydin Pathak RN

## 2023-05-23 NOTE — LETTER
5/23/2023        RE: Namita Viera  [address]  [place] [address]        M [facility]       Patient Namita Viera  MRN: [id number]        Reason for Visit     Chief Complaint   Patient presents with     RECHECK     INITIAL       Code Status     CPR/Full code     Assessment     RT hip intertrochanteric fracture s/p ORIF  H/o of mechanical fall/ recurrent falls  COPD  TOBACCO use disorder  ETOH use disorder  Hyponatremia  ABLA status post 1 unit of blood transfusion  Acute metabolic encephalopathy  Hypertension  Osteoporosis  Generalized weakness    Plan     Pt is admitted to TCU for strengthening and rehab.  Pt admitted to TCU after elective hip surgery  Date of procedure- 5/17/23  Continue with incisional cares  WBAT  Follow-up with orthopedics as scheduled  Cont PT / OT for strengthening/ gait retraining  Pain management optimized  Tylenol scheduled 1 g every 8 hours  Continue narcotics prn-on oxycodone q6hrs prn  Also has Motrin and sparing use advised in light of her being on Lovenox as well as aspirin  Advised aggressive icing  On lovenox for dvt prophylaxis  Duration to be determined by orthopedics  Tubigrip stockings recommended for management of swelling of the lower extremities     Recheck labs done today -cbc shows  Hg >8  This was a fragility fracture.  She is already on calcium vitamin D and Fosamax she will continue with the same  Outpatient follow-up with her primary and orthopedics to discuss what needs to be done further  She has ongoing nicotine addiction but has refused a nicotine patch  Advised patient to discuss this with nursing  Has COPD with an occasional wheeze on exam continue with her inhalers  Pressures are noted to be 100/69 hold parameters given for her lisinopril/HCTZ  Continue with PT/OT    History     Patient is a very pleasant 73 year old female who is admitted to TCU  Patient presented post fall and noted to have a right hip intertrochanteric  fracture.  She underwent surgical repair on 5/17/2023.  Postoperatively has been discharged to the TCU.  She has COPD and continues to smoke  Also has alcohol use disorder and has been recommended to not continue.  She has hyponatremia which was felt to be stable discharged to the TCU for strengthening    Past Medical & Surgical History     PAST MEDICAL HISTORY:   Past Medical History:   Diagnosis Date     Acid reflux      Alcohol use      Closed fracture of left femur (H)      Closed fracture of sacrum with routine healing 10/12/2021     Emphysema of lung (H) 02/07/2022     Hip fracture requiring operative repair (H)      Hypertension 01/2021     Rib pain on left side 05/06/2021     SAH (subarachnoid hemorrhage) (H)      Traumatic closed displaced fracture of distal end of radius       PAST SURGICAL HISTORY:   has a past surgical history that includes Hysterectomy total abdominal, bilateral salpingo-oophorectomy, combined (N/A, 2008); Bladder Suspension (2008); orthopedic surgery (Left, 2018); and Open reduction internal fixation rodding intramedullary femur (Right, 5/17/2023).      Past Social History     Reviewed,  reports that she has been smoking cigarettes. She started smoking about 61 years ago. She has a 60.00 pack-year smoking history. She has never used smokeless tobacco. She reports current alcohol use of about 14.0 standard drinks of alcohol per week. She reports that she does not use drugs.    Family History     Reviewed, and family history includes Cancer in her sister; Esophageal Cancer in her mother; Heart Disease in her father; Rectal Cancer in her sister; Rheumatoid Arthritis in her brother.    Medication List     Current Outpatient Medications   Medication     acetaminophen (TYLENOL) 325 MG tablet     albuterol (PROAIR HFA/PROVENTIL HFA/VENTOLIN HFA) 108 (90 Base) MCG/ACT inhaler     alendronate (FOSAMAX) 70 MG tablet     aspirin (ASA) 81 MG EC tablet     calcium carbonate (OS-PETER) 1500 (600 Ca)  MG tablet     enoxaparin ANTICOAGULANT (LOVENOX) 40 MG/0.4ML syringe     ibuprofen (ADVIL/MOTRIN) 200 MG tablet     losartan-hydrochlorothiazide (HYZAAR) 100-25 MG tablet     melatonin 1 MG TABS tablet     multivitamin, therapeutic (THERA-VIT) TABS tablet     nicotine (NICODERM CQ) 21 MG/24HR 24 hr patch     omeprazole (PRILOSEC) 20 MG DR capsule     oxyCODONE (ROXICODONE) 5 MG tablet     polyethylene glycol (MIRALAX) 17 g packet     senna-docusate (SENOKOT-S/PERICOLACE) 8.6-50 MG tablet     tiotropium (SPIRIVA RESPIMAT) 2.5 MCG/ACT inhaler     vitamin C (ASCORBIC ACID) 500 MG tablet     Vitamin D, Cholecalciferol, 25 MCG (1000 UT) TABS     No current facility-administered medications for this visit.      MED REC REQUIRED  Post Medication Reconciliation Status: discharge medications reconciled, continue medications without change       Allergies     No Known Allergies    Review of Systems   A comprehensive review of 14 systems was done. Pertinent findings noted here and in history of present illness. All the rest negative.  Constitutional: Negative.  Negative for fever, chills, she has  activity change, appetite change and fatigue.   HENT: Negative for congestion and facial swelling.    Eyes: Negative for photophobia, redness and visual disturbance.   Respiratory: Negative for cough and chest tightness.  Has an occasional wheeze from her COPD has ongoing smoking issues  Cardiovascular: Negative for chest pain, palpitations and leg swelling.   Gastrointestinal: Negative for nausea, diarrhea, constipation, blood in stool and abdominal distention.   Genitourinary: Negative.    Musculoskeletal: Reporting minimal pain at rest however she is noticing decreased endurance and increased fatigue with minimal ambulation  Skin: Negative.    Neurological: Negative for dizziness, tremors, syncope, weakness, light-headedness and headaches.   Hematological: Does not bruise/bleed easily.   Psychiatric/Behavioral: Negative.   "      Physical Exam   BP (!) 158/74   Pulse 94   Temp 97.3  F (36.3  C)   Resp 18   Ht 1.549 m (5' 1\")   Wt 47.2 kg (104 lb)   SpO2 99%   BMI 19.65 kg/m       Constitutional: Oriented to person, place, and time and appears well-developed.   HEENT:  Normocephalic and atraumatic.  Eyes: Conjunctivae and EOM are normal. Pupils are equal, round, and reactive to light. No discharge.  No scleral icterus. Nose normal. Mouth/Throat: Oropharynx is clear and moist. No oropharyngeal exudate.    NECK: Normal range of motion. Neck supple. No JVD present. No tracheal deviation present. No thyromegaly present.   CARDIOVASCULAR: Normal rate, regular rhythm and intact distal pulses.  Exam reveals no gallop and no friction rub.  Systolic murmur present.  PULMONARY: Effort normal and breath sounds normal. No respiratory distress occasional expiratory  wheezing or rales.  ABDOMEN: Soft. Bowel sounds are normal. No distension and no mass.  There is no tenderness. There is no rebound and no guarding. No HSM.  MUSCULOSKELETAL: Normal range of motion. Mild kyphosis, no tenderness.  LYMPH NODES: Has no cervical, supraclavicular, axillary and groin adenopathy.   NEUROLOGICAL: Alert and oriented to person, place, and time. No cranial nerve deficit.  Normal muscle tone. Coordination normal.   GENITOURINARY: Deferred exam.  SKIN: Skin is warm and dry. No rash noted. No erythema. No pallor.   EXTREMITIES: No cyanosis, no clubbing, no edema. No Deformity.  Right hip surgical incision is intact  PSYCHIATRIC: Normal mood, affect and behavior.      Lab Results     Recent Results (from the past 240 hour(s))   Basic metabolic panel    Collection Time: 05/15/23 11:48 AM   Result Value Ref Range    Sodium 126 (L) 136 - 145 mmol/L    Potassium 3.9 3.4 - 5.3 mmol/L    Chloride 89 (L) 98 - 107 mmol/L    Carbon Dioxide (CO2) 23 22 - 29 mmol/L    Anion Gap 14 7 - 15 mmol/L    Urea Nitrogen 21.9 8.0 - 23.0 mg/dL    Creatinine 0.65 0.51 - 0.95 mg/dL    " Calcium 10.2 8.8 - 10.2 mg/dL    Glucose 123 (H) 70 - 99 mg/dL    GFR Estimate >90 >60 mL/min/1.73m2   Extra Red Top Tube    Collection Time: 05/15/23 11:48 AM   Result Value Ref Range    Hold Specimen [facility]    Extra Green Top (Lithium Heparin) Tube    Collection Time: 05/15/23 11:48 AM   Result Value Ref Range    Hold Specimen [facility]    Extra Purple Top Tube    Collection Time: 05/15/23 11:48 AM   Result Value Ref Range    Hold Specimen [facility]    Extra Blue Top Tube    Collection Time: 05/15/23 11:48 AM   Result Value Ref Range    Hold Specimen [facility]    UA with Microscopic reflex to Culture    Collection Time: 05/15/23 11:56 AM    Specimen: Urine, Clean Catch   Result Value Ref Range    Color Urine Yellow Colorless, Straw, Light Yellow, Yellow    Appearance Urine Turbid (A) Clear    Glucose Urine Negative Negative mg/dL    Bilirubin Urine Negative Negative    Ketones Urine 20 (A) Negative mg/dL    Specific Gravity Urine 1.020 1.001 - 1.030    Blood Urine Negative Negative    pH Urine 6.0 5.0 - 7.0    Protein Albumin Urine 10 (A) Negative mg/dL    Urobilinogen Urine <2.0 <2.0 mg/dL    Nitrite Urine Positive (A) Negative    Leukocyte Esterase Urine 250 Jenny/uL (A) Negative    Bacteria Urine Moderate (A) None Seen /HPF    Mucus Urine Present (A) None Seen /LPF    RBC Urine 1 <=2 /HPF    WBC Urine 10 (H) <=5 /HPF    Squamous Epithelials Urine 1 <=1 /HPF   CBC (+ platelets, no diff)    Collection Time: 05/15/23 11:56 AM   Result Value Ref Range    WBC Count 5.2 4.0 - 11.0 10e3/uL    RBC Count 3.69 (L) 3.80 - 5.20 10e6/uL    Hemoglobin 12.7 11.7 - 15.7 g/dL    Hematocrit 35.0 35.0 - 47.0 %    MCV 95 78 - 100 fL    MCH 34.4 (H) 26.5 - 33.0 pg    MCHC 36.3 31.5 - 36.5 g/dL    RDW 13.0 10.0 - 15.0 %    Platelet Count 291 150 - 450 10e3/uL   Urine Culture    Collection Time: 05/15/23 11:56 AM    Specimen: Urine, Clean Catch   Result Value Ref Range    Culture >100,000 CFU/mL Escherichia coli (A)        Susceptibility    Escherichia  coli - MALLORIE     Ampicillin 4 Susceptible ug/mL     Ampicillin/ Sulbactam <=2 Susceptible ug/mL     Piperacillin/Tazobactam <=4 Susceptible ug/mL     Cefazolin* <=4 Susceptible ug/mL      * Cefazolin MALLORIE breakpoints are for the treatment of uncomplicated urinary tract infections. For the treatment of systemic infections, please contact the laboratory for additional testing.     Cefoxitin <=4 Susceptible ug/mL     Ceftazidime <=1 Susceptible ug/mL     Ceftriaxone <=1 Susceptible ug/mL     Cefepime <=1 Susceptible ug/mL     Gentamicin <=1 Susceptible ug/mL     Tobramycin <=1 Susceptible ug/mL     Ciprofloxacin <=0.25 Susceptible ug/mL     Levofloxacin <=0.12 Susceptible ug/mL     Nitrofurantoin <=16 Susceptible ug/mL     Trimethoprim/Sulfamethoxazole <=1/19 Susceptible ug/mL   Basic metabolic panel    Collection Time: 05/16/23  3:02 PM   Result Value Ref Range    Sodium 134 (L) 136 - 145 mmol/L    Potassium 3.8 3.4 - 5.3 mmol/L    Chloride 97 (L) 98 - 107 mmol/L    Carbon Dioxide (CO2) 22 22 - 29 mmol/L    Anion Gap 15 7 - 15 mmol/L    Urea Nitrogen 30.8 (H) 8.0 - 23.0 mg/dL    Creatinine 0.92 0.51 - 0.95 mg/dL    Calcium 9.6 8.8 - 10.2 mg/dL    Glucose 111 (H) 70 - 99 mg/dL    GFR Estimate 65 >60 mL/min/1.73m2   CBC with platelets and differential    Collection Time: 05/16/23  3:02 PM   Result Value Ref Range    WBC Count 9.9 4.0 - 11.0 10e3/uL    RBC Count 2.97 (L) 3.80 - 5.20 10e6/uL    Hemoglobin 10.2 (L) 11.7 - 15.7 g/dL    Hematocrit 29.3 (L) 35.0 - 47.0 %    MCV 99 78 - 100 fL    MCH 34.3 (H) 26.5 - 33.0 pg    MCHC 34.8 31.5 - 36.5 g/dL    RDW 13.3 10.0 - 15.0 %    Platelet Count 235 150 - 450 10e3/uL    % Neutrophils 88 %    % Lymphocytes 5 %    % Monocytes 6 %    % Eosinophils 0 %    % Basophils 0 %    % Immature Granulocytes 1 %    NRBCs per 100 WBC 0 <1 /100    Absolute Neutrophils 8.8 (H) 1.6 - 8.3 10e3/uL    Absolute Lymphocytes 0.5 (L) 0.8 - 5.3 10e3/uL    Absolute Monocytes 0.6 0.0 - 1.3 10e3/uL     Absolute Eosinophils 0.0 0.0 - 0.7 10e3/uL    Absolute Basophils 0.0 0.0 - 0.2 10e3/uL    Absolute Immature Granulocytes 0.1 <=0.4 10e3/uL    Absolute NRBCs 0.0 10e3/uL   Adult Type and Screen    Collection Time: 05/16/23  3:02 PM   Result Value Ref Range    ABO/RH(D) O POS     Antibody Screen Negative Negative    SPECIMEN EXPIRATION DATE [id number]    ECG 12-LEAD WITH MUSE ([facility])    Collection Time: 05/16/23  7:48 PM   Result Value Ref Range    Systolic Blood Pressure  mmHg    Diastolic Blood Pressure  mmHg    Ventricular Rate 101 BPM    Atrial Rate 101 BPM    SD Interval 122 ms    QRS Duration 80 ms     ms    QTc 443 ms    P Axis 74 degrees    R AXIS 83 degrees    T Axis 70 degrees    Interpretation ECG       Sinus tachycardia  Low voltage QRS  Possible Inferior infarct , age undetermined  Abnormal ECG  When compared with ECG of 02-MAY-2018 11:48,  Borderline criteria for Inferior infarct are now Present     Basic metabolic panel    Collection Time: 05/17/23  5:45 AM   Result Value Ref Range    Sodium 138 136 - 145 mmol/L    Potassium 3.7 3.4 - 5.3 mmol/L    Chloride 105 98 - 107 mmol/L    Carbon Dioxide (CO2) 20 (L) 22 - 29 mmol/L    Anion Gap 13 7 - 15 mmol/L    Urea Nitrogen 28.6 (H) 8.0 - 23.0 mg/dL    Creatinine 0.83 0.51 - 0.95 mg/dL    Calcium 8.7 (L) 8.8 - 10.2 mg/dL    Glucose 90 70 - 99 mg/dL    GFR Estimate 73 >60 mL/min/1.73m2   Hemoglobin    Collection Time: 05/17/23  5:45 AM   Result Value Ref Range    Hemoglobin 7.9 (L) 11.7 - 15.7 g/dL   Prepare red blood cells (unit)    Collection Time: 05/17/23  9:46 AM   Result Value Ref Range    Blood Component Type Red Blood Cells     Product Code I8072R64     Unit Status Released     Unit Number [id number]     CROSSMATCH Compatible     CODING SYSTEM [id number]     UNIT ABO/RH O+     UNIT TYPE ISBT 5100    Prepare red blood cells (unit)    Collection Time: 05/17/23  9:46 AM   Result Value Ref Range    Blood Component Type Red Blood Cells     Product  Code X8195K41     Unit Status Transfused     Unit Number [id number]     CROSSMATCH Compatible     CODING SYSTEM [id number]     ISSUE DATE AND TIME [id number]     UNIT ABO/RH O+     UNIT TYPE ISBT 5100    Hemoglobin    Collection Time: 05/17/23  8:10 PM   Result Value Ref Range    Hemoglobin 7.5 (L) 11.7 - 15.7 g/dL   Extra Green Top (Lithium Heparin) Tube    Collection Time: 05/17/23  8:28 PM   Result Value Ref Range    Hold Specimen [facility]    Basic metabolic panel    Collection Time: 05/18/23  5:48 AM   Result Value Ref Range    Sodium 133 (L) 136 - 145 mmol/L    Potassium 3.8 3.4 - 5.3 mmol/L    Chloride 102 98 - 107 mmol/L    Carbon Dioxide (CO2) 21 (L) 22 - 29 mmol/L    Anion Gap 10 7 - 15 mmol/L    Urea Nitrogen 19.7 8.0 - 23.0 mg/dL    Creatinine 0.74 0.51 - 0.95 mg/dL    Calcium 8.5 (L) 8.8 - 10.2 mg/dL    Glucose 82 70 - 99 mg/dL    GFR Estimate 84 >60 mL/min/1.73m2   CBC with platelets and differential    Collection Time: 05/18/23  5:48 AM   Result Value Ref Range    WBC Count 6.0 4.0 - 11.0 10e3/uL    RBC Count 2.00 (L) 3.80 - 5.20 10e6/uL    Hemoglobin 6.8 (LL) 11.7 - 15.7 g/dL    Hematocrit 20.5 (L) 35.0 - 47.0 %     (H) 78 - 100 fL    MCH 34.0 (H) 26.5 - 33.0 pg    MCHC 33.2 31.5 - 36.5 g/dL    RDW 13.4 10.0 - 15.0 %    Platelet Count 179 150 - 450 10e3/uL    % Neutrophils 71 %    % Lymphocytes 15 %    % Monocytes 11 %    % Eosinophils 2 %    % Basophils 1 %    % Immature Granulocytes 0 %    NRBCs per 100 WBC 0 <1 /100    Absolute Neutrophils 4.2 1.6 - 8.3 10e3/uL    Absolute Lymphocytes 0.9 0.8 - 5.3 10e3/uL    Absolute Monocytes 0.7 0.0 - 1.3 10e3/uL    Absolute Eosinophils 0.1 0.0 - 0.7 10e3/uL    Absolute Basophils 0.0 0.0 - 0.2 10e3/uL    Absolute Immature Granulocytes 0.0 <=0.4 10e3/uL    Absolute NRBCs 0.0 10e3/uL   Hemoglobin    Collection Time: 05/18/23  3:23 PM   Result Value Ref Range    Hemoglobin 8.8 (L) 11.7 - 15.7 g/dL   Extra Red Top Tube    Collection Time: 05/18/23  3:23 PM    Result Value Ref Range    Hold Specimen [facility]    Basic metabolic panel    Collection Time: 05/19/23  5:49 AM   Result Value Ref Range    Sodium 137 136 - 145 mmol/L    Potassium 3.9 3.4 - 5.3 mmol/L    Chloride 104 98 - 107 mmol/L    Carbon Dioxide (CO2) 23 22 - 29 mmol/L    Anion Gap 10 7 - 15 mmol/L    Urea Nitrogen 11.5 8.0 - 23.0 mg/dL    Creatinine 0.72 0.51 - 0.95 mg/dL    Calcium 8.9 8.8 - 10.2 mg/dL    Glucose 93 70 - 99 mg/dL    GFR Estimate 87 >60 mL/min/1.73m2   CBC with platelets and differential    Collection Time: 05/19/23  5:49 AM   Result Value Ref Range    WBC Count 6.9 4.0 - 11.0 10e3/uL    RBC Count 2.47 (L) 3.80 - 5.20 10e6/uL    Hemoglobin 8.1 (L) 11.7 - 15.7 g/dL    Hematocrit 23.3 (L) 35.0 - 47.0 %    MCV 94 78 - 100 fL    MCH 32.8 26.5 - 33.0 pg    MCHC 34.8 31.5 - 36.5 g/dL    RDW 17.0 (H) 10.0 - 15.0 %    Platelet Count 195 150 - 450 10e3/uL    % Neutrophils 73 %    % Lymphocytes 12 %    % Monocytes 12 %    % Eosinophils 1 %    % Basophils 1 %    % Immature Granulocytes 1 %    NRBCs per 100 WBC 0 <1 /100    Absolute Neutrophils 5.1 1.6 - 8.3 10e3/uL    Absolute Lymphocytes 0.8 0.8 - 5.3 10e3/uL    Absolute Monocytes 0.8 0.0 - 1.3 10e3/uL    Absolute Eosinophils 0.1 0.0 - 0.7 10e3/uL    Absolute Basophils 0.0 0.0 - 0.2 10e3/uL    Absolute Immature Granulocytes 0.1 <=0.4 10e3/uL    Absolute NRBCs 0.0 10e3/uL   CBC with platelets    Collection Time: 05/23/23  5:41 AM   Result Value Ref Range    WBC Count 5.5 4.0 - 11.0 10e3/uL    RBC Count 2.70 (L) 3.80 - 5.20 10e6/uL    Hemoglobin 8.6 (L) 11.7 - 15.7 g/dL    Hematocrit 26.4 (L) 35.0 - 47.0 %    MCV 98 78 - 100 fL    MCH 31.9 26.5 - 33.0 pg    MCHC 32.6 31.5 - 36.5 g/dL    RDW 16.8 (H) 10.0 - 15.0 %    Platelet Count 465 (H) 150 - 450 10e3/uL                 Electronically signed by    Jeniffer Cannon MD                           Sincerely,        SHOSHANA Cornelius

## 2023-05-24 RX ORDER — OXYCODONE HYDROCHLORIDE 5 MG/1
5 TABLET ORAL EVERY 6 HOURS PRN
Qty: 30 TABLET | Refills: 0 | Status: SHIPPED | OUTPATIENT
Start: 2023-05-24 | End: 2023-05-26

## 2023-05-26 ENCOUNTER — LAB REQUISITION (OUTPATIENT)
Dept: LAB | Facility: CLINIC | Age: 73
End: 2023-05-26
Payer: COMMERCIAL

## 2023-05-26 DIAGNOSIS — N30.00 ACUTE CYSTITIS WITHOUT HEMATURIA: ICD-10-CM

## 2023-05-26 DIAGNOSIS — S72.001A HIP FRACTURE, RIGHT, CLOSED, INITIAL ENCOUNTER (H): ICD-10-CM

## 2023-05-26 DIAGNOSIS — F17.200 SMOKER: ICD-10-CM

## 2023-05-26 DIAGNOSIS — E87.1 HYPONATREMIA: ICD-10-CM

## 2023-05-26 DIAGNOSIS — N39.0 URINARY TRACT INFECTION, SITE NOT SPECIFIED: ICD-10-CM

## 2023-05-26 DIAGNOSIS — J43.9 PULMONARY EMPHYSEMA, UNSPECIFIED EMPHYSEMA TYPE (H): ICD-10-CM

## 2023-05-26 DIAGNOSIS — N39.0 ACUTE UTI: ICD-10-CM

## 2023-05-26 LAB
ALBUMIN UR-MCNC: NEGATIVE MG/DL
APPEARANCE UR: CLEAR
BILIRUB UR QL STRIP: NEGATIVE
COLOR UR AUTO: YELLOW
GLUCOSE UR STRIP-MCNC: NEGATIVE MG/DL
HGB UR QL STRIP: NEGATIVE
HYALINE CASTS: 1 /LPF
KETONES UR STRIP-MCNC: NEGATIVE MG/DL
LEUKOCYTE ESTERASE UR QL STRIP: NEGATIVE
MUCOUS THREADS #/AREA URNS LPF: PRESENT /LPF
NITRATE UR QL: NEGATIVE
PH UR STRIP: 7 [PH] (ref 5–7)
RBC URINE: <1 /HPF
SP GR UR STRIP: 1.02 (ref 1–1.03)
SQUAMOUS EPITHELIAL: 2 /HPF
UROBILINOGEN UR STRIP-MCNC: NORMAL MG/DL
WBC URINE: 1 /HPF

## 2023-05-26 PROCEDURE — 81001 URINALYSIS AUTO W/SCOPE: CPT | Mod: ORL | Performed by: NURSE PRACTITIONER

## 2023-05-30 RX ORDER — OXYCODONE HYDROCHLORIDE 5 MG/1
5 TABLET ORAL EVERY 6 HOURS PRN
Qty: 60 TABLET | Refills: 0 | Status: SHIPPED | OUTPATIENT
Start: 2023-05-30 | End: 2023-06-26

## 2023-05-31 ENCOUNTER — DISCHARGE SUMMARY NURSING HOME (OUTPATIENT)
Dept: GERIATRICS | Facility: CLINIC | Age: 73
End: 2023-05-31
Payer: COMMERCIAL

## 2023-05-31 VITALS
DIASTOLIC BLOOD PRESSURE: 64 MMHG | HEART RATE: 77 BPM | HEIGHT: 61 IN | BODY MASS INDEX: 18.86 KG/M2 | RESPIRATION RATE: 18 BRPM | OXYGEN SATURATION: 94 % | WEIGHT: 99.9 LBS | SYSTOLIC BLOOD PRESSURE: 134 MMHG | TEMPERATURE: 64.4 F

## 2023-05-31 DIAGNOSIS — R53.81 PHYSICAL DECONDITIONING: ICD-10-CM

## 2023-05-31 DIAGNOSIS — D64.9 ANEMIA, UNSPECIFIED TYPE: ICD-10-CM

## 2023-05-31 DIAGNOSIS — S72.001A HIP FRACTURE, RIGHT, CLOSED, INITIAL ENCOUNTER (H): Primary | ICD-10-CM

## 2023-05-31 DIAGNOSIS — R52 PAIN MANAGEMENT: ICD-10-CM

## 2023-05-31 PROCEDURE — 99316 NF DSCHRG MGMT 30 MIN+: CPT | Performed by: NURSE PRACTITIONER

## 2023-05-31 RX ORDER — ACETAMINOPHEN 500 MG
1000 TABLET ORAL 3 TIMES DAILY
COMMUNITY
End: 2023-06-26

## 2023-05-31 NOTE — LETTER
5/31/2023        RE: Namita Viera  [address]  [place] [address]        [facility]  Chief Complaint   Patient presents with     Discharge Summary [facility] Medical Record Number:  [id number]  Place of Service where encounter took place:  [facility] ([facility]) [[id number]]  Code Status:  Full Code     HISTORY:      HPI:  Namita Viera  is 73 year old (1950) undergoing physical and occupational therapy.  She  Is with pertinent medical history of tobacco abuse, regular alcohol use, emphysema of lung, HTN -- brought to the ER after a fall, and could not get up because of right hip pain.  She was just in the ER yesterday with confusion -- sodium 126, UA with 10 WBC's and treated for a UTI and son says she is better today (confusion resolved).  She did have a left hip fracture in 2018 and did well after ORIF.        In ER,  Pelvic xray shows an acute displaced intertrochanteric proximal right femur fracture and underwent a ORIF.  Head and lumbar CT's unremarkable    Today she was seen to review vital signs, labs, routine visit and a face-to-face for discharge.  She will discharge to home on 6/3/2023 with current medications and treatments.  She will have home care services PT OT home health aide she denies chest pain shortness of breath cough congestion constipation or diarrhea.  She did complete cefdinir for UTI.  Right hip healing well clean dry and intact open to air.  She is on enoxaparin which will end on 6/16/2023.  She does feel that she can self administer the injections and staff to teach her prior to discharge.  She is on a nicotine patch that is to end on 6/3/2023 however she reports she does not want renewed or taper to a lower dose.  She will follow-up with orthopedics today.  Labs reviewed last hemoglobin 8.6 which is up from 8.1.      ALLERGIES:Patient has no known allergies.    PAST MEDICAL HISTORY:   Past Medical History:   Diagnosis Date      Acid reflux      Alcohol use      Closed fracture of left femur (H)      Closed fracture of sacrum with routine healing 10/12/2021     Emphysema of lung (H) 02/07/2022     Hip fracture requiring operative repair (H)      Hypertension 01/2021     Rib pain on left side 05/06/2021     SAH (subarachnoid hemorrhage) (H)      Traumatic closed displaced fracture of distal end of radius        PAST SURGICAL HISTORY:   has a past surgical history that includes Hysterectomy total abdominal, bilateral salpingo-oophorectomy, combined (N/A, 2008); Bladder Suspension (2008); orthopedic surgery (Left, 2018); and Open reduction internal fixation rodding intramedullary femur (Right, 5/17/2023).    FAMILY HISTORY: family history includes Cancer in her sister; Esophageal Cancer in her mother; Heart Disease in her father; Rectal Cancer in her sister; Rheumatoid Arthritis in her brother.    SOCIAL HISTORY:  reports that she has been smoking cigarettes. She started smoking about 61 years ago. She has a 60.00 pack-year smoking history. She has never used smokeless tobacco. She reports current alcohol use of about 14.0 standard drinks of alcohol per week. She reports that she does not use drugs.    ROS:  Constitutional: Negative for activity change, appetite change, fatigue and fever.   HENT: Negative for congestion.    Respiratory: Negative for cough, shortness of breath and wheezing.    Cardiovascular: Negative for chest pain and leg swelling.   Gastrointestinal: Negative for abdominal distention, abdominal pain, constipation, diarrhea and nausea.   Genitourinary: Negative for dysuria.   Musculoskeletal: Negative for arthralgia. Negative for back pain.  Surgical incision right hip healing well clean dry and intact open to air  Skin: Negative for color change and wound.  Surgical incision right hip  Neurological: Negative for dizziness.   Psychiatric/Behavioral: Negative for agitation, behavioral problems and confusion.     Physical  "Exam:  Constitutional:       Appearance: Patient is well-developed.   HENT:      Head: Normocephalic.   Eyes:      Conjunctiva/sclera: Conjunctivae normal.   Neck:      Musculoskeletal: Normal range of motion.   Cardiovascular:      Rate and Rhythm: Normal rate and regular rhythm.      Heart sounds: Normal heart sounds. No murmur.   Pulmonary:      Effort: No respiratory distress.      Breath sounds: Normal breath sounds. No wheezing or rales.   Abdominal:      General: Bowel sounds are normal. There is no distension.      Palpations: Abdomen is soft.      Tenderness: There is no abdominal tenderness.   Musculoskeletal:       Normal range of motion.     Right hip surgical wound denies calf pain  Skin:General:        Skin is warm.   Neurological:         Mental Status: Patient is alert and oriented to person, place, and time.   Psychiatric:         Behavior: Behavior normal.     Vitals:/64   Pulse 77   Temp (!) 64.4  F (18  C)   Resp 18   Ht 1.549 m (5' 1\")   Wt 45.3 kg (99 lb 14.4 oz)   SpO2 94%   BMI 18.88 kg/m   and Body mass index is 18.88 kg/m .    Lab/Diagnostic data:   Recent Results (from the past 240 hour(s))   CBC with platelets    Collection Time: 05/23/23  5:41 AM   Result Value Ref Range    WBC Count 5.5 4.0 - 11.0 10e3/uL    RBC Count 2.70 (L) 3.80 - 5.20 10e6/uL    Hemoglobin 8.6 (L) 11.7 - 15.7 g/dL    Hematocrit 26.4 (L) 35.0 - 47.0 %    MCV 98 78 - 100 fL    MCH 31.9 26.5 - 33.0 pg    MCHC 32.6 31.5 - 36.5 g/dL    RDW 16.8 (H) 10.0 - 15.0 %    Platelet Count 465 (H) 150 - 450 10e3/uL   UA with Microscopic reflex to Culture    Collection Time: 05/26/23 12:10 AM    Specimen: Urine, NOS   Result Value Ref Range    Color Urine Yellow Colorless, Straw, Light Yellow, Yellow    Appearance Urine Clear Clear    Glucose Urine Negative Negative mg/dL    Bilirubin Urine Negative Negative    Ketones Urine Negative Negative mg/dL    Specific Gravity Urine 1.019 1.003 - 1.035    Blood Urine Negative " Negative    pH Urine 7.0 5.0 - 7.0    Protein Albumin Urine Negative Negative mg/dL    Urobilinogen Urine Normal Normal, 2.0 mg/dL    Nitrite Urine Negative Negative    Leukocyte Esterase Urine Negative Negative    Mucus Urine Present (A) None Seen /LPF    RBC Urine <1 <=2 /HPF    WBC Urine 1 <=5 /HPF    Squamous Epithelials Urine 2 (H) <=1 /HPF    Hyaline Casts Urine 1 <=2 /LPF       MEDICATIONS:     Review of your medicines          Accurate as of May 31, 2023  9:29 AM. If you have any questions, ask your nurse or doctor.            CONTINUE these medicines which may have CHANGED, or have new prescriptions. If we are uncertain of the size of tablets/capsules you have at home, strength may be listed as something that might have changed.      Dose / Directions   acetaminophen 500 MG tablet  Commonly known as: TYLENOL  This may have changed: Another medication with the same name was removed. Continue taking this medication, and follow the directions you see here.  Changed by: Sade Andersen CNP      Dose: 1,000 mg  Take 1,000 mg by mouth 3 times daily  Refills: 0        CONTINUE these medicines which have NOT CHANGED      Dose / Directions   albuterol 108 (90 Base) MCG/ACT inhaler  Commonly known as: PROAIR HFA/PROVENTIL HFA/VENTOLIN HFA  Used for: Cough      Dose: 1-2 puff  INHALE 1-2 PUFFS INTO THE LUNGS EVERY 6 HOURS AS NEEDED FOR COUGH OR SHORTNESS OF BREATH OR WHEEZE  Quantity: 18 g  Refills: 11     alendronate 70 MG tablet  Commonly known as: FOSAMAX  Used for: Age-related osteoporosis without current pathological fracture      TAKE 1 TABLET BY MOUTH EVERY 7 DAYS. TAKE IN THE MORNING ON AN EMPTY STOMACH WITH A FULL GLASS OF WATER 30 MINUTES BEFORE FOOD  Quantity: 12 tablet  Refills: 2     aspirin 81 MG EC tablet  Commonly known as: ASA  Used for: Closed fracture of sacrum with routine healing, unspecified portion of sacrum, subsequent encounter      Dose: 81 mg  Take 1 tablet (81 mg) by mouth daily  Refills: 0      calcium carbonate 1500 (600 Ca) MG tablet  Commonly known as: OS-PETER      Dose: 600 mg  Take 600 mg by mouth daily  Refills: 0     enoxaparin ANTICOAGULANT 40 MG/0.4ML syringe  Commonly known as: LOVENOX  Used for: Hip fracture, right, closed, initial encounter (H)      Dose: 40 mg  Inject 0.4 mLs (40 mg) Subcutaneous every 24 hours  Quantity: 11.2 mL  Refills: 0     ibuprofen 200 MG tablet  Commonly known as: ADVIL/MOTRIN      Dose: 200 mg  Take 200 mg by mouth every 6 hours as needed for pain  Refills: 0     losartan-hydrochlorothiazide 100-25 MG tablet  Commonly known as: HYZAAR  Used for: Benign essential hypertension      Dose: 1 tablet  Take 1 tablet by mouth daily  Quantity: 90 tablet  Refills: 2     melatonin 1 MG Tabs tablet  Used for: Hip fracture, right, closed, initial encounter (H), Acute UTI, Hyponatremia, Acute cystitis without hematuria, Pulmonary emphysema, unspecified emphysema type (H), Smoker      Dose: 1 mg  Take 1 tablet (1 mg) by mouth nightly as needed for sleep  Quantity: 30 tablet  Refills: 0     multivitamin, therapeutic Tabs tablet      Dose: 1 tablet  Take 1 tablet by mouth daily  Refills: 0     nicotine 21 MG/24HR 24 hr patch  Commonly known as: NICODERM CQ  Used for: Hip fracture, right, closed, initial encounter (H), Acute UTI, Hyponatremia, Acute cystitis without hematuria, Pulmonary emphysema, unspecified emphysema type (H), Smoker      Dose: 1 patch  Place 1 patch onto the skin daily for 14 days  Quantity: 14 patch  Refills: 0     omeprazole 20 MG DR capsule  Commonly known as: priLOSEC  Used for: Gastroesophageal reflux disease without esophagitis      Dose: 20 mg  TAKE 1 CAPSULE (20 MG) BY MOUTH DAILY  Quantity: 90 capsule  Refills: 0     oxyCODONE 5 MG tablet  Commonly known as: ROXICODONE  Indication: Acute Pain, Chronic Pain  Used for: Hip fracture, right, closed, initial encounter (H), Acute UTI, Hyponatremia, Acute cystitis without hematuria, Pulmonary emphysema,  unspecified emphysema type (H), Smoker      Dose: 5 mg  Take 1 tablet (5 mg) by mouth every 6 hours as needed for moderate pain  Quantity: 60 tablet  Refills: 0     polyethylene glycol 17 g packet  Commonly known as: MIRALAX  Used for: Hip fracture, right, closed, initial encounter (H), Acute UTI, Hyponatremia, Acute cystitis without hematuria, Pulmonary emphysema, unspecified emphysema type (H), Smoker      Dose: 17 g  Take 17 g by mouth daily  Quantity: 30 each  Refills: 0     senna-docusate 8.6-50 MG tablet  Commonly known as: SENOKOT-S/PERICOLACE  Used for: Hip fracture, right, closed, initial encounter (H), Acute UTI, Hyponatremia, Acute cystitis without hematuria, Pulmonary emphysema, unspecified emphysema type (H), Smoker      Dose: 1 tablet  Take 1 tablet by mouth 2 times daily as needed for constipation  Quantity: 30 tablet  Refills: 0     tiotropium 2.5 MCG/ACT inhaler  Commonly known as: SPIRIVA RESPIMAT  Used for: Pulmonary emphysema, unspecified emphysema type (H)      Dose: 2 puff  Inhale 2 puffs into the lungs daily (controller)  Quantity: 4 g  Refills: 11     vitamin C 500 MG tablet  Commonly known as: ASCORBIC ACID      Dose: 500 mg  Take 500 mg by mouth daily  Refills: 0     Vitamin D3 25 mcg (1000 units) tablet  Commonly known as: CHOLECALCIFEROL      Dose: 1,000 Units  Take 1,000 Units by mouth daily  Refills: 0            ASSESSMENT/PLAN  Encounter Diagnoses   Name Primary?     Hip fracture, right, closed, initial encounter (H) Yes     Pain management      Anemia, unspecified type      Physical deconditioning      Right femur fracture status post ORIF, follow-up with orthopedics pain control    Anemia last hemoglobin 8.6 up from 8.1    Pain management scheduled Tylenol, as needed ibuprofen as needed oxycodone    Shortness of breath as needed albuterol inhaler    Osteoporosis on alendronate, continue calcium carbonate     hypertension on losartan potassium-hydrochlorothiazide    Insomnia continue  melatonin    Nicotine abuse currently on nicotine patches they will end on 6/3/2023 and she does not want them renewed or tapered    GERD continue omeprazole    Physical deconditioning she will have home care services PT OT home health aide    anticoagulation management continue enoxaparin subcu to end on June 16, 2023      DISCHARGE PLAN/FACE TO FACE:  I certify that services are/were furnished while this patient was under the care of a physician and that a physician or an allowed non-physician practitioner (NPP), had a face-to-face encounter that meets the physician face-to-face encounter requirements. The encounter was in whole, or in part, related to the primary reason for home health. The patient is confined to his/her home and needs intermittent skilled nursing, physical therapy, speech-language pathology, or the continued need for occupational therapy. A plan of care has been established by a physician and is periodically reviewed by a physician.  Date of Face-to-Face Encounter: 5/31/23    I certify that, based on my findings, the following services are medically necessary home health services: PT/OT/HHA    My clinical findings support the need for the above skilled services because: PT OT for continued strength and endurance, home health aide to assist with activities of daily living.    This patient is homebound because: She is deconditioned easily fatigued following recent right ORIF of the femur.  She will continue home therapy for strengthening    The patient is, or has been, under my care and I have initiated the establishment of the plan of care. This patient will be followed by a physician who will periodically review the plan of care.    Schedule follow up visit with primary care provider within 7 days to reestablish care.    Electronically signed by: Sade Andersen CNP              Sincerely,        Sade Andersen CNP

## 2023-05-31 NOTE — PROGRESS NOTES
[facility] GERIATRIC SERVICES  Chief Complaint   Patient presents with     Discharge Summary [facility] Medical Record Number:  [id number]  Place of Service where encounter took place:  [facility] ([facility]) [[id number]]  Code Status:  Full Code     HISTORY:      HPI:  Namita Viera  is 73 year old (1950) undergoing physical and occupational therapy.  She  Is with pertinent medical history of tobacco abuse, regular alcohol use, emphysema of lung, HTN -- brought to the ER after a fall, and could not get up because of right hip pain.  She was just in the ER yesterday with confusion -- sodium 126, UA with 10 WBC's and treated for a UTI and son says she is better today (confusion resolved).  She did have a left hip fracture in 2018 and did well after ORIF.        In ER,  Pelvic xray shows an acute displaced intertrochanteric proximal right femur fracture and underwent a ORIF.  Head and lumbar CT's unremarkable    Today she was seen to review vital signs, labs, routine visit and a face-to-face for discharge.  She will discharge to home on 6/3/2023 with current medications and treatments.  She will have home care services PT OT home health aide she denies chest pain shortness of breath cough congestion constipation or diarrhea.  She did complete cefdinir for UTI.  Right hip healing well clean dry and intact open to air.  She is on enoxaparin which will end on 6/16/2023.  She does feel that she can self administer the injections and staff to teach her prior to discharge.  She is on a nicotine patch that is to end on 6/3/2023 however she reports she does not want renewed or taper to a lower dose.  She will follow-up with orthopedics today.  Labs reviewed last hemoglobin 8.6 which is up from 8.1.      ALLERGIES:Patient has no known allergies.    PAST MEDICAL HISTORY:   Past Medical History:   Diagnosis Date     Acid reflux      Alcohol use      Closed fracture of left femur (H)      Closed fracture of  sacrum with routine healing 10/12/2021     Emphysema of lung (H) 02/07/2022     Hip fracture requiring operative repair (H)      Hypertension 01/2021     Rib pain on left side 05/06/2021     SAH (subarachnoid hemorrhage) (H)      Traumatic closed displaced fracture of distal end of radius        PAST SURGICAL HISTORY:   has a past surgical history that includes Hysterectomy total abdominal, bilateral salpingo-oophorectomy, combined (N/A, 2008); Bladder Suspension (2008); orthopedic surgery (Left, 2018); and Open reduction internal fixation rodding intramedullary femur (Right, 5/17/2023).    FAMILY HISTORY: family history includes Cancer in her sister; Esophageal Cancer in her mother; Heart Disease in her father; Rectal Cancer in her sister; Rheumatoid Arthritis in her brother.    SOCIAL HISTORY:  reports that she has been smoking cigarettes. She started smoking about 61 years ago. She has a 60.00 pack-year smoking history. She has never used smokeless tobacco. She reports current alcohol use of about 14.0 standard drinks of alcohol per week. She reports that she does not use drugs.    ROS:  Constitutional: Negative for activity change, appetite change, fatigue and fever.   HENT: Negative for congestion.    Respiratory: Negative for cough, shortness of breath and wheezing.    Cardiovascular: Negative for chest pain and leg swelling.   Gastrointestinal: Negative for abdominal distention, abdominal pain, constipation, diarrhea and nausea.   Genitourinary: Negative for dysuria.   Musculoskeletal: Negative for arthralgia. Negative for back pain.  Surgical incision right hip healing well clean dry and intact open to air  Skin: Negative for color change and wound.  Surgical incision right hip  Neurological: Negative for dizziness.   Psychiatric/Behavioral: Negative for agitation, behavioral problems and confusion.     Physical Exam:  Constitutional:       Appearance: Patient is well-developed.   HENT:      Head:  "Normocephalic.   Eyes:      Conjunctiva/sclera: Conjunctivae normal.   Neck:      Musculoskeletal: Normal range of motion.   Cardiovascular:      Rate and Rhythm: Normal rate and regular rhythm.      Heart sounds: Normal heart sounds. No murmur.   Pulmonary:      Effort: No respiratory distress.      Breath sounds: Normal breath sounds. No wheezing or rales.   Abdominal:      General: Bowel sounds are normal. There is no distension.      Palpations: Abdomen is soft.      Tenderness: There is no abdominal tenderness.   Musculoskeletal:       Normal range of motion.     Right hip surgical wound denies calf pain  Skin:General:        Skin is warm.   Neurological:         Mental Status: Patient is alert and oriented to person, place, and time.   Psychiatric:         Behavior: Behavior normal.     Vitals:/64   Pulse 77   Temp (!) 64.4  F (18  C)   Resp 18   Ht 1.549 m (5' 1\")   Wt 45.3 kg (99 lb 14.4 oz)   SpO2 94%   BMI 18.88 kg/m   and Body mass index is 18.88 kg/m .    Lab/Diagnostic data:   Recent Results (from the past 240 hour(s))   CBC with platelets    Collection Time: 05/23/23  5:41 AM   Result Value Ref Range    WBC Count 5.5 4.0 - 11.0 10e3/uL    RBC Count 2.70 (L) 3.80 - 5.20 10e6/uL    Hemoglobin 8.6 (L) 11.7 - 15.7 g/dL    Hematocrit 26.4 (L) 35.0 - 47.0 %    MCV 98 78 - 100 fL    MCH 31.9 26.5 - 33.0 pg    MCHC 32.6 31.5 - 36.5 g/dL    RDW 16.8 (H) 10.0 - 15.0 %    Platelet Count 465 (H) 150 - 450 10e3/uL   UA with Microscopic reflex to Culture    Collection Time: 05/26/23 12:10 AM    Specimen: Urine, NOS   Result Value Ref Range    Color Urine Yellow Colorless, Straw, Light Yellow, Yellow    Appearance Urine Clear Clear    Glucose Urine Negative Negative mg/dL    Bilirubin Urine Negative Negative    Ketones Urine Negative Negative mg/dL    Specific Gravity Urine 1.019 1.003 - 1.035    Blood Urine Negative Negative    pH Urine 7.0 5.0 - 7.0    Protein Albumin Urine Negative Negative mg/dL    " Urobilinogen Urine Normal Normal, 2.0 mg/dL    Nitrite Urine Negative Negative    Leukocyte Esterase Urine Negative Negative    Mucus Urine Present (A) None Seen /LPF    RBC Urine <1 <=2 /HPF    WBC Urine 1 <=5 /HPF    Squamous Epithelials Urine 2 (H) <=1 /HPF    Hyaline Casts Urine 1 <=2 /LPF       MEDICATIONS:     Review of your medicines          Accurate as of May 31, 2023  9:29 AM. If you have any questions, ask your nurse or doctor.            CONTINUE these medicines which may have CHANGED, or have new prescriptions. If we are uncertain of the size of tablets/capsules you have at home, strength may be listed as something that might have changed.      Dose / Directions   acetaminophen 500 MG tablet  Commonly known as: TYLENOL  This may have changed: Another medication with the same name was removed. Continue taking this medication, and follow the directions you see here.  Changed by: Sade Andersen CNP      Dose: 1,000 mg  Take 1,000 mg by mouth 3 times daily  Refills: 0        CONTINUE these medicines which have NOT CHANGED      Dose / Directions   albuterol 108 (90 Base) MCG/ACT inhaler  Commonly known as: PROAIR HFA/PROVENTIL HFA/VENTOLIN HFA  Used for: Cough      Dose: 1-2 puff  INHALE 1-2 PUFFS INTO THE LUNGS EVERY 6 HOURS AS NEEDED FOR COUGH OR SHORTNESS OF BREATH OR WHEEZE  Quantity: 18 g  Refills: 11     alendronate 70 MG tablet  Commonly known as: FOSAMAX  Used for: Age-related osteoporosis without current pathological fracture      TAKE 1 TABLET BY MOUTH EVERY 7 DAYS. TAKE IN THE MORNING ON AN EMPTY STOMACH WITH A FULL GLASS OF WATER 30 MINUTES BEFORE FOOD  Quantity: 12 tablet  Refills: 2     aspirin 81 MG EC tablet  Commonly known as: ASA  Used for: Closed fracture of sacrum with routine healing, unspecified portion of sacrum, subsequent encounter      Dose: 81 mg  Take 1 tablet (81 mg) by mouth daily  Refills: 0     calcium carbonate 1500 (600 Ca) MG tablet  Commonly known as: OS-PETER      Dose: 600  mg  Take 600 mg by mouth daily  Refills: 0     enoxaparin ANTICOAGULANT 40 MG/0.4ML syringe  Commonly known as: LOVENOX  Used for: Hip fracture, right, closed, initial encounter (H)      Dose: 40 mg  Inject 0.4 mLs (40 mg) Subcutaneous every 24 hours  Quantity: 11.2 mL  Refills: 0     ibuprofen 200 MG tablet  Commonly known as: ADVIL/MOTRIN      Dose: 200 mg  Take 200 mg by mouth every 6 hours as needed for pain  Refills: 0     losartan-hydrochlorothiazide 100-25 MG tablet  Commonly known as: HYZAAR  Used for: Benign essential hypertension      Dose: 1 tablet  Take 1 tablet by mouth daily  Quantity: 90 tablet  Refills: 2     melatonin 1 MG Tabs tablet  Used for: Hip fracture, right, closed, initial encounter (H), Acute UTI, Hyponatremia, Acute cystitis without hematuria, Pulmonary emphysema, unspecified emphysema type (H), Smoker      Dose: 1 mg  Take 1 tablet (1 mg) by mouth nightly as needed for sleep  Quantity: 30 tablet  Refills: 0     multivitamin, therapeutic Tabs tablet      Dose: 1 tablet  Take 1 tablet by mouth daily  Refills: 0     nicotine 21 MG/24HR 24 hr patch  Commonly known as: NICODERM CQ  Used for: Hip fracture, right, closed, initial encounter (H), Acute UTI, Hyponatremia, Acute cystitis without hematuria, Pulmonary emphysema, unspecified emphysema type (H), Smoker      Dose: 1 patch  Place 1 patch onto the skin daily for 14 days  Quantity: 14 patch  Refills: 0     omeprazole 20 MG DR capsule  Commonly known as: priLOSEC  Used for: Gastroesophageal reflux disease without esophagitis      Dose: 20 mg  TAKE 1 CAPSULE (20 MG) BY MOUTH DAILY  Quantity: 90 capsule  Refills: 0     oxyCODONE 5 MG tablet  Commonly known as: ROXICODONE  Indication: Acute Pain, Chronic Pain  Used for: Hip fracture, right, closed, initial encounter (H), Acute UTI, Hyponatremia, Acute cystitis without hematuria, Pulmonary emphysema, unspecified emphysema type (H), Smoker      Dose: 5 mg  Take 1 tablet (5 mg) by mouth every 6  hours as needed for moderate pain  Quantity: 60 tablet  Refills: 0     polyethylene glycol 17 g packet  Commonly known as: MIRALAX  Used for: Hip fracture, right, closed, initial encounter (H), Acute UTI, Hyponatremia, Acute cystitis without hematuria, Pulmonary emphysema, unspecified emphysema type (H), Smoker      Dose: 17 g  Take 17 g by mouth daily  Quantity: 30 each  Refills: 0     senna-docusate 8.6-50 MG tablet  Commonly known as: SENOKOT-S/PERICOLACE  Used for: Hip fracture, right, closed, initial encounter (H), Acute UTI, Hyponatremia, Acute cystitis without hematuria, Pulmonary emphysema, unspecified emphysema type (H), Smoker      Dose: 1 tablet  Take 1 tablet by mouth 2 times daily as needed for constipation  Quantity: 30 tablet  Refills: 0     tiotropium 2.5 MCG/ACT inhaler  Commonly known as: SPIRIVA RESPIMAT  Used for: Pulmonary emphysema, unspecified emphysema type (H)      Dose: 2 puff  Inhale 2 puffs into the lungs daily (controller)  Quantity: 4 g  Refills: 11     vitamin C 500 MG tablet  Commonly known as: ASCORBIC ACID      Dose: 500 mg  Take 500 mg by mouth daily  Refills: 0     Vitamin D3 25 mcg (1000 units) tablet  Commonly known as: CHOLECALCIFEROL      Dose: 1,000 Units  Take 1,000 Units by mouth daily  Refills: 0            ASSESSMENT/PLAN  Encounter Diagnoses   Name Primary?     Hip fracture, right, closed, initial encounter (H) Yes     Pain management      Anemia, unspecified type      Physical deconditioning      Right femur fracture status post ORIF, follow-up with orthopedics pain control    Anemia last hemoglobin 8.6 up from 8.1    Pain management scheduled Tylenol, as needed ibuprofen as needed oxycodone    Shortness of breath as needed albuterol inhaler    Osteoporosis on alendronate, continue calcium carbonate     hypertension on losartan potassium-hydrochlorothiazide    Insomnia continue melatonin    Nicotine abuse currently on nicotine patches they will end on 6/3/2023 and she  does not want them renewed or tapered    GERD continue omeprazole    Physical deconditioning she will have home care services PT OT home health aide    anticoagulation management continue enoxaparin subcu to end on June 16, 2023      DISCHARGE PLAN/FACE TO FACE:  I certify that services are/were furnished while this patient was under the care of a physician and that a physician or an allowed non-physician practitioner (NPP), had a face-to-face encounter that meets the physician face-to-face encounter requirements. The encounter was in whole, or in part, related to the primary reason for home health. The patient is confined to his/her home and needs intermittent skilled nursing, physical therapy, speech-language pathology, or the continued need for occupational therapy. A plan of care has been established by a physician and is periodically reviewed by a physician.  Date of Face-to-Face Encounter: 5/31/23    I certify that, based on my findings, the following services are medically necessary home health services: PT/OT/HHA    My clinical findings support the need for the above skilled services because: PT OT for continued strength and endurance, home health aide to assist with activities of daily living.    This patient is homebound because: She is deconditioned easily fatigued following recent right ORIF of the femur.  She will continue home therapy for strengthening    The patient is, or has been, under my care and I have initiated the establishment of the plan of care. This patient will be followed by a physician who will periodically review the plan of care.    Schedule follow up visit with primary care provider within 7 days to reestablish care.    Electronically signed by: Sade Andersen CNP

## 2023-06-16 ENCOUNTER — TRANSFERRED RECORDS (OUTPATIENT)
Dept: HEALTH INFORMATION MANAGEMENT | Facility: CLINIC | Age: 73
End: 2023-06-16
Payer: COMMERCIAL

## 2023-06-25 DIAGNOSIS — I10 BENIGN ESSENTIAL HYPERTENSION: ICD-10-CM

## 2023-06-25 DIAGNOSIS — K21.9 GASTROESOPHAGEAL REFLUX DISEASE WITHOUT ESOPHAGITIS: ICD-10-CM

## 2023-06-26 ENCOUNTER — OFFICE VISIT (OUTPATIENT)
Dept: FAMILY MEDICINE | Facility: CLINIC | Age: 73
End: 2023-06-26
Payer: COMMERCIAL

## 2023-06-26 VITALS
HEART RATE: 102 BPM | SYSTOLIC BLOOD PRESSURE: 121 MMHG | WEIGHT: 95.2 LBS | BODY MASS INDEX: 17.97 KG/M2 | HEIGHT: 61 IN | DIASTOLIC BLOOD PRESSURE: 71 MMHG | OXYGEN SATURATION: 96 %

## 2023-06-26 DIAGNOSIS — S72.001D CLOSED FRACTURE OF RIGHT HIP WITH ROUTINE HEALING, SUBSEQUENT ENCOUNTER: ICD-10-CM

## 2023-06-26 DIAGNOSIS — M81.0 AGE-RELATED OSTEOPOROSIS WITHOUT CURRENT PATHOLOGICAL FRACTURE: ICD-10-CM

## 2023-06-26 DIAGNOSIS — E55.9 VITAMIN D DEFICIENCY: ICD-10-CM

## 2023-06-26 DIAGNOSIS — Z78.0 POSTMENOPAUSAL STATUS: ICD-10-CM

## 2023-06-26 DIAGNOSIS — K21.9 GASTROESOPHAGEAL REFLUX DISEASE WITHOUT ESOPHAGITIS: ICD-10-CM

## 2023-06-26 DIAGNOSIS — G47.00 PERSISTENT INSOMNIA: ICD-10-CM

## 2023-06-26 DIAGNOSIS — I10 BENIGN ESSENTIAL HYPERTENSION: ICD-10-CM

## 2023-06-26 DIAGNOSIS — Z09 HOSPITAL DISCHARGE FOLLOW-UP: Primary | ICD-10-CM

## 2023-06-26 DIAGNOSIS — N30.00 ACUTE CYSTITIS WITHOUT HEMATURIA: ICD-10-CM

## 2023-06-26 DIAGNOSIS — J43.9 PULMONARY EMPHYSEMA, UNSPECIFIED EMPHYSEMA TYPE (H): ICD-10-CM

## 2023-06-26 DIAGNOSIS — F17.200 SMOKER: ICD-10-CM

## 2023-06-26 DIAGNOSIS — E87.1 HYPONATREMIA: ICD-10-CM

## 2023-06-26 DIAGNOSIS — D62 ANEMIA DUE TO BLOOD LOSS, ACUTE: ICD-10-CM

## 2023-06-26 DIAGNOSIS — Z12.31 VISIT FOR SCREENING MAMMOGRAM: ICD-10-CM

## 2023-06-26 LAB — HGB BLD-MCNC: 10.4 G/DL (ref 11.7–15.7)

## 2023-06-26 PROCEDURE — 82306 VITAMIN D 25 HYDROXY: CPT | Performed by: FAMILY MEDICINE

## 2023-06-26 PROCEDURE — 99214 OFFICE O/P EST MOD 30 MIN: CPT | Performed by: FAMILY MEDICINE

## 2023-06-26 PROCEDURE — 36415 COLL VENOUS BLD VENIPUNCTURE: CPT | Performed by: FAMILY MEDICINE

## 2023-06-26 PROCEDURE — 85018 HEMOGLOBIN: CPT | Performed by: FAMILY MEDICINE

## 2023-06-26 RX ORDER — ALENDRONATE SODIUM 70 MG/1
TABLET ORAL
Qty: 12 TABLET | Refills: 3 | Status: SHIPPED | OUTPATIENT
Start: 2023-06-26 | End: 2024-07-07

## 2023-06-26 RX ORDER — LOSARTAN POTASSIUM AND HYDROCHLOROTHIAZIDE 25; 100 MG/1; MG/1
1 TABLET ORAL DAILY
Qty: 90 TABLET | Refills: 3 | Status: ON HOLD | OUTPATIENT
Start: 2023-06-26 | End: 2024-07-02

## 2023-06-26 RX ORDER — LOSARTAN POTASSIUM AND HYDROCHLOROTHIAZIDE 25; 100 MG/1; MG/1
1 TABLET ORAL DAILY
Qty: 90 TABLET | Refills: 2 | OUTPATIENT
Start: 2023-06-26

## 2023-06-26 NOTE — PATIENT INSTRUCTIONS
The Benefits of Living Tobacco-Free  What do you want to improve in your life by quitting tobacco? Whether you smoke cigarettes, e-cigarettes, cigars, or a pipe, or use smokeless tobacco, there are many reasons to quit. Check off some reasons you want to be tobacco-free.  Health benefits  ___  I want to breathe without coughing or feeling short of breath.  ___  I want to reduce my risk for lung cancer, oral cancer, heart disease, bone problems such as osteoporosis and fractures, and chronic lung disease. Or reduce my risk for a serious lung injury called EVALI that may happen from vaping or using e-cigarettes.  ___  I want to have fewer wrinkles and softer skin.  ___  I want to improve my sense of taste and smell.  ___  For pregnant women: I want to reduce my risk for a miscarriage, stillbirth,  birth, or a low-birth-weight baby.  Personal benefits  ___  I want to be able to walk, go up stairs, and exercise without becoming out of breath.  ___  I want to feel more in control of my life.  ___  I want to have better-smelling hair, clothes, home, and car.  ___  I want to save time by not having to take smoke breaks, buy tobacco products, or hunt for a light.  ___  I want to have whiter teeth and fresher breath.  Family benefits  ___  I want to reduce my child's respiratory tract infections.  ___  I want to set a healthy example for my child.  ___  I want to have the energy needed to play with my children and not become out-of-breath  ___  I want to reduce my family s cancer risk.  Financial benefits  ___  I want to save hundreds of dollars each year that would be spent on tobacco products.  ___  I want to save money on medical bills.  ___  I want to save money on life, health, and car insurance premiums.  How much do you spend?  A tobacco habit is expensive. Do you know how much you spend on tobacco each year? Fill in the blanks below to find out:  A. How much do you pay for 1 pack of cigarettes, 1 cigar, 1  pouch of pipe tobacco, or 1 can of smokeless tobacco? $________  B. How many packs, cigars, pouches, or cans do you use each day? _______________  C. Multiply answer A with answer B:___________________  D. Multiply answer C with 365: $___________________. This is your yearly cost of using tobacco.  Besides the cost of buying tobacco, there are other costs. These include:    Costs of cleaning clothing, furniture, and car    Replacement costs for clothing and furniture    Medical costs for tobacco-related illnesses    Missed days of work because of illness    Higher health, life, and car insurance premiums  Get help    QuitNow, www.cdc.gov/tobacco/quit_smoking/, 800-QUIT-NOW (220-519-3738).    QuitLine, www.smokefree.gov, 877-44U-QUIT (698-405-0313).    [employer] last reviewed this educational content on 4/1/2020 2000-2022 [employer]. All rights reserved. This information is not intended as a substitute for professional medical care. Always follow your healthcare professional's instructions.

## 2023-06-26 NOTE — TELEPHONE ENCOUNTER
Duplicate request refused. Signed 6/26/2023    Mayda Chavarria RN  [facility]  June 26, 2023, 12:43 PM

## 2023-06-26 NOTE — TELEPHONE ENCOUNTER
Duplicate request refused. Signed 6/26/2023    Mayda Chavarria RN  [facility]  June 26, 2023, 12:45 PM

## 2023-06-27 LAB — DEPRECATED CALCIDIOL+CALCIFEROL SERPL-MC: 67 UG/L (ref 20–75)

## 2023-07-30 ENCOUNTER — HEALTH MAINTENANCE LETTER (OUTPATIENT)
Age: 73
End: 2023-07-30

## 2023-10-08 ENCOUNTER — HEALTH MAINTENANCE LETTER (OUTPATIENT)
Age: 73
End: 2023-10-08

## 2024-03-15 DIAGNOSIS — R05.9 COUGH: ICD-10-CM

## 2024-03-18 RX ORDER — ALBUTEROL SULFATE 90 UG/1
1-2 AEROSOL, METERED RESPIRATORY (INHALATION) EVERY 6 HOURS
Qty: 18 G | Refills: 1 | Status: SHIPPED | OUTPATIENT
Start: 2024-03-18 | End: 2024-06-17

## 2024-06-15 DIAGNOSIS — R05.9 COUGH: ICD-10-CM

## 2024-06-17 ENCOUNTER — MYC REFILL (OUTPATIENT)
Dept: FAMILY MEDICINE | Facility: CLINIC | Age: 74
End: 2024-06-17
Payer: COMMERCIAL

## 2024-06-17 DIAGNOSIS — R05.9 COUGH: ICD-10-CM

## 2024-06-17 RX ORDER — ALBUTEROL SULFATE 90 UG/1
1-2 AEROSOL, METERED RESPIRATORY (INHALATION) EVERY 6 HOURS
Qty: 18 G | Refills: 0 | Status: SHIPPED | OUTPATIENT
Start: 2024-06-17

## 2024-06-17 RX ORDER — ALBUTEROL SULFATE 90 UG/1
1-2 AEROSOL, METERED RESPIRATORY (INHALATION) EVERY 6 HOURS
Qty: 18 G | Refills: 1 | OUTPATIENT
Start: 2024-06-17

## 2024-06-27 ENCOUNTER — APPOINTMENT (OUTPATIENT)
Dept: CT IMAGING | Facility: HOSPITAL | Age: 74
DRG: 871 | End: 2024-06-27
Attending: EMERGENCY MEDICINE
Payer: COMMERCIAL

## 2024-06-27 ENCOUNTER — APPOINTMENT (OUTPATIENT)
Dept: ULTRASOUND IMAGING | Facility: HOSPITAL | Age: 74
DRG: 871 | End: 2024-06-27
Attending: INTERNAL MEDICINE
Payer: COMMERCIAL

## 2024-06-27 ENCOUNTER — APPOINTMENT (OUTPATIENT)
Dept: RADIOLOGY | Facility: HOSPITAL | Age: 74
DRG: 871 | End: 2024-06-27
Attending: INTERNAL MEDICINE
Payer: COMMERCIAL

## 2024-06-27 ENCOUNTER — APPOINTMENT (OUTPATIENT)
Dept: RADIOLOGY | Facility: HOSPITAL | Age: 74
DRG: 871 | End: 2024-06-27
Attending: EMERGENCY MEDICINE
Payer: COMMERCIAL

## 2024-06-27 ENCOUNTER — HOSPITAL ENCOUNTER (INPATIENT)
Facility: HOSPITAL | Age: 74
LOS: 7 days | Discharge: NURSING, REHAB OR LONG-TERM CARE | DRG: 871 | End: 2024-07-04
Attending: EMERGENCY MEDICINE | Admitting: INTERNAL MEDICINE
Payer: COMMERCIAL

## 2024-06-27 DIAGNOSIS — Z78.9 ALCOHOL USE: ICD-10-CM

## 2024-06-27 DIAGNOSIS — J96.02 ACUTE RESPIRATORY FAILURE WITH HYPERCAPNIA (H): ICD-10-CM

## 2024-06-27 DIAGNOSIS — S32.9XXA CLOSED NONDISPLACED FRACTURE OF PELVIS, UNSPECIFIED PART OF PELVIS, INITIAL ENCOUNTER (H): ICD-10-CM

## 2024-06-27 DIAGNOSIS — R41.82 ALTERED MENTAL STATUS, UNSPECIFIED ALTERED MENTAL STATUS TYPE: ICD-10-CM

## 2024-06-27 DIAGNOSIS — S72.001A HIP FRACTURE, RIGHT, CLOSED, INITIAL ENCOUNTER (H): ICD-10-CM

## 2024-06-27 DIAGNOSIS — Z71.6 ENCOUNTER FOR TOBACCO USE CESSATION COUNSELING: ICD-10-CM

## 2024-06-27 DIAGNOSIS — N39.0 ACUTE UTI: ICD-10-CM

## 2024-06-27 DIAGNOSIS — L24.A2 IRRITANT CONTACT DERMATITIS DUE TO FECAL, URINARY OR DUAL INCONTINENCE: Primary | ICD-10-CM

## 2024-06-27 DIAGNOSIS — S22.49XA CLOSED FRACTURE OF MULTIPLE RIBS, UNSPECIFIED LATERALITY, INITIAL ENCOUNTER: ICD-10-CM

## 2024-06-27 DIAGNOSIS — E87.6 HYPOKALEMIA: ICD-10-CM

## 2024-06-27 DIAGNOSIS — J69.0 ASPIRATION PNEUMONIA, UNSPECIFIED ASPIRATION PNEUMONIA TYPE, UNSPECIFIED LATERALITY, UNSPECIFIED PART OF LUNG (H): ICD-10-CM

## 2024-06-27 DIAGNOSIS — I26.99 OTHER ACUTE PULMONARY EMBOLISM WITHOUT ACUTE COR PULMONALE (H): ICD-10-CM

## 2024-06-27 DIAGNOSIS — J44.1 COPD EXACERBATION (H): ICD-10-CM

## 2024-06-27 LAB
ALBUMIN SERPL BCG-MCNC: 4.2 G/DL (ref 3.5–5.2)
ALBUMIN UR-MCNC: 30 MG/DL
ALP SERPL-CCNC: 127 U/L (ref 40–150)
ALT SERPL W P-5'-P-CCNC: 22 U/L (ref 0–50)
ANION GAP SERPL CALCULATED.3IONS-SCNC: 18 MMOL/L (ref 7–15)
APPEARANCE UR: CLEAR
AST SERPL W P-5'-P-CCNC: 28 U/L (ref 0–45)
BASE EXCESS BLDV CALC-SCNC: -0.8 MMOL/L (ref -3–3)
BASE EXCESS BLDV CALC-SCNC: -2.1 MMOL/L (ref -3–3)
BASE EXCESS BLDV CALC-SCNC: -2.7 MMOL/L (ref -3–3)
BASE EXCESS BLDV CALC-SCNC: -4 MMOL/L (ref -3–3)
BASE EXCESS BLDV CALC-SCNC: 1.5 MMOL/L (ref -3–3)
BASOPHILS # BLD AUTO: 0 10E3/UL (ref 0–0.2)
BASOPHILS NFR BLD AUTO: 0 %
BILIRUB SERPL-MCNC: 0.3 MG/DL
BILIRUB UR QL STRIP: NEGATIVE
BUN SERPL-MCNC: 37.2 MG/DL (ref 8–23)
CA-I BLD-MCNC: 5.5 MG/DL (ref 4.4–5.2)
CALCIUM SERPL-MCNC: 11.2 MG/DL (ref 8.8–10.2)
CHLORIDE SERPL-SCNC: 98 MMOL/L (ref 98–107)
CK SERPL-CCNC: 31 U/L (ref 26–192)
COLOR UR AUTO: ABNORMAL
CREAT SERPL-MCNC: 1.11 MG/DL (ref 0.51–0.95)
DEPRECATED HCO3 PLAS-SCNC: 29 MMOL/L (ref 22–29)
EGFRCR SERPLBLD CKD-EPI 2021: 51 ML/MIN/1.73M2
EOSINOPHIL # BLD AUTO: 0 10E3/UL (ref 0–0.7)
EOSINOPHIL NFR BLD AUTO: 0 %
ERYTHROCYTE [DISTWIDTH] IN BLOOD BY AUTOMATED COUNT: 14.4 % (ref 10–15)
ETHANOL SERPL-MCNC: <0.01 G/DL
FLUAV RNA SPEC QL NAA+PROBE: NEGATIVE
FLUBV RNA RESP QL NAA+PROBE: NEGATIVE
GLUCOSE BLDC GLUCOMTR-MCNC: 167 MG/DL (ref 70–99)
GLUCOSE BLDC GLUCOMTR-MCNC: 210 MG/DL (ref 70–99)
GLUCOSE SERPL-MCNC: 121 MG/DL (ref 70–99)
GLUCOSE UR STRIP-MCNC: NEGATIVE MG/DL
HBA1C MFR BLD: 4.7 %
HCO3 BLDV-SCNC: 23 MMOL/L (ref 21–28)
HCO3 BLDV-SCNC: 26 MMOL/L (ref 21–28)
HCO3 BLDV-SCNC: 26 MMOL/L (ref 21–28)
HCO3 BLDV-SCNC: 28 MMOL/L (ref 21–28)
HCO3 BLDV-SCNC: 31 MMOL/L (ref 21–28)
HCT VFR BLD AUTO: 39.8 % (ref 35–47)
HGB BLD-MCNC: 13.2 G/DL (ref 11.7–15.7)
HGB UR QL STRIP: NEGATIVE
HOLD SPECIMEN: NORMAL
HYALINE CASTS: 3 /LPF
IMM GRANULOCYTES # BLD: 0 10E3/UL
IMM GRANULOCYTES NFR BLD: 0 %
KETONES UR STRIP-MCNC: 10 MG/DL
LACTATE SERPL-SCNC: 0.9 MMOL/L (ref 0.7–2)
LEUKOCYTE ESTERASE UR QL STRIP: NEGATIVE
LYMPHOCYTES # BLD AUTO: 0.4 10E3/UL (ref 0.8–5.3)
LYMPHOCYTES NFR BLD AUTO: 5 %
MAGNESIUM SERPL-MCNC: 2.1 MG/DL (ref 1.7–2.3)
MCH RBC QN AUTO: 32.9 PG (ref 26.5–33)
MCHC RBC AUTO-ENTMCNC: 33.2 G/DL (ref 31.5–36.5)
MCV RBC AUTO: 99 FL (ref 78–100)
MONOCYTES # BLD AUTO: 0.6 10E3/UL (ref 0–1.3)
MONOCYTES NFR BLD AUTO: 8 %
NEUTROPHILS # BLD AUTO: 7.4 10E3/UL (ref 1.6–8.3)
NEUTROPHILS NFR BLD AUTO: 87 %
NITRATE UR QL: NEGATIVE
NRBC # BLD AUTO: 0 10E3/UL
NRBC BLD AUTO-RTO: 0 /100
NT-PROBNP SERPL-MCNC: 909 PG/ML (ref 0–1800)
O2/TOTAL GAS SETTING VFR VENT: 0 %
O2/TOTAL GAS SETTING VFR VENT: 30 %
O2/TOTAL GAS SETTING VFR VENT: 40 %
OXYHGB MFR BLDV: 48 % (ref 70–75)
OXYHGB MFR BLDV: 49 % (ref 70–75)
OXYHGB MFR BLDV: 58 % (ref 70–75)
OXYHGB MFR BLDV: 63 % (ref 70–75)
OXYHGB MFR BLDV: 72 % (ref 70–75)
PCO2 BLDV: 50 MM HG (ref 40–50)
PCO2 BLDV: 64 MM HG (ref 40–50)
PCO2 BLDV: 69 MM HG (ref 40–50)
PCO2 BLDV: 80 MM HG (ref 40–50)
PCO2 BLDV: 89 MM HG (ref 40–50)
PH BLDV: 7.14 [PH] (ref 7.32–7.43)
PH BLDV: 7.15 [PH] (ref 7.32–7.43)
PH BLDV: 7.18 [PH] (ref 7.32–7.43)
PH BLDV: 7.22 [PH] (ref 7.32–7.43)
PH BLDV: 7.27 [PH] (ref 7.32–7.43)
PH UR STRIP: 6 [PH] (ref 5–7)
PLATELET # BLD AUTO: 341 10E3/UL (ref 150–450)
PO2 BLDV: 30 MM HG (ref 25–47)
PO2 BLDV: 32 MM HG (ref 25–47)
PO2 BLDV: 36 MM HG (ref 25–47)
PO2 BLDV: 41 MM HG (ref 25–47)
PO2 BLDV: 41 MM HG (ref 25–47)
POTASSIUM BLD-SCNC: 2.6 MMOL/L (ref 3.4–5.3)
POTASSIUM SERPL-SCNC: 2.6 MMOL/L (ref 3.4–5.3)
POTASSIUM SERPL-SCNC: 3 MMOL/L (ref 3.4–5.3)
PROT SERPL-MCNC: 7.9 G/DL (ref 6.4–8.3)
RADIOLOGIST FLAGS: ABNORMAL
RBC # BLD AUTO: 4.01 10E6/UL (ref 3.8–5.2)
RBC URINE: 2 /HPF
RSV RNA SPEC NAA+PROBE: NEGATIVE
SAO2 % BLDV: 49.2 % (ref 70–75)
SAO2 % BLDV: 50.2 % (ref 70–75)
SAO2 % BLDV: 59.5 % (ref 70–75)
SAO2 % BLDV: 64.5 % (ref 70–75)
SAO2 % BLDV: 73 % (ref 70–75)
SARS-COV-2 RNA RESP QL NAA+PROBE: NEGATIVE
SODIUM SERPL-SCNC: 145 MMOL/L (ref 135–145)
SP GR UR STRIP: 1.01 (ref 1–1.03)
SQUAMOUS EPITHELIAL: 3 /HPF
TROPONIN T SERPL HS-MCNC: 49 NG/L
TROPONIN T SERPL HS-MCNC: 52 NG/L
UFH PPP CHRO-ACNC: 0.99 IU/ML
UROBILINOGEN UR STRIP-MCNC: <2 MG/DL
WBC # BLD AUTO: 8.5 10E3/UL (ref 4–11)
WBC URINE: 1 /HPF

## 2024-06-27 PROCEDURE — 250N000009 HC RX 250: Performed by: EMERGENCY MEDICINE

## 2024-06-27 PROCEDURE — 36415 COLL VENOUS BLD VENIPUNCTURE: CPT | Performed by: EMERGENCY MEDICINE

## 2024-06-27 PROCEDURE — 82077 ASSAY SPEC XCP UR&BREATH IA: CPT | Performed by: EMERGENCY MEDICINE

## 2024-06-27 PROCEDURE — 258N000003 HC RX IP 258 OP 636: Performed by: EMERGENCY MEDICINE

## 2024-06-27 PROCEDURE — 82565 ASSAY OF CREATININE: CPT | Performed by: INTERNAL MEDICINE

## 2024-06-27 PROCEDURE — 81001 URINALYSIS AUTO W/SCOPE: CPT | Performed by: EMERGENCY MEDICINE

## 2024-06-27 PROCEDURE — 93970 EXTREMITY STUDY: CPT

## 2024-06-27 PROCEDURE — 85520 HEPARIN ASSAY: CPT | Performed by: INTERNAL MEDICINE

## 2024-06-27 PROCEDURE — 250N000011 HC RX IP 250 OP 636: Performed by: INTERNAL MEDICINE

## 2024-06-27 PROCEDURE — 94660 CPAP INITIATION&MGMT: CPT

## 2024-06-27 PROCEDURE — 83880 ASSAY OF NATRIURETIC PEPTIDE: CPT | Performed by: EMERGENCY MEDICINE

## 2024-06-27 PROCEDURE — 84484 ASSAY OF TROPONIN QUANT: CPT | Performed by: INTERNAL MEDICINE

## 2024-06-27 PROCEDURE — 82330 ASSAY OF CALCIUM: CPT | Performed by: INTERNAL MEDICINE

## 2024-06-27 PROCEDURE — 258N000003 HC RX IP 258 OP 636: Performed by: INTERNAL MEDICINE

## 2024-06-27 PROCEDURE — 87149 DNA/RNA DIRECT PROBE: CPT | Performed by: EMERGENCY MEDICINE

## 2024-06-27 PROCEDURE — 99291 CRITICAL CARE FIRST HOUR: CPT | Mod: 25

## 2024-06-27 PROCEDURE — 87086 URINE CULTURE/COLONY COUNT: CPT | Performed by: INTERNAL MEDICINE

## 2024-06-27 PROCEDURE — 94640 AIRWAY INHALATION TREATMENT: CPT | Mod: 76

## 2024-06-27 PROCEDURE — 250N000013 HC RX MED GY IP 250 OP 250 PS 637: Performed by: INTERNAL MEDICINE

## 2024-06-27 PROCEDURE — 84132 ASSAY OF SERUM POTASSIUM: CPT | Performed by: INTERNAL MEDICINE

## 2024-06-27 PROCEDURE — 83605 ASSAY OF LACTIC ACID: CPT | Performed by: EMERGENCY MEDICINE

## 2024-06-27 PROCEDURE — 250N000009 HC RX 250: Performed by: INTERNAL MEDICINE

## 2024-06-27 PROCEDURE — 272N000272 HC CONTINUOUS NEBULIZER MICRO PUMP

## 2024-06-27 PROCEDURE — 999N000157 HC STATISTIC RCP TIME EA 10 MIN

## 2024-06-27 PROCEDURE — 250N000013 HC RX MED GY IP 250 OP 250 PS 637: Performed by: EMERGENCY MEDICINE

## 2024-06-27 PROCEDURE — 36415 COLL VENOUS BLD VENIPUNCTURE: CPT | Performed by: INTERNAL MEDICINE

## 2024-06-27 PROCEDURE — 96375 TX/PRO/DX INJ NEW DRUG ADDON: CPT

## 2024-06-27 PROCEDURE — 250N000011 HC RX IP 250 OP 636: Performed by: EMERGENCY MEDICINE

## 2024-06-27 PROCEDURE — 82805 BLOOD GASES W/O2 SATURATION: CPT | Performed by: INTERNAL MEDICINE

## 2024-06-27 PROCEDURE — 70450 CT HEAD/BRAIN W/O DYE: CPT

## 2024-06-27 PROCEDURE — 87186 SC STD MICRODIL/AGAR DIL: CPT | Performed by: EMERGENCY MEDICINE

## 2024-06-27 PROCEDURE — 84484 ASSAY OF TROPONIN QUANT: CPT | Performed by: EMERGENCY MEDICINE

## 2024-06-27 PROCEDURE — 84450 TRANSFERASE (AST) (SGOT): CPT | Performed by: EMERGENCY MEDICINE

## 2024-06-27 PROCEDURE — 93005 ELECTROCARDIOGRAM TRACING: CPT | Performed by: EMERGENCY MEDICINE

## 2024-06-27 PROCEDURE — 82247 BILIRUBIN TOTAL: CPT | Performed by: EMERGENCY MEDICINE

## 2024-06-27 PROCEDURE — 72170 X-RAY EXAM OF PELVIS: CPT

## 2024-06-27 PROCEDURE — 71045 X-RAY EXAM CHEST 1 VIEW: CPT

## 2024-06-27 PROCEDURE — 71275 CT ANGIOGRAPHY CHEST: CPT

## 2024-06-27 PROCEDURE — HZ2ZZZZ DETOXIFICATION SERVICES FOR SUBSTANCE ABUSE TREATMENT: ICD-10-PCS | Performed by: STUDENT IN AN ORGANIZED HEALTH CARE EDUCATION/TRAINING PROGRAM

## 2024-06-27 PROCEDURE — 83735 ASSAY OF MAGNESIUM: CPT | Performed by: INTERNAL MEDICINE

## 2024-06-27 PROCEDURE — 36569 INSJ PICC 5 YR+ W/O IMAGING: CPT

## 2024-06-27 PROCEDURE — 99291 CRITICAL CARE FIRST HOUR: CPT | Performed by: INTERNAL MEDICINE

## 2024-06-27 PROCEDURE — 94640 AIRWAY INHALATION TREATMENT: CPT

## 2024-06-27 PROCEDURE — 73552 X-RAY EXAM OF FEMUR 2/>: CPT | Mod: LT

## 2024-06-27 PROCEDURE — 73060 X-RAY EXAM OF HUMERUS: CPT | Mod: LT

## 2024-06-27 PROCEDURE — 250N000012 HC RX MED GY IP 250 OP 636 PS 637: Performed by: INTERNAL MEDICINE

## 2024-06-27 PROCEDURE — 96365 THER/PROPH/DIAG IV INF INIT: CPT

## 2024-06-27 PROCEDURE — 200N000001 HC R&B ICU

## 2024-06-27 PROCEDURE — 87637 SARSCOV2&INF A&B&RSV AMP PRB: CPT | Performed by: STUDENT IN AN ORGANIZED HEALTH CARE EDUCATION/TRAINING PROGRAM

## 2024-06-27 PROCEDURE — 82805 BLOOD GASES W/O2 SATURATION: CPT | Performed by: EMERGENCY MEDICINE

## 2024-06-27 PROCEDURE — 99292 CRITICAL CARE ADDL 30 MIN: CPT

## 2024-06-27 PROCEDURE — 73030 X-RAY EXAM OF SHOULDER: CPT | Mod: LT

## 2024-06-27 PROCEDURE — 272N000452 HC KIT SHRLOCK 5FR POWER PICC TRIPLE LUMEN

## 2024-06-27 PROCEDURE — 99223 1ST HOSP IP/OBS HIGH 75: CPT | Performed by: INTERNAL MEDICINE

## 2024-06-27 PROCEDURE — 5A09357 ASSISTANCE WITH RESPIRATORY VENTILATION, LESS THAN 24 CONSECUTIVE HOURS, CONTINUOUS POSITIVE AIRWAY PRESSURE: ICD-10-PCS | Performed by: STUDENT IN AN ORGANIZED HEALTH CARE EDUCATION/TRAINING PROGRAM

## 2024-06-27 PROCEDURE — 82550 ASSAY OF CK (CPK): CPT | Performed by: INTERNAL MEDICINE

## 2024-06-27 PROCEDURE — 83036 HEMOGLOBIN GLYCOSYLATED A1C: CPT | Performed by: INTERNAL MEDICINE

## 2024-06-27 PROCEDURE — 85025 COMPLETE CBC W/AUTO DIFF WBC: CPT | Performed by: EMERGENCY MEDICINE

## 2024-06-27 RX ORDER — IPRATROPIUM BROMIDE AND ALBUTEROL SULFATE 2.5; .5 MG/3ML; MG/3ML
3 SOLUTION RESPIRATORY (INHALATION)
Status: DISCONTINUED | OUTPATIENT
Start: 2024-06-27 | End: 2024-06-30

## 2024-06-27 RX ORDER — CEFAZOLIN SODIUM 1 G/50ML
750 SOLUTION INTRAVENOUS ONCE
Status: COMPLETED | OUTPATIENT
Start: 2024-06-27 | End: 2024-06-27

## 2024-06-27 RX ORDER — LIDOCAINE 50 MG/G
OINTMENT TOPICAL 4 TIMES DAILY
Status: DISCONTINUED | OUTPATIENT
Start: 2024-06-27 | End: 2024-07-04 | Stop reason: HOSPADM

## 2024-06-27 RX ORDER — LIDOCAINE 40 MG/G
CREAM TOPICAL
Status: DISCONTINUED | OUTPATIENT
Start: 2024-06-27 | End: 2024-07-04 | Stop reason: HOSPADM

## 2024-06-27 RX ORDER — NICOTINE POLACRILEX 4 MG
15-30 LOZENGE BUCCAL
Status: DISCONTINUED | OUTPATIENT
Start: 2024-06-27 | End: 2024-07-04 | Stop reason: HOSPADM

## 2024-06-27 RX ORDER — PIPERACILLIN SODIUM, TAZOBACTAM SODIUM 3; .375 G/15ML; G/15ML
3.38 INJECTION, POWDER, LYOPHILIZED, FOR SOLUTION INTRAVENOUS ONCE
Status: COMPLETED | OUTPATIENT
Start: 2024-06-27 | End: 2024-06-27

## 2024-06-27 RX ORDER — NOREPINEPHRINE BITARTRATE 0.02 MG/ML
.01-.6 INJECTION, SOLUTION INTRAVENOUS CONTINUOUS
Status: DISCONTINUED | OUTPATIENT
Start: 2024-06-27 | End: 2024-06-30

## 2024-06-27 RX ORDER — PIPERACILLIN SODIUM, TAZOBACTAM SODIUM 3; .375 G/15ML; G/15ML
3.38 INJECTION, POWDER, LYOPHILIZED, FOR SOLUTION INTRAVENOUS EVERY 8 HOURS
Status: DISCONTINUED | OUTPATIENT
Start: 2024-06-27 | End: 2024-06-27

## 2024-06-27 RX ORDER — ONDANSETRON 2 MG/ML
4 INJECTION INTRAMUSCULAR; INTRAVENOUS EVERY 6 HOURS PRN
Status: DISCONTINUED | OUTPATIENT
Start: 2024-06-27 | End: 2024-07-04 | Stop reason: HOSPADM

## 2024-06-27 RX ORDER — IPRATROPIUM BROMIDE AND ALBUTEROL SULFATE 2.5; .5 MG/3ML; MG/3ML
3 SOLUTION RESPIRATORY (INHALATION)
Status: DISCONTINUED | OUTPATIENT
Start: 2024-06-27 | End: 2024-06-27

## 2024-06-27 RX ORDER — DEXTROSE MONOHYDRATE 25 G/50ML
25-50 INJECTION, SOLUTION INTRAVENOUS
Status: DISCONTINUED | OUTPATIENT
Start: 2024-06-27 | End: 2024-07-04 | Stop reason: HOSPADM

## 2024-06-27 RX ORDER — IPRATROPIUM BROMIDE AND ALBUTEROL SULFATE 2.5; .5 MG/3ML; MG/3ML
3 SOLUTION RESPIRATORY (INHALATION) ONCE
Status: COMPLETED | OUTPATIENT
Start: 2024-06-27 | End: 2024-06-27

## 2024-06-27 RX ORDER — IOPAMIDOL 755 MG/ML
75 INJECTION, SOLUTION INTRAVASCULAR ONCE
Status: COMPLETED | OUTPATIENT
Start: 2024-06-27 | End: 2024-06-27

## 2024-06-27 RX ORDER — NYSTATIN 100000 U/G
CREAM TOPICAL 2 TIMES DAILY
Status: DISCONTINUED | OUTPATIENT
Start: 2024-06-27 | End: 2024-07-04 | Stop reason: HOSPADM

## 2024-06-27 RX ORDER — HEPARIN SODIUM 10000 [USP'U]/100ML
0-5000 INJECTION, SOLUTION INTRAVENOUS CONTINUOUS
Status: DISCONTINUED | OUTPATIENT
Start: 2024-06-27 | End: 2024-06-30

## 2024-06-27 RX ORDER — ALBUTEROL SULFATE 0.83 MG/ML
2.5 SOLUTION RESPIRATORY (INHALATION)
Status: COMPLETED | OUTPATIENT
Start: 2024-06-27 | End: 2024-07-01

## 2024-06-27 RX ORDER — POTASSIUM CHLORIDE 7.45 MG/ML
10 INJECTION INTRAVENOUS
Status: COMPLETED | OUTPATIENT
Start: 2024-06-27 | End: 2024-06-28

## 2024-06-27 RX ORDER — AMOXICILLIN 250 MG
2 CAPSULE ORAL 2 TIMES DAILY PRN
Status: DISCONTINUED | OUTPATIENT
Start: 2024-06-27 | End: 2024-07-04 | Stop reason: HOSPADM

## 2024-06-27 RX ORDER — METHYLPREDNISOLONE SODIUM SUCCINATE 125 MG/2ML
125 INJECTION, POWDER, LYOPHILIZED, FOR SOLUTION INTRAMUSCULAR; INTRAVENOUS ONCE
Status: COMPLETED | OUTPATIENT
Start: 2024-06-27 | End: 2024-06-27

## 2024-06-27 RX ORDER — LIDOCAINE 4 G/G
2 PATCH TOPICAL
Status: DISCONTINUED | OUTPATIENT
Start: 2024-06-27 | End: 2024-07-01

## 2024-06-27 RX ORDER — POTASSIUM CHLORIDE 7.45 MG/ML
10 INJECTION INTRAVENOUS
Status: COMPLETED | OUTPATIENT
Start: 2024-06-27 | End: 2024-06-27

## 2024-06-27 RX ORDER — NICOTINE 21 MG/24HR
1 PATCH, TRANSDERMAL 24 HOURS TRANSDERMAL DAILY
Status: DISCONTINUED | OUTPATIENT
Start: 2024-06-27 | End: 2024-07-04 | Stop reason: HOSPADM

## 2024-06-27 RX ORDER — NYSTATIN 100000 U/G
CREAM TOPICAL ONCE
Status: COMPLETED | OUTPATIENT
Start: 2024-06-27 | End: 2024-06-27

## 2024-06-27 RX ORDER — AMOXICILLIN 250 MG
1 CAPSULE ORAL 2 TIMES DAILY PRN
Status: DISCONTINUED | OUTPATIENT
Start: 2024-06-27 | End: 2024-07-04 | Stop reason: HOSPADM

## 2024-06-27 RX ORDER — CEFTRIAXONE 1 G/1
1 INJECTION, POWDER, FOR SOLUTION INTRAMUSCULAR; INTRAVENOUS EVERY 24 HOURS
Status: DISCONTINUED | OUTPATIENT
Start: 2024-06-27 | End: 2024-07-02

## 2024-06-27 RX ORDER — LIDOCAINE 40 MG/G
CREAM TOPICAL
Status: ACTIVE | OUTPATIENT
Start: 2024-06-27 | End: 2024-06-30

## 2024-06-27 RX ORDER — ONDANSETRON 4 MG/1
4 TABLET, ORALLY DISINTEGRATING ORAL EVERY 6 HOURS PRN
Status: DISCONTINUED | OUTPATIENT
Start: 2024-06-27 | End: 2024-07-04 | Stop reason: HOSPADM

## 2024-06-27 RX ORDER — METHYLPREDNISOLONE SODIUM SUCCINATE 125 MG/2ML
60 INJECTION, POWDER, LYOPHILIZED, FOR SOLUTION INTRAMUSCULAR; INTRAVENOUS EVERY 6 HOURS
Status: DISCONTINUED | OUTPATIENT
Start: 2024-06-27 | End: 2024-06-29

## 2024-06-27 RX ADMIN — LIDOCAINE: 50 OINTMENT TOPICAL at 23:44

## 2024-06-27 RX ADMIN — NYSTATIN: 100000 CREAM TOPICAL at 20:53

## 2024-06-27 RX ADMIN — MICONAZOLE NITRATE ANTIFUNGAL POWDER: 2 POWDER TOPICAL at 23:44

## 2024-06-27 RX ADMIN — POTASSIUM CHLORIDE 10 MEQ: 7.46 INJECTION, SOLUTION INTRAVENOUS at 16:44

## 2024-06-27 RX ADMIN — HEPARIN SODIUM 800 UNITS/HR: 10000 INJECTION, SOLUTION INTRAVENOUS at 15:13

## 2024-06-27 RX ADMIN — METHYLPREDNISOLONE SODIUM SUCCINATE 62.5 MG: 125 INJECTION, POWDER, FOR SOLUTION INTRAMUSCULAR; INTRAVENOUS at 20:53

## 2024-06-27 RX ADMIN — SODIUM CHLORIDE 750 MG: 9 INJECTION, SOLUTION INTRAVENOUS at 16:22

## 2024-06-27 RX ADMIN — POTASSIUM CHLORIDE 10 MEQ: 7.46 INJECTION, SOLUTION INTRAVENOUS at 22:59

## 2024-06-27 RX ADMIN — IPRATROPIUM BROMIDE AND ALBUTEROL SULFATE 3 ML: .5; 3 SOLUTION RESPIRATORY (INHALATION) at 16:00

## 2024-06-27 RX ADMIN — IPRATROPIUM BROMIDE AND ALBUTEROL SULFATE 3 ML: .5; 3 SOLUTION RESPIRATORY (INHALATION) at 21:10

## 2024-06-27 RX ADMIN — PIPERACILLIN AND TAZOBACTAM 3.38 G: 3; .375 INJECTION, POWDER, FOR SOLUTION INTRAVENOUS at 15:25

## 2024-06-27 RX ADMIN — LIDOCAINE 2 PATCH: 4 PATCH TOPICAL at 20:49

## 2024-06-27 RX ADMIN — POTASSIUM CHLORIDE 10 MEQ: 7.46 INJECTION, SOLUTION INTRAVENOUS at 21:25

## 2024-06-27 RX ADMIN — IOPAMIDOL 75 ML: 755 INJECTION, SOLUTION INTRAVENOUS at 14:18

## 2024-06-27 RX ADMIN — NYSTATIN: 100000 CREAM TOPICAL at 14:53

## 2024-06-27 RX ADMIN — IPRATROPIUM BROMIDE AND ALBUTEROL SULFATE 3 ML: .5; 3 SOLUTION RESPIRATORY (INHALATION) at 13:20

## 2024-06-27 RX ADMIN — POTASSIUM CHLORIDE 10 MEQ: 7.46 INJECTION, SOLUTION INTRAVENOUS at 15:24

## 2024-06-27 RX ADMIN — INSULIN ASPART 2 UNITS: 100 INJECTION, SOLUTION INTRAVENOUS; SUBCUTANEOUS at 23:40

## 2024-06-27 RX ADMIN — AZITHROMYCIN MONOHYDRATE 500 MG: 500 INJECTION, POWDER, LYOPHILIZED, FOR SOLUTION INTRAVENOUS at 21:30

## 2024-06-27 RX ADMIN — INSULIN ASPART 1 UNITS: 100 INJECTION, SOLUTION INTRAVENOUS; SUBCUTANEOUS at 19:31

## 2024-06-27 RX ADMIN — METHYLPREDNISOLONE SODIUM SUCCINATE 125 MG: 125 INJECTION, POWDER, FOR SOLUTION INTRAMUSCULAR; INTRAVENOUS at 13:37

## 2024-06-27 RX ADMIN — POTASSIUM CHLORIDE 10 MEQ: 7.46 INJECTION, SOLUTION INTRAVENOUS at 14:26

## 2024-06-27 RX ADMIN — CEFTRIAXONE SODIUM 1 G: 1 INJECTION, POWDER, FOR SOLUTION INTRAMUSCULAR; INTRAVENOUS at 20:37

## 2024-06-27 RX ADMIN — FOLIC ACID: 5 INJECTION, SOLUTION INTRAMUSCULAR; INTRAVENOUS; SUBCUTANEOUS at 17:44

## 2024-06-27 RX ADMIN — LIDOCAINE HYDROCHLORIDE 2 ML: 10 INJECTION, SOLUTION EPIDURAL; INFILTRATION; INTRACAUDAL; PERINEURAL at 21:09

## 2024-06-27 RX ADMIN — IPRATROPIUM BROMIDE AND ALBUTEROL SULFATE 3 ML: .5; 3 SOLUTION RESPIRATORY (INHALATION) at 13:26

## 2024-06-27 RX ADMIN — POTASSIUM CHLORIDE 10 MEQ: 7.46 INJECTION, SOLUTION INTRAVENOUS at 20:37

## 2024-06-27 ASSESSMENT — ACTIVITIES OF DAILY LIVING (ADL)
ADLS_ACUITY_SCORE: 38
ADLS_ACUITY_SCORE: 42
DEPENDENT_IADLS:: INDEPENDENT
ADLS_ACUITY_SCORE: 42
ADLS_ACUITY_SCORE: 38
ADLS_ACUITY_SCORE: 51
ADLS_ACUITY_SCORE: 38
ADLS_ACUITY_SCORE: 51
ADLS_ACUITY_SCORE: 38

## 2024-06-27 ASSESSMENT — COLUMBIA-SUICIDE SEVERITY RATING SCALE - C-SSRS
1. IN THE PAST MONTH, HAVE YOU WISHED YOU WERE DEAD OR WISHED YOU COULD GO TO SLEEP AND NOT WAKE UP?: NO
6. HAVE YOU EVER DONE ANYTHING, STARTED TO DO ANYTHING, OR PREPARED TO DO ANYTHING TO END YOUR LIFE?: NO
2. HAVE YOU ACTUALLY HAD ANY THOUGHTS OF KILLING YOURSELF IN THE PAST MONTH?: NO

## 2024-06-27 NOTE — CONSULTS
CRITICAL CARE CONSULT:    Reason for Consult:  Acute respiratory failure with hypercapnia and hypoxia    Assessment/Plan:  74 year old female active smoker with a history of alcohol dependence, extensive emphysema with no PFTs on record, kyphosis, osteoporosis, hip fractures s/p bilateral ORIF, GERD, HTN, insomnia, protein-calorie malnutrition, subarachnoid hemorrhage, vitamin D deficiency, E coli UTI presented on 6/27 with acute encephalopathy, hypoxia, found to be agitated, respiratory acidosis, segmental PE, acute multifocal bronchitis/pneumonia most prominent in LLL, admitted to ICU on BiPAP.    PULMONARY/RESPIRATORY:  Acute respirator failure with hypercapnia and hypoxia  Tobacco dependence  Pulmonary embolism  Likely severe COPD by clinical history, CT, presentation. No PFTs on record, on albuterol alone. Continues to smoke heavily per family. Now with acute segmental PE, extensive biapical emphysema, bronchial wall thickening with endoluminal mucus/debris most prominent LLL with some evidence of pneumonia. VBG not much improved with BiPAP, steroids, and nebs.  Discussing code status with family  Continue AVAPS  Recheck VBG at 2000  If not improving may need intubation  IV methylpred  Nebulized ipratropium-albuterol  Antibiotics as below  Heparin drip  Place PICC to monitor VBG    CARDIOVASCULAR:  Peripheral edema  Right atrial enlargement  Left posterior fascicular block  Significant pitting edema at ankle. Albumin normal at 4.2. KATLIN on ECG. Risk of WHO group 3 pulmonary HTN. NT-proBNP normal.  TTE  Avoid excess IV fluids, risk of pulmonary HTN  Consider judicious diuresis when more stable    NEUROLOGIC:  Acute encephalopathy  Alcohol dependence  Drinks at least 6 glasses of wine per family. Isolated herself more in recent months. Likely septic and hypercapnic encephalopathy, risk of withdrawal.  Monitor for withdrawal  Intubated if loss of airway reflexes    GASTROINTESTINAL:  Protein-calorie  malnutrition  Likely pulmonary cachexia, alcoholism.  Monitor  NPO currently with respiratory status    MSK:  Rib fractures left 10th and 12th ribs.    RENAL/GENITOURINARY:  CARLOS  Hypokalemia  Baseline Cr 0.7, currently 1.1. Risk of rhabdo, was down at home for unknown duration. Risk of UTI. Possible sepsis/ATN, though also with bilateral ankle edema. Low intake likely leading to hypo-K.  Received initial fluid resuscitation  Avoid excess IV fluids  Check CK and UA-reflex UC  Monitor I/O  K and Mg replacement protocols    INFECTIOUS DISEASE:  Acute bronchitis/CAP  Risk of UTI  Decubitus ulcers  Candidiasis  Ceftriaxone and azithromycin  Check UA  Sputum culture if able  Nystatin cream to perineum/sacrum  WOC consult    HEMATOLOGIC/ONCOLOGIC:  Anemia  Chronic disease, alcohol dependence (high MCV).  Monitor with restrictive transfusion threshold    ENDOCRINE:  No acute issues.  FSBG with sliding scale aspart    Checklist:  FEN: NPO  VTE ppx: heparin drip  GI ppx: PPI  Bowel regimen: prn senna-doc, poly gly  VAP ppx: NA  Lines/tube-size:  Will place PICC  Will place bradley  Skin: perineal and sacral ulcerations and erythema c/w decubitus ulcers and candidiasis, WOC consulted   PT/OT/SLP, early mobility: as able  Code Status: DNR, okay to intubate  Communication: Communicated with patient's daughter, Tiara, and two sons in person. They are continuing to discuss whether Namita would want to be intubated for short-term trial of invasive mechanical ventilation.  Disposition: The patient is critically ill requiring BiPAP, at high risk of decompensation.    Restraints  Not indicated    Raj Coker MD  Pulmonary and Critical Care Medicine  [facility]  Office [phone]  he/him    History of Present Illness:  74 year old female active smoker with a history of alcohol dependence, extensive emphysema with no PFTs on record, kyphosis, osteoporosis, hip fractures s/p bilateral ORIF, GERD, HTN, insomnia,  "protein-calorie malnutrition, subarachnoid hemorrhage, vitamin D deficiency, E coli UTI presented on 6/27 with acute encephalopathy, hypoxia, found to be agitated, respiratory acidosis, segmental PE, acute multifocal bronchitis/pneumonia most prominent in LLL, admitted to ICU on BiPAP. Recent months isolating herself, drinks at least 6 glasses of wine daily per family. Smokes heavily on daily basis. Found down at home, altered mental status, SpO2 60s, brought to ED, found to have acute respirator failure with hypercapnia and hypoxia, started on BiPAP, small PE, endoluminal mucus LLL bronchi, extensive emphysema. Started on steroids, antibiotics, heparin, nebs admitted to ICU.    Medical & Surgical History:  active smoker with a history of alcohol dependence, extensive emphysema with no PFTs on record, kyphosis, osteoporosis, hip fractures s/p bilateral ORIF, GERD, HTN, insomnia, protein-calorie malnutrition, subarachnoid hemorrhage, vitamin D deficiency, E coli UTI       Family & Social History:  Reviewed in the electronic medical record    Medications:  Reviewed in the electronic medical record.     Allergies:  Reviewed in the electronic medical record.     Physical Exam:  Vent settings for last 24 hours:  FiO2 (%): 30 %  Resp: (!) 55      BP (!) 85/50   Pulse 74   Temp 97.3  F (36.3  C) (Rectal)   Resp (!) 55   Ht 1.549 m (5' 1\")   Wt 43.1 kg (95 lb)   SpO2 90%   BMI 17.95 kg/m      Intake/Output last 3 shifts:  No intake/output data recorded.  Intake/Output this shift:  No intake/output data recorded.    Physical Exam  Physical Exam:  Gen: on BiPAP, tolerating, able to vocalize but a bit agitated, cachectic  HEENT: NT, no ANTON  CV: RRR, no m/g/r  Resp: diminished bilaterally with prolonged expiratory phase  Abd: soft, nontender, BS+  Skin: ulcerations over sacrum/buttocks with erythematous rash c/w candidasis  Ext: muscle atrophy/cachexia, moderate pitting edema bilateral ankles  Neuro: PERRL, nonfocal " "exam, somewhat agitated, able to follow commands and respond but orientation difficult with BiPAP mask    IMAGING:      All pertinent vital signs, ventilator settings, I&Os, laboratory, microbiology, ECGs, & imaging data has been personally reviewed. Total critical care time, excluding procedures, was 60 Minutes    Clinically Significant Risk Factors Present on Admission        # Hypokalemia: Lowest K = 2.6 mmol/L in last 2 days, will replace as needed    # Hypercalcemia: Highest Ca = 11.2 mg/dL in last 2 days, will monitor as appropriate      # Drug Induced Platelet Defect: home medication list includes an antiplatelet medication   # Hypertension: Noted on problem list   # Non-Invasive mechanical ventilation: current O2 Device: BiPAP/CPAP  # Acute hypoxic respiratory failure: continue supplemental O2 as needed            # Cachexia: Estimated body mass index is 17.95 kg/m  as calculated from the following:    Height as of this encounter: 1.549 m (5' 1\").    Weight as of this encounter: 43.1 kg (95 lb).                          "

## 2024-06-27 NOTE — H&P
[facility]    History and Physical - Hospitalist Service       Date of Admission:  6/27/2024    Assessment & Plan      Namita Viera is a 74 year old female with PMH of COPD and emphysema not on home O2, ongoing tobacco and alcohol use, malnutrition, right hip fracture s/p ORIF in 5/2023, who was brought in with encephalopathy and hypoxia O2 sats in the 60s.  Workup remarkable for probable aspiration pneumonia, small subsegmental PE, severe hypokalemia, rib fractures.  Requiring NIPPV for respiratory failure with hypercapnia    Acute respiratory failure with hypoxia and hypercapnia.  In the setting of COPD exacerbation and pneumonia, likely aspiration pneumonia.  Continue IV Zosyn, scheduled and as needed nebulizers, IV steroids.  Requiring NIPPV.  Repeat VBG without significant improvement, BiPAP settings adjusted, will get another VBG in 2 hours.  Has been restless and trying to pull BiPAP off.  Low threshold to admit to ICU and consider Precedex drip for sedation.  Family was strongly encouraged to discuss goals of care including intubation.    Acute encephalopathy.  Probably multifactorial due to infection, EtOH use, nutritional deficiencies, but mostly hypercapnia.   Add banana bag tonight.  Will need oral vitamin replacements once able to take oral medications  Small subsegmental right lower lobe PE.  IV heparin for now.  Venous Dopplers of the lower extremities to evaluate for DVT  Severe hypokalemia.  Replacement ordered.  Monitor on telemetry.  Magnesium within normal limits  Hypercalcemia.  Corrected calcium level is 11.  IVF tonight, recheck tomorrow   Displaced left posterolateral 10th rib fracture, nondisplaced left 12th rib fracture.  Lidocaine patch ordered  Severe T9 compression fracture with mild retropulsion new since 10/13/2022.  Unable to assess if patient has any pain.  Consider neurosurgery evaluation  Severe left shoulder and hip pain.  History of fall.  Will x-ray  "to rule out fractures  Severe malnutrition.  RD consult  Tobacco use.  Nicotine patch ordered  Alcohol abuse.  Monitor for withdrawal symptoms.  Would use benzodiazepines very cautiously.   Sacral pressure injury.  WOC RN consult  Generalized weakness.  PT/OT consult.  Will need TCU.  Family is considering increased level of care after TCU discharge.   consult placed       Diet: NPO for Medical/Clinical Reasons Except for: No Exceptions  DVT Prophylaxis: On IV heparin  Delgado Catheter: Not present  Lines: None     Cardiac Monitoring: ACTIVE order. Indication: Electrolyte Imbalance (24 hours)- Magnesium <1.3 mg/ml; Potassium < =2.8 or > 5.5 mg/ml  Code Status: Full Code    Clinically Significant Risk Factors Present on Admission        # Hypokalemia: Lowest K = 2.6 mmol/L in last 2 days, will replace as needed    # Hypercalcemia: Highest Ca = 11.2 mg/dL in last 2 days, will monitor as appropriate      # Drug Induced Platelet Defect: home medication list includes an antiplatelet medication   # Hypertension: Noted on problem list   # Non-Invasive mechanical ventilation: current O2 Device: BiPAP/CPAP  # Acute hypoxic respiratory failure: continue supplemental O2 as needed            # Cachexia: Estimated body mass index is 17.95 kg/m  as calculated from the following:    Height as of this encounter: 1.549 m (5' 1\").    Weight as of this encounter: 43.1 kg (95 lb).              Disposition Plan     Medically Ready for Discharge: Anticipated in 2-4 Days           Di Martínez MD  Hospitalist Service  [facility]  Securely message with [employer] (more info)  Text page via [employer] Call Paging/Directory     ______________________________________________________________________    Chief Complaint   Found down, hypoxic    Most of the history obtained from son and daughter at bedside as patient remains drowsy    History of Present Illness   Namita Viera is a 74 year old female with PMH of " COPD and emphysema not on home O2, tobacco and alcohol use, malnutrition, right hip fracture s/p ORIF in 5/2023, traumatic SAH about 5 years ago who was brought in by EMS after family could not get in touch with the patient for a few days.  EMS found patient on a  with O2 sats in the 60s which improved with only couple liters of supplemental O2.      Family reports patient has longstanding history of alcohol use, she drinks about 6 glasses of wine daily.  They are not aware of alcohol withdrawal symptoms in the past but they are not sure if she ever stopped drinking.  She smokes daily.  Has very poor oral intake.  Has not been moving much at home but has been getting out to buy wine and cigarettes.  She is not on home O2.  Daughter believes she has not been taking her medications consistently.  Daughter is not aware of any recent fevers, chills, worsening cough.  Patient admits having had a fall.  Grimaces upon touching her left shoulder and left hip.      In the ER patient was noted to have increased work of breathing and started on NIPPV.  Labs significant for potassium 2.6, creatinine 1.11, calcium 11.2, anion gap of 18, troponin 49, .  Normal WBC and lactate.  CT chest VBG: pH 7.14, CO2 89, pO2 41, O2 sat 64%, bicarb 31.  CT chest shows small acute subsegmental RLL PE, extensive intraluminal debris with bronchial plugging within bilateral lower lobes, fine tree-in-bud opacities within LLL with adjacent consolidative opacities, displaced fracture of the left 10th rib, nondisplaced fracture of the left 12th rib, severe T9 compression fracture with mild retropulsion which is new since 10/13/2022.       Past Medical History    Past Medical History:   Diagnosis Date    Acid reflux     Alcohol use     Closed fracture of left femur (H)     Closed fracture of sacrum with routine healing 10/12/2021    Emphysema of lung (H) 02/07/2022    Hip fracture requiring operative repair (H)     Hypertension 01/2021     Rib pain on left side 05/06/2021    SAH (subarachnoid hemorrhage) (H)     Traumatic closed displaced fracture of distal end of radius        Past Surgical History   Past Surgical History:   Procedure Laterality Date    BLADDER SUSPENSION  2008    HYSTERECTOMY TOTAL ABDOMINAL, BILATERAL SALPINGO-OOPHORECTOMY, COMBINED N/A 2008    OPEN REDUCTION INTERNAL FIXATION RODDING INTRAMEDULLARY FEMUR Right 5/17/2023    Procedure: OPEN REDUCTION INTERNAL FIXATION, FRACTURE, FEMUR, USING INTRAMEDULLARY NOEL;  Surgeon: Ollie Tucker MD;  [facility]: [facility]    ORTHOPEDIC SURGERY Left 2018    Left Hip fracture, S/P ORIF       Prior to Admission Medications   Prior to Admission Medications   Prescriptions Last Dose Informant Patient Reported? Taking?   Vitamin D, Cholecalciferol, 25 MCG (1000 UT) TABS Past Month  Yes Yes   Sig: Take 1,000 Units by mouth daily   albuterol (PROAIR HFA/PROVENTIL HFA/VENTOLIN HFA) 108 (90 Base) MCG/ACT inhaler Past Week at prn  No Yes   Sig: INHALE 1-2 PUFFS INTO THE LUNGS EVERY 6 HOURS AS NEEDED FOR COUGH OR SHORTNESS OF BREATH OR WHEEZE   alendronate (FOSAMAX) 70 MG tablet Past Month at pt not taking currently  No Yes   Sig: TAKE 1 TABLET BY MOUTH EVERY 7 DAYS. TAKE IN THE MORNING ON AN EMPTY STOMACH WITH A FULL GLASS OF WATER 30 MINUTES BEFORE FOOD   aspirin (ASA) 81 MG EC tablet Past Month  No Yes   Sig: Take 1 tablet (81 mg) by mouth daily   calcium carbonate (OS-PETER) 1500 (600 Ca) MG tablet Past Month  Yes Yes   Sig: Take 600 mg by mouth daily   ibuprofen (ADVIL/MOTRIN) 200 MG tablet Unknown at prn  Yes Yes   Sig: Take 200 mg by mouth every 6 hours as needed for pain   losartan-hydrochlorothiazide (HYZAAR) 100-25 MG tablet Past Week at 3 days ago  No Yes   Sig: Take 1 tablet by mouth daily   melatonin 1 MG TABS tablet Unknown at prn  No Yes   Sig: Take 1 tablet (1 mg) by mouth nightly as needed for sleep   multivitamin, therapeutic (THERA-VIT) TABS tablet Past Month  Yes Yes    Sig: Take 1 tablet by mouth daily   omeprazole (PRILOSEC) 20 MG DR capsule Past Week at 3 days ago  No Yes   Sig: Take 1 capsule (20 mg) by mouth daily   vitamin C (ASCORBIC ACID) 500 MG tablet Past Month  Yes Yes   Sig: Take 500 mg by mouth daily      Facility-Administered Medications: None        Review of Systems    The 10 point Review of Systems is negative other than noted in the HPI or here.     Social History   I have reviewed this patient's social history and updated it with pertinent information if needed.  Social History   Longstanding history of tobacco use, still smokes  Per family drinks up to 6 glasses of wine daily   Lives alone    Physical Exam   Vital Signs: Temp: 97.3  F (36.3  C) Temp src: Rectal BP: 104/52 Pulse: 87   Resp: (!) 37 SpO2: 92 % O2 Device: BiPAP/CPAP Oxygen Delivery: 2 LPM  Weight: 95 lbs 0 oz    General Appearance: Elderly frail and malnourished lady in mild respiratory distress, drowsy, restless, on BiPAP  Respiratory: Mild tachypnea, coarse breath sounds with wheezing bilaterally  Cardiovascular: Regular rate and rhythm.  Normal S1-S2, no murmur.  Trace pedal edema  GI: Abdomen soft and nontender  Skin: Pressure injury over sacrum  MSK: Grimaces when touching left shoulder and left hip, decreased range of motion in the left shoulder and left hip  Neuro: Drowsy, knows she is in the hospital    Medical Decision Making       80 MINUTES SPENT BY ME on the date of service doing chart review, history, exam, documentation & further activities per the note.  MANAGEMENT DISCUSSED with the following over the past 24 hours: Patient, family at bedside, RT, RN       Data     I have personally reviewed the following data over the past 24 hrs:    8.5  \   13.2   / 341     145 98 37.2 (H) /  121 (H)   2.6 (LL) 29 1.11 (H) \     ALT: 22 AST: 28 AP: 127 TBILI: 0.3   ALB: 4.2 TOT PROTEIN: 7.9 LIPASE: N/A     Trop: 49 (H) BNP: 909     Procal: N/A CRP: N/A Lactic Acid: 0.9         Imaging results  reviewed over the past 24 hrs:   Recent Results (from the past 24 hour(s))   XR Chest Port 1 View    Narrative    EXAM: XR CHEST PORT 1 VIEW  LOCATION: [facility]  DATE: 6/27/2024    INDICATION: hypoxia  COMPARISON: Low-dose lung cancer CT 10/13/2022 and older studies, chest x-ray  5/4/2021    Impression    IMPRESSION: Lungs are hyperexpanded consistent with COPD.    Incidental calcified granuloma in the right lower lobe.     There is another ovoid opacity just inferior to the right chest lead along the margin of the scapula which corresponds to the old fracture deformity of the right sixth rib.    No signs of pneumonia or failure. Heart and pulmonary vascularity are normal.   CT Head w/o Contrast    Narrative    EXAM: CT HEAD W/O CONTRAST  LOCATION: [facility]  DATE: 6/27/2024    INDICATION: Altered mental status  COMPARISON: 5/16/2023.  TECHNIQUE: Routine CT Head without IV contrast. Multiplanar reformats. Dose reduction techniques were used.    FINDINGS:  INTRACRANIAL CONTENTS: No intracranial hemorrhage, extraaxial collection, or mass effect.  No CT evidence of acute infarct. Mild presumed chronic small vessel ischemic changes. Mild generalized volume loss. No hydrocephalus. Bilateral carotid siphon and   vertebral artery V4 segment atherosclerotic calcifications.    VISUALIZED ORBITS/SINUSES/MASTOIDS: Prior bilateral cataract surgery. Visualized portions of the orbits are otherwise unremarkable. No paranasal sinus mucosal disease. No middle ear or mastoid effusion.    BONES/SOFT TISSUES: No acute abnormality.      Impression    IMPRESSION:  1.  No CT evidence for acute intracranial process.  2.  Brain atrophy and presumed chronic microvascular ischemic changes as above.   CT Chest Pulmonary Embolism w Contrast   Result Value    Radiologist flags New diagnosis of pulmonary embolism (AA)    Narrative    EXAM: CT CHEST PULMONARY EMBOLISM W CONTRAST  LOCATION:   [facility]  DATE: 6/27/2024    INDICATION: Hypoxia. Respiratory failure.   COMPARISON: CT chest 10/13/2022.   TECHNIQUE: CT chest pulmonary angiogram during arterial phase injection of IV contrast. Multiplanar reformats and MIP reconstructions were performed. Dose reduction techniques were used.   CONTRAST: [id number] 75ml     FINDINGS:    ANGIOGRAM CHEST: Pulmonary arteries are normal caliber. Single small subsegmental pulmonary embolism within the right lower lobe (4/#189). No additional pulmonary emboli. No evidence of right heart strain. Thoracic aorta is normal in caliber with   moderate mixed calcified and noncalcified atherosclerotic plaque. No evidence of acute aortic pathology.    LUNGS AND PLEURA: Moderate upper lobe predominant centrilobular emphysema. Mild diffuse bronchial wall thickening with scattered areas of mild bronchiectasis. Extensive intraluminal debris with scattered areas of bronchial plugging within the bilateral   lower lobe airways, more extensive on the left. Fine tree-in-bud opacities within the left lower lobe, with adjacent linear consolidative opacities. Scattered small pulmonary nodules, calcified granulomas, and calcified pleural plaques are unchanged.     MEDIASTINUM/AXILLAE: Normal cardiac size. No significant pericardial effusion. Small hiatal hernia. Scattered small calcified mediastinal and right hilar lymph nodes. No enlarged thoracic lymph node.     CORONARY ARTERY CALCIFICATION: Moderate.    UPPER ABDOMEN: No acute abnormality.     MUSCULOSKELETAL: Diffusely demineralized bones. Chronic severe T7 and mild T6 compression fractures are unchanged. Severe T9 compression fracture with mild retropulsion into the spinal canal is new since the prior exam. The compression fractures result   in focal kyphosis of the spine. Nondisplaced displaced fracture of the left 12th rib. Displaced fracture of the left posterolateral 10th rib. Multiple additional remote  healed left rib fractures are unchanged.       Impression    IMPRESSION:    1.  Small acute subsegmental right lower lobe pulmonary embolism. No evidence of right heart strain.     2.  Extensive intraluminal debris with scattered areas of bronchial plugging within the bilateral lower lobe airways, more extensive on the left. There are also fine tree-in-bud opacities within the left lower lobe with adjacent linear consolidative   opacities. Aspiration and early aspiration pneumonia is possible.     3.  Displaced fracture of the left posterolateral 10th rib. Nondisplaced fracture of the left 12th rib. No pneumothorax.    4.  Severe T9 compression fracture with mild retropulsion into the spinal canal is new since 10/13/2022.    [Critical Result: New diagnosis of pulmonary embolism]    Finding was identified on 6/27/2024 2:47 PM CDT.     Dr. Oliveira was contacted by me on 6/27/2024 2:51 PM CDT and verbalized understanding of the critical result.

## 2024-06-27 NOTE — PROGRESS NOTES
Respiratory care note:    Per EMS patient found hypoxic with sats in the 60's at home, pt does not wear home oxygen, hx of emphysema.  Upon arrival pt sats 99% on 6L oxymask. Pt is confused with diminished lung sounds bilaterally, very little air movement.  Gave nebulizers with some improvement and placed on bipap.      Venous Blood Gas  Recent Labs   Lab 06/27/24  1330   PHV 7.14*   PCO2V 89*   PO2V 41   HCO3V 31*   KRISTIN 1.5   O2PER 40     Plan is for RN to redraw VBG 1 hour post bipap.     Pt was transported to CT on bipap without incident.      ADDENDUM:    Repeat VBG:  Venous Blood Gas  Recent Labs   Lab 06/27/24  1523 06/27/24  1330   PHV 7.15* 7.14*   PCO2V 80* 89*   PO2V 32 41   HCO3V 28 31*   KRISTIN -0.8 1.5   O2PER 30 40     MD at bedside. Increased ipap/RR for greater minute ventilation on bipap, noticed pt  was having low volumes when she was resting.  Changed to AVAPS with a target VT of 350.    Repeat gas in 1 hour    Eula Reyes RT on 6/27/2024 at 2:27 PM

## 2024-06-27 NOTE — MEDICATION SCRIBE - ADMISSION MEDICATION HISTORY
Medication Scribe Admission Medication History    Admission medication history is complete. The information provided in this note is only as accurate as the sources available at the time of the update.    Information Source(s): Family member and CareEverywhere/SureScripts via in-person    Pertinent Information: pt daughter at bedside provide med rec  Per daughter pt hasn't been taking her daily med's nor OTC supplements  as prescribed, hasn't had most med's over a month hs supply at home     Changes made to PTA medication list:  Added: None  Deleted: None  Changed: None    Allergies reviewed with patient and updates made in EHR: yes    Medication History Completed By: JUDI SHELBY 6/27/2024 1:57 PM    PTA Med List   Medication Sig Last Dose    albuterol (PROAIR HFA/PROVENTIL HFA/VENTOLIN HFA) 108 (90 Base) MCG/ACT inhaler INHALE 1-2 PUFFS INTO THE LUNGS EVERY 6 HOURS AS NEEDED FOR COUGH OR SHORTNESS OF BREATH OR WHEEZE Past Week at prn    alendronate (FOSAMAX) 70 MG tablet TAKE 1 TABLET BY MOUTH EVERY 7 DAYS. TAKE IN THE MORNING ON AN EMPTY STOMACH WITH A FULL GLASS OF WATER 30 MINUTES BEFORE FOOD Past Month at pt not taking currently    aspirin (ASA) 81 MG EC tablet Take 1 tablet (81 mg) by mouth daily Past Month    calcium carbonate (OS-PETER) 1500 (600 Ca) MG tablet Take 600 mg by mouth daily Past Month    ibuprofen (ADVIL/MOTRIN) 200 MG tablet Take 200 mg by mouth every 6 hours as needed for pain Unknown at prn    losartan-hydrochlorothiazide (HYZAAR) 100-25 MG tablet Take 1 tablet by mouth daily Past Week at 3 days ago    melatonin 1 MG TABS tablet Take 1 tablet (1 mg) by mouth nightly as needed for sleep Unknown at prn    multivitamin, therapeutic (THERA-VIT) TABS tablet Take 1 tablet by mouth daily Past Month    omeprazole (PRILOSEC) 20 MG DR capsule Take 1 capsule (20 mg) by mouth daily Past Week at 3 days ago    vitamin C (ASCORBIC ACID) 500 MG tablet Take 500 mg by mouth daily Past Month    Vitamin D,  Cholecalciferol, 25 MCG (1000 UT) TABS Take 1,000 Units by mouth daily Past Month

## 2024-06-27 NOTE — PHARMACY-VANCOMYCIN DOSING SERVICE
Pharmacy Vancomycin Initial Note  Date of Service 2024  Patient's  1950  74 year old, female    Indication: Aspiration Pneumonia    Current estimated CrCl = Estimated Creatinine Clearance: 29.3 mL/min (A) (based on SCr of 1.11 mg/dL (H)).    Creatinine for last 3 days  2024:  1:30 PM Creatinine 1.11 mg/dL    Recent Vancomycin Level(s) for last 3 days  No results found for requested labs within last 3 days.      Vancomycin IV Administrations (past 72 hours)        No vancomycin orders with administrations in past 72 hours.                    Nephrotoxins and other renal medications (From now, onward)      Start     Dose/Rate Route Frequency Ordered Stop    24 1530  vancomycin (VANCOCIN) 750 mg in sodium chloride 0.9 % 250 mL intermittent infusion         750 mg  over 60 Minutes Intravenous ONCE 24 1455      24 1500  piperacillin-tazobactam (ZOSYN) 3.375 g vial to attach to  mL bag         3.375 g  over 30 Minutes Intravenous ONCE 24 1451              Contrast Orders - past 72 hours (72h ago, onward)      Start     Dose/Rate Route Frequency Stop    24 1430  iopamidol (ISOVUE-370) solution 75 mL         75 mL Intravenous ONCE 24 1418               Plan:  Give vancomycin 750 mg (17.4 mg/kg) IV Once in ED.   Re-consult pharmacy upon admission for continued vancomycin dosing, if indicated.     Thank you,  Addie Coleman, [facility]

## 2024-06-27 NOTE — ED TRIAGE NOTES
Pt found by family at home, altered mental status, EMS found pt to be 60% on room air, now 93% on 4 lpm, known etoh abuse.

## 2024-06-27 NOTE — CONSULTS
"Care Management Initial Consult    General Information  Assessment completed with: Children (pt on bipap and just taken to CT at this time so I am speaking with her son.), son Phillip  Type of CM/SW Visit: Initial Assessment    Primary Care Provider verified and updated as needed: Yes   Readmission within the last 30 days: no previous admission in last 30 days      Reason for Consult: discharge planning, substance use concerns  Advance Care Planning: Advance Care Planning Reviewed: no concerns identified          Communication Assessment  Patient's communication style: spoken language (English or Bilingual)                  Living Environment:   People in home: alone     Current living Arrangements: house (\"[facility]. It is all 1 level living\".)      Able to return to prior arrangements: other (see comments) (unknown at this time)       Family/Social Support:  Care provided by: self  Provides care for: no one  Marital Status:   Children          Description of Support System: Supportive, Involved    Support Assessment: Adequate family and caregiver support, Adequate social supports    Current Resources:   Patient receiving home care services: No     Community Resources: None (per son, \"she has been offered help before for things like housekeeping and home care and [employer] insurance to do an eval for needs, but she always refuses the help\".)  Equipment currently used at home: cane, straight, walker, rolling (\"She uses her cane inside her house. She also has a 4WW, but doesn't use it much. It is too heavy for her to lift into the car\".)  Supplies currently used at home: None    Employment/Financial:  Employment Status: retired (\"retired seamstress\")     Employment/ Comments: \"no  history\"  Financial Concerns:     Referral to Financial Worker: No       Does the patient's insurance plan have a 3 day qualifying hospital stay waiver?  Yes     Which insurance plan 3 day waiver is available? " "Alternative insurance waiver    Will the waiver be used for post-acute placement? Undetermined at this time      Functional Status:  Prior to admission patient needed assistance:   Dependent ADLs:: Ambulation-cane, Ambulation-walker, Independent  Dependent IADLs:: Independent (per son, \"her kids don't think she should be driving, but she still does. I don't know if she drives very much anymore. I took away her keys, but she got a  to make new keys\".)  Assesssment of Functional Status: Not at baseline with ADL Functioning, Not at baseline with mobility, Not at  functional baseline    Mental Health Status:          Chemical Dependency Status:  Chemical Dependency Status: Current Concern (per son, \"she has been offered help with her drinking, but she refuses the help in the past\".)             Values/Beliefs:  Spiritual, Cultural Beliefs, Cheondoism Practices, Values that affect care:                 Additional Information:  Namita lives in a Cottage alone. It is \"[facility]. It is all 1 level living\".    \"She uses her cane inside her house. She also has a 4WW, but doesn't use it much. It is too heavy for her to lift into the car\".    She is normally independent with ADLs and IADLs. Per son Phillip, \"her kids don't think she should be driving, but she still does. I don't know if she drives very much anymore. I took away her keys, but she got a  to make new keys\".     Unknown discharge needs at this time. May need CD resources. Has refused \"home care help, housekeeping help, and other resources, and CD help in the past\".     Tiara daughter [phone].     Family to transport at discharge.    CM to follow for medical progression of care, discharge recommendations, and final discharge plan.    Denisse Jeffries RN    "

## 2024-06-27 NOTE — ED PROVIDER NOTES
EMERGENCY DEPARTMENT ENCOUNTER     NAME: Namita Viera   AGE: 74 year old female   YOB: 1950   MRN: [id number]   EVALUATION DATE & TIME: 6/27/2024  1:10 PM   PCP: Anahi Andersen     Chief Complaint   Patient presents with    Altered Mental Status   :    FINAL IMPRESSION       1. Acute respiratory failure with hypercapnia (H)    2. COPD exacerbation (H)    3. Hypokalemia    4. Altered mental status, unspecified altered mental status type    5. Other acute pulmonary embolism without acute cor pulmonale (H)    6. Aspiration pneumonia, unspecified aspiration pneumonia type, unspecified laterality, unspecified part of lung (H)           ED COURSE & MEDICAL DECISION MAKING      Pertinent Labs & Imaging studies reviewed. (See chart for details)   74 year old female  presents to the Emergency Department for evaluation of altered mental status and difficulty breathing. Initial Vitals Reviewed. Initial exam notable for thin patient who looks chronically unwell and disheveled, does have some yeast visible around her buttocks and what looks to be a developing pressure wound, who is very altered but will wake up and nod to questioning.  She looks more encephalopathic and I do not see any external signs of trauma.  Family arrived and notes that they have not heard from her in several days and they have not seen her this week.  In the past, they have recommended a higher level of care for her or living with family but she has declined this noting that she has a history of alcohol abuse and tobacco abuse and wants to continue these lifestyle freedoms.  When EMS found her today, she was initially hypoxic in the 60s but improved very quickly on oxygen.  When I saw her, she has almost no air movement with respiratory distress and tachypnea and I trialed an albuterol neb but without significant improvement quickly and I put her on BiPAP.  Initial VBG does show respiratory acidosis with a pH of 7.1 and a CO2 of nearly  90.  I do think this is a COPD exacerbation and I have given some steroids and additional nebs while on BiPAP.  She has not had any further hypoxia and is actually on 21% FiO2.  I also considered whether mental status was a component of alcohol intoxication but her alcohol level is negative today which suggest that she may have been in worsening respiratory status for some time as typically she does drink daily.  There are no signs of seizures, tachycardia or tremors and she does not look to be in withdrawal currently but this will need to be monitored.  With the hypoxia I did end up getting a CT of her chest and I was notified by radiologist that she has a very small subsegmental pulmonary embolus.  I do not think this is the cause of her respiratory failure but we will treat with heparin as she is now hospitalized and bedbound as well.  He also commented that it looks like she has an aspiration pneumonia so I wonder if she started to get encephalopathic and then this led to aspiration as well.  I have ordered blood cultures and started antibiotics.  Vital signs of remained generally stable, and she will wake up and answer questioning.  At this time, plan to discuss the case with hospitalist for admission.  Notably, I did get a CT of the head to rule out trauma as the source of encephalopathy and this is negative.           At the conclusion of the encounter I discussed the results of all of the tests and the disposition. The questions were answered. The patient or family acknowledged understanding and was agreeable with the care plan.     90 minutes critical care time, see procedure note below for details if relevant    Medical Decision Making    History:  Supplemental history from: Family Member/Significant Other and EMS  External Record(s) reviewed: Outpatient Record: Office visit related to Hospital follow-up 6/26/2023    Work Up:  Chart documentation includes differential considered and any EKGs or imaging  independently interpreted by provider, where specified.  In additional to work up documented, I considered the following work up: Documented in chart, if applicable.    External consultation:  Discussion of management with another provider: Hospitalist and Radiology (regarding imaging)    Complicating factors:  Care impacted by chronic illness: Chronic Lung Disease  Care affected by social determinants of health: Access to Medical Care, Alcohol Abuse and/or Recreational Drug Use, and No Support for Care at Home    Disposition considerations: Admit.        MEDICATIONS GIVEN IN THE EMERGENCY:   Medications   albuterol (PROVENTIL) neb solution 2.5 mg (has no administration in time range)   methylPREDNISolone sodium succinate (solu-MEDROL) injection 125 mg (has no administration in time range)   ipratropium - albuterol 0.5 mg/2.5 mg/3 mL (DUONEB) neb solution 3 mL (3 mLs Nebulization $Given 6/27/24 1320)   ipratropium - albuterol 0.5 mg/2.5 mg/3 mL (DUONEB) neb solution 3 mL (3 mLs Nebulization $Given 6/27/24 1326)      NEW PRESCRIPTIONS STARTED AT TODAY'S ER VISIT   New Prescriptions    No medications on file     ================================================================   HISTORY OF PRESENT ILLNESS       Patient information was obtained from: EMS   Use of Intrepreter: N/A   Namita Viera is a 74 year old female with history of COPD, alcohol abuse who presents after being found by family on the ground.  Most of the history is from EMS because patient really cannot tell us anything.  They note that they were called to the house after family found her on the floor.  It sounds like the last time family had seen her was 5 days ago.  She does have a history of alcohol abuse and nods to answer that she had drinks yesterday but denies any today.  She denies a fall.  For EMS she was found to have an initial oxygen in the 60s and was placed on oxygen and improved.  She is unable to give much other  history.    ================================================================        PAST HISTORY     PAST MEDICAL HISTORY:   Past Medical History:   Diagnosis Date    Acid reflux     Alcohol use     Closed fracture of left femur (H)     Closed fracture of sacrum with routine healing 10/12/2021    Emphysema of lung (H) 02/07/2022    Hip fracture requiring operative repair (H)     Hypertension 01/2021    Rib pain on left side 05/06/2021    SAH (subarachnoid hemorrhage) (H)     Traumatic closed displaced fracture of distal end of radius       PAST SURGICAL HISTORY:   Past Surgical History:   Procedure Laterality Date    BLADDER SUSPENSION  2008    HYSTERECTOMY TOTAL ABDOMINAL, BILATERAL SALPINGO-OOPHORECTOMY, COMBINED N/A 2008    OPEN REDUCTION INTERNAL FIXATION RODDING INTRAMEDULLARY FEMUR Right 5/17/2023    Procedure: OPEN REDUCTION INTERNAL FIXATION, FRACTURE, FEMUR, USING INTRAMEDULLARY NOEL;  Surgeon: Ollie Tucker MD;  Location: [facility]    ORTHOPEDIC SURGERY Left 2018    Left Hip fracture, S/P ORIF      CURRENT MEDICATIONS:   albuterol (PROAIR HFA/PROVENTIL HFA/VENTOLIN HFA) 108 (90 Base) MCG/ACT inhaler  alendronate (FOSAMAX) 70 MG tablet  aspirin (ASA) 81 MG EC tablet  calcium carbonate (OS-PETER) 1500 (600 Ca) MG tablet  enoxaparin ANTICOAGULANT (LOVENOX) 40 MG/0.4ML syringe  ibuprofen (ADVIL/MOTRIN) 200 MG tablet  losartan-hydrochlorothiazide (HYZAAR) 100-25 MG tablet  melatonin 1 MG TABS tablet  multivitamin, therapeutic (THERA-VIT) TABS tablet  omeprazole (PRILOSEC) 20 MG DR capsule  tiotropium (SPIRIVA RESPIMAT) 2.5 MCG/ACT inhaler  vitamin C (ASCORBIC ACID) 500 MG tablet  Vitamin D, Cholecalciferol, 25 MCG (1000 UT) TABS      ALLERGIES:   No Known Allergies   FAMILY HISTORY:   Family History   Problem Relation Age of Onset    Esophageal Cancer Mother     Heart Disease Father     Rectal Cancer Sister     Cancer Sister         lump in neck.     Rheumatoid Arthritis Brother       SOCIAL  "HISTORY:   Social History     Socioeconomic History    Marital status:     Number of children: 4   Tobacco Use    Smoking status: Every Day     Current packs/day: 0.00     Average packs/day: 1 pack/day for 60.0 years (60.0 ttl pk-yrs)     Types: Cigarettes     Start date: 1962     Last attempt to quit: 2018     Years since quittin.0    Smokeless tobacco: Never   Substance and Sexual Activity    Alcohol use: Yes     Alcohol/week: 14.0 standard drinks of alcohol     Types: 14 Standard drinks or equivalent per week     Comment: admits to 2 glasses of wine a night    Drug use: No    Sexual activity: Not Currently   Other Topics Concern    Parent/sibling w/ CABG, MI or angioplasty before 65F 55M? No   Social History Narrative    , 4 children, lives by herself in an apartment.  Worked as a seamstress and continues to sew.  Is full code.  (last updated 2023)      Social Determinants of Health      Received from [facility] [employer] [facility], [facility] [employer] [facility]    Social Connections        VITALS  Patient Vitals for the past 24 hrs:   BP Pulse Resp SpO2   24 1319 119/59 -- -- --   24 1312 -- 99 (!) 53 100 %        ================================================================    PHYSICAL EXAM     VITAL SIGNS: /58   Pulse 87   Temp 97.3  F (36.3  C) (Rectal)   Resp (!) 51   Ht 1.549 m (5' 1\")   Wt 43.1 kg (95 lb)   SpO2 100%   BMI 17.95 kg/m     Constitutional:  Awake, tachypneic, drowsy but arousable and will nod to answer some questions but will not speak  HENT:  Atraumatic, oropharynx without exudate or erythema, membranes dry  Lymph:  No adenopathy  Eyes: EOM intact, PERRL, no injection  Neck: Supple  Respiratory: Significantly diminished breath sounds bilaterally, with minimal air movement on inspiration and almost no air movement heard on expiration.  Tachypnea with respiratory rate of 53, by time of arrival here oxygen " in the low 90s on 2 L by facemask  Cardiovascular:  Regular rate and rhythm, single S1 and S2   GI:  Soft, nontender, nondistended, no rebound or guarding   Musculoskeletal:  Moves all extremities, 1+ lower extremity edema, no deformities    Skin: Upper extremities appear warm, lower extremities appear cool to the touch and not well-perfused with delayed capillary refill  Neurologic: Patient drowsy but arousable, can tell us her birthdate but not much else and is not speaking, no focal deficits noted       ================================================================  LAB       All pertinent labs reviewed and interpreted.   Labs Ordered and Resulted from Time of ED Arrival to Time of ED Departure   TROPONIN T, HIGH SENSITIVITY - Abnormal       Result Value    Troponin T, High Sensitivity 49 (*)    BLOOD GAS VENOUS - Abnormal    pH Venous 7.14 (*)     pCO2 Venous 89 (*)     pO2 Venous 41      Bicarbonate Venous 31 (*)     Base Excess/Deficit Venous 1.5      FIO2 40      Oxyhemoglobin Venous 63 (*)     O2 Sat, Venous 64.5 (*)    COMPREHENSIVE METABOLIC PANEL - Abnormal    Sodium 145      Potassium 2.6 (*)     Carbon Dioxide (CO2) 29      Anion Gap 18 (*)     Urea Nitrogen 37.2 (*)     Creatinine 1.11 (*)     GFR Estimate 51 (*)     Calcium 11.2 (*)     Chloride 98      Glucose 121 (*)     Alkaline Phosphatase 127      AST 28      ALT 22      Protein Total 7.9      Albumin 4.2      Bilirubin Total 0.3     CBC WITH PLATELETS AND DIFFERENTIAL - Abnormal    WBC Count 8.5      RBC Count 4.01      Hemoglobin 13.2      Hematocrit 39.8      MCV 99      MCH 32.9      MCHC 33.2      RDW 14.4      Platelet Count 341      % Neutrophils 87      % Lymphocytes 5      % Monocytes 8      % Eosinophils 0      % Basophils 0      % Immature Granulocytes 0      NRBCs per 100 WBC 0      Absolute Neutrophils 7.4      Absolute Lymphocytes 0.4 (*)     Absolute Monocytes 0.6      Absolute Eosinophils 0.0      Absolute Basophils 0.0       Absolute Immature Granulocytes 0.0      Absolute NRBCs 0.0     NT PROBNP INPATIENT - Normal    N terminal Pro BNP Inpatient 909     ETHYL ALCOHOL LEVEL - Normal    Alcohol ethyl <0.01     LACTIC ACID WHOLE BLOOD WITH 1X REPEAT IN 2 HR WHEN >2 - Normal    Lactic Acid, Initial 0.9     INFLUENZA A/B, RSV, & SARS-COV2 PCR - Normal    Influenza A PCR Negative      Influenza B PCR Negative      RSV PCR Negative      SARS CoV2 PCR Negative     ROUTINE UA WITH MICROSCOPIC REFLEX TO CULTURE   BLOOD GAS VENOUS   HEPARIN UNFRACTIONATED ANTI XA LEVEL        ===============================================================  RADIOLOGY       Reviewed all pertinent imaging. Please see official radiology report.   CT Head w/o Contrast   Final Result   IMPRESSION:   1.  No CT evidence for acute intracranial process.   2.  Brain atrophy and presumed chronic microvascular ischemic changes as above.      XR Chest Port 1 View   Final Result   IMPRESSION: Lungs are hyperexpanded consistent with COPD.      Incidental calcified granuloma in the right lower lobe.       There is another ovoid opacity just inferior to the right chest lead along the margin of the scapula which corresponds to the old fracture deformity of the right sixth rib.      No signs of pneumonia or failure. Heart and pulmonary vascularity are normal.      CT Chest Pulmonary Embolism w Contrast    (Results Pending)         ================================================================  EKG     EKG read interpreted by me shows motion artifact and study with a rate of 100, normal axis, QTc 451 with no overt ST or T wave changes since May 2023 when the appearance was similar    I have independently reviewed and interpreted the EKG(s) documented above.     ================================================================  PROCEDURES     Critical Care  Performed by: Tanya Oliveira MD  Authorized by: Tanya Oliveira MD  Total critical care time: 90 minutes  Critical care time was  exclusive of separately billable procedures and treating other patients.  Critical care was necessary to treat or prevent imminent or life-threatening deterioration of the following conditions: Acute respiratory failure, pulmonary embolus, aspiration pneumonia, encephalopathy  Critical care was time spent personally by me on the following activities: development of treatment plan with patient or surrogate, discussions with consultants, examination of patient, evaluation of patient's response to treatment, obtaining history from patient or surrogate, ordering and performing treatments and interventions, ordering and review of laboratory studies, ordering and review of radiographic studies and re-evaluation of patient's condition, this excludes any separately billable procedures.          Tanya Oliveira M.D.   Emergency Medicine   [facility] EMERGENCY DEPARTMENT  [address] [place] [id number] [phone]  Dept: [phone]        Tanya Oliveira MD  06/27/24 8823

## 2024-06-27 NOTE — ED NOTES
D: Pt family concerned about new left arm pain, concerned that is it from the recent fall.  A: Hospitalist notified  R: Primary RN to follow up.

## 2024-06-27 NOTE — ED NOTES
"[facility] ED Handoff Report    ED Chief Complaint: AMS    ED Diagnosis:  (J96.02) Acute respiratory failure with hypercapnia (H)  Comment: COPD exacerbation  Plan: BiPAP    (J44.1) COPD exacerbation (H)  Comment:  Plan: monitor and treat hypercapnia    (E87.6) Hypokalemia  Comment: 2.6 K+  Plan: K+ infusion (has currently 3 bumps)    (R41.82) Altered mental status, unspecified altered mental status type  Comment: due to probable long term at home hypoxia  Plan: monitor via RT, assessment    (I26.99) Other acute pulmonary embolism without acute cor pulmonale (H)  Comment: Right lower lobe  Plan: Heparrin continuous    (J69.0) Aspiration pneumonia, unspecified aspiration pneumonia type, unspecified laterality, unspecified part of lung (H)  Comment:   Plan: Abx infusion       PMH:    Past Medical History:   Diagnosis Date    Acid reflux     Alcohol use     Closed fracture of left femur (H)     Closed fracture of sacrum with routine healing 10/12/2021    Emphysema of lung (H) 02/07/2022    Hip fracture requiring operative repair (H)     Hypertension 01/2021    Rib pain on left side 05/06/2021    SAH (subarachnoid hemorrhage) (H)     Traumatic closed displaced fracture of distal end of radius         Code Status:  Full Code     Falls Risk: Yes Band: Applied    Current Living Situation/Residence: lives alone     Elimination Status: Continent: No Purewick in place    Activity Level: Total assist/lift    Patients Preferred Language:  English     Needed: No    Vital Signs:  /52   Pulse 87   Temp 97.3  F (36.3  C) (Rectal)   Resp (!) 37   Ht 1.549 m (5' 1\")   Wt 43.1 kg (95 lb)   SpO2 92%   BMI 17.95 kg/m       Cardiac Rhythm: NSR, regular rhythm    Pain Score: Patient is unable to quantify. Grimaces at moments with left side/arm movements    Is the Patient Confused:  Yes  currently alert to self; baseline AOx4    Last Food or Drink:  unknown  at     Focused Assessment: initial stats 60s O2 " room air, lungs sounds diminished bilateral, on BiPAP 30FiO2,sats 95%. Regular rhythm and rate. Soft pressure in upper 80s to 100s intermittedly with MAPs above 65. +1 dorsi pulses, excoriation on buttocks and low back, hx of alcohol abuse and withdrawal, AMS and alert to self only. Respiratory acidosis with 2nd VBG 7.15pH/80CO2.     Tests Performed: Done: Labs and Imaging    Treatments Provided:  Heparin, Zosyn, Vanco, K+    Family Dynamics/Concerns: No    Family Updated On Visitor Policy: Yes    Plan of Care Communicated to Family: Yes    Who Was Updated about Plan of Care: Tiara - daughter    Belongings Checklist Done and Signed by Patient: Yes    Medications sent with patient: Nystatin cream    Covid: symptomatic, negative    Additional Information:     RN: Sergei Rodriguez RN   6/27/2024 4:59 PM

## 2024-06-27 NOTE — ED NOTES
Bed: JNED-01  Expected date: 6/27/24  Expected time: 12:58 PM  Means of arrival:   Comments:  Alt mental status/o2 sats 60% ra up to 90s on 3 liters

## 2024-06-27 NOTE — PROGRESS NOTES
Per RN report, patient keeps pulling BiPAP mask off.    Concerned about impending respiratory failure.    Discussed with intensivist Dr. Coker, will admit to ICU.  [facility] will sign off at this time.  Please reconsult once patient is ready to transfer from ICU.      Di Martínez MD

## 2024-06-28 ENCOUNTER — APPOINTMENT (OUTPATIENT)
Dept: CARDIOLOGY | Facility: HOSPITAL | Age: 74
DRG: 871 | End: 2024-06-28
Attending: INTERNAL MEDICINE
Payer: COMMERCIAL

## 2024-06-28 LAB
ALBUMIN SERPL BCG-MCNC: 3.1 G/DL (ref 3.5–5.2)
ALP SERPL-CCNC: 80 U/L (ref 40–150)
ALT SERPL W P-5'-P-CCNC: 14 U/L (ref 0–50)
ANION GAP SERPL CALCULATED.3IONS-SCNC: 12 MMOL/L (ref 7–15)
ANION GAP SERPL CALCULATED.3IONS-SCNC: 14 MMOL/L (ref 7–15)
ANION GAP SERPL CALCULATED.3IONS-SCNC: 17 MMOL/L (ref 7–15)
AST SERPL W P-5'-P-CCNC: 18 U/L (ref 0–45)
ATRIAL RATE - MUSE: 100 BPM
BASE EXCESS BLDV CALC-SCNC: -4.6 MMOL/L (ref -3–3)
BASE EXCESS BLDV CALC-SCNC: -4.7 MMOL/L (ref -3–3)
BASE EXCESS BLDV CALC-SCNC: -5.5 MMOL/L (ref -3–3)
BILIRUB SERPL-MCNC: 0.2 MG/DL
BUN SERPL-MCNC: 32.1 MG/DL (ref 8–23)
BUN SERPL-MCNC: 33 MG/DL (ref 8–23)
BUN SERPL-MCNC: 34.6 MG/DL (ref 8–23)
CALCIUM SERPL-MCNC: 9.3 MG/DL (ref 8.8–10.2)
CALCIUM SERPL-MCNC: 9.3 MG/DL (ref 8.8–10.2)
CALCIUM SERPL-MCNC: 9.4 MG/DL (ref 8.8–10.2)
CHLORIDE SERPL-SCNC: 105 MMOL/L (ref 98–107)
CHLORIDE SERPL-SCNC: 109 MMOL/L (ref 98–107)
CHLORIDE SERPL-SCNC: 109 MMOL/L (ref 98–107)
CREAT SERPL-MCNC: 1.06 MG/DL (ref 0.51–0.95)
CREAT SERPL-MCNC: 1.12 MG/DL (ref 0.51–0.95)
CREAT SERPL-MCNC: 1.24 MG/DL (ref 0.51–0.95)
DEPRECATED HCO3 PLAS-SCNC: 20 MMOL/L (ref 22–29)
DEPRECATED HCO3 PLAS-SCNC: 21 MMOL/L (ref 22–29)
DEPRECATED HCO3 PLAS-SCNC: 21 MMOL/L (ref 22–29)
DIASTOLIC BLOOD PRESSURE - MUSE: 81 MMHG
EGFRCR SERPLBLD CKD-EPI 2021: 45 ML/MIN/1.73M2
EGFRCR SERPLBLD CKD-EPI 2021: 51 ML/MIN/1.73M2
EGFRCR SERPLBLD CKD-EPI 2021: 54 ML/MIN/1.73M2
ERYTHROCYTE [DISTWIDTH] IN BLOOD BY AUTOMATED COUNT: 14.5 % (ref 10–15)
GLUCOSE BLDC GLUCOMTR-MCNC: 126 MG/DL (ref 70–99)
GLUCOSE BLDC GLUCOMTR-MCNC: 127 MG/DL (ref 70–99)
GLUCOSE BLDC GLUCOMTR-MCNC: 146 MG/DL (ref 70–99)
GLUCOSE BLDC GLUCOMTR-MCNC: 161 MG/DL (ref 70–99)
GLUCOSE BLDC GLUCOMTR-MCNC: 164 MG/DL (ref 70–99)
GLUCOSE SERPL-MCNC: 154 MG/DL (ref 70–99)
GLUCOSE SERPL-MCNC: 215 MG/DL (ref 70–99)
GLUCOSE SERPL-MCNC: 222 MG/DL (ref 70–99)
HCO3 BLDV-SCNC: 20 MMOL/L (ref 21–28)
HCO3 BLDV-SCNC: 21 MMOL/L (ref 21–28)
HCO3 BLDV-SCNC: 22 MMOL/L (ref 21–28)
HCT VFR BLD AUTO: 29.2 % (ref 35–47)
HGB BLD-MCNC: 9.7 G/DL (ref 11.7–15.7)
INTERPRETATION ECG - MUSE: NORMAL
MAGNESIUM SERPL-MCNC: 1.7 MG/DL (ref 1.7–2.3)
MAGNESIUM SERPL-MCNC: 2.8 MG/DL (ref 1.7–2.3)
MCH RBC QN AUTO: 33 PG (ref 26.5–33)
MCHC RBC AUTO-ENTMCNC: 33.2 G/DL (ref 31.5–36.5)
MCV RBC AUTO: 99 FL (ref 78–100)
O2/TOTAL GAS SETTING VFR VENT: 23 %
O2/TOTAL GAS SETTING VFR VENT: 25 %
O2/TOTAL GAS SETTING VFR VENT: 30 %
OXYHGB MFR BLDV: 70 % (ref 70–75)
OXYHGB MFR BLDV: 72 % (ref 70–75)
OXYHGB MFR BLDV: 73 % (ref 70–75)
P AXIS - MUSE: 78 DEGREES
PCO2 BLDV: 34 MM HG (ref 40–50)
PCO2 BLDV: 40 MM HG (ref 40–50)
PCO2 BLDV: 47 MM HG (ref 40–50)
PH BLDV: 7.28 [PH] (ref 7.32–7.43)
PH BLDV: 7.33 [PH] (ref 7.32–7.43)
PH BLDV: 7.37 [PH] (ref 7.32–7.43)
PLATELET # BLD AUTO: 277 10E3/UL (ref 150–450)
PO2 BLDV: 37 MM HG (ref 25–47)
PO2 BLDV: 38 MM HG (ref 25–47)
PO2 BLDV: 40 MM HG (ref 25–47)
POTASSIUM SERPL-SCNC: 3.4 MMOL/L (ref 3.4–5.3)
POTASSIUM SERPL-SCNC: 3.5 MMOL/L (ref 3.4–5.3)
POTASSIUM SERPL-SCNC: 3.8 MMOL/L (ref 3.4–5.3)
POTASSIUM SERPL-SCNC: 3.9 MMOL/L (ref 3.4–5.3)
POTASSIUM SERPL-SCNC: 4.2 MMOL/L (ref 3.4–5.3)
PR INTERVAL - MUSE: 124 MS
PROT SERPL-MCNC: 5.6 G/DL (ref 6.4–8.3)
QRS DURATION - MUSE: 94 MS
QT - MUSE: 350 MS
QTC - MUSE: 451 MS
R AXIS - MUSE: 176 DEGREES
RBC # BLD AUTO: 2.94 10E6/UL (ref 3.8–5.2)
SAO2 % BLDV: 71.3 % (ref 70–75)
SAO2 % BLDV: 73.4 % (ref 70–75)
SAO2 % BLDV: 73.8 % (ref 70–75)
SODIUM SERPL-SCNC: 141 MMOL/L (ref 135–145)
SODIUM SERPL-SCNC: 143 MMOL/L (ref 135–145)
SODIUM SERPL-SCNC: 144 MMOL/L (ref 135–145)
SYSTOLIC BLOOD PRESSURE - MUSE: 144 MMHG
T AXIS - MUSE: 47 DEGREES
UFH PPP CHRO-ACNC: 0.27 IU/ML
UFH PPP CHRO-ACNC: 0.84 IU/ML
UFH PPP CHRO-ACNC: 0.87 IU/ML
VENTRICULAR RATE- MUSE: 100 BPM
WBC # BLD AUTO: 11.2 10E3/UL (ref 4–11)

## 2024-06-28 PROCEDURE — 250N000009 HC RX 250: Performed by: INTERNAL MEDICINE

## 2024-06-28 PROCEDURE — 250N000011 HC RX IP 250 OP 636: Performed by: INTERNAL MEDICINE

## 2024-06-28 PROCEDURE — 82805 BLOOD GASES W/O2 SATURATION: CPT | Performed by: INTERNAL MEDICINE

## 2024-06-28 PROCEDURE — 200N000001 HC R&B ICU

## 2024-06-28 PROCEDURE — 94640 AIRWAY INHALATION TREATMENT: CPT

## 2024-06-28 PROCEDURE — 999N000157 HC STATISTIC RCP TIME EA 10 MIN

## 2024-06-28 PROCEDURE — 94640 AIRWAY INHALATION TREATMENT: CPT | Mod: 76

## 2024-06-28 PROCEDURE — 258N000003 HC RX IP 258 OP 636: Performed by: INTERNAL MEDICINE

## 2024-06-28 PROCEDURE — 93306 TTE W/DOPPLER COMPLETE: CPT | Mod: 26 | Performed by: INTERNAL MEDICINE

## 2024-06-28 PROCEDURE — 94660 CPAP INITIATION&MGMT: CPT

## 2024-06-28 PROCEDURE — 84132 ASSAY OF SERUM POTASSIUM: CPT | Performed by: INTERNAL MEDICINE

## 2024-06-28 PROCEDURE — G0463 HOSPITAL OUTPT CLINIC VISIT: HCPCS | Mod: 25

## 2024-06-28 PROCEDURE — 85520 HEPARIN ASSAY: CPT | Performed by: INTERNAL MEDICINE

## 2024-06-28 PROCEDURE — 250N000013 HC RX MED GY IP 250 OP 250 PS 637: Performed by: INTERNAL MEDICINE

## 2024-06-28 PROCEDURE — 83735 ASSAY OF MAGNESIUM: CPT | Performed by: INTERNAL MEDICINE

## 2024-06-28 PROCEDURE — 80053 COMPREHEN METABOLIC PANEL: CPT | Performed by: INTERNAL MEDICINE

## 2024-06-28 PROCEDURE — C9113 INJ PANTOPRAZOLE SODIUM, VIA: HCPCS | Performed by: INTERNAL MEDICINE

## 2024-06-28 PROCEDURE — 85027 COMPLETE CBC AUTOMATED: CPT | Performed by: INTERNAL MEDICINE

## 2024-06-28 PROCEDURE — 255N000002 HC RX 255 OP 636: Performed by: INTERNAL MEDICINE

## 2024-06-28 PROCEDURE — 999N000208 ECHOCARDIOGRAM COMPLETE

## 2024-06-28 RX ORDER — THIAMINE HYDROCHLORIDE 100 MG/ML
100 INJECTION, SOLUTION INTRAMUSCULAR; INTRAVENOUS DAILY
Status: DISCONTINUED | OUTPATIENT
Start: 2024-06-28 | End: 2024-06-29

## 2024-06-28 RX ORDER — ACETAMINOPHEN 325 MG/1
325 TABLET ORAL EVERY 6 HOURS PRN
Status: DISCONTINUED | OUTPATIENT
Start: 2024-06-28 | End: 2024-07-04 | Stop reason: HOSPADM

## 2024-06-28 RX ORDER — MAGNESIUM SULFATE HEPTAHYDRATE 40 MG/ML
2 INJECTION, SOLUTION INTRAVENOUS ONCE
Status: COMPLETED | OUTPATIENT
Start: 2024-06-28 | End: 2024-06-28

## 2024-06-28 RX ORDER — FOLIC ACID 5 MG/ML
1 INJECTION, SOLUTION INTRAMUSCULAR; INTRAVENOUS; SUBCUTANEOUS DAILY
Status: DISCONTINUED | OUTPATIENT
Start: 2024-06-28 | End: 2024-06-29

## 2024-06-28 RX ORDER — POTASSIUM CHLORIDE 7.45 MG/ML
10 INJECTION INTRAVENOUS
Status: COMPLETED | OUTPATIENT
Start: 2024-06-28 | End: 2024-06-28

## 2024-06-28 RX ORDER — THIAMINE HYDROCHLORIDE 100 MG/ML
100 INJECTION, SOLUTION INTRAMUSCULAR; INTRAVENOUS DAILY
Status: DISCONTINUED | OUTPATIENT
Start: 2024-06-28 | End: 2024-06-28

## 2024-06-28 RX ORDER — FOLIC ACID 1 MG/1
1 TABLET ORAL DAILY
Status: DISCONTINUED | OUTPATIENT
Start: 2024-06-28 | End: 2024-06-28

## 2024-06-28 RX ORDER — FOLIC ACID 5 MG/ML
1 INJECTION, SOLUTION INTRAMUSCULAR; INTRAVENOUS; SUBCUTANEOUS DAILY
Status: DISCONTINUED | OUTPATIENT
Start: 2024-06-28 | End: 2024-06-28

## 2024-06-28 RX ADMIN — CEFTRIAXONE SODIUM 1 G: 1 INJECTION, POWDER, FOR SOLUTION INTRAMUSCULAR; INTRAVENOUS at 20:10

## 2024-06-28 RX ADMIN — IPRATROPIUM BROMIDE AND ALBUTEROL SULFATE 3 ML: .5; 3 SOLUTION RESPIRATORY (INHALATION) at 01:30

## 2024-06-28 RX ADMIN — MICONAZOLE NITRATE ANTIFUNGAL POWDER: 2 POWDER TOPICAL at 08:22

## 2024-06-28 RX ADMIN — SODIUM CHLORIDE 250 ML: 9 INJECTION, SOLUTION INTRAVENOUS at 16:55

## 2024-06-28 RX ADMIN — POTASSIUM CHLORIDE 10 MEQ: 7.46 INJECTION, SOLUTION INTRAVENOUS at 03:08

## 2024-06-28 RX ADMIN — AZITHROMYCIN DIHYDRATE 250 MG: 500 INJECTION, POWDER, LYOPHILIZED, FOR SOLUTION INTRAVENOUS at 17:43

## 2024-06-28 RX ADMIN — METHYLPREDNISOLONE SODIUM SUCCINATE 62.5 MG: 125 INJECTION, POWDER, FOR SOLUTION INTRAMUSCULAR; INTRAVENOUS at 08:21

## 2024-06-28 RX ADMIN — IPRATROPIUM BROMIDE AND ALBUTEROL SULFATE 3 ML: .5; 3 SOLUTION RESPIRATORY (INHALATION) at 07:40

## 2024-06-28 RX ADMIN — MICONAZOLE NITRATE ANTIFUNGAL POWDER: 2 POWDER TOPICAL at 20:12

## 2024-06-28 RX ADMIN — INSULIN ASPART 1 UNITS: 100 INJECTION, SOLUTION INTRAVENOUS; SUBCUTANEOUS at 16:04

## 2024-06-28 RX ADMIN — MAGNESIUM SULFATE HEPTAHYDRATE 2 G: 40 INJECTION, SOLUTION INTRAVENOUS at 08:21

## 2024-06-28 RX ADMIN — LIDOCAINE: 50 OINTMENT TOPICAL at 15:58

## 2024-06-28 RX ADMIN — METHYLPREDNISOLONE SODIUM SUCCINATE 62.5 MG: 125 INJECTION, POWDER, FOR SOLUTION INTRAMUSCULAR; INTRAVENOUS at 20:10

## 2024-06-28 RX ADMIN — LIDOCAINE 2 PATCH: 4 PATCH TOPICAL at 20:11

## 2024-06-28 RX ADMIN — POTASSIUM CHLORIDE 10 MEQ: 7.46 INJECTION, SOLUTION INTRAVENOUS at 00:04

## 2024-06-28 RX ADMIN — THIAMINE HYDROCHLORIDE 100 MG: 100 INJECTION, SOLUTION INTRAMUSCULAR; INTRAVENOUS at 13:24

## 2024-06-28 RX ADMIN — METHYLPREDNISOLONE SODIUM SUCCINATE 62.5 MG: 125 INJECTION, POWDER, FOR SOLUTION INTRAMUSCULAR; INTRAVENOUS at 13:24

## 2024-06-28 RX ADMIN — POTASSIUM CHLORIDE 10 MEQ: 7.46 INJECTION, SOLUTION INTRAVENOUS at 04:06

## 2024-06-28 RX ADMIN — FOLIC ACID 1 MG: 5 INJECTION, SOLUTION INTRAMUSCULAR; INTRAVENOUS; SUBCUTANEOUS at 14:10

## 2024-06-28 RX ADMIN — IPRATROPIUM BROMIDE AND ALBUTEROL SULFATE 3 ML: .5; 3 SOLUTION RESPIRATORY (INHALATION) at 20:00

## 2024-06-28 RX ADMIN — METHYLPREDNISOLONE SODIUM SUCCINATE 62.5 MG: 125 INJECTION, POWDER, FOR SOLUTION INTRAMUSCULAR; INTRAVENOUS at 02:13

## 2024-06-28 RX ADMIN — NYSTATIN: 100000 CREAM TOPICAL at 20:12

## 2024-06-28 RX ADMIN — ACETAMINOPHEN 325 MG: 325 TABLET ORAL at 18:05

## 2024-06-28 RX ADMIN — NOREPINEPHRINE BITARTRATE 0.03 MCG/KG/MIN: 0.02 INJECTION, SOLUTION INTRAVENOUS at 02:06

## 2024-06-28 RX ADMIN — LIDOCAINE: 50 OINTMENT TOPICAL at 20:12

## 2024-06-28 RX ADMIN — NYSTATIN: 100000 CREAM TOPICAL at 08:22

## 2024-06-28 RX ADMIN — INSULIN ASPART 1 UNITS: 100 INJECTION, SOLUTION INTRAVENOUS; SUBCUTANEOUS at 20:11

## 2024-06-28 RX ADMIN — PERFLUTREN 2 ML: 6.52 INJECTION, SUSPENSION INTRAVENOUS at 09:30

## 2024-06-28 RX ADMIN — PANTOPRAZOLE SODIUM 40 MG: 40 INJECTION, POWDER, FOR SOLUTION INTRAVENOUS at 08:21

## 2024-06-28 RX ADMIN — LIDOCAINE: 50 OINTMENT TOPICAL at 11:52

## 2024-06-28 RX ADMIN — LIDOCAINE: 50 OINTMENT TOPICAL at 08:22

## 2024-06-28 RX ADMIN — INSULIN ASPART 1 UNITS: 100 INJECTION, SOLUTION INTRAVENOUS; SUBCUTANEOUS at 09:05

## 2024-06-28 RX ADMIN — IPRATROPIUM BROMIDE AND ALBUTEROL SULFATE 3 ML: .5; 3 SOLUTION RESPIRATORY (INHALATION) at 13:40

## 2024-06-28 ASSESSMENT — ACTIVITIES OF DAILY LIVING (ADL)
ADLS_ACUITY_SCORE: 44
ADLS_ACUITY_SCORE: 54
ADLS_ACUITY_SCORE: 44
ADLS_ACUITY_SCORE: 53
ADLS_ACUITY_SCORE: 49
ADLS_ACUITY_SCORE: 44
ADLS_ACUITY_SCORE: 49
ADLS_ACUITY_SCORE: 44
ADLS_ACUITY_SCORE: 44
ADLS_ACUITY_SCORE: 49
ADLS_ACUITY_SCORE: 53
ADLS_ACUITY_SCORE: 53
ADLS_ACUITY_SCORE: 49
ADLS_ACUITY_SCORE: 53
ADLS_ACUITY_SCORE: 44
ADLS_ACUITY_SCORE: 53
ADLS_ACUITY_SCORE: 44
ADLS_ACUITY_SCORE: 44
ADLS_ACUITY_SCORE: 49
ADLS_ACUITY_SCORE: 44

## 2024-06-28 NOTE — PROGRESS NOTES
"Care Management Follow Up    Length of Stay (days): 1    Expected Discharge Date:  pending    Anticipated Discharge Plan:  TBD     Transportation: TBD    PT Recommendations:    OT Recommendations:        Barriers to Discharge: medical stability, pulm status-IV antibiotic and steroids, bipap, therapy orders and eval when appropriate    Prior Living Situation:  Patient lives in [facility] alone, 1 level living, ambulates with a cane inside her house. She also has a 4WW, but doesn't use it much. It is too heavy for her to lift into the car. She is normally independent with ADLs and IADLs. Per son Phillip, \"her kids don't think she should be driving, but she still does. I don't know if she drives very much anymore. I took away her keys, but she got a  to make new keys\".     Advanced Directive on File: no HCD     Patient/Spokesperson Updated: No    Additional Information:  Medical:  Patient with history of alcohol dependence, extensive emphysema with no PFTs on record, kyphosis, osteoporosis, hip fractures s/p bilateral ORIF, GERD, HTN, insomnia, protein-calorie malnutrition, subarachnoid hemorrhage, vitamin D deficiency, E coli UTI presented on 6/27 with acute encephalopathy, hypoxia, found to be agitated, respiratory acidosis, segmental PE, acute multifocal bronchitis/pneumonia most prominent in LLL, admitted to ICU on BiPAP.       6/28/24:  Patient is not medically stable to initiate discharge planning.  She will need PT/OT orders and eval when appropriate.       Yeimy Sorto RN        "

## 2024-06-28 NOTE — CONSULTS
CLINICAL NUTRITION SERVICES - ASSESSMENT NOTE     Nutrition Prescription    RECOMMENDATIONS FOR MDs/PROVIDERS TO ORDER:      Malnutrition Status:    Severe in acute    Recommendations already ordered by Registered Dietitian (RD):  None at this time    Future/Additional Recommendations:       REASON FOR ASSESSMENT  Namita Viera is a/an 74 year old female assessed by the dietitian for Provider Order - malnutrition    NUTRITION HISTORY  History of alcohol dependence, extensive emphysema with no PFTs on record, kyphosis, osteoporosis, hip fractures s/p bilateral ORIF, GERD, HTN, insomnia, protein-calorie malnutrition, subarachnoid hemorrhage, vitamin D deficiency, E coli UTI presented on 6/27 with acute encephalopathy, hypoxia, found to be agitated, respiratory acidosis, segmental PE, acute multifocal bronchitis/pneumonia most prominent in LLL, admitted to ICU on BiPAP.   Met patient and patient's son.  Patient lives alone.  She reports that she snacks during the day and eats one meal at supper, a frozen dinner.  Patient does not like sweet supplements.      CURRENT NUTRITION ORDERS  Diet: regular-diet just advanced per MD.    LABS  Labs reviewed    MEDICATIONS  Current Facility-Administered Medications   Medication Dose Route Frequency Provider Last Rate Last Admin    azithromycin (ZITHROMAX) 250 mg in sodium chloride 0.9 % 250 mL intermittent infusion  250 mg Intravenous Q24H Raj Coker MD        cefTRIAXone (ROCEPHIN) 1 g vial to attach to  mL bag for ADULTS or NS 50 mL bag for PEDS  1 g Intravenous Q24H Raj Coker MD   1 g at 06/27/24 2037    folic acid injection 1 mg  1 mg Intravenous Daily Felipe Ji MD   1 mg at 06/28/24 1410    insulin aspart (NovoLOG) injection (RAPID ACTING)  1-7 Units Subcutaneous Q4H Raj Coker MD   1 Units at 06/28/24 0905    ipratropium - albuterol 0.5 mg/2.5 mg/3 mL (DUONEB) neb solution 3 mL  3 mL Nebulization Q6H John Paul  Raj Ryan MD   3 mL at 06/28/24 1340    Lidocaine (LIDOCARE) 4 % Patch 2 patch  2 patch Transdermal Q24h Di Martínez MD   2 patch at 06/27/24 2049    lidocaine (XYLOCAINE) 5 % ointment   Topical 4x Daily Di Martínez MD   Given at 06/28/24 1152    methylPREDNISolone sodium succinate (solu-MEDROL) injection 62.5 mg  62.5 mg Intravenous Q6H Di Martínez MD   62.5 mg at 06/28/24 1324    miconazole (MICATIN) 2 % powder   Topical BID Di Martínez MD   Given at 06/28/24 0822    nicotine (NICODERM CQ) 14 MG/24HR 24 hr patch 1 patch  1 patch Transdermal Daily Di Martínez MD        nystatin (MYCOSTATIN) cream   Topical BID Raj Coker MD   Given at 06/28/24 0822    pantoprazole (PROTONIX) IV push injection 40 mg  40 mg Intravenous Daily with breakfast Raj Coker MD   40 mg at 06/28/24 0821    sodium chloride (PF) 0.9% PF flush 3 mL  3 mL Intracatheter Q8H Di Martínez MD   3 mL at 06/28/24 0822    thiamine (B-1) injection 100 mg  100 mg Intravenous Daily Felipe Ji MD   100 mg at 06/28/24 1324      Current Facility-Administered Medications   Medication Dose Route Frequency Provider Last Rate Last Admin    heparin 25,000 units in 0.45% NaCl 250 mL ANTICOAGULANT infusion  0-5,000 Units/hr Intravenous Continuous Tanya Oliveira MD   Paused at 06/28/24 1403    norepinephrine (LEVOPHED) 4 mg in  mL infusion PREMIX  0.01-0.6 mcg/kg/min Intravenous Continuous Zane Crabtree MD 4.8 mL/hr at 06/28/24 1200 0.03 mcg/kg/min at 06/28/24 1200    Patient is already receiving anticoagulation with heparin, enoxaparin (LOVENOX), warfarin (COUMADIN)  or other anticoagulant medication   Does not apply Continuous PRN Di Martínez MD          Current Facility-Administered Medications   Medication Dose Route Frequency Provider Last Rate Last Admin    albuterol (PROVENTIL) neb solution 2.5 mg  2.5 mg Nebulization Once PRN Tee  "Tanya Gandhi MD        glucose gel 15-30 g  15-30 g Oral Q15 Min PRN Raj Coker MD        Or    dextrose 50 % injection 25-50 mL  25-50 mL Intravenous Q15 Min PRN Raj Coker MD        Or    glucagon injection 1 mg  1 mg Subcutaneous Q15 Min PRN Raj Coker MD        lidocaine (LMX4) cream   Topical Q1H PRN Di Martínez MD        lidocaine (LMX4) cream   Topical Q1H PRN Raj Coker MD        lidocaine 1 % 0.1-1 mL  0.1-1 mL Other Q1H PRN Di Martínez MD        lidocaine 1 % 0.1-5 mL  0.1-5 mL Other Q1H PRN Raj Coker MD   2 mL at 06/27/24 2109    ondansetron (ZOFRAN ODT) ODT tab 4 mg  4 mg Oral Q6H PRN Di Martínez MD        Or    ondansetron (ZOFRAN) injection 4 mg  4 mg Intravenous Q6H PRDi Mendoza MD        Patient is already receiving anticoagulation with heparin, enoxaparin (LOVENOX), warfarin (COUMADIN)  or other anticoagulant medication   Does not apply Continuous PRN Di Martínez MD        senna-docusate (SENOKOT-S/PERICOLACE) 8.6-50 MG per tablet 1 tablet  1 tablet Oral BID PRN Di Martínez MD        Or    senna-docusate (SENOKOT-S/PERICOLACE) 8.6-50 MG per tablet 2 tablet  2 tablet Oral BID PRDi Mendoza MD        sodium chloride (PF) 0.9% PF flush 10-40 mL  10-40 mL Intracatheter Once PRN Raj Coker MD        sodium chloride (PF) 0.9% PF flush 3 mL  3 mL Intracatheter q1 min prDi Mendoza MD          Medications reviewed    ANTHROPOMETRICS  Height: 154.9 cm (5' 1\")  Most Recent Weight: 40.3 kg (88 lb 13.5 oz)    BMI: Underweight BMI <18.5  Weight History:   12/05/23 : 42.9 kg (94 lb 9.6 oz)   06/26/23 : 43.2 kg (95 lb 3.2 oz)   05/31/23 : 45.3 kg (99 lb 14.4 oz)   05/23/23 : 47.2 kg (104 lb)   05/22/23 : 47.7 kg (105 lb 3.2 oz)   05/16/23 : 45.5 kg (100 lb 5 oz)   05/15/23 : 43.9 kg (96 lb 12.5 oz)   Gradual wt loss noted.     Dosing Weight: 40.3 kg    ASSESSED NUTRITION " NEEDS  Estimated Energy Needs: 1200+ kcals/day (30+ kcals/kg )  Justification: Repletion and Underweight  Estimated Protein Needs: 48+ grams protein/day (1.2+ grams of pro/kg)  Justification: Increased needs  Estimated Fluid Needs: 1200+ mL/day (1 mL/kcal)   Justification: Maintenance    PHYSICAL FINDINGS  See malnutrition section below.  Buttocks injury, contact dermatitis, WOC RN following.      MALNUTRITION:  % Weight Loss:  Weight loss does not meet criteria for malnutrition gradual wt loss noted.  % Intake:  </= 75% for >/= 1 month (severe malnutrition)  Subcutaneous Fat Loss:  Orbital region mild depletion  Muscle Loss:  Clavicle bone region moderate depletion and Acromion bone region moderate depletion  Fluid Retention:  None noted    Malnutrition Diagnosis: Severe malnutrition  In Context of:  Acute illness or injury    NUTRITION DIAGNOSIS  Malnutrition related to acute illness as evidenced by decreased intake, loss of lean body mass      INTERVENTIONS  Implementation  Nutrition Education: we discussed protein foods to include at meals for healing/repletion.   Pt declines supplements.  Starting po diet this afternoon.     Goals  No wt loss  Patient to consume % of nutritionally adequate meals three times per day, or the equivalent with supplements/snacks.     Monitoring/Evaluation  Progress toward goals will be monitored and evaluated per protocol.

## 2024-06-28 NOTE — PROCEDURES
"PICC Line Insertion Procedure Note  Pt. Name: Namita Viera  MRN:        [id number]    Procedure: Insertion of a  triple Lumen  5 fr  Bard SOLO (valved) Power PICC, Lot number [id number]    Indications: Vasopressor,acute access    Contraindications : none    Procedure Details     Patient identified with 2 identifiers and \"Time Out\" conducted.  .     Central line insertion bundle followed: hand hygiene performed prior to procedure, site cleansed with cholraprep, hat, mask, sterile gloves, sterile gown worn, patient draped with maximum barrier head to toe drape, sterile field maintained.    The vein was assessed and found to be compressible and of adequate size.     Lidocaine 1% 2 ml administered sq to the insertion site. A 5 Fr PICC was inserted into the basilic vein of the left arm with ultrasound guidance. 1 attempt(s) required to access vein.   Catheter threaded without difficulty. Good blood return noted.    Modified Seldinger Technique used for insertion.    The 8 sharps that are included in the PICC insertion kit were accounted for and disposed of in the sharps container prior to breakdown of the sterile field.    Catheter secured with Statlock, biopatch and Tegaderm dressing applied.    Findings:    Total catheter length  42 cm, with 0 cm exposed. Mid upper arm circumference is 19 cm. Catheter was flushed with 30 cc NS. Patient  tolerated procedure well.    Tip placement verified by 3CG technology. Tip placement in the SVC/RA junction.    CLABSI prevention brochure left at bedside.    Patient's primary RN notified PICC is ready for use.      Comments:  Unsuccessful PICC placement right arm. Catheter met resistance at subclavian area.          Nael Woods PICC RN  [facility] - [facility]      "

## 2024-06-28 NOTE — PLAN OF CARE
Goal Outcome Evaluation:      Plan of Care Reviewed With: patient    Overall Patient Progress: [facility] - ICU    RN Progress Note:            Pertinent Assessments:      Please refer to flowsheet rows for full assessment     Resist cares. Keeps eye shut. Refused to speak. Not following commands. Moans and screams a little with repositioning. On lidocaine patch and ointment for pain.           Key Events - This Shift:     Inadequate Urine output. Had  only 190 ml for 8 hours. Creatinine 1.12.    Tolerated BIPAP at 30 % FIO2 overnight . VBG improving. Placed on 2lpm/NC at  06:00 for a skin break.    Titrating Levophed gtt to keep MAP>65. Currently at 0.03 mcg/kg/min.  Heparin gtt on hold for one hour per Anti Xa . Will restart at 500 units/hr at 07:21.    Replacing Potasium per protocol.                   Barriers to Discharge / Downgrade:     On Pressor, Guarded respiratory function.           Problem: Risk for Delirium  Goal: Improved Attention and Thought Clarity  Outcome: Progressing  Intervention: Maximize Cognitive Function  Recent Flowsheet Documentation  Taken 6/28/2024 0400 by Haydee Cordoba RN  Reorientation Measures: reorientation provided  Taken 6/28/2024 0000 by Haydee Cordoba RN  Reorientation Measures: reorientation provided     Problem: Comorbidity Management  Goal: Maintenance of COPD Symptom Control  Outcome: Progressing  Intervention: Maintain COPD Symptom Control  Recent Flowsheet Documentation  Taken 6/28/2024 0400 by Haydee Cordoba RN  Medication Review/Management:   medications reviewed   high-risk medications identified  Taken 6/28/2024 0000 by Haydee Cordoba RN  Medication Review/Management:   medications reviewed   high-risk medications identified

## 2024-06-28 NOTE — CONSULTS
[facility] Nurse Inpatient Assessment     Consulted for: Buttocks, sacrum    Summary:     Patient History (according to provider note(s):      Namita Viera is a 74 year old female with PMH of COPD and emphysema not on home O2, ongoing tobacco and alcohol use, malnutrition, right hip fracture s/p ORIF in 5/2023, who was brought in with encephalopathy and hypoxia O2 sats in the 60s.  Workup remarkable for probable aspiration pneumonia, small subsegmental PE, severe hypokalemia, rib fractures.  Requiring NIPPV for respiratory failure with hypercapnia     Assessment:      Skin Injury Location: Buttocks      6/28    Last photo: 6/28  Skin injury due to: Irritant contact dermatitis due to fecal, urinary or dual incontinence   Skin history and plan of care:   satellite lesions and likely outline of a brief or pull up  Affected area:      Skin assessment: Denudement, Erosion of epidermis, Erythema, and Rash     Measurements (length x width x depth, in cm) 8.5  x 4  x  0.1 cm  to main denudement to sacrococcygeal region     Color: pink and red     Temperature  warm     Drainage: slight .      Color: serosanguinous      Odor: mild  Pain: severe, Irritable or crying out at intervals, tension to hands, feet and body, and facial expression of distress, tender and burning  Pain interventions prior to dressing change: slow and gentle cares   Treatment goal: Heal , Infection control/prevention, Protection, and Promote epidermal migration  STATUS: initial assessment  Supplies ordered: ordered vashe and triad; antifungal products already at bedside    Daughter at bedside.      Treatment Plan:     Buttocks  Soak irritated open skin with vashe moistened gauze for 2-5 minutes, pat dry  Apply AF powder to buttocks, then cover with TRIAD to open skin on buttocks  Once soiled, gently wipe away soiled TRIAD only, do not scrub all off  Resoak with Vashe moistened gauze  Apply additional TRIAD to keep skin  covered and protected  Apply BID + PRN after each kelvin cares    Orders: Written    RECOMMEND PRIMARY TEAM ORDER: None, at this time  Education provided: plan of care, Moisture management, and Hygiene  Discussed plan of care with: Patient, Family, and Nurse  [facility] nurse follow-up plan: twice weekly  Notify WO if wound(s) deteriorate.  Nursing to notify the Provider(s) and re-consult the WO Nurse if new skin concern.    DATA:     Current support surface: Standard  Low air loss (ELMIRA pump, Isolibrium, Pulsate)  Containment of urine/stool: Incontinence Protocol  BMI: Body mass index is 16.79 kg/m .   Active diet order: Orders Placed This Encounter      NPO for Medical/Clinical Reasons Except for: No Exceptions     Output: I/O last 3 completed shifts:  In: 1940.12 [I.V.:1940.12]  Out: 290 [Urine:290]     Labs:   Recent Labs   Lab 06/28/24  0524 06/27/24  1902   ALBUMIN 3.1*  --    HGB 9.7*  --    WBC 11.2*  --    A1C  --  4.7     Pressure injury risk assessment:   Sensory Perception: 2-->very limited  Moisture: 3-->occasionally moist  Activity: 1-->bedfast  Mobility: 2-->very limited  Nutrition: 1-->very poor  Friction and Shear: 1-->problem  Gigi Score: 10    THEO Quevedo RN CWOCN  Pager no longer in use, please contact through Obvious group: [facility]

## 2024-06-28 NOTE — PLAN OF CARE
Goal Outcome Evaluation:  [facility] - ICU    RN Progress Note:            Pertinent Assessments:      Please refer to flowsheet rows for full assessment     Patient has been off of bipap for 4 hours, currently on 2L NC.  Patient alert/oriented occasionally confused, but redirectable. VSS with support of levophed, titration to meet goal.  Patient does have pain with repositioning, mainly from her skin on her bottom.  WOC nurse seen and assessed.  Bottom has been cleansed with cream/powder applied.  Family has been at bedside today, will continue to give supportive care to patient and family.             Key Events - This Shift:       Transitioning from bipap to NC, patient is more arousable and able to answer questions appropriately.        ALLI SAT (Sedation Awakening Trial): For use ONLY if intubated             Barriers to Discharge / Downgrade:     Levophed

## 2024-06-28 NOTE — PROGRESS NOTES
[facility]:  CRITICAL PROGRESS NOTE     Date / Time of Admission:  6/27/2024  1:10 PM    Namita Viera  Female, 74 year old, 1950  MRN: [id number]  Bed: -27  Code: No CPR- Pre-arrest intubation OK (no ACP docs)         Key event since admission   Uneventful night. Was sleeping when I rounded, no distress on AVAPS. Still on low dose norepi  Hypercapnia resolving        Assessment/Plan:   history of alcohol dependence, extensive emphysema with no PFTs on record, kyphosis, osteoporosis, hip fractures s/p bilateral ORIF, GERD, HTN, insomnia, protein-calorie malnutrition, subarachnoid hemorrhage, vitamin D deficiency, E coli UTI presented on 6/27 with acute encephalopathy, hypoxia, found to be agitated, respiratory acidosis, segmental PE, acute multifocal bronchitis/pneumonia most prominent in LLL, admitted to ICU on BiPAP.     PULMONARY/RESPIRATORY:  Acute respirator failure with hypercapnia and hypoxia  Tobacco dependence  Pulmonary embolism  Possible bronchitis/CAP  Likely severe COPD by clinical history, CT, presentation. No PFTs on record, on albuterol alone. Continues to smoke heavily per family. Found to have acute segmental PE, extensive biapical emphysema, bronchial wall thickening with endoluminal mucus/debris most prominent LLL with some evidence of pneumonia. VBG not much improved with NIV, steroids, and nebs.  Previous provider discussed code status with family yesterday  Continue AVAPS but can during the day alternate with HFNC so can get a break and eat  Recheck VBG  as needed  Monitor for need for intubation  IV methylpred  Nebulized ipratropium-albuterol  Antibiotics as below  Heparin drip     CARDIOVASCULAR:  Peripheral edema  Right atrial enlargement  Left posterior fascicular block  Hypotension (sepsis?)  Significant pitting edema at ankle. Albumin normal at 4.2. KATLIN on ECG. Risk of WHO group 3 pulmonary HTN. NT-proBNP normal.  TTE result pending  Avoid excess IV  fluids, risk of pulmonary HTN  Consider judicious diuresis when more stable and echo findings available  Wean norepi as able     Intake/Output Summary (Last 24 hours) at 6/28/2024 0957  Last data filed at 6/28/2024 0800  Gross per 24 hour   Intake 2047.55 ml   Output 340 ml   Net 1707.55 ml         NEUROLOGIC:  Acute encephalopathy, sleeping on BIPAP this am   Alcohol dependence  Drinks at least 6 glasses of wine per family. Isolated herself more in recent months. Likely septic and hypercapnic encephalopathy, risk of withdrawal.  Monitor for withdrawal  Intubated if loss of airway reflexes but is improving      GASTROINTESTINAL:  Protein-calorie malnutrition  Likely pulmonary cachexia, alcoholism.  Monitor  Was NPO currently with respiratory status, will try to start feeding if MS allows as hypercapnia resolving and decreased risk  of need for intubation     MSK:  Rib fractures left 10th and 12th ribs.     RENAL/GENITOURINARY:  CARLOS  Hypokalemia  Baseline Cr 0.7, currently 1.18.   NO rhabdo, was down at home for unknown duration.   Risk of UTI. Possible sepsis/ATN, though also with bilateral ankle edema. Low intake likely leading to hypo-K.  Received initial fluid resuscitation  Avoid excess IV fluids and nephrotoxic drugs  UA-reflex UC in progress  Monitor I/O  K and Mg replacement protocols     INFECTIOUS DISEASE:  Acute bronchitis/CAP  Possible UTI  Decubitus ulcers  Candidiasis  Ceftriaxone and azithromycin  Check UA  Sputum culture if able  Nystatin cream to perineum/sacrum  WOC consult     HEMATOLOGIC/ONCOLOGIC:  Leukocytosis likely from steroids  Anemia  Chronic disease, alcohol dependence (high MCV).  Monitor with restrictive transfusion threshold  Monitor CBC     ENDOCRINE:  No acute issues.  FSBG with sliding scale aspart     Checklist:  FEN: NPO  VTE ppx: heparin drip  GI ppx: PPI  Bowel regimen: prn senna-doc, poly gly  VAP ppx: NA  Lines/tube-size:  Will place PICC  Will place bradley  Skin: perineal and  "sacral ulcerations and erythema c/w decubitus ulcers and candidiasis, WOC consulted   PT/OT/SLP, early mobility: as able  Code Status: DNR, okay to intubate  Communication: Communicated with patient's daughter, Tiara, and two sons in person. They are continuing to discuss whether Namita would want to be intubated for short-term trial of invasive mechanical ventilation.  Disposition: The patient is critically ill requiring BiPAP, at high risk of decompensation.     Restraints  Not indicated     Felipe Ji MD MD  Pulmonary and Critical Care Medicine  [facility]  Vocera:    Risk Factors Present on Admission:  Clinically Significant Risk Factors Present on Admission        # Hypokalemia: Lowest K = 2.6 mmol/L in last 2 days, will replace as needed      # Hypercalcemia: Highest Ca = 11.2 mg/dL in last 2 days, will monitor as appropriate    # Hypoalbuminemia: Lowest albumin = 3.1 g/dL at 6/28/2024  5:24 AM, will monitor as appropriate     # Drug Induced Platelet Defect: home medication list includes an antiplatelet medication       # Hypertension: Noted on problem list     # Circulatory Shock: required vasopressors within past 24 hours    # Acute Respiratory Failure: Documented O2 saturation < 91%.  Continue supplemental oxygen as needed     # Anemia: based on hgb <11        #Precipitous drop in Hgb/Hct: Lowest Hgb this hospitalization: 9.7 g/dL. Will continue to monitor and treat/transfuse as appropriate.     # Cachexia: Estimated body mass index is 16.79 kg/m  as calculated from the following:    Height as of this encounter: 1.549 m (5' 1\").    Weight as of this encounter: 40.3 kg (88 lb 13.5 oz).                   Code Status:  No CPR- Pre-arrest intubation OK     This patient is considered critically ill and requires ICU level of care due to hypoxic and hypercapnic resp failure   Total Critical Care time, not including separate billable procedure time: 35 minutes.    Felipe Ji MD  [facility] [place] [facility]/Critical Care   06/28/2024   7:38 AM             Allergies/Medications:   Allergies:   No Known Allergies    OUTPATIENT Medications:  Prior to Admission medications    Medication Sig Start Date End Date Taking? Authorizing Provider   albuterol (PROAIR HFA/PROVENTIL HFA/VENTOLIN HFA) 108 (90 Base) MCG/ACT inhaler INHALE 1-2 PUFFS INTO THE LUNGS EVERY 6 HOURS AS NEEDED FOR COUGH OR SHORTNESS OF BREATH OR WHEEZE 6/17/24  Yes Denisse Klein MD   alendronate (FOSAMAX) 70 MG tablet TAKE 1 TABLET BY MOUTH EVERY 7 DAYS. TAKE IN THE MORNING ON AN EMPTY STOMACH WITH A FULL GLASS OF WATER 30 MINUTES BEFORE FOOD 6/26/23  Yes Anahi Andersen,    aspirin (ASA) 81 MG EC tablet Take 1 tablet (81 mg) by mouth daily 11/10/21  Yes Anahi Andersen DO   calcium carbonate (OS-PETER) 1500 (600 Ca) MG tablet Take 600 mg by mouth daily   Yes Unknown, Entered By History   ibuprofen (ADVIL/MOTRIN) 200 MG tablet Take 200 mg by mouth every 6 hours as needed for pain   Yes Unknown, Entered By History   losartan-hydrochlorothiazide (HYZAAR) 100-25 MG tablet Take 1 tablet by mouth daily 6/26/23  Yes Anahi Andersen DO   melatonin 1 MG TABS tablet Take 1 tablet (1 mg) by mouth nightly as needed for sleep 6/26/23  Yes Anahi Andersen DO   multivitamin, therapeutic (THERA-VIT) TABS tablet Take 1 tablet by mouth daily   Yes Unknown, Entered By History   omeprazole (PRILOSEC) 20 MG DR capsule Take 1 capsule (20 mg) by mouth daily 6/26/23  Yes Anahi Andersen DO   vitamin C (ASCORBIC ACID) 500 MG tablet Take 500 mg by mouth daily   Yes Unknown, Entered By History   Vitamin D, Cholecalciferol, 25 MCG (1000 UT) TABS Take 1,000 Units by mouth daily   Yes Unknown, Entered By History        INPATIENT Medications: Continuous Infusions:  Current Facility-Administered Medications   Medication Dose Route Frequency Provider Last Rate Last Admin    heparin 25,000 units in 0.45% NaCl 250 mL ANTICOAGULANT infusion  0-5,000 Units/hr Intravenous  Continuous Tanya Oliveira MD 5 mL/hr at 06/28/24 0721 500 Units/hr at 06/28/24 0721    norepinephrine (LEVOPHED) 4 mg in  mL infusion PREMIX  0.01-0.6 mcg/kg/min Intravenous Continuous Zane Crabtree MD 4.8 mL/hr at 06/28/24 0645 0.03 mcg/kg/min at 06/28/24 0645    Patient is already receiving anticoagulation with heparin, enoxaparin (LOVENOX), warfarin (COUMADIN)  or other anticoagulant medication   Does not apply Continuous PRN Di Martínez MD           INPATIENT Medications: Scheduled Medications:  Current Facility-Administered Medications   Medication Dose Route Frequency Provider Last Rate Last Admin    azithromycin (ZITHROMAX) 250 mg in sodium chloride 0.9 % 250 mL intermittent infusion  250 mg Intravenous Q24H Raj Coker MD        cefTRIAXone (ROCEPHIN) 1 g vial to attach to  mL bag for ADULTS or NS 50 mL bag for PEDS  1 g Intravenous Q24H Raj Coker MD   1 g at 06/27/24 2037    insulin aspart (NovoLOG) injection (RAPID ACTING)  1-7 Units Subcutaneous Q4H Raj Coker MD   2 Units at 06/27/24 2340    ipratropium - albuterol 0.5 mg/2.5 mg/3 mL (DUONEB) neb solution 3 mL  3 mL Nebulization Q6H Raj Coker MD   3 mL at 06/28/24 0130    Lidocaine (LIDOCARE) 4 % Patch 2 patch  2 patch Transdermal Q24h Di Martínez MD   2 patch at 06/27/24 2049    lidocaine (XYLOCAINE) 5 % ointment   Topical 4x Daily Di Martínez MD   Given at 06/27/24 2344    magnesium sulfate 2 g in 50 mL sterile water intermittent infusion  2 g Intravenous Once Raj Coker MD        methylPREDNISolone sodium succinate (solu-MEDROL) injection 62.5 mg  62.5 mg Intravenous Q6H Di Martínez MD   62.5 mg at 06/28/24 0213    miconazole (MICATIN) 2 % powder   Topical BID Di Martínez MD   Given at 06/27/24 6389    nicotine (NICODERM CQ) 14 MG/24HR 24 hr patch 1 patch  1 patch Transdermal Daily Di Martínez MD        nystatin  "(MYCOSTATIN) cream   Topical BID Raj Coker MD   Given at 06/27/24 2053    pantoprazole (PROTONIX) IV push injection 40 mg  40 mg Intravenous Daily with breakfast Raj Coker MD        sodium chloride (PF) 0.9% PF flush 3 mL  3 mL Intracatheter Q8H Di Martínez MD   3 mL at 06/28/24 0218           Subjective:   Sleeping so will differ hx to later today. Discussed with nurse events ovenight  Geremias cold and has BH in place and felt better. Was not hypothermic.          Objective:   Vitals:  BP 98/53   Pulse 84   Temp 97.6  F (36.4  C) (Axillary)   Resp 20   Ht 1.549 m (5' 1\")   Wt 40.3 kg (88 lb 13.5 oz)   SpO2 93%   BMI 16.79 kg/m    Vent:   FiO2 (%): 30 %  Resp: 20    Intake/Output Summary (Last 24 hours) at 6/28/2024 1014  Last data filed at 6/28/2024 0800  Gross per 24 hour   Intake 2047.55 ml   Output 340 ml   Net 1707.55 ml       GEN: NAD on BIpap  HEENT: Normocephalic, atraumatic.  Extraoccular eye movements intact, anicteric sclera.   NECK: Supple.    PULM: Non-labored breathing.  Diminished bS with prolonged expiration **   CVS: Regular rate and rhythm.  Normal S1, S2.  No rubs, murmurs, or gallops.    ABDOMEN: Normoactive bowel sounds.  Non-tender to palpation.  Non-distended.    EXTREMITES:  No clubbing, cyanosis, ++ LE edema.    NEURO: Sleeping  Intake/Output: I/O last 3 completed shifts:  In: 1940.12 [I.V.:1940.12]  Out: 290 [Urine:290]        Pertinent Studies:     I have personally reviewed the following data over the past 24 hrs:    11.2 (H)  \   9.7 (L)   / 277     144 109 (H) 33.0 (H) /  154 (H)   4.2 21 (L) 1.12 (H) \     ALT: 14 AST: 18 AP: 80 TBILI: 0.2   ALB: 3.1 (L) TOT PROTEIN: 5.6 (L) LIPASE: N/A     Trop: 52 (H) BNP: 909     TSH: N/A T4: N/A A1C: 4.7     Procal: N/A CRP: N/A Lactic Acid: 0.9         Imaging results reviewed over the past 24 hrs:   Recent Results (from the past 24 hour(s))   XR Chest Port 1 View    Narrative    EXAM: XR CHEST PORT 1 " VIEW  LOCATION: [facility]  DATE: 6/27/2024    INDICATION: hypoxia  COMPARISON: Low-dose lung cancer CT 10/13/2022 and older studies, chest x-ray  5/4/2021    Impression    IMPRESSION: Lungs are hyperexpanded consistent with COPD.    Incidental calcified granuloma in the right lower lobe.     There is another ovoid opacity just inferior to the right chest lead along the margin of the scapula which corresponds to the old fracture deformity of the right sixth rib.    No signs of pneumonia or failure. Heart and pulmonary vascularity are normal.   CT Head w/o Contrast    Narrative    EXAM: CT HEAD W/O CONTRAST  LOCATION: [facility]  DATE: 6/27/2024    INDICATION: Altered mental status  COMPARISON: 5/16/2023.  TECHNIQUE: Routine CT Head without IV contrast. Multiplanar reformats. Dose reduction techniques were used.    FINDINGS:  INTRACRANIAL CONTENTS: No intracranial hemorrhage, extraaxial collection, or mass effect.  No CT evidence of acute infarct. Mild presumed chronic small vessel ischemic changes. Mild generalized volume loss. No hydrocephalus. Bilateral carotid siphon and   vertebral artery V4 segment atherosclerotic calcifications.    VISUALIZED ORBITS/SINUSES/MASTOIDS: Prior bilateral cataract surgery. Visualized portions of the orbits are otherwise unremarkable. No paranasal sinus mucosal disease. No middle ear or mastoid effusion.    BONES/SOFT TISSUES: No acute abnormality.      Impression    IMPRESSION:  1.  No CT evidence for acute intracranial process.  2.  Brain atrophy and presumed chronic microvascular ischemic changes as above.   CT Chest Pulmonary Embolism w Contrast   Result Value    Radiologist flags New diagnosis of pulmonary embolism (AA)    Narrative    EXAM: CT CHEST PULMONARY EMBOLISM W CONTRAST  LOCATION: [facility]  DATE: 6/27/2024    INDICATION: Hypoxia. Respiratory failure.   COMPARISON: CT chest 10/13/2022.    TECHNIQUE: CT chest pulmonary angiogram during arterial phase injection of IV contrast. Multiplanar reformats and MIP reconstructions were performed. Dose reduction techniques were used.   CONTRAST: [id number] 75ml     FINDINGS:    ANGIOGRAM CHEST: Pulmonary arteries are normal caliber. Single small subsegmental pulmonary embolism within the right lower lobe (4/#189). No additional pulmonary emboli. No evidence of right heart strain. Thoracic aorta is normal in caliber with   moderate mixed calcified and noncalcified atherosclerotic plaque. No evidence of acute aortic pathology.    LUNGS AND PLEURA: Moderate upper lobe predominant centrilobular emphysema. Mild diffuse bronchial wall thickening with scattered areas of mild bronchiectasis. Extensive intraluminal debris with scattered areas of bronchial plugging within the bilateral   lower lobe airways, more extensive on the left. Fine tree-in-bud opacities within the left lower lobe, with adjacent linear consolidative opacities. Scattered small pulmonary nodules, calcified granulomas, and calcified pleural plaques are unchanged.     MEDIASTINUM/AXILLAE: Normal cardiac size. No significant pericardial effusion. Small hiatal hernia. Scattered small calcified mediastinal and right hilar lymph nodes. No enlarged thoracic lymph node.     CORONARY ARTERY CALCIFICATION: Moderate.    UPPER ABDOMEN: No acute abnormality.     MUSCULOSKELETAL: Diffusely demineralized bones. Chronic severe T7 and mild T6 compression fractures are unchanged. Severe T9 compression fracture with mild retropulsion into the spinal canal is new since the prior exam. The compression fractures result   in focal kyphosis of the spine. Nondisplaced displaced fracture of the left 12th rib. Displaced fracture of the left posterolateral 10th rib. Multiple additional remote healed left rib fractures are unchanged.       Impression    IMPRESSION:    1.  Small acute subsegmental right lower lobe pulmonary  embolism. No evidence of right heart strain.     2.  Extensive intraluminal debris with scattered areas of bronchial plugging within the bilateral lower lobe airways, more extensive on the left. There are also fine tree-in-bud opacities within the left lower lobe with adjacent linear consolidative   opacities. Aspiration and early aspiration pneumonia is possible.     3.  Displaced fracture of the left posterolateral 10th rib. Nondisplaced fracture of the left 12th rib. No pneumothorax.    4.  Severe T9 compression fracture with mild retropulsion into the spinal canal is new since 10/13/2022.    [Critical Result: New diagnosis of pulmonary embolism]    Finding was identified on 6/27/2024 2:47 PM CDT.     Dr. Oliveira was contacted by me on 6/27/2024 2:51 PM CDT and verbalized understanding of the critical result.   XR Femur Left 2 Views    Narrative    EXAM: XR FEMUR LEFT 2 VIEWS, XR PELVIS 1/2 VIEWS  LOCATION: [facility]  DATE: 6/27/2024    INDICATION: Pain after a fall  COMPARISON: 5/2/2018      Impression    IMPRESSION:   Left femur: Previous internal fixation of healed proximal left femur fracture. No acute left femur fracture..     Pelvis: Acute displaced fractures of the left superior and inferior pubic rami. Diffuse bony demineralization. Internally fixated likely healed proximal right femur fracture. Degenerative changes in the lumbar spine.   XR Pelvis 1/2 Views    Narrative    EXAM: XR FEMUR LEFT 2 VIEWS, XR PELVIS 1/2 VIEWS  LOCATION: [facility]  DATE: 6/27/2024    INDICATION: Pain after a fall  COMPARISON: 5/2/2018      Impression    IMPRESSION:   Left femur: Previous internal fixation of healed proximal left femur fracture. No acute left femur fracture..     Pelvis: Acute displaced fractures of the left superior and inferior pubic rami. Diffuse bony demineralization. Internally fixated likely healed proximal right femur fracture. Degenerative changes in  the lumbar spine.   XR Humerus Left G/E 2 Views    Narrative    EXAM: XR SHOULDER LEFT 2 VIEWS, XR HUMERUS LEFT G/E 2 VIEWS  LOCATION: [facility]  DATE: 6/27/2024    INDICATION: Severe pain after a fall  COMPARISON: None.      Impression    IMPRESSION:   Shoulder: No acute fracture or dislocation. Diffuse bony demineralization. Mild calcification projecting near the greater tuberosity could represent chondrocalcinosis or hydroxyapatite deposition. Age indeterminate mid thoracic compression fractures.    Humerus: No acute fracture. Diffuse bony demineralization.   XR Shoulder Left 2 Views    Narrative    EXAM: XR SHOULDER LEFT 2 VIEWS, XR HUMERUS LEFT G/E 2 VIEWS  LOCATION: [facility]  DATE: 6/27/2024    INDICATION: Severe pain after a fall  COMPARISON: None.      Impression    IMPRESSION:   Shoulder: No acute fracture or dislocation. Diffuse bony demineralization. Mild calcification projecting near the greater tuberosity could represent chondrocalcinosis or hydroxyapatite deposition. Age indeterminate mid thoracic compression fractures.    Humerus: No acute fracture. Diffuse bony demineralization.   US Lower Extremity Venous Duplex Bilateral    Narrative    EXAM: US LOWER EXTREMITY VENOUS DUPLEX BILATERAL  LOCATION: [facility]  DATE: 6/27/2024    INDICATION: Has PE, rule out DVT  COMPARISON: None.  TECHNIQUE: Venous Duplex ultrasound of bilateral lower extremities with and without compression, augmentation and duplex. Color flow and spectral Doppler with waveform analysis performed.    FINDINGS: Exam includes the common femoral, femoral, popliteal veins as well as segmentally visualized deep calf veins and greater saphenous vein.     RIGHT: No deep vein thrombosis. No superficial thrombophlebitis. No popliteal cyst.    LEFT: No deep vein thrombosis. No superficial thrombophlebitis. No popliteal cyst.      Impression    IMPRESSION:  No deep  venous thrombosis in the bilateral lower extremities.     Recent Labs   Lab 06/28/24  0524 06/28/24  0403 06/28/24  0229 06/27/24  2340 06/27/24  2334 06/27/24  1523 06/27/24  1330   WBC 11.2*  --   --   --   --   --  8.5   HGB 9.7*  --   --   --   --   --  13.2   MCV 99  --   --   --   --   --  99     --   --   --   --   --  341     --   --   --  143  --  145   POTASSIUM 4.2  --  3.4  --  3.5   < > 2.6*   CHLORIDE 109*  --   --   --  105  --  98   CO2 21*  --   --   --  21*  --  29   BUN 33.0*  --   --   --  34.6*  --  37.2*   CR 1.12*  --   --   --  1.06*  --  1.11*   ANIONGAP 14  --   --   --  17*  --  18*   PETER 9.4  --   --   --  9.3  --  11.2*   * 127*  --  210* 215*   < > 121*   ALBUMIN 3.1*  --   --   --   --   --  4.2   PROTTOTAL 5.6*  --   --   --   --   --  7.9   BILITOTAL 0.2  --   --   --   --   --  0.3   ALKPHOS 80  --   --   --   --   --  127   ALT 14  --   --   --   --   --  22   AST 18  --   --   --   --   --  28    < > = values in this interval not displayed.       Most Recent 3 CBC's:  Recent Labs   Lab Test 06/28/24 0524 06/27/24  1330 06/26/23  1237 05/23/23  0541   WBC 11.2* 8.5  --  5.5   HGB 9.7* 13.2 10.4* 8.6*   MCV 99 99  --  98    341  --  465*     Most Recent 2 LFT's:  Recent Labs   Lab Test 06/28/24  0524 06/27/24  1330   AST 18 28   ALT 14 22   ALKPHOS 80 127   BILITOTAL 0.2 0.3     Most Recent 3 BNP's:  Recent Labs   Lab Test 06/27/24  1330   NTBNPI 909      Latest Reference Range & Units 06/28/24 05:24   FIO2  30   Ph Venous 7.32 - 7.43  7.28 (L)   PCO2 Venous 40 - 50 mm Hg 47   PO2 Venous 25 - 47 mm Hg 40   O2 Sat, Venous 70.0 - 75.0 % 73.8   Bicarbonate Venous 21 - 28 mmol/L 22   Base Excess Venous -3.0 - 3.0 mmol/L -4.6 (L)   Oxyhemoglobin Venous 70 - 75 % 73      Latest Reference Range & Units 06/28/24 08:37   FIO2  25   Ph Venous 7.32 - 7.43  7.33   PCO2 Venous 40 - 50 mm Hg 40   PO2 Venous 25 - 47 mm Hg 38   O2 Sat, Venous 70.0 - 75.0 % 73.4    Bicarbonate Venous 21 - 28 mmol/L 21   Base Excess Venous -3.0 - 3.0 mmol/L -4.7 (L)   Oxyhemoglobin Venous 70 - 75 % 72   (L): Data is abnormally low      Most Recent 6 Bacteria Isolates From Any Culture (See EPIC Reports for Culture Details):No lab results found.  Most Recent ABG:No lab results found.  Total time critical care was 45 min    Felipe Ji MD

## 2024-06-28 NOTE — DISCHARGE INSTRUCTIONS
WOC DISCHARGE INSTRUCTIONS:  Buttocks  Soak irritated open skin with vashe moistened gauze for 2-5 minutes, pat dry  Apply AF powder to buttocks, then cover with TRIAD to open skin on buttocks  Once soiled, gently wipe away soiled TRIAD only, do not scrub all off  Resoak with Vashe moistened gauze  Apply additional TRIAD to keep skin covered and protected  Apply BID + PRN after each kelvin cares

## 2024-06-28 NOTE — CONSULTS
SPIRITUAL HEALTH SERVICES Progress Note  [facility], ICU    Saw pt Namita Viera per consult order/family request for [facility] anointing.  visited with patient's daughter, Tiara, who shared about Namita's hospitalization and medical history.     Namita comes from [employer] [place] background; no current connection to shake[employer] community. Family inquiring about  visit for sacrament of anointing. I let family know that our hospital  will be available on Saturday, referral made.     Plan of Care -  will attempt to visit on Saturday to offer sacrament of anointing.  staff remains available to offer support as needed.     Jeff Pa MDiv, [facility]  /Manager Spiritual Health Services  [phone]       Spiritual Health Services is available 24/7 for emergent requests and consults, either by paging the on-call  or by entering an ASAP/STAT consult in [employer], which will also page the on-call .

## 2024-06-28 NOTE — PLAN OF CARE
[facility] - ICU    RN Progress Note:            Pertinent Assessments:      Please refer to flowsheet rows for full assessment     Oriented x3. Moves all extremities weakly, patient is very stiff. Follows commands.    NSR. BP low at times. Pulses palpable but weak. Edema in lower extremities.    Diminished lung sounds. BiPAP at 30% AVAPS. Weak cough.    Hypoactive bowel sounds.    Delgado in place.    Skin on bottom is very red and irritated, mepilex in place, awaiting cream and powder.           Key Events - This Shift:       Heparin infusing at 600.     Lidocaine patches applied for pain management. Patient is in pain with movement.    PICC was placed.    BP started to drop at the end of my shift, levo was ordered.    pH is improving from VBGs.             Barriers to Discharge / Downgrade:     Levo         Point of Contact Update YES-OR-NO: Yes  Name:Tiara  Phone Number:see chart  Summary of Conversation: updated Tiara on plan for the night and how the patient was doing. Told her that we would call with any big changes.        Problem: Gas Exchange Impaired  Goal: Optimal Gas Exchange  Outcome: Progressing  Intervention: Optimize Oxygenation and Ventilation  Recent Flowsheet Documentation  Taken 6/27/2024 2200 by Dionne Hinton, RN  Head of Bed (HOB) Positioning: HOB at 20-30 degrees  Taken 6/27/2024 2000 by Dionne Hinton, RN  Head of Bed (HOB) Positioning: HOB at 20-30 degrees     Problem: Pain Acute  Goal: Optimal Pain Control and Function  Outcome: Progressing  Intervention: Prevent or Manage Pain  Recent Flowsheet Documentation  Taken 6/27/2024 2000 by Dionne Hinton, RN  Sensory Stimulation Regulation:   care clustered   lighting decreased   quiet environment promoted  Sleep/Rest Enhancement:   awakenings minimized   comfort measures   noise level reduced   regular sleep/rest pattern promoted   relaxation techniques promoted   room darkened  Medication Review/Management:    medications reviewed   high-risk medications identified  Intervention: Optimize Psychosocial Wellbeing  Recent Flowsheet Documentation  Taken 6/27/2024 2000 by Dionne Hinton, RN  Supportive Measures: relaxation techniques promoted     Problem: Comorbidity Management  Goal: Maintenance of COPD Symptom Control  Outcome: Progressing  Intervention: Maintain COPD Symptom Control  Recent Flowsheet Documentation  Taken 6/27/2024 2000 by Dionne Hinton, RN  Supportive Measures: relaxation techniques promoted  Medication Review/Management:   medications reviewed   high-risk medications identified     Problem: Adult Inpatient Plan of Care  Goal: Absence of Hospital-Acquired Illness or Injury  Intervention: Prevent Skin Injury  Recent Flowsheet Documentation  Taken 6/27/2024 2200 by Dionne Hinton, RN  Body Position:   turned   right   legs elevated  Taken 6/27/2024 2000 by Dionne Hinton, RN  Body Position:   turned   supine, legs elevated  Skin Protection:   incontinence pads utilized   silicone foam dressing in place   skin to device areas padded   skin to skin areas padded   transparent dressing maintained  Device Skin Pressure Protection:   absorbent pad utilized/changed   pressure points protected   skin-to-device areas padded   skin-to-skin areas padded   tubing/devices free from skin contact

## 2024-06-29 ENCOUNTER — APPOINTMENT (OUTPATIENT)
Dept: OCCUPATIONAL THERAPY | Facility: HOSPITAL | Age: 74
DRG: 871 | End: 2024-06-29
Attending: INTERNAL MEDICINE
Payer: COMMERCIAL

## 2024-06-29 ENCOUNTER — APPOINTMENT (OUTPATIENT)
Dept: PHYSICAL THERAPY | Facility: HOSPITAL | Age: 74
DRG: 871 | End: 2024-06-29
Attending: INTERNAL MEDICINE
Payer: COMMERCIAL

## 2024-06-29 LAB
ANION GAP SERPL CALCULATED.3IONS-SCNC: 12 MMOL/L (ref 7–15)
BACTERIA UR CULT: NO GROWTH
BASOPHILS # BLD AUTO: 0 10E3/UL (ref 0–0.2)
BASOPHILS NFR BLD AUTO: 0 %
BUN SERPL-MCNC: 32.2 MG/DL (ref 8–23)
CALCIUM SERPL-MCNC: 9.1 MG/DL (ref 8.8–10.2)
CHLORIDE SERPL-SCNC: 109 MMOL/L (ref 98–107)
CREAT SERPL-MCNC: 1.21 MG/DL (ref 0.51–0.95)
DEPRECATED HCO3 PLAS-SCNC: 20 MMOL/L (ref 22–29)
EGFRCR SERPLBLD CKD-EPI 2021: 46 ML/MIN/1.73M2
ENTEROCOCCUS FAECALIS: NOT DETECTED
ENTEROCOCCUS FAECIUM: NOT DETECTED
EOSINOPHIL # BLD AUTO: 0 10E3/UL (ref 0–0.7)
EOSINOPHIL NFR BLD AUTO: 0 %
ERYTHROCYTE [DISTWIDTH] IN BLOOD BY AUTOMATED COUNT: 14.8 % (ref 10–15)
GLUCOSE BLDC GLUCOMTR-MCNC: 136 MG/DL (ref 70–99)
GLUCOSE BLDC GLUCOMTR-MCNC: 141 MG/DL (ref 70–99)
GLUCOSE BLDC GLUCOMTR-MCNC: 142 MG/DL (ref 70–99)
GLUCOSE BLDC GLUCOMTR-MCNC: 143 MG/DL (ref 70–99)
GLUCOSE BLDC GLUCOMTR-MCNC: 165 MG/DL (ref 70–99)
GLUCOSE BLDC GLUCOMTR-MCNC: 168 MG/DL (ref 70–99)
GLUCOSE SERPL-MCNC: 159 MG/DL (ref 70–99)
HCT VFR BLD AUTO: 25.4 % (ref 35–47)
HGB BLD-MCNC: 8.7 G/DL (ref 11.7–15.7)
IMM GRANULOCYTES # BLD: 0.2 10E3/UL
IMM GRANULOCYTES NFR BLD: 1 %
LISTERIA SPECIES (DETECTED/NOT DETECTED): NOT DETECTED
LYMPHOCYTES # BLD AUTO: 0.3 10E3/UL (ref 0.8–5.3)
LYMPHOCYTES NFR BLD AUTO: 2 %
MAGNESIUM SERPL-MCNC: 2.4 MG/DL (ref 1.7–2.3)
MCH RBC QN AUTO: 33.5 PG (ref 26.5–33)
MCHC RBC AUTO-ENTMCNC: 34.3 G/DL (ref 31.5–36.5)
MCV RBC AUTO: 98 FL (ref 78–100)
MONOCYTES # BLD AUTO: 0.4 10E3/UL (ref 0–1.3)
MONOCYTES NFR BLD AUTO: 2 %
NEUTROPHILS # BLD AUTO: 17.1 10E3/UL (ref 1.6–8.3)
NEUTROPHILS NFR BLD AUTO: 95 %
NRBC # BLD AUTO: 0 10E3/UL
NRBC BLD AUTO-RTO: 0 /100
PLATELET # BLD AUTO: 250 10E3/UL (ref 150–450)
POTASSIUM SERPL-SCNC: 3.5 MMOL/L (ref 3.4–5.3)
RBC # BLD AUTO: 2.6 10E6/UL (ref 3.8–5.2)
SODIUM SERPL-SCNC: 141 MMOL/L (ref 135–145)
STAPHYLOCOCCUS AUREUS: NOT DETECTED
STAPHYLOCOCCUS EPIDERMIDIS: NOT DETECTED
STAPHYLOCOCCUS LUGDUNENSIS: NOT DETECTED
STAPHYLOCOCCUS SPECIES: DETECTED
STREPTOCOCCUS AGALACTIAE: NOT DETECTED
STREPTOCOCCUS ANGINOSUS GROUP: NOT DETECTED
STREPTOCOCCUS PNEUMONIAE: NOT DETECTED
STREPTOCOCCUS PYOGENES: NOT DETECTED
STREPTOCOCCUS SPECIES: NOT DETECTED
UFH PPP CHRO-ACNC: 0.59 IU/ML
UFH PPP CHRO-ACNC: 0.6 IU/ML
UFH PPP CHRO-ACNC: 0.66 IU/ML
WBC # BLD AUTO: 18 10E3/UL (ref 4–11)

## 2024-06-29 PROCEDURE — 85520 HEPARIN ASSAY: CPT | Performed by: INTERNAL MEDICINE

## 2024-06-29 PROCEDURE — 94640 AIRWAY INHALATION TREATMENT: CPT | Mod: 76

## 2024-06-29 PROCEDURE — 250N000011 HC RX IP 250 OP 636: Performed by: INTERNAL MEDICINE

## 2024-06-29 PROCEDURE — 999N000157 HC STATISTIC RCP TIME EA 10 MIN

## 2024-06-29 PROCEDURE — 250N000013 HC RX MED GY IP 250 OP 250 PS 637: Performed by: INTERNAL MEDICINE

## 2024-06-29 PROCEDURE — 83735 ASSAY OF MAGNESIUM: CPT | Performed by: INTERNAL MEDICINE

## 2024-06-29 PROCEDURE — 999N000287 HC ICU ADULT ROUNDING, EACH 10 MINS

## 2024-06-29 PROCEDURE — 85025 COMPLETE CBC W/AUTO DIFF WBC: CPT | Performed by: INTERNAL MEDICINE

## 2024-06-29 PROCEDURE — 87040 BLOOD CULTURE FOR BACTERIA: CPT | Performed by: INTERNAL MEDICINE

## 2024-06-29 PROCEDURE — 250N000011 HC RX IP 250 OP 636: Performed by: EMERGENCY MEDICINE

## 2024-06-29 PROCEDURE — 36415 COLL VENOUS BLD VENIPUNCTURE: CPT | Performed by: INTERNAL MEDICINE

## 2024-06-29 PROCEDURE — 97166 OT EVAL MOD COMPLEX 45 MIN: CPT | Mod: GO

## 2024-06-29 PROCEDURE — 80048 BASIC METABOLIC PNL TOTAL CA: CPT | Performed by: INTERNAL MEDICINE

## 2024-06-29 PROCEDURE — 250N000009 HC RX 250: Performed by: INTERNAL MEDICINE

## 2024-06-29 PROCEDURE — 99291 CRITICAL CARE FIRST HOUR: CPT | Performed by: INTERNAL MEDICINE

## 2024-06-29 PROCEDURE — 200N000001 HC R&B ICU

## 2024-06-29 PROCEDURE — 87641 MR-STAPH DNA AMP PROBE: CPT | Performed by: INTERNAL MEDICINE

## 2024-06-29 PROCEDURE — 97162 PT EVAL MOD COMPLEX 30 MIN: CPT | Mod: GP

## 2024-06-29 PROCEDURE — 258N000003 HC RX IP 258 OP 636: Performed by: INTERNAL MEDICINE

## 2024-06-29 RX ORDER — CEFAZOLIN SODIUM 1 G/50ML
750 SOLUTION INTRAVENOUS ONCE
Status: COMPLETED | OUTPATIENT
Start: 2024-06-29 | End: 2024-06-29

## 2024-06-29 RX ORDER — VANCOMYCIN HYDROCHLORIDE 500 MG/10ML
500 INJECTION, POWDER, LYOPHILIZED, FOR SOLUTION INTRAVENOUS
Status: DISCONTINUED | OUTPATIENT
Start: 2024-06-30 | End: 2024-06-30

## 2024-06-29 RX ORDER — METHYLPREDNISOLONE SODIUM SUCCINATE 125 MG/2ML
60 INJECTION, POWDER, LYOPHILIZED, FOR SOLUTION INTRAMUSCULAR; INTRAVENOUS EVERY 12 HOURS
Status: DISCONTINUED | OUTPATIENT
Start: 2024-06-29 | End: 2024-06-30

## 2024-06-29 RX ORDER — POTASSIUM CHLORIDE 7.45 MG/ML
10 INJECTION INTRAVENOUS ONCE
Status: COMPLETED | OUTPATIENT
Start: 2024-06-29 | End: 2024-06-29

## 2024-06-29 RX ORDER — FOLIC ACID 1 MG/1
1 TABLET ORAL DAILY
Status: DISCONTINUED | OUTPATIENT
Start: 2024-06-30 | End: 2024-07-04 | Stop reason: HOSPADM

## 2024-06-29 RX ORDER — MIDODRINE HYDROCHLORIDE 2.5 MG/1
2.5 TABLET ORAL
Status: DISCONTINUED | OUTPATIENT
Start: 2024-06-29 | End: 2024-07-04 | Stop reason: HOSPADM

## 2024-06-29 RX ORDER — PANTOPRAZOLE SODIUM 40 MG/1
40 TABLET, DELAYED RELEASE ORAL
Status: DISCONTINUED | OUTPATIENT
Start: 2024-06-30 | End: 2024-07-04 | Stop reason: HOSPADM

## 2024-06-29 RX ADMIN — PANTOPRAZOLE SODIUM 40 MG: 40 INJECTION, POWDER, FOR SOLUTION INTRAVENOUS at 08:25

## 2024-06-29 RX ADMIN — POTASSIUM CHLORIDE 10 MEQ: 7.46 INJECTION, SOLUTION INTRAVENOUS at 04:58

## 2024-06-29 RX ADMIN — METHYLPREDNISOLONE SODIUM SUCCINATE 62.5 MG: 125 INJECTION, POWDER, FOR SOLUTION INTRAMUSCULAR; INTRAVENOUS at 08:25

## 2024-06-29 RX ADMIN — HEPARIN SODIUM 550 UNITS/HR: 10000 INJECTION, SOLUTION INTRAVENOUS at 00:19

## 2024-06-29 RX ADMIN — MICONAZOLE NITRATE ANTIFUNGAL POWDER: 2 POWDER TOPICAL at 20:41

## 2024-06-29 RX ADMIN — NYSTATIN: 100000 CREAM TOPICAL at 08:28

## 2024-06-29 RX ADMIN — CARBIDOPA AND LEVODOPA 2.5 MG: 50; 200 TABLET, EXTENDED RELEASE ORAL at 12:54

## 2024-06-29 RX ADMIN — INSULIN ASPART 1 UNITS: 100 INJECTION, SOLUTION INTRAVENOUS; SUBCUTANEOUS at 08:33

## 2024-06-29 RX ADMIN — LIDOCAINE: 50 OINTMENT TOPICAL at 08:29

## 2024-06-29 RX ADMIN — IPRATROPIUM BROMIDE AND ALBUTEROL SULFATE 3 ML: .5; 3 SOLUTION RESPIRATORY (INHALATION) at 01:18

## 2024-06-29 RX ADMIN — INSULIN ASPART 1 UNITS: 100 INJECTION, SOLUTION INTRAVENOUS; SUBCUTANEOUS at 20:27

## 2024-06-29 RX ADMIN — MICONAZOLE NITRATE ANTIFUNGAL POWDER: 2 POWDER TOPICAL at 08:28

## 2024-06-29 RX ADMIN — THIAMINE HYDROCHLORIDE 100 MG: 100 INJECTION, SOLUTION INTRAMUSCULAR; INTRAVENOUS at 08:26

## 2024-06-29 RX ADMIN — METHYLPREDNISOLONE SODIUM SUCCINATE 62.5 MG: 125 INJECTION, POWDER, FOR SOLUTION INTRAMUSCULAR; INTRAVENOUS at 02:24

## 2024-06-29 RX ADMIN — FOLIC ACID 1 MG: 5 INJECTION, SOLUTION INTRAMUSCULAR; INTRAVENOUS; SUBCUTANEOUS at 08:26

## 2024-06-29 RX ADMIN — SODIUM CHLORIDE 750 MG: 9 INJECTION, SOLUTION INTRAVENOUS at 22:42

## 2024-06-29 RX ADMIN — IPRATROPIUM BROMIDE AND ALBUTEROL SULFATE 3 ML: .5; 3 SOLUTION RESPIRATORY (INHALATION) at 19:11

## 2024-06-29 RX ADMIN — CARBIDOPA AND LEVODOPA 2.5 MG: 50; 200 TABLET, EXTENDED RELEASE ORAL at 17:35

## 2024-06-29 RX ADMIN — NYSTATIN: 100000 CREAM TOPICAL at 20:50

## 2024-06-29 RX ADMIN — METHYLPREDNISOLONE SODIUM SUCCINATE 62.5 MG: 125 INJECTION, POWDER, FOR SOLUTION INTRAMUSCULAR; INTRAVENOUS at 20:30

## 2024-06-29 RX ADMIN — LIDOCAINE: 50 OINTMENT TOPICAL at 12:53

## 2024-06-29 RX ADMIN — ACETAMINOPHEN 325 MG: 325 TABLET ORAL at 22:18

## 2024-06-29 RX ADMIN — INSULIN ASPART 1 UNITS: 100 INJECTION, SOLUTION INTRAVENOUS; SUBCUTANEOUS at 03:54

## 2024-06-29 RX ADMIN — LIDOCAINE: 50 OINTMENT TOPICAL at 20:41

## 2024-06-29 RX ADMIN — LIDOCAINE: 50 OINTMENT TOPICAL at 17:36

## 2024-06-29 RX ADMIN — LIDOCAINE 2 PATCH: 4 PATCH TOPICAL at 20:41

## 2024-06-29 RX ADMIN — AZITHROMYCIN DIHYDRATE 250 MG: 500 INJECTION, POWDER, LYOPHILIZED, FOR SOLUTION INTRAVENOUS at 17:44

## 2024-06-29 RX ADMIN — CEFTRIAXONE SODIUM 1 G: 1 INJECTION, POWDER, FOR SOLUTION INTRAMUSCULAR; INTRAVENOUS at 20:34

## 2024-06-29 RX ADMIN — IPRATROPIUM BROMIDE AND ALBUTEROL SULFATE 3 ML: .5; 3 SOLUTION RESPIRATORY (INHALATION) at 07:55

## 2024-06-29 ASSESSMENT — ACTIVITIES OF DAILY LIVING (ADL)
ADLS_ACUITY_SCORE: 44
ADLS_ACUITY_SCORE: 45
ADLS_ACUITY_SCORE: 44
ADLS_ACUITY_SCORE: 44
ADLS_ACUITY_SCORE: 45
ADLS_ACUITY_SCORE: 45
ADLS_ACUITY_SCORE: 44
ADLS_ACUITY_SCORE: 45
ADLS_ACUITY_SCORE: 45
ADLS_ACUITY_SCORE: 44
ADLS_ACUITY_SCORE: 45
ADLS_ACUITY_SCORE: 45
ADLS_ACUITY_SCORE: 44
ADLS_ACUITY_SCORE: 45
ADLS_ACUITY_SCORE: 44
ADLS_ACUITY_SCORE: 45
ADLS_ACUITY_SCORE: 44
ADLS_ACUITY_SCORE: 45

## 2024-06-29 NOTE — PLAN OF CARE
Problem: Adult Inpatient Plan of Care  Goal: Plan of Care Review  Description: The Plan of Care Review/Shift note should be completed every shift.  The Outcome Evaluation is a brief statement about your assessment that the patient is improving, declining, or no change.  This information will be displayed automatically on your shift  note.  Outcome: Progressing     Patient up in chair for couple hours, butt is very sore and activity she has overall body aches. Poor appetite today, for lunch and dinner but did eat all her breakfast.     BP improved after starting midodrine, levophed off at 1245. May downgrade tomorrow if can stay off IV pressors.     Low urine output 275ml in 12 hr period. Patient is drinking well.

## 2024-06-29 NOTE — PLAN OF CARE
"  Problem: Adult Inpatient Plan of Care  Goal: Plan of Care Review  Description: The Plan of Care Review/Shift note should be completed every shift.  The Outcome Evaluation is a brief statement about your assessment that the patient is improving, declining, or no change.  This information will be displayed automatically on your shift  note.  Outcome: Progressing     Problem: Risk for Delirium  Goal: Optimal Coping  Outcome: Progressing  Intervention: Optimize Psychosocial Adjustment to Delirium  Recent Flowsheet Documentation  Taken 6/29/2024 0000 by Robert Cohen RN  Supportive Measures:   active listening utilized   relaxation techniques promoted  Taken 6/28/2024 2000 by Robert Cohen RN  Supportive Measures:   active listening utilized   relaxation techniques promoted     Problem: Comorbidity Management  Goal: Maintenance of COPD Symptom Control  Outcome: Progressing  Intervention: Maintain COPD Symptom Control  Recent Flowsheet Documentation  Taken 6/29/2024 0000 by Robert Cohen RN  Supportive Measures:   active listening utilized   relaxation techniques promoted  Medication Review/Management: medications reviewed  Taken 6/28/2024 2000 by Robert Cohen RN  Supportive Measures:   active listening utilized   relaxation techniques promoted  Medication Review/Management: medications reviewed   Goal Outcome Evaluation:       [facility] - ICU    RN Progress Note:            Pertinent Assessments:      Please refer to flowsheet rows for full assessment     Alert and oriented to person,place and situation. Vitals stable. Buttocks/ coccyx very denuded socked with Vashe and applied TRIAD as per order.             Key Events - This Shift:       Pt removed and refused wearing BIPAP Dr. Fry  came to bedside discussed and explained the indication of wearing BIPAP but patient still refused stated that \" she is claustrophobic \" Lungs sounds diminished/course on 2 litter of oxygen/ NC " armando mid-high 90's. Will continue to monitor.                      Barriers to Discharge / Downgrade:     Levo drip.

## 2024-06-29 NOTE — PROGRESS NOTES
SPIRITUAL [facility] Progress Note    Saw pt Namita Viera and offered Yarsanism sacrament of anointing for the healing of the sick.     Fr. Mike Trotter

## 2024-06-29 NOTE — PROGRESS NOTES
Patient refused wearing BIPAP; nebs given. BS diminished/coarse. Pt is currently on 2 lpm O2 by nasal cannula.     Danny Ornelas, RT

## 2024-06-29 NOTE — PROGRESS NOTES
06/29/24 1300   Appointment Info   Signing Clinician's Name / Credentials (PT) Terri King,PT   Living Environment   People in Home alone   Current Living Arrangements apartment   Home Accessibility no concerns   Self-Care   Equipment Currently Used at Home cane, straight;walker, rolling;other (see comments)  ([employer]-iinside and casne outside)   Fall history within last six months yes   Number of times patient has fallen within last six months 1   Activity/Exercise/Self-Care Comment I ADLs, IADLS, drives   General Information   Onset of Illness/Injury or Date of Surgery 06/27/24   Referring Physician Dr. Di Martínez   Patient/Family Therapy Goals Statement (PT) none stated   Pertinent History of Current Problem (include personal factors and/or comorbidities that impact the POC) Namita Viera is a 74 year old female with PMH of COPD and emphysema not on home O2, tobacco and alcohol use, malnutrition, right hip fracture s/p ORIF in 5/2023, traumatic SAH about 5 years ago who was brought in by EMS after family could not get in touch with the patient for a few days.  EMS found patient on a  with O2 sats in the 60s which improved with only couple liters of supplemental O2.       Family reports patient has longstanding history of alcohol use, she drinks about 6 glasses of wine daily.  They are not aware of alcohol withdrawal symptoms in the past but they are not sure if she ever stopped drinking.  She smokes daily.  Has very poor oral intake.  Has not been moving much at home but has been getting out to buy wine and cigarettes.  She is not on home O2.  Daughter believes she has not been taking her medications consistently.  Daughter is not aware of any recent fevers, chills, worsening cough.  Patient admits having had a fall.  Grimaces upon touching her left shoulder and left hip.   Existing Precautions/Restrictions other (see comments)  (pt in ICU, multiple lines)   General Observations pt in bed  agreeable to therapy   Cognition   Affect/Mental Status (Cognition) WFL   Pain Assessment   Patient Currently in Pain Yes, see Vital Sign flowsheet  (back, buttocks)   Range of Motion (ROM)   ROM Comment BLEs limited due to pain   Strength (Manual Muscle Testing)   Strength Comments BLE s limited due to pain   Bed Mobility   Bed Mobility Limitations decreased ability to use legs for bridging/pushing   Impairments Contributing to Impaired Bed Mobility pain;decreased strength   Assistive Device (Bed Mobility) bed rails;draw sheet;other (see comments)  (HOB elevated)   Comment, (Bed Mobility) mod Ax2 due to pain   Transfers   Impairments Contributing to Impaired Transfers pain;decreased strength   Comment, (Transfers) mod Ax1 arm in arm   Gait/Stairs (Locomotion)   [place] Level (Gait) minimum assist (75% patient effort);2 person assist   Assistive Device (Gait) other (see comments)  (arm in arm)   Distance in Feet (Gait) 3'   Pattern (Gait) step-to   Deviations/Abnormal Patterns (Gait) antalgic;weight shifting decreased   Comment, (Gait/Stairs) pt shakey due to pain   Balance   Balance Comments unsteady   Clinical Impression   Criteria for Skilled Therapeutic Intervention Yes, treatment indicated   PT Diagnosis (PT) decreased functional mobility   Influenced by the following impairments pain, weakness   Functional limitations due to impairments bed mob, transfers ,gait   Clinical Presentation (PT Evaluation Complexity) evolving   Clinical Presentation Rationale presents as medically diagnosed   Clinical Decision Making (Complexity) moderate complexity   Planned Therapy Interventions (PT) bed mobility training;gait training;transfer training;strengthening   Risk & Benefits of therapy have been explained evaluation/treatment results reviewed   PT Total Evaluation Time   PT Eval, Moderate Complexity Minutes (17881) 18   Physical Therapy Goals   PT Frequency 5x/week   PT Predicted Duration/Target Date for Goal  Attainment 07/06/24   PT Goals Bed Mobility;Transfers;Gait   PT: Bed Mobility Minimal assist;Supine to/from sit   PT: Transfers Sit to/from stand;Bed to/from chair;Assistive device  (CGA)   PT: Gait Minimal assist;Rolling walker;100 feet   PT Discharge Planning   PT Plan LEex, bedmob, transfers, gait   PT Discharge Recommendation (DC Rec) Transitional Care Facility   PT Rationale for DC Rec pt limited mobility due to pain and weakness   PT Brief overview of current status pt mod Ax2 bedmob and transfer to chair arm in arm 3'   PT Equipment Needed at Discharge walker, rolling   Total Session Time   Total Session Time (sum of timed and untimed services) 18

## 2024-06-29 NOTE — PROGRESS NOTES
"Critical Care Progress Note      06/29/2024    Name: Namita Viera MRN#: [id number]   Age: 74 year old YOB: 1950     Hsptl Day# 2  ICU DAY #    MV DAY #             Problem List:   Principal Problem:    Acute respiratory failure with hypercapnia (H)  Active Problems:    Hypokalemia    COPD exacerbation (H)    Altered mental status, unspecified altered mental status type    Other acute pulmonary embolism without acute cor pulmonale (H)    Aspiration pneumonia, unspecified aspiration pneumonia type, unspecified laterality, unspecified part of lung (H)    Clinically Significant Risk Factors        # Hypokalemia: Lowest K = 2.6 mmol/L in last 2 days, will replace as needed    # Hypercalcemia: Highest Ca = 11.2 mg/dL in last 2 days, will monitor as appropriate    # Hypoalbuminemia: Lowest albumin = 3.1 g/dL at 6/28/2024  5:24 AM, will monitor as appropriate     # Hypertension: Noted on problem list           #Precipitous drop in Hgb/Hct: Lowest Hgb this hospitalization: 8.7 g/dL. Will continue to monitor and treat/transfuse as appropriate.     # Cachexia: Estimated body mass index is 17.41 kg/m  as calculated from the following:    Height as of this encounter: 1.549 m (5' 1\").    Weight as of this encounter: 41.8 kg (92 lb 2.4 oz)., PRESENT ON ADMISSION  # Severe Malnutrition: based on nutrition assessment, PRESENT ON ADMISSION         Code Status: No CPR- Pre-arrest intubation OK                    Summary/Hospital Course:     history of alcohol dependence, extensive emphysema with no PFTs on record, kyphosis, osteoporosis, hip fractures s/p bilateral ORIF, GERD, HTN, insomnia, protein-calorie malnutrition, subarachnoid hemorrhage, vitamin D deficiency, E coli UTI presented on 6/27 with acute encephalopathy, hypoxia, found to be agitated, respiratory acidosis, segmental PE, acute multifocal bronchitis/pneumonia most prominent in LLL, admitted to ICU on BiPAP.       Assessment and plan :       I have " personally reviewed the daily labs, imaging studies, cultures and discussed the case with referring physician and consulting physicians.     My assessment and plan by system for this patient is as follows:    Neurology/Psychiatry:   Alcohol dependence, drinks at least 6 glasses of wine per day  Continue thiamine/folic acid    Cardiovascular:   Small PE on heparin drip    Still requiring low-dose norepinephrine  Start midodrine      Pulmonary/Ventilator Management:   Acute respiratory failure with hypercapnia and hypoxia  Bronchitis/community-acquired pneumonia  Pulmonary embolism  OPD exacerbation    Continue bronchodilators  Decrease steroids to every 12 hours  Continue antibiotics    Probably aspiration pneumonia based on intraluminal debris and tree-in-bud opacities    Refused BiPAP overnight.  Switch to as needed    GI and Nutrition :   Protein calorie malnutrition      Renal/Fluids/Electrolytes:   CARLOS  Creatinine seems to have plateaued    - monitor function and electrolytes as needed with replacement per ICU protocols.  - generally avoid nephrotoxic agents such as NSAID, IV contrast unless specifically required  - adjust medications as needed for renal clearance  - follow I/O's as appropriate.    Infectious Disease:   Continue ceftriaxone and azithromycin for acute bronchitis/community-acquired pneumonia versus aspiration pneumonia    Endocrine:     Plan  - ICU insulin protocol, goal sugar <180      Hematology/Oncology:   Macrocytic anemia  Leukocytosis secondary to steroids      ICU Prophylaxis:   1. DVT: Hep drip  3. Stress Ulcer: PPI           Key Medications:     Current Facility-Administered Medications   Medication Dose Route Frequency Provider Last Rate Last Admin    azithromycin (ZITHROMAX) 250 mg in sodium chloride 0.9 % 250 mL intermittent infusion  250 mg Intravenous Q24H Raj Coker MD   250 mg at 06/28/24 5810    cefTRIAXone (ROCEPHIN) 1 g vial to attach to  mL bag for ADULTS or  NS 50 mL bag for PEDS  1 g Intravenous Q24H Raj Coker MD   1 g at 06/28/24 2010    [START ON 6/30/2024] folic acid (FOLVITE) tablet 1 mg  1 mg Oral Daily Di Martínez MD        insulin aspart (NovoLOG) injection (RAPID ACTING)  1-7 Units Subcutaneous Q4H Raj Coker MD   1 Units at 06/29/24 0833    ipratropium - albuterol 0.5 mg/2.5 mg/3 mL (DUONEB) neb solution 3 mL  3 mL Nebulization Q6H Raj Coker MD   3 mL at 06/29/24 0755    Lidocaine (LIDOCARE) 4 % Patch 2 patch  2 patch Transdermal Q24h Di Martínez MD   2 patch at 06/28/24 2011    lidocaine (XYLOCAINE) 5 % ointment   Topical 4x Daily Di Martínez MD   Given at 06/29/24 0829    methylPREDNISolone sodium succinate (solu-MEDROL) injection 62.5 mg  62.5 mg Intravenous Q6H Di Martínez MD   62.5 mg at 06/29/24 0825    miconazole (MICATIN) 2 % powder   Topical BID Di Martínez MD   Given at 06/29/24 0828    nicotine (NICODERM CQ) 14 MG/24HR 24 hr patch 1 patch  1 patch Transdermal Daily Di Martínez MD        nystatin (MYCOSTATIN) cream   Topical BID Raj Coker MD   Given at 06/29/24 0828    [START ON 6/30/2024] pantoprazole (PROTONIX) EC tablet 40 mg  40 mg Oral QAM AC Di Martínez MD        sodium chloride (PF) 0.9% PF flush 3 mL  3 mL Intracatheter Q8H Di Martínez MD   3 mL at 06/29/24 0826    [START ON 6/30/2024] thiamine (B-1) tablet 100 mg  100 mg Oral Daily Di Martínez MD         Current Facility-Administered Medications   Medication Dose Route Frequency Provider Last Rate Last Admin    heparin 25,000 units in 0.45% NaCl 250 mL ANTICOAGULANT infusion  0-5,000 Units/hr Intravenous Continuous Tanya Oliveira MD 5.5 mL/hr at 06/29/24 0900 550 Units/hr at 06/29/24 0900    norepinephrine (LEVOPHED) 4 mg in  mL infusion PREMIX  0.01-0.6 mcg/kg/min Intravenous Continuous Zane Crabtree MD 1.6 mL/hr at 06/29/24 1030 0.01  mcg/kg/min at 06/29/24 1030    Patient is already receiving anticoagulation with heparin, enoxaparin (LOVENOX), warfarin (COUMADIN)  or other anticoagulant medication   Does not apply Continuous PRN Di Martínez MD                   Physical Examination:   Temp:  [97.3  F (36.3  C)-99.1  F (37.3  C)] 97.9  F (36.6  C)  Pulse:  [] 90  Resp:  [15-80] 32  BP: ()/(43-65) 97/56  FiO2 (%):  [25 %] 25 %  SpO2:  [87 %-100 %] 96 %    Intake/Output Summary (Last 24 hours) at 6/29/2024 1056  Last data filed at 6/29/2024 0900  Gross per 24 hour   Intake 1946.16 ml   Output 650 ml   Net 1296.16 ml     Wt Readings from Last 4 Encounters:   06/29/24 41.8 kg (92 lb 2.4 oz)   06/26/23 43.2 kg (95 lb 3.2 oz)   05/31/23 45.3 kg (99 lb 14.4 oz)   05/23/23 47.2 kg (104 lb)     BP - Mean:  [58-88] 73  FiO2 (%): 25 %  Resp: (!) 32    Recent Labs   Lab 06/28/24  1757 06/28/24  0837 06/28/24  0524 06/27/24  2334   O2PER 23 25 30 30       GEN: no acute distress   HEENT: head ncat, sclera anicteric, OP patent, trachea midline   PULM: Bilateral rhonchi  CV/COR: RRR S1S2 no gallop,  No rub, no murmur  ABD: soft nontender, hypoactive bowel sounds, no mass  EXT: Lower extremity edema  NEURO: Awake.  Answers questions  SKIN: no obvious rash  LINES: clean, dry intact         Data:   All data and imaging reviewed     ROUTINE ICU LABS (Last four results)  CMP  Recent Labs   Lab 06/29/24  0833 06/29/24  0354 06/29/24  0350 06/29/24  0004 06/28/24  2007 06/28/24  1749 06/28/24  1604 06/28/24  1332 06/28/24  0836 06/28/24  0524 06/27/24  2340 06/27/24  2334 06/27/24  1523 06/27/24  1330   NA  --   --  141  --   --  141  --   --   --  144  --  143  --  145   POTASSIUM  --   --  3.5  --   --  3.9  --  3.8  --  4.2   < > 3.5   < > 2.6*   CHLORIDE  --   --  109*  --   --  109*  --   --   --  109*  --  105  --  98   CO2  --   --  20*  --   --  20*  --   --   --  21*  --  21*  --  29   ANIONGAP  --   --  12  --   --  12  --   --   --  14   "--  17*  --  18*   * 168* 159* 136*   < > 222*   < >  --    < > 154*   < > 215*   < > 121*   BUN  --   --  32.2*  --   --  32.1*  --   --   --  33.0*  --  34.6*  --  37.2*   CR  --   --  1.21*  --   --  1.24*  --   --   --  1.12*  --  1.06*  --  1.11*   GFRESTIMATED  --   --  46*  --   --  45*  --   --   --  51*  --  54*  --  51*   PETER  --   --  9.1  --   --  9.3  --   --   --  9.4  --  9.3  --  11.2*   MAG  --   --  2.4*  --   --   --   --  2.8*  --  1.7  --   --   --  2.1   PROTTOTAL  --   --   --   --   --   --   --   --   --  5.6*  --   --   --  7.9   ALBUMIN  --   --   --   --   --   --   --   --   --  3.1*  --   --   --  4.2   BILITOTAL  --   --   --   --   --   --   --   --   --  0.2  --   --   --  0.3   ALKPHOS  --   --   --   --   --   --   --   --   --  80  --   --   --  127   AST  --   --   --   --   --   --   --   --   --  18  --   --   --  28   ALT  --   --   --   --   --   --   --   --   --  14  --   --   --  22    < > = values in this interval not displayed.     CBC  Recent Labs   Lab 06/29/24  0350 06/28/24  0524 06/27/24  1330   WBC 18.0* 11.2* 8.5   RBC 2.60* 2.94* 4.01   HGB 8.7* 9.7* 13.2   HCT 25.4* 29.2* 39.8   MCV 98 99 99   MCH 33.5* 33.0 32.9   MCHC 34.3 33.2 33.2   RDW 14.8 14.5 14.4    277 341     INRNo lab results found in last 7 days.  Arterial Blood Gas  Recent Labs   Lab 06/28/24  1757 06/28/24  0837 06/28/24  0524 06/27/24  2334   O2PER 23 25 30 30       All cultures:  No results for input(s): \"CULT\" in the last 168 hours.  No results found for this or any previous visit (from the past 24 hour(s)).      Billing: This patient is critically ill: Yes. Total critical care time today 33 min exclusive of procedures or teaching.          "

## 2024-06-29 NOTE — PROGRESS NOTES
Occupational Therapy        06/29/24 1301   Appointment Info   Signing Clinician's Name / Credentials (OT) Loni Ireland OTR/L   Living Environment   People in Home alone   Current Living Arrangements apartment   Home Accessibility no concerns   Self-Care   Equipment Currently Used at Home cane, straight;walker, rolling;other (see comments)  (uses 4WW inside, cane outside)   Fall history within last six months yes   Number of times patient has fallen within last six months 1   Activity/Exercise/Self-Care Comment independent with all ADLs/IADLs   General Information   Onset of Illness/Injury or Date of Surgery 06/27/24   Referring Physician Di Martínez MD   Patient/Family Therapy Goal Statement (OT) get home   Additional Occupational Profile Info/Pertinent History of Current Problem history of alcohol dependence, extensive emphysema with no PFTs on record, kyphosis, osteoporosis, hip fractures s/p bilateral ORIF, GERD, HTN, insomnia, protein-calorie malnutrition, subarachnoid hemorrhage, vitamin D deficiency, E coli UTI presented on 6/27 with acute encephalopathy, hypoxia, found to be agitated, respiratory acidosis, segmental PE, acute multifocal bronchitis/pneumonia most prominent in LLL, admitted to ICU on BiPAP.   Existing Precautions/Restrictions fall   Cognitive Status Examination   Orientation Status orientation to person, place and time   Range of Motion Comprehensive   General Range of Motion no range of motion deficits identified   Strength Comprehensive (MMT)   Comment, General Manual Muscle Testing (MMT) Assessment genealized weakness   Bed Mobility   Bed Mobility supine-sit   Supine-Sit [place] (Bed Mobility) moderate assist (50% patient effort);2 person assist   Transfers   Transfers sit-stand transfer   Sit-Stand Transfer   Sit-Stand [place] (Transfers) minimum assist (75% patient effort);2 person assist   Sit/Stand Transfer Comments Patient completed pivot to chair with Min A x2.  Patient unable to tolerate sitting @ EOB due to wound on buttock   Activities of Daily Living   BADL Assessment/Intervention lower body dressing   Lower Body Dressing Assessment/Training   [place] Level (Lower Body Dressing) dependent (less than 25% patient effort)   Clinical Impression   Criteria for Skilled Therapeutic Interventions Met (OT) Yes, treatment indicated   OT Diagnosis Decreased ADL indpendence   OT Problem List-Impairments impacting ADL problems related to;activity tolerance impaired;balance;pain   Assessment of Occupational Performance 3-5 Performance Deficits   Identified Performance Deficits trsfs, bed mobility, dressing, endurance   Planned Therapy Interventions (OT) ADL retraining;balance training;bed mobility training;transfer training   Clinical Decision Making Complexity (OT) detailed assessment/moderate complexity   Risk & Benefits of therapy have been explained evaluation/treatment results reviewed;care plan/treatment goals reviewed   OT Total Evaluation Time   OT Eval, Moderate Complexity Minutes (57921) 20   OT Goals   Therapy Frequency (OT) 5 times/week   OT Predicted Duration/Target Date for Goal Attainment 07/06/24   OT Goals Transfers;Toilet Transfer/Toileting;Lower Body Dressing   OT: Lower Body Dressing Minimal assist   OT: Transfer Minimal assist   OT: Toilet Transfer/Toileting Minimal assist;toilet transfer;cleaning and garment management   OT Discharge Planning   OT Plan Trsfs, standing g/h, bed mob   OT Discharge Recommendation (DC Rec) Transitional Care Facility   OT Rationale for DC Rec Patient lives alone and currently requiring assistance with all ADLs.   OT Brief overview of current status Assist of 2 for bed mobility, only pivoting   Total Session Time   Total Session Time (sum of timed and untimed services) 20

## 2024-06-29 NOTE — PROGRESS NOTES
Brief ICU Note    Pt refusing nocturnal NIV support.  Notes claustrophobia.  Pt admit with hypercapnic failure requiring NIV/AVAPS past 24 hrs.   Currently on nasal cannula no distress, sats appropriate and adequate ventilation on exam.  Discussed indication and rationale for use and will continue to monitor off. If clinical change - check VBG and re-evaluate need    Fabricio Fry MD

## 2024-06-30 ENCOUNTER — APPOINTMENT (OUTPATIENT)
Dept: CT IMAGING | Facility: HOSPITAL | Age: 74
DRG: 871 | End: 2024-06-30
Attending: STUDENT IN AN ORGANIZED HEALTH CARE EDUCATION/TRAINING PROGRAM
Payer: COMMERCIAL

## 2024-06-30 LAB
CREAT SERPL-MCNC: 1.14 MG/DL (ref 0.51–0.95)
EGFRCR SERPLBLD CKD-EPI 2021: 50 ML/MIN/1.73M2
ERYTHROCYTE [DISTWIDTH] IN BLOOD BY AUTOMATED COUNT: 15.1 % (ref 10–15)
GLUCOSE BLDC GLUCOMTR-MCNC: 109 MG/DL (ref 70–99)
GLUCOSE BLDC GLUCOMTR-MCNC: 120 MG/DL (ref 70–99)
GLUCOSE BLDC GLUCOMTR-MCNC: 127 MG/DL (ref 70–99)
GLUCOSE BLDC GLUCOMTR-MCNC: 134 MG/DL (ref 70–99)
GLUCOSE BLDC GLUCOMTR-MCNC: 139 MG/DL (ref 70–99)
GLUCOSE BLDC GLUCOMTR-MCNC: 146 MG/DL (ref 70–99)
HCT VFR BLD AUTO: 24.5 % (ref 35–47)
HGB BLD-MCNC: 8.1 G/DL (ref 11.7–15.7)
MAGNESIUM SERPL-MCNC: 1.9 MG/DL (ref 1.7–2.3)
MCH RBC QN AUTO: 32.1 PG (ref 26.5–33)
MCHC RBC AUTO-ENTMCNC: 33.1 G/DL (ref 31.5–36.5)
MCV RBC AUTO: 97 FL (ref 78–100)
MRSA DNA SPEC QL NAA+PROBE: NEGATIVE
PLATELET # BLD AUTO: 231 10E3/UL (ref 150–450)
POTASSIUM SERPL-SCNC: 3.6 MMOL/L (ref 3.4–5.3)
RBC # BLD AUTO: 2.52 10E6/UL (ref 3.8–5.2)
SA TARGET DNA: NEGATIVE
UFH PPP CHRO-ACNC: 0.84 IU/ML
UFH PPP CHRO-ACNC: 0.94 IU/ML
WBC # BLD AUTO: 15.5 10E3/UL (ref 4–11)

## 2024-06-30 PROCEDURE — 272N000202 HC AEROBIKA WITH MANOMETER

## 2024-06-30 PROCEDURE — 999N000157 HC STATISTIC RCP TIME EA 10 MIN

## 2024-06-30 PROCEDURE — 250N000011 HC RX IP 250 OP 636: Performed by: INTERNAL MEDICINE

## 2024-06-30 PROCEDURE — 250N000009 HC RX 250: Performed by: INTERNAL MEDICINE

## 2024-06-30 PROCEDURE — 99233 SBSQ HOSP IP/OBS HIGH 50: CPT | Performed by: INTERNAL MEDICINE

## 2024-06-30 PROCEDURE — 94150 VITAL CAPACITY TEST: CPT

## 2024-06-30 PROCEDURE — 250N000013 HC RX MED GY IP 250 OP 250 PS 637: Performed by: STUDENT IN AN ORGANIZED HEALTH CARE EDUCATION/TRAINING PROGRAM

## 2024-06-30 PROCEDURE — 250N000011 HC RX IP 250 OP 636: Performed by: EMERGENCY MEDICINE

## 2024-06-30 PROCEDURE — 82565 ASSAY OF CREATININE: CPT | Performed by: INTERNAL MEDICINE

## 2024-06-30 PROCEDURE — 94640 AIRWAY INHALATION TREATMENT: CPT | Mod: 76

## 2024-06-30 PROCEDURE — 250N000013 HC RX MED GY IP 250 OP 250 PS 637: Performed by: INTERNAL MEDICINE

## 2024-06-30 PROCEDURE — 120N000004 HC R&B MS OVERFLOW

## 2024-06-30 PROCEDURE — 99207 PR APP CREDIT; MD BILLING SHARED VISIT: CPT | Mod: FS | Performed by: NURSE PRACTITIONER

## 2024-06-30 PROCEDURE — 85014 HEMATOCRIT: CPT | Performed by: EMERGENCY MEDICINE

## 2024-06-30 PROCEDURE — 84132 ASSAY OF SERUM POTASSIUM: CPT | Performed by: INTERNAL MEDICINE

## 2024-06-30 PROCEDURE — 94799 UNLISTED PULMONARY SVC/PX: CPT

## 2024-06-30 PROCEDURE — 99221 1ST HOSP IP/OBS SF/LOW 40: CPT | Mod: FS | Performed by: SURGERY

## 2024-06-30 PROCEDURE — 72192 CT PELVIS W/O DYE: CPT

## 2024-06-30 PROCEDURE — 258N000003 HC RX IP 258 OP 636: Performed by: INTERNAL MEDICINE

## 2024-06-30 PROCEDURE — 99418 PROLNG IP/OBS E/M EA 15 MIN: CPT | Performed by: STUDENT IN AN ORGANIZED HEALTH CARE EDUCATION/TRAINING PROGRAM

## 2024-06-30 PROCEDURE — 83735 ASSAY OF MAGNESIUM: CPT | Performed by: INTERNAL MEDICINE

## 2024-06-30 PROCEDURE — 99223 1ST HOSP IP/OBS HIGH 75: CPT | Performed by: STUDENT IN AN ORGANIZED HEALTH CARE EDUCATION/TRAINING PROGRAM

## 2024-06-30 PROCEDURE — 85520 HEPARIN ASSAY: CPT | Performed by: INTERNAL MEDICINE

## 2024-06-30 RX ORDER — IPRATROPIUM BROMIDE AND ALBUTEROL SULFATE 2.5; .5 MG/3ML; MG/3ML
3 SOLUTION RESPIRATORY (INHALATION)
Status: DISCONTINUED | OUTPATIENT
Start: 2024-06-30 | End: 2024-07-03

## 2024-06-30 RX ORDER — NALOXONE HYDROCHLORIDE 0.4 MG/ML
0.4 INJECTION, SOLUTION INTRAMUSCULAR; INTRAVENOUS; SUBCUTANEOUS
Status: DISCONTINUED | OUTPATIENT
Start: 2024-06-30 | End: 2024-07-04 | Stop reason: HOSPADM

## 2024-06-30 RX ORDER — VANCOMYCIN HYDROCHLORIDE 500 MG/10ML
500 INJECTION, POWDER, LYOPHILIZED, FOR SOLUTION INTRAVENOUS
Status: DISCONTINUED | OUTPATIENT
Start: 2024-06-30 | End: 2024-07-02

## 2024-06-30 RX ORDER — POTASSIUM CHLORIDE 750 MG/1
10 TABLET, EXTENDED RELEASE ORAL ONCE
Status: COMPLETED | OUTPATIENT
Start: 2024-06-30 | End: 2024-06-30

## 2024-06-30 RX ORDER — NALOXONE HYDROCHLORIDE 0.4 MG/ML
0.2 INJECTION, SOLUTION INTRAMUSCULAR; INTRAVENOUS; SUBCUTANEOUS
Status: DISCONTINUED | OUTPATIENT
Start: 2024-06-30 | End: 2024-07-04 | Stop reason: HOSPADM

## 2024-06-30 RX ORDER — MAGNESIUM SULFATE HEPTAHYDRATE 40 MG/ML
2 INJECTION, SOLUTION INTRAVENOUS ONCE
Status: COMPLETED | OUTPATIENT
Start: 2024-06-30 | End: 2024-06-30

## 2024-06-30 RX ORDER — METHYLPREDNISOLONE SODIUM SUCCINATE 125 MG/2ML
60 INJECTION, POWDER, LYOPHILIZED, FOR SOLUTION INTRAMUSCULAR; INTRAVENOUS EVERY 24 HOURS
Status: DISCONTINUED | OUTPATIENT
Start: 2024-07-01 | End: 2024-07-01

## 2024-06-30 RX ORDER — METHOCARBAMOL 500 MG/1
500 TABLET, FILM COATED ORAL EVERY 6 HOURS PRN
Status: ACTIVE | OUTPATIENT
Start: 2024-06-30 | End: 2024-07-03

## 2024-06-30 RX ORDER — MAGNESIUM OXIDE 400 MG/1
400 TABLET ORAL EVERY 4 HOURS
Status: DISCONTINUED | OUTPATIENT
Start: 2024-06-30 | End: 2024-06-30

## 2024-06-30 RX ADMIN — CARBIDOPA AND LEVODOPA 2.5 MG: 50; 200 TABLET, EXTENDED RELEASE ORAL at 16:41

## 2024-06-30 RX ADMIN — HEPARIN SODIUM 550 UNITS/HR: 10000 INJECTION, SOLUTION INTRAVENOUS at 02:57

## 2024-06-30 RX ADMIN — NYSTATIN: 100000 CREAM TOPICAL at 20:06

## 2024-06-30 RX ADMIN — AZITHROMYCIN DIHYDRATE 250 MG: 500 INJECTION, POWDER, LYOPHILIZED, FOR SOLUTION INTRAVENOUS at 18:36

## 2024-06-30 RX ADMIN — PANTOPRAZOLE SODIUM 40 MG: 40 TABLET, DELAYED RELEASE ORAL at 08:17

## 2024-06-30 RX ADMIN — LIDOCAINE 2 PATCH: 4 PATCH TOPICAL at 19:45

## 2024-06-30 RX ADMIN — APIXABAN 10 MG: 5 TABLET, FILM COATED ORAL at 20:06

## 2024-06-30 RX ADMIN — MICONAZOLE NITRATE ANTIFUNGAL POWDER: 2 POWDER TOPICAL at 19:45

## 2024-06-30 RX ADMIN — VANCOMYCIN HYDROCHLORIDE 500 MG: 500 INJECTION, POWDER, LYOPHILIZED, FOR SOLUTION INTRAVENOUS at 16:43

## 2024-06-30 RX ADMIN — LIDOCAINE: 50 OINTMENT TOPICAL at 16:37

## 2024-06-30 RX ADMIN — LIDOCAINE: 50 OINTMENT TOPICAL at 08:22

## 2024-06-30 RX ADMIN — LIDOCAINE: 50 OINTMENT TOPICAL at 19:45

## 2024-06-30 RX ADMIN — LIDOCAINE: 50 OINTMENT TOPICAL at 12:09

## 2024-06-30 RX ADMIN — CARBIDOPA AND LEVODOPA 2.5 MG: 50; 200 TABLET, EXTENDED RELEASE ORAL at 12:09

## 2024-06-30 RX ADMIN — MICONAZOLE NITRATE ANTIFUNGAL POWDER: 2 POWDER TOPICAL at 08:22

## 2024-06-30 RX ADMIN — METHYLPREDNISOLONE SODIUM SUCCINATE 62.5 MG: 125 INJECTION, POWDER, FOR SOLUTION INTRAMUSCULAR; INTRAVENOUS at 08:18

## 2024-06-30 RX ADMIN — IPRATROPIUM BROMIDE AND ALBUTEROL SULFATE 3 ML: .5; 3 SOLUTION RESPIRATORY (INHALATION) at 09:57

## 2024-06-30 RX ADMIN — ONDANSETRON 4 MG: 2 INJECTION INTRAMUSCULAR; INTRAVENOUS at 09:46

## 2024-06-30 RX ADMIN — HYDROMORPHONE HYDROCHLORIDE 1 MG: 2 TABLET ORAL at 15:04

## 2024-06-30 RX ADMIN — POTASSIUM CHLORIDE 10 MEQ: 750 TABLET, EXTENDED RELEASE ORAL at 08:22

## 2024-06-30 RX ADMIN — CEFTRIAXONE SODIUM 1 G: 1 INJECTION, POWDER, FOR SOLUTION INTRAMUSCULAR; INTRAVENOUS at 19:42

## 2024-06-30 RX ADMIN — THIAMINE HCL TAB 100 MG 100 MG: 100 TAB at 08:17

## 2024-06-30 RX ADMIN — CARBIDOPA AND LEVODOPA 2.5 MG: 50; 200 TABLET, EXTENDED RELEASE ORAL at 08:17

## 2024-06-30 RX ADMIN — FOLIC ACID 1 MG: 1 TABLET ORAL at 08:17

## 2024-06-30 RX ADMIN — APIXABAN 10 MG: 5 TABLET, FILM COATED ORAL at 13:38

## 2024-06-30 RX ADMIN — MAGNESIUM SULFATE HEPTAHYDRATE 2 G: 40 INJECTION, SOLUTION INTRAVENOUS at 05:00

## 2024-06-30 RX ADMIN — ACETAMINOPHEN 325 MG: 325 TABLET ORAL at 15:04

## 2024-06-30 RX ADMIN — NYSTATIN: 100000 CREAM TOPICAL at 08:22

## 2024-06-30 ASSESSMENT — ACTIVITIES OF DAILY LIVING (ADL)
ADLS_ACUITY_SCORE: 47
ADLS_ACUITY_SCORE: 45
ADLS_ACUITY_SCORE: 45
ADLS_ACUITY_SCORE: 47
ADLS_ACUITY_SCORE: 45
ADLS_ACUITY_SCORE: 47
ADLS_ACUITY_SCORE: 45
ADLS_ACUITY_SCORE: 47
ADLS_ACUITY_SCORE: 45
ADLS_ACUITY_SCORE: 47

## 2024-06-30 NOTE — CONSULTS
[facility]  Consult Note - Hospitalist Service  Date of Admission:  6/27/2024    Assessment & Plan   Namita Viera is a 74 year old female admitted on 6/27/2024 with acute hypoxic respiratory failure secondary to COPDE complicated by PE and neumonia. She was initially admitted to ICU for shock, now weaned off pressors.  Case is complicated by falls and several rib as well as pubic rami fractures.    Acute hypoxic and hypercapnic respiratory failure  COPD exacerbation  CAP  -CT scan done on admission showing scattered areas of bronchial plugging and tree-in-bud opacities with adjacent consolidative opacities  -Question aspiration, will consult speech therapy  -Continue COPD cares including azithromycin, ceftriaxone, DuoNebs, and IV steroids  -Continue tapering IV steroids, currently on 62.5 mg IV methylprednisolone daily  -Continue GI prophylaxis with PTA PPI  -Patient refusing BiPAP at night, implications discussed  -Palliative consult for goals of care and code status   -PT/ OT recommending TCU    Septic shock   -weaned off norepinephrine, on low dose midodrine  -continue midodrine for now, titrate as appropriate to keep MAP >65-70    ?Bacteremia  -1/2 bottles of gram positive cocci 6/27/24 likely contaminant   -MRSA screen negative   -on IV vancomycin pending repeat blood cx results, will discontinue if cx negative     Pulmonary embolism  -continue Eliquis dosing for PE  -TTE completed 6/27/24 without RV dysfunction     Anemia  -Hemoglobin acutely dropped on hospital day 1, has been relatively stable since without any additional active signs and symptoms of bleed  -likely secondary to acute illness and alcohol use given high-normal MCV  -continue to monitor     CARLOS  -Creatinine last checked 1 year ago at 0.72.  Peaked at 1.2 during this admission and currently improved at 1.14  -Continue management as noted above  -encourage oral intake    Alcohol use and dependence  -Drinks  approximately 6 glasses of wine per day  -CIWA scores have been 0  -continue vitamin supplements   -defers chem dep    Displaced left 10th rib fracture  Nondisplaced left 12th rib fracture  -rib fracture protocol placed  -surgery consul per protocol, recs appreciated     T9 compression fracture   -no red flag symptoms at the moment   -monitor pain, careful use of opiates   -consider neurosurgery consult pending course    Acute left inferior and superior pubic rami fractures   -ortho consult     Tobacco use  -discussed cessation  -continue nicotine patch     Sacral injury  -WOC    Chronic conditions  GERD: PTA PPI  Osteoporosis: Resume home Fosamax on discharge  HTN: Holding home antihypertensives in setting of shock and midodrine.  Restart when appropriate when off midodrine     Clinically Significant Risk Factors              # Hypoalbuminemia: Lowest albumin = 3.1 g/dL at 6/28/2024  5:24 AM, will monitor as appropriate     # Hypertension: Noted on problem list           #Precipitous drop in Hgb/Hct: Lowest Hgb this hospitalization: 8.1 g/dL. Will continue to monitor and treat/transfuse as appropriate.      # Severe Malnutrition: based on nutrition assessment, PRESENT ON ADMISSION          Farhan Dewitt DO  Hospitalist Service  Securely message with [employer] (more info)  Text page via [facility] Paging/Directory   ______________________________________________________________________    Chief Complaint   Found down, hypoxic    History is obtained from the patient    History of Present Illness   Namita Viera is a 74 year old female who was initially admitted on 6/27/24 for the above complaint.  Please see initial H&P for full detail.  Patient was admitted with acute respiratory failure with hypoxia and hypercapnia in the setting of COPD exacerbation.  Case was complicated by encephalopathy and patient ultimately needed transfer to ICU.  She has since been hemodynamically stable.  On encounter, patient states that  she does not remember much of the previous few days.  She is currently without complaint outside of ongoing pain throughout her body.      Past Medical History    Past Medical History:   Diagnosis Date    Acid reflux     Alcohol use     Closed fracture of left femur (H)     Closed fracture of sacrum with routine healing 10/12/2021    Emphysema of lung (H) 2022    Hip fracture requiring operative repair (H)     Hypertension 2021    Rib pain on left side 2021    SAH (subarachnoid hemorrhage) (H)     Traumatic closed displaced fracture of distal end of radius        Past Surgical History   Past Surgical History:   Procedure Laterality Date    BLADDER SUSPENSION      HYSTERECTOMY TOTAL ABDOMINAL, BILATERAL SALPINGO-OOPHORECTOMY, COMBINED N/A     OPEN REDUCTION INTERNAL FIXATION RODDING INTRAMEDULLARY FEMUR Right 2023    Procedure: OPEN REDUCTION INTERNAL FIXATION, FRACTURE, FEMUR, USING INTRAMEDULLARY NOEL;  Surgeon: Ollie Tucker MD;  Location: [facility] [place] OR    ORTHOPEDIC SURGERY Left 2018    Left Hip fracture, S/P ORIF    PICC TRIPLE LUMEN PLACEMENT  2024       Medications   I have reviewed this patient's current medications       Social History   I have reviewed this patient's social history and updated it with pertinent information if needed.  Social History     Tobacco Use    Smoking status: Every Day     Current packs/day: 0.00     Average packs/day: 1 pack/day for 60.0 years (60.0 ttl pk-yrs)     Types: Cigarettes     Start date: 1962     Last attempt to quit: 2018     Years since quittin.0    Smokeless tobacco: Never   Substance Use Topics    Alcohol use: Yes     Alcohol/week: 14.0 standard drinks of alcohol     Types: 14 Standard drinks or equivalent per week     Comment: admits to 2 glasses of wine a night    Drug use: No         Family History   I have reviewed this patient's family history and updated it with pertinent information if  needed.  Family History   Problem Relation Age of Onset    Esophageal Cancer Mother     Heart Disease Father     Rectal Cancer Sister     Cancer Sister         lump in neck.     Rheumatoid Arthritis Brother          Allergies   No Known Allergies     Physical Exam   Vital Signs: Temp: 98.4  F (36.9  C) Temp src: Oral BP: 94/54 Pulse: 87   Resp: 21 SpO2: 95 % O2 Device: Nasal cannula Oxygen Delivery: 1 LPM  Weight: 96 lbs 8.98 oz    General Appearance: Mildly anxious appearing, nontoxic and nondiaphoretic  Respiratory: Scattered wheezes, no rales or rhonchi, nasal cannula in place and not using accessory muscles for breathing  Cardiovascular: Regular rate and rhythm  GI: Mild pain without any rebound or guarding, no masses, no hepatosplenomegaly  Other: No focal deficits noted, strength in bilateral lower extremities is 5 out of 5, sensation intact bilaterally, pulses equal in all 4 extremities    Medical Decision Making       90 MINUTES SPENT BY ME on the date of service doing chart review, history, exam, documentation & further activities per the note.      Data     I have personally reviewed the following data over the past 24 hrs:    15.5 (H)  \   8.1 (L)   / 231     N/A N/A N/A /  127 (H)   3.6 N/A 1.14 (H) \       Imaging results reviewed over the past 24 hrs:   Recent Results (from the past 24 hour(s))   CT Pelvis Bone wo Contrast    Narrative    EXAM: CT PELVIS BONE WITHOUT CONTRAST  LOCATION: [facility]  DATE: 06/30/2024    INDICATION: Left superior inferior pubic rami fractures, evaluate posterior pelvis.  COMPARISON: 06/27/2024.  TECHNIQUE: CT scan of the pelvis was performed without IV contrast. Multiplanar reformats were obtained. Dose reduction techniques were used.  CONTRAST: None.    FINDINGS:   Bones are demineralized. Bilateral ORIF changes involving the proximal femurs again seen. There are residual bony deformities and bone formation bilaterally, unchanged. There is solid  bony bridging across the left proximal femoral fracture. Incomplete   healing of the right proximal femoral fracture with portions of the fracture plane still visible.    Subacute nondisplaced stress fracture of the left iliac crest where there is sclerosis and remodeling/incomplete healing. Acute to subacute nondisplaced left sacral alar fracture which extends across midline through S2. There is probably some focal   involvement of the right sacral ala as well.    Again seen are mildly displaced comminuted fractures of the left inferior pubic ramus as well as the left superior pubic ramus near the parasymphyseal region.     Soft tissues about the pelvis show subcutaneous edema without a well-defined hematoma or fluid collection. Intrapelvic contents show a Delgado catheter within a decompressed bladder. Colonic diverticulosis without evidence of diverticulitis.   Atherosclerotic vascular calcifications.      Impression    IMPRESSION:  1.  Acute comminuted mildly displaced left pubic rami fractures.    2.  Acute to subacute, left greater than right sacral alar fractures without significant displacement. Midline extending across S2.    3.  Subacute healing nondisplaced insufficiency fracture left iliac crest.    4.  Bilateral proximal femoral internal fixation changes. There is no loss of reduction. Incomplete healing on the right side.    5.  Bone demineralization.

## 2024-06-30 NOTE — PHARMACY-VANCOMYCIN DOSING SERVICE
Pharmacy Vancomycin Initial Note  Date of Service 2024  Patient's  1950  74 year old, female    Indication: Bacteremia    Current estimated CrCl = Estimated Creatinine Clearance: 26.1 mL/min (A) (based on SCr of 1.21 mg/dL (H)).    Creatinine for last 3 days  2024:  1:30 PM Creatinine 1.11 mg/dL; 11:34 PM Creatinine 1.06 mg/dL  2024:  5:24 AM Creatinine 1.12 mg/dL;  5:49 PM Creatinine 1.24 mg/dL  2024:  3:50 AM Creatinine 1.21 mg/dL    Recent Vancomycin Level(s) for last 3 days  No results found for requested labs within last 3 days.      Vancomycin IV Administrations (past 72 hours)                     vancomycin (VANCOCIN) 750 mg in sodium chloride 0.9 % 250 mL intermittent infusion (mg) 750 mg New Bag 24 1622                    Nephrotoxins and other renal medications (From now, onward)      Start     Dose/Rate Route Frequency Ordered Stop    24 1200  vancomycin (VANCOCIN) 500 mg vial to attach to  mL bag         500 mg  over 1 Hours Intravenous EVERY 18 HOURS 24 2200  vancomycin (VANCOCIN) 750 mg in sodium chloride 0.9 % 250 mL intermittent infusion         750 mg  over 60 Minutes Intravenous ONCE 24 2330  norepinephrine (LEVOPHED) 4 mg in  mL infusion PREMIX         0.01-0.6 mcg/kg/min × 43.1 kg  1.6-97 mL/hr  Intravenous CONTINUOUS 24 2302              Contrast Orders - past 72 hours (72h ago, onward)      Start     Dose/Rate Route Frequency Stop    24 0930  perflutren lipid microsphere (DEFINITY) injection SUSP 2 mL         2 mL Intravenous ONCE 24 0930    24 1430  iopamidol (ISOVUE-370) solution 75 mL         75 mL Intravenous ONCE 24 1418            [employer] Prediction of Planned Initial Vancomycin Regimen  Loading dose: 750 mg once  Regimen: 500 mg IV every 18 hours.  Start time: 04:22 on 2024  Exposure target: AUC24 (range)400-600 mg/L.hr   AUC24,ss: 460  mg/L.hr  Probability of AUC24 > 400: 67 %  Ctrough,ss: 15.5 mg/L  Probability of Ctrough,ss > 20: 24 %  Probability of nephrotoxicity (Lodise EMERY 2009): 11 %        Plan:  Start vancomycin  750 mg IV once then 500 q18h.   Vancomycin monitoring method: AUC  Vancomycin therapeutic monitoring goal: 400-600 mg*h/L  Pharmacy will check vancomycin levels as appropriate in 1-3 Days.    Scr ordered for 6/30 and 7/1.    Lyla Renteria, RPH

## 2024-06-30 NOTE — PLAN OF CARE
Problem: Adult Inpatient Plan of Care  Goal: Plan of Care Review  Description: The Plan of Care Review/Shift note should be completed every shift.  The Outcome Evaluation is a brief statement about your assessment that the patient is improving, declining, or no change.  This information will be displayed automatically on your shift  note.  Outcome: Progressing    Patient more painful today and fatigued, falls asleep frequently, poor appetite, only wants to drink coffee. 3 loose stools today, 2 large loose and 1 small incontinent stool. Skin is very painful to touch when cleaning, using triad paste to protect skin.     Reposition every 2 hours for comfort and skin integrity. Patient refused to get up in chair today, states she doesn't feel good. C/O nausea today given zofran once.     Downgraded out of ICU. Vitals stable. Requiring 2 L NC today since she's been sleeping.

## 2024-06-30 NOTE — PLAN OF CARE
Ortho Brief Note    Consult received.     74 year old female admitted with COPD exacerbation, aspiration pneumonia, and small PE. Found to have left superior and inferior pubic rami fractures on pelvic XR as well as bilateral healed proximal femoral fractures with IMN in place.     I have ordered CT pelvis to evaluate the posterior pelvic for injury. Recommend bedrest until CT scan obtained. Will update weightbearing status based on results.     Full consult note to follow.     Keith New MD  [facility]      Addendeum: CT scan reviewed. Patient with subacute sacral fracture (some sclerosis and early healing) and healing iliac crest fracture. Recommend WBAT, protected with a walker. Patient will be evaluated in the AM with full consult note to follow. Please page with any urgent issues.     Keith New MD  [facility]

## 2024-06-30 NOTE — PROGRESS NOTES
Critical Care Progress Note      06/30/2024    Name: Namita Viera MRN#: [id number]   Age: 74 year old YOB: 1950     Hsptl Day# 3  ICU DAY #    MV DAY #             Problem List:   Principal Problem:    Acute respiratory failure with hypercapnia (H)  Active Problems:    Hypokalemia    COPD exacerbation (H)    Altered mental status, unspecified altered mental status type    Other acute pulmonary embolism without acute cor pulmonale (H)    Aspiration pneumonia, unspecified aspiration pneumonia type, unspecified laterality, unspecified part of lung (H)    Clinically Significant Risk Factors              # Hypoalbuminemia: Lowest albumin = 3.1 g/dL at 6/28/2024  5:24 AM, will monitor as appropriate     # Hypertension: Noted on problem list           #Precipitous drop in Hgb/Hct: Lowest Hgb this hospitalization: 8.1 g/dL. Will continue to monitor and treat/transfuse as appropriate.      # Severe Malnutrition: based on nutrition assessment, PRESENT ON ADMISSION         Code Status: No CPR- Pre-arrest intubation OK                    Summary/Hospital Course:     history of alcohol dependence, extensive emphysema with no PFTs on record, kyphosis, osteoporosis, hip fractures s/p bilateral ORIF, GERD, HTN, insomnia, protein-calorie malnutrition, subarachnoid hemorrhage, vitamin D deficiency, E coli UTI presented on 6/27 with acute encephalopathy, hypoxia, found to be agitated, respiratory acidosis, segmental PE, acute multifocal bronchitis/pneumonia most prominent in LLL, admitted to ICU on BiPAP.       Assessment and plan :       I have personally reviewed the daily labs, imaging studies, cultures and discussed the case with referring physician and consulting physicians.     My assessment and plan by system for this patient is as follows:    Neurology/Psychiatry:   Alcohol dependence, drinks at least 6 glasses of wine per day  Continue thiamine/folic acid    Cardiovascular:   Small PE on heparin drip.   Transition to apixaban    Started midodrine.  Came off norepinephrine      Pulmonary/Ventilator Management:   Acute respiratory failure with hypercapnia and hypoxia  Bronchitis/community-acquired pneumonia  Pulmonary embolism  COPD exacerbation    Continue bronchodilators  Decrease steroids to every 24 hours  Continue antibiotics    Probably aspiration pneumonia based on intraluminal debris and tree-in-bud opacities    Refused BiPAP overnight.  Switch to as needed    GI and Nutrition :   Protein calorie malnutrition      Renal/Fluids/Electrolytes:   CARLOS  Creatinine seems to have plateaued slowly improving    - monitor function and electrolytes as needed with replacement per ICU protocols.  - generally avoid nephrotoxic agents such as NSAID, IV contrast unless specifically required  - adjust medications as needed for renal clearance  - follow I/O's as appropriate.    Infectious Disease:   Continue ceftriaxone and azithromycin for acute bronchitis/community-acquired pneumonia versus aspiration pneumonia    Last evening 1 out of 2 blood cultures from 6/27 turn positive for coag negative staph according to pharmacy.  Most likely contaminant but will wait for the 6/29 cultures to finalize before stopping vancomycin.    Endocrine:     Plan  - ICU insulin protocol, goal sugar <180      Hematology/Oncology:   Macrocytic anemia  Leukocytosis secondary to steroids      ICU Prophylaxis:   1. DVT: Hep drip.  Transition to apixaban  3. Stress Ulcer: PPI           Key Medications:     Current Facility-Administered Medications   Medication Dose Route Frequency Provider Last Rate Last Admin    apixaban ANTICOAGULANT (ELIQUIS) tablet 10 mg  10 mg Oral BID Sandeep Escobar MD        Followed by    [START ON 7/7/2024] apixaban ANTICOAGULANT (ELIQUIS) tablet 5 mg  5 mg Oral BID Sandeep Escobar MD        azithromycin (ZITHROMAX) 250 mg in sodium chloride 0.9 % 250 mL intermittent infusion  250 mg Intravenous Q24H John Paul  Raj Ryan MD   250 mg at 06/29/24 1744    cefTRIAXone (ROCEPHIN) 1 g vial to attach to  mL bag for ADULTS or NS 50 mL bag for PEDS  1 g Intravenous Q24H Raj Coker MD   1 g at 06/29/24 2034    folic acid (FOLVITE) tablet 1 mg  1 mg Oral Daily Di Martínez MD   1 mg at 06/30/24 0817    insulin aspart (NovoLOG) injection (RAPID ACTING)  1-7 Units Subcutaneous Q4H Raj Coker MD   1 Units at 06/29/24 2027    ipratropium - albuterol 0.5 mg/2.5 mg/3 mL (DUONEB) neb solution 3 mL  3 mL Nebulization 2 times daily Sandeep Escobar MD        Lidocaine (LIDOCARE) 4 % Patch 2 patch  2 patch Transdermal Q24h Di Martínez MD   2 patch at 06/29/24 2041    lidocaine (XYLOCAINE) 5 % ointment   Topical 4x Daily Di Martínez MD   Given at 06/30/24 1209    [START ON 7/1/2024] methylPREDNISolone sodium succinate (solu-MEDROL) injection 62.5 mg  62.5 mg Intravenous Q24H Sandeep Escobar MD        miconazole (MICATIN) 2 % powder   Topical BID Di Martínez MD   Given at 06/30/24 0822    midodrine (PROAMATINE) tablet 2.5 mg  2.5 mg Oral TID w/meals Sandeep Escobar MD   2.5 mg at 06/30/24 1209    nicotine (NICODERM CQ) 14 MG/24HR 24 hr patch 1 patch  1 patch Transdermal Daily Di Martínez MD        nystatin (MYCOSTATIN) cream   Topical BID Raj Coker MD   Given at 06/30/24 0822    pantoprazole (PROTONIX) EC tablet 40 mg  40 mg Oral QAM AC Di Martínez MD   40 mg at 06/30/24 0817    sodium chloride (PF) 0.9% PF flush 3 mL  3 mL Intracatheter Q8H Di Martínez MD   3 mL at 06/30/24 0023    thiamine (B-1) tablet 100 mg  100 mg Oral Daily Di Martínez MD   100 mg at 06/30/24 0817     Current Facility-Administered Medications   Medication Dose Route Frequency Provider Last Rate Last Admin    Patient is already receiving anticoagulation with heparin, enoxaparin (LOVENOX), warfarin (COUMADIN)  or other anticoagulant medication    Does not apply Continuous PRN Di Martínez MD                   Physical Examination:   Temp:  [98.2  F (36.8  C)-98.8  F (37.1  C)] (P) 98.4  F (36.9  C)  Pulse:  [74-98] 82  Resp:  [14-33] 16  BP: ()/(47-64) 97/53  SpO2:  [88 %-100 %] 93 %    Intake/Output Summary (Last 24 hours) at 6/29/2024 1056  Last data filed at 6/29/2024 0900  Gross per 24 hour   Intake 1946.16 ml   Output 650 ml   Net 1296.16 ml     Wt Readings from Last 4 Encounters:   06/30/24 43.8 kg (96 lb 9 oz)   06/26/23 43.2 kg (95 lb 3.2 oz)   05/31/23 45.3 kg (99 lb 14.4 oz)   05/23/23 47.2 kg (104 lb)     BP - Mean:  [67-84] 72  Resp: 16    Recent Labs   Lab 06/28/24  1757 06/28/24  0837 06/28/24  0524 06/27/24  2334   O2PER 23 25 30 30       GEN: no acute distress   HEENT: head ncat, sclera anicteric, OP patent, trachea midline   PULM: Bilateral rhonchi  CV/COR: RRR S1S2 no gallop,  No rub, no murmur  ABD: soft nontender, hypoactive bowel sounds, no mass  EXT: Lower extremity edema  NEURO: Awake.  Answers questions  SKIN: no obvious rash  LINES: clean, dry intact         Data:   All data and imaging reviewed     ROUTINE ICU LABS (Last four results)  CMP  Recent Labs   Lab 06/30/24  1141 06/30/24  0758 06/30/24  0412 06/30/24  0411 06/30/24  0022 06/29/24  0354 06/29/24  0350 06/28/24 2007 06/28/24  1749 06/28/24  1604 06/28/24  1332 06/28/24  0836 06/28/24  0524 06/27/24  2340 06/27/24  2334 06/27/24  1523 06/27/24  1330   NA  --   --   --   --   --   --  141  --  141  --   --   --  144  --  143  --  145   POTASSIUM  --   --  3.6  --   --   --  3.5  --  3.9  --  3.8  --  4.2   < > 3.5   < > 2.6*   CHLORIDE  --   --   --   --   --   --  109*  --  109*  --   --   --  109*  --  105  --  98   CO2  --   --   --   --   --   --  20*  --  20*  --   --   --  21*  --  21*  --  29   ANIONGAP  --   --   --   --   --   --  12  --  12  --   --   --  14  --  17*  --  18*   * 109*  --  120* 139*   < > 159*   < > 222*   < >  --    < > 154*    "< > 215*   < > 121*   BUN  --   --   --   --   --   --  32.2*  --  32.1*  --   --   --  33.0*  --  34.6*  --  37.2*   CR  --   --  1.14*  --   --   --  1.21*  --  1.24*  --   --   --  1.12*  --  1.06*  --  1.11*   GFRESTIMATED  --   --  50*  --   --   --  46*  --  45*  --   --   --  51*  --  54*  --  51*   PETER  --   --   --   --   --   --  9.1  --  9.3  --   --   --  9.4  --  9.3  --  11.2*   MAG  --   --  1.9  --   --   --  2.4*  --   --   --  2.8*  --  1.7  --   --   --  2.1   PROTTOTAL  --   --   --   --   --   --   --   --   --   --   --   --  5.6*  --   --   --  7.9   ALBUMIN  --   --   --   --   --   --   --   --   --   --   --   --  3.1*  --   --   --  4.2   BILITOTAL  --   --   --   --   --   --   --   --   --   --   --   --  0.2  --   --   --  0.3   ALKPHOS  --   --   --   --   --   --   --   --   --   --   --   --  80  --   --   --  127   AST  --   --   --   --   --   --   --   --   --   --   --   --  18  --   --   --  28   ALT  --   --   --   --   --   --   --   --   --   --   --   --  14  --   --   --  22    < > = values in this interval not displayed.     CBC  Recent Labs   Lab 24  0412 24  0350 24  0524 24  1330   WBC 15.5* 18.0* 11.2* 8.5   RBC 2.52* 2.60* 2.94* 4.01   HGB 8.1* 8.7* 9.7* 13.2   HCT 24.5* 25.4* 29.2* 39.8   MCV 97 98 99 99   MCH 32.1 33.5* 33.0 32.9   MCHC 33.1 34.3 33.2 33.2   RDW 15.1* 14.8 14.5 14.4    250 277 341     INRNo lab results found in last 7 days.  Arterial Blood Gas  Recent Labs   Lab 24  1757 24  0837 24  0524 24  2334   O2PER 23 25 30 30       All cultures:  No results for input(s): \"CULT\" in the last 168 hours.  No results found for this or any previous visit (from the past 24 hour(s)).      Billin minutes spent with the patient     "

## 2024-06-30 NOTE — CONSULTS
"General Surgery Consultation  Namita Viera MRN# [id number]   Age/Sex: 74 year old female YOB: 1950     Reason for consult: 1. Irritant contact dermatitis due to fecal, urinary or dual incontinence [L24.A2]    2. Acute respiratory failure with hypercapnia (H)    3. COPD exacerbation (H)    4. Hypokalemia    5. Altered mental status, unspecified altered mental status type    6. Other acute pulmonary embolism without acute cor pulmonale (H)    7. Aspiration pneumonia, unspecified aspiration pneumonia type, unspecified laterality, unspecified part of lung (H)            Requesting physician: Dr. Dewitt                   Assessment and Plan:   Assessment:  Rib fractures (2) of unknown etiology without pneumothorax. Patient is hospital day 3. Pain control is achieved.    Plan:  Follow rib fracture protocol  Surgery will sign off          Chief Complaint:     Chief Complaint   Patient presents with    Altered Mental Status        History is obtained from the patient and electronic health record    HPI:   Namita Viera is a 74 year old female who we are consulted on hospital day 3 for rib fractures. Patient was brought to ED with encephalopathy and respiratory failure. She was admitted to ICU and treated for acute respiratory failure in the setting oc COPD, pneumonia and PE. She was found to have fractures of the ;eft 10 th and 12 th ribs. Patient does not remember falling prior to her arrival at the ED but states, \"I do fall a lot\". Patient has no complaints and does not remember the details surrounding her current situation.          Past Medical History:     Past Medical History:   Diagnosis Date    Acid reflux     Alcohol use     Closed fracture of left femur (H)     Closed fracture of sacrum with routine healing 10/12/2021    Emphysema of lung (H) 02/07/2022    Hip fracture requiring operative repair (H)     Hypertension 01/2021    Rib pain on left side 05/06/2021    SAH (subarachnoid " hemorrhage) (H)     Traumatic closed displaced fracture of distal end of radius               Past Surgical History:     Past Surgical History:   Procedure Laterality Date    BLADDER SUSPENSION  2008    HYSTERECTOMY TOTAL ABDOMINAL, BILATERAL SALPINGO-OOPHORECTOMY, COMBINED N/A 2008    OPEN REDUCTION INTERNAL FIXATION RODDING INTRAMEDULLARY FEMUR Right 5/17/2023    Procedure: OPEN REDUCTION INTERNAL FIXATION, FRACTURE, FEMUR, USING INTRAMEDULLARY NOEL;  Surgeon: Ollie Tucker MD;  Location: [facility]    ORTHOPEDIC SURGERY Left 2018    Left Hip fracture, S/P ORIF    PICC TRIPLE LUMEN PLACEMENT  6/27/2024             Social History:    reports that she has been smoking cigarettes. She started smoking about 62 years ago. She has a 60 pack-year smoking history. She has never used smokeless tobacco. She reports current alcohol use of about 14.0 standard drinks of alcohol per week. She reports that she does not use drugs.           Family History:     Family History   Problem Relation Age of Onset    Esophageal Cancer Mother     Heart Disease Father     Rectal Cancer Sister     Cancer Sister         lump in neck.     Rheumatoid Arthritis Brother               Allergies:   No Known Allergies           Medications:     Prior to Admission medications    Medication Sig Start Date End Date Taking? Authorizing Provider   albuterol (PROAIR HFA/PROVENTIL HFA/VENTOLIN HFA) 108 (90 Base) MCG/ACT inhaler INHALE 1-2 PUFFS INTO THE LUNGS EVERY 6 HOURS AS NEEDED FOR COUGH OR SHORTNESS OF BREATH OR WHEEZE 6/17/24  Yes Denisse Klein MD   alendronate (FOSAMAX) 70 MG tablet TAKE 1 TABLET BY MOUTH EVERY 7 DAYS. TAKE IN THE MORNING ON AN EMPTY STOMACH WITH A FULL GLASS OF WATER 30 MINUTES BEFORE FOOD 6/26/23  Yes Anahi Andersen, DO   aspirin (ASA) 81 MG EC tablet Take 1 tablet (81 mg) by mouth daily 11/10/21  Yes Anahi Andersen, DO   calcium carbonate (OS-PETER) 1500 (600 Ca) MG tablet Take 600 mg by mouth daily   Yes  Unknown, Entered By History   ibuprofen (ADVIL/MOTRIN) 200 MG tablet Take 200 mg by mouth every 6 hours as needed for pain   Yes Unknown, Entered By History   losartan-hydrochlorothiazide (HYZAAR) 100-25 MG tablet Take 1 tablet by mouth daily 6/26/23  Yes Anahi Andersen DO   melatonin 1 MG TABS tablet Take 1 tablet (1 mg) by mouth nightly as needed for sleep 6/26/23  Yes Anahi Andersen DO   multivitamin, therapeutic (THERA-VIT) TABS tablet Take 1 tablet by mouth daily   Yes Unknown, Entered By History   omeprazole (PRILOSEC) 20 MG DR capsule Take 1 capsule (20 mg) by mouth daily 6/26/23  Yes Anahi Andersen DO   vitamin C (ASCORBIC ACID) 500 MG tablet Take 500 mg by mouth daily   Yes Unknown, Entered By History   Vitamin D, Cholecalciferol, 25 MCG (1000 UT) TABS Take 1,000 Units by mouth daily   Yes Unknown, Entered By History              Review of Systems:   The Review of Systems is negative other than noted in the HPI            Physical Exam:   Patient Vitals for the past 24 hrs:   BP Temp Temp src Pulse Resp SpO2 Weight   06/30/24 1500 106/56 -- -- 89 24 (!) 87 % --   06/30/24 1400 96/58 -- -- 86 24 98 % --   06/30/24 1300 94/54 -- -- 87 21 95 % --   06/30/24 1200 97/53 98.4  F (36.9  C) Oral 82 16 93 % --   06/30/24 1100 99/51 -- -- 80 14 95 % --   06/30/24 1000 116/63 -- -- 90 20 95 % --   06/30/24 0957 -- -- -- 81 18 98 % --   06/30/24 0900 114/58 -- -- 84 19 98 % --   06/30/24 0800 100/55 98.4  F (36.9  C) Oral 78 22 98 % --   06/30/24 0700 99/54 -- -- 77 25 97 % --   06/30/24 0647 -- -- -- 75 28 97 % --   06/30/24 0600 94/53 -- -- 77 (!) 33 93 % --   06/30/24 0542 -- -- -- 79 (!) 33 (!) 89 % --   06/30/24 0500 98/57 -- -- 75 (!) 33 97 % --   06/30/24 0400 103/55 98.2  F (36.8  C) Oral 87 25 94 % 43.8 kg (96 lb 9 oz)   06/30/24 0300 107/57 -- -- 83 26 98 % --   06/30/24 0200 104/54 -- -- 90 24 92 % --   06/30/24 0100 98/54 -- -- 79 18 91 % --   06/30/24 0000 91/47 98.5  F (36.9  C) Oral 74 17 92 % --    06/29/24 2300 100/55 -- -- 82 22 92 % --   06/29/24 2204 -- -- -- 90 30 (!) 88 % --   06/29/24 2200 104/55 -- -- 81 19 91 % --   06/29/24 2100 125/59 -- -- 98 26 93 % --   06/29/24 2000 113/60 98.6  F (37  C) Oral 86 21 94 % --   06/29/24 1911 -- -- -- -- -- (P) 93 % --   06/29/24 1900 110/64 -- -- 83 24 92 % --   06/29/24 1800 116/56 -- -- 95 30 91 % --   06/29/24 1700 116/57 -- -- 96 26 91 % --          Intake/Output Summary (Last 24 hours) at 6/30/2024 1631  Last data filed at 6/30/2024 1400  Gross per 24 hour   Intake 1590.66 ml   Output 635 ml   Net 955.66 ml      Constitutional:   awake, alert, cooperative, no apparent distress, and appears stated age       Eyes:   PERRL, conjunctiva/corneas clear, EOM's intact; no scleral edema or icterus noted        ENT:   Normocephalic, without obvious abnormality, atraumatic, Lips, mucosa, and tongue normal          Lungs:   Normal respiratory effort, no accessory muscle use     Chest wall: not tender, not bruised and no crepitus noted    Cardiovascular:   Regular rate and rhythm       Abdomen:   Soft, not tender, not distended       Musculoskeletal:   No obvious swelling, bruising or deformity       Skin:   Skin color and texture normal for patient, multiple extremities with bruises             Data:         All imaging studies reviewed by me.    Results for orders placed or performed during the hospital encounter of 06/27/24 (from the past 24 hour(s))   Heparin Unfractionated Anti Xa Level   Result Value Ref Range    Anti Xa Unfractionated Heparin 0.66 For Reference Range, See Comment IU/mL    Narrative    Therapeutic Range: UFH: 0.25-0.50 IU/mL for low intensity dosing,  0.30-0.70 IU/mL for high intensity dosing DVT and PE.  This test is not validated for other direct factor X inhibitors (e.g. rivaroxaban, apixaban, edoxaban, betrixaban, fondaparinux) and should not be used for monitoring of other medications.   Glucose by meter   Result Value Ref Range    GLUCOSE BY  METER POCT 143 (H) 70 - 99 mg/dL   Blood Culture Hand, Right    Specimen: Hand, Right; Blood   Result Value Ref Range    Culture No growth after 12 hours     Narrative    Only an Aerobic Blood Culture Bottle was collected, interpret results with caution.       Blood Culture Line, venous    Specimen: Line, venous; Blood   Result Value Ref Range    Culture No growth after 12 hours    MRSA MSSA PCR, Nasal Swab    Specimen: Nares, Bilateral; Swab   Result Value Ref Range    MRSA Target DNA Negative Negative    SA Target DNA Negative     Narrative    The [employer]  Xpert SA Nasal Complete assay performed in the [employer] is a qualitative in vitro diagnostic test designed for rapid detection of Staphylococcus aureus (SA) and methicillin-resistant Staphylococcus aureus (MRSA) from nasal swabs in patients at risk for nasal colonization. The test utilizes automated real-time polymerase chain reaction (PCR) to detect MRSA/SA DNA. The Xpert SA Nasal Complete assay is intended to aid in the prevention and control of MRSA/SA infections in healthcare settings. The assay is not intended to diagnose, guide or monitor treatment for MRSA/SA infections, or provide results of susceptibility to methicillin. A negative result does not preclude MRSA/SA nasal colonization.    Glucose by meter   Result Value Ref Range    GLUCOSE BY METER POCT 139 (H) 70 - 99 mg/dL   Glucose by meter   Result Value Ref Range    GLUCOSE BY METER POCT 120 (H) 70 - 99 mg/dL   CBC with platelets   Result Value Ref Range    WBC Count 15.5 (H) 4.0 - 11.0 10e3/uL    RBC Count 2.52 (L) 3.80 - 5.20 10e6/uL    Hemoglobin 8.1 (L) 11.7 - 15.7 g/dL    Hematocrit 24.5 (L) 35.0 - 47.0 %    MCV 97 78 - 100 fL    MCH 32.1 26.5 - 33.0 pg    MCHC 33.1 31.5 - 36.5 g/dL    RDW 15.1 (H) 10.0 - 15.0 %    Platelet Count 231 150 - 450 10e3/uL   Potassium   Result Value Ref Range    Potassium 3.6 3.4 - 5.3 mmol/L   Heparin Unfractionated Anti Xa Level   Result Value Ref  Range    Anti Xa Unfractionated Heparin 0.84 For Reference Range, See Comment IU/mL    Narrative    Therapeutic Range: UFH: 0.25-0.50 IU/mL for low intensity dosing,  0.30-0.70 IU/mL for high intensity dosing DVT and PE.  This test is not validated for other direct factor X inhibitors (e.g. rivaroxaban, apixaban, edoxaban, betrixaban, fondaparinux) and should not be used for monitoring of other medications.   Creatinine   Result Value Ref Range    Creatinine 1.14 (H) 0.51 - 0.95 mg/dL    GFR Estimate 50 (L) >60 mL/min/1.73m2   Magnesium   Result Value Ref Range    Magnesium 1.9 1.7 - 2.3 mg/dL   Glucose by meter   Result Value Ref Range    GLUCOSE BY METER POCT 109 (H) 70 - 99 mg/dL   Glucose by meter   Result Value Ref Range    GLUCOSE BY METER POCT 127 (H) 70 - 99 mg/dL   Heparin Unfractionated Anti Xa Level   Result Value Ref Range    Anti Xa Unfractionated Heparin 0.94 For Reference Range, See Comment IU/mL    Narrative    Therapeutic Range: UFH: 0.25-0.50 IU/mL for low intensity dosing,  0.30-0.70 IU/mL for high intensity dosing DVT and PE.  This test is not validated for other direct factor X inhibitors (e.g. rivaroxaban, apixaban, edoxaban, betrixaban, fondaparinux) and should not be used for monitoring of other medications.   CT Pelvis Bone wo Contrast    Narrative    EXAM: CT PELVIS BONE WITHOUT CONTRAST  LOCATION: [facility]  DATE: 06/30/2024    INDICATION: Left superior inferior pubic rami fractures, evaluate posterior pelvis.  COMPARISON: 06/27/2024.  TECHNIQUE: CT scan of the pelvis was performed without IV contrast. Multiplanar reformats were obtained. Dose reduction techniques were used.  CONTRAST: None.    FINDINGS:   Bones are demineralized. Bilateral ORIF changes involving the proximal femurs again seen. There are residual bony deformities and bone formation bilaterally, unchanged. There is solid bony bridging across the left proximal femoral fracture. Incomplete   healing  of the right proximal femoral fracture with portions of the fracture plane still visible.    Subacute nondisplaced stress fracture of the left iliac crest where there is sclerosis and remodeling/incomplete healing. Acute to subacute nondisplaced left sacral alar fracture which extends across midline through S2. There is probably some focal   involvement of the right sacral ala as well.    Again seen are mildly displaced comminuted fractures of the left inferior pubic ramus as well as the left superior pubic ramus near the parasymphyseal region.     Soft tissues about the pelvis show subcutaneous edema without a well-defined hematoma or fluid collection. Intrapelvic contents show a Delgado catheter within a decompressed bladder. Colonic diverticulosis without evidence of diverticulitis.   Atherosclerotic vascular calcifications.      Impression    IMPRESSION:  1.  Acute comminuted mildly displaced left pubic rami fractures.    2.  Acute to subacute, left greater than right sacral alar fractures without significant displacement. Midline extending across S2.    3.  Subacute healing nondisplaced insufficiency fracture left iliac crest.    4.  Bilateral proximal femoral internal fixation changes. There is no loss of reduction. Incomplete healing on the right side.    5.  Bone demineralization.            Anita Davila, LONNIE CNP

## 2024-06-30 NOTE — PLAN OF CARE
Problem: Adult Inpatient Plan of Care  Goal: Plan of Care Review    Outcome: Progressing       Goal Outcome Evaluation:  [facility] - ICU    RN Progress Note:            Pertinent Assessments:      Please refer to flowsheet rows for full assessment     VSS. Afebrile. Alert and oriented x4. Generalized soreness, PRN tylenol given.            Key Events - This Shift:       Loose stools this shift. Low urine out put Dr Deutsch notified. Vancomycin started. Continued on Heparin gtt as ordered. Low SpO2 when asleep, placed on NC at bedtime. Electrolytes replaced per protocol.    Critical lab: Positive blood culture from 6/27 Gram positive cocci in clusters.             Barriers to Discharge / Downgrade:     Heparin gtt

## 2024-07-01 ENCOUNTER — APPOINTMENT (OUTPATIENT)
Dept: PHYSICAL THERAPY | Facility: HOSPITAL | Age: 74
DRG: 871 | End: 2024-07-01
Payer: COMMERCIAL

## 2024-07-01 ENCOUNTER — APPOINTMENT (OUTPATIENT)
Dept: SPEECH THERAPY | Facility: HOSPITAL | Age: 74
DRG: 871 | End: 2024-07-01
Attending: STUDENT IN AN ORGANIZED HEALTH CARE EDUCATION/TRAINING PROGRAM
Payer: COMMERCIAL

## 2024-07-01 LAB
ANION GAP SERPL CALCULATED.3IONS-SCNC: 3 MMOL/L (ref 7–15)
BUN SERPL-MCNC: 25.4 MG/DL (ref 8–23)
CALCIUM SERPL-MCNC: 8.3 MG/DL (ref 8.8–10.2)
CHLORIDE SERPL-SCNC: 107 MMOL/L (ref 98–107)
CREAT SERPL-MCNC: 1.06 MG/DL (ref 0.51–0.95)
DEPRECATED HCO3 PLAS-SCNC: 23 MMOL/L (ref 22–29)
EGFRCR SERPLBLD CKD-EPI 2021: 54 ML/MIN/1.73M2
ERYTHROCYTE [DISTWIDTH] IN BLOOD BY AUTOMATED COUNT: 15.1 % (ref 10–15)
GLUCOSE BLDC GLUCOMTR-MCNC: 131 MG/DL (ref 70–99)
GLUCOSE BLDC GLUCOMTR-MCNC: 153 MG/DL (ref 70–99)
GLUCOSE BLDC GLUCOMTR-MCNC: 85 MG/DL (ref 70–99)
GLUCOSE SERPL-MCNC: 95 MG/DL (ref 70–99)
HCT VFR BLD AUTO: 26.1 % (ref 35–47)
HGB BLD-MCNC: 8.7 G/DL (ref 11.7–15.7)
MAGNESIUM SERPL-MCNC: 2.4 MG/DL (ref 1.7–2.3)
MCH RBC QN AUTO: 33 PG (ref 26.5–33)
MCHC RBC AUTO-ENTMCNC: 33.3 G/DL (ref 31.5–36.5)
MCV RBC AUTO: 99 FL (ref 78–100)
PLATELET # BLD AUTO: 205 10E3/UL (ref 150–450)
POTASSIUM SERPL-SCNC: 3.7 MMOL/L (ref 3.4–5.3)
RBC # BLD AUTO: 2.64 10E6/UL (ref 3.8–5.2)
SODIUM SERPL-SCNC: 133 MMOL/L (ref 135–145)
WBC # BLD AUTO: 12.5 10E3/UL (ref 4–11)

## 2024-07-01 PROCEDURE — 99232 SBSQ HOSP IP/OBS MODERATE 35: CPT | Performed by: STUDENT IN AN ORGANIZED HEALTH CARE EDUCATION/TRAINING PROGRAM

## 2024-07-01 PROCEDURE — 250N000013 HC RX MED GY IP 250 OP 250 PS 637: Performed by: INTERNAL MEDICINE

## 2024-07-01 PROCEDURE — 120N000004 HC R&B MS OVERFLOW

## 2024-07-01 PROCEDURE — 97110 THERAPEUTIC EXERCISES: CPT | Mod: GP

## 2024-07-01 PROCEDURE — 250N000009 HC RX 250: Performed by: INTERNAL MEDICINE

## 2024-07-01 PROCEDURE — 92610 EVALUATE SWALLOWING FUNCTION: CPT | Mod: GN

## 2024-07-01 PROCEDURE — 94640 AIRWAY INHALATION TREATMENT: CPT | Mod: 76

## 2024-07-01 PROCEDURE — 80048 BASIC METABOLIC PNL TOTAL CA: CPT | Performed by: INTERNAL MEDICINE

## 2024-07-01 PROCEDURE — G0463 HOSPITAL OUTPT CLINIC VISIT: HCPCS

## 2024-07-01 PROCEDURE — 250N000011 HC RX IP 250 OP 636: Performed by: INTERNAL MEDICINE

## 2024-07-01 PROCEDURE — 85027 COMPLETE CBC AUTOMATED: CPT | Performed by: INTERNAL MEDICINE

## 2024-07-01 PROCEDURE — 250N000009 HC RX 250: Performed by: EMERGENCY MEDICINE

## 2024-07-01 PROCEDURE — 999N000157 HC STATISTIC RCP TIME EA 10 MIN

## 2024-07-01 PROCEDURE — 99223 1ST HOSP IP/OBS HIGH 75: CPT | Performed by: NURSE PRACTITIONER

## 2024-07-01 PROCEDURE — 83735 ASSAY OF MAGNESIUM: CPT | Performed by: INTERNAL MEDICINE

## 2024-07-01 PROCEDURE — 94640 AIRWAY INHALATION TREATMENT: CPT

## 2024-07-01 PROCEDURE — 258N000003 HC RX IP 258 OP 636: Performed by: INTERNAL MEDICINE

## 2024-07-01 PROCEDURE — 97530 THERAPEUTIC ACTIVITIES: CPT | Mod: GP

## 2024-07-01 PROCEDURE — 250N000013 HC RX MED GY IP 250 OP 250 PS 637: Performed by: STUDENT IN AN ORGANIZED HEALTH CARE EDUCATION/TRAINING PROGRAM

## 2024-07-01 RX ORDER — LIDOCAINE 4 G/G
2 PATCH TOPICAL
Status: DISCONTINUED | OUTPATIENT
Start: 2024-07-02 | End: 2024-07-04 | Stop reason: HOSPADM

## 2024-07-01 RX ORDER — PREDNISONE 20 MG/1
40 TABLET ORAL DAILY
Status: COMPLETED | OUTPATIENT
Start: 2024-07-02 | End: 2024-07-03

## 2024-07-01 RX ORDER — POTASSIUM CHLORIDE 750 MG/1
10 TABLET, EXTENDED RELEASE ORAL ONCE
Status: COMPLETED | OUTPATIENT
Start: 2024-07-01 | End: 2024-07-01

## 2024-07-01 RX ADMIN — PANTOPRAZOLE SODIUM 40 MG: 40 TABLET, DELAYED RELEASE ORAL at 08:21

## 2024-07-01 RX ADMIN — NYSTATIN: 100000 CREAM TOPICAL at 08:21

## 2024-07-01 RX ADMIN — METHYLPREDNISOLONE SODIUM SUCCINATE 62.5 MG: 125 INJECTION, POWDER, FOR SOLUTION INTRAMUSCULAR; INTRAVENOUS at 08:20

## 2024-07-01 RX ADMIN — FOLIC ACID 1 MG: 1 TABLET ORAL at 08:19

## 2024-07-01 RX ADMIN — NYSTATIN: 100000 CREAM TOPICAL at 20:53

## 2024-07-01 RX ADMIN — LIDOCAINE: 50 OINTMENT TOPICAL at 20:49

## 2024-07-01 RX ADMIN — APIXABAN 10 MG: 5 TABLET, FILM COATED ORAL at 08:19

## 2024-07-01 RX ADMIN — ALBUTEROL SULFATE 2.5 MG: 2.5 SOLUTION RESPIRATORY (INHALATION) at 05:27

## 2024-07-01 RX ADMIN — LIDOCAINE: 50 OINTMENT TOPICAL at 08:27

## 2024-07-01 RX ADMIN — AZITHROMYCIN DIHYDRATE 250 MG: 500 INJECTION, POWDER, LYOPHILIZED, FOR SOLUTION INTRAVENOUS at 18:42

## 2024-07-01 RX ADMIN — MICONAZOLE NITRATE ANTIFUNGAL POWDER: 2 POWDER TOPICAL at 20:49

## 2024-07-01 RX ADMIN — CARBIDOPA AND LEVODOPA 2.5 MG: 50; 200 TABLET, EXTENDED RELEASE ORAL at 11:21

## 2024-07-01 RX ADMIN — APIXABAN 10 MG: 5 TABLET, FILM COATED ORAL at 20:48

## 2024-07-01 RX ADMIN — ACETAMINOPHEN 325 MG: 325 TABLET ORAL at 02:56

## 2024-07-01 RX ADMIN — IPRATROPIUM BROMIDE AND ALBUTEROL SULFATE 3 ML: .5; 3 SOLUTION RESPIRATORY (INHALATION) at 19:41

## 2024-07-01 RX ADMIN — LIDOCAINE: 50 OINTMENT TOPICAL at 11:20

## 2024-07-01 RX ADMIN — THIAMINE HCL TAB 100 MG 100 MG: 100 TAB at 08:22

## 2024-07-01 RX ADMIN — ACETAMINOPHEN 325 MG: 325 TABLET ORAL at 14:42

## 2024-07-01 RX ADMIN — ONDANSETRON 4 MG: 4 TABLET, ORALLY DISINTEGRATING ORAL at 05:50

## 2024-07-01 RX ADMIN — MICONAZOLE NITRATE ANTIFUNGAL POWDER: 2 POWDER TOPICAL at 08:20

## 2024-07-01 RX ADMIN — LIDOCAINE: 50 OINTMENT TOPICAL at 16:26

## 2024-07-01 RX ADMIN — CEFTRIAXONE SODIUM 1 G: 1 INJECTION, POWDER, FOR SOLUTION INTRAMUSCULAR; INTRAVENOUS at 20:53

## 2024-07-01 RX ADMIN — IPRATROPIUM BROMIDE AND ALBUTEROL SULFATE 3 ML: .5; 3 SOLUTION RESPIRATORY (INHALATION) at 09:13

## 2024-07-01 RX ADMIN — VANCOMYCIN HYDROCHLORIDE 500 MG: 500 INJECTION, POWDER, LYOPHILIZED, FOR SOLUTION INTRAVENOUS at 11:21

## 2024-07-01 RX ADMIN — CARBIDOPA AND LEVODOPA 2.5 MG: 50; 200 TABLET, EXTENDED RELEASE ORAL at 16:39

## 2024-07-01 RX ADMIN — POTASSIUM CHLORIDE 10 MEQ: 750 TABLET, EXTENDED RELEASE ORAL at 08:27

## 2024-07-01 RX ADMIN — CARBIDOPA AND LEVODOPA 2.5 MG: 50; 200 TABLET, EXTENDED RELEASE ORAL at 08:21

## 2024-07-01 ASSESSMENT — ACTIVITIES OF DAILY LIVING (ADL)
ADLS_ACUITY_SCORE: 47
ADLS_ACUITY_SCORE: 46
ADLS_ACUITY_SCORE: 47
ADLS_ACUITY_SCORE: 46
ADLS_ACUITY_SCORE: 47
ADLS_ACUITY_SCORE: 46
ADLS_ACUITY_SCORE: 47
ADLS_ACUITY_SCORE: 46
ADLS_ACUITY_SCORE: 47
ADLS_ACUITY_SCORE: 46
ADLS_ACUITY_SCORE: 46
ADLS_ACUITY_SCORE: 47
ADLS_ACUITY_SCORE: 47
ADLS_ACUITY_SCORE: 46

## 2024-07-01 NOTE — PROGRESS NOTES
Speech-Language Pathology: Clinical Swallow Evaluation     07/01/24 0905   Appointment Info   Signing Clinician's Name / Credentials (SLP) Kyra Andersen MA CCC-SLP   General Information   Onset of Illness/Injury or Date of Surgery 06/27/24   Referring Physician Dr. Dewitt   Pertinent History of Current Problem Per EMR: 74 year old female admitted on 6/27/2024 with acute hypoxic respiratory failure secondary to COPDE complicated by PE and neumonia. She was initially admitted to ICU for shock, now weaned off pressors.  Case is complicated by falls and several rib as well as pubic rami fractures   General Observations Alert and cooperative   Type of Evaluation   Type of Evaluation Swallow Evaluation   Oral Motor   Oral Musculature generally intact   Mucosal Quality good   Dentition (Oral Motor)   Dentition (Oral Motor) adequate dentition   Facial Symmetry (Oral Motor)   Facial Symmetry (Oral Motor) WNL   Lip Function (Oral Motor)   Lip Range of Motion (Oral Motor) WNL   Lip Strength (Oral Motor) WNL   Comment, Lip Function (Oral Motor) Raised, blue/black sore or bruise noted on lower lip. Patient reports this baseline and not acute.   Tongue Function (Oral Motor)   Tongue Strength (Oral Motor) WNL   Tongue Coordination/Speed (Oral Motor) WNL   Tongue ROM (Oral Motor) WNL   Jaw Function (Oral Motor)   Jaw Function (Oral Motor) WNL   Vocal Quality/Secretion Management (Oral Motor)   Vocal Quality (Oral Motor) WNL   Secretion Management (Oral Motor) WNL   General Swallowing Observations   Past History of Dysphagia None per EMR, RN, or patient report. Patient denies any issues and reports she has experience with family members. She reported she is not interested in pursuing a Video Swallow Study at this time.   Comment, General Swallowing Observations Chest imaging: Extensive intraluminal debris with scattered areas of bronchial plugging within the bilateral lower lobe airways, more extensive on the left. There are also  fine tree-in-bud opacities within the left lower lobe with adjacent linear consolidative   opacities. Aspiration and early aspiration pneumonia is possible.   Current Diet/Method of Nutritional Intake (General Swallowing Observations, NIS) regular diet;thin liquids (level 0)   Swallowing Evaluation Clinical swallow evaluation   Clinical Swallow Evaluation   Clinical Swallow Evaluation Textures Trialed thin liquids;solid foods   Clinical Swallow Eval: Thin Liquid Texture Trial   Mode of Presentation, Thin Liquids cup;straw   Oral Phase of Swallow WFL   Pharyngeal Phase of Swallow intact   Diagnostic Statement No overt s/s aspiration   Clinical Swallow Evaluation: Solid Food Texture Trial   Mode of Presentation self-fed   Oral Phase WFL   Pharyngeal Phase intact   Diagnostic Statement No overt s/s aspiration   Esophageal Phase of Swallow   Patient reports or presents with symptoms of esophageal dysphagia No   Swallowing Recommendations   Diet Consistency Recommendations regular diet;thin liquids (level 0)   Recommended Feeding/Eating Techniques (Swallow Eval) maintain upright sitting position for eating   Medication Administration Recommendations, Swallowing (SLP) Per patient preference   Instrumental Assessment Recommendations   (MD may consider VFSS if needed clinically;)   Comment, Swallowing Recommendations No s/s aspiration or overt signs of pharyngeal dysphagia. Low concern for chronic or ongoing aspiration per this BSS. Chest imaging may be suspicious and could warrant VFSS if needed to rule out silent aspiration pending medical respiratory status.   Clinical Impression   Criteria for Skilled Therapeutic Interventions Met (SLP Eval) Evaluation only   SLP Diagnosis Dysphagia   Risks & Benefits of therapy have been explained evaluation/treatment results reviewed;care plan/treatment goals reviewed;participants voiced agreement with care plan;participants included;patient   Clinical Impression Comments Patient  participated in Clinical Swallow Evaluation. No s/s aspiration with any intake. Oral motor was WFL. Mastication was adequate. Hyolaryngeal movement was present. Recommend diet listed above. No anticipated SLP needs at this time. Please contact SLP with any questions or concerns.   SLP Total Evaluation Time   Eval: oral/pharyngeal swallow function, clinical swallow Minutes (30721) 20   SLP Discharge Planning   SLP Discharge Recommendation other (see comments)  (per team)   SLP Rationale for DC Rec No ST needs anticipated post-d/c   SLP Brief overview of current status  Recommend regular textures and thin liquids. No anticipated SLP needs at this time. Please contact SLP with any questions or concerns. Consider VFSS if needed pending medical and respiratory status. Patient did decline this at this time.   Total Session Time   Total Session Time (sum of timed and untimed services) 20

## 2024-07-01 NOTE — CONSULTS
"Palliative Care Consultation Note  [facility]      Patient: Namita Viera  Date of Admission:  6/27/2024    Requesting Clinician / Team: Hospital medicine  Reason for consult:   Goals of care  CODE STATUS     Recommendations & Counseling     GOALS OF CARE:   Life-prolonging with limits   She is hopeful for continued improvement in her pain with movement and that she can return back home shortly.  She is not interested in invasive procedures, NIPPV or \"aggressive \"measures unless it were to greatly improve her quality of life.  She assents to changing her CODE STATUS to DNR/DNI    ADVANCE CARE PLANNING:  No health care directive on file. Per  informed consent policy, next of kin should be involved if patient becomes unable.  Healthcare directive was printed off and given to Namita and her sister today.  There is no POLST form on file, defer to patient and/or next of kin for decisions   Code status: No CPR- Pre-arrest intubation OK    DECISION MAKING:  Patient's decision making preferences: shared with support from loved ones  Patient has decision-making capacity today for complex decisions: Intact     MEDICAL MANAGEMENT:   We are not actively managing symptoms at this time.    PSYCHOSOCIAL/SPIRITUAL SUPPORT:  Family   Friends   [place] community: [facility]     Palliative Care will continue to follow. Thank you for the consult and allowing us to aid in the care of Namita Viera.    These recommendations have been discussed with bedside and primary team.    Ashwin Story NP  [facility], Palliative Care  Securely message with the Vocera Web Console (learn more here) or  Text page via [employer] Paging/Directory     Chart documentation was completed, in part, with [employer] voice-recognition software. Even though reviewed, some grammatical, spelling, and word errors may remain.    Assessment      Namita Viera is a 74 year old female with PMH of COPD and emphysema not on home O2, ongoing " tobacco and alcohol use, malnutrition, right hip fracture s/p ORIF in 5/2023, who was brought in with encephalopathy and hypoxia O2 sats in the 60s.  Workup remarkable for probable aspiration pneumonia, small subsegmental PE, severe hypokalemia, rib fractures.  Requiring NIPPV for respiratory failure with hypercapnia     Today, the patient was seen for:  Role introduction, rapport building, goals of care support    History of Present Illness   Met with Namita and her Sister Marissa.   Introduced the role of palliative care as an interdisciplinary team that cares for patients with serious illness to help support symptom management, communication, coping for patients and their families as well as support with medical decision making.    Prognosis, Goals, & Planning:   Functional Status just prior to this current hospitalization:  Outpatient Palliative Performance Score (PPS) 80%  Some evidence of disease. Full/normal ambulation, self-care & LOC; normal activity/work w/effort; normal or reduced intake.  ECOG2 (Ambulatory and capable of all selfcare but unable to carry out any work activities; may need help with IADLs up and about > 50% of waking hours)    Prognosis, Goals, and/or Advance Care Planning:  Discussed what continuing restorative/life-prolonging care entails, including continued (re)admissions to the hospital, continuing with preventative and primary care, seeing disease/organ-specific specialties for medical treatment in hopes to prolong life for as long as possible.  Shared that with any treatment, there will be risks and benefits that may affect overall quality of life.    Explained where continued care can be done (home care, private pay, skilled nursing facilities as options), but shared that care-management will assist with specifics.  Adilson shared today that she has had experience with transitional care in the past and is not interested in returning to any transitional care.    Education provided on  the role of palliative care while continuing with disease treatments (support/coping, symptom management).  We discussed that if/when disease treatments are no longer effective or when their quality of life is too negatively impacted, patient may elect to focus more solely on quality-of-life measures and stop disease treatment.    Education provided on transition to comfort-focused goals of care would be including discontinuation of IV fluids, cardiac monitoring, labs, tube feeding, TPN, etc. and other interventions that do not directly promote comfort.  Education given that current treatments that promote comfort and quality of life are continued. Anticipatory guidance was given regarding feeding, hunger, fluids at end of life. We discussed utilization of medications to ease air hunger, agitation and restlessness. Discussed that this process is very purposeful in terms of ensuring patient is as comfortable as possible and that family wishes are honored.    Code Status was addressed today:   Yes, We discussed potential risks and rationale of attempting cardiac resuscitation, intubation, and mechanical ventilation.  We also discussed probability of survival as well as quality of life implications.  Based on this discussion, patient or surrogate response/decision: Affirmed DNR/DNI            Coping, Meaning, & Spirituality:   Mood, coping, and/or meaning in the context of serious illness were addressed today: Yes    Social:   Living situation:lives alone  Important relationships/caregivers: She has a large family with many siblings she also has 4 children of her own as well as 17+ grandchildren and many more great-grandchildren    Medications:  Reviewed this patient's medication profile and medications from this hospitalization.   [place] Board of Pharmacy Data Base Reviewed: No    ROS:  Comprehensive ROS is reviewed and is negative except as here & per HPI:     Physical Exam   Vital Signs with Ranges  Temp:  [97.2   F (36.2  C)-98.1  F (36.7  C)] 97.2  F (36.2  C)  Pulse:  [67-89] 82  Resp:  [18-24] 18  BP: ()/(54-59) 103/56  SpO2:  [87 %-100 %] 92 %  Wt Readings from Last 10 Encounters:   07/01/24 45.6 kg (100 lb 9.6 oz)   06/26/23 43.2 kg (95 lb 3.2 oz)   05/31/23 45.3 kg (99 lb 14.4 oz)   05/23/23 47.2 kg (104 lb)   05/22/23 47.7 kg (105 lb 3.2 oz)   05/16/23 45.5 kg (100 lb 5 oz)   05/15/23 43.9 kg (96 lb 12.5 oz)   09/06/22 41.9 kg (92 lb 6.4 oz)   02/04/22 39.2 kg (86 lb 6.4 oz)   01/10/22 40.6 kg (89 lb 6.4 oz)     100 lbs 9.6 oz    Physical Exam  Vitals and nursing note reviewed.   Constitutional:       General: She is not in acute distress.  HENT:      Head: Normocephalic.      Mouth/Throat:      Mouth: Mucous membranes are moist.      Pharynx: Oropharynx is clear.   Eyes:      Extraocular Movements: Extraocular movements intact.      Conjunctiva/sclera: Conjunctivae normal.      Pupils: Pupils are equal, round, and reactive to light.   Cardiovascular:      Rate and Rhythm: Normal rate and regular rhythm.      Pulses: Normal pulses.   Pulmonary:      Effort: Pulmonary effort is normal. No respiratory distress.      Breath sounds: No wheezing.   Abdominal:      General: Abdomen is flat. There is no distension.      Tenderness: There is no abdominal tenderness.   Musculoskeletal:         General: Normal range of motion.   Skin:     General: Skin is warm and dry.      Capillary Refill: Capillary refill takes less than 2 seconds.   Neurological:      General: No focal deficit present.      Mental Status: She is alert. Mental status is at baseline.   Psychiatric:         Mood and Affect: Mood normal.         Thought Content: Thought content normal.           Data reviewed:  Results for orders placed or performed during the hospital encounter of 06/27/24 (from the past 24 hour(s))   CT Pelvis Bone wo Contrast    Narrative    EXAM: CT PELVIS BONE WITHOUT CONTRAST  LOCATION: [facility]  DATE:  06/30/2024    INDICATION: Left superior inferior pubic rami fractures, evaluate posterior pelvis.  COMPARISON: 06/27/2024.  TECHNIQUE: CT scan of the pelvis was performed without IV contrast. Multiplanar reformats were obtained. Dose reduction techniques were used.  CONTRAST: None.    FINDINGS:   Bones are demineralized. Bilateral ORIF changes involving the proximal femurs again seen. There are residual bony deformities and bone formation bilaterally, unchanged. There is solid bony bridging across the left proximal femoral fracture. Incomplete   healing of the right proximal femoral fracture with portions of the fracture plane still visible.    Subacute nondisplaced stress fracture of the left iliac crest where there is sclerosis and remodeling/incomplete healing. Acute to subacute nondisplaced left sacral alar fracture which extends across midline through S2. There is probably some focal   involvement of the right sacral ala as well.    Again seen are mildly displaced comminuted fractures of the left inferior pubic ramus as well as the left superior pubic ramus near the parasymphyseal region.     Soft tissues about the pelvis show subcutaneous edema without a well-defined hematoma or fluid collection. Intrapelvic contents show a Delgado catheter within a decompressed bladder. Colonic diverticulosis without evidence of diverticulitis.   Atherosclerotic vascular calcifications.      Impression    IMPRESSION:  1.  Acute comminuted mildly displaced left pubic rami fractures.    2.  Acute to subacute, left greater than right sacral alar fractures without significant displacement. Midline extending across S2.    3.  Subacute healing nondisplaced insufficiency fracture left iliac crest.    4.  Bilateral proximal femoral internal fixation changes. There is no loss of reduction. Incomplete healing on the right side.    5.  Bone demineralization.       Glucose by meter   Result Value Ref Range    GLUCOSE BY METER POCT 146 (H)  70 - 99 mg/dL   Glucose by meter   Result Value Ref Range    GLUCOSE BY METER POCT 134 (H) 70 - 99 mg/dL   Magnesium   Result Value Ref Range    Magnesium 2.4 (H) 1.7 - 2.3 mg/dL   Basic metabolic panel   Result Value Ref Range    Sodium 133 (L) 135 - 145 mmol/L    Potassium 3.7 3.4 - 5.3 mmol/L    Chloride 107 98 - 107 mmol/L    Carbon Dioxide (CO2) 23 22 - 29 mmol/L    Anion Gap 3 (L) 7 - 15 mmol/L    Urea Nitrogen 25.4 (H) 8.0 - 23.0 mg/dL    Creatinine 1.06 (H) 0.51 - 0.95 mg/dL    GFR Estimate 54 (L) >60 mL/min/1.73m2    Calcium 8.3 (L) 8.8 - 10.2 mg/dL    Glucose 95 70 - 99 mg/dL   CBC with platelets   Result Value Ref Range    WBC Count 12.5 (H) 4.0 - 11.0 10e3/uL    RBC Count 2.64 (L) 3.80 - 5.20 10e6/uL    Hemoglobin 8.7 (L) 11.7 - 15.7 g/dL    Hematocrit 26.1 (L) 35.0 - 47.0 %    MCV 99 78 - 100 fL    MCH 33.0 26.5 - 33.0 pg    MCHC 33.3 31.5 - 36.5 g/dL    RDW 15.1 (H) 10.0 - 15.0 %    Platelet Count 205 150 - 450 10e3/uL   Glucose by meter   Result Value Ref Range    GLUCOSE BY METER POCT 85 70 - 99 mg/dL       Medical Decision Making       80 MINUTES SPENT BY ME on the date of service doing chart review, history, exam, documentation & further activities per the note.

## 2024-07-01 NOTE — CONSULTS
7/1- test claim for Eliquis/Xarelto- Both medications covered $282 for 30 days supply- meeting a deductible and once met the cost will be $47for 30 days supply. First 30 day vouchers avail in the discharge pharmacy if needed.   Thank you for allowing me to help with your patient  Shalini Eric [facility]  Pharmacy Discharge Liaison [facility]

## 2024-07-01 NOTE — PROGRESS NOTES
[facility] Nurse Inpatient Assessment     Consulted for: 7/1 new consult entered for sacrum, Buttocks, sacrum    Summary: 7/1 Assessed patient for new consult, consult now d/c'd    Patient History (according to provider note(s):      Namita Viera is a 74 year old female with PMH of COPD and emphysema not on home O2, ongoing tobacco and alcohol use, malnutrition, right hip fracture s/p ORIF in 5/2023, who was brought in with encephalopathy and hypoxia O2 sats in the 60s.  Workup remarkable for probable aspiration pneumonia, small subsegmental PE, severe hypokalemia, rib fractures.  Requiring NIPPV for respiratory failure with hypercapnia     Assessment:      Skin Injury Location: Buttocks       6/28 7/1    Last photo: 7/1  Skin injury due to: Irritant contact dermatitis due to fecal, urinary or dual incontinence   Skin history and plan of care:   satellite lesions and likely outline of a brief or pull up  Affected area:      Skin assessment: Denudement, Erosion of epidermis, Erythema, and Rash     Measurements (length x width x depth, in cm) 8.5  x 4  x  0.1 cm  to main denudement to sacrococcygeal region     Color: pink and red     Temperature  warm     Drainage: slight .      Color: serosanguinous      Odor: mild  Pain: severe, Irritable or crying out at intervals, tension to hands, feet and body, and facial expression of distress, tender and burning  Pain interventions prior to dressing change: slow and gentle cares   Treatment goal: Heal , Infection control/prevention, Protection, and Promote epidermal migration  STATUS: evolving and healing  Supplies ordered: ordered vashe and triad; antifungal products already at bedside    Daughter at bedside.      Treatment Plan:     Buttocks  Soak irritated open skin with vashe moistened gauze for 2-5 minutes, pat  dry  Apply AF powder to buttocks, then cover with TRIAD to open skin on buttocks  Once soiled, gently wipe away soiled TRIAD only, do not scrub all off  Resoak with Vashe moistened gauze  Apply additional TRIAD to keep skin covered and protected  Apply BID + PRN after each kelvin cares    Orders: Reviewed    RECOMMEND PRIMARY TEAM ORDER: None, at this time  Education provided: plan of care, Moisture management, and Hygiene  Discussed plan of care with: Patient, Family, and Nurse  [facility] nurse follow-up plan: twice weekly  Notify [facility] if wound(s) deteriorate.  Nursing to notify the Provider(s) and re-consult the [facility] Nurse if new skin concern.    DATA:     Current support surface: Standard  Low air loss (ELMIRA pump, Isolibrium, Pulsate)  Containment of urine/stool: Incontinence Protocol  BMI: Body mass index is 19.01 kg/m .   Active diet order: Orders Placed This Encounter      Regular Diet Adult     Output: I/O last 3 completed shifts:  In: 741.5 [P.O.:460; I.V.:281.5]  Out: 675 [Urine:675]     Labs:   Recent Labs   Lab 07/01/24  0517 06/29/24  0350 06/28/24  0524 06/27/24  1902   ALBUMIN  --   --  3.1*  --    HGB 8.7*   < > 9.7*  --    WBC 12.5*   < > 11.2*  --    A1C  --   --   --  4.7    < > = values in this interval not displayed.     Pressure injury risk assessment:   Sensory Perception: 4-->no impairment  Moisture: 4-->rarely moist  Activity: 2-->chairfast  Mobility: 2-->very limited  Nutrition: 4-->excellent  Friction and Shear: 2-->potential problem  Gigi Score: 18    BLAINE MayerN RN CWOCN  Pager no longer in use, please contact through [employer] group: [facility] [employer]

## 2024-07-01 NOTE — PROGRESS NOTES
[facility]    Medicine Progress Note - Hospitalist Service    Date of Admission:  6/27/2024    Assessment & Plan   Namita Viera is a 74 year old female admitted on 6/27/2024 with acute hypoxic respiratory failure secondary to COPDE complicated by PE and neumonia. She was initially admitted to ICU for shock, now weaned off pressors.  Case is complicated by falls and several rib as well as pubic rami fractures.     Acute hypoxic and hypercapnic respiratory failure  COPD exacerbation  CAP  -CT scan done on admission showing scattered areas of bronchial plugging and tree-in-bud opacities with adjacent consolidative opacities  -Question aspiration, s/p SLP eval. No s/s of aspiration or pharyngeal dysphagia. Continue regular textures with thi liquids   -Continue COPD cares including azithromycin, ceftriaxone, DuoNebs  -will transition to PO steroids  -Continue GI prophylaxis with PTA PPI  -Patient refusing BiPAP at night, implications discussed  -PT/ OT recommending TCU  -Palliative consult for goals of care and code status     Septic shock   -weaned off norepinephrine, on low dose midodrine  -continue midodrine for now, titrate as appropriate to keep MAP >65-70  -has PICC in place, plan to remove within next day or two     ?Bacteremia, presumed contaminant   -1/2 bottles of gram positive cocci 6/27/24 likely contaminant   -MRSA screen negative   -on IV vancomycin pending repeat blood cx results, will discontinue if cx negative   -remove PICC pending cultures      Pulmonary embolism  -continue Eliquis dosing for PE  -TTE completed 6/27/24 without RV dysfunction      Anemia  -Hemoglobin acutely dropped on hospital day 1, has been stable since without any additional active signs and symptoms of bleed  -likely secondary to acute illness and alcohol use given high-normal MCV  -continue to monitor      CARLOS  -Creatinine last checked 1 year ago at 0.72.  Peaked at 1.2 during this admission and  currently improving  -Continue management as noted above  -encourage oral intake     Alcohol use and dependence  -Drinks approximately 6 glasses of wine per day  -CIWA scores have been 0, will discontinue orders   -continue vitamin supplements   -defers chem dep     Displaced left 10th rib fracture  Nondisplaced left 12th rib fracture  -rib fracture protocol placed  -surgery consul per protocol, signed off 6/20/24 and recommending continued conservative management and rib fracture protocol      T9 compression fracture   -no red flag symptoms at the moment   -monitor pain, careful use of opiates   -consider neurosurgery consult pending course     Acute left inferior and superior pubic rami fractures   -ortho consulted, recommending conservative management and outpatient follow-up      Tobacco use  -discussed cessation  -continue nicotine patch      Sacral injury  -WOC     Chronic conditions  GERD: PTA PPI  Osteoporosis: Resume home Fosamax on discharge  HTN: Holding home antihypertensives in setting of shock and midodrine.  Restart when appropriate when off midodrine          Diet: Regular Diet Adult    DVT Prophylaxis: Eliquis  Delgado Catheter: PRESENT, indication: Wound deterioration and failed external collection device  Lines: PRESENT      PICC 06/27/24 Triple Lumen Left Basilic Vasopressor,acute Access-Site Assessment: WDL      Cardiac Monitoring: ACTIVE order. Indication: Electrolyte Imbalance (24 hours)- Magnesium <1.3 mg/ml; Potassium < =2.8 or > 5.5 mg/ml  Code Status: No CPR- Pre-arrest intubation OK      Clinically Significant Risk Factors              # Hypoalbuminemia: Lowest albumin = 3.1 g/dL at 6/28/2024  5:24 AM, will monitor as appropriate     # Hypertension: Noted on problem list           #Precipitous drop in Hgb/Hct: Lowest Hgb this hospitalization: 8.1 g/dL. Will continue to monitor and treat/transfuse as appropriate.      # Severe Malnutrition: based on nutrition assessment, PRESENT ON ADMISSION           Disposition Plan     Medically Ready for Discharge: Anticipated Tomorrow             Farhan Dewitt DO  Hospitalist Service  [facility]  Securely message with [employer] (more info)  Text page via [employer] Paging/Directory   ______________________________________________________________________    Interval History   No acute overnight events. Patient not currently in pain this AM. Slept well, denies issues with breathing. No acute complaints. She states she either wants to remain here or go home, apprehensive for TCU but risks were discussed and she is in understanding. States she wants to be comfortable as opposed to safe overall. Palliative was consulted which was re-discussed with patient today.     Physical Exam   Vital Signs: Temp: 97.2  F (36.2  C) Temp src: Oral BP: 103/56 Pulse: 82   Resp: 18 SpO2: 92 % O2 Device: Nasal cannula Oxygen Delivery: 3 LPM  Weight: 100 lbs 9.6 oz    General Appearance:  no acute distress, nontoxic and nondiaphoretic  Respiratory: no wheezing, no rales or rhonchi, nasal cannula in place and not using accessory muscles for breathing  Cardiovascular: Regular rate and rhythm  GI: Mild pain without any rebound or guarding, no masses, no hepatosplenomegaly  Other:  No focal deficits noted, strength in bilateral lower extremities is 5 out of 5, sensation intact bilaterally, pulses equal in all 4 extremities    Medical Decision Making       45 MINUTES SPENT BY ME on the date of service doing chart review, history, exam, documentation & further activities per the note.      Data     I have personally reviewed the following data over the past 24 hrs:    12.5 (H)  \   8.7 (L)   / 205     133 (L) 107 25.4 (H) /  85   3.7 23 1.06 (H) \       Imaging results reviewed over the past 24 hrs:   Recent Results (from the past 24 hour(s))   CT Pelvis Bone wo Contrast    Narrative    EXAM: CT PELVIS BONE WITHOUT CONTRAST  LOCATION: [facility]  DATE:  06/30/2024    INDICATION: Left superior inferior pubic rami fractures, evaluate posterior pelvis.  COMPARISON: 06/27/2024.  TECHNIQUE: CT scan of the pelvis was performed without IV contrast. Multiplanar reformats were obtained. Dose reduction techniques were used.  CONTRAST: None.    FINDINGS:   Bones are demineralized. Bilateral ORIF changes involving the proximal femurs again seen. There are residual bony deformities and bone formation bilaterally, unchanged. There is solid bony bridging across the left proximal femoral fracture. Incomplete   healing of the right proximal femoral fracture with portions of the fracture plane still visible.    Subacute nondisplaced stress fracture of the left iliac crest where there is sclerosis and remodeling/incomplete healing. Acute to subacute nondisplaced left sacral alar fracture which extends across midline through S2. There is probably some focal   involvement of the right sacral ala as well.    Again seen are mildly displaced comminuted fractures of the left inferior pubic ramus as well as the left superior pubic ramus near the parasymphyseal region.     Soft tissues about the pelvis show subcutaneous edema without a well-defined hematoma or fluid collection. Intrapelvic contents show a Delgado catheter within a decompressed bladder. Colonic diverticulosis without evidence of diverticulitis.   Atherosclerotic vascular calcifications.      Impression    IMPRESSION:  1.  Acute comminuted mildly displaced left pubic rami fractures.    2.  Acute to subacute, left greater than right sacral alar fractures without significant displacement. Midline extending across S2.    3.  Subacute healing nondisplaced insufficiency fracture left iliac crest.    4.  Bilateral proximal femoral internal fixation changes. There is no loss of reduction. Incomplete healing on the right side.    5.  Bone demineralization.

## 2024-07-01 NOTE — PLAN OF CARE
Problem: Adult Inpatient Plan of Care  Goal: Plan of Care Review  Description: The Plan of Care Review/Shift note should be completed every shift.  The Outcome Evaluation is a brief statement about your assessment that the patient is improving, declining, or no change.  This information will be displayed automatically on your shift  note.  Outcome: Progressing   Goal Outcome Evaluation:    Alert and oriented with some forgetfulness. Very fatigue, poor appetite.  Vitals stable, BG stable. Some general pain from ribs fx, hips fx, and arthritis pain. Pt declined pain meds. Skin cares done. No BM this evening. Low urine output, MD was aware of it.     Winifred Mariee RN

## 2024-07-01 NOTE — PROGRESS NOTES
Care Management Follow Up    Length of Stay (days): 4    Expected Discharge Date: 07/02/2024     Concerns to be Addressed: discharge planning,      Patient plan of care discussed at interdisciplinary rounds: Yes    Anticipated Discharge Disposition:  TCU     Anticipated Discharge Services:    Anticipated Discharge DME:      Patient/family educated on Medicare website which has current facility and service quality ratings:    Education Provided on the Discharge Plan:    Patient/Family in Agreement with the Plan:      Referrals Placed by CM/SW:  none at this time  Private pay costs discussed: Not applicable    Additional Information:  Social history per previous notes:  Pt lives alone at [facility]. Ambulates with a cane and walker. Independent with ADLs and IADLs at baseline.    Therapy recommendation is TCU. CM went to pt room to discuss this and pt was very receptive of going to TCU. She did want CM to discuss TCU location with her daughter Tiara. CM then tried to call daughter no answer LVM to call back to discuss discharge planning.    Cori Bradley RN

## 2024-07-01 NOTE — PLAN OF CARE
Problem: Gas Exchange Impaired  Goal: Optimal Gas Exchange  Outcome: Progressing  Intervention: Optimize Oxygenation and Ventilation  Recent Flowsheet Documentation  Taken 7/1/2024 0000 by Namita Plaza RN  Head of Bed (HOB) Positioning: HOB at 20-30 degrees  Patient c/o discomfort with repositioning. States pain is related to excoriated buttocks. Barrier cream applied, scant amt of serous and pink drainage. Turned Q 2 hours from side to side.   Oxygen at 4L, lungs sound dim.  At approx 0500 oxygen need to 5-7 L. Lungs are course with exp wheezes. Denies SOB. Gave albuterol neb. Lungs sound somewhat coarse without wheezes after, oxygen now at 3L NC. Has congested non productive cough.

## 2024-07-01 NOTE — PROGRESS NOTES
Patient is alert/oriented; was refusing treatment. Shallow breathing and increased O2 need noted. Prn is given. Deep breath and coughing encouraged.     Danny Ornelas, RT

## 2024-07-01 NOTE — PROGRESS NOTES
"CLINICAL NUTRITION SERVICES - BRIEF NOTE      EVALUATION OF THE PROGRESS TOWARD GOALS   Diet: Regular  Eating 25% at meals.      NEW FINDINGS   Patient reports that she is eating well, she likes real food and does not want supplements.   Last BM x4 on 6/30/24.  Labs noted sodium 133 (L), magnesium 2.4 (H)    ANTHROPOMETRICS  Height: 154.9 cm (5' 1\")  Admit wt 43.1 kg (95 lb) 6/27/24.  Most Recent Weight: 45.6 kg (100 lb 9.6 oz)      INTERVENTIONS  Implementation  Patient declines supplements.  RD assisted patient with meal order.        "

## 2024-07-01 NOTE — CONSULTS
ORTHOPEDIC CONSULTATION    Consultation  Namita Viera,  1950, MRN [id number]    Hypokalemia [E87.6]  COPD exacerbation (H) [J44.1]  Acute respiratory failure with hypercapnia (H) [J96.02]  Altered mental status, unspecified altered mental status type [R41.82]  Other acute pulmonary embolism without acute cor pulmonale (H) [I26.99]  Aspiration pneumonia, unspecified aspiration pneumonia type, unspecified laterality, unspecified part of lung (H) [J69.0]    PCP: Anahi Andersen, [phone]   Code status:  No CPR- Pre-arrest intubation OK       Extended Emergency Contact Information  Primary Emergency Contact: Tiara Slaughter   [place]  Mobile Phone: [phone]  Relation: Daughter  Secondary Emergency Contact: Phillip Viera  Address: [address] [place] [address]  Home Phone: [phone]  Mobile Phone: [phone]  Relation: Son         IMPRESSION:  Left acute mildly displaced inferior and superior pubic rami fractures  Bilateral nondisplaced sacral ala fracture  Subacute nondisplaced left iliac crest stress fracture     PLAN:  This patient was discussed with Dr. New, on-call surgeon for [facility] and they are in agreement with the following plan.   -No indication for urgent surgical intervention at this time.  The pelvic fracture can be treated nonoperatively  -Protected weight-bear as tolerated with a walker and activity as tolerated with assistance as needed  -PT/OT for disposition recommendations  -Pain regimen per primary team  -Patient may follow-up with [facility] in 2 weeks as an outpatient for repeat xrays and further management of the fracture.  We would recommend that they follow-up with a bone density specialist, may see Dr. Simeon at [facility] for bone density evaluation    Ortho will sign off. Please feel free to reach out with any further questions or concerns.       Thank you for including [facility] in the care of  Namita Viera. It has been a pleasure participating in their care.        CHIEF COMPLAINT: Acute respiratory failure with hypercapnia (H)    HISTORY OF PRESENT ILLNESS:  The patient is seen in orthopedic consultation at the request of Farhan Dewitt DO for pelvic fractures.  The patient is a 74 year old female     Today patient is experiencing pain in the left hip and pelvis.  Initially during my visit she notes that she does not have any pain at this time but does exhibit some pain mostly around her left hip and pelvis area during the exam.  She has a fall but unfortunately does not remember the fall itself.  She fell while at her independent living facility and was brought into the emergency department for further evaluation.  Radiographs demonstrated pelvic fracture, CT scan also redemonstrates the pelvic fracture in addition to sacral ala fractures.  She states that she has been out of bed since being in the hospital but feels like she has some weakness.  As far she can recall she did not have much pain when she was weightbearing and ambulating here in the hospital.  She does report a history of osteoporosis    ALLERGIES:   Review of patient's allergies indicates No Known Allergies      MEDICATIONS UPON ADMISSION:  Medications were reviewed.  They include:   Medications Prior to Admission   Medication Sig Dispense Refill Last Dose    albuterol (PROAIR HFA/PROVENTIL HFA/VENTOLIN HFA) 108 (90 Base) MCG/ACT inhaler INHALE 1-2 PUFFS INTO THE LUNGS EVERY 6 HOURS AS NEEDED FOR COUGH OR SHORTNESS OF BREATH OR WHEEZE 18 g 0 Past Week at prn    alendronate (FOSAMAX) 70 MG tablet TAKE 1 TABLET BY MOUTH EVERY 7 DAYS. TAKE IN THE MORNING ON AN EMPTY STOMACH WITH A FULL GLASS OF WATER 30 MINUTES BEFORE FOOD 12 tablet 3 Past Month at pt not taking currently    aspirin (ASA) 81 MG EC tablet Take 1 tablet (81 mg) by mouth daily   Past Month    calcium carbonate (OS-PETER) 1500 (600 Ca) MG tablet Take 600 mg by mouth daily    "Past Month    ibuprofen (ADVIL/MOTRIN) 200 MG tablet Take 200 mg by mouth every 6 hours as needed for pain   Unknown at prn    losartan-hydrochlorothiazide (HYZAAR) 100-25 MG tablet Take 1 tablet by mouth daily 90 tablet 3 Past Week at 3 days ago    melatonin 1 MG TABS tablet Take 1 tablet (1 mg) by mouth nightly as needed for sleep 90 tablet 3 Unknown at prn    multivitamin, therapeutic (THERA-VIT) TABS tablet Take 1 tablet by mouth daily   Past Month    omeprazole (PRILOSEC) 20 MG DR capsule Take 1 capsule (20 mg) by mouth daily 90 capsule 3 Past Week at 3 days ago    vitamin C (ASCORBIC ACID) 500 MG tablet Take 500 mg by mouth daily   Past Month    Vitamin D, Cholecalciferol, 25 MCG (1000 UT) TABS Take 1,000 Units by mouth daily   Past Month         SOCIAL HISTORY:   she  reports that she has been smoking cigarettes. She started smoking about 62 years ago. She has a 60 pack-year smoking history. She has never used smokeless tobacco. She reports current alcohol use of about 14.0 standard drinks of alcohol per week. She reports that she does not use drugs.      FAMILY HISTORY:  family history includes Cancer in her sister; Esophageal Cancer in her mother; Heart Disease in her father; Rectal Cancer in her sister; Rheumatoid Arthritis in her brother.      REVIEW OF SYSTEMS:   Reviewed with patient. See HPI, otherwise negative       PHYSICAL EXAMINATION:  Vitals: /56 (BP Location: Left arm)   Pulse 82   Temp 97.2  F (36.2  C) (Oral)   Resp 18   Ht 1.549 m (5' 1\")   Wt 45.6 kg (100 lb 9.6 oz)   SpO2 92%   BMI 19.01 kg/m    General: On examination, the patient is resting comfortably, NAD, awake, and alert and oriented to person, place, time, and, and general circumstances   SKIN: There is no evidence of erythema, warmth, swelling, deformity, crepitus, break to the skin, open wound.  Pulses:  Dorsalis pedis pulse is intact and equal bilaterally  Sensation: intact and equal bilaterally to the distal lower " extremities.  Tenderness:  NTTP of the SI joints, lumbar spinous processes, or greater trochanters.  ROM:  DF/PF intact, wiggles toes, 5/5 strength       RADIOGRAPHIC EVALUATION:  Personally reviewed  EXAM: CT PELVIS BONE WITHOUT CONTRAST  LOCATION: [facility]  DATE: 06/30/2024     INDICATION: Left superior inferior pubic rami fractures, evaluate posterior pelvis.  COMPARISON: 06/27/2024.  TECHNIQUE: CT scan of the pelvis was performed without IV contrast. Multiplanar reformats were obtained. Dose reduction techniques were used.  CONTRAST: None.     FINDINGS:   Bones are demineralized. Bilateral ORIF changes involving the proximal femurs again seen. There are residual bony deformities and bone formation bilaterally, unchanged. There is solid bony bridging across the left proximal femoral fracture. Incomplete   healing of the right proximal femoral fracture with portions of the fracture plane still visible.     Subacute nondisplaced stress fracture of the left iliac crest where there is sclerosis and remodeling/incomplete healing. Acute to subacute nondisplaced left sacral alar fracture which extends across midline through S2. There is probably some focal   involvement of the right sacral ala as well.     Again seen are mildly displaced comminuted fractures of the left inferior pubic ramus as well as the left superior pubic ramus near the parasymphyseal region.      Soft tissues about the pelvis show subcutaneous edema without a well-defined hematoma or fluid collection. Intrapelvic contents show a Delgado catheter within a decompressed bladder. Colonic diverticulosis without evidence of diverticulitis.   Atherosclerotic vascular calcifications.                                                                      IMPRESSION:  1.  Acute comminuted mildly displaced left pubic rami fractures.     2.  Acute to subacute, left greater than right sacral alar fractures without significant  displacement. Midline extending across S2.     3.  Subacute healing nondisplaced insufficiency fracture left iliac crest.     4.  Bilateral proximal femoral internal fixation changes. There is no loss of reduction. Incomplete healing on the right side.     5.  Bone demineralization.    PERTINENT LABS:  Personally reviewed  Recent Labs   Lab Test 07/01/24  0517 05/02/18  1516 04/30/18  1926   INR  --   --  0.95   HGB 8.7*   < > 15.3      < > 279    < > = values in this interval not displayed.         KELLI CARDONA PA-C  Date: 7/1/2024  Time: 9:39 AM  [facility]    CC1:   Farhan Dewitt DO

## 2024-07-01 NOTE — PLAN OF CARE
Problem: Adult Inpatient Plan of Care  Goal: Absence of Hospital-Acquired Illness or Injury  Intervention: Identify and Manage Fall Risk  Recent Flowsheet Documentation  Taken 7/1/2024 0800 by Gertrudis Jeffries RN  Safety Promotion/Fall Prevention:   activity supervised   lighting adjusted   room near nurse's station   room organization consistent   room door open  Intervention: Prevent Skin Injury  Recent Flowsheet Documentation  Taken 7/1/2024 0800 by Gertrudis Jeffries RN  Body Position: turned  Taken 7/1/2024 0727 by Gertrudis Jeffries RN  Body Position:   turned   sitting up in bed  Intervention: Prevent Infection  Recent Flowsheet Documentation  Taken 7/1/2024 0800 by Gertrudis Jeffries RN  Infection Prevention:   equipment surfaces disinfected   hand hygiene promoted  Goal: Optimal Comfort and Wellbeing  Intervention: Provide Person-Centered Care  Recent Flowsheet Documentation  Taken 7/1/2024 0800 by Gertrudis Jeffries RN  Trust Relationship/Rapport:   care explained   reassurance provided   questions encouraged   questions answered   Goal Outcome Evaluation:       Denying pain when lying in bed some with movement.  Updated daughter- eventual plan will be TCU and from there daughter would like some Assisted Living do to the falls pt has been having and living alone

## 2024-07-02 ENCOUNTER — APPOINTMENT (OUTPATIENT)
Dept: PHYSICAL THERAPY | Facility: HOSPITAL | Age: 74
DRG: 871 | End: 2024-07-02
Payer: COMMERCIAL

## 2024-07-02 ENCOUNTER — APPOINTMENT (OUTPATIENT)
Dept: OCCUPATIONAL THERAPY | Facility: HOSPITAL | Age: 74
DRG: 871 | End: 2024-07-02
Payer: COMMERCIAL

## 2024-07-02 LAB
BACTERIA BLD CULT: ABNORMAL
BACTERIA BLD CULT: ABNORMAL
GLUCOSE BLDC GLUCOMTR-MCNC: 112 MG/DL (ref 70–99)
GLUCOSE BLDC GLUCOMTR-MCNC: 126 MG/DL (ref 70–99)
GLUCOSE BLDC GLUCOMTR-MCNC: 149 MG/DL (ref 70–99)
GLUCOSE BLDC GLUCOMTR-MCNC: 94 MG/DL (ref 70–99)
MAGNESIUM SERPL-MCNC: 2 MG/DL (ref 1.7–2.3)
POTASSIUM SERPL-SCNC: 4.4 MMOL/L (ref 3.4–5.3)

## 2024-07-02 PROCEDURE — 84132 ASSAY OF SERUM POTASSIUM: CPT | Performed by: STUDENT IN AN ORGANIZED HEALTH CARE EDUCATION/TRAINING PROGRAM

## 2024-07-02 PROCEDURE — 97535 SELF CARE MNGMENT TRAINING: CPT | Mod: GO

## 2024-07-02 PROCEDURE — 250N000011 HC RX IP 250 OP 636: Performed by: INTERNAL MEDICINE

## 2024-07-02 PROCEDURE — 83735 ASSAY OF MAGNESIUM: CPT | Performed by: STUDENT IN AN ORGANIZED HEALTH CARE EDUCATION/TRAINING PROGRAM

## 2024-07-02 PROCEDURE — 99231 SBSQ HOSP IP/OBS SF/LOW 25: CPT | Performed by: NURSE PRACTITIONER

## 2024-07-02 PROCEDURE — 97116 GAIT TRAINING THERAPY: CPT | Mod: GP

## 2024-07-02 PROCEDURE — 94640 AIRWAY INHALATION TREATMENT: CPT | Mod: 76

## 2024-07-02 PROCEDURE — 999N000157 HC STATISTIC RCP TIME EA 10 MIN

## 2024-07-02 PROCEDURE — 94640 AIRWAY INHALATION TREATMENT: CPT

## 2024-07-02 PROCEDURE — 250N000013 HC RX MED GY IP 250 OP 250 PS 637: Performed by: INTERNAL MEDICINE

## 2024-07-02 PROCEDURE — 94799 UNLISTED PULMONARY SVC/PX: CPT

## 2024-07-02 PROCEDURE — 250N000012 HC RX MED GY IP 250 OP 636 PS 637: Performed by: STUDENT IN AN ORGANIZED HEALTH CARE EDUCATION/TRAINING PROGRAM

## 2024-07-02 PROCEDURE — 250N000013 HC RX MED GY IP 250 OP 250 PS 637: Performed by: STUDENT IN AN ORGANIZED HEALTH CARE EDUCATION/TRAINING PROGRAM

## 2024-07-02 PROCEDURE — 120N000004 HC R&B MS OVERFLOW

## 2024-07-02 PROCEDURE — 94150 VITAL CAPACITY TEST: CPT

## 2024-07-02 PROCEDURE — 250N000009 HC RX 250: Performed by: INTERNAL MEDICINE

## 2024-07-02 RX ORDER — LIDOCAINE 4 G/G
2 PATCH TOPICAL EVERY 24 HOURS
Status: SHIPPED
Start: 2024-07-02

## 2024-07-02 RX ORDER — NICOTINE 21 MG/24HR
1 PATCH, TRANSDERMAL 24 HOURS TRANSDERMAL DAILY
Status: SHIPPED
Start: 2024-07-03

## 2024-07-02 RX ORDER — HYDROMORPHONE HYDROCHLORIDE 2 MG/1
1 TABLET ORAL EVERY 4 HOURS PRN
Qty: 10 TABLET | Refills: 0 | Status: SHIPPED | OUTPATIENT
Start: 2024-07-02 | End: 2024-08-12

## 2024-07-02 RX ORDER — PREDNISONE 20 MG/1
TABLET ORAL
Status: SHIPPED
Start: 2024-07-02 | End: 2024-07-04

## 2024-07-02 RX ORDER — MAGNESIUM SULFATE HEPTAHYDRATE 40 MG/ML
2 INJECTION, SOLUTION INTRAVENOUS ONCE
Status: COMPLETED | OUTPATIENT
Start: 2024-07-02 | End: 2024-07-02

## 2024-07-02 RX ORDER — FOLIC ACID 1 MG/1
1 TABLET ORAL DAILY
Status: SHIPPED
Start: 2024-07-03

## 2024-07-02 RX ORDER — LANOLIN ALCOHOL/MO/W.PET/CERES
100 CREAM (GRAM) TOPICAL DAILY
Status: SHIPPED
Start: 2024-07-03

## 2024-07-02 RX ORDER — PANTOPRAZOLE SODIUM 40 MG/1
40 TABLET, DELAYED RELEASE ORAL
Status: SHIPPED
Start: 2024-07-03 | End: 2024-07-02

## 2024-07-02 RX ORDER — MIDODRINE HYDROCHLORIDE 2.5 MG/1
2.5 TABLET ORAL
Status: SHIPPED
Start: 2024-07-02 | End: 2024-07-05 | Stop reason: DRUGHIGH

## 2024-07-02 RX ADMIN — CARBIDOPA AND LEVODOPA 2.5 MG: 50; 200 TABLET, EXTENDED RELEASE ORAL at 08:07

## 2024-07-02 RX ADMIN — MICONAZOLE NITRATE ANTIFUNGAL POWDER: 2 POWDER TOPICAL at 20:26

## 2024-07-02 RX ADMIN — CARBIDOPA AND LEVODOPA 2.5 MG: 50; 200 TABLET, EXTENDED RELEASE ORAL at 18:29

## 2024-07-02 RX ADMIN — IPRATROPIUM BROMIDE AND ALBUTEROL SULFATE 3 ML: .5; 3 SOLUTION RESPIRATORY (INHALATION) at 21:03

## 2024-07-02 RX ADMIN — NYSTATIN: 100000 CREAM TOPICAL at 08:15

## 2024-07-02 RX ADMIN — LIDOCAINE 2 PATCH: 4 PATCH TOPICAL at 08:08

## 2024-07-02 RX ADMIN — CARBIDOPA AND LEVODOPA 2.5 MG: 50; 200 TABLET, EXTENDED RELEASE ORAL at 11:56

## 2024-07-02 RX ADMIN — MAGNESIUM SULFATE HEPTAHYDRATE 2 G: 40 INJECTION, SOLUTION INTRAVENOUS at 06:01

## 2024-07-02 RX ADMIN — IPRATROPIUM BROMIDE AND ALBUTEROL SULFATE 3 ML: .5; 3 SOLUTION RESPIRATORY (INHALATION) at 07:43

## 2024-07-02 RX ADMIN — FOLIC ACID 1 MG: 1 TABLET ORAL at 08:08

## 2024-07-02 RX ADMIN — PANTOPRAZOLE SODIUM 40 MG: 40 TABLET, DELAYED RELEASE ORAL at 06:36

## 2024-07-02 RX ADMIN — APIXABAN 10 MG: 5 TABLET, FILM COATED ORAL at 08:08

## 2024-07-02 RX ADMIN — LIDOCAINE: 50 OINTMENT TOPICAL at 18:31

## 2024-07-02 RX ADMIN — LIDOCAINE: 50 OINTMENT TOPICAL at 20:27

## 2024-07-02 RX ADMIN — LIDOCAINE: 50 OINTMENT TOPICAL at 11:56

## 2024-07-02 RX ADMIN — MICONAZOLE NITRATE ANTIFUNGAL POWDER: 2 POWDER TOPICAL at 08:15

## 2024-07-02 RX ADMIN — ONDANSETRON 4 MG: 2 INJECTION INTRAMUSCULAR; INTRAVENOUS at 04:40

## 2024-07-02 RX ADMIN — APIXABAN 10 MG: 5 TABLET, FILM COATED ORAL at 20:26

## 2024-07-02 RX ADMIN — LIDOCAINE: 50 OINTMENT TOPICAL at 08:15

## 2024-07-02 RX ADMIN — VANCOMYCIN HYDROCHLORIDE 500 MG: 500 INJECTION, POWDER, LYOPHILIZED, FOR SOLUTION INTRAVENOUS at 04:43

## 2024-07-02 RX ADMIN — PREDNISONE 40 MG: 20 TABLET ORAL at 08:07

## 2024-07-02 RX ADMIN — THIAMINE HCL TAB 100 MG 100 MG: 100 TAB at 08:08

## 2024-07-02 RX ADMIN — NYSTATIN: 100000 CREAM TOPICAL at 20:27

## 2024-07-02 ASSESSMENT — ACTIVITIES OF DAILY LIVING (ADL)
ADLS_ACUITY_SCORE: 46

## 2024-07-02 NOTE — DISCHARGE SUMMARY
[facility]  Hospitalist Discharge Summary      Date of Admission:  6/27/2024  Date of Discharge:  7/4/2024  Discharging Provider: Farhan Dewitt DO  Discharge Service: Hospitalist Service    Discharge Diagnoses   Principal Problem:    Acute respiratory failure with hypercapnia (H)  Active Problems:    Hypokalemia    COPD exacerbation (H)    Altered mental status, unspecified altered mental status type    Other acute pulmonary embolism without acute cor pulmonale (H)    Aspiration pneumonia, unspecified aspiration pneumonia type, unspecified laterality, unspecified part of lung (H)        Clinically Significant Risk Factors     # Severe Malnutrition: based on nutrition assessment      Follow-ups Needed After Discharge   Follow-up Appointments     Follow Up Care      Please follow-up with a hip surgeon in 2 weeks at [facility].   Call our scheduling line at [phone] to make an appointment, if you do   not already have one scheduled.        Follow Up and recommended labs and tests      Follow up with Nursing home physician.  No follow up labs or test are   needed.  Follow-up with [facility] in 2 weeks. Would recommend that they   follow-up with a bone density specialist, may see Dr. Simeon at [facility] for bone density evaluation.            Unresulted Labs Ordered in the Past 30 Days of this Admission       Date and Time Order Name Status Description    6/29/2024  9:26 PM Blood Culture Line, venous Preliminary     6/29/2024  9:26 PM Blood Culture Hand, Right Preliminary         These results will be followed up by nursing home physician, [facility] [place]    Discharge Disposition   Discharged to home  Condition at discharge: Stable    Hospital Course   Namita Viera is a 74 year old female admitted on 6/27/2024 with acute hypoxic respiratory failure secondary to COPDE complicated by PE and neumonia. She was initially admitted to ICU for shock, now weaned off pressors.   Case is complicated by falls and several rib as well as pubic rami fractures.     Acute hypoxic and hypercapnic respiratory failure  COPD exacerbation  CAP  Pulmonary embolism  -CT scan done on admission showing scattered areas of bronchial plugging and tree-in-bud opacities with adjacent consolidative opacities  -Question aspiration, s/p SLP eval. No s/s of aspiration or pharyngeal dysphagia. Continue regular textures with thin liquids   -Continue steroid taper  -has since completed antibiotics for COPD   -Continue GI prophylaxis with PTA PPI  -blood sugars have been at goal with steroids, monitor at TCU and follow-up for further management   -See management for pulmonary embolism as noted below  -Patient refusing BiPAP at night, implications discussed  -Palliative consult for goals of care and code status. Patient wanting to switch code to DNR     Septic shock   -weaned off norepinephrine, on low dose midodrine which will be continued on discharge   -holding home HTN meds on discharge, follow-up for BP evaluation and medication titration   -removed PICC 7/2/24     ?Bacteremia, presumed contaminant   -1/2 bottles of gram positive cocci 6/27/24 likely contaminant   -MRSA screen negative   -monitor off antibiotics      Pulmonary embolism  -continue Eliquis dosing for PE  -TTE completed 6/27/24 without RV dysfunction  -Positional hypoxia likely related to PE, will require intermittent nasal cannula at TCU     Anemia  -Hemoglobin acutely dropped on hospital day 1, has been stable since without any additional active signs and symptoms of bleed  -likely secondary to acute illness and alcohol use given high-normal MCV  -continue to monitor      CARLOS  -Creatinine last checked 1 year ago at 0.72.  Peaked at 1.2 during this admission and currently improving  -Continue management as noted above  -encourage oral intake     Alcohol use and dependence  -Drinks approximately 6 glasses of wine per day  -CIWA scores have been 0,  will discontinue orders   -continue vitamin supplements   -defers chem dep     Displaced left 10th rib fracture  Nondisplaced left 12th rib fracture  -rib fracture protocol placed  -surgery consulted per protocol, signed off 6/20/24 and recommending continued conservative management and rib fracture protocol      T9 compression fracture   -no red flag symptoms at the moment   -monitor pain, careful use of opiates   -consider neurosurgery consult pending course     Acute left inferior and superior pubic rami fractures   -ortho consulted, recommending conservative management and outpatient follow-up      Tobacco use  -discussed cessation  -continue nicotine patch      Sacral injury  -WOC     Chronic conditions  GERD: PTA PPI  Osteoporosis: Resume home Fosamax on discharge  HTN: Holding home antihypertensives in setting of shock and midodrine.  Restart when appropriate when off midodrine    Consultations This Hospital Stay   PHARMACY IP CONSULT  PHARMACY TO DOSE VANCO  CARE MANAGEMENT / SOCIAL WORK IP CONSULT  WOUND OSTOMY CONTINENCE NURSE  IP CONSULT  NUTRITION SERVICES ADULT IP CONSULT  PHYSICAL THERAPY ADULT IP CONSULT  OCCUPATIONAL THERAPY ADULT IP CONSULT  CARE MANAGEMENT / SOCIAL WORK IP CONSULT  PULMONARY IP CONSULT  INTENSIVIST IP CONSULT  VASCULAR ACCESS ADULT IP CONSULT  SPIRITUAL HEALTH SERVICES IP CONSULT  PHARMACY TO DOSE VANCO  HOSPITALIST IP CONSULT  PHARMACY LIAISON FOR MEDICATION COVERAGE CONSULT  SURGERY GENERAL IP CONSULT  ORTHOPEDIC SURGERY IP CONSULT  PALLIATIVE CARE ADULT IP CONSULT  SPEECH LANGUAGE PATH ADULT IP CONSULT  PHYSICAL THERAPY ADULT IP CONSULT  OCCUPATIONAL THERAPY ADULT IP CONSULT  SPIRITUAL HEALTH SERVICES IP CONSULT    Code Status   No CPR- Pre-arrest intubation OK    Time Spent on this Encounter   IFarhan DO, personally saw the patient today and spent greater than 30 minutes discharging this patient.       Farhan Dewitt DO  [facility]  [address] [address]  Phone: [phone]  Fax: [phone]  ______________________________________________________________________    Physical Exam   Vital Signs: Temp: 97.9  F (36.6  C) Temp src: Oral BP: 134/63 Pulse: 76   Resp: 20 SpO2: 92 % O2 Device: None (Room air) Oxygen Delivery: 2 LPM  Weight: 102 lbs 1.17 oz    General Appearance:  no acute distress, nontoxic and nondiaphoretic  Respiratory: no wheezing, no rales or rhonchi, breathing comfortably on room air while sitting up   Cardiovascular: Regular rate and rhythm  GI: Mild pain without any rebound or guarding, no masses, no hepatosplenomegaly  Other:  No focal deficits noted, strength in bilateral lower extremities is 5 out of 5, sensation intact bilaterally, pulses equal in all 4 extremities       Primary Care Physician   Anahi Andersen    Discharge Orders      Primary Care - Care Coordination Referral      Follow Up Care    Please follow-up with a hip surgeon in 2 weeks at [facility]. Call our scheduling line at [phone] to make an appointment, if you do not already have one scheduled.     Activity     Weight bearing as tolerated    Weight bearing as tolerated on your operative extremity.     General info for SNF    Length of Stay Estimate: Short Term Care: Estimated # of Days <30  Condition at Discharge: Improving  Level of care:skilled   Rehabilitation Potential: Good  Admission H&P remains valid and up-to-date: Yes  Recent Chemotherapy: N/A  Use Nursing Home Standing Orders: Yes     Mantoux instructions    Give two-step Mantoux (PPD) Per Facility Policy Yes     Follow Up and recommended labs and tests    Follow up with Nursing home physician.  No follow up labs or test are needed.  Follow-up with [facility] in 2 weeks. Would recommend that they follow-up with a bone density specialist, may see Dr. Simeon at [facility] for bone density evaluation.     Reason for your hospital stay    Principal  Problem:    Acute respiratory failure with hypercapnia (H)  Active Problems:    Hypokalemia    COPD exacerbation (H)    Altered mental status, unspecified altered mental status type    Other acute pulmonary embolism without acute cor pulmonale (H)    Aspiration pneumonia, unspecified aspiration pneumonia type, unspecified laterality, unspecified part of lung (H)     Activity - Up with assistive device     Physical Therapy Adult Consult    Evaluate and treat as clinically indicated.     Occupational Therapy Adult Consult    Evaluate and treat as clinically indicated.     Fall precautions     Diet    Follow this diet upon discharge: Orders Placed This Encounter      Regular Diet Adult       Significant Results and Procedures   Results for orders placed or performed during the hospital encounter of 06/27/24   XR Chest Port 1 View    Narrative    EXAM: XR CHEST PORT 1 VIEW  LOCATION: [facility]  DATE: 6/27/2024    INDICATION: hypoxia  COMPARISON: Low-dose lung cancer CT 10/13/2022 and older studies, chest x-ray  5/4/2021    Impression    IMPRESSION: Lungs are hyperexpanded consistent with COPD.    Incidental calcified granuloma in the right lower lobe.     There is another ovoid opacity just inferior to the right chest lead along the margin of the scapula which corresponds to the old fracture deformity of the right sixth rib.    No signs of pneumonia or failure. Heart and pulmonary vascularity are normal.   CT Head w/o Contrast    Narrative    EXAM: CT HEAD W/O CONTRAST  LOCATION: [facility]  DATE: 6/27/2024    INDICATION: Altered mental status  COMPARISON: 5/16/2023.  TECHNIQUE: Routine CT Head without IV contrast. Multiplanar reformats. Dose reduction techniques were used.    FINDINGS:  INTRACRANIAL CONTENTS: No intracranial hemorrhage, extraaxial collection, or mass effect.  No CT evidence of acute infarct. Mild presumed chronic small vessel ischemic changes. Mild  generalized volume loss. No hydrocephalus. Bilateral carotid siphon and   vertebral artery V4 segment atherosclerotic calcifications.    VISUALIZED ORBITS/SINUSES/MASTOIDS: Prior bilateral cataract surgery. Visualized portions of the orbits are otherwise unremarkable. No paranasal sinus mucosal disease. No middle ear or mastoid effusion.    BONES/SOFT TISSUES: No acute abnormality.      Impression    IMPRESSION:  1.  No CT evidence for acute intracranial process.  2.  Brain atrophy and presumed chronic microvascular ischemic changes as above.   CT Chest Pulmonary Embolism w Contrast     Value    Radiologist flags New diagnosis of pulmonary embolism (AA)    Narrative    EXAM: CT CHEST PULMONARY EMBOLISM W CONTRAST  LOCATION: [facility]  DATE: 6/27/2024    INDICATION: Hypoxia. Respiratory failure.   COMPARISON: CT chest 10/13/2022.   TECHNIQUE: CT chest pulmonary angiogram during arterial phase injection of IV contrast. Multiplanar reformats and MIP reconstructions were performed. Dose reduction techniques were used.   CONTRAST: [id number] 75ml     FINDINGS:    ANGIOGRAM CHEST: Pulmonary arteries are normal caliber. Single small subsegmental pulmonary embolism within the right lower lobe (4/#189). No additional pulmonary emboli. No evidence of right heart strain. Thoracic aorta is normal in caliber with   moderate mixed calcified and noncalcified atherosclerotic plaque. No evidence of acute aortic pathology.    LUNGS AND PLEURA: Moderate upper lobe predominant centrilobular emphysema. Mild diffuse bronchial wall thickening with scattered areas of mild bronchiectasis. Extensive intraluminal debris with scattered areas of bronchial plugging within the bilateral   lower lobe airways, more extensive on the left. Fine tree-in-bud opacities within the left lower lobe, with adjacent linear consolidative opacities. Scattered small pulmonary nodules, calcified granulomas, and calcified pleural plaques  are unchanged.     MEDIASTINUM/AXILLAE: Normal cardiac size. No significant pericardial effusion. Small hiatal hernia. Scattered small calcified mediastinal and right hilar lymph nodes. No enlarged thoracic lymph node.     CORONARY ARTERY CALCIFICATION: Moderate.    UPPER ABDOMEN: No acute abnormality.     MUSCULOSKELETAL: Diffusely demineralized bones. Chronic severe T7 and mild T6 compression fractures are unchanged. Severe T9 compression fracture with mild retropulsion into the spinal canal is new since the prior exam. The compression fractures result   in focal kyphosis of the spine. Nondisplaced displaced fracture of the left 12th rib. Displaced fracture of the left posterolateral 10th rib. Multiple additional remote healed left rib fractures are unchanged.       Impression    IMPRESSION:    1.  Small acute subsegmental right lower lobe pulmonary embolism. No evidence of right heart strain.     2.  Extensive intraluminal debris with scattered areas of bronchial plugging within the bilateral lower lobe airways, more extensive on the left. There are also fine tree-in-bud opacities within the left lower lobe with adjacent linear consolidative   opacities. Aspiration and early aspiration pneumonia is possible.     3.  Displaced fracture of the left posterolateral 10th rib. Nondisplaced fracture of the left 12th rib. No pneumothorax.    4.  Severe T9 compression fracture with mild retropulsion into the spinal canal is new since 10/13/2022.    [Critical Result: New diagnosis of pulmonary embolism]    Finding was identified on 6/27/2024 2:47 PM CDT.     Dr. Oliveira was contacted by me on 6/27/2024 2:51 PM CDT and verbalized understanding of the critical result.   US Lower Extremity Venous Duplex Bilateral    Narrative    EXAM: US LOWER EXTREMITY VENOUS DUPLEX BILATERAL  LOCATION: [facility]  DATE: 6/27/2024    INDICATION: Has PE, rule out DVT  COMPARISON: None.  TECHNIQUE: Venous Duplex  ultrasound of bilateral lower extremities with and without compression, augmentation and duplex. Color flow and spectral Doppler with waveform analysis performed.    FINDINGS: Exam includes the common femoral, femoral, popliteal veins as well as segmentally visualized deep calf veins and greater saphenous vein.     RIGHT: No deep vein thrombosis. No superficial thrombophlebitis. No popliteal cyst.    LEFT: No deep vein thrombosis. No superficial thrombophlebitis. No popliteal cyst.      Impression    IMPRESSION:  No deep venous thrombosis in the bilateral lower extremities.   XR Femur Left 2 Views    Narrative    EXAM: XR FEMUR LEFT 2 VIEWS, XR PELVIS 1/2 VIEWS  LOCATION: [facility]  DATE: 6/27/2024    INDICATION: Pain after a fall  COMPARISON: 5/2/2018      Impression    IMPRESSION:   Left femur: Previous internal fixation of healed proximal left femur fracture. No acute left femur fracture..     Pelvis: Acute displaced fractures of the left superior and inferior pubic rami. Diffuse bony demineralization. Internally fixated likely healed proximal right femur fracture. Degenerative changes in the lumbar spine.   XR Shoulder Left 2 Views    Narrative    EXAM: XR SHOULDER LEFT 2 VIEWS, XR HUMERUS LEFT G/E 2 VIEWS  LOCATION: [facility]  DATE: 6/27/2024    INDICATION: Severe pain after a fall  COMPARISON: None.      Impression    IMPRESSION:   Shoulder: No acute fracture or dislocation. Diffuse bony demineralization. Mild calcification projecting near the greater tuberosity could represent chondrocalcinosis or hydroxyapatite deposition. Age indeterminate mid thoracic compression fractures.    Humerus: No acute fracture. Diffuse bony demineralization.   XR Humerus Left G/E 2 Views    Narrative    EXAM: XR SHOULDER LEFT 2 VIEWS, XR HUMERUS LEFT G/E 2 VIEWS  LOCATION: [facility]  DATE: 6/27/2024    INDICATION: Severe pain after a fall  COMPARISON:  None.      Impression    IMPRESSION:   Shoulder: No acute fracture or dislocation. Diffuse bony demineralization. Mild calcification projecting near the greater tuberosity could represent chondrocalcinosis or hydroxyapatite deposition. Age indeterminate mid thoracic compression fractures.    Humerus: No acute fracture. Diffuse bony demineralization.   XR Pelvis 1/2 Views    Narrative    EXAM: XR FEMUR LEFT 2 VIEWS, XR PELVIS 1/2 VIEWS  LOCATION: [facility]  DATE: 6/27/2024    INDICATION: Pain after a fall  COMPARISON: 5/2/2018      Impression    IMPRESSION:   Left femur: Previous internal fixation of healed proximal left femur fracture. No acute left femur fracture..     Pelvis: Acute displaced fractures of the left superior and inferior pubic rami. Diffuse bony demineralization. Internally fixated likely healed proximal right femur fracture. Degenerative changes in the lumbar spine.   CT Pelvis Bone wo Contrast    Narrative    EXAM: CT PELVIS BONE WITHOUT CONTRAST  LOCATION: [facility]  DATE: 06/30/2024    INDICATION: Left superior inferior pubic rami fractures, evaluate posterior pelvis.  COMPARISON: 06/27/2024.  TECHNIQUE: CT scan of the pelvis was performed without IV contrast. Multiplanar reformats were obtained. Dose reduction techniques were used.  CONTRAST: None.    FINDINGS:   Bones are demineralized. Bilateral ORIF changes involving the proximal femurs again seen. There are residual bony deformities and bone formation bilaterally, unchanged. There is solid bony bridging across the left proximal femoral fracture. Incomplete   healing of the right proximal femoral fracture with portions of the fracture plane still visible.    Subacute nondisplaced stress fracture of the left iliac crest where there is sclerosis and remodeling/incomplete healing. Acute to subacute nondisplaced left sacral alar fracture which extends across midline through S2. There is  probably some focal   involvement of the right sacral ala as well.    Again seen are mildly displaced comminuted fractures of the left inferior pubic ramus as well as the left superior pubic ramus near the parasymphyseal region.     Soft tissues about the pelvis show subcutaneous edema without a well-defined hematoma or fluid collection. Intrapelvic contents show a Delgado catheter within a decompressed bladder. Colonic diverticulosis without evidence of diverticulitis.   Atherosclerotic vascular calcifications.      Impression    IMPRESSION:  1.  Acute comminuted mildly displaced left pubic rami fractures.    2.  Acute to subacute, left greater than right sacral alar fractures without significant displacement. Midline extending across S2.    3.  Subacute healing nondisplaced insufficiency fracture left iliac crest.    4.  Bilateral proximal femoral internal fixation changes. There is no loss of reduction. Incomplete healing on the right side.    5.  Bone demineralization.       Echocardiogram Complete    Narrative    [id number]  [id number]  [id number]  [id number]^[address]^LUCAS[place] [address]     Name: JAMEE HANSEN  MRN: [id number]  : 1950  Study Date: 2024 09:11 AM  Age: 74 yrs  Gender: Female  Patient Location: [facility]  Reason For Study: Heart Failure  Ordering Physician: LUCAS GALLEGO  Performed By: ACE     BSA: 1.3 m2  Height: 61 in  Weight: 88 lb  HR: 84  BP: 121/54 mmHg  ______________________________________________________________________________  Procedure  Complete Portable Echo Adult. Definity (NDC #29027-124) given intravenously.  ______________________________________________________________________________  Interpretation Summary     1. Normal left ventricular size and systolic performance with a visually  estimated ejection fraction of 65-70%.  2. No significant valvular heart disease is identified on this study.  3. Normal right  ventricular size and systolic performance.  4. There is a catheter/PICC line with tip in the mid right atrium.  ______________________________________________________________________________  Left ventricle:  Normal left ventricular size and systolic performance with a visually  estimated ejection fraction of 65-70%. There is normal regional wall motion.  Left ventricular wall thickness is normal.     Assessment of LV Diastolic Function: The cumulative findings suggest normal  diastolic filling [The septal e' velocity is > 7 cm/s & lateral e' velocity  is  < 10 cm/s. The average E/e' is < 14. The TR velocity cannot be determined due  to insufficient tricuspid insufficiency signal. Left atrial volume index is  less than 34 mL/mÂ ].     Right ventricle:  Normal right ventricular size and systolic performance.     Left atrium:  The left atrium is of normal size.     Right atrium:  The right atrium is of normal size. There is a catheter/PICC line with tip in  the mid right atrium.     IVC:  The IVC is of normal caliber.     Aortic valve:  The aortic valve is comprised of three cusps. No significant aortic stenosis  or aortic insufficiency is detected on this study.     Mitral valve:  The mitral valve appears morphologically normal. There is trace mitral  insufficiency.     Tricuspid valve:  The tricuspid valve is grossly morphologically normal. There is trace  tricuspid insufficiency.     Pulmonic valve:  The pulmonic valve is grossly morphologically normal.     Thoracic aorta:  The aortic root and proximal ascending aorta are of normal dimension.     Pericardium:  There is no significant pericardial effusion.  ______________________________________________________________________________  ______________________________________________________________________________  MMode/2D Measurements & Calculations  IVSd: 0.61 cm  LVIDd: 3.7 cm  LVIDs: 2.5 cm  LVPWd: 0.70 cm  FS: 33.6 %  LV mass(C)d: 64.3 grams  LV mass(C)dI: 48.3  grams/m2  Ao root diam: 3.0 cm  LA dimension: 2.7 cm  LA/Ao: 0.89  LVOT diam: 1.7 cm  LVOT area: 2.3 cm2  Ao root diam index Ht(cm/m): 1.9  Ao root diam index BSA (cm/m2): 2.3  EF Biplane: 74.4 %  LA Volume (BP): 33.1 ml     LA Volume Index (BP): 24.9 ml/m2  LA Volume Indexed (AL/bp): 27.0 ml/m2  RWT: 0.37  TAPSE: 1.8 cm     Time Measurements  MM HR: 77.0 BPM     Doppler Measurements & Calculations  MV E max nic: 85.4 cm/sec  MV A max nic: 118.0 cm/sec  MV E/A: 0.72  MV dec slope: 466.6 cm/sec2  MV dec time: 0.18 sec  Ao V2 max: 140.4 cm/sec  Ao max P.0 mmHg  Ao V2 mean: 99.1 cm/sec  Ao mean P.3 mmHg  Ao V2 VTI: 27.2 cm  LIANNA(I,D): 2.1 cm2  LIANNA(V,D): 2.2 cm2  LV V1 max P.2 mmHg  LV V1 max: 134.6 cm/sec  LV V1 VTI: 24.2 cm  SV(LVOT): 55.9 ml  SI(LVOT): 41.9 ml/m2  PA acc time: 0.09 sec  AV Nic Ratio (DI): 0.96  LIANNA Index (cm2/m2): 1.5  E/E': 10.8  E/E' av.1  Lateral E/e': 10.8  Medial E/e': 11.4  Peak E' Nic: 7.9 cm/sec  RV S Nic: 13.5 cm/sec     ______________________________________________________________________________  Report approved by: Prem Rai 2024 12:20 PM             Discharge Medications   Current Discharge Medication List        START taking these medications    Details   apixaban ANTICOAGULANT (ELIQUIS) 5 MG tablet Take 2 tablets (10 mg) by mouth 2 times daily for 5 days, THEN 1 tablet (5 mg) 2 times daily for 30 days.    Associated Diagnoses: Other acute pulmonary embolism without acute cor pulmonale (H)      folic acid (FOLVITE) 1 MG tablet Take 1 tablet (1 mg) by mouth daily    Associated Diagnoses: Alcohol use      HYDROmorphone (DILAUDID) 2 MG tablet Take 0.5 tablets (1 mg) by mouth every 4 hours as needed for severe pain  Qty: 10 tablet, Refills: 0    Associated Diagnoses: Closed nondisplaced fracture of pelvis, unspecified part of pelvis, initial encounter (H); Closed fracture of multiple ribs, unspecified laterality, initial encounter      Lidocaine  (LIDOCARE) 4 % Patch Place 2 patches onto the skin every 24 hours To prevent lidocaine toxicity, patient should be patch free for 12 hrs daily. Apply to left lower chest for rib fractures.    Associated Diagnoses: Closed nondisplaced fracture of pelvis, unspecified part of pelvis, initial encounter (H); Closed fracture of multiple ribs, unspecified laterality, initial encounter      miconazole (MICATIN) 2 % external powder Apply topically 2 times daily    Associated Diagnoses: Acute UTI      midodrine (PROAMATINE) 2.5 MG tablet Take 1 tablet (2.5 mg) by mouth 3 times daily (with meals)    Associated Diagnoses: Acute UTI      nicotine (NICODERM CQ) 14 MG/24HR 24 hr patch Place 1 patch onto the skin daily    Associated Diagnoses: Encounter for tobacco use cessation counseling      predniSONE (DELTASONE) 20 MG tablet Take 1.5 tablets (30 mg) by mouth daily for 3 days, THEN 1 tablet (20 mg) daily for 3 days, THEN 0.5 tablets (10 mg) daily for 3 days.    Associated Diagnoses: COPD exacerbation (H)      thiamine (B-1) 100 MG tablet Take 1 tablet (100 mg) by mouth daily    Associated Diagnoses: Alcohol use           CONTINUE these medications which have NOT CHANGED    Details   albuterol (PROAIR HFA/PROVENTIL HFA/VENTOLIN HFA) 108 (90 Base) MCG/ACT inhaler INHALE 1-2 PUFFS INTO THE LUNGS EVERY 6 HOURS AS NEEDED FOR COUGH OR SHORTNESS OF BREATH OR WHEEZE  Qty: 18 g, Refills: 0    Comments: Pharmacy may dispense brand covered by insurance (Proair, or proventil or ventolin or generic albuterol inhaler)  Associated Diagnoses: Cough      alendronate (FOSAMAX) 70 MG tablet TAKE 1 TABLET BY MOUTH EVERY 7 DAYS. TAKE IN THE MORNING ON AN EMPTY STOMACH WITH A FULL GLASS OF WATER 30 MINUTES BEFORE FOOD  Qty: 12 tablet, Refills: 3    Associated Diagnoses: Age-related osteoporosis without current pathological fracture      aspirin (ASA) 81 MG EC tablet Take 1 tablet (81 mg) by mouth daily    Associated Diagnoses: Closed fracture of  sacrum with routine healing, unspecified portion of sacrum, subsequent encounter      calcium carbonate (OS-PETER) 1500 (600 Ca) MG tablet Take 600 mg by mouth daily      ibuprofen (ADVIL/MOTRIN) 200 MG tablet Take 200 mg by mouth every 6 hours as needed for pain      melatonin 1 MG TABS tablet Take 1 tablet (1 mg) by mouth nightly as needed for sleep  Qty: 90 tablet, Refills: 3    Associated Diagnoses: Persistent insomnia      multivitamin, therapeutic (THERA-VIT) TABS tablet Take 1 tablet by mouth daily      omeprazole (PRILOSEC) 20 MG DR capsule Take 1 capsule (20 mg) by mouth daily  Qty: 90 capsule, Refills: 3    Associated Diagnoses: Gastroesophageal reflux disease without esophagitis      vitamin C (ASCORBIC ACID) 500 MG tablet Take 500 mg by mouth daily      Vitamin D, Cholecalciferol, 25 MCG (1000 UT) TABS Take 1,000 Units by mouth daily           STOP taking these medications       losartan-hydrochlorothiazide (HYZAAR) 100-25 MG tablet Comments:   Reason for Stopping:             Allergies   No Known Allergies

## 2024-07-02 NOTE — PROGRESS NOTES
Received in report that patient discharging at 1630 although it appears pt on 2-4L NC and patient to be picked up by son.  Spoke with  who did not realize patient needed the oxygen and said that transport services were not available so the transfer would have to be cancelled.      Paged Dr. Dewitt to let him know of cancelled discharge and asked if he still wanted the PICC line and bradley removed.  He confirmed removals.    Tres Ramirez RN

## 2024-07-02 NOTE — PLAN OF CARE
[facility] - ICU    RN Progress Note:            Pertinent Assessments:      Please refer to flowsheet rows for full assessment     Alert and oriented, vital signs stable no fever,denies pain. Turned and repositioned every two hours.           Key Events - This Shift:     Uneventful                         Barriers to Discharge / Downgrade:     None

## 2024-07-02 NOTE — PROGRESS NOTES
Plan is for pt to be discharged today between 4-5 PM. Left message for Tiara, pts daughter. Pts son is pcking her up and taking her to TCU. Report given to oncoming RN.

## 2024-07-02 NOTE — CONSULTS
SPIRITUAL HEALTH SERVICES Progress Note  [facility], ICU    Saw pt Namita Viera per consult order/length of stay. Namita shared about her hospitalization and reports feeling much better today. She shared about her 4 kids, 16 grandchildren and 23 great grandchildren. She was appreciative of  visit earlier in the week. Namita expressed appreciation for the visit.      Plan of Care - No further plans to visit at this time, but  staff available if further needs arise.     Jeff Pa MDiv, [facility]  /Manager Spiritual Health Services  [phone]       Spiritual Health Services is available 24/7 for emergent requests and consults, either by paging the on-call  or by entering an ASAP/STAT consult in [employer], which will also page the on-call .

## 2024-07-02 NOTE — PLAN OF CARE
Physical Therapy Discharge Summary    Reason for therapy discharge:    Discharged to transitional care facility.    Progress towards therapy goal(s). See goals on Care Plan in [facility] electronic health record for goal details.  Goals not met.  Barriers to achieving goals:   limited tolerance for therapy and discharge from facility.    Therapy recommendation(s):    Continued therapy is recommended.  Rationale/Recommendations:  PT making progress, PT goals not fully met.

## 2024-07-02 NOTE — PROGRESS NOTES
"PALLIATIVE CARE PROGRESS NOTE  [facility]     Patient Name: Namita Viera  Date of Admission: 6/27/2024   Today the patient was seen for: C      Recommendations & Counseling     GOALS OF CARE:   Life-prolonging with limits   She is hopeful for continued improvement in her pain with movement and that she can return back home shortly.  She is not interested in invasive procedures, NIPPV or \"aggressive \"measures unless it were to greatly improve her quality of life.  She assents to changing her CODE STATUS to DNR/DNI     ADVANCE CARE PLANNING:  No health care directive on file. Per  informed consent policy, next of kin should be involved if patient becomes unable.  Healthcare directive was printed off and given to Namita and her sister .  There is no POLST form on file, defer to patient and/or next of kin for decisions   Code status: No CPR- Pre-arrest intubation OK     DECISION MAKING:  Patient's decision making preferences: shared with support from loved ones  Patient has decision-making capacity today for complex decisions: Intact      MEDICAL MANAGEMENT:   We are not actively managing symptoms at this time.     PSYCHOSOCIAL/SPIRITUAL SUPPORT:  Family   Friends   [place] community: [facility]      Palliative Care will continue to follow. Thank you for the consult and allowing us to aid in the care of Namita Viera.     These recommendations have been discussed with bedside and primary team.     Ashwin Story NP  [facility], Palliative Care  Securely message with the [employer] Web Console (learn more here) or  Text page via [employer]        Palliative Care will sign off. Thank you for the consult and allowing us to aid in the care of Namita Viera.      These recommendations have been discussed with primary and bedside teams.    Ashwin Story NP  Securely message with [employer] (more info)  Text page via [employer] Paging[employer]       Chart documentation was " completed, in part, with Dragon voice-recognition software. Even though reviewed, some grammatical, spelling, and word errors may remain.         Interval History:       Course reviewed. No significant events overnight and no new concerns today.  Continues to improve.  Overall, doing well.  Denies shortness of breath, palpitations, nausea or vomiting. Pain is well managed with current treatment plan.         Assessment          Namita Viera is a 74 year old female with PMH of COPD and emphysema not on home O2, ongoing tobacco and alcohol use, malnutrition, right hip fracture s/p ORIF in 5/2023, who was brought in with encephalopathy and hypoxia O2 sats in the 60s.  Workup remarkable for probable aspiration pneumonia, small subsegmental PE, severe hypokalemia, rib fractures.  Requiring NIPPV for respiratory failure with hypercapnia                      Review of Systems:     Besides above, ROS was reviewed and is unremarkable               Physical Exam:   Temp:  [97.5  F (36.4  C)-98.3  F (36.8  C)] 97.9  F (36.6  C)  Pulse:  [65-86] 82  Resp:  [16-20] 16  BP: ()/(53-68) 96/68  SpO2:  [92 %-99 %] 92 %  101 lbs 3.2 oz    Physical Exam  Vitals and nursing note reviewed.   Constitutional:       General: She is sleeping. She is not in acute distress.  HENT:      Head: Normocephalic.      Mouth/Throat:      Mouth: Mucous membranes are moist.      Pharynx: Oropharynx is clear.   Cardiovascular:      Rate and Rhythm: Normal rate and regular rhythm.      Pulses: Normal pulses.   Pulmonary:      Effort: Pulmonary effort is normal. No respiratory distress.      Breath sounds: No wheezing.   Abdominal:      General: Abdomen is flat. There is no distension.      Tenderness: There is no abdominal tenderness.   Musculoskeletal:         General: Normal range of motion.   Skin:     General: Skin is warm and dry.      Capillary Refill: Capillary refill takes less than 2 seconds.   Neurological:      General: No focal  deficit present.      Mental Status: Mental status is at baseline.                        Current Problem List:   Principal Problem:    Acute respiratory failure with hypercapnia (H)  Active Problems:    Hypokalemia    COPD exacerbation (H)    Altered mental status, unspecified altered mental status type    Other acute pulmonary embolism without acute cor pulmonale (H)    Aspiration pneumonia, unspecified aspiration pneumonia type, unspecified laterality, unspecified part of lung (H)      No Known Allergies         Data Reviewed:     Results for orders placed or performed during the hospital encounter of 06/27/24 (from the past 24 hour(s))   Glucose by meter   Result Value Ref Range    GLUCOSE BY METER POCT 131 (H) 70 - 99 mg/dL   Glucose by meter   Result Value Ref Range    GLUCOSE BY METER POCT 153 (H) 70 - 99 mg/dL   Magnesium   Result Value Ref Range    Magnesium 2.0 1.7 - 2.3 mg/dL   Potassium   Result Value Ref Range    Potassium 4.4 3.4 - 5.3 mmol/L   Glucose by meter   Result Value Ref Range    GLUCOSE BY METER POCT 94 70 - 99 mg/dL   Glucose by meter   Result Value Ref Range    GLUCOSE BY METER POCT 112 (H) 70 - 99 mg/dL         Medical Decision Making       29 MINUTES SPENT BY ME on the date of service doing chart review, history, exam, documentation & further activities per the note.

## 2024-07-02 NOTE — PLAN OF CARE
"Goal Outcome Evaluation:  Problem: Adult Inpatient Plan of Care  Goal: Plan of Care Review  Description: The Plan of Care Review/Shift note should be completed every shift.  The Outcome Evaluation is a brief statement about your assessment that the patient is improving, declining, or no change.  This information will be displayed automatically on your shift  note.  Outcome: Progressing    Weaning down O2 with periods of time that patient was on room air as she does not keep cannula in her nose at all times. VSS. Pain with movement but quickly subsides and she is pain free again. Delgado catheter with minimal but adequate urine output. Decreased appetite. Congested cough patient says it is at her baseline.          Problem: Adult Inpatient Plan of Care  Goal: Patient-Specific Goal (Individualized)  Description: You can add care plan individualizations to a care plan. Examples of Individualization might be:  \"Parent requests to be called daily at 9am for status\", \"I have a hard time hearing out of my right ear\", or \"Do not touch me to wake me up as it startles  me\".  Outcome: Progressing   Goal this shift was to maintain or improve skin integrity to patient's kelvin area at skin breakdown areas. Turning completed every two hours with shifting between. Up to chair for several hours. Wound care completed as well as incontinence cares.                         "

## 2024-07-02 NOTE — PROGRESS NOTES
Care Management Follow Up    Length of Stay (days): 5    Expected Discharge Date: 07/02/2024     Concerns to be Addressed: discharge planning, substance/tobacco abuse/use     Patient plan of care discussed at interdisciplinary rounds: Yes    Anticipated Discharge Disposition:  TCU     Anticipated Discharge Services:    Anticipated Discharge DME:      Patient/family educated on Medicare website which has current facility and service quality ratings:    Education Provided on the Discharge Plan:    Patient/Family in Agreement with the Plan:      Referrals Placed by CM/SW:  TCU  Private pay costs discussed: Not applicable    Additional Information:  Social history per previous notes:  Pt lives alone at [facility]. Ambulates with a cane and walker. Independent with ADLs and IADLs at baseline.    Therapy recommendation is TCU and pt is accepting of this discharge plan however would like daughter to help pick TCU. 7/1 tried to call daughter Tiara to go over TCU choices, no answer LVM to call [phone]. 7/2 per provider pt is medically ready and CM updated provider that we do not have a TCU bed yet. CM then reached out to pt driss pike to go over discharge planning, no answer LVM to call CM back. CM then called driss Ayala and he told CM that he is on vacation as well as his sister Tiara but that pt's son Yousif is home and he will call to give TCU choices.    11:39 AM Son Yousif called CM back and gave TCU choices. TCU referrals sent/pending. Per son Yousif transport will need to be set up as he is very busy this week and his other siblings are on vacation. We went over cost for [facility] transport and he is accepting.    2:40 PM  [facility] Pt and pt's son accepting. Pt son Yousif will be here between [id number] to transport the pt. CM called transport to see if they could bring the pt and they did not have a ride until 2000. So then CM contacted pt son and he was able to transport the  pt.    Care Management Discharge Note    Discharge Date: 07/02/2024       Discharge Disposition: Transitional Care ([facility])    Discharge Services:  per TCU    Discharge DME:  none    Discharge Transportation: Son Yousif to transport between [id number]    Private pay costs discussed: Not applicable    Does the patient's insurance plan have a 3 day qualifying hospital stay waiver?  No    PAS Confirmation Code: [id number]  Patient/family educated on Medicare website which has current facility and service quality ratings:      Education Provided on the Discharge Plan:    Persons Notified of Discharge Plans: yes  Patient/Family in Agreement with the Plan:      Handoff Referral Completed: Yes    Additional Information:  Pt adequate for discharge. Therapy recommendation is TCU and pt has been accepted to [facility] for TCU. Pt and son Yousif are accepting of this plan, and Yousif will transport pt to [facility] today between [id number]. No further CM needs at this time.    Cori Bradley RN    4:08 PM  ICU nurse contacted CM to see if son will have O2 for transport. CM contacted pt's son to see if he could get the pt's oxygen from her home for transport. Pt son told CM that pt is not on home O2 at baseline. CM apologized and told pt's son we will need to cancel the discharge for today as we do not have O2 for the transport. Son was understanding and we discussed that this particular TCU does not have a bed for tomorrow so CM will cont to look for another TCU. Nursing, provider and TCU alll updated on the cancel of the discharge.

## 2024-07-03 LAB
GLUCOSE BLDC GLUCOMTR-MCNC: 122 MG/DL (ref 70–99)
GLUCOSE BLDC GLUCOMTR-MCNC: 137 MG/DL (ref 70–99)
GLUCOSE BLDC GLUCOMTR-MCNC: 158 MG/DL (ref 70–99)
GLUCOSE BLDC GLUCOMTR-MCNC: 88 MG/DL (ref 70–99)
MAGNESIUM SERPL-MCNC: 2.2 MG/DL (ref 1.7–2.3)
POTASSIUM SERPL-SCNC: 3.7 MMOL/L (ref 3.4–5.3)

## 2024-07-03 PROCEDURE — 250N000013 HC RX MED GY IP 250 OP 250 PS 637: Performed by: STUDENT IN AN ORGANIZED HEALTH CARE EDUCATION/TRAINING PROGRAM

## 2024-07-03 PROCEDURE — 250N000013 HC RX MED GY IP 250 OP 250 PS 637: Performed by: INTERNAL MEDICINE

## 2024-07-03 PROCEDURE — 999N000157 HC STATISTIC RCP TIME EA 10 MIN

## 2024-07-03 PROCEDURE — 999N000156 HC STATISTIC RCP CONSULT EA 30 MIN

## 2024-07-03 PROCEDURE — 99233 SBSQ HOSP IP/OBS HIGH 50: CPT | Performed by: STUDENT IN AN ORGANIZED HEALTH CARE EDUCATION/TRAINING PROGRAM

## 2024-07-03 PROCEDURE — 120N000004 HC R&B MS OVERFLOW

## 2024-07-03 PROCEDURE — 94640 AIRWAY INHALATION TREATMENT: CPT

## 2024-07-03 PROCEDURE — 94150 VITAL CAPACITY TEST: CPT

## 2024-07-03 PROCEDURE — 83735 ASSAY OF MAGNESIUM: CPT | Performed by: INTERNAL MEDICINE

## 2024-07-03 PROCEDURE — 84132 ASSAY OF SERUM POTASSIUM: CPT | Performed by: INTERNAL MEDICINE

## 2024-07-03 PROCEDURE — 250N000009 HC RX 250: Performed by: INTERNAL MEDICINE

## 2024-07-03 PROCEDURE — 250N000012 HC RX MED GY IP 250 OP 636 PS 637: Performed by: STUDENT IN AN ORGANIZED HEALTH CARE EDUCATION/TRAINING PROGRAM

## 2024-07-03 RX ORDER — PREDNISONE 20 MG/1
20 TABLET ORAL DAILY
Status: DISCONTINUED | OUTPATIENT
Start: 2024-07-07 | End: 2024-07-04 | Stop reason: HOSPADM

## 2024-07-03 RX ORDER — POTASSIUM CHLORIDE 750 MG/1
10 TABLET, EXTENDED RELEASE ORAL ONCE
Status: COMPLETED | OUTPATIENT
Start: 2024-07-03 | End: 2024-07-03

## 2024-07-03 RX ORDER — IPRATROPIUM BROMIDE AND ALBUTEROL SULFATE 2.5; .5 MG/3ML; MG/3ML
3 SOLUTION RESPIRATORY (INHALATION) EVERY 4 HOURS PRN
Status: DISCONTINUED | OUTPATIENT
Start: 2024-07-03 | End: 2024-07-04 | Stop reason: HOSPADM

## 2024-07-03 RX ADMIN — LIDOCAINE 2 INCH: 50 OINTMENT TOPICAL at 20:30

## 2024-07-03 RX ADMIN — FOLIC ACID 1 MG: 1 TABLET ORAL at 09:01

## 2024-07-03 RX ADMIN — NYSTATIN: 100000 CREAM TOPICAL at 09:05

## 2024-07-03 RX ADMIN — CARBIDOPA AND LEVODOPA 2.5 MG: 50; 200 TABLET, EXTENDED RELEASE ORAL at 17:31

## 2024-07-03 RX ADMIN — CARBIDOPA AND LEVODOPA 2.5 MG: 50; 200 TABLET, EXTENDED RELEASE ORAL at 12:35

## 2024-07-03 RX ADMIN — MICONAZOLE NITRATE ANTIFUNGAL POWDER: 2 POWDER TOPICAL at 08:00

## 2024-07-03 RX ADMIN — CARBIDOPA AND LEVODOPA 2.5 MG: 50; 200 TABLET, EXTENDED RELEASE ORAL at 08:00

## 2024-07-03 RX ADMIN — PREDNISONE 40 MG: 20 TABLET ORAL at 09:01

## 2024-07-03 RX ADMIN — LIDOCAINE: 50 OINTMENT TOPICAL at 17:30

## 2024-07-03 RX ADMIN — LIDOCAINE: 50 OINTMENT TOPICAL at 07:49

## 2024-07-03 RX ADMIN — THIAMINE HCL TAB 100 MG 100 MG: 100 TAB at 09:01

## 2024-07-03 RX ADMIN — APIXABAN 10 MG: 5 TABLET, FILM COATED ORAL at 09:01

## 2024-07-03 RX ADMIN — IPRATROPIUM BROMIDE AND ALBUTEROL SULFATE 3 ML: .5; 3 SOLUTION RESPIRATORY (INHALATION) at 09:09

## 2024-07-03 RX ADMIN — APIXABAN 10 MG: 5 TABLET, FILM COATED ORAL at 20:34

## 2024-07-03 RX ADMIN — NYSTATIN 2 APPLICATOR: 100000 CREAM TOPICAL at 20:34

## 2024-07-03 RX ADMIN — LIDOCAINE: 50 OINTMENT TOPICAL at 12:35

## 2024-07-03 RX ADMIN — PANTOPRAZOLE SODIUM 40 MG: 40 TABLET, DELAYED RELEASE ORAL at 06:43

## 2024-07-03 RX ADMIN — ACETAMINOPHEN 325 MG: 325 TABLET ORAL at 12:33

## 2024-07-03 RX ADMIN — POTASSIUM CHLORIDE 10 MEQ: 750 TABLET, EXTENDED RELEASE ORAL at 06:43

## 2024-07-03 RX ADMIN — MICONAZOLE NITRATE ANTIFUNGAL POWDER 2 APPLICATOR: 2 POWDER TOPICAL at 20:34

## 2024-07-03 ASSESSMENT — ACTIVITIES OF DAILY LIVING (ADL)
ADLS_ACUITY_SCORE: 50
ADLS_ACUITY_SCORE: 54
ADLS_ACUITY_SCORE: 50
ADLS_ACUITY_SCORE: 54
ADLS_ACUITY_SCORE: 50
ADLS_ACUITY_SCORE: 54
ADLS_ACUITY_SCORE: 54
ADLS_ACUITY_SCORE: 50
ADLS_ACUITY_SCORE: 54
ADLS_ACUITY_SCORE: 50
ADLS_ACUITY_SCORE: 50
ADLS_ACUITY_SCORE: 54
ADLS_ACUITY_SCORE: 50

## 2024-07-03 NOTE — TREATMENT PLAN
[facility] Treatment Plan    Patient Score: 6  Patient Acuity: 4    Clinical Indication for Therapy: history of bronchospasm and history of mucous producing disease    Therapy Ordered: discontinue NIF     Assessment Summary: pt here with COPDE, PE, and pneumonia. She is a current 1 pack a day smoker. CXR 6/27 hyper expanded lungs. RR 16, LS diminished, NPC, on room air. Discussed with attending MD. Ok to discontinue NIF. Will reassess as needed.      Jordan Finney, RT  7/3/2024

## 2024-07-03 NOTE — PLAN OF CARE
[facility] - ICU    RN Progress Note:            Pertinent Assessments:      Please refer to flowsheet rows for full assessment     Alert, oriented but upset about having to go to TCU.  Delgado removed per order but left PICC line per P3 nurse request.  Pt has am labs so can remove prior to discharge.  Wound cares done after incontinence.             Key Events - This Shift:       Transfer to P3 after calling report to Monae RN.            Point of Contact Update YES-OR-NO: Yes  If No, reason: NA  Name:Yousif  Phone Number:in chart  Summary of Conversation: Updated of POC and asked him to call the patient per her request.        Goal: Readiness for Transition of Care  Outcome: Progressing   Goal Outcome Evaluation:   Reached out to Case management and hospitalist.  Unable to set up transport with portable O2 for tonight so transfer was cancelled.    Goal: Absence of Hospital-Acquired Illness or Injury  Intervention: Identify and Manage Fall Risk  Recent Flowsheet Documentation  Taken 7/2/2024 1600 by Tres Ramirez RN  Safety Promotion/Fall Prevention: activity supervised  Intervention: Prevent Skin Injury  Recent Flowsheet Documentation  Taken 7/2/2024 1720 by Tres Ramirez RN  Body Position:   log-rolled   turned   right   left   legs elevated  Taken 7/2/2024 1600 by Tres Ramirez RN  Body Position: position maintained  Intervention: Prevent Infection  Recent Flowsheet Documentation  Taken 7/2/2024 1600 by Tres Ramirez RN  Infection Prevention: hand hygiene promoted  Goal: Optimal Comfort and Wellbeing  Intervention: Provide Person-Centered Care  Recent Flowsheet Documentation  Taken 7/2/2024 1600 by Tres Ramirez RN  Trust Relationship/Rapport:   care explained   emotional support provided   questions answered   questions encouraged

## 2024-07-03 NOTE — PROGRESS NOTES
[facility]    Medicine Progress Note - Hospitalist Service    Date of Admission:  6/27/2024    Assessment & Plan   Namita Viera is a 74 year old female admitted on 6/27/2024 with acute hypoxic respiratory failure secondary to COPDE complicated by PE and neumonia. She was initially admitted to ICU for shock, now weaned off pressors.  Case is complicated by falls and several rib as well as pubic rami fractures.    Patient will discharge on 7-4-2024 to TCU.     Acute hypoxic and hypercapnic respiratory failure  COPD exacerbation  CAP  Pulmonary embolism  -CT scan done on admission showing scattered areas of bronchial plugging and tree-in-bud opacities with adjacent consolidative opacities  -Question aspiration, s/p SLP eval. No s/s of aspiration or pharyngeal dysphagia. Continue regular textures with thin liquids   -Continue steroid taper  -has since completed antibiotics for COPD   -Continue GI prophylaxis with PTA PPI  -See management for pulmonary embolism as noted below  -Patient refusing BiPAP at night, implications discussed  -Palliative consult for goals of care and code status. Patient wanting to switch code to DNR     Septic shock   -weaned off norepinephrine, on low dose midodrine which will be continued on discharge   -holding home HTN meds on discharge, follow-up for BP evaluation and medication titration   -removed PICC 7/2/24     ?Bacteremia, presumed contaminant   -1/2 bottles of gram positive cocci 6/27/24 likely contaminant   -MRSA screen negative   -monitor off antibiotics      Pulmonary embolism  -continue Eliquis dosing for PE  -TTE completed 6/27/24 without RV dysfunction  -Positional hypoxia likely related to PE, will require intermittent nasal cannula at TCU     Anemia  -Hemoglobin acutely dropped on hospital day 1, has been stable since without any additional active signs and symptoms of bleed  -likely secondary to acute illness and alcohol use given high-normal  MCV  -continue to monitor      CARLOS  -Creatinine last checked 1 year ago at 0.72.  Peaked at 1.2 during this admission and currently improving  -Continue management as noted above  -encourage oral intake     Alcohol use and dependence  -Drinks approximately 6 glasses of wine per day  -CIWA scores have been 0, will discontinue orders   -continue vitamin supplements   -defers chem dep     Displaced left 10th rib fracture  Nondisplaced left 12th rib fracture  -rib fracture protocol placed  -surgery consulted per protocol, signed off 6/20/24 and recommending continued conservative management and rib fracture protocol      T9 compression fracture   -no red flag symptoms at the moment   -monitor pain, careful use of opiates   -consider neurosurgery consult pending course     Acute left inferior and superior pubic rami fractures   -ortho consulted, recommending conservative management and outpatient follow-up      Tobacco use  -discussed cessation  -continue nicotine patch      Sacral injury  -WOC     Chronic conditions  GERD: PTA PPI  Osteoporosis: Resume home Fosamax on discharge  HTN: Holding home antihypertensives in setting of shock and midodrine.  Restart when appropriate when off midodrine        Diet: Regular Diet Adult  Diet    DVT Prophylaxis: Eliquis  Delgado Catheter: Not present  Lines: PRESENT      PICC 06/27/24 Triple Lumen Left Basilic Vasopressor,acute Access-Site Assessment: WDL      Cardiac Monitoring: None  Code Status: No CPR- Pre-arrest intubation OK      Clinically Significant Risk Factors              # Hypoalbuminemia: Lowest albumin = 3.1 g/dL at 6/28/2024  5:24 AM, will monitor as appropriate     # Hypertension: Noted on problem list           #Precipitous drop in Hgb/Hct: Lowest Hgb this hospitalization: 8.1 g/dL. Will continue to monitor and treat/transfuse as appropriate.      # Severe Malnutrition: based on nutrition assessment           Disposition Plan     Medically Ready for Discharge:  Anticipated Tomorrow             Farhan Dewitt,   Hospitalist Service  [facility]  Securely message with [employer] (more info)  Text page via [employer] Paging/[employer]   ______________________________________________________________________    Interval History   No acute overnight events.  Patient sitting in bedside chair on encounter, states that she is doing quite well and denies any acute complaints.  No issues with breathing, no pain.    Physical Exam   Vital Signs: Temp: 98.9  F (37.2  C) Temp src: Oral BP: 127/60 Pulse: 84   Resp: 18 SpO2: 92 % O2 Device: None (Room air) Oxygen Delivery: 2 LPM  Weight: 105 lbs 8 oz    General Appearance:  no acute distress, nontoxic and nondiaphoretic  Respiratory: no wheezing, no rales or rhonchi, breathing comfortably on room air while sitting in bedside chair  Cardiovascular: Regular rate and rhythm  GI: Mild pain without any rebound or guarding, no masses, no hepatosplenomegaly  Other:  No focal deficits noted, strength in bilateral lower extremities is 5 out of 5, sensation intact bilaterally, pulses equal in all 4 extremities    Medical Decision Making       50 MINUTES SPENT BY ME on the date of service doing chart review, history, exam, documentation & further activities per the note.      Data     I have personally reviewed the following data over the past 24 hrs:    N/A  \   N/A   / N/A     N/A N/A N/A /  158 (H)   3.7 N/A N/A \       Imaging results reviewed over the past 24 hrs:   No results found for this or any previous visit (from the past 24 hour(s)).

## 2024-07-03 NOTE — PROGRESS NOTES
"Care Management Follow Up    Length of Stay (days): 6    Expected Discharge Date: 07/03/2024     Concerns to be Addressed: Care progression - discharge planning   Patient plan of care discussed at interdisciplinary rounds: Yes    Anticipated Discharge Disposition: Transitional Care     Anticipated Discharge Services:  Transitional Care  Anticipated Discharge DME:  NA    Patient/family educated on Medicare website which has current facility and service quality ratings:  NA  Education Provided on the Discharge Plan:  Yes per team  Patient/Family in Agreement with the Plan:  Yes    Referrals Placed by CM/SW:  Yes  Private pay costs discussed: Transportation costs    Additional Information:  0756 called [facility] and spoke with Barbie. Barbie states they are full for accepting patients today. The next availability would be Friday.    Met with patient at bedside to update. She agreed to re-send the referrals. Would like RNCM to call and update her daughter, Tiara.    Re-sent referrals and [facility]  [facility]  [facility]    Called patient's daughter, Tiara, and left a message to return call.    Social Hx: \"Live alone in [facility]. Normally independent w/ADLs/IADLs. May need CD resources. [facility] iTara is primary contact. Accepted to [facility] for 7/4. PAS completed.  [address]  transport [phone].\"     RNCM to follow for medical progression, recommendations, and final discharge plan.     Nena Meadows, RN     1124 patient's daughter, Tiara, called and update given. Tiara accepted [facility]. Tiara preferred a WC transport for patient d/t hx of hip fracture.  Discussed out of pocket cost of [facility] [facility] transportation by wheelchair with patient and or family member, Tiara. Patient/family member agreed with the plan to have transportation arranged by [facility].      Paged bedside nurse, " Shelley, and discussed appropriate for WC transport    [facility] a [facility] and what time to set up the ride?    1245 called and spoke with Tayla. She request ride set up around 1430.    Called [facility] to set up a WC ride with oxygen for tomorrow 7/4.  [facility] [phone]  Sent message to update Tayla    [id number]    Called to update patient's daughter, Tiara  Met patient at bedside to update on the transport and TCU

## 2024-07-03 NOTE — PLAN OF CARE
Problem: Gas Exchange Impaired  Goal: Optimal Gas Exchange  Outcome: Not Progressing  Intervention: Optimize Oxygenation and Ventilation  Recent Flowsheet Documentation  Taken 7/3/2024 0020 by Lesly Richmond RN  Head of Bed ([facility]) Positioning: HOB at 20-30 degrees  Taken 7/2/2024 2009 by Lesly Richmond RN  Head of Bed ([facility]) Positioning: HOB at 20-30 degrees     Problem: COPD (Chronic Obstructive Pulmonary Disease)  Goal: Effective Oxygenation and Ventilation  Outcome: Not Progressing  Intervention: Promote Airway Secretion Clearance  Recent Flowsheet Documentation  Taken 7/3/2024 0020 by Lesly Richmond RN  Cough And Deep Breathing: done with encouragement  Taken 7/2/2024 2200 by Lesly Richmond RN  Cough And Deep Breathing: done with encouragement  Taken 7/2/2024 2009 by Lesly Richmond RN  Cough And Deep Breathing: done with encouragement  Intervention: Optimize Oxygenation and Ventilation  Recent Flowsheet Documentation  Taken 7/3/2024 0020 by Lesly Richmond RN  Head of Bed ([facility]) Positioning: HOB at 20-30 degrees  Taken 7/2/2024 2009 by Lesly Richmond RN  Head of Bed ([facility]) Positioning: HOB at 20-30 degrees   Pt alert and oriented x4, vitals stable except O2 level, on 2L oxygen . No complains of pain, skin has a lot of open areas on the buttocks, barrier cream applied with each toileting. Pt reported this has been open for a while now. Needs encouragement with IS, and coughing as pt sounds congested. Care is ongoing.

## 2024-07-04 ENCOUNTER — MEDICAL CORRESPONDENCE (OUTPATIENT)
Dept: HEALTH INFORMATION MANAGEMENT | Facility: CLINIC | Age: 74
End: 2024-07-04
Payer: COMMERCIAL

## 2024-07-04 VITALS
RESPIRATION RATE: 20 BRPM | HEART RATE: 76 BPM | WEIGHT: 102.07 LBS | BODY MASS INDEX: 19.27 KG/M2 | OXYGEN SATURATION: 92 % | TEMPERATURE: 97.9 F | SYSTOLIC BLOOD PRESSURE: 134 MMHG | HEIGHT: 61 IN | DIASTOLIC BLOOD PRESSURE: 63 MMHG

## 2024-07-04 LAB
GLUCOSE BLDC GLUCOMTR-MCNC: 73 MG/DL (ref 70–99)
GLUCOSE BLDC GLUCOMTR-MCNC: 94 MG/DL (ref 70–99)
MAGNESIUM SERPL-MCNC: 1.7 MG/DL (ref 1.7–2.3)
POTASSIUM SERPL-SCNC: 3.6 MMOL/L (ref 3.4–5.3)

## 2024-07-04 PROCEDURE — 84132 ASSAY OF SERUM POTASSIUM: CPT | Performed by: STUDENT IN AN ORGANIZED HEALTH CARE EDUCATION/TRAINING PROGRAM

## 2024-07-04 PROCEDURE — 250N000013 HC RX MED GY IP 250 OP 250 PS 637

## 2024-07-04 PROCEDURE — 250N000012 HC RX MED GY IP 250 OP 636 PS 637: Performed by: STUDENT IN AN ORGANIZED HEALTH CARE EDUCATION/TRAINING PROGRAM

## 2024-07-04 PROCEDURE — 250N000013 HC RX MED GY IP 250 OP 250 PS 637: Performed by: INTERNAL MEDICINE

## 2024-07-04 PROCEDURE — 99239 HOSP IP/OBS DSCHRG MGMT >30: CPT | Performed by: STUDENT IN AN ORGANIZED HEALTH CARE EDUCATION/TRAINING PROGRAM

## 2024-07-04 PROCEDURE — 83735 ASSAY OF MAGNESIUM: CPT | Performed by: STUDENT IN AN ORGANIZED HEALTH CARE EDUCATION/TRAINING PROGRAM

## 2024-07-04 PROCEDURE — 250N000013 HC RX MED GY IP 250 OP 250 PS 637: Performed by: STUDENT IN AN ORGANIZED HEALTH CARE EDUCATION/TRAINING PROGRAM

## 2024-07-04 RX ORDER — POTASSIUM CHLORIDE 750 MG/1
10 TABLET, EXTENDED RELEASE ORAL ONCE
Status: COMPLETED | OUTPATIENT
Start: 2024-07-04 | End: 2024-07-04

## 2024-07-04 RX ORDER — CALCIUM CARBONATE 500 MG/1
500 TABLET, CHEWABLE ORAL DAILY PRN
Status: DISCONTINUED | OUTPATIENT
Start: 2024-07-04 | End: 2024-07-04 | Stop reason: HOSPADM

## 2024-07-04 RX ORDER — MAGNESIUM OXIDE 400 MG/1
400 TABLET ORAL EVERY 4 HOURS
Status: COMPLETED | OUTPATIENT
Start: 2024-07-04 | End: 2024-07-04

## 2024-07-04 RX ORDER — PREDNISONE 20 MG/1
TABLET ORAL
Status: SHIPPED
Start: 2024-07-04 | End: 2024-07-07

## 2024-07-04 RX ADMIN — NYSTATIN: 100000 CREAM TOPICAL at 11:50

## 2024-07-04 RX ADMIN — LIDOCAINE: 50 OINTMENT TOPICAL at 11:51

## 2024-07-04 RX ADMIN — MAGNESIUM OXIDE TAB 400 MG (241.3 MG ELEMENTAL MG) 400 MG: 400 (241.3 MG) TAB at 06:45

## 2024-07-04 RX ADMIN — CALCIUM CARBONATE (ANTACID) CHEW TAB 500 MG 500 MG: 500 CHEW TAB at 04:43

## 2024-07-04 RX ADMIN — POTASSIUM CHLORIDE 10 MEQ: 750 TABLET, EXTENDED RELEASE ORAL at 06:45

## 2024-07-04 RX ADMIN — MAGNESIUM OXIDE TAB 400 MG (241.3 MG ELEMENTAL MG) 400 MG: 400 (241.3 MG) TAB at 11:50

## 2024-07-04 RX ADMIN — ACETAMINOPHEN 325 MG: 325 TABLET ORAL at 14:20

## 2024-07-04 RX ADMIN — LIDOCAINE: 50 OINTMENT TOPICAL at 08:23

## 2024-07-04 RX ADMIN — THIAMINE HCL TAB 100 MG 100 MG: 100 TAB at 08:18

## 2024-07-04 RX ADMIN — FOLIC ACID 1 MG: 1 TABLET ORAL at 08:18

## 2024-07-04 RX ADMIN — CALCIUM CARBONATE (ANTACID) CHEW TAB 500 MG 500 MG: 500 CHEW TAB at 14:22

## 2024-07-04 RX ADMIN — MICONAZOLE NITRATE ANTIFUNGAL POWDER: 2 POWDER TOPICAL at 08:25

## 2024-07-04 RX ADMIN — PREDNISONE 30 MG: 20 TABLET ORAL at 08:18

## 2024-07-04 RX ADMIN — APIXABAN 10 MG: 5 TABLET, FILM COATED ORAL at 08:18

## 2024-07-04 RX ADMIN — PANTOPRAZOLE SODIUM 40 MG: 40 TABLET, DELAYED RELEASE ORAL at 06:45

## 2024-07-04 RX ADMIN — CARBIDOPA AND LEVODOPA 2.5 MG: 50; 200 TABLET, EXTENDED RELEASE ORAL at 11:50

## 2024-07-04 RX ADMIN — CARBIDOPA AND LEVODOPA 2.5 MG: 50; 200 TABLET, EXTENDED RELEASE ORAL at 08:18

## 2024-07-04 ASSESSMENT — ACTIVITIES OF DAILY LIVING (ADL)
ADLS_ACUITY_SCORE: 46
ADLS_ACUITY_SCORE: 50
ADLS_ACUITY_SCORE: 46
ADLS_ACUITY_SCORE: 50
ADLS_ACUITY_SCORE: 46

## 2024-07-04 NOTE — PROGRESS NOTES
Care Management Follow Up    Length of Stay (days): 7    Expected Discharge Date: 07/04/2024     Concerns to be Addressed: discharge planning, substance/tobacco abuse/use     Patient plan of care discussed at interdisciplinary rounds: Yes    Anticipated Discharge Disposition: Transitional Care ([facility])     Anticipated Discharge Services:  rehab at the TCU  Anticipated Discharge DME:  as per caret team rec    Patient/family educated on Medicare website which has current facility and service quality ratings:  yes  Education Provided on the Discharge Plan:  yes  Patient/Family in Agreement with the Plan:  yes    Referrals Placed by CM/SW:  TCU  Private pay costs discussed: Not applicable    Additional Information:  Patient is ready for discharge. She is going to [facility] via [facility] transport at [phone] via wheelchair. Daughter Marcie previously updated.    Anitra Ba, TATYANASW

## 2024-07-04 NOTE — PROGRESS NOTES
Patient discharged to [facility].  Tyl given prior to transport for comfort.  Transporting with oxygen.  Wound care to buttocks was completed.  Purwick used to keep wound clean.

## 2024-07-04 NOTE — PLAN OF CARE
Problem: Adult Inpatient Plan of Care  Goal: Plan of Care Review  Description: The Plan of Care Review/Shift note should be completed every shift.  The Outcome Evaluation is a brief statement about your assessment that the patient is improving, declining, or no change.  This information will be displayed automatically on your shift  note.  Outcome: Progressing     Problem: Fall Injury Risk  Goal: Absence of Fall and Fall-Related Injury  Outcome: Progressing  Intervention: Identify and Manage Contributors  Recent Flowsheet Documentation  Taken 7/3/2024 1600 by Shelley Lopez RN  Medication Review/Management: medications reviewed  Taken 7/3/2024 1200 by Shelley Lopez RN  Medication Review/Management: medications reviewed  Taken 7/3/2024 0800 by Shelley Lopez RN  Medication Review/Management: medications reviewed  Intervention: Promote Injury-Free Environment  Recent Flowsheet Documentation  Taken 7/3/2024 1600 by Shelley Lopez RN  Safety Promotion/Fall Prevention: activity supervised  Taken 7/3/2024 1200 by Shelley Lopez RN  Safety Promotion/Fall Prevention: activity supervised  Taken 7/3/2024 0800 by Shelley Lopez RN  Safety Promotion/Fall Prevention: activity supervised     Problem: Pain Acute  Goal: Optimal Pain Control and Function  Outcome: Progressing  Intervention: Prevent or Manage Pain  Recent Flowsheet Documentation  Taken 7/3/2024 1600 by Shelley Lopez RN  Sensory Stimulation Regulation:   care clustered   quiet environment promoted  Medication Review/Management: medications reviewed  Taken 7/3/2024 1200 by Shelley Lopez RN  Sensory Stimulation Regulation:   care clustered   quiet environment promoted  Medication Review/Management: medications reviewed  Taken 7/3/2024 0800 by Shelley Lopez RN  Sensory Stimulation Regulation:   care clustered   quiet environment promoted  Medication Review/Management: medications reviewed  Intervention: Optimize Psychosocial  Wellbeing  Recent Flowsheet Documentation  Taken 7/3/2024 1600 by Shelley Lopez RN  Supportive Measures: active listening utilized  Taken 7/3/2024 1200 by Shelley Lopez RN  Supportive Measures: active listening utilized  Taken 7/3/2024 0800 by Shelley Lopez RN  Supportive Measures: active listening utilized     Problem: Gas Exchange Impaired  Goal: Optimal Gas Exchange  Outcome: Progressing  Intervention: Optimize Oxygenation and Ventilation  Recent Flowsheet Documentation  Taken 7/3/2024 1600 by Shelley Lopez RN  Head of Bed (HOB) Positioning: HOB at 20-30 degrees  Taken 7/3/2024 1200 by Shelley Lopez RN  Head of Bed (HOB) Positioning: HOB at 20-30 degrees  Taken 7/3/2024 0800 by Shelley Lopez RN  Head of Bed (HOB) Positioning: HOB at 20-30 degrees   Goal Outcome Evaluation:         Unable to wean from supplemental O2.  Bubbler added for nasal comfort.  Lidocaine ointment to shoulder effective.  She reports sore bottom with movement but states it is ok at rest.  PRN Tyl given this afternoon with relief.  Patient up in chair x2 with 1-2 assist.  Call light used to alert staff to her needs.  Plan for TCU tomorrow.  Tele NSR.

## 2024-07-04 NOTE — PLAN OF CARE
Physical Therapy Discharge Summary    Reason for therapy discharge:    Discharged to transitional care facility.    Progress towards therapy goal(s). See goals on Care Plan in [facility] electronic health record for goal details.  Goals partially met.  Barriers to achieving goals:   limited tolerance for therapy and discharge from facility.    Therapy recommendation(s):    Continued therapy is recommended.  Rationale/Recommendations:  PT goals not fully met.

## 2024-07-05 ENCOUNTER — TELEPHONE (OUTPATIENT)
Dept: GERIATRICS | Facility: CLINIC | Age: 74
End: 2024-07-05
Payer: COMMERCIAL

## 2024-07-05 ENCOUNTER — DOCUMENTATION ONLY (OUTPATIENT)
Dept: GERIATRICS | Facility: CLINIC | Age: 74
End: 2024-07-05
Payer: COMMERCIAL

## 2024-07-05 ENCOUNTER — PATIENT OUTREACH (OUTPATIENT)
Dept: CARE COORDINATION | Facility: CLINIC | Age: 74
End: 2024-07-05
Payer: COMMERCIAL

## 2024-07-05 PROBLEM — Z87.891 HISTORY OF TOBACCO USE: Status: ACTIVE | Noted: 2024-07-05

## 2024-07-05 PROBLEM — I10 HTN (HYPERTENSION): Status: ACTIVE | Noted: 2021-12-06

## 2024-07-05 PROBLEM — Z86.0100 HISTORY OF COLONIC POLYPS: Status: ACTIVE | Noted: 2021-07-09

## 2024-07-05 PROBLEM — K63.5 POLYP OF COLON: Status: ACTIVE | Noted: 2021-07-09

## 2024-07-05 PROBLEM — D12.8 BENIGN NEOPLASM OF RECTUM: Status: ACTIVE | Noted: 2021-07-13

## 2024-07-05 PROBLEM — R60.0 BILATERAL LEG EDEMA: Status: ACTIVE | Noted: 2021-12-06

## 2024-07-05 PROBLEM — M54.30 SCIATIC PAIN: Status: ACTIVE | Noted: 2021-12-06

## 2024-07-05 LAB
BACTERIA BLD CULT: NO GROWTH
BACTERIA BLD CULT: NO GROWTH

## 2024-07-05 RX ORDER — MIDODRINE HYDROCHLORIDE 2.5 MG/1
2.5 TABLET ORAL
COMMUNITY
Start: 2024-07-05

## 2024-07-05 NOTE — PLAN OF CARE
Occupational Therapy Discharge Summary    Reason for therapy discharge:    Discharged to TCU 7/4/24    Progress towards therapy goal(s). See goals on Care Plan in [facility] electronic health record for goal details.  Progressing towards goals.    Therapy recommendation(s):    Recommend continued OT @ TCU.

## 2024-07-05 NOTE — PROGRESS NOTES
Clinic Care Coordination Contact  Care Coordination Transition Communication    Clinical Data: Patient was hospitalized at [facility] from 6/27/24 to 7/4/24 with diagnosis of Acute respiratory failure with hypercapnia (H)  Active Problems:    Hypokalemia    COPD exacerbation (H)    Altered mental status, unspecified altered mental status type    Other acute pulmonary embolism without acute cor pulmonale (H)    Aspiration pneumonia, unspecified aspiration pneumonia type, unspecified laterality, unspecified part of lung (H)   .     Assessment: Patient has transitioned to TCU/ARU for short term rehabilitation:    Facility Name: [facility]  Transition Communication:  Notified facility of Primary Care- Care Coordination support via Epic In-Basket message.    Plan: Care Coordinator will await notification from facility staff informing of patient's discharge plans/needs. Care Coordinator will review chart and outreach to facility staff every 4 weeks and as needed.     See SW note 6/27/24

## 2024-07-05 NOTE — TELEPHONE ENCOUNTER
[facility] Geriatrics Triage Nurse Telephone Encounter    Provider: ESTELLE Yi  Facility: [facility] Facility Type:  TCU    Caller: Alonso  Call Back Number: [phone]    Allergies:    Allergies   Allergen Reactions    Penicillins Hives        Reason for call: Nurse is calling to report that patient has 2+ pitting bilateral LE edema.  Weight yesterday upon admission was 112.7#.  No weight taken today.  Orthostatic BP's:  lying-118/68, sitting-120/72, standing-144/72.  Other VS:  T=98.0, P=103, R=18, O2=92-93% 2L/min.  Of note, patient in on a Prednisone taper and has orders for Midodrine 2.5mg TID with no parameters.      Verbal Order/Direction given by Provider: Daily weights in AM before breakfast.  Check CBC, BMP, BNP on 7/8/24.  Cesar stockings---on AM and off HS.  Check BP prior to Midodrine administration and hold for SBP greater than 155.      Provider giving Order:  ESTELLE Yi    Verbal Order given to: Alonso Pathak RN

## 2024-07-05 NOTE — LETTER
Hand-off  for Care Coordination    Att: Discharge Planner:  Please if able, fax or call the number listed below when the below patient is discharged from your facility.  Clinic Care Coordination from patient's Primary Care Clinic will outreach to patient upon discharge to assist with TCU transition/discharge.    Thank you        Patient Name:   Namita Viera  Patient :     1950  Patient PCP:     Anahi Andersen DO    Patient Primary Clinic:   [address] [place] [address] E  [facility] [address]  D/C Facility: _____________________________________________   TCU Contact Info for questions: ___________________________  D/C Date:  ______________________________________________  Follow-up Apt with PCP after TCU D/C:   ____________________  Other Follow-up Apt s:      _________________________________________  Additional information (concerns, and Home Care, ect ):   __________________________________________________________________  __________________________________________________________________  Care Coordinator to Contact at [place]  Fax to: [phone]  Attn:  Adrienne Johansen RN Clinic Care Coordination [facility]  Phone: [phone]

## 2024-07-07 ENCOUNTER — HOSPITAL ENCOUNTER (INPATIENT)
Facility: HOSPITAL | Age: 74
LOS: 5 days | Discharge: NURSING, REHAB OR LONG-TERM CARE | DRG: 388 | End: 2024-07-12
Attending: EMERGENCY MEDICINE | Admitting: INTERNAL MEDICINE
Payer: COMMERCIAL

## 2024-07-07 ENCOUNTER — APPOINTMENT (OUTPATIENT)
Dept: CT IMAGING | Facility: HOSPITAL | Age: 74
DRG: 388 | End: 2024-07-07
Attending: PHYSICIAN ASSISTANT
Payer: COMMERCIAL

## 2024-07-07 DIAGNOSIS — R32 INCONTINENCE ASSOCIATED DERMATITIS: Primary | ICD-10-CM

## 2024-07-07 DIAGNOSIS — I50.33 ACUTE ON CHRONIC DIASTOLIC CONGESTIVE HEART FAILURE (H): ICD-10-CM

## 2024-07-07 DIAGNOSIS — K56.41 FECAL IMPACTION (H): ICD-10-CM

## 2024-07-07 DIAGNOSIS — J43.9 PULMONARY EMPHYSEMA, UNSPECIFIED EMPHYSEMA TYPE (H): ICD-10-CM

## 2024-07-07 DIAGNOSIS — L25.8 INCONTINENCE ASSOCIATED DERMATITIS: Primary | ICD-10-CM

## 2024-07-07 DIAGNOSIS — J44.1 COPD EXACERBATION (H): ICD-10-CM

## 2024-07-07 DIAGNOSIS — K62.5 RECTAL BLEEDING: ICD-10-CM

## 2024-07-07 DIAGNOSIS — I26.99 OTHER ACUTE PULMONARY EMBOLISM WITHOUT ACUTE COR PULMONALE (H): ICD-10-CM

## 2024-07-07 LAB
ABO/RH(D): NORMAL
ALBUMIN SERPL BCG-MCNC: 3 G/DL (ref 3.5–5.2)
ALP SERPL-CCNC: 96 U/L (ref 40–150)
ALT SERPL W P-5'-P-CCNC: 16 U/L (ref 0–50)
ANION GAP SERPL CALCULATED.3IONS-SCNC: 10 MMOL/L (ref 7–15)
ANTIBODY SCREEN: NEGATIVE
APTT PPP: 28 SECONDS (ref 22–38)
AST SERPL W P-5'-P-CCNC: 20 U/L (ref 0–45)
BASOPHILS # BLD AUTO: 0 10E3/UL (ref 0–0.2)
BASOPHILS NFR BLD AUTO: 0 %
BILIRUB DIRECT SERPL-MCNC: <0.2 MG/DL (ref 0–0.3)
BILIRUB SERPL-MCNC: <0.2 MG/DL
BUN SERPL-MCNC: 20.1 MG/DL (ref 8–23)
CALCIUM SERPL-MCNC: 8 MG/DL (ref 8.8–10.2)
CHLORIDE SERPL-SCNC: 104 MMOL/L (ref 98–107)
CREAT SERPL-MCNC: 0.82 MG/DL (ref 0.51–0.95)
DEPRECATED HCO3 PLAS-SCNC: 23 MMOL/L (ref 22–29)
EGFRCR SERPLBLD CKD-EPI 2021: 74 ML/MIN/1.73M2
EOSINOPHIL # BLD AUTO: 0 10E3/UL (ref 0–0.7)
EOSINOPHIL NFR BLD AUTO: 0 %
ERYTHROCYTE [DISTWIDTH] IN BLOOD BY AUTOMATED COUNT: 14.7 % (ref 10–15)
GLUCOSE SERPL-MCNC: 140 MG/DL (ref 70–99)
HCT VFR BLD AUTO: 26.1 % (ref 35–47)
HGB BLD-MCNC: 8.9 G/DL (ref 11.7–15.7)
HOLD SPECIMEN: NORMAL
HOLD SPECIMEN: NORMAL
IMM GRANULOCYTES # BLD: 0.1 10E3/UL
IMM GRANULOCYTES NFR BLD: 1 %
INR PPP: 1.25 (ref 0.85–1.15)
LIPASE SERPL-CCNC: 180 U/L (ref 13–60)
LYMPHOCYTES # BLD AUTO: 0.3 10E3/UL (ref 0.8–5.3)
LYMPHOCYTES NFR BLD AUTO: 3 %
MAGNESIUM SERPL-MCNC: 1.2 MG/DL (ref 1.7–2.3)
MCH RBC QN AUTO: 32.6 PG (ref 26.5–33)
MCHC RBC AUTO-ENTMCNC: 34.1 G/DL (ref 31.5–36.5)
MCV RBC AUTO: 96 FL (ref 78–100)
MONOCYTES # BLD AUTO: 0.5 10E3/UL (ref 0–1.3)
MONOCYTES NFR BLD AUTO: 7 %
NEUTROPHILS # BLD AUTO: 6.7 10E3/UL (ref 1.6–8.3)
NEUTROPHILS NFR BLD AUTO: 89 %
NRBC # BLD AUTO: 0 10E3/UL
NRBC BLD AUTO-RTO: 0 /100
NT-PROBNP SERPL-MCNC: 959 PG/ML (ref 0–1800)
PLATELET # BLD AUTO: 242 10E3/UL (ref 150–450)
POTASSIUM SERPL-SCNC: 3.8 MMOL/L (ref 3.4–5.3)
PROT SERPL-MCNC: 4.9 G/DL (ref 6.4–8.3)
RBC # BLD AUTO: 2.73 10E6/UL (ref 3.8–5.2)
SODIUM SERPL-SCNC: 137 MMOL/L (ref 135–145)
SPECIMEN EXPIRATION DATE: NORMAL
WBC # BLD AUTO: 7.5 10E3/UL (ref 4–11)

## 2024-07-07 PROCEDURE — 85025 COMPLETE CBC W/AUTO DIFF WBC: CPT | Performed by: PHYSICIAN ASSISTANT

## 2024-07-07 PROCEDURE — 82248 BILIRUBIN DIRECT: CPT | Performed by: PHYSICIAN ASSISTANT

## 2024-07-07 PROCEDURE — 86900 BLOOD TYPING SEROLOGIC ABO: CPT | Performed by: PHYSICIAN ASSISTANT

## 2024-07-07 PROCEDURE — 80053 COMPREHEN METABOLIC PANEL: CPT | Performed by: PHYSICIAN ASSISTANT

## 2024-07-07 PROCEDURE — 74174 CTA ABD&PLVS W/CONTRAST: CPT

## 2024-07-07 PROCEDURE — 83690 ASSAY OF LIPASE: CPT | Performed by: PHYSICIAN ASSISTANT

## 2024-07-07 PROCEDURE — 85730 THROMBOPLASTIN TIME PARTIAL: CPT | Performed by: PHYSICIAN ASSISTANT

## 2024-07-07 PROCEDURE — 83735 ASSAY OF MAGNESIUM: CPT | Performed by: INTERNAL MEDICINE

## 2024-07-07 PROCEDURE — 85610 PROTHROMBIN TIME: CPT | Performed by: PHYSICIAN ASSISTANT

## 2024-07-07 PROCEDURE — 250N000011 HC RX IP 250 OP 636: Performed by: EMERGENCY MEDICINE

## 2024-07-07 PROCEDURE — 250N000011 HC RX IP 250 OP 636: Performed by: INTERNAL MEDICINE

## 2024-07-07 PROCEDURE — 250N000013 HC RX MED GY IP 250 OP 250 PS 637: Performed by: INTERNAL MEDICINE

## 2024-07-07 PROCEDURE — 36415 COLL VENOUS BLD VENIPUNCTURE: CPT | Performed by: PHYSICIAN ASSISTANT

## 2024-07-07 PROCEDURE — 120N000001 HC R&B MED SURG/OB

## 2024-07-07 PROCEDURE — 99222 1ST HOSP IP/OBS MODERATE 55: CPT | Performed by: INTERNAL MEDICINE

## 2024-07-07 PROCEDURE — 83880 ASSAY OF NATRIURETIC PEPTIDE: CPT | Performed by: INTERNAL MEDICINE

## 2024-07-07 PROCEDURE — 99285 EMERGENCY DEPT VISIT HI MDM: CPT | Mod: 25

## 2024-07-07 RX ORDER — AMOXICILLIN 250 MG
1 CAPSULE ORAL 2 TIMES DAILY PRN
Status: DISCONTINUED | OUTPATIENT
Start: 2024-07-07 | End: 2024-07-12 | Stop reason: HOSPADM

## 2024-07-07 RX ORDER — ACETAMINOPHEN 650 MG/1
650 SUPPOSITORY RECTAL EVERY 4 HOURS PRN
Status: DISCONTINUED | OUTPATIENT
Start: 2024-07-07 | End: 2024-07-10

## 2024-07-07 RX ORDER — POLYETHYLENE GLYCOL 3350 17 G/17G
17 POWDER, FOR SOLUTION ORAL DAILY
Status: DISCONTINUED | OUTPATIENT
Start: 2024-07-08 | End: 2024-07-08 | Stop reason: ALTCHOICE

## 2024-07-07 RX ORDER — ALENDRONATE SODIUM 70 MG/1
70 TABLET ORAL
COMMUNITY

## 2024-07-07 RX ORDER — MAGNESIUM SULFATE HEPTAHYDRATE 40 MG/ML
2 INJECTION, SOLUTION INTRAVENOUS ONCE
Status: COMPLETED | OUTPATIENT
Start: 2024-07-07 | End: 2024-07-08

## 2024-07-07 RX ORDER — ONDANSETRON 2 MG/ML
4 INJECTION INTRAMUSCULAR; INTRAVENOUS EVERY 6 HOURS PRN
Status: DISCONTINUED | OUTPATIENT
Start: 2024-07-07 | End: 2024-07-12 | Stop reason: HOSPADM

## 2024-07-07 RX ORDER — ONDANSETRON 4 MG/1
4 TABLET, ORALLY DISINTEGRATING ORAL EVERY 6 HOURS PRN
Status: DISCONTINUED | OUTPATIENT
Start: 2024-07-07 | End: 2024-07-12 | Stop reason: HOSPADM

## 2024-07-07 RX ORDER — PREDNISONE 20 MG/1
20 TABLET ORAL DAILY
Status: ON HOLD | COMMUNITY
End: 2024-07-12

## 2024-07-07 RX ORDER — CALCIUM CARBONATE 500 MG/1
1000 TABLET, CHEWABLE ORAL 4 TIMES DAILY PRN
Status: DISCONTINUED | OUTPATIENT
Start: 2024-07-07 | End: 2024-07-12 | Stop reason: HOSPADM

## 2024-07-07 RX ORDER — LIDOCAINE 40 MG/G
CREAM TOPICAL
Status: DISCONTINUED | OUTPATIENT
Start: 2024-07-07 | End: 2024-07-08

## 2024-07-07 RX ORDER — IOPAMIDOL 755 MG/ML
75 INJECTION, SOLUTION INTRAVASCULAR ONCE
Status: COMPLETED | OUTPATIENT
Start: 2024-07-07 | End: 2024-07-07

## 2024-07-07 RX ORDER — AMOXICILLIN 250 MG
2 CAPSULE ORAL 2 TIMES DAILY PRN
Status: DISCONTINUED | OUTPATIENT
Start: 2024-07-07 | End: 2024-07-12 | Stop reason: HOSPADM

## 2024-07-07 RX ORDER — ACETAMINOPHEN 325 MG/1
650 TABLET ORAL EVERY 4 HOURS PRN
Status: DISCONTINUED | OUTPATIENT
Start: 2024-07-07 | End: 2024-07-10

## 2024-07-07 RX ORDER — LOSARTAN POTASSIUM 25 MG/1
25 TABLET ORAL DAILY
Status: DISCONTINUED | OUTPATIENT
Start: 2024-07-07 | End: 2024-07-12 | Stop reason: HOSPADM

## 2024-07-07 RX ORDER — FUROSEMIDE 10 MG/ML
40 INJECTION INTRAMUSCULAR; INTRAVENOUS EVERY 12 HOURS
Status: DISCONTINUED | OUTPATIENT
Start: 2024-07-07 | End: 2024-07-11

## 2024-07-07 RX ADMIN — LOSARTAN POTASSIUM 25 MG: 25 TABLET, FILM COATED ORAL at 22:03

## 2024-07-07 RX ADMIN — IOPAMIDOL 75 ML: 755 INJECTION, SOLUTION INTRAVENOUS at 20:21

## 2024-07-07 RX ADMIN — FUROSEMIDE 40 MG: 10 INJECTION, SOLUTION INTRAMUSCULAR; INTRAVENOUS at 22:00

## 2024-07-07 RX ADMIN — MAGNESIUM SULFATE HEPTAHYDRATE 2 G: 40 INJECTION, SOLUTION INTRAVENOUS at 22:03

## 2024-07-07 ASSESSMENT — COLUMBIA-SUICIDE SEVERITY RATING SCALE - C-SSRS
2. HAVE YOU ACTUALLY HAD ANY THOUGHTS OF KILLING YOURSELF IN THE PAST MONTH?: NO
6. HAVE YOU EVER DONE ANYTHING, STARTED TO DO ANYTHING, OR PREPARED TO DO ANYTHING TO END YOUR LIFE?: NO
1. IN THE PAST MONTH, HAVE YOU WISHED YOU WERE DEAD OR WISHED YOU COULD GO TO SLEEP AND NOT WAKE UP?: NO

## 2024-07-07 ASSESSMENT — ACTIVITIES OF DAILY LIVING (ADL)
ADLS_ACUITY_SCORE: 40

## 2024-07-08 ENCOUNTER — TELEPHONE (OUTPATIENT)
Dept: CARDIOLOGY | Facility: CLINIC | Age: 74
End: 2024-07-08

## 2024-07-08 ENCOUNTER — APPOINTMENT (OUTPATIENT)
Dept: PHYSICAL THERAPY | Facility: HOSPITAL | Age: 74
DRG: 388 | End: 2024-07-08
Attending: INTERNAL MEDICINE
Payer: COMMERCIAL

## 2024-07-08 ENCOUNTER — APPOINTMENT (OUTPATIENT)
Dept: OCCUPATIONAL THERAPY | Facility: HOSPITAL | Age: 74
DRG: 388 | End: 2024-07-08
Attending: INTERNAL MEDICINE
Payer: COMMERCIAL

## 2024-07-08 DIAGNOSIS — I50.9 ACUTE DECOMPENSATED HEART FAILURE (H): Primary | ICD-10-CM

## 2024-07-08 PROBLEM — S32.9XXA PELVIC FRACTURE (H): Status: ACTIVE | Noted: 2024-07-08

## 2024-07-08 LAB
ANION GAP SERPL CALCULATED.3IONS-SCNC: 11 MMOL/L (ref 7–15)
BUN SERPL-MCNC: 21.6 MG/DL (ref 8–23)
CALCIUM SERPL-MCNC: 8.6 MG/DL (ref 8.8–10.2)
CHLORIDE SERPL-SCNC: 102 MMOL/L (ref 98–107)
CREAT SERPL-MCNC: 0.84 MG/DL (ref 0.51–0.95)
DEPRECATED HCO3 PLAS-SCNC: 27 MMOL/L (ref 22–29)
EGFRCR SERPLBLD CKD-EPI 2021: 72 ML/MIN/1.73M2
GLUCOSE SERPL-MCNC: 87 MG/DL (ref 70–99)
HGB BLD-MCNC: 9 G/DL (ref 11.7–15.7)
HGB BLD-MCNC: 9.7 G/DL (ref 11.7–15.7)
MAGNESIUM SERPL-MCNC: 1.9 MG/DL (ref 1.7–2.3)
POTASSIUM SERPL-SCNC: 3.3 MMOL/L (ref 3.4–5.3)
POTASSIUM SERPL-SCNC: 4.2 MMOL/L (ref 3.4–5.3)
SODIUM SERPL-SCNC: 140 MMOL/L (ref 135–145)

## 2024-07-08 PROCEDURE — 250N000013 HC RX MED GY IP 250 OP 250 PS 637: Performed by: INTERNAL MEDICINE

## 2024-07-08 PROCEDURE — 250N000013 HC RX MED GY IP 250 OP 250 PS 637: Performed by: HOSPITALIST

## 2024-07-08 PROCEDURE — 83735 ASSAY OF MAGNESIUM: CPT | Performed by: INTERNAL MEDICINE

## 2024-07-08 PROCEDURE — 250N000011 HC RX IP 250 OP 636: Performed by: INTERNAL MEDICINE

## 2024-07-08 PROCEDURE — 97166 OT EVAL MOD COMPLEX 45 MIN: CPT | Mod: GO

## 2024-07-08 PROCEDURE — 120N000001 HC R&B MED SURG/OB

## 2024-07-08 PROCEDURE — 97162 PT EVAL MOD COMPLEX 30 MIN: CPT | Mod: GP

## 2024-07-08 PROCEDURE — 99233 SBSQ HOSP IP/OBS HIGH 50: CPT | Performed by: HOSPITALIST

## 2024-07-08 PROCEDURE — G0463 HOSPITAL OUTPT CLINIC VISIT: HCPCS

## 2024-07-08 PROCEDURE — 250N000012 HC RX MED GY IP 250 OP 636 PS 637: Performed by: HOSPITALIST

## 2024-07-08 PROCEDURE — 84132 ASSAY OF SERUM POTASSIUM: CPT | Performed by: HOSPITALIST

## 2024-07-08 PROCEDURE — 36415 COLL VENOUS BLD VENIPUNCTURE: CPT | Performed by: HOSPITALIST

## 2024-07-08 PROCEDURE — 85018 HEMOGLOBIN: CPT | Performed by: INTERNAL MEDICINE

## 2024-07-08 PROCEDURE — 36415 COLL VENOUS BLD VENIPUNCTURE: CPT | Performed by: INTERNAL MEDICINE

## 2024-07-08 PROCEDURE — 80048 BASIC METABOLIC PNL TOTAL CA: CPT | Performed by: INTERNAL MEDICINE

## 2024-07-08 RX ORDER — POLYETHYLENE GLYCOL 3350 17 G/17G
17 POWDER, FOR SOLUTION ORAL 2 TIMES DAILY
Status: DISCONTINUED | OUTPATIENT
Start: 2024-07-08 | End: 2024-07-12 | Stop reason: HOSPADM

## 2024-07-08 RX ORDER — ASPIRIN 81 MG/1
81 TABLET ORAL DAILY
Status: DISCONTINUED | OUTPATIENT
Start: 2024-07-08 | End: 2024-07-12 | Stop reason: HOSPADM

## 2024-07-08 RX ORDER — AMOXICILLIN 250 MG
1 CAPSULE ORAL 2 TIMES DAILY
Status: DISCONTINUED | OUTPATIENT
Start: 2024-07-08 | End: 2024-07-12 | Stop reason: HOSPADM

## 2024-07-08 RX ORDER — NALOXONE HYDROCHLORIDE 0.4 MG/ML
0.4 INJECTION, SOLUTION INTRAMUSCULAR; INTRAVENOUS; SUBCUTANEOUS
Status: DISCONTINUED | OUTPATIENT
Start: 2024-07-08 | End: 2024-07-12 | Stop reason: HOSPADM

## 2024-07-08 RX ORDER — NICOTINE 21 MG/24HR
1 PATCH, TRANSDERMAL 24 HOURS TRANSDERMAL DAILY
Status: DISCONTINUED | OUTPATIENT
Start: 2024-07-08 | End: 2024-07-08

## 2024-07-08 RX ORDER — PANTOPRAZOLE SODIUM 40 MG/1
40 TABLET, DELAYED RELEASE ORAL
Status: DISCONTINUED | OUTPATIENT
Start: 2024-07-09 | End: 2024-07-12 | Stop reason: HOSPADM

## 2024-07-08 RX ORDER — ALBUTEROL SULFATE 90 UG/1
1-2 AEROSOL, METERED RESPIRATORY (INHALATION) EVERY 6 HOURS
Status: DISCONTINUED | OUTPATIENT
Start: 2024-07-08 | End: 2024-07-12 | Stop reason: HOSPADM

## 2024-07-08 RX ORDER — PREDNISONE 20 MG/1
20 TABLET ORAL DAILY
Status: COMPLETED | OUTPATIENT
Start: 2024-07-08 | End: 2024-07-08

## 2024-07-08 RX ORDER — NICOTINE 21 MG/24HR
1 PATCH, TRANSDERMAL 24 HOURS TRANSDERMAL DAILY PRN
Status: DISCONTINUED | OUTPATIENT
Start: 2024-07-08 | End: 2024-07-12 | Stop reason: HOSPADM

## 2024-07-08 RX ORDER — LIDOCAINE 4 G/G
2 PATCH TOPICAL EVERY 24 HOURS
Status: DISCONTINUED | OUTPATIENT
Start: 2024-07-08 | End: 2024-07-08

## 2024-07-08 RX ORDER — MIDODRINE HYDROCHLORIDE 2.5 MG/1
2.5 TABLET ORAL
Status: DISCONTINUED | OUTPATIENT
Start: 2024-07-08 | End: 2024-07-12 | Stop reason: HOSPADM

## 2024-07-08 RX ORDER — LIDOCAINE 4 G/G
2 PATCH TOPICAL DAILY PRN
Status: DISCONTINUED | OUTPATIENT
Start: 2024-07-08 | End: 2024-07-12 | Stop reason: HOSPADM

## 2024-07-08 RX ORDER — NALOXONE HYDROCHLORIDE 0.4 MG/ML
0.2 INJECTION, SOLUTION INTRAMUSCULAR; INTRAVENOUS; SUBCUTANEOUS
Status: DISCONTINUED | OUTPATIENT
Start: 2024-07-08 | End: 2024-07-12 | Stop reason: HOSPADM

## 2024-07-08 RX ORDER — PREDNISONE 5 MG/1
10 TABLET ORAL DAILY
Status: COMPLETED | OUTPATIENT
Start: 2024-07-09 | End: 2024-07-11

## 2024-07-08 RX ORDER — POTASSIUM CHLORIDE 1500 MG/1
20 TABLET, EXTENDED RELEASE ORAL ONCE
Status: COMPLETED | OUTPATIENT
Start: 2024-07-08 | End: 2024-07-08

## 2024-07-08 RX ADMIN — SENNOSIDES AND DOCUSATE SODIUM 1 TABLET: 50; 8.6 TABLET ORAL at 20:15

## 2024-07-08 RX ADMIN — THIAMINE HCL TAB 100 MG 100 MG: 100 TAB at 12:45

## 2024-07-08 RX ADMIN — MICONAZOLE NITRATE ANTIFUNGAL POWDER: 2 POWDER TOPICAL at 20:16

## 2024-07-08 RX ADMIN — POLYETHYLENE GLYCOL 3350 17 G: 17 POWDER, FOR SOLUTION ORAL at 20:15

## 2024-07-08 RX ADMIN — POLYETHYLENE GLYCOL 3350 17 G: 17 POWDER, FOR SOLUTION ORAL at 08:16

## 2024-07-08 RX ADMIN — APIXABAN 5 MG: 5 TABLET, FILM COATED ORAL at 20:15

## 2024-07-08 RX ADMIN — SENNOSIDES AND DOCUSATE SODIUM 1 TABLET: 50; 8.6 TABLET ORAL at 12:44

## 2024-07-08 RX ADMIN — SENNOSIDES AND DOCUSATE SODIUM 1 TABLET: 50; 8.6 TABLET ORAL at 12:56

## 2024-07-08 RX ADMIN — FUROSEMIDE 40 MG: 10 INJECTION, SOLUTION INTRAMUSCULAR; INTRAVENOUS at 21:38

## 2024-07-08 RX ADMIN — MICONAZOLE NITRATE ANTIFUNGAL POWDER: 2 POWDER TOPICAL at 13:02

## 2024-07-08 RX ADMIN — CARBIDOPA AND LEVODOPA 2.5 MG: 50; 200 TABLET, EXTENDED RELEASE ORAL at 12:45

## 2024-07-08 RX ADMIN — PREDNISONE 20 MG: 20 TABLET ORAL at 12:44

## 2024-07-08 RX ADMIN — CALCIUM CARBONATE (ANTACID) CHEW TAB 500 MG 1000 MG: 500 CHEW TAB at 21:57

## 2024-07-08 RX ADMIN — ALBUTEROL SULFATE 2 PUFF: 90 AEROSOL, METERED RESPIRATORY (INHALATION) at 22:55

## 2024-07-08 RX ADMIN — LOSARTAN POTASSIUM 25 MG: 25 TABLET, FILM COATED ORAL at 08:16

## 2024-07-08 RX ADMIN — ALBUTEROL SULFATE 2 PUFF: 90 AEROSOL, METERED RESPIRATORY (INHALATION) at 12:46

## 2024-07-08 RX ADMIN — FUROSEMIDE 40 MG: 10 INJECTION, SOLUTION INTRAMUSCULAR; INTRAVENOUS at 09:12

## 2024-07-08 RX ADMIN — POTASSIUM CHLORIDE 20 MEQ: 1500 TABLET, EXTENDED RELEASE ORAL at 10:12

## 2024-07-08 RX ADMIN — ALBUTEROL SULFATE 2 PUFF: 90 AEROSOL, METERED RESPIRATORY (INHALATION) at 18:28

## 2024-07-08 RX ADMIN — CARBIDOPA AND LEVODOPA 2.5 MG: 50; 200 TABLET, EXTENDED RELEASE ORAL at 18:28

## 2024-07-08 ASSESSMENT — ACTIVITIES OF DAILY LIVING (ADL)
ADLS_ACUITY_SCORE: 40
ADLS_ACUITY_SCORE: 40
ADLS_ACUITY_SCORE: 44
ADLS_ACUITY_SCORE: 44
ADLS_ACUITY_SCORE: 41
ADLS_ACUITY_SCORE: 45
ADLS_ACUITY_SCORE: 45
ADLS_ACUITY_SCORE: 40
ADLS_ACUITY_SCORE: 44
ADLS_ACUITY_SCORE: 41
ADLS_ACUITY_SCORE: 44
DEPENDENT_IADLS:: INDEPENDENT
ADLS_ACUITY_SCORE: 45
ADLS_ACUITY_SCORE: 44
ADLS_ACUITY_SCORE: 41
ADLS_ACUITY_SCORE: 44
ADLS_ACUITY_SCORE: 45
ADLS_ACUITY_SCORE: 44
ADLS_ACUITY_SCORE: 41
ADLS_ACUITY_SCORE: 44
ADLS_ACUITY_SCORE: 45

## 2024-07-08 NOTE — PROGRESS NOTES
[facility]    Medicine Progress Note - Hospitalist Service    Date of Admission:  7/7/2024    Assessment & Plan                Namita Viera is a 74 year old female with COPD, alcoholism, frequent falls, recent hospital stay after she was found down with hypoxia, diagnosed with PE and discharged to TCU on 7/4, returns for evaluation of rectal bleeding found to have fecal impaction, incidental finding of CHF exacerbation. Hospital Day: 2    # Rectal bleeding  # Fecal impaction  # Opioid-induced constipation  -Had a large bowel movement at TCU associated with bright red blood per rectum, CT here showing fecal impaction.  Has had 2 small bowel movements here are also streaked with blood.  Doubtful that we have relieved the fecal impaction just yet.  -GI saw and recommending laxative therapy, patient has difficulty drinking MiraLAX and therefore changed to Lucia-Colace  -Bleeding likely exacerbated by anticoagulation but she does need this due to recent PE.  We will cautiously resume Eliquis tonight  -Hemoglobin has remained stable.  Recheck hemoglobin in the morning  -She will need a good bowel regimen going forward    # Acute on chronic diastolic heart failure  -Last LVEF 65 to 70% 2 weeks ago  -BNP similar to previous around 900, however she has bilateral moderate pleural effusions, 1+ pitting ankle edema that is new.  Suspicion for diastolic heart failure related to IV infusions received during last hospital stay  -Seems to be responding well to IV Lasix, continue for now  -Started on ARB  -Strict intake and output, daily weights  -Fluid restriction, low-salt diet  -Trend BMP    # Recent pulmonary embolus  -Has been on Eliquis, currently on hold due to active bleeding, will cautiously resume tonight    # Chronic hypoxic respiratory failure  -Has been on 2 L nasal cannula at TCU, here actually only requiring 1 L  -Continue supplemental oxygen  -Multiple underlying reasons to need O2  including advanced COPD, known pulmonary embolus, suspected acute on chronic CHF as above    # Alcoholism  -longstanding issue, per family  -she declined chem dep treatment last admission    #Recent rib fractures  # T9 compression fracture  #Pelvic fractures   -had unwitnessed fall at her Assisted Living prior to previous admission, sustained multiple fractures  -Continue tylenol, low dose hydromorphone as needed  -patient declines to continue on lido patch  -IS  -PT/OT  -plan to return to TCU at discharge  -consider holding home alendronate while healing acute fractures    # Recent COPD exacerbation  -Finishing a steroid taper    # Elevated lipase  -Pancreas normal on CT.  No abdominal discomfort.  GI recommends recheck in a few weeks as outpatient to ensure normalizes    # Hypokalemia  # Hypomagnesemia  -Protocols    # Hypotension  -Recently had septic shock requiring pressors, had difficulty weaning off and was continued on midodrine which she has been on at TCU as well.  Okay to continue midodrine.  Monitor blood pressure    # Tobacco use  -Declined patch    # Sacral decubitus ulcer  # Relatively extensive candidal intertrigo  -Continue topical miconazole  -WOC consulted          Diet: Fluid restriction 2000 ML FLUID (and additional linked orders)  Regular Diet Adult    DVT Prophylaxis: Known VTE.   DOAC  Delgado Catheter: Not present  Lines: None     Cardiac Monitoring: ACTIVE order. Indication: Acute decompensated heart failure (48 hours)  Code Status: No CPR- Pre-arrest intubation OK      Clinically Significant Risk Factors Present on Admission        # Hypokalemia: Lowest K = 3.3 mmol/L in last 2 days, will replace as needed     # Hypomagnesemia: Lowest Mg = 1.2 mg/dL in last 2 days, will replace as needed   # Hypoalbuminemia: Lowest albumin = 3 g/dL at 7/7/2024  7:11 PM, will monitor as appropriate  # Drug Induced Coagulation Defect: home medication list includes an anticoagulant medication  # Drug Induced  Platelet Defect: home medication list includes an antiplatelet medication   # Hypertension: Noted on problem list    # Anemia: based on hgb <11  # Anemia: based on hgb <11               # Financial/Environmental Concerns: none         Disposition Plan     Medically Ready for Discharge: Anticipated Tomorrow         Discharge barrier(s): monitor stool output, resume anticoag, diuresis  Care discussed with: patient, dtr Tiara Sarmiento MD  Hospitalist Service  [facility]  Securely message with Mandae (more info)  Text page via [employer] Paging/Directory   ______________________________________________________________________      Physical Exam   Vital Signs: Temp: 97.5  F (36.4  C) Temp src: Oral BP: 116/55 Pulse: 77   Resp: 18 SpO2: 93 % O2 Device: Nasal cannula Oxygen Delivery: 2 LPM  Weight: 105 lbs 0 oz    General: in no apparent distress, non-toxic, and alert female lying in hospital bed oriented x3  HEENT: Head normocephalic atraumatic, oral mucosa moist. Sclerae anicteric  CV: Regular rhythm, normal rate, no murmurs  Resp: No wheezes, no rales or rhonchi, no focal consolidations  GI: Belly soft, nondistended, nontender, bowel sounds present  Skin: No rashes or lesions  Extremities: 1+ pitting edema bilateral ankles  Psych: Normal affect, mood euthymic  Neuro: Grossly normal      Medical Decision Making               Data   Recent Results (from the past 16 hour(s))   Magnesium    Collection Time: 07/08/24  1:24 AM   Result Value Ref Range    Magnesium 1.9 1.7 - 2.3 mg/dL   Hemoglobin    Collection Time: 07/08/24  1:24 AM   Result Value Ref Range    Hemoglobin 9.7 (L) 11.7 - 15.7 g/dL   Basic metabolic panel    Collection Time: 07/08/24  7:07 AM   Result Value Ref Range    Sodium 140 135 - 145 mmol/L    Potassium 3.3 (L) 3.4 - 5.3 mmol/L    Chloride 102 98 - 107 mmol/L    Carbon Dioxide (CO2) 27 22 - 29 mmol/L    Anion Gap 11 7 - 15 mmol/L    Urea Nitrogen 21.6 8.0 - 23.0 mg/dL     Creatinine 0.84 0.51 - 0.95 mg/dL    GFR Estimate 72 >60 mL/min/1.73m2    Calcium 8.6 (L) 8.8 - 10.2 mg/dL    Glucose 87 70 - 99 mg/dL   Hemoglobin    Collection Time: 07/08/24  7:07 AM   Result Value Ref Range    Hemoglobin 9.0 (L) 11.7 - 15.7 g/dL       Interval History     Patient states feeling fine, states she thinks she should be able to go back to TCU.  She is currently on 1 L nasal cannula and states she thinks she was on 2 L at the TCU.  Denies any discomfort.  Has 1+ pitting edema on her bilateral ankles with signs of skin tension and a congested cough.  Potassium was low, she is being diuresed for CHF.  Has had multiple bowel movements here still with bloody streaks, but hemoglobin has been stable.      Discussed with elza Menjivar, in agreement with plan to monitor patient 1 more day here

## 2024-07-08 NOTE — CONSULTS
[facility] Consult       Name: Namita Viera    Medical Record #: [id number]    YOB: 1950    Date/Time: 7/8/2024/10:44 AM    Reason for Consultation: Michelle Sarmiento MD has asked me to evaluate Namita Viera regarding rectal bleeding.    HPI: Patient is a 74-year-old female who was admitted after experiencing 1 episode of bright red blood per rectum.  She reports this occurred after she passed 1 large bowel movement at home.  She was recently hospitalized for a COPD exacerbation.  Her hemoglobin is not significantly different from her recent hospitalization.  She reports that she only had 1 episode of bright red blood per rectum has not had any further rectal bleeding.  A CT angiogram was performed and did not show any evidence of active GI bleeding.  It showed a large amount of stool in the rectal vault.  Patient's lipase was elevated at 180.  She denies any abdominal pain.  Her CT scan did not show any evidence of pancreatitis.  Patient's last colonoscopy was in July 2021 and this was incomplete to the sigmoid colon due to acute angulation.  1 sessile serrated adenoma was removed at that time.  She subsequently had a CT colonography in 2021 and this did not reveal any further colon polyps or colon masses.  Patient denies any other GI symptoms at this time.    Patient Active Problem List   Diagnosis    Esophageal reflux    Smoker    Ear mass    Age-related cataract of both eyes, unspecified age-related cataract type    Alcohol dependence (H)    Chronic cough    Benign essential hypertension    Vitamin D deficiency    Right Intertroch Hip Fract    Hypercalcemia    Anemia, unspecified type    Leg cramping    Osteoporosis    Nocturia    COPD (chronic obstructive pulmonary disease) (H)    UTI (urinary tract infection)    Acute UTI    Hip fracture, right, closed, initial encounter (H)    Hyponatremia    Hypokalemia    COPD exacerbation (H)    Acute respiratory failure with hypercapnia  (H)    Altered mental status, unspecified altered mental status type    Other acute pulmonary embolism without acute cor pulmonale (H)    Aspiration pneumonia, unspecified aspiration pneumonia type, unspecified laterality, unspecified part of lung (H)    History of tobacco use    Vaginitis and vulvovaginitis    Sciatic pain    Polyp of colon    HTN (hypertension)    History of colonic polyps    Circumscribed scleroderma    Bilateral leg edema    Benign neoplasm of rectum    Acquired absence of organ, genital organs    Rectal bleeding    Fecal impaction (H)    Acute on chronic diastolic congestive heart failure (H)    Pelvic fracture (H)          Review of Systems (ROS): A comprehensive review of systems was negative.    Past Medical History:  Past Medical History:   Diagnosis Date    Acid reflux     Alcohol use     Closed fracture of left femur (H)     Closed fracture of sacrum with routine healing 10/12/2021    Emphysema of lung (H) 02/07/2022    Hip fracture requiring operative repair (H)     Hypertension 01/2021    Rib pain on left side 05/06/2021    SAH (subarachnoid hemorrhage) (H)     Traumatic closed displaced fracture of distal end of radius        Medications:   Current Outpatient Medications   Medication Sig Dispense Refill    albuterol (PROAIR HFA/PROVENTIL HFA/VENTOLIN HFA) 108 (90 Base) MCG/ACT inhaler INHALE 1-2 PUFFS INTO THE LUNGS EVERY 6 HOURS AS NEEDED FOR COUGH OR SHORTNESS OF BREATH OR WHEEZE 18 g 0    alendronate (FOSAMAX) 70 MG tablet Take 70 mg by mouth every 7 days      apixaban ANTICOAGULANT (ELIQUIS) 5 MG tablet Take 2 tablets (10 mg) by mouth 2 times daily for 5 days, THEN 1 tablet (5 mg) 2 times daily for 30 days.      aspirin (ASA) 81 MG EC tablet Take 1 tablet (81 mg) by mouth daily      calcium carbonate (OS-PETER) 1500 (600 Ca) MG tablet Take 600 mg by mouth daily      folic acid (FOLVITE) 1 MG tablet Take 1 tablet (1 mg) by mouth daily      HYDROmorphone (DILAUDID) 2 MG tablet Take 0.5  tablets (1 mg) by mouth every 4 hours as needed for severe pain 10 tablet 0    ibuprofen (ADVIL/MOTRIN) 200 MG tablet Take 200 mg by mouth every 6 hours as needed for pain      Lidocaine (LIDOCARE) 4 % Patch Place 2 patches onto the skin every 24 hours To prevent lidocaine toxicity, patient should be patch free for 12 hrs daily. Apply to left lower chest for rib fractures.      melatonin 1 MG TABS tablet Take 1 tablet (1 mg) by mouth nightly as needed for sleep 90 tablet 3    miconazole (MICATIN) 2 % external powder Apply topically 2 times daily      midodrine (PROAMATINE) 2.5 MG tablet Take 2.5 mg by mouth 3 times daily (with meals) (Hold for SBP greater than 155)      multivitamin, therapeutic (THERA-VIT) TABS tablet Take 1 tablet by mouth daily      nicotine (NICODERM CQ) 14 MG/24HR 24 hr patch Place 1 patch onto the skin daily      omeprazole (PRILOSEC) 20 MG DR capsule Take 1 capsule (20 mg) by mouth daily 90 capsule 3    predniSONE (DELTASONE) 20 MG tablet Take 20 mg by mouth daily Take 1 tablet (20 mg) daily for 3 days (7/6, 7/7, and 7/8 ), then take one half tablet (10 mg) daily for 3 days      thiamine (B-1) 100 MG tablet Take 1 tablet (100 mg) by mouth daily      vitamin C (ASCORBIC ACID) 500 MG tablet Take 500 mg by mouth daily      Vitamin D, Cholecalciferol, 25 MCG (1000 UT) TABS Take 1,000 Units by mouth daily         Allergies: Penicillins    Family History:  Family History   Problem Relation Age of Onset    Esophageal Cancer Mother     Heart Disease Father     Rectal Cancer Sister     Cancer Sister         lump in neck.     Rheumatoid Arthritis Brother        Social History:  Social History     Socioeconomic History    Marital status:      Spouse name: Not on file    Number of children: 4    Years of education: Not on file    Highest education level: Not on file   Occupational History    Not on file   Tobacco Use    Smoking status: Every Day     Current packs/day: 0.00     Average packs/day: 1  "pack/day for 60.0 years (60.0 ttl pk-yrs)     Types: Cigarettes     Start date: 1962     Last attempt to quit: 2018     Years since quittin.1    Smokeless tobacco: Never   Substance and Sexual Activity    Alcohol use: Yes     Alcohol/week: 14.0 standard drinks of alcohol     Types: 14 Standard drinks or equivalent per week     Comment: admits to 2 glasses of wine a night    Drug use: No    Sexual activity: Not Currently   Other Topics Concern    Parent/sibling w/ CABG, MI or angioplasty before 65F 55M? No   Social History Narrative    , 4 children, lives by herself in an apartment.  Worked as a seamstress and continues to sew.  Is full code.  (last updated 2023)      Social Determinants of Health     Financial Resource Strain: Not on file   Food Insecurity: Not on file   Transportation Needs: Not on file   Physical Activity: Not on file   Stress: Not on file   Social Connections: Unknown (2023)    [facility] [email or web address] [facility] [email or web address] [facility] [email or web address]    Social Connections     Frequency of Communication with Friends and Family: Not on file   Interpersonal Safety: Not on file   Housing Stability: Not on file       PHYSICAL EXAMINATION:  /57 (BP Location: Right arm)   Pulse 72   Temp 97.8  F (36.6  C) (Oral)   Resp 18   Ht 1.626 m (5' 4\")   Wt 47.6 kg (105 lb)   SpO2 94%   BMI 18.02 kg/m   Body mass index is 18.02 kg/m .  General:  NAD  HEENT: Sclera non-icteric.  Moist mucous membranes. Oropharynx clear.   Lymph: No cervical lymphadenopathy.  Gastrointestinal: Non-distended, Nontender  Neuro: Alert and oriented.  No gross focal abnormalities.  Psych: Mood and affect normal.    I have reviewed recent laboratory studies:    LABORATORY DATA:    Recent Labs   Lab Test 24  0707 24  0124 24  1911   WBC  --   --  7.5   RBC  --   --  2.73*   HGB 9.0*   < > 8.9*   HCT  --   --  26.1*   MCV  --   --  96   MCH  --   -- "  32.6   MCHC  --   --  34.1   RDW  --   --  14.7   PLT  --   --  242    < > = values in this interval not displayed.      Sodium   Date Value Ref Range Status   07/08/2024 140 135 - 145 mmol/L Final     Potassium   Date Value Ref Range Status   07/08/2024 3.3 (L) 3.4 - 5.3 mmol/L Final     Potassium Whole Blood   Date Value Ref Range Status   06/27/2024 2.6 (LL) 3.4 - 5.3 mmol/L Final     Chloride   Date Value Ref Range Status   07/08/2024 102 98 - 107 mmol/L Final   02/04/2022 98 98 - 107 mmol/L Final     Carbon Dioxide (CO2)   Date Value Ref Range Status   07/08/2024 27 22 - 29 mmol/L Final   02/04/2022 24 22 - 31 mmol/L Final     Anion Gap   Date Value Ref Range Status   07/08/2024 11 7 - 15 mmol/L Final   02/04/2022 13 5 - 18 mmol/L Final     Glucose   Date Value Ref Range Status   07/08/2024 87 70 - 99 mg/dL Final   02/04/2022 92 70 - 125 mg/dL Final     GLUCOSE BY METER POCT   Date Value Ref Range Status   07/04/2024 94 70 - 99 mg/dL Final     Urea Nitrogen   Date Value Ref Range Status   07/08/2024 21.6 8.0 - 23.0 mg/dL Final   02/04/2022 10 8 - 28 mg/dL Final     Creatinine   Date Value Ref Range Status   07/08/2024 0.84 0.51 - 0.95 mg/dL Final     GFR Estimate   Date Value Ref Range Status   07/08/2024 72 >60 mL/min/1.73m2 Final     Comment:     eGFR calculated using 2021 CKD-EPI equation.   05/20/2021 >60 >60 mL/min/1.73m2 Final     Calcium   Date Value Ref Range Status   07/08/2024 8.6 (L) 8.8 - 10.2 mg/dL Final      Recent Labs   Lab Test 07/07/24 1911   PROTTOTAL 4.9*   ALBUMIN 3.0*   BILITOTAL <0.2   ALKPHOS 96   AST 20   ALT 16      INR   Date Value Ref Range Status   07/07/2024 1.25 (H) 0.85 - 1.15 Final         Radiology: reviewed.    Impression: This is a 74-year-old female who was admitted after experiencing 1 episode of bright red blood per rectum.  She reports this occurred after she passed 1 large bowel movement at home.  She was recently hospitalized for a COPD exacerbation.  Her hemoglobin  is not significantly different from her recent hospitalization.  She reports that she only had 1 episode of bright red blood per rectum has not had any further rectal bleeding.  A CT angiogram was performed and did not show any evidence of active GI bleeding.  It showed a large amount of stool in the rectal vault.  Patient's lipase was elevated at 180.  She denies any abdominal pain.  Her CT scan did not show any evidence of pancreatitis.  Patient's last colonoscopy was in July 2021 and this was incomplete to the sigmoid colon due to acute angulation.  1 sessile serrated adenoma was removed at that time.  She subsequently had a CT colonography in 2021 and this did not reveal any further colon polyps or colon masses.  Patient denies any other GI symptoms at this time.  Suspect that patient's single episode of rectal bleeding after passing a large bowel movement may have been due to outlet bleeding from anal irritation or hemorrhoids.  She has not had any further rectal bleeding and her hemoglobin is not significantly changed from her recent hospitalization.  Other possible etiologies of her rectal bleeding include colonic AVMs, diverticular bleeding, or bleeding from a large colon polyp or less likely a colon cancer.    Recommendation:   -Patient was agreeable to an attempt at an outpatient colonoscopy in the near future.  If this is again incomplete, then would proceed with a CT colonography for further evaluation.  -Patient should have her lipase rechecked by her primary care provider in the next few weeks.  If the lipase remains elevated, then she should be referred to [facility] for further evaluation.  -Would start MiraLAX 1 dose twice daily and adjust MiraLAX dose and/or frequency as needed for treatment of her constipation.  -Patient declined any enemas at this time for her large amount of stool in the rectum seen on the recent CT scan.  -Will sign off. Please call with questions.    Total of 40  minutes spent.    Ralph Potter MD  7/8/2024  [facility]

## 2024-07-08 NOTE — ED PROVIDER NOTES
EMERGENCY DEPARTMENT ENCOUNTER   NAME: Namita Viera ; AGE: 74 year old female ; YOB: 1950 ; MRN: [id number] ; PCP: Anahi Andersen     Evaluation Date & Time: 7/7/2024  7:02 PM    ED Provider: Yeni Jamison PA-C    CHIEF COMPLAINT     Rectal Bleeding      FINAL ASSESSMENT       ICD-10-CM    1. Rectal bleeding  K62.5       2. Fecal impaction (H)  K56.41           ED COURSE, MEDICAL DECISION MAKING, PLAN     ED course     6:56 PM I met with the patient, obtained history, performed an initial exam, and discussed options and plan for diagnostics and treatment here in the ED.  7:05 PM Staffed patient with Dr. Oliveira.  9:14 PM Spoke with Dr. Francis Wells, hospitalist, who accepts patient. Updated patient on plan.   ______________________________________________________________________    Namita Viera is a 74 year old female with pertinent medical history of HTN, COPD, tobacco abuse, osteoporosis, alcohol dependence presenting for rectal bleeding noted today by her TCU staff.  Reportedly had a very large bowel movement that had a lot of lela red blood present with it.  Patient denies having any associated symptoms.    Exam: Patient is wearing a brief that does have some bright red blood present with stool.  Digital rectal exam does reveal bright red blood.  She also has erythema diffusely in the perineum.  No appreciable open skin.  Ecchymosis to the right upper extremity.  Patient is wearing supplemental oxygen via nasal cannula.  Lung sounds with diffuse rhonchi, but no labored breathing appreciated.  She is vitally normal.    Differentials: Lower GI bleed, internal hemorrhoids, fissure, proctitis, rectal prolapse, diverticulitis    Labs: Lipase is slightly elevated at 180 with no comparison.  CBC is actually stable with a hemoglobin at 8.9.  Was 8.7 six days ago.  Protein and albumin are low at 4.9 and 3.0 respectively.  BMP with a glucose of 140 otherwise normal electrolytes.  INR  "1.25.    EKG: N/A    Imaging: CTA of the abdomen and pelvis obtained.   1.  No evidence of active extravasation of contrast to suggest active GI bleed.  2.  Prominent rectal vessels, correlate for hemorrhoids.  3.  Small amount of free fluid. Mild anasarca.  4.  Constipation and fecal impaction.  5.  Moderate bilateral pleural effusions with subtotal compressive atelectasis of the lower lobes.  6.  Coronary artery disease and atherosclerotic vascular disease.    Consults: Spoke with hospitalist Dr. Francis Wells who accepts the patient.     Interventions/recheck: Pt continues to feel well.     Assessment: Rectal bleeding, likely secondary to internal hemorrhoids. Constipation with fecal impaction.   CT does not show any evidence of active extravasation to suggest a GI bleed.  On exam there are no external hemorrhoids nor fissures.  No evidence of rectal prolapse on exam.  CT does not show evidence of diverticulitis or colitis.  No stercoral colitis seen on CT.    Plan: Admission.    ______________________________________________________________________    *All pertinent lab & imaging studies independently reviewed. (See chart for details)   *Discussed the results of all the tests and plan with patient and family/guardians.   *The patient and/or family/guardian acknowledged understanding and was agreeable with the care plan.      HISTORY OF PRESENT ILLNESS   Patient information was obtained from: Patient and EMS   Use of Intrepreter: N/A     Namita Viera is a 74 year old female with a pertinent history of HTN, COPD, tobacco abuse, osteoporosis, alcohol dependence who presents to the ED by EMS for evaluation of rectal bleeding.    Per EMS, patient arrives from [facility] for rectal bleeding. EMS was unable to get most of the history due to staff shift change on arrival but reportedly staff noted that the patient had one \"large bowel movement\" which they described as having \"a lot of lela bright red blood.\" "   Patient denies a prior history of hemorrhoids or blood in the stool.   Patient denies any pain currently.      She was recently hospitalized here for acute respiratory failure from 6/27-7/4. She is currently at TCU. EMS reports that patient has a history of COPD and is on 2 liters of oxygen at baseline.     Per chart review: Admitted at [facility] for acute respiratory failure with hypercapnia from 6/27-/7/4/24.  She was initially admitted to ICU for shock, now weaned off pressors.   X rays showed Acute comminuted mildly displaced left pubic rami fractures. Displaced left 10th rib fracture. Nondisplaced left 12th rib fracture  -1/2 bottles of gram positive cocci 6/27/24 likely contaminant   -On Eliquis dosing for PE  -TTE completed 6/27/24 without RV dysfunction  -Positional hypoxia likely related to PE, will require intermittent nasal cannula at TCU  -Hemoglobin acutely dropped on hospital day 1, has been stable since without any additional active signs and symptoms of bleed     MEDICAL HISTORY     Past Medical History:   Diagnosis Date    Acid reflux     Alcohol use     Closed fracture of left femur (H)     Closed fracture of sacrum with routine healing 10/12/2021    Emphysema of lung (H) 02/07/2022    Hip fracture requiring operative repair (H)     Hypertension 01/2021    Rib pain on left side 05/06/2021    SAH (subarachnoid hemorrhage) (H)     Traumatic closed displaced fracture of distal end of radius        Past Surgical History:   Procedure Laterality Date    BLADDER SUSPENSION  2008    HYSTERECTOMY TOTAL ABDOMINAL, BILATERAL SALPINGO-OOPHORECTOMY, COMBINED N/A 2008    OPEN REDUCTION INTERNAL FIXATION RODDING INTRAMEDULLARY FEMUR Right 5/17/2023    Procedure: OPEN REDUCTION INTERNAL FIXATION, FRACTURE, FEMUR, USING INTRAMEDULLARY NOEL;  Surgeon: Ollie Tucker MD;  Location: [facility] [place]    ORTHOPEDIC SURGERY Left 2018    Left Hip fracture, S/P ORIF    PICC TRIPLE LUMEN PLACEMENT   2024       Family History   Problem Relation Age of Onset    Esophageal Cancer Mother     Heart Disease Father     Rectal Cancer Sister     Cancer Sister         lump in neck.     Rheumatoid Arthritis Brother        Social History     Tobacco Use    Smoking status: Every Day     Current packs/day: 0.00     Average packs/day: 1 pack/day for 60.0 years (60.0 ttl pk-yrs)     Types: Cigarettes     Start date: 1962     Last attempt to quit: 2018     Years since quittin.1    Smokeless tobacco: Never   Substance Use Topics    Alcohol use: Yes     Alcohol/week: 14.0 standard drinks of alcohol     Types: 14 Standard drinks or equivalent per week     Comment: admits to 2 glasses of wine a night    Drug use: No       albuterol (PROAIR HFA/PROVENTIL HFA/VENTOLIN HFA) 108 (90 Base) MCG/ACT inhaler  alendronate (FOSAMAX) 70 MG tablet  apixaban ANTICOAGULANT (ELIQUIS) 5 MG tablet  aspirin (ASA) 81 MG EC tablet  calcium carbonate (OS-PETER) 1500 (600 Ca) MG tablet  folic acid (FOLVITE) 1 MG tablet  HYDROmorphone (DILAUDID) 2 MG tablet  ibuprofen (ADVIL/MOTRIN) 200 MG tablet  Lidocaine (LIDOCARE) 4 % Patch  melatonin 1 MG TABS tablet  miconazole (MICATIN) 2 % external powder  midodrine (PROAMATINE) 2.5 MG tablet  multivitamin, therapeutic (THERA-VIT) TABS tablet  nicotine (NICODERM CQ) 14 MG/24HR 24 hr patch  omeprazole (PRILOSEC) 20 MG DR capsule  predniSONE (DELTASONE) 20 MG tablet  thiamine (B-1) 100 MG tablet  vitamin C (ASCORBIC ACID) 500 MG tablet  Vitamin D, Cholecalciferol, 25 MCG (1000 UT) TABS          PHYSICAL EXAM     First Vitals:  Patient Vitals for the past 24 hrs:   BP Temp Temp src Pulse Resp SpO2 Height Weight   24 110/55 -- -- 82 -- 96 % -- --   24 118/58 -- -- 83 -- 95 % -- --   24 135/63 -- -- 81 -- 98 % -- --   24 127/60 -- -- -- -- -- -- --   24 (!) 154/68 -- -- 83 -- 99 % -- --   24 128/60 -- -- 84 -- 99 % -- --   24 190  "119/75 97.9  F (36.6  C) Oral 92 16 100 % 1.626 m (5' 4\") 47.6 kg (105 lb)   07/07/24 1906 119/75 -- -- 86 -- 99 % -- --         PHYSICAL EXAM:   Constitutional: Frail elderly female in no acute distress.  Neuro: Awake and alert. No focal deficits.  Psych: Calm and cooperative.  Head: Normocephalic.  Eyes: PERRL. EOMI. Conjunctivae clear.   Mouth: Poor dentition.  Oropharynx is mildly dry.   Neck: FROM.   Cardio: Regular rate. Adequate perfusion to extremities. Regular rhythm. No murmurs.  Pulmonary: Oxygenating well with 2 L of nasal cannula.  Diffuse rhonchi bilaterally. No labored breathing.   Abdomen: BS present. Soft and non-distended. No palpable pain.  /Rectal: Patient has diffuse erythema around the vulva, perineum, and buttocks.  No significant open tissue appreciated.  There is some bright red blood on her brief.  Digital rectal exam reveals bright red blood as well.  No external hemorrhoids appreciated.  No fissures appreciated.    Upper extremities: Moves freely.   Lower extremities: 2+ pitting edema to bilateral lower extremities.  Moves freely.   Skin: Extensive ecchymosis to the right upper extremity.  See above under /rectal.       RESULTS     LAB:  All pertinent labs reviewed and interpreted  Labs Ordered and Resulted from Time of ED Arrival to Time of ED Departure   INR - Abnormal       Result Value    INR 1.25 (*)    BASIC METABOLIC PANEL - Abnormal    Sodium 137      Potassium 3.8      Chloride 104      Carbon Dioxide (CO2) 23      Anion Gap 10      Urea Nitrogen 20.1      Creatinine 0.82      GFR Estimate 74      Calcium 8.0 (*)     Glucose 140 (*)    HEPATIC FUNCTION PANEL - Abnormal    Protein Total 4.9 (*)     Albumin 3.0 (*)     Bilirubin Total <0.2      Alkaline Phosphatase 96      AST 20      ALT 16      Bilirubin Direct <0.20     LIPASE - Abnormal    Lipase 180 (*)    CBC WITH PLATELETS AND DIFFERENTIAL - Abnormal    WBC Count 7.5      RBC Count 2.73 (*)     Hemoglobin 8.9 (*)     " Hematocrit 26.1 (*)     MCV 96      MCH 32.6      MCHC 34.1      RDW 14.7      Platelet Count 242      % Neutrophils 89      % Lymphocytes 3      % Monocytes 7      % Eosinophils 0      % Basophils 0      % Immature Granulocytes 1      NRBCs per 100 WBC 0      Absolute Neutrophils 6.7      Absolute Lymphocytes 0.3 (*)     Absolute Monocytes 0.5      Absolute Eosinophils 0.0      Absolute Basophils 0.0      Absolute Immature Granulocytes 0.1      Absolute NRBCs 0.0     PARTIAL THROMBOPLASTIN TIME - Normal    aPTT 28     TYPE AND SCREEN, ADULT    ABO/RH(D) O POS      Antibody Screen Negative      SPECIMEN EXPIRATION DATE [id number]     ABO/RH TYPE AND SCREEN       RADIOLOGY:  CTA Abdomen Pelvis with Contrast   Final Result   IMPRESSION:   1.  No evidence of active extravasation of contrast to suggest active GI bleed.   2.  Prominent rectal vessels, correlate for hemorrhoids.   3.  Small amount of free fluid. Mild anasarca.   4.  Constipation and fecal impaction.   5.  Moderate bilateral pleural effusions with subtotal compressive atelectasis of the lower lobes.   6.  Coronary artery disease and atherosclerotic vascular disease.          ECG:    N/A      PROCEDURES     N/A         MEDICAL DECISION MAKING:  Obtained supplemental history:Supplemental history obtained?: EMS  Reviewed external records: External records reviewed?: Inpatient Record: See [facility]  Care impacted by chronic illness:Anticoagulated State, Chronic Lung Disease, Hypertension, and Smoking / Nicotine Use  Care significantly affected by social determinants of health:Alcohol Abuse and/or Recreational Drug Use  Did you consider but not order tests?: Work up considered but not performed and documented in chart, if applicable  Did you interpret images independently?: Independent interpretation of ECG and images noted in documentation, when applicable.  Consultation discussion with other provider:Did you involve another provider (consultant, , pharmacy,  etc.)?: I discussed the care with another health care provider, see documentation for details.  Admit.      FINAL IMPRESSION:    ICD-10-CM    1. Rectal bleeding  K62.5       2. Fecal impaction (H)  K56.41             MEDICATIONS GIVEN IN THE EMERGENCY DEPARTMENT:  Medications   iopamidol (ISOVUE-370) solution 75 mL (75 mLs Intravenous $Given 7/7/24 2021)         NEW PRESCRIPTIONS STARTED AT TODAY'S ED VISIT:  New Prescriptions    No medications on file            I, Jaymie Anne, am serving as a scribe to document services personally performed by Yeni Jamison PA-C, based on my observation and the provider's statements to me. I, Yeni Jamison PA-C attest that Jaymie Anne is acting in a scribe capacity, has observed my performance of the services and has documented them in accordance with my direction.     Some or all of this documentation has been completed using dictation software and mild grammatical errors may be present. Please contact me with any concerns regarding this.       Yeni Jamison PA-C  Emergency Medicine   [facility] EMERGENCY DEPARTMENT       Yeni Jamison PA-C  07/07/24 0685

## 2024-07-08 NOTE — ED NOTES
Bed: JNED-02  Expected date: 7/7/24  Expected time: 6:51 PM  Means of arrival: Ambulance  Comments:  WB - rectal bleeding.

## 2024-07-08 NOTE — H&P
[facility]    History and Physical - Hospitalist Service       Date of Admission:  7/7/2024    Assessment & Plan      74 year old female s/p recent hospitalization for respiratory failure, COPD, presents from TCU now with BRBPR x 1 episode.    Active problems:  #Bright red blood per rectum: single episode reported by care facility staff after large BM. BRB found on rectal exam in ED today. First and second hgb check here similar to discharge level. Vitals stable. Suspect hemorrhoids exacerbated by constipation and stool impaction. Consult GI. Clear liq diet.  #Hx of PE: seen on chest CT at last admission. Hold apixaban until GI bleed assessed by consultant.  #Constipation: initiate miralax.  #COPD: improved from previous hospitalization; saturating well on room air. Continue prn nebulizer, steroid taper. Taper of O2.  #Pressure ulcer and rash: around buttocks area, due to neglect and difficulty moving. Follow up WOC.  #Acute on chronic diastolic CHF: initiate lasix diuresis. Add losartan. Follow BP carefully  #protein/calorie malnutrition: reconsult nutrition  #Hypomagnesemia: due to poor nutrition. Replace  #pelvic fractures, immobility: reconsult phys therapy          Diet: Fluid restriction 2000 ML FLUID (and additional linked orders)  Clear Liquid Diet  DVT Prophylaxis: VTE Prophylaxis contraindicated due to BRBPR  Delgado Catheter: Not present  Lines: None     Cardiac Monitoring: ACTIVE order. Indication: Acute decompensated heart failure (48 hours)  Code Status: No CPR- Pre-arrest intubation OK- discussed by palliative care at last admission; patient requested this code status.    Clinically Significant Risk Factors Present on Admission            # Hypomagnesemia: Lowest Mg = 1.2 mg/dL in last 2 days, will replace as needed   # Hypoalbuminemia: Lowest albumin = 3 g/dL at 7/7/2024  7:11 PM, will monitor as appropriate    # Drug Induced Coagulation Defect: home medication list includes an  anticoagulant medication  # Drug Induced Platelet Defect: home medication list includes an antiplatelet medication   # Hypertension: Noted on problem list    # Anemia: based on hgb <11  # Anemia: based on hgb <11                      Disposition Plan     Medically Ready for Discharge: Anticipated in 2-4 Days           Francis Wells MD  Hospitalist Service  [facility]  Securely message with [employer] (more info)  Text page via [employer] Paging/Directory     ______________________________________________________________________    Chief Complaint   Bright red blood per rectum after large BM    History is obtained from the patient    History of Present Illness   74 year old female with hx of COPD and still smoking, EtOH use, lives alone, was recently hospitalized at [facility] for respiratory failure, failure to thrive, fall with pelvic fracture - sent to TCU on prednisone and O2.  On day two at TCU - reported to have large BM, followed by bright red blood concerning amount.  Patient denies hx of same, denies chest pain, dyspnea, abdominal pain, dysuria, denies knowledge of hemorrhoids.    Past Medical History    Past Medical History:   Diagnosis Date    Acid reflux     Alcohol use     Closed fracture of left femur (H)     Closed fracture of sacrum with routine healing 10/12/2021    Emphysema of lung (H) 02/07/2022    Hip fracture requiring operative repair (H)     Hypertension 01/2021    Rib pain on left side 05/06/2021    SAH (subarachnoid hemorrhage) (H)     Traumatic closed displaced fracture of distal end of radius        Past Surgical History   Past Surgical History:   Procedure Laterality Date    BLADDER SUSPENSION  2008    HYSTERECTOMY TOTAL ABDOMINAL, BILATERAL SALPINGO-OOPHORECTOMY, COMBINED N/A 2008    OPEN REDUCTION INTERNAL FIXATION RODDING INTRAMEDULLARY FEMUR Right 5/17/2023    Procedure: OPEN REDUCTION INTERNAL FIXATION, FRACTURE, FEMUR, USING INTRAMEDULLARY NOEL;  Surgeon: Garrett  Ollie Jacobo MD;  [place] [facility] [place] [place]    [place]THOPEDIC SURGERY Left 2018    Left Hip fracture, S/P ORIF    PICC TRIPLE LUMEN PLACEMENT  6/27/2024       Prior to Admission Medications   Prior to Admission Medications   Prescriptions Last Dose Informant Patient Reported? Taking?   HYDROmorphone (DILAUDID) 2 MG tablet Unknown at prn Nursing Home No Yes   Sig: Take 0.5 tablets (1 mg) by mouth every 4 hours as needed for severe pain   Lidocaine (LIDOCARE) 4 % Patch 7/7/2024 at am Nursing Home No Yes   Sig: Place 2 patches onto the skin every 24 hours To prevent lidocaine toxicity, patient should be patch free for 12 hrs daily. Apply to left lower chest for rib fractures.   Vitamin D, Cholecalciferol, 25 MCG (1000 UT) TABS 7/7/2024 at am Nursing Home Yes Yes   Sig: Take 1,000 Units by mouth daily   albuterol (PROAIR HFA/PROVENTIL HFA/VENTOLIN HFA) 108 (90 Base) MCG/ACT inhaler Unknown at prn Nursing Home No Yes   Sig: INHALE 1-2 PUFFS INTO THE LUNGS EVERY 6 HOURS AS NEEDED FOR COUGH OR SHORTNESS OF BREATH OR WHEEZE   alendronate (FOSAMAX) 70 MG tablet 7/5/2024 at am Nursing Home Yes Yes   Sig: Take 70 mg by mouth every 7 days   apixaban ANTICOAGULANT (ELIQUIS) 5 MG tablet 7/7/2024 at pm Nursing Home No Yes   Sig: Take 2 tablets (10 mg) by mouth 2 times daily for 5 days, THEN 1 tablet (5 mg) 2 times daily for 30 days.   aspirin (ASA) 81 MG EC tablet 7/7/2024 at am Nursing Home No Yes   Sig: Take 1 tablet (81 mg) by mouth daily   calcium carbonate (OS-PETER) 1500 (600 Ca) MG tablet 7/7/2024 at am Nursing Home Yes Yes   Sig: Take 600 mg by mouth daily   folic acid (FOLVITE) 1 MG tablet 7/7/2024 at am Nursing Home No Yes   Sig: Take 1 tablet (1 mg) by mouth daily   ibuprofen (ADVIL/MOTRIN) 200 MG tablet Unknown at prn prison Yes Yes   Sig: Take 200 mg by mouth every 6 hours as needed for pain   melatonin 1 MG TABS tablet Unknown at prn Nursing Home No Yes   Sig: Take 1 tablet (1 mg) by mouth nightly as needed for  sleep   miconazole (MICATIN) 2 % external powder 7/7/2024 at pm Nursing Home No Yes   Sig: Apply topically 2 times daily   midodrine (PROAMATINE) 2.5 MG tablet 7/5/2024 at am Nursing Home Yes Yes   Sig: Take 2.5 mg by mouth 3 times daily (with meals) (Hold for SBP greater than 155)   multivitamin, therapeutic (THERA-VIT) TABS tablet 7/7/2024 at am Nursing Home Yes Yes   Sig: Take 1 tablet by mouth daily   nicotine (NICODERM CQ) 14 MG/24HR 24 hr patch 7/5/2024 at am Nursing Home No Yes   Sig: Place 1 patch onto the skin daily   omeprazole (PRILOSEC) 20 MG DR capsule 7/7/2024 at am Nursing Home No Yes   Sig: Take 1 capsule (20 mg) by mouth daily   predniSONE (DELTASONE) 20 MG tablet 7/7/2024 at am Nursing Home Yes Yes   Sig: Take 20 mg by mouth daily Take 1 tablet (20 mg) daily for 3 days (7/6, 7/7, and 7/8 ), then take one half tablet (10 mg) daily for 3 days   thiamine (B-1) 100 MG tablet 7/7/2024 at am Nursing Home No Yes   Sig: Take 1 tablet (100 mg) by mouth daily   vitamin C (ASCORBIC ACID) 500 MG tablet 7/7/2024 at am Nursing Home Yes Yes   Sig: Take 500 mg by mouth daily      Facility-Administered Medications: None        Review of Systems    The 5 point Review of Systems is negative other than noted in the HPI or here.     Physical Exam   Vital Signs: Temp: 98.8  F (37.1  C) Temp src: Oral BP: 137/63 Pulse: 78   Resp: 27 SpO2: 98 % O2 Device: Nasal cannula Oxygen Delivery: 2 LPM  Weight: 105 lbs 0 oz    General Appearance: Elderly female, appears comfortable  Respiratory: large airway rales bilaterally. Saturating well on room air  Cardiovascular: RRR  GI: soft, NT, BS+  Skin: buttocks with inflammed skin  Other: Awake, alert, confabulates     Medical Decision Making       55 MINUTES SPENT BY ME on the date of service doing chart review, history, exam, documentation & further activities per the note.      Data   ------------------------- PAST 24 HR DATA REVIEWED  -----------------------------------------------    I have personally reviewed the following data over the past 24 hrs:    7.5  \   9.7 (L)   / 242     137 104 20.1 /  140 (H)   3.8 23 0.82 \     ALT: 16 AST: 20 AP: 96 TBILI: <0.2   ALB: 3.0 (L) TOT PROTEIN: 4.9 (L) LIPASE: 180 (H)     Trop: N/A BNP: 959     INR:  1.25 (H) PTT:  28   D-dimer:  N/A Fibrinogen:  N/A       Imaging results reviewed over the past 24 hrs:   Recent Results (from the past 24 hour(s))   CTA Abdomen Pelvis with Contrast    Narrative    EXAM: CTA ABDOMEN PELVIS WITH CONTRAST  LOCATION: [facility]  DATE: 7/7/2024    INDICATION: Rectal bleeding today x1. Neptali blood in rectal vault on exam.  COMPARISON: None.  TECHNIQUE: CT angiogram abdomen pelvis during arterial phase of injection of IV contrast. 2D and 3D MIP reconstructions were performed by the CT technologist. Dose reduction techniques were used.  CONTRAST: 75 ml Isovue 370.    FINDINGS:  ANGIOGRAM ABDOMEN/PELVIS: No aortic dissection or aneurysm. Patent portal and hepatic veins. Severe atherosclerotic vascular calcifications. Prominent rectal vessels, correlate for hemorrhoids.    LOWER CHEST: Moderate bilateral pleural effusions with compressive atelectasis. Moderately sized hiatal hernia. Coronary artery calcifications.    HEPATOBILIARY: Normal.    PANCREAS: Normal.    SPLEEN: Normal.    ADRENAL GLANDS: Normal.    KIDNEYS/BLADDER: Normal.    BOWEL: Large amount of feces in the rectal vault, correlate for fecal impaction and constipation. Diverticulosis. No diverticulitis, colitis, or obstruction. Small volume free fluid. No free air.    LYMPH NODES: Normal.    PELVIC ORGANS: Normal.    MUSCULOSKELETAL: Anasarca. Bilateral ORIFs of the femurs. Subacute fractures of the superior and inferior left pubic rami. Subacute right sacral ala fracture. Subacute healing fracture of the left iliac crest.      Impression    IMPRESSION:  1.  No evidence of active extravasation  of contrast to suggest active GI bleed.  2.  Prominent rectal vessels, correlate for hemorrhoids.  3.  Small amount of free fluid. Mild anasarca.  4.  Constipation and fecal impaction.  5.  Moderate bilateral pleural effusions with subtotal compressive atelectasis of the lower lobes.  6.  Coronary artery disease and atherosclerotic vascular disease.

## 2024-07-08 NOTE — ED NOTES
"[facility] ED Handoff Report    ED Chief Complaint: rectal bleeding    ED Diagnosis:  (K62.5) Rectal bleeding  Comment: none  Plan: to monitor overnight in hospital    (K56.41) Fecal impaction (H)  Comment: none  Plan: medications to break up constipation       PMH:    Past Medical History:   Diagnosis Date    Acid reflux     Alcohol use     Closed fracture of left femur (H)     Closed fracture of sacrum with routine healing 10/12/2021    Emphysema of lung (H) 02/07/2022    Hip fracture requiring operative repair (H)     Hypertension 01/2021    Rib pain on left side 05/06/2021    SAH (subarachnoid hemorrhage) (H)     Traumatic closed displaced fracture of distal end of radius         Code Status:  No CPR- Pre-arrest intubation OK     Falls Risk: Yes Band: Applied    Current Living Situation/Residence: lives in a skilled nursing facility     Elimination Status: Continent: yes     Activity Level: SBA w/ walker    Patients Preferred Language:  English     Needed: No    Vital Signs:  /66   Pulse 81   Temp 97.9  F (36.6  C) (Oral)   Resp 16   Ht 1.626 m (5' 4\")   Wt 47.6 kg (105 lb)   SpO2 95%   BMI 18.02 kg/m       Cardiac Rhythm: NSR     Pain Score: 0/10    Is the Patient Confused:  No    Last Food or Drink: 07/07/24 at 2130     Focused Assessment:  blood from rectum    Tests Performed: Done: Labs and Imaging    Treatments Provided:  none so far    Family Dynamics/Concerns: No    Family Updated On Visitor Policy: Yes    Plan of Care Communicated to Family: Yes    Who Was Updated about Plan of Care: Marcie (daughter)    Belongings Checklist Done and Signed by Patient: Yes    Belongings Sent with Patient: none     Medications sent with patient: none     Covid: asymptomatic , not done     Additional Information: none     RN: Anita Martínez RN  7/7/2024 9:38 PM       "

## 2024-07-08 NOTE — MEDICATION SCRIBE - ADMISSION MEDICATION HISTORY
Medication Scribe Admission Medication History    Admission medication history is complete. The information provided in this note is only as accurate as the sources available at the time of the update.    Information Source(s): Facility (U/NH/) medication list/MAR via  Printout    Pertinent Information: Per facility record, patient did not get Midodrine for the last two days because patient ran out of it.  Per facility record, patient refuse to take Nicotine patch for the last two days.    Changes made to PTA medication list:  Added: None  Deleted: None  Changed: None    Allergies reviewed with patient and updates made in EHR: yes    Medication History Completed By: Lasha Bentley 7/7/2024 9:38 PM    PTA Med List   Medication Sig Last Dose    albuterol (PROAIR HFA/PROVENTIL HFA/VENTOLIN HFA) 108 (90 Base) MCG/ACT inhaler INHALE 1-2 PUFFS INTO THE LUNGS EVERY 6 HOURS AS NEEDED FOR COUGH OR SHORTNESS OF BREATH OR WHEEZE Unknown at prn    alendronate (FOSAMAX) 70 MG tablet Take 70 mg by mouth every 7 days 7/5/2024 at am    apixaban ANTICOAGULANT (ELIQUIS) 5 MG tablet Take 2 tablets (10 mg) by mouth 2 times daily for 5 days, THEN 1 tablet (5 mg) 2 times daily for 30 days. 7/7/2024 at pm    aspirin (ASA) 81 MG EC tablet Take 1 tablet (81 mg) by mouth daily 7/7/2024 at am    calcium carbonate (OS-PETER) 1500 (600 Ca) MG tablet Take 600 mg by mouth daily 7/7/2024 at am    folic acid (FOLVITE) 1 MG tablet Take 1 tablet (1 mg) by mouth daily 7/7/2024 at am    HYDROmorphone (DILAUDID) 2 MG tablet Take 0.5 tablets (1 mg) by mouth every 4 hours as needed for severe pain Unknown at prn    ibuprofen (ADVIL/MOTRIN) 200 MG tablet Take 200 mg by mouth every 6 hours as needed for pain Unknown at prn    Lidocaine (LIDOCARE) 4 % Patch Place 2 patches onto the skin every 24 hours To prevent lidocaine toxicity, patient should be patch free for 12 hrs daily. Apply to left lower chest for rib fractures. 7/7/2024 at am    melatonin  1 MG TABS tablet Take 1 tablet (1 mg) by mouth nightly as needed for sleep Unknown at prn    miconazole (MICATIN) 2 % external powder Apply topically 2 times daily 7/7/2024 at pm    midodrine (PROAMATINE) 2.5 MG tablet Take 2.5 mg by mouth 3 times daily (with meals) (Hold for SBP greater than 155) 7/5/2024 at am    multivitamin, therapeutic (THERA-VIT) TABS tablet Take 1 tablet by mouth daily 7/7/2024 at am    nicotine (NICODERM CQ) 14 MG/24HR 24 hr patch Place 1 patch onto the skin daily 7/5/2024 at am    omeprazole (PRILOSEC) 20 MG DR capsule Take 1 capsule (20 mg) by mouth daily 7/7/2024 at am    predniSONE (DELTASONE) 20 MG tablet Take 20 mg by mouth daily Take 1 tablet (20 mg) daily for 3 days (7/6, 7/7, and 7/8 ), then take one half tablet (10 mg) daily for 3 days 7/7/2024 at am    thiamine (B-1) 100 MG tablet Take 1 tablet (100 mg) by mouth daily 7/7/2024 at am    vitamin C (ASCORBIC ACID) 500 MG tablet Take 500 mg by mouth daily 7/7/2024 at am    Vitamin D, Cholecalciferol, 25 MCG (1000 UT) TABS Take 1,000 Units by mouth daily 7/7/2024 at am

## 2024-07-08 NOTE — ED NOTES
I am seeing this patient along with Yeni Jamison PA-C. I had a face to face encounter with this patient seen by the Advanced Practice Provider (FAIZA).  I have seen, examined, and discussed the patient with the FAIZA and agree with their assessment and plan of management. I personally saw the patient and performed a substantive portion of the visit including all aspects of the medical decision making.    HPI: 74-year-old female on Eliquis in the TCU here after staff noticed a large bowel movement with lela blood.  Patient denies any pain.    Physical Exam: Patient with decubitus ulcers that have wound cream covering, but no bleeding from these.  Internal rectal exam does reveal bright red blood mixed with stool.      LABS  Pertinent lab results reviewed in chart.  Labs Ordered and Resulted from Time of ED Arrival to Time of ED Departure   INR - Abnormal       Result Value    INR 1.25 (*)    BASIC METABOLIC PANEL - Abnormal    Sodium 137      Potassium 3.8      Chloride 104      Carbon Dioxide (CO2) 23      Anion Gap 10      Urea Nitrogen 20.1      Creatinine 0.82      GFR Estimate 74      Calcium 8.0 (*)     Glucose 140 (*)    HEPATIC FUNCTION PANEL - Abnormal    Protein Total 4.9 (*)     Albumin 3.0 (*)     Bilirubin Total <0.2      Alkaline Phosphatase 96      AST 20      ALT 16      Bilirubin Direct <0.20     LIPASE - Abnormal    Lipase 180 (*)    CBC WITH PLATELETS AND DIFFERENTIAL - Abnormal    WBC Count 7.5      RBC Count 2.73 (*)     Hemoglobin 8.9 (*)     Hematocrit 26.1 (*)     MCV 96      MCH 32.6      MCHC 34.1      RDW 14.7      Platelet Count 242      % Neutrophils 89      % Lymphocytes 3      % Monocytes 7      % Eosinophils 0      % Basophils 0      % Immature Granulocytes 1      NRBCs per 100 WBC 0      Absolute Neutrophils 6.7      Absolute Lymphocytes 0.3 (*)     Absolute Monocytes 0.5      Absolute Eosinophils 0.0      Absolute Basophils 0.0      Absolute Immature Granulocytes 0.1      Absolute  NRBCs 0.0     MAGNESIUM - Abnormal    Magnesium 1.2 (*)    PARTIAL THROMBOPLASTIN TIME - Normal    aPTT 28     NT PROBNP INPATIENT   TYPE AND SCREEN, ADULT    ABO/RH(D) O POS      Antibody Screen Negative      SPECIMEN EXPIRATION DATE [id number]     ABO/RH TYPE AND SCREEN       EKG      RADIOLOGY  CTA Abdomen Pelvis with Contrast   Final Result   IMPRESSION:   1.  No evidence of active extravasation of contrast to suggest active GI bleed.   2.  Prominent rectal vessels, correlate for hemorrhoids.   3.  Small amount of free fluid. Mild anasarca.   4.  Constipation and fecal impaction.   5.  Moderate bilateral pleural effusions with subtotal compressive atelectasis of the lower lobes.   6.  Coronary artery disease and atherosclerotic vascular disease.          PROCEDURES       ED COURSE & MEDICAL DECISION MAKING    Pertinent Labs and Imagaing reviewed (see chart for details)    74 year old female here for evaluation of new rectal bleeding.  She is anticoagulated due to a recent pulmonary embolus.  She is in a TCU and looks chronically ill-appearing with some contact dermatitis versus decubitus ulcers that are in early stage as well.  She does have some lela blood with rectal examination.  Fortunately, labs were checked and her hemoglobin is stable at 8 with no acute blood loss but this did just start tonight and she is anticoagulated.  We did a CTA and there is no active GI bleed but they do see internal hemorrhoids which may be the cause of the bleeding, but also see a fecal impaction.  With concern for causing additional trauma in a patient that already appears to potentially have internal hemorrhoids that are bleeding, we cannot do a manual disimpaction so she is going to need to be admitted for bowel regimen and further care as well as serial hemoglobin.    At the conclusion of the encounter I discussed  the results of all of the tests and the disposition.   The questions were answered.  The patient or family  acknowledged understanding and was agreeable with the care plan.       FINAL IMPRESSION      1. Rectal bleeding    2. Fecal impaction (H)            CRITICAL CARE   Minutes    Tanya Oliveira MD  7/7/2024 7:04 PM       Tanya Oliveira MD  07/07/24 2190

## 2024-07-08 NOTE — CONSULTS
[facility]  WOC Nurse Inpatient Assessment     Consulted for: 7/8 buttocks    Patient History (according to provider note(s):      Namita Viera is a 74 year old female with PMH of COPD and emphysema not on home O2, ongoing tobacco and alcohol use, malnutrition, right hip fracture s/p ORIF in 5/2023, who was brought in with encephalopathy and hypoxia O2 sats in the 60s.  Workup remarkable for probable aspiration pneumonia, small subsegmental PE, severe hypokalemia, rib fractures.  Requiring NIPPV for respiratory failure with hypercapnia     Assessment:      Skin Injury Location: Buttocks       6/28 7/1  Previous admission photos above        Last photo: 7/8  Skin injury due to: Irritant contact dermatitis due to fecal, urinary or dual incontinence   Skin history and plan of care:   satellite lesions and likely outline of a brief or pull up  Affected area:      Skin assessment: Denudement, Erosion of epidermis, Erythema, and Rash     Measurements (length x width x depth, in cm) left buttock dermis 4 x 8 x0.1cm     Color: pink and red     Temperature  warm     Drainage: slight .      Color: serosanguinous      Odor: mild  Pain: denies   Pain interventions prior to dressing change: slow and gentle cares   Treatment goal: Heal , Infection control/prevention, Protection, and Promote epidermal migration  STATUS: initial assessment this encounter, improved since previous encounter  Continue antifungal  Reviewed no brief in bed, or with anjana pt and nurse updated      Treatment Plan:     Buttocks  AF powder per orders    Orders: Reviewed    RECOMMEND PRIMARY TEAM ORDER: None, at this time  Education provided: plan of care, Moisture management, and Hygiene  Discussed plan of care with: Patient, Family, and Nurse  WOC nurse follow-up plan: weekly  Notify WOC if wound(s)  deteriorate.  Nursing to notify the Provider(s) and re-consult the [facility] Nurse if new skin concern.    DATA:     Current support surface: Standard  Low air loss (ELMIRA pump, Isolibrium, Pulsate)  Containment of urine/stool: Incontinence Protocol  BMI: Body mass index is 18.02 kg/m .   Active diet order: Orders Placed This Encounter      Combination Diet 2 gm NA Diet     Output: I/O last 3 completed shifts:  In: -   Out: 1350 [Urine:1350]     Labs:   Recent Labs   Lab 07/08/24  0707 07/08/24  0124 07/07/24  1911   ALBUMIN  --   --  3.0*   HGB 9.0*   < > 8.9*   INR  --   --  1.25*   WBC  --   --  7.5    < > = values in this interval not displayed.     Pressure injury risk assessment:   Sensory Perception: 4-->no impairment  Moisture: 3-->occasionally moist  Activity: 3-->walks occasionally  Mobility: 3-->slightly limited  Nutrition: 2-->probably inadequate  Friction and Shear: 2-->potential problem  Gigi Score: 17    Yeni Nuñez, BLAINEN, RN, PHN, HNB-BC, CWOCN  Pager no longer is use, please contact through [employer] group: [facility] [employer]

## 2024-07-08 NOTE — PLAN OF CARE
Problem: Adult Inpatient Plan of Care  Goal: Optimal Comfort and Wellbeing  Outcome: Progressing     Problem: Gas Exchange Impaired  Goal: Optimal Gas Exchange  Outcome: Progressing  Intervention: Optimize Oxygenation and Ventilation  Recent Flowsheet Documentation  Taken 7/8/2024 0030 by Pastora Loco, RN  Head of Bed (HOB) Positioning: HOB at 20-30 degrees     Problem: Anemia  Goal: Anemia Symptom Improvement  Intervention: Monitor and Manage Anemia  Recent Flowsheet Documentation  Taken 7/8/2024 0030 by Pastora Loco, RN  Safety Promotion/Fall Prevention:   activity supervised   assistive device/personal items within reach   clutter free environment maintained   lighting adjusted   nonskid shoes/slippers when out of bed   room door open   room near nurse's station   Goal Outcome Evaluation:    Patient alert, oriented x 4. Denied pain. Saturation 88-94 % on 2 LPM, dyspnea on exertion. Small  bloody BM overnight. HGB checks every 6 hrs (9.7). buttocks noted with abrasions from shearing sustained while at facility patient resides. Cleansed and left open to air. Encouraged patient to reposition on the sides. C orders to eval and treat. Will continue to monitor.

## 2024-07-08 NOTE — ED TRIAGE NOTES
Patient presents to ED via [facility] from [facility] for rectal bleeding.  Had one bout of lela red blood this evening after having  stool.  Denies pain.  Denies any dizziness.  Normally on 2L O2.      Triage Assessment (Adult)       Row Name 07/07/24 9895          Triage Assessment    Airway WDL WDL        Respiratory WDL    Respiratory WDL WDL        Skin Circulation/Temperature WDL    Skin Circulation/Temperature WDL WDL        Cardiac WDL    Cardiac WDL WDL        Peripheral/Neurovascular WDL    Peripheral Neurovascular WDL WDL        Cognitive/Neuro/Behavioral WDL    Cognitive/Neuro/Behavioral WDL WDL

## 2024-07-08 NOTE — CONSULTS
Care Management Initial Consult    General Information  Assessment completed with:  Tiara  Type of CM/SW Visit: Initial Assessment    Primary Care Provider verified and updated as needed: Yes   Readmission within the last 30 days: current reason for admission unrelated to previous admission      Reason for Consult: discharge planning  Advance Care Planning: Advance Care Planning Reviewed: no concerns identified          Communication Assessment  Patient's communication style: spoken language (English or Bilingual)             Cognitive  Cognitive/Neuro/Behavioral: WDL                      Living Environment:   People in home: alone     Current living Arrangements: independent living facility      Able to return to prior arrangements:         Family/Social Support:  Care provided by: self  Provides care for: no one                Description of Support System:           Current Resources:   Patient receiving home care services: No     Community Resources: None  Equipment currently used at home: walker, rolling  Supplies currently used at home:      Employment/Financial:  Employment Status: retired        Financial Concerns: none           Does the patient's insurance plan have a 3 day qualifying hospital stay waiver?  No    Lifestyle & Psychosocial Needs:  Social Determinants of Health     Food Insecurity: Not on file   Depression: Not at risk (12/5/2023)    [facility]    PHQ-2     PHQ-2 TOTAL SCORE: 0   Housing Stability: Not on file   Tobacco Use: High Risk (6/27/2024)    Patient History     Smoking Tobacco Use: Every Day     Smokeless Tobacco Use: Never     Passive Exposure: Not on file   Financial Resource Strain: Not on file   Alcohol Use: Not on file   Transportation Needs: Not on file   Physical Activity: Not on file   Interpersonal Safety: Not on file   Stress: Not on file   Social Connections: Unknown (12/5/2023)     Received from [facility], [facility]    Social Connections     Frequency of Communication with Friends and Family: Not on file   Health Literacy: Not on file       Functional Status:  Prior to admission patient needed assistance:   Dependent ADLs:: Ambulation-walker  Dependent IADLs:: Independent       Mental Health Status:          Chemical Dependency Status:                Values/Beliefs:  Spiritual, Cultural Beliefs, [employer]an Practices, Values that affect care:                 Additional Information:  Chart reviewed. Pt recently here 6/27-7/4 and then discharged to [facility] TCU. Pt comes from [facility] where she lives alone. She uses a walker for mobility. Last time she was here she was needing O2 and she does not wear O2 at baseline. Transport TBD.    12:17 PM  Confirmed with [facility] that pt does have a bed hold and she can come back when medically ready.    12:41 PM  CM got a call back from pt daughter Josefina and she would like the pt to go back to [facility] and would like  transport set up at discharge.     Cori Bradley RN

## 2024-07-09 ENCOUNTER — APPOINTMENT (OUTPATIENT)
Dept: OCCUPATIONAL THERAPY | Facility: HOSPITAL | Age: 74
DRG: 388 | End: 2024-07-09
Payer: COMMERCIAL

## 2024-07-09 LAB
ANION GAP SERPL CALCULATED.3IONS-SCNC: 10 MMOL/L (ref 7–15)
BUN SERPL-MCNC: 25.3 MG/DL (ref 8–23)
CALCIUM SERPL-MCNC: 9.3 MG/DL (ref 8.8–10.2)
CHLORIDE SERPL-SCNC: 96 MMOL/L (ref 98–107)
CREAT SERPL-MCNC: 0.74 MG/DL (ref 0.51–0.95)
DEPRECATED HCO3 PLAS-SCNC: 31 MMOL/L (ref 22–29)
EGFRCR SERPLBLD CKD-EPI 2021: 83 ML/MIN/1.73M2
ERYTHROCYTE [DISTWIDTH] IN BLOOD BY AUTOMATED COUNT: 14.7 % (ref 10–15)
GLUCOSE SERPL-MCNC: 85 MG/DL (ref 70–99)
HCT VFR BLD AUTO: 25.1 % (ref 35–47)
HGB BLD-MCNC: 8.6 G/DL (ref 11.7–15.7)
MAGNESIUM SERPL-MCNC: 1.3 MG/DL (ref 1.7–2.3)
MAGNESIUM SERPL-MCNC: 2 MG/DL (ref 1.7–2.3)
MCH RBC QN AUTO: 32.1 PG (ref 26.5–33)
MCHC RBC AUTO-ENTMCNC: 34.3 G/DL (ref 31.5–36.5)
MCV RBC AUTO: 94 FL (ref 78–100)
PLATELET # BLD AUTO: 281 10E3/UL (ref 150–450)
POTASSIUM SERPL-SCNC: 3.1 MMOL/L (ref 3.4–5.3)
POTASSIUM SERPL-SCNC: 4.5 MMOL/L (ref 3.4–5.3)
RBC # BLD AUTO: 2.68 10E6/UL (ref 3.8–5.2)
SODIUM SERPL-SCNC: 137 MMOL/L (ref 135–145)
WBC # BLD AUTO: 8.8 10E3/UL (ref 4–11)

## 2024-07-09 PROCEDURE — 250N000013 HC RX MED GY IP 250 OP 250 PS 637: Performed by: HOSPITALIST

## 2024-07-09 PROCEDURE — 83735 ASSAY OF MAGNESIUM: CPT | Performed by: HOSPITALIST

## 2024-07-09 PROCEDURE — 80048 BASIC METABOLIC PNL TOTAL CA: CPT | Performed by: HOSPITALIST

## 2024-07-09 PROCEDURE — 250N000011 HC RX IP 250 OP 636: Performed by: HOSPITALIST

## 2024-07-09 PROCEDURE — 250N000011 HC RX IP 250 OP 636: Performed by: INTERNAL MEDICINE

## 2024-07-09 PROCEDURE — 36415 COLL VENOUS BLD VENIPUNCTURE: CPT | Performed by: HOSPITALIST

## 2024-07-09 PROCEDURE — 85027 COMPLETE CBC AUTOMATED: CPT | Performed by: HOSPITALIST

## 2024-07-09 PROCEDURE — 84132 ASSAY OF SERUM POTASSIUM: CPT | Performed by: HOSPITALIST

## 2024-07-09 PROCEDURE — 250N000013 HC RX MED GY IP 250 OP 250 PS 637: Performed by: INTERNAL MEDICINE

## 2024-07-09 PROCEDURE — 97535 SELF CARE MNGMENT TRAINING: CPT | Mod: GO

## 2024-07-09 PROCEDURE — 83735 ASSAY OF MAGNESIUM: CPT | Performed by: INTERNAL MEDICINE

## 2024-07-09 PROCEDURE — 99232 SBSQ HOSP IP/OBS MODERATE 35: CPT | Performed by: HOSPITALIST

## 2024-07-09 PROCEDURE — 250N000012 HC RX MED GY IP 250 OP 636 PS 637: Performed by: HOSPITALIST

## 2024-07-09 PROCEDURE — 120N000001 HC R&B MED SURG/OB

## 2024-07-09 RX ORDER — MAGNESIUM SULFATE HEPTAHYDRATE 40 MG/ML
2 INJECTION, SOLUTION INTRAVENOUS ONCE
Status: COMPLETED | OUTPATIENT
Start: 2024-07-09 | End: 2024-07-09

## 2024-07-09 RX ORDER — POTASSIUM CHLORIDE 1500 MG/1
20 TABLET, EXTENDED RELEASE ORAL ONCE
Status: COMPLETED | OUTPATIENT
Start: 2024-07-09 | End: 2024-07-09

## 2024-07-09 RX ADMIN — CARBIDOPA AND LEVODOPA 2.5 MG: 50; 200 TABLET, EXTENDED RELEASE ORAL at 16:01

## 2024-07-09 RX ADMIN — SENNOSIDES AND DOCUSATE SODIUM 1 TABLET: 50; 8.6 TABLET ORAL at 08:55

## 2024-07-09 RX ADMIN — MICONAZOLE NITRATE ANTIFUNGAL POWDER: 2 POWDER TOPICAL at 08:55

## 2024-07-09 RX ADMIN — ALBUTEROL SULFATE 2 PUFF: 90 AEROSOL, METERED RESPIRATORY (INHALATION) at 17:16

## 2024-07-09 RX ADMIN — ACETAMINOPHEN 650 MG: 325 TABLET ORAL at 10:10

## 2024-07-09 RX ADMIN — Medication 5 MG: at 22:50

## 2024-07-09 RX ADMIN — APIXABAN 5 MG: 5 TABLET, FILM COATED ORAL at 20:55

## 2024-07-09 RX ADMIN — FUROSEMIDE 40 MG: 10 INJECTION, SOLUTION INTRAMUSCULAR; INTRAVENOUS at 20:55

## 2024-07-09 RX ADMIN — PANTOPRAZOLE SODIUM 40 MG: 40 TABLET, DELAYED RELEASE ORAL at 06:15

## 2024-07-09 RX ADMIN — SENNOSIDES AND DOCUSATE SODIUM 1 TABLET: 50; 8.6 TABLET ORAL at 20:55

## 2024-07-09 RX ADMIN — CARBIDOPA AND LEVODOPA 2.5 MG: 50; 200 TABLET, EXTENDED RELEASE ORAL at 08:54

## 2024-07-09 RX ADMIN — POLYETHYLENE GLYCOL 3350 17 G: 17 POWDER, FOR SOLUTION ORAL at 20:55

## 2024-07-09 RX ADMIN — THIAMINE HCL TAB 100 MG 100 MG: 100 TAB at 08:55

## 2024-07-09 RX ADMIN — MAGNESIUM SULFATE HEPTAHYDRATE 2 G: 40 INJECTION, SOLUTION INTRAVENOUS at 08:54

## 2024-07-09 RX ADMIN — CARBIDOPA AND LEVODOPA 2.5 MG: 50; 200 TABLET, EXTENDED RELEASE ORAL at 12:59

## 2024-07-09 RX ADMIN — ALBUTEROL SULFATE 2 PUFF: 90 AEROSOL, METERED RESPIRATORY (INHALATION) at 06:15

## 2024-07-09 RX ADMIN — POLYETHYLENE GLYCOL 3350 17 G: 17 POWDER, FOR SOLUTION ORAL at 08:54

## 2024-07-09 RX ADMIN — PREDNISONE 10 MG: 5 TABLET ORAL at 08:55

## 2024-07-09 RX ADMIN — HYDROMORPHONE HYDROCHLORIDE 1 MG: 2 TABLET ORAL at 08:53

## 2024-07-09 RX ADMIN — FUROSEMIDE 40 MG: 10 INJECTION, SOLUTION INTRAMUSCULAR; INTRAVENOUS at 08:55

## 2024-07-09 RX ADMIN — ALBUTEROL SULFATE 2 PUFF: 90 AEROSOL, METERED RESPIRATORY (INHALATION) at 23:56

## 2024-07-09 RX ADMIN — POTASSIUM CHLORIDE 20 MEQ: 1500 TABLET, EXTENDED RELEASE ORAL at 08:55

## 2024-07-09 RX ADMIN — APIXABAN 5 MG: 5 TABLET, FILM COATED ORAL at 08:55

## 2024-07-09 RX ADMIN — LOSARTAN POTASSIUM 25 MG: 25 TABLET, FILM COATED ORAL at 08:55

## 2024-07-09 RX ADMIN — ALBUTEROL SULFATE 2 PUFF: 90 AEROSOL, METERED RESPIRATORY (INHALATION) at 12:59

## 2024-07-09 RX ADMIN — CALCIUM CARBONATE (ANTACID) CHEW TAB 500 MG 1000 MG: 500 CHEW TAB at 00:35

## 2024-07-09 ASSESSMENT — ACTIVITIES OF DAILY LIVING (ADL)
ADLS_ACUITY_SCORE: 47
ADLS_ACUITY_SCORE: 50
ADLS_ACUITY_SCORE: 49
ADLS_ACUITY_SCORE: 44
ADLS_ACUITY_SCORE: 47
ADLS_ACUITY_SCORE: 47
ADLS_ACUITY_SCORE: 44
ADLS_ACUITY_SCORE: 50
ADLS_ACUITY_SCORE: 46
ADLS_ACUITY_SCORE: 50
ADLS_ACUITY_SCORE: 46
ADLS_ACUITY_SCORE: 47
ADLS_ACUITY_SCORE: 50
ADLS_ACUITY_SCORE: 47
ADLS_ACUITY_SCORE: 50
ADLS_ACUITY_SCORE: 46
ADLS_ACUITY_SCORE: 46
ADLS_ACUITY_SCORE: 47
ADLS_ACUITY_SCORE: 47
ADLS_ACUITY_SCORE: 50
ADLS_ACUITY_SCORE: 46

## 2024-07-09 NOTE — PROGRESS NOTES
Care Management Follow Up    Length of Stay (days): 2    Expected Discharge Date: 07/10/2024    Anticipated Discharge Plan:       Transportation: Confirmed Wheelchair. Transportation costs discussed? Yes. Discussed with daughter Tiara    PT Recommendations: Transitional Care Facility  OT Recommendations:  Transitional Care Facility     Barriers to Discharge: medical stability    Prior Living Situation: independent living facility with alone    Advanced Directive on File: No     Patient/Spokesperson Updated: Yes. Who? Daughter Tiara    Additional Information:  Plan is to return to [facility] when medically ready. Pt currently has a bed hold at the facility.    10:12 AM  SW spoke with Pt's daughter Tiara who requested for information on applying for MA. Referral sent to [facility].      CECILLE Hermosillo

## 2024-07-09 NOTE — PLAN OF CARE
Problem: Gastrointestinal Bleeding  Goal: Optimal Coping with Acute Illness  Outcome: Progressing  Goal: Hemostasis  Outcome: Progressing       Problem: Comorbidity Management  Goal: Maintenance of COPD Symptom Control  Intervention: Maintain COPD Symptom Control  Recent Flowsheet Documentation  Taken 7/8/2024 9977 by Leodan Edgar, RN  Medication Review/Management: medications reviewed   Goal Outcome Evaluation:    Pt A/ox4. BP is soft. 1L NC. Assist of 1 with walker and belt. Upset with 2g Na diet. MD paged re diet order. Explained to pt for reasoning to reduce edema in her ankles. Called Tiara bertrand twice and got pt to calm down. Denies pain. Good appetite. Maintained fluid restriction, only gave 360mL. IV lasik given. No BM this shift. Purewick in place. Wounds on buttocks and left hip are healing. Pt remained calm and cooperative the rest of the shift. PRN Tums given for heart burn and was effective. Call light within reach and bed alarm on.

## 2024-07-09 NOTE — PLAN OF CARE
Goal Outcome Evaluation:    Slept on and off. Alert to all but situation at times and can be forgetful. Had small BM overnight- pt had removed purewick and pull up and tried to hide it- pt was cleaned up and new purewick and pull up applied. Pt had no signs of rectal bleeding. +2 pitting edema ankles and feet. Tele- NSR. Lung sounds wheezy at bases, has congested cough.     Temp: 98.9  F (37.2  C) Temp src: Oral BP: 111/56 Pulse: 100   Resp: 20 SpO2: 90 % O2 Device: Nasal cannula Oxygen Delivery: 1 LPM

## 2024-07-09 NOTE — PROGRESS NOTES
[facility]    Medicine Progress Note - Hospitalist Service    Date of Admission:  7/7/2024    Assessment & Plan                Namita Viera is a 74 year old female with COPD, alcoholism, frequent falls, recent hospital stay after she was found down with hypoxia, diagnosed with PE and discharged to TCU on 7/4, returned for evaluation of rectal bleeding found to have fecal impaction, incidental finding of CHF exacerbation. Hospital Day: 3      # Rectal bleeding  # Fecal impaction  # Opioid-induced constipation  -Had a large bowel movement at TCU associated with bright red blood per rectum, CT here showing fecal impaction.  Has had just a few small bowel movements here.  Doubtful that we have relieved the fecal impaction just yet.  -GI saw and recommending laxative therapy, patient has difficulty drinking MiraLAX and therefore changed to Lucia-Colace  -Bleeding likely exacerbated by anticoagulation but she does need this due to recent PE.  cautiously resumed Eliquis   -Hemoglobin has remained stable  -She will need a good bowel regimen going forward  -consider enema tomorrow if still has not passed much stool    # Acute on chronic diastolic heart failure  -Last LVEF 65 to 70% 2 weeks ago  -BNP similar to previous around 900, however she has bilateral moderate pleural effusions, 1+ pitting ankle edema that is new.  Suspicion for diastolic heart failure related to IV infusions received during last hospital stay, in the setting of very high salt diet  -Seems to be responding well to IV Lasix, continue for now  -Started on ARB  -Strict intake and output, daily weights  -Fluid restriction, low-salt diet. Family confirm her usual diet is very high in salt  -Trend BMP    # Recent pulmonary embolus  -Eliquis    # Chronic hypoxic respiratory failure  -Has been on 2 L nasal cannula since recent discharge  -Continue supplemental oxygen  -Multiple underlying reasons to need O2 including advanced COPD,  known pulmonary embolus, suspected acute on chronic CHF as above    # Alcoholism  -longstanding issue, per family  -she declined chem dep treatment last admission    #Recent rib fractures  # T9 compression fracture  #Pelvic fractures   -had unwitnessed fall at her Assisted Living prior to previous admission, sustained multiple fractures  -Continue tylenol, low dose hydromorphone as needed  -patient declines to continue on lido patch  -IS  -PT/OT  -plan to return to TCU at discharge  -consider holding home alendronate while healing acute fractures    # Recent COPD exacerbation  -Finishing a steroid taper    # Elevated lipase  -Pancreas normal on CT.  No abdominal discomfort.  GI recommends recheck in a few weeks as outpatient to ensure normalizes    # Hypokalemia  # Hypomagnesemia  -Protocols    # Hypotension  -Recently had septic shock requiring pressors, had difficulty weaning off and was continued on midodrine which she has been on at TCU as well.  Okay to continue midodrine.  Monitor blood pressure    # Tobacco use  -Declined patch    # Sacral decubitus ulcer  # Relatively extensive candidal intertrigo  -Continue topical miconazole  -WOC consulted          Diet: Fluid restriction 2000 ML FLUID (and additional linked orders)  Combination Diet 2 gm NA Diet    DVT Prophylaxis: Known VTE.   DOAC  Delgado Catheter: Not present  Lines: None     Cardiac Monitoring: ACTIVE order. Indication: Acute decompensated heart failure (48 hours)  Code Status: Full Code      Clinically Significant Risk Factors        # Hypokalemia: Lowest K = 3.1 mmol/L in last 2 days, will replace as needed     # Hypomagnesemia: Lowest Mg = 1.2 mg/dL in last 2 days, will replace as needed   # Hypoalbuminemia: Lowest albumin = 3 g/dL at 7/7/2024  7:11 PM, will monitor as appropriate    # Coagulation Defect: INR = 1.25 (Ref range: 0.85 - 1.15) and/or PTT = 28 Seconds (Ref range: 22 - 38 Seconds), will monitor for bleeding    # Hypertension: Noted on  "problem list                # Cachexia: Estimated body mass index is 17.14 kg/m  as calculated from the following:    Height as of this encounter: 1.626 m (5' 4\").    Weight as of this encounter: 45.3 kg (99 lb 13.9 oz)., PRESENT ON ADMISSION     # Financial/Environmental Concerns: none         Disposition Plan     Medically Ready for Discharge: Anticipated Tomorrow         Discharge barrier(s): monitor stool output, diuresis  Care discussed with: patient, dtr Tiara Sarmiento MD  Hospitalist Service  [facility]  Securely message with [employer] (more info)  Text page via [facility] Paging/Directory   ______________________________________________________________________      Physical Exam   Vital Signs: Temp: 98.3  F (36.8  C) Temp src: Oral BP: 112/58 Pulse: 79   Resp: 18 SpO2: 98 % O2 Device: Nasal cannula Oxygen Delivery: 2 LPM  Weight: 99 lbs 13.89 oz    General: in no apparent distress, non-toxic, and alert female lying in hospital bed oriented x3  HEENT: Head normocephalic atraumatic, oral mucosa moist. Sclerae anicteric  Skin: No rashes or lesions  Extremities: 1+ pitting edema bilateral ankles, slight improvement from yesterday  Psych: Normal affect, mood euthymic  Neuro: Grossly normal      Medical Decision Making               Data   Recent Results (from the past 16 hour(s))   Magnesium    Collection Time: 07/09/24  7:04 AM   Result Value Ref Range    Magnesium 1.3 (L) 1.7 - 2.3 mg/dL   Basic metabolic panel    Collection Time: 07/09/24  7:04 AM   Result Value Ref Range    Sodium 137 135 - 145 mmol/L    Potassium 3.1 (L) 3.4 - 5.3 mmol/L    Chloride 96 (L) 98 - 107 mmol/L    Carbon Dioxide (CO2) 31 (H) 22 - 29 mmol/L    Anion Gap 10 7 - 15 mmol/L    Urea Nitrogen 25.3 (H) 8.0 - 23.0 mg/dL    Creatinine 0.74 0.51 - 0.95 mg/dL    GFR Estimate 83 >60 mL/min/1.73m2    Calcium 9.3 8.8 - 10.2 mg/dL    Glucose 85 70 - 99 mg/dL   CBC with platelets    Collection Time: 07/09/24  7:04 AM "   Result Value Ref Range    WBC Count 8.8 4.0 - 11.0 10e3/uL    RBC Count 2.68 (L) 3.80 - 5.20 10e6/uL    Hemoglobin 8.6 (L) 11.7 - 15.7 g/dL    Hematocrit 25.1 (L) 35.0 - 47.0 %    MCV 94 78 - 100 fL    MCH 32.1 26.5 - 33.0 pg    MCHC 34.3 31.5 - 36.5 g/dL    RDW 14.7 10.0 - 15.0 %    Platelet Count 281 150 - 450 10e3/uL       Interval History     Patient seems to be doing better today.  Edema has improved.  Still no large bowel movement, we have more work to do to get fecal impaction relieved.  Recommend another day in the hospital, patient is agreeable to this.      I spoke with her dtr Tiara about plan

## 2024-07-10 ENCOUNTER — APPOINTMENT (OUTPATIENT)
Dept: OCCUPATIONAL THERAPY | Facility: HOSPITAL | Age: 74
DRG: 388 | End: 2024-07-10
Attending: HOSPITALIST
Payer: COMMERCIAL

## 2024-07-10 ENCOUNTER — APPOINTMENT (OUTPATIENT)
Dept: PHYSICAL THERAPY | Facility: HOSPITAL | Age: 74
DRG: 388 | End: 2024-07-10
Payer: COMMERCIAL

## 2024-07-10 ENCOUNTER — APPOINTMENT (OUTPATIENT)
Dept: OCCUPATIONAL THERAPY | Facility: HOSPITAL | Age: 74
DRG: 388 | End: 2024-07-10
Payer: COMMERCIAL

## 2024-07-10 LAB
ANION GAP SERPL CALCULATED.3IONS-SCNC: 8 MMOL/L (ref 7–15)
BUN SERPL-MCNC: 28.5 MG/DL (ref 8–23)
CALCIUM SERPL-MCNC: 8.9 MG/DL (ref 8.8–10.2)
CHLORIDE SERPL-SCNC: 96 MMOL/L (ref 98–107)
CREAT SERPL-MCNC: 0.9 MG/DL (ref 0.51–0.95)
DEPRECATED HCO3 PLAS-SCNC: 32 MMOL/L (ref 22–29)
EGFRCR SERPLBLD CKD-EPI 2021: 66 ML/MIN/1.73M2
GLUCOSE SERPL-MCNC: 88 MG/DL (ref 70–99)
HGB BLD-MCNC: 8.3 G/DL (ref 11.7–15.7)
MAGNESIUM SERPL-MCNC: 1.6 MG/DL (ref 1.7–2.3)
POTASSIUM SERPL-SCNC: 3.7 MMOL/L (ref 3.4–5.3)
SODIUM SERPL-SCNC: 136 MMOL/L (ref 135–145)

## 2024-07-10 PROCEDURE — 250N000012 HC RX MED GY IP 250 OP 636 PS 637: Performed by: HOSPITALIST

## 2024-07-10 PROCEDURE — 99232 SBSQ HOSP IP/OBS MODERATE 35: CPT | Performed by: HOSPITALIST

## 2024-07-10 PROCEDURE — 85018 HEMOGLOBIN: CPT | Performed by: HOSPITALIST

## 2024-07-10 PROCEDURE — 80048 BASIC METABOLIC PNL TOTAL CA: CPT | Performed by: HOSPITALIST

## 2024-07-10 PROCEDURE — 97530 THERAPEUTIC ACTIVITIES: CPT | Mod: GP

## 2024-07-10 PROCEDURE — 36415 COLL VENOUS BLD VENIPUNCTURE: CPT | Performed by: HOSPITALIST

## 2024-07-10 PROCEDURE — 250N000013 HC RX MED GY IP 250 OP 250 PS 637: Performed by: HOSPITALIST

## 2024-07-10 PROCEDURE — 250N000013 HC RX MED GY IP 250 OP 250 PS 637: Performed by: INTERNAL MEDICINE

## 2024-07-10 PROCEDURE — 120N000001 HC R&B MED SURG/OB

## 2024-07-10 PROCEDURE — 250N000011 HC RX IP 250 OP 636: Performed by: INTERNAL MEDICINE

## 2024-07-10 PROCEDURE — 83735 ASSAY OF MAGNESIUM: CPT | Performed by: HOSPITALIST

## 2024-07-10 PROCEDURE — 97110 THERAPEUTIC EXERCISES: CPT | Mod: GP

## 2024-07-10 PROCEDURE — 97535 SELF CARE MNGMENT TRAINING: CPT | Mod: GO

## 2024-07-10 RX ORDER — LANOLIN ALCOHOL/MO/W.PET/CERES
3 CREAM (GRAM) TOPICAL
Status: DISCONTINUED | OUTPATIENT
Start: 2024-07-10 | End: 2024-07-12 | Stop reason: HOSPADM

## 2024-07-10 RX ORDER — ACETAMINOPHEN 325 MG/1
650 TABLET ORAL
Status: DISCONTINUED | OUTPATIENT
Start: 2024-07-10 | End: 2024-07-12 | Stop reason: HOSPADM

## 2024-07-10 RX ADMIN — CARBIDOPA AND LEVODOPA 2.5 MG: 50; 200 TABLET, EXTENDED RELEASE ORAL at 11:56

## 2024-07-10 RX ADMIN — FUROSEMIDE 40 MG: 10 INJECTION, SOLUTION INTRAMUSCULAR; INTRAVENOUS at 20:52

## 2024-07-10 RX ADMIN — MICONAZOLE NITRATE ANTIFUNGAL POWDER: 2 POWDER TOPICAL at 08:27

## 2024-07-10 RX ADMIN — ACETAMINOPHEN 650 MG: 325 TABLET ORAL at 17:07

## 2024-07-10 RX ADMIN — SODIUM PHOSPHATE, DIBASIC AND SODIUM PHOSPHATE, MONOBASIC 1 ENEMA: 7; 19 ENEMA RECTAL at 10:30

## 2024-07-10 RX ADMIN — FUROSEMIDE 40 MG: 10 INJECTION, SOLUTION INTRAMUSCULAR; INTRAVENOUS at 08:23

## 2024-07-10 RX ADMIN — ACETAMINOPHEN 650 MG: 325 TABLET ORAL at 21:12

## 2024-07-10 RX ADMIN — ALBUTEROL SULFATE 2 PUFF: 90 AEROSOL, METERED RESPIRATORY (INHALATION) at 06:31

## 2024-07-10 RX ADMIN — ANORECTAL OINTMENT: 15.7; .44; 24; 20.6 OINTMENT TOPICAL at 17:09

## 2024-07-10 RX ADMIN — APIXABAN 5 MG: 5 TABLET, FILM COATED ORAL at 20:53

## 2024-07-10 RX ADMIN — SENNOSIDES AND DOCUSATE SODIUM 1 TABLET: 50; 8.6 TABLET ORAL at 08:24

## 2024-07-10 RX ADMIN — ACETAMINOPHEN 650 MG: 325 TABLET ORAL at 14:52

## 2024-07-10 RX ADMIN — CARBIDOPA AND LEVODOPA 2.5 MG: 50; 200 TABLET, EXTENDED RELEASE ORAL at 08:24

## 2024-07-10 RX ADMIN — PANTOPRAZOLE SODIUM 40 MG: 40 TABLET, DELAYED RELEASE ORAL at 06:31

## 2024-07-10 RX ADMIN — ANORECTAL OINTMENT: 15.7; .44; 24; 20.6 OINTMENT TOPICAL at 20:53

## 2024-07-10 RX ADMIN — POLYETHYLENE GLYCOL 3350 17 G: 17 POWDER, FOR SOLUTION ORAL at 08:24

## 2024-07-10 RX ADMIN — LOSARTAN POTASSIUM 25 MG: 25 TABLET, FILM COATED ORAL at 08:24

## 2024-07-10 RX ADMIN — ALBUTEROL SULFATE 2 PUFF: 90 AEROSOL, METERED RESPIRATORY (INHALATION) at 17:08

## 2024-07-10 RX ADMIN — MICONAZOLE NITRATE ANTIFUNGAL POWDER: 2 POWDER TOPICAL at 20:53

## 2024-07-10 RX ADMIN — ALBUTEROL SULFATE 2 PUFF: 90 AEROSOL, METERED RESPIRATORY (INHALATION) at 11:56

## 2024-07-10 RX ADMIN — CARBIDOPA AND LEVODOPA 2.5 MG: 50; 200 TABLET, EXTENDED RELEASE ORAL at 17:07

## 2024-07-10 RX ADMIN — ACETAMINOPHEN 650 MG: 325 TABLET ORAL at 10:16

## 2024-07-10 RX ADMIN — PREDNISONE 10 MG: 5 TABLET ORAL at 08:24

## 2024-07-10 RX ADMIN — Medication 5 MG: at 20:53

## 2024-07-10 RX ADMIN — POLYETHYLENE GLYCOL 3350 17 G: 17 POWDER, FOR SOLUTION ORAL at 20:52

## 2024-07-10 RX ADMIN — APIXABAN 5 MG: 5 TABLET, FILM COATED ORAL at 08:24

## 2024-07-10 RX ADMIN — SENNOSIDES AND DOCUSATE SODIUM 1 TABLET: 50; 8.6 TABLET ORAL at 20:53

## 2024-07-10 RX ADMIN — THIAMINE HCL TAB 100 MG 100 MG: 100 TAB at 08:24

## 2024-07-10 ASSESSMENT — ACTIVITIES OF DAILY LIVING (ADL)
ADLS_ACUITY_SCORE: 50
ADLS_ACUITY_SCORE: 54
ADLS_ACUITY_SCORE: 50

## 2024-07-10 NOTE — PLAN OF CARE
Problem: Gastrointestinal Bleeding  Goal: Optimal Coping with Acute Illness  Outcome: Progressing     Problem: Comorbidity Management  Goal: Blood Pressure in Desired Range  Outcome: Progressing  Intervention: Maintain Blood Pressure Management  Recent Flowsheet Documentation  Taken 7/10/2024 0000 by Lila Oconnor RN  Medication Review/Management: medications reviewed   Goal Outcome Evaluation:        A/O x4. Denied pain. VSS on 2L nasal cannula overnight. Pure wick in place draining adequate urine. No acute events overnight. Call light within reach and bed alarm activated.

## 2024-07-10 NOTE — PROGRESS NOTES
Care Management Follow Up    Length of Stay (days): 3    Expected Discharge Date: 07/10/2024    Anticipated Discharge Plan:       Transportation: Confirmed Wheelchair. Transportation costs discussed? Yes. Discussed with daughter Tiara    PT Recommendations: Transitional Care Facility  OT Recommendations:  Transitional Care Facility     Barriers to Discharge: medical stability    Prior Living Situation: independent living facility with alone     Patient/Spokesperson Updated: Yes. Who? Fabrizio Menjivar    Additional Information:  Plan is to return to [facility] when medically ready. Pt currently has a bed hold at the facility.     10:32 AM  Pt's daughter Tiara stopped CM office to provide an update that Pt will likely not discharge until tomorrow. Tiara asked about the bed hold given the number of days Pt has been admitted. TAIWO called and spoke to Tayla at [facility] who reported that they do not charge for the bed holds and can accept Pt back at any time tomorrow. TAIWO reported CM will follow up with the facility tomorrow with updates.      CECILLE Hermosillo

## 2024-07-10 NOTE — PROGRESS NOTES
07/10/24 1030   Appointment Info   Signing Clinician's Name / Credentials (OT) Khadraluke Claire OTD OTR/L   Rehab Comments (OT) (S)  Lymphedema evaluation   Quick Adds   Quick Adds Edema   General Information   Onset of Illness/Injury or Date of Surgery 07/07/24   Referring Physician Michelle Sarmiento MD   Patient/Family Therapy Goal Statement (OT) To decrease swelling   Additional Occupational Profile Info/Pertinent History of Current Problem Namita Viera is a 74 year old female with COPD, alcoholism, frequent falls, recent hospital stay after she was found down with hypoxia, diagnosed with PE and discharged to TCU on 7/4, returned for evaluation of rectal bleeding found to have fecal impaction, incidental finding of CHF exacerbation.   Edema General Information   Onset of Edema 07/07/24   Affected Body Part(s) Left LE;Right LE   Edema Etiology Insidious;Unknown   Edema Precautions Cardiac Edema/CHF   Edema Examination/Assessment   Skin Condition Pitting;Dryness;Intact   Scar No   Ulcerations No   Pitting Assessment 3+   Edema Assessment Comments Increased pitting at base of feet and LE   Clinical Impression   Criteria for Skilled Therapeutic Interventions Met (OT) Yes, treatment indicated   Edema: Patient Presentation Edema   Influenced by the following impairments CHF   Edema: Planned Interventions Manual lymph drainage;Gradient compression bandaging;Edema exercises;Precautions to prevent infection/exacerbation;Education   Clinical Decision Making Complexity (OT) problem focused assessment/low complexity   Risk & Benefits of therapy have been explained evaluation/treatment results reviewed;care plan/treatment goals reviewed;risks/benefits reviewed;current/potential barriers reviewed;patient   OT Total Evaluation Time   OT Eval, Low Complexity Minutes (26109)   (OT eval previously billed during admission)   OT Goals   Therapy Frequency (OT) Daily   OT Predicted Duration/Target Date for Goal Attainment 07/15/24   OT  Goals Edema   OT: Edema education to increase ability to manage edema after discharge from the hospital Patient;Verbalize;signs/symptoms of intolerance;wear schedule   OT: Management of edema bandages Patient;Verbalize;quick wrap   OT: Functional edema exercise program to reduce limb volume, increase activity tolerance and improve independence with ADL Patient;Supervision/Stand-by assist;HEP   Self-Care/Home Management   Self-Care/Home Mgmt/ADL, Compensatory, Meal Prep Minutes (63154) 24   Symptoms Noted During/After Treatment (Meal Preparation/Planning Training) none   Treatment Detail/Skilled Intervention Lymph: eval completed, treatment initiated. Washed/lotioned bilateral LE, donne tG soft base layer, SSB applied with quick wrap method, herringbone technique 75% overlap med compression. Pt tolerating wraps - reports comfort. Educated on indications for removal and HEP for LE   OT Discharge Planning   OT Plan Lymph: reassess, rewrap   Total Session Time   Timed Code Treatment Minutes 24   Total Session Time (sum of timed and untimed services) 24

## 2024-07-10 NOTE — PLAN OF CARE
Problem: Constipation  Goal: Effective Bowel Elimination  Outcome: Progressing     Problem: Heart Failure  Goal: Optimal Coping  Outcome: Progressing   Goal Outcome Evaluation:       VSS weaned to 1L O2. SR on tele. A&Ox4, forgetful. Intermittent buttock pain managed w/ rest, repo, & tylenol- switched from PRN to sched. No brief on for skin integrity. IV lasix given per order. Sched senna & miralax given + one time enema- multiple large BMs afterward. K & mag protocols OK, recheck w/ am labs. Up w/ 1 & W. Worked w/ therapy, up moving around multiple times today. Does not like salt restriction. Daughter visiting at bedside.

## 2024-07-10 NOTE — PROGRESS NOTES
[facility]    Medicine Progress Note - Hospitalist Service    Date of Admission:  7/7/2024    Assessment & Plan                Namita Viera is a 74 year old female with COPD, alcoholism, frequent falls, recent hospital stay after she was found down with hypoxia, diagnosed with PE and discharged to TCU on 7/4, returned for evaluation of rectal bleeding found to have fecal impaction, incidental finding of CHF exacerbation. Hospital Day: 4      # Rectal bleeding  # Fecal impaction  # Opioid-induced constipation  -Had a large bowel movement at TCU associated with bright red blood per rectum, CT here showing fecal impaction.  -GI saw and recommending laxative therapy, patient has difficulty drinking MiraLAX and therefore added Lucia-Colace  -Here she has been having just small bowel movements, today did enema in hopes of relieving impaction, was successful with multiple large loose BMs.  -Bleeding resolved since 7/9, tolerated anticoag resumption  -Hemoglobin has drifted down a little bit 9-->8.3, recheck tomorrow  -She should continue a good bowel regimen going forward    # Acute on chronic diastolic heart failure  -Last LVEF 65 to 70% 2 weeks ago  -BNP similar to previous around 900, however she has bilateral moderate pleural effusions, 2+ pitting ankle edema that is new.  Suspicion for diastolic heart failure related to IV infusions received during last hospital stay, in the setting of very high salt diet  -Seems to be responding well to IV Lasix, continue for now  -Started on ARB  -Strict intake and output, daily weights  -Fluid restriction, low-salt diet. Family confirm her usual diet is very high in salt  -Trend BMP    #Peripheral edema  -not responded much despite diuresis and Cr rising a bit  -needs compression, reluctantly agreeable, lymphedema therapist saw today    # Recent pulmonary embolus  -Eliquis    # Chronic hypoxic respiratory failure  -Has been on 2 L nasal cannula since  recent discharge  -Continue supplemental oxygen  -Multiple underlying reasons to need O2 including advanced COPD, known pulmonary embolus, suspected acute on chronic CHF as above    # Alcoholism  -longstanding issue, per family  -she declined chem dep treatment last admission    #Recent rib fractures  # T9 compression fracture  #Pelvic fractures   -had unwitnessed fall at her Assisted Living prior to previous admission, sustained multiple fractures  -Continue tylenol, low dose hydromorphone as needed  -patient declines to continue on lido patch  -IS  -PT/OT  -plan to return to TCU at discharge  -consider holding home alendronate while healing acute fractures    # Recent COPD exacerbation  -Finishing a steroid taper    # Elevated lipase  -Pancreas normal on CT.  No abdominal discomfort.  GI recommends recheck in a few weeks as outpatient to ensure normalizes    # Hypokalemia  # Hypomagnesemia  -Protocols    # Hypotension  -Recently had septic shock requiring pressors, had difficulty weaning off and was continued on midodrine which she has been on at TCU as well.  Okay to continue midodrine.  Monitor blood pressure    # Tobacco use  -Declined patch    # Sacral decubitus ulcer  # Relatively extensive candidal intertrigo  -Continue topical miconazole  -WOC consulted    #Cachexia  #Underweight  -BMI 17 noted, increases her risk of wounds, injuries, falls, infections etc  -RD consult          Diet: Fluid restriction 2000 ML FLUID (and additional linked orders)  Combination Diet 2 gm NA Diet    DVT Prophylaxis: Known VTE.   DOAC  Delgado Catheter: Not present  Lines: None     Cardiac Monitoring: ACTIVE order. Indication: Acute decompensated heart failure (48 hours)  Code Status: Full Code      Clinically Significant Risk Factors        # Hypokalemia: Lowest K = 3.1 mmol/L in last 2 days, will replace as needed     # Hypomagnesemia: Lowest Mg = 1.3 mg/dL in last 2 days, will replace as needed   # Hypoalbuminemia: Lowest  "albumin = 3 g/dL at 7/7/2024  7:11 PM, will monitor as appropriate       # Hypertension: Noted on problem list                # Cachexia: Estimated body mass index is 17.14 kg/m  as calculated from the following:    Height as of this encounter: 1.626 m (5' 4\").    Weight as of this encounter: 45.3 kg (99 lb 13.9 oz)., PRESENT ON ADMISSION     # Financial/Environmental Concerns: none         Disposition Plan     Medically Ready for Discharge: Anticipated Tomorrow         Discharge barrier(s): diuresis, recheck Hgb  Care discussed with: patient, CHANTELLE Malik MD  Hospitalist Service  [facility]  Securely message with [email or web address] (more info)  Text page via [employer] Paging/Directory   ______________________________________________________________________      Physical Exam   Vital Signs: Temp: 97.5  F (36.4  C) Temp src: Oral BP: 96/55 Pulse: 80   Resp: 18 SpO2: 93 % O2 Device: Nasal cannula Oxygen Delivery: 1 LPM  Weight: 99 lbs 13.89 oz    General: in no apparent distress, non-toxic, and alert female lying in hospital bed oriented x3  HEENT: Head normocephalic atraumatic, oral mucosa moist. Sclerae anicteric  Skin: No rashes or lesions, skin of BLE is shiny and tense  Extremities: 2+ pitting edema bilateral ankles, no improvement from yesterday  Psych: Normal affect, mood euthymic  Neuro: Grossly normal      Medical Decision Making               Data   Recent Results (from the past 16 hour(s))   Magnesium    Collection Time: 07/10/24  6:49 AM   Result Value Ref Range    Magnesium 1.6 (L) 1.7 - 2.3 mg/dL   Basic metabolic panel    Collection Time: 07/10/24  6:49 AM   Result Value Ref Range    Sodium 136 135 - 145 mmol/L    Potassium 3.7 3.4 - 5.3 mmol/L    Chloride 96 (L) 98 - 107 mmol/L    Carbon Dioxide (CO2) 32 (H) 22 - 29 mmol/L    Anion Gap 8 7 - 15 mmol/L    Urea Nitrogen 28.5 (H) 8.0 - 23.0 mg/dL    Creatinine 0.90 0.51 - 0.95 mg/dL    GFR Estimate 66 >60 mL/min/1.73m2    " Calcium 8.9 8.8 - 10.2 mg/dL    Glucose 88 70 - 99 mg/dL   Hemoglobin    Collection Time: 07/10/24  6:49 AM   Result Value Ref Range    Hemoglobin 8.3 (L) 11.7 - 15.7 g/dL       Interval History     Patient had some full body pain this morning, improved with oral Dilaudid.  Feels dose is effective.  We discussed scheduling her Tylenol instead as needed.  Patient is quite deconditioned.  Ongoing peripheral edema despite slight rise in creatinine, wonder if we may not get full resolution of edema without compression.  Patient is reluctantly agreeable to trying compression, asked lymphedema therapist to visit, not sure if patient will tolerate anything stronger than Tubigrip's.  Patient has still been having just small soft bowel movements, recommended enema to see if we can alleviate fecal impaction hopefully get her discharged back to TCU.  Daughter updated at bedside.  Not ready for discharge possibly tomorrow.

## 2024-07-11 ENCOUNTER — APPOINTMENT (OUTPATIENT)
Dept: PHYSICAL THERAPY | Facility: HOSPITAL | Age: 74
DRG: 388 | End: 2024-07-11
Payer: COMMERCIAL

## 2024-07-11 ENCOUNTER — APPOINTMENT (OUTPATIENT)
Dept: OCCUPATIONAL THERAPY | Facility: HOSPITAL | Age: 74
DRG: 388 | End: 2024-07-11
Payer: COMMERCIAL

## 2024-07-11 LAB
ANION GAP SERPL CALCULATED.3IONS-SCNC: 12 MMOL/L (ref 7–15)
BUN SERPL-MCNC: 29.4 MG/DL (ref 8–23)
CALCIUM SERPL-MCNC: 8.6 MG/DL (ref 8.8–10.2)
CHLORIDE SERPL-SCNC: 93 MMOL/L (ref 98–107)
CREAT SERPL-MCNC: 0.89 MG/DL (ref 0.51–0.95)
DEPRECATED HCO3 PLAS-SCNC: 31 MMOL/L (ref 22–29)
EGFRCR SERPLBLD CKD-EPI 2021: 67 ML/MIN/1.73M2
GLUCOSE SERPL-MCNC: 85 MG/DL (ref 70–99)
HGB BLD-MCNC: 8.1 G/DL (ref 11.7–15.7)
MAGNESIUM SERPL-MCNC: 1.4 MG/DL (ref 1.7–2.3)
MAGNESIUM SERPL-MCNC: 2.1 MG/DL (ref 1.7–2.3)
POTASSIUM SERPL-SCNC: 3 MMOL/L (ref 3.4–5.3)
POTASSIUM SERPL-SCNC: 4.6 MMOL/L (ref 3.4–5.3)
SODIUM SERPL-SCNC: 136 MMOL/L (ref 135–145)

## 2024-07-11 PROCEDURE — 250N000013 HC RX MED GY IP 250 OP 250 PS 637: Performed by: STUDENT IN AN ORGANIZED HEALTH CARE EDUCATION/TRAINING PROGRAM

## 2024-07-11 PROCEDURE — 250N000013 HC RX MED GY IP 250 OP 250 PS 637: Performed by: HOSPITALIST

## 2024-07-11 PROCEDURE — 250N000012 HC RX MED GY IP 250 OP 636 PS 637: Performed by: HOSPITALIST

## 2024-07-11 PROCEDURE — 250N000011 HC RX IP 250 OP 636: Performed by: INTERNAL MEDICINE

## 2024-07-11 PROCEDURE — 250N000013 HC RX MED GY IP 250 OP 250 PS 637: Performed by: INTERNAL MEDICINE

## 2024-07-11 PROCEDURE — 80048 BASIC METABOLIC PNL TOTAL CA: CPT | Performed by: HOSPITALIST

## 2024-07-11 PROCEDURE — 83735 ASSAY OF MAGNESIUM: CPT | Performed by: STUDENT IN AN ORGANIZED HEALTH CARE EDUCATION/TRAINING PROGRAM

## 2024-07-11 PROCEDURE — 83735 ASSAY OF MAGNESIUM: CPT | Performed by: HOSPITALIST

## 2024-07-11 PROCEDURE — 84132 ASSAY OF SERUM POTASSIUM: CPT | Performed by: STUDENT IN AN ORGANIZED HEALTH CARE EDUCATION/TRAINING PROGRAM

## 2024-07-11 PROCEDURE — 97116 GAIT TRAINING THERAPY: CPT | Mod: GP

## 2024-07-11 PROCEDURE — 99232 SBSQ HOSP IP/OBS MODERATE 35: CPT | Performed by: STUDENT IN AN ORGANIZED HEALTH CARE EDUCATION/TRAINING PROGRAM

## 2024-07-11 PROCEDURE — 250N000011 HC RX IP 250 OP 636: Performed by: STUDENT IN AN ORGANIZED HEALTH CARE EDUCATION/TRAINING PROGRAM

## 2024-07-11 PROCEDURE — 85018 HEMOGLOBIN: CPT | Performed by: HOSPITALIST

## 2024-07-11 PROCEDURE — 120N000001 HC R&B MED SURG/OB

## 2024-07-11 PROCEDURE — 97530 THERAPEUTIC ACTIVITIES: CPT | Mod: GP

## 2024-07-11 PROCEDURE — 36415 COLL VENOUS BLD VENIPUNCTURE: CPT | Performed by: HOSPITALIST

## 2024-07-11 PROCEDURE — 36415 COLL VENOUS BLD VENIPUNCTURE: CPT | Performed by: STUDENT IN AN ORGANIZED HEALTH CARE EDUCATION/TRAINING PROGRAM

## 2024-07-11 PROCEDURE — 97535 SELF CARE MNGMENT TRAINING: CPT | Mod: GO

## 2024-07-11 PROCEDURE — 84520 ASSAY OF UREA NITROGEN: CPT | Performed by: HOSPITALIST

## 2024-07-11 RX ORDER — FUROSEMIDE 20 MG
40 TABLET ORAL DAILY
Status: DISCONTINUED | OUTPATIENT
Start: 2024-07-12 | End: 2024-07-11

## 2024-07-11 RX ORDER — FUROSEMIDE 20 MG
40 TABLET ORAL
Status: DISCONTINUED | OUTPATIENT
Start: 2024-07-11 | End: 2024-07-12 | Stop reason: HOSPADM

## 2024-07-11 RX ORDER — POTASSIUM CHLORIDE 1500 MG/1
40 TABLET, EXTENDED RELEASE ORAL ONCE
Status: COMPLETED | OUTPATIENT
Start: 2024-07-11 | End: 2024-07-11

## 2024-07-11 RX ORDER — MAGNESIUM SULFATE HEPTAHYDRATE 40 MG/ML
2 INJECTION, SOLUTION INTRAVENOUS ONCE
Status: COMPLETED | OUTPATIENT
Start: 2024-07-11 | End: 2024-07-11

## 2024-07-11 RX ADMIN — CARBIDOPA AND LEVODOPA 2.5 MG: 50; 200 TABLET, EXTENDED RELEASE ORAL at 17:06

## 2024-07-11 RX ADMIN — SENNOSIDES AND DOCUSATE SODIUM 1 TABLET: 50; 8.6 TABLET ORAL at 20:54

## 2024-07-11 RX ADMIN — ALBUTEROL SULFATE 2 PUFF: 90 AEROSOL, METERED RESPIRATORY (INHALATION) at 00:46

## 2024-07-11 RX ADMIN — ANORECTAL OINTMENT: 15.7; .44; 24; 20.6 OINTMENT TOPICAL at 20:53

## 2024-07-11 RX ADMIN — POLYETHYLENE GLYCOL 3350 17 G: 17 POWDER, FOR SOLUTION ORAL at 20:53

## 2024-07-11 RX ADMIN — MAGNESIUM SULFATE HEPTAHYDRATE 2 G: 40 INJECTION, SOLUTION INTRAVENOUS at 09:30

## 2024-07-11 RX ADMIN — APIXABAN 5 MG: 5 TABLET, FILM COATED ORAL at 09:38

## 2024-07-11 RX ADMIN — APIXABAN 5 MG: 5 TABLET, FILM COATED ORAL at 20:54

## 2024-07-11 RX ADMIN — ALBUTEROL SULFATE 2 PUFF: 90 AEROSOL, METERED RESPIRATORY (INHALATION) at 17:06

## 2024-07-11 RX ADMIN — ACETAMINOPHEN 650 MG: 325 TABLET ORAL at 11:10

## 2024-07-11 RX ADMIN — ALBUTEROL SULFATE 1 PUFF: 90 AEROSOL, METERED RESPIRATORY (INHALATION) at 05:15

## 2024-07-11 RX ADMIN — FUROSEMIDE 40 MG: 20 TABLET ORAL at 17:06

## 2024-07-11 RX ADMIN — ACETAMINOPHEN 650 MG: 325 TABLET ORAL at 17:06

## 2024-07-11 RX ADMIN — PREDNISONE 10 MG: 5 TABLET ORAL at 09:28

## 2024-07-11 RX ADMIN — MICONAZOLE NITRATE ANTIFUNGAL POWDER: 2 POWDER TOPICAL at 09:33

## 2024-07-11 RX ADMIN — THIAMINE HCL TAB 100 MG 100 MG: 100 TAB at 09:30

## 2024-07-11 RX ADMIN — Medication 5 MG: at 20:54

## 2024-07-11 RX ADMIN — CARBIDOPA AND LEVODOPA 2.5 MG: 50; 200 TABLET, EXTENDED RELEASE ORAL at 09:30

## 2024-07-11 RX ADMIN — PANTOPRAZOLE SODIUM 40 MG: 40 TABLET, DELAYED RELEASE ORAL at 09:31

## 2024-07-11 RX ADMIN — POTASSIUM CHLORIDE 40 MEQ: 1500 TABLET, EXTENDED RELEASE ORAL at 09:33

## 2024-07-11 RX ADMIN — ALBUTEROL SULFATE 2 PUFF: 90 AEROSOL, METERED RESPIRATORY (INHALATION) at 11:10

## 2024-07-11 RX ADMIN — FUROSEMIDE 40 MG: 10 INJECTION, SOLUTION INTRAMUSCULAR; INTRAVENOUS at 09:38

## 2024-07-11 RX ADMIN — ACETAMINOPHEN 650 MG: 325 TABLET ORAL at 13:20

## 2024-07-11 RX ADMIN — CARBIDOPA AND LEVODOPA 2.5 MG: 50; 200 TABLET, EXTENDED RELEASE ORAL at 13:21

## 2024-07-11 RX ADMIN — ACETAMINOPHEN 650 MG: 325 TABLET ORAL at 20:54

## 2024-07-11 RX ADMIN — MICONAZOLE NITRATE ANTIFUNGAL POWDER: 2 POWDER TOPICAL at 20:53

## 2024-07-11 RX ADMIN — ACETAMINOPHEN 650 MG: 325 TABLET ORAL at 05:16

## 2024-07-11 RX ADMIN — LOSARTAN POTASSIUM 25 MG: 25 TABLET, FILM COATED ORAL at 09:31

## 2024-07-11 ASSESSMENT — ACTIVITIES OF DAILY LIVING (ADL)
ADLS_ACUITY_SCORE: 50
ADLS_ACUITY_SCORE: 51
ADLS_ACUITY_SCORE: 50
ADLS_ACUITY_SCORE: 51
ADLS_ACUITY_SCORE: 50

## 2024-07-11 NOTE — PLAN OF CARE
Problem: Heart Failure  Goal: Optimal Coping  7/10/2024 2210 by Mike Swan RN  Outcome: Progressing  7/10/2024 1537 by Mike Swan RN  Outcome: Progressing     Problem: Heart Failure  Goal: Effective Oxygenation and Ventilation  7/10/2024 2210 by Mike Swan RN  Outcome: Progressing  7/10/2024 1537 by Mike Swan RN  Outcome: Progressing  Intervention: Promote Airway Secretion Clearance  Recent Flowsheet Documentation  Taken 7/10/2024 1700 by Mike Swan RN  Cough And Deep Breathing: done independently per patient     Problem: Constipation  Goal: Effective Bowel Elimination  7/10/2024 2210 by Mike Swan RN  Outcome: Progressing  7/10/2024 1537 by Mike Swan RN  Outcome: Progressing   Goal Outcome Evaluation:       VSS weaned to RA. SR on tele. A&Ox4 but forgetful. Lucia & bottom pain managed w/ rest, repo, & sched tylenol. Up w/ 1 & W. Walked hallway x1. IV lasix given per order. Still having numerous large BMs. No brief in bed. Tolerated lymph wraps until 6 pm, then requested them to be removed.

## 2024-07-11 NOTE — PLAN OF CARE
Physical Therapy Discharge Summary    Reason for therapy discharge:    Discharged to transitional care facility.on 07/12/2024    Progress towards therapy goal(s). See goals on Care Plan in [facility] electronic health record for goal details.  Goals partially met.  Barriers to achieving goals:   limited tolerance for therapy.    Therapy recommendation(s):    Continued therapy is recommended.  Rationale/Recommendations:  to improve functional mobility for returning home.

## 2024-07-11 NOTE — CONSULTS
"CLINICAL NUTRITION SERVICES - ASSESSMENT NOTE     Nutrition Prescription    RECOMMENDATIONS FOR MDs/PROVIDERS TO ORDER:  None    Malnutrition Status:    Moderate in acute illness    Recommendations already ordered by Registered Dietitian (RD):  Ensure enlive daily     Future/Additional Recommendations:  Adjust supplement pending intake weight, tolerance, acceptance, labs       REASON FOR ASSESSMENT  Namita Viera is a/an 74 year old female assessed by the dietitian for Provider Order -   supplement select, low sodium diet ed     Pt presented with fecal impaction from TCU, opioid induced constipation and acute on chronic CHF, peripheral edema, chronic hypoxic respiratory failure, sacral DU  Hx etoh, recent rib fx from fall, recent copd exacerbation, cachexia      NUTRITION HISTORY  Pt assessed by RD last admission. Last seen by RD 7/1. Pt declined supplements last stay d/t only wanting \"real food\". And with poor intake, eating 25% of meals    Pt has not had low sodium diet ed in the past. Receptive to diet ed today.   Pt eats 1 meal/day at supper. Occasionally snacks.  Pt cooks for herself \"usually whatever I am hungry for and is on sale\": roast beef, ham, grilled cheese  Pt likes pickles and olives but does not eat many  Adds salt to her food \"depending on what it is\" and uses sea or kosher salt  She likes cottage cheese, prefers natural cheeses over american cheese, does not eat baked goods. She uses salted butter  Pt reports she has always been \"a salt person,  that is just who I am\"    Pt reports her appetite is at baseline and was at the TCU. \"I eat when I am hungry\"    2 of Pt daughters are nurses and pt reports she \"has had it up to here with nurses\"    CURRENT NUTRITION ORDERS  Diet: 2 g Sodium and 2000 mL Fluid Restriction    Intake/Tolerance: Good  % of meals with 1-2 meal/day being documented  Ordering 3 meal/day 8018-4679 kcal, 60-71 g protein    Pt ate all of her lunch today. She reports her " "appetite is good. Suggested supplement to improve strengthening and pt was interested. She did not care what kind. Pt had a family member with esophageal CA that \"lived on boost\"  Pt c/o indigesting during visit but declined prn med    LABS  Labs reviewed  K+ 3.0 (L), decreased d/t iv diuretic  BUN 29.4 (H), increasing  Mag 1.4 (L), decreased    MEDICATIONS  Medications reviewed  Oral lasix bid-changed from iv today, iv mag today, protonix daily, miralax bid, kcl today, precolace bid, thiamine 100 mg daily     ANTHROPOMETRICS  Height: 162.6 cm (5' 4\")  IBW: 54.5 kg  BMI: Underweight BMI <18.5  Weight History:  Wt fluctuating with fluid balance, Currently fluid weight up and diuresing  Date/Time Weight Weight Method   07/08/24 1548 45.3 kg (99 lb 13.9 oz) Bed scale   07/07/24 1907 47.6 kg (105 lb)- stated --     Wt Readings from Last 10 Encounters:   07/04/24 46.3 kg (102 lb 1.2 oz)   06/26/23 43.2 kg (95 lb 3.2 oz)   05/31/23 45.3 kg (99 lb 14.4 oz)   05/23/23 47.2 kg (104 lb)   05/22/23 47.7 kg (105 lb 3.2 oz)   05/16/23 45.5 kg (100 lb 5 oz)   05/15/23 43.9 kg (96 lb 12.5 oz)   09/06/22 41.9 kg (92 lb 6.4 oz)   02/04/22 39.2 kg (86 lb 6.4 oz)   Low weight at baseline    Dosing Weight: 43.2 kg -suspected drier weight    ASSESSED NUTRITION NEEDS  Estimated Energy Needs: 2037-1096 kcals/day (30 - 35 kcals/kg )  Justification: Increased needs and Underweight  Estimated Protein Needs: 43-52 grams protein/day (1 - 1.2 grams of pro/kg)  Justification: Repletion  Estimated Fluid Needs: 7187-0793 mL/day (25 - 30 mL/kg)   Justification: Maintenance    PHYSICAL FINDINGS  See malnutrition section below.  Buttock contact dermatitis wound improved  GI - formed BM today. 4 soft yesterday      MALNUTRITION:  % Weight Loss:  None noted- affected by fluid retention  % Intake:  No decreased intake noted  Subcutaneous Fat Loss:  Orbital region mild depletion   Muscle Loss:  Clavicle bone region moderate depletion and Acromion bone " region moderate depletion   Fluid Retention:  Mild +1-+2 bilateral LE    Malnutrition Diagnosis: Moderate malnutrition  In Context of:  Acute illness or injury    NUTRITION DIAGNOSIS  Impaired nutrient utilization related to CHF as evidenced by increased O2 needs, WOB    INTERVENTIONS  Implementation  Provided low sodium diet ed: recommended healthy choice if purchasing frozen meal, limiting intake of pickled items, avoiding american cheese and processed foods. Choosing low Na lunch meat and cottage cheese. Switching to unsalted butter. Using a seasoning bled with salt to wean from the salt shaker. Cooking from fresh as often as possible and the ability to change her taste sensitivity to salt.     Add ensure enlive daily to help promote weight gain and muscle building    Goals  Maintain/increase weight - no weight loss below 43.2 kg  Meet >75% of estimated nutrition needs     Monitoring/Evaluation  Progress toward goals will be monitored and evaluated per protocol.

## 2024-07-11 NOTE — PROGRESS NOTES
[facility]    Medicine Progress Note - Hospitalist Service    Date of Admission:  7/7/2024    Assessment & Plan   Namita Viera is a 74 year old female with COPD, alcoholism, frequent falls, recent hospital stay after she was found down with hypoxia, diagnosed with PE and discharged to TCU on 7/4, returned for evaluation of rectal bleeding found to have fecal impaction, incidental finding of CHF exacerbation. Hospital Day: 5    # Rectal bleeding  # Fecal impaction  # Opioid-induced constipation  -Had a large bowel movement at TCU associated with bright red blood per rectum, CT here showing fecal impaction.  -GI saw and recommending laxative therapy, patient has difficulty drinking MiraLAX and therefore added Lucia-Colace  -Here she has been having just small bowel movements, today did enema in hopes of relieving impaction, was successful with multiple large loose BMs.  -Bleeding resolved since 7/9, tolerated anticoag resumption  -Hemoglobin has drifted down a little bit 9-->8.3-->8.1  -She should continue a good bowel regimen going forward    # Acute on chronic diastolic heart failure  # Contraction alkalosis  -Last LVEF 65 to 70% 2 weeks ago  -BNP similar to previous around 900, however she has bilateral moderate pleural effusions, 2+ pitting ankle edema that is new.  Suspicion for diastolic heart failure related to IV infusions received during last hospital stay, in the setting of very high salt diet  -Changed IV Lasix, changed to p.o.40 mg twice daily for now- may be able to go on this dose if bicarb better.  -Started on ARB  -Strict intake and output, daily weights  -Fluid restriction, low-salt diet. Family confirm her usual diet is very high in salt  -Trend BMP    # Peripheral edema  -not responded much despite diuresis and Cr rising a bit  -needs compression, reluctantly agreeable, lymphedema therapist saw here    # Recent pulmonary embolus  -Eliquis    # Chronic hypoxic respiratory  failure  -Has been on 2 L nasal cannula since recent discharge  -Continue supplemental oxygen  -Multiple underlying reasons to need O2 including advanced COPD, known pulmonary embolus, suspected acute on chronic CHF as above    # Alcoholism  -longstanding issue, per family  -she declined chem dep treatment last admission    # Recent rib fractures  # T9 compression fracture  # Pelvic fractures   -had unwitnessed fall at her Assisted Living prior to previous admission, sustained multiple fractures  -Continue tylenol, low dose hydromorphone as needed  -patient declines to continue on lido patch  -IS  -PT/OT  -plan to return to TCU at discharge  -consider holding home alendronate while healing acute fractures    # Recent COPD exacerbation  -Finishing a steroid taper    # Elevated lipase  -Pancreas normal on CT.  No abdominal discomfort.  GI recommends recheck in a few weeks as outpatient to ensure normalizes    # Hypokalemia  # Hypomagnesemia  -Protocols    # Hypotension  -Recently had septic shock requiring pressors, had difficulty weaning off and was continued on midodrine which she has been on at TCU as well.  Okay to continue midodrine.  Monitor blood pressure    # Tobacco use  -Declined patch    # Sacral decubitus ulcer  # Relatively extensive candidal intertrigo  -Continue topical miconazole  -WOC consulted    #Cachexia  #Underweight  -BMI 17 noted, increases her risk of wounds, injuries, falls, infections etc  -RD consult          Diet: Fluid restriction 2000 ML FLUID (and additional linked orders)  Combination Diet 2 gm NA Diet    DVT Prophylaxis: Known VTE.   DOAC  Delgado Catheter: Not present  Lines: None     Cardiac Monitoring: ACTIVE order. Indication: Acute decompensated heart failure (48 hours)  Code Status: Full Code      Clinically Significant Risk Factors        # Hypokalemia: Lowest K = 3 mmol/L in last 2 days, will replace as needed     # Hypomagnesemia: Lowest Mg = 1.4 mg/dL in last 2 days, will  "replace as needed   # Hypoalbuminemia: Lowest albumin = 3 g/dL at 7/7/2024  7:11 PM, will monitor as appropriate       # Hypertension: Noted on problem list            # Cachexia: Estimated body mass index is 17.14 kg/m  as calculated from the following:    Height as of this encounter: 1.626 m (5' 4\").    Weight as of this encounter: 45.3 kg (99 lb 13.9 oz)., PRESENT ON ADMISSION     # Financial/Environmental Concerns: none         Disposition Plan     Medically Ready for Discharge: Anticipated Tomorrow     Discharge barrier(s): diuresis, recheck Hgb  Care discussed with: patient, elza Menjivar, RN      Ivanna Gonzalez MD  Hospitalist Service  [facility]  Securely message with [employer] (more info)  Text page via [employer] Paging/Directory   __________________________________________________________________  Interval events  Patient new to me. Chart reviewed. No acute events overnight  Seen and examined at bedside  Adequate BMs last evening.  Able to control her stool.  No blood in stools seen.  Bowel regimen held this morning.  At this time has no specific complaints, feels weak but able to ambulate around.  Reviewed labs noted contraction alkalosis.    Physical Exam   Vital Signs: Temp: 98.9  F (37.2  C) Temp src: Axillary BP: 112/54 Pulse: 82   Resp: 17 SpO2: 94 % O2 Device: None (Room air) Oxygen Delivery: 1 LPM  Weight: 99 lbs 13.89 oz    General: in no apparent distress, non-toxic, and alert female lying in hospital bed oriented x3  HEENT: Head normocephalic atraumatic, oral mucosa moist. Sclerae anicteric  Skin: No rashes or lesions, skin of BLE is shiny and tense  Extremities: 2+ pitting edema bilateral ankles,   Psych: Normal affect, mood euthymic  Neuro: Grossly normal    Data   Recent Results (from the past 16 hour(s))   Magnesium    Collection Time: 07/11/24  7:02 AM   Result Value Ref Range    Magnesium 1.4 (L) 1.7 - 2.3 mg/dL   Basic metabolic panel    Collection Time: 07/11/24  " 7:02 AM   Result Value Ref Range    Sodium 136 135 - 145 mmol/L    Potassium 3.0 (L) 3.4 - 5.3 mmol/L    Chloride 93 (L) 98 - 107 mmol/L    Carbon Dioxide (CO2) 31 (H) 22 - 29 mmol/L    Anion Gap 12 7 - 15 mmol/L    Urea Nitrogen 29.4 (H) 8.0 - 23.0 mg/dL    Creatinine 0.89 0.51 - 0.95 mg/dL    GFR Estimate 67 >60 mL/min/1.73m2    Calcium 8.6 (L) 8.8 - 10.2 mg/dL    Glucose 85 70 - 99 mg/dL   Hemoglobin    Collection Time: 07/11/24  7:02 AM   Result Value Ref Range    Hemoglobin 8.1 (L) 11.7 - 15.7 g/dL

## 2024-07-11 NOTE — PROGRESS NOTES
Care Management Follow Up    Length of Stay (days): 4    Expected Discharge Date: 07/12/2024    Anticipated Discharge Plan: return to [facility] ([facility])      Transportation: Confirmed Wheelchair. Transportation costs discussed? Yes. Discussed with daughter Tiara    PT Recommendations: Transitional Care Facility  OT Recommendations:  Transitional Care Facility     Barriers to Discharge: medical stability    Prior Living Situation: independent living facility with alone     Patient/Spokesperson Updated: Yes. Who? Daughter Tiara    Additional Information:  Plan is to return to [facility] when medically ready. Pt currently has a bed hold at the facility.     8:52 AM  Pt's daughter Tiara stopped by CM office reporting that she thinks Pt should be able to discharge today. SW reported CM will follow-up with MD and set up discharge back to TCU if medically ready.    [facility] wheelchair transport pick-up window: [phone].    11:06 AM  CM updated Pt is transitioning of IV lasix today so Pt will stay until tomorrow. Facility and daughter Tiara updated. Transport rescheduled to tomorrow morning for pick-up between [phone].      CECILLE Hermosillo

## 2024-07-11 NOTE — PLAN OF CARE
Occupational Therapy Discharge Summary    Reason for therapy discharge:    All goals and outcomes met, no further needs identified.    Progress towards therapy goal(s). See goals on Care Plan in [facility] electronic health record for goal details.  Goals met    Therapy recommendation(s):    No further therapy is recommended.Pt will only slight amount of swelling present in ankles otherwise resolved.  Pt states low tolerance for compression due to feeling claustorphobic.  Pt edcuated on other edema reduction techniques and use of compression socks as an outpatient if swelling worsens.

## 2024-07-11 NOTE — PLAN OF CARE
Problem: Anemia  Goal: Anemia Symptom Improvement  Outcome: Progressing   Goal Outcome Evaluation:       Alert/oriented, denies pain. On RA, sat 91%. On tele, NSR. No acute events overnight. Anticipated discharge today, call light within reach and bed alarm activated.

## 2024-07-12 ENCOUNTER — LAB REQUISITION (OUTPATIENT)
Dept: LAB | Facility: CLINIC | Age: 74
End: 2024-07-12
Payer: COMMERCIAL

## 2024-07-12 VITALS
BODY MASS INDEX: 17.05 KG/M2 | WEIGHT: 99.87 LBS | HEIGHT: 64 IN | OXYGEN SATURATION: 91 % | HEART RATE: 94 BPM | SYSTOLIC BLOOD PRESSURE: 109 MMHG | RESPIRATION RATE: 12 BRPM | TEMPERATURE: 98.4 F | DIASTOLIC BLOOD PRESSURE: 56 MMHG

## 2024-07-12 DIAGNOSIS — D64.9 ANEMIA, UNSPECIFIED: ICD-10-CM

## 2024-07-12 LAB
ANION GAP SERPL CALCULATED.3IONS-SCNC: 10 MMOL/L (ref 7–15)
BUN SERPL-MCNC: 25 MG/DL (ref 8–23)
CALCIUM SERPL-MCNC: 8.6 MG/DL (ref 8.8–10.2)
CHLORIDE SERPL-SCNC: 96 MMOL/L (ref 98–107)
CREAT SERPL-MCNC: 0.81 MG/DL (ref 0.51–0.95)
DEPRECATED HCO3 PLAS-SCNC: 29 MMOL/L (ref 22–29)
EGFRCR SERPLBLD CKD-EPI 2021: 75 ML/MIN/1.73M2
GLUCOSE SERPL-MCNC: 88 MG/DL (ref 70–99)
HOLD SPECIMEN: NORMAL
MAGNESIUM SERPL-MCNC: 1.8 MG/DL (ref 1.7–2.3)
POTASSIUM SERPL-SCNC: 3.6 MMOL/L (ref 3.4–5.3)
SODIUM SERPL-SCNC: 135 MMOL/L (ref 135–145)

## 2024-07-12 PROCEDURE — 250N000013 HC RX MED GY IP 250 OP 250 PS 637: Performed by: INTERNAL MEDICINE

## 2024-07-12 PROCEDURE — 250N000013 HC RX MED GY IP 250 OP 250 PS 637: Performed by: HOSPITALIST

## 2024-07-12 PROCEDURE — 250N000013 HC RX MED GY IP 250 OP 250 PS 637: Performed by: STUDENT IN AN ORGANIZED HEALTH CARE EDUCATION/TRAINING PROGRAM

## 2024-07-12 PROCEDURE — 83735 ASSAY OF MAGNESIUM: CPT | Performed by: STUDENT IN AN ORGANIZED HEALTH CARE EDUCATION/TRAINING PROGRAM

## 2024-07-12 PROCEDURE — 99239 HOSP IP/OBS DSCHRG MGMT >30: CPT | Performed by: STUDENT IN AN ORGANIZED HEALTH CARE EDUCATION/TRAINING PROGRAM

## 2024-07-12 PROCEDURE — 36415 COLL VENOUS BLD VENIPUNCTURE: CPT | Performed by: STUDENT IN AN ORGANIZED HEALTH CARE EDUCATION/TRAINING PROGRAM

## 2024-07-12 PROCEDURE — G0463 HOSPITAL OUTPT CLINIC VISIT: HCPCS

## 2024-07-12 PROCEDURE — 80048 BASIC METABOLIC PNL TOTAL CA: CPT | Performed by: STUDENT IN AN ORGANIZED HEALTH CARE EDUCATION/TRAINING PROGRAM

## 2024-07-12 RX ORDER — POTASSIUM CHLORIDE 1500 MG/1
20 TABLET, EXTENDED RELEASE ORAL DAILY
DISCHARGE
Start: 2024-07-12

## 2024-07-12 RX ORDER — FUROSEMIDE 40 MG
40 TABLET ORAL
DISCHARGE
Start: 2024-07-12

## 2024-07-12 RX ORDER — POLYETHYLENE GLYCOL 3350 17 G/17G
17 POWDER, FOR SOLUTION ORAL DAILY
DISCHARGE
Start: 2024-07-12

## 2024-07-12 RX ORDER — LOSARTAN POTASSIUM 25 MG/1
25 TABLET ORAL DAILY
DISCHARGE
Start: 2024-07-12

## 2024-07-12 RX ORDER — AMOXICILLIN 250 MG
1 CAPSULE ORAL DAILY
DISCHARGE
Start: 2024-07-12

## 2024-07-12 RX ADMIN — POLYETHYLENE GLYCOL 3350 17 G: 17 POWDER, FOR SOLUTION ORAL at 09:43

## 2024-07-12 RX ADMIN — LOSARTAN POTASSIUM 25 MG: 25 TABLET, FILM COATED ORAL at 09:42

## 2024-07-12 RX ADMIN — ALBUTEROL SULFATE 2 PUFF: 90 AEROSOL, METERED RESPIRATORY (INHALATION) at 05:14

## 2024-07-12 RX ADMIN — ANORECTAL OINTMENT: 15.7; .44; 24; 20.6 OINTMENT TOPICAL at 09:44

## 2024-07-12 RX ADMIN — MICONAZOLE NITRATE ANTIFUNGAL POWDER: 2 POWDER TOPICAL at 09:44

## 2024-07-12 RX ADMIN — FUROSEMIDE 40 MG: 20 TABLET ORAL at 09:43

## 2024-07-12 RX ADMIN — ALBUTEROL SULFATE 2 PUFF: 90 AEROSOL, METERED RESPIRATORY (INHALATION) at 00:20

## 2024-07-12 RX ADMIN — PANTOPRAZOLE SODIUM 40 MG: 40 TABLET, DELAYED RELEASE ORAL at 09:43

## 2024-07-12 RX ADMIN — CARBIDOPA AND LEVODOPA 2.5 MG: 50; 200 TABLET, EXTENDED RELEASE ORAL at 09:42

## 2024-07-12 RX ADMIN — ACETAMINOPHEN 650 MG: 325 TABLET ORAL at 10:26

## 2024-07-12 RX ADMIN — APIXABAN 5 MG: 5 TABLET, FILM COATED ORAL at 09:43

## 2024-07-12 RX ADMIN — THIAMINE HCL TAB 100 MG 100 MG: 100 TAB at 09:43

## 2024-07-12 RX ADMIN — SENNOSIDES AND DOCUSATE SODIUM 1 TABLET: 50; 8.6 TABLET ORAL at 09:43

## 2024-07-12 ASSESSMENT — ACTIVITIES OF DAILY LIVING (ADL)
ADLS_ACUITY_SCORE: 50
ADLS_ACUITY_SCORE: 55
ADLS_ACUITY_SCORE: 50
ADLS_ACUITY_SCORE: 55
ADLS_ACUITY_SCORE: 49
ADLS_ACUITY_SCORE: 50

## 2024-07-12 NOTE — PLAN OF CARE
Problem: Adult Inpatient Plan of Care  Goal: Optimal Comfort and Wellbeing  Outcome: Progressing   Goal Outcome Evaluation:        Alert/oriented, denies pain. On RA, no acute events. Anticipated discharge today, call light within reach and bed alarm activated.

## 2024-07-12 NOTE — PROGRESS NOTES
Care Management Discharge Note    Discharge Date: 07/12/2024       Discharge Disposition: Transitional Care    Discharge Services: None    Discharge DME: None    Discharge Transportation: agency    Private pay costs discussed: transportation costs    Does the patient's insurance plan have a 3 day qualifying hospital stay waiver?  Yes     Which insurance plan 3 day waiver is available? Alternative insurance waiver    Will the waiver be used for post-acute placement? No    PAS Confirmation Code: N/A - Returning to facility  Patient/family educated on Medicare website which has current facility and service quality ratings: yes    Education Provided on the Discharge Plan: Yes  Persons Notified of Discharge Plans: Patient & Family  Patient/Family in Agreement with the Plan: yes    Handoff Referral Completed: Yes    Additional Information:  Plan is for Pt to discharge back to [facility] this morning. [facility] wheelchair transport pick-up window: [phone]. No PAS needed.     7:42 AM  TAIWO sent message to MD to confirm Pt is ready for discharge and request discharge orders.     8:15 AM  Bedside RN updated regarding plans for discharge this morning. TAIWO reached out to Pt's daughter Tiara and updated her with plans for discharge.     9:33 AM  TAIWO confirmed discharge plan with Dorita at [facility].     10:13 AM  Pt did not have any clothes for discharge. SW dropped off clothing from  clothing donation supply. Pt denies any questions or needs from . No further needs anticipated prior to discharge.      CECILLE Hermosillo

## 2024-07-12 NOTE — PROGRESS NOTES
Discharged to TCU- VSS on RA. A&Ox4, forgetful. Denies pain. Up w/ 1 & W. Electrolytes OK. Renato sent w/, DC instructions reviewed, transport via [facility] services.

## 2024-07-12 NOTE — PLAN OF CARE
Occupational Therapy Discharge Summary    Reason for therapy discharge:    Discharged to transitional care facility.    Progress towards therapy goal(s). See goals on Care Plan in [facility] electronic health record for goal details.  Goals partially met.  Barriers to achieving goals:   discharge from facility.    Therapy recommendation(s):    Continued therapy is recommended.  Rationale/Recommendations:  To improve transfer safety/ADL JAMES Mancia, OTR/L, 7/12/2024, 3:20 PM

## 2024-07-12 NOTE — PROGRESS NOTES
[facility]  WOC Nurse Inpatient Assessment     Consulted for: 7/8 buttocks    Patient History (according to provider note(s):      Namita Viera is a 74 year old female with PMH of COPD and emphysema not on home O2, ongoing tobacco and alcohol use, malnutrition, right hip fracture s/p ORIF in 5/2023, who was brought in with encephalopathy and hypoxia O2 sats in the 60s.  Workup remarkable for probable aspiration pneumonia, small subsegmental PE, severe hypokalemia, rib fractures.  Requiring NIPPV for respiratory failure with hypercapnia     Assessment:      Skin Injury Location: Buttocks       6/28 7/1  Previous admission photos above        Last photo: 7/8  Skin injury due to: Irritant contact dermatitis due to fecal, urinary or dual incontinence   Skin history and plan of care:   satellite lesions and likely outline of a brief or pull up  No open areas noted - no satellite lesions noted at this time. Approximate 7 cm x 7 cm area of erythema.  Pain interventions prior to dressing change: slow and gentle cares   Treatment goal: Heal , Infection control/prevention, and Protection  STATUS: improving  Continue antifungal    Treatment Plan:     Buttocks  AF powder per orders    Orders: Reviewed    RECOMMEND PRIMARY TEAM ORDER: None, at this time  Education provided: plan of care, Moisture management, and Hygiene  Discussed plan of care with: Patient, Family, and Nurse  WOC nurse follow-up plan: weekly  Notify WOC if wound(s) deteriorate.  Nursing to notify the Provider(s) and re-consult the WOC Nurse if new skin concern.    DATA:     Current support surface: Standard  Low air loss (ELMIRA pump, Isolibrium, Pulsate)  Containment of urine/stool: Incontinence Protocol  BMI: Body mass index is 17.14 kg/m .   Active diet order: Orders Placed This Encounter      Diet      Output: I/O last 3 completed shifts:  In: 100 [P.O.:100]  Out: -      Labs:   Recent Labs   Lab 07/11/24  0702 07/10/24  0649 07/09/24  0704 07/08/24  0124 07/07/24  1911   ALBUMIN  --   --   --   --  3.0*   HGB 8.1*   < > 8.6*   < > 8.9*   INR  --   --   --   --  1.25*   WBC  --   --  8.8  --  7.5    < > = values in this interval not displayed.     Pressure injury risk assessment:   Sensory Perception: 4-->no impairment  Moisture: 3-->occasionally moist  Activity: 3-->walks occasionally  Mobility: 3-->slightly limited  Nutrition: 3-->adequate  Friction and Shear: 2-->potential problem  Gigi Score: 18    Lidya Olmos, MSN RN CWOCN  Pager no longer is use, please contact through [employer] group: [facility] [employer]

## 2024-07-12 NOTE — DISCHARGE SUMMARY
"[facility]  Hospitalist Discharge Summary      Date of Admission:  7/7/2024  Date of Discharge:  7/12/2024 11:05 AM  Discharging Provider: Ivanna Gonzalez MD  Discharge Service: Hospitalist Service    Discharge Diagnoses   #Rectal bleeding  # Fecal impaction  # Opioid-induced constipation  # Acute on chronic diastolic heart failure  # Contraction alkalosis  # Chronic hypoxic respiratory failure  # Alcoholism    # Recent rib fractures  # T9 compression fracture  # Pelvic fractures   # Recent COPD exacerbation   # Elevated lipase   # Hypokalemia  # Hypomagnesemia  # Hypotension # Sacral decubitus ulcer  # Relatively extensive candidal intertrigo  #Cachexia  #Underweight    Clinically Significant Risk Factors     # Cachexia: Estimated body mass index is 17.14 kg/m  as calculated from the following:    Height as of this encounter: 1.626 m (5' 4\").    Weight as of this encounter: 45.3 kg (99 lb 13.9 oz).  # Moderate Malnutrition: based on nutrition assessment      Follow-ups Needed After Discharge   Follow-up Appointments     Follow Up and recommended labs and tests      Follow up with prison physician.  The following labs/tests are   recommended: CBC and BMP within 3-4 days.            Discharge Disposition   Discharged to short-term care facility  Condition at discharge: Stable    Hospital Course   Namita Viera is a 74 year old female with COPD, alcoholism, frequent falls, recent hospital stay after she was found down with hypoxia, diagnosed with PE and discharged to TCU on 7/4, returned for evaluation of rectal bleeding found to have fecal impaction, incidental finding of CHF exacerbation.    # Rectal bleeding  # Fecal impaction  # Opioid-induced constipation  -Had a large bowel movement at TCU associated with bright red blood per rectum, CT here showing fecal impaction.  -GI saw and recommending laxative therapy, patient has difficulty drinking MiraLAX and therefore added " Lucia-Colaceof   -Bleeding resolved since 7/9, tolerated anticoag resumption  -Hemoglobin has drifted down a little but remained stable at discharge at 8.7  -Continued on bowel regimen at discharge.    # Acute on chronic diastolic heart failure  # Contraction alkalosis, resolved  -Last LVEF 65 to 70% 2 weeks ago  -BNP similar to previous around 900, however she has bilateral moderate pleural effusions, 2+ pitting ankle edema that is new.  Suspicion for diastolic heart failure related to IV infusions received during last hospital stay, in the setting of very high salt diet.  Given IV Lasix 40 mg twice daily and changed to p.o. 40 mg twice daily at discharge.    # Peripheral edema  -needs compression, reluctantly agreeable, lymphedema therapist saw here  -Continue Lasix as above    # Recent pulmonary embolus  -Eliquis    # Chronic hypoxic respiratory failure  -Has been on 2 L nasal cannula since recent discharge  -Continue supplemental oxygen  -Multiple underlying reasons to need O2 including advanced COPD, known pulmonary embolus, suspected acute on chronic CHF as above    # Alcoholism  -longstanding issue, per family  -she declined chem dep treatment last admission    # Recent rib fractures  # T9 compression fracture  # Pelvic fractures   -had unwitnessed fall at her Assisted Living prior to previous admission, sustained multiple fractures  -Continue tylenol, low dose hydromorphone as needed  -patient declines to continue on lido patch  -plan to return to TCU at discharge  -consider holding home alendronate while healing acute fractures    # Recent COPD exacerbation  -Finished steroid taper    # Elevated lipase  -Pancreas normal on CT.  No abdominal discomfort.  GI recommends recheck in a few weeks as outpatient to ensure normalizes    # Hypokalemia  # Hypomagnesemia  -Cleared per protocol    # Hypotension  -Recently had septic shock requiring pressors, had difficulty weaning off and was continued on midodrine which  she has been on at [facility] as well.  Okay to continue midodrine.  Monitor blood pressure    # Tobacco use  -Declined patch    # Sacral decubitus ulcer  # Relatively extensive candidal intertrigo  -Continue topical miconazole  -WOC consulted    #Cachexia  #Underweight  # Malnutrition, moderate  -BMI 17 noted, increases her risk of wounds, injuries, falls, infections etc  -RD consult    Consultations This Hospital Stay   CORE CLINIC EVALUATION IP CONSULT  OCCUPATIONAL THERAPY ADULT IP CONSULT  PHYSICAL THERAPY ADULT IP CONSULT  OCCUPATIONAL THERAPY ADULT IP CONSULT  GASTROENTEROLOGY IP CONSULT  WOUND OSTOMY CONTINENCE NURSE  IP CONSULT  CARE MANAGEMENT / SOCIAL WORK IP CONSULT  LYMPHEDEMA THERAPY IP CONSULT  NUTRITION SERVICES ADULT IP CONSULT  PHYSICAL THERAPY ADULT IP CONSULT  OCCUPATIONAL THERAPY ADULT IP CONSULT    Code Status   Prior    Time Spent on this Encounter   I, Ivanna Gonzalez MD, personally saw the patient today and spent greater than 30 minutes discharging this patient.       Ivanna Gonzalez MD  [address] [address]  Phone: [phone]  Fax: [phone]  ______________________________________________________________________    Physical Exam   Vital Signs: Temp: 98.4  F (36.9  C) Temp src: Oral BP: 109/56 Pulse: 94   Resp: 12 SpO2: 91 % O2 Device: None (Room air)    Weight: 99 lbs 13.89 oz  General: in no apparent distress, non-toxic, and alert female lying in hospital bed oriented x3  HEENT: Head normocephalic atraumatic, oral mucosa moist. Sclerae anicteric  Skin: No rashes or lesions, skin of BLE is shiny and tense  Extremities: 2+ pitting edema bilateral ankles,   Psych: Normal affect, mood euthymic  Neuro: Grossly normal       Primary Care Physician   Anahi Andersen    Discharge Orders      Primary Care - Care Coordination Referral      General info for SNF    Length of Stay Estimate: Short Term Care: Estimated # of Days  31-90  Condition at Discharge: Improving  Level of care:skilled   Rehabilitation Potential: Good  Admission H&P remains valid and up-to-date: Yes  Recent Chemotherapy: N/A  Use Nursing Home Standing Orders: Yes     Mantoux instructions    Give two-step Mantoux (PPD) Per Facility Policy Yes     Follow Up and recommended labs and tests    Follow up with penitentiary physician.  The following labs/tests are recommended: CBC and BMP within 3-4 days.     Reason for your hospital stay    Admitted to [facility] due to having bright red blood in stools, which has been felt to be due to fecal impaction.   While in the hospital, also found incidentally to have symptoms of heat failure.     Intake and output    Every shift     Daily weights    Call Provider for weight gain of more than 2 pounds per day or 5 pounds per week.     Activity - Up with nursing assistance     Physical Therapy Adult Consult    Evaluate and treat as clinically indicated.    Reason:  Weakness     Occupational Therapy Adult Consult    Evaluate and treat as clinically indicated.    Reason:  Weakness     Oxygen (SNF/TCU) Discharge     Fall precautions     Diet    Follow this diet upon discharge: Orders Placed This Encounter      Fluid restriction 2000 ML FLUID      Combination Diet 2 gm NA Diet       Significant Results and Procedures   Most Recent 3 CBC's:  Recent Labs   Lab Test 07/11/24  0702 07/10/24  0649 07/09/24  0704 07/08/24  0124 07/07/24  1911 07/01/24  0517   WBC  --   --  8.8  --  7.5 12.5*   HGB 8.1* 8.3* 8.6*   < > 8.9* 8.7*   MCV  --   --  94  --  96 99   PLT  --   --  281  --  242 205    < > = values in this interval not displayed.     Most Recent 3 BMP's:  Recent Labs   Lab Test 07/12/24  0753 07/11/24  1427 07/11/24  0702 07/10/24  0649     --  136 136   POTASSIUM 3.6 4.6 3.0* 3.7   CHLORIDE 96*  --  93* 96*   CO2 29  --  31* 32*   BUN 25.0*  --  29.4* 28.5*   CR 0.81  --  0.89 0.90   ANIONGAP 10  --  12 8   PETER 8.6*  --   8.6* 8.9   GLC 88  --  85 88     Most Recent 2 LFT's:  Recent Labs   Lab Test 07/07/24  1911 06/28/24  0524   AST 20 18   ALT 16 14   ALKPHOS 96 80   BILITOTAL <0.2 0.2   ,   Results for orders placed or performed during the hospital encounter of 07/07/24   CTA Abdomen Pelvis with Contrast    Narrative    EXAM: CTA ABDOMEN PELVIS WITH CONTRAST  LOCATION: [facility]  DATE: 7/7/2024    INDICATION: Rectal bleeding today x1. Neptali blood in rectal vault on exam.  COMPARISON: None.  TECHNIQUE: CT angiogram abdomen pelvis during arterial phase of injection of IV contrast. 2D and 3D MIP reconstructions were performed by the CT technologist. Dose reduction techniques were used.  CONTRAST: 75 ml Isovue 370.    FINDINGS:  ANGIOGRAM ABDOMEN/PELVIS: No aortic dissection or aneurysm. Patent portal and hepatic veins. Severe atherosclerotic vascular calcifications. Prominent rectal vessels, correlate for hemorrhoids.    LOWER CHEST: Moderate bilateral pleural effusions with compressive atelectasis. Moderately sized hiatal hernia. Coronary artery calcifications.    HEPATOBILIARY: Normal.    PANCREAS: Normal.    SPLEEN: Normal.    ADRENAL GLANDS: Normal.    KIDNEYS/BLADDER: Normal.    BOWEL: Large amount of feces in the rectal vault, correlate for fecal impaction and constipation. Diverticulosis. No diverticulitis, colitis, or obstruction. Small volume free fluid. No free air.    LYMPH NODES: Normal.    PELVIC ORGANS: Normal.    MUSCULOSKELETAL: Anasarca. Bilateral ORIFs of the femurs. Subacute fractures of the superior and inferior left pubic rami. Subacute right sacral ala fracture. Subacute healing fracture of the left iliac crest.      Impression    IMPRESSION:  1.  No evidence of active extravasation of contrast to suggest active GI bleed.  2.  Prominent rectal vessels, correlate for hemorrhoids.  3.  Small amount of free fluid. Mild anasarca.  4.  Constipation and fecal impaction.  5.  Moderate  bilateral pleural effusions with subtotal compressive atelectasis of the lower lobes.  6.  Coronary artery disease and atherosclerotic vascular disease.       Discharge Medications   Discharge Medication List as of 7/12/2024 10:44 AM        START taking these medications    Details   furosemide (LASIX) 40 MG tablet Take 1 tablet (40 mg) by mouth 2 times daily, Transitional      losartan (COZAAR) 25 MG tablet Take 1 tablet (25 mg) by mouth daily, Transitional      polyethylene glycol (MIRALAX) 17 GM/Dose powder Take 17 g by mouth daily, Transitional      potassium chloride ER (K-TAB) 20 MEQ CR tablet Take 1 tablet (20 mEq) by mouth daily, Transitional      senna-docusate (SENOKOT-S/PERICOLACE) 8.6-50 MG tablet Take 1 tablet by mouth daily, Transitional           CONTINUE these medications which have CHANGED    Details   apixaban ANTICOAGULANT (ELIQUIS) 5 MG tablet Take 1 tablet (5 mg) by mouth 2 times daily for 5 days, Disp-10 tablet, R-0, E-Prescribe           CONTINUE these medications which have NOT CHANGED    Details   albuterol (PROAIR HFA/PROVENTIL HFA/VENTOLIN HFA) 108 (90 Base) MCG/ACT inhaler INHALE 1-2 PUFFS INTO THE LUNGS EVERY 6 HOURS AS NEEDED FOR COUGH OR SHORTNESS OF BREATH OR WHEEZE, Disp-18 g, R-0, E-PrescribePharmacy may dispense brand covered by insurance (Proair, or proventil or ventolin or generic albuterol inhaler)      alendronate (FOSAMAX) 70 MG tablet Take 70 mg by mouth every 7 days, Historical      aspirin (ASA) 81 MG EC tablet Take 1 tablet (81 mg) by mouth daily, No Print Out      calcium carbonate (OS-PETER) 1500 (600 Ca) MG tablet Take 600 mg by mouth daily, Historical      folic acid (FOLVITE) 1 MG tablet Take 1 tablet (1 mg) by mouth daily, No Print Out      HYDROmorphone (DILAUDID) 2 MG tablet Take 0.5 tablets (1 mg) by mouth every 4 hours as needed for severe pain, Disp-10 tablet, R-0, Local Print      ibuprofen (ADVIL/MOTRIN) 200 MG tablet Take 200 mg by mouth every 6 hours as needed  for pain, Historical      Lidocaine (LIDOCARE) 4 % Patch Place 2 patches onto the skin every 24 hours To prevent lidocaine toxicity, patient should be patch free for 12 hrs daily. Apply to left lower chest for rib fractures.No Print Out      melatonin 1 MG TABS tablet Take 1 tablet (1 mg) by mouth nightly as needed for sleep, Disp-90 tablet, R-3, E-Prescribe      miconazole (MICATIN) 2 % external powder Apply topically 2 times dailyNo Print Out      midodrine (PROAMATINE) 2.5 MG tablet Take 2.5 mg by mouth 3 times daily (with meals) (Hold for SBP greater than 155), Historical      multivitamin, therapeutic (THERA-VIT) TABS tablet Take 1 tablet by mouth daily, Historical      nicotine (NICODERM CQ) 14 MG/24HR 24 hr patch Place 1 patch onto the skin daily, No Print Out      omeprazole (PRILOSEC) 20 MG DR capsule Take 1 capsule (20 mg) by mouth daily, Disp-90 capsule, R-3, E-Prescribe      thiamine (B-1) 100 MG tablet Take 1 tablet (100 mg) by mouth daily, No Print Out      vitamin C (ASCORBIC ACID) 500 MG tablet Take 500 mg by mouth daily, Historical      Vitamin D, Cholecalciferol, 25 MCG (1000 UT) TABS Take 1,000 Units by mouth daily, Historical           STOP taking these medications       predniSONE (DELTASONE) 20 MG tablet Comments:   Reason for Stopping:             Allergies   Allergies   Allergen Reactions    Penicillins Hives

## 2024-07-12 NOTE — PLAN OF CARE
Problem: Heart Failure  Goal: Optimal Coping  Outcome: Progressing     Problem: Fall Injury Risk  Goal: Absence of Fall and Fall-Related Injury  Outcome: Progressing  Intervention: Identify and Manage Contributors  Recent Flowsheet Documentation  Taken 7/11/2024 1530 by Mike Swan, RN  Medication Review/Management: medications reviewed  Intervention: Promote Injury-Free Environment  Recent Flowsheet Documentation  Taken 7/11/2024 1530 by Miek Swan, RN  Safety Promotion/Fall Prevention:   activity supervised   assistive device/personal items within reach   bedside attendant   clutter free environment maintained   increased rounding and observation   increase visualization of patient   lighting adjusted   nonskid shoes/slippers when out of bed   patient and family education   room door open   room organization consistent   safety round/check completed   Goal Outcome Evaluation:       VSS on RA. A&Ox4, forgetful. Tele monitoring DC'd. K & mag protocols OK, recheck w/ am labs. Denies pain. Lasix now PO. Had multiple large BMs tonight, incontinent of B&B. Family visiting at bedside.

## 2024-07-12 NOTE — PROGRESS NOTES
"SPIRITUAL HEALTH SERVICES - Brief Note  [facility] P1    Referral Source: routine consult/length of stay    Pt Namita declined a visit and prayer saying \"I'm really alright, thank you\"    Plan: [facility] available upon request.    Time spent with patient: 5 min     Keri Alston Mdiv '26    Intern      [facility] available 24/7 for emergent requests/referrals, either by paging the on-call  or by entering an ASAP/STAT consult in Epic (this will also page the on-call ).    "

## 2024-07-15 ENCOUNTER — TRANSITIONAL CARE UNIT VISIT (OUTPATIENT)
Dept: GERIATRICS | Facility: CLINIC | Age: 74
End: 2024-07-15
Payer: COMMERCIAL

## 2024-07-15 VITALS
DIASTOLIC BLOOD PRESSURE: 68 MMHG | TEMPERATURE: 97.1 F | WEIGHT: 90.6 LBS | HEART RATE: 81 BPM | RESPIRATION RATE: 20 BRPM | OXYGEN SATURATION: 93 % | SYSTOLIC BLOOD PRESSURE: 105 MMHG | BODY MASS INDEX: 15.55 KG/M2

## 2024-07-15 DIAGNOSIS — S22.070D CLOSED WEDGE COMPRESSION FRACTURE OF T9 VERTEBRA WITH ROUTINE HEALING, SUBSEQUENT ENCOUNTER: ICD-10-CM

## 2024-07-15 DIAGNOSIS — K56.41 FECAL IMPACTION IN RECTUM (H): ICD-10-CM

## 2024-07-15 DIAGNOSIS — Z86.711 HISTORY OF PULMONARY EMBOLISM: ICD-10-CM

## 2024-07-15 DIAGNOSIS — J43.9 PULMONARY EMPHYSEMA, UNSPECIFIED EMPHYSEMA TYPE (H): ICD-10-CM

## 2024-07-15 DIAGNOSIS — I95.9 HYPOTENSION, UNSPECIFIED HYPOTENSION TYPE: ICD-10-CM

## 2024-07-15 DIAGNOSIS — J96.01 ACUTE RESPIRATORY FAILURE WITH HYPOXIA (H): ICD-10-CM

## 2024-07-15 DIAGNOSIS — K92.1 MELENA: Primary | ICD-10-CM

## 2024-07-15 DIAGNOSIS — S32.301D CLOSED NONDISPLACED FRACTURE OF RIGHT ILIUM WITH ROUTINE HEALING, UNSPECIFIED FRACTURE MORPHOLOGY, SUBSEQUENT ENCOUNTER: ICD-10-CM

## 2024-07-15 DIAGNOSIS — I50.21 ACUTE SYSTOLIC HEART FAILURE (H): ICD-10-CM

## 2024-07-15 DIAGNOSIS — K59.03 DRUG-INDUCED CONSTIPATION: ICD-10-CM

## 2024-07-15 LAB
ANION GAP SERPL CALCULATED.3IONS-SCNC: 10 MMOL/L (ref 7–15)
BUN SERPL-MCNC: 20.3 MG/DL (ref 8–23)
CALCIUM SERPL-MCNC: 9.2 MG/DL (ref 8.8–10.2)
CHLORIDE SERPL-SCNC: 96 MMOL/L (ref 98–107)
CREAT SERPL-MCNC: 0.78 MG/DL (ref 0.51–0.95)
EGFRCR SERPLBLD CKD-EPI 2021: 78 ML/MIN/1.73M2
ERYTHROCYTE [DISTWIDTH] IN BLOOD BY AUTOMATED COUNT: 15.4 % (ref 10–15)
GLUCOSE SERPL-MCNC: 87 MG/DL (ref 70–99)
HCO3 SERPL-SCNC: 29 MMOL/L (ref 22–29)
HCT VFR BLD AUTO: 24.5 % (ref 35–47)
HGB BLD-MCNC: 7.7 G/DL (ref 11.7–15.7)
MCH RBC QN AUTO: 32 PG (ref 26.5–33)
MCHC RBC AUTO-ENTMCNC: 31.4 G/DL (ref 31.5–36.5)
MCV RBC AUTO: 102 FL (ref 78–100)
PLATELET # BLD AUTO: 271 10E3/UL (ref 150–450)
POTASSIUM SERPL-SCNC: 3.7 MMOL/L (ref 3.4–5.3)
RBC # BLD AUTO: 2.41 10E6/UL (ref 3.8–5.2)
SODIUM SERPL-SCNC: 135 MMOL/L (ref 135–145)
WBC # BLD AUTO: 5.7 10E3/UL (ref 4–11)

## 2024-07-15 PROCEDURE — 36415 COLL VENOUS BLD VENIPUNCTURE: CPT | Mod: ORL | Performed by: NURSE PRACTITIONER

## 2024-07-15 PROCEDURE — 85027 COMPLETE CBC AUTOMATED: CPT | Mod: ORL | Performed by: NURSE PRACTITIONER

## 2024-07-15 PROCEDURE — P9604 ONE-WAY ALLOW PRORATED TRIP: HCPCS | Mod: ORL | Performed by: NURSE PRACTITIONER

## 2024-07-15 PROCEDURE — 99310 SBSQ NF CARE HIGH MDM 45: CPT | Performed by: NURSE PRACTITIONER

## 2024-07-15 PROCEDURE — 80048 BASIC METABOLIC PNL TOTAL CA: CPT | Mod: ORL | Performed by: NURSE PRACTITIONER

## 2024-07-15 NOTE — LETTER
7/15/2024      Namita Viera  [address]  [place] [address]        [facility]    Code Status:  FULL CODE   Visit Type:   Chief Complaint   Patient presents with     TCU admit     Facility:  [facility]) [[id number]]         HPI: Namita Viera is a 74 year old female who I am seeing today for re admit to the TCU.  Pt recently re hospitalized with GI bleed. Past medical history includes  COPD, alcoholism, frequent falls. Pt had recently been hospitalized 6/27-7/4 post fall. Pt found to have PE. Pt at TCU and had rectal bleeding. Pt found to have fecal impaction leading to rectal bleed. Also found to have CHF. BNP similar to previous around 900, however pt with bilateral moderate pleural effusions, 2+ pitting ankle edema that is new.  Suspicion for diastolic heart failure related to IV infusions received during last hospital stay. Pt given  IV Lasix 40 mg twice daily and changed to p.o. 40 mg twice daily at discharge. Hx of ETOH abuse. Declined treatment last hospitalization.    Transitional Care Course: Today patient sitting up in bedside chair.  She denies any further rectal bleeding.  She is having regular bowel movements.  She continues on oxygen at 1 L.  She was not on oxygen at home.  Will attempt to wean off.  Weights have been stable.  She denies any shortness of breath or chest pain.  No lower extremity edema.  She is eating well.  Recent T9, pelvic fracture and rib fractures due to recent fall.  Pain well-controlled.      Assessment/Plan:     Rectal bleeding  Fecal impaction  Opioid-induced constipation  -CT showed fecal impaction.  -Patient treated with laxatives; impaction improved.  -Initially anticoagulant held.  But resumed.  -Bleeding resolved since 7/9.  -Hemoglobin at discharge at 8.7  -Follow-up hemoglobin.  -Continue PTA senna and MiraLAX.     Acute on chronic diastolic heart failure  -LVEF 65 to 70% 2 weeks ago  -continues on lasix 40 mg BID.   -2  gm sodium diet.   -every day weights.   -peripheral edema resolved.      pulmonary embolus  -Continue Eliquis 5 mg BID X 5 more days, then ASA 81 mg every day.      Chronic hypoxic respiratory failure  -known COPD, known pulmonary embolus,  acute on chronic CHF   -was not on O2 at home.   -Continue flutter valve.   -Attempt to wean off O2.      rib fractures  T9 compression fracture  Pelvic fractures   -Continue tylenol, low dose hydromorphone as needed (will attempt to wean off hydromorphone secondary to recent falls)     Recent COPD exacerbation  -Finished steroid taper     Elevated lipase  -Pancreas normal on CT.  No abdominal discomfort.    -Follow-up enzymes in 1 week.      Hypotension  -Continue prior to admit midodrine.  -Monitor blood pressure       -Okay for PT OT eval and treat.    -Follow-up CBC and BMP.      Active Ambulatory Problems     Diagnosis Date Noted     Esophageal reflux      Smoker 05/01/2018     Ear mass 06/15/2018     Age-related cataract of both eyes, unspecified age-related cataract type 11/27/2019     Alcohol dependence (H) 05/06/2021     Chronic cough 05/06/2021     Benign essential hypertension 05/06/2021     Vitamin D deficiency 05/06/2021     Right Intertroch Hip Fract 04/30/2018     Hypercalcemia 02/07/2022     Anemia, unspecified type 02/07/2022     Leg cramping 02/07/2022     Osteoporosis 02/07/2022     Nocturia 02/07/2022     COPD (chronic obstructive pulmonary disease) (H) 02/07/2022     UTI (urinary tract infection) 05/16/2023     Acute UTI 05/16/2023     Hip fracture, right, closed, initial encounter (H) 05/16/2023     Hyponatremia 05/16/2023     Hypokalemia 06/27/2024     COPD exacerbation (H) 06/27/2024     Acute respiratory failure with hypercapnia (H) 06/27/2024     Altered mental status, unspecified altered mental status type 06/27/2024     Other acute pulmonary embolism without acute cor pulmonale (H) 06/27/2024     Aspiration pneumonia, unspecified aspiration pneumonia  type, unspecified laterality, unspecified part of lung (H) 06/27/2024     History of tobacco use 07/05/2024     Vaginitis and vulvovaginitis 04/29/2008     Sciatic pain 12/06/2021     Polyp of colon 07/09/2021     HTN (hypertension) 12/06/2021     History of colonic polyps 07/09/2021     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     Bilateral leg edema 12/06/2021     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     Acquired absence of organ, genital organs 04/29/2008     Rectal bleeding 07/07/2024     Fecal impaction (H) 07/07/2024     Acute on chronic diastolic congestive heart failure (H) 07/07/2024     Pelvic fracture (H) 07/08/2024     Resolved Ambulatory Problems     Diagnosis Date Noted     History of alcohol abuse 11/27/2019     Rib pain on left side 05/06/2021     SAH (subarachnoid hemorrhage) (H) 04/30/2018     Closed nondisplaced intertrochanteric fracture of left femur, initial encounter (H) 05/01/2018     Back pain 10/12/2021     Closed fracture of sacrum with routine healing 10/12/2021     Pyuria 10/12/2021     Acute cystitis without hematuria 10/13/2021     Weight loss 02/07/2022     Past Medical History:   Diagnosis Date     Acid reflux      Alcohol use      Closed fracture of left femur (H)      Emphysema of lung (H) 02/07/2022     Hip fracture requiring operative repair (H)      Hypertension 01/2021     Traumatic closed displaced fracture of distal end of radius      Allergies   Allergen Reactions     Penicillins Hives       All Meds and Allergies reviewed in the record at the facility and is the most up-to-date.    Post Discharge Medication Reconciliation Status: discharge medications reconciled, continue medications without change  Current Outpatient Medications   Medication Sig Dispense Refill     albuterol (PROAIR HFA/PROVENTIL HFA/VENTOLIN HFA) 108 (90 Base) MCG/ACT inhaler INHALE 1-2 PUFFS INTO THE LUNGS EVERY 6 HOURS AS NEEDED FOR COUGH OR SHORTNESS OF BREATH OR WHEEZE 18 g 0     alendronate (FOSAMAX) 70  MG tablet Take 70 mg by mouth every 7 days       apixaban ANTICOAGULANT (ELIQUIS) 5 MG tablet Take 1 tablet (5 mg) by mouth 2 times daily for 5 days 10 tablet 0     aspirin (ASA) 81 MG EC tablet Take 1 tablet (81 mg) by mouth daily       calcium carbonate (OS-PETER) 1500 (600 Ca) MG tablet Take 600 mg by mouth daily       folic acid (FOLVITE) 1 MG tablet Take 1 tablet (1 mg) by mouth daily       furosemide (LASIX) 40 MG tablet Take 1 tablet (40 mg) by mouth 2 times daily       HYDROmorphone (DILAUDID) 2 MG tablet Take 0.5 tablets (1 mg) by mouth every 4 hours as needed for severe pain 10 tablet 0     ibuprofen (ADVIL/MOTRIN) 200 MG tablet Take 200 mg by mouth every 6 hours as needed for pain       Lidocaine (LIDOCARE) 4 % Patch Place 2 patches onto the skin every 24 hours To prevent lidocaine toxicity, patient should be patch free for 12 hrs daily. Apply to left lower chest for rib fractures.       losartan (COZAAR) 25 MG tablet Take 1 tablet (25 mg) by mouth daily       melatonin 1 MG TABS tablet Take 1 tablet (1 mg) by mouth nightly as needed for sleep 90 tablet 3     miconazole (MICATIN) 2 % external powder Apply topically 2 times daily       midodrine (PROAMATINE) 2.5 MG tablet Take 2.5 mg by mouth 3 times daily (with meals) (Hold for SBP greater than 155)       multivitamin, therapeutic (THERA-VIT) TABS tablet Take 1 tablet by mouth daily       nicotine (NICODERM CQ) 14 MG/24HR 24 hr patch Place 1 patch onto the skin daily       omeprazole (PRILOSEC) 20 MG DR capsule Take 1 capsule (20 mg) by mouth daily 90 capsule 3     polyethylene glycol (MIRALAX) 17 GM/Dose powder Take 17 g by mouth daily       potassium chloride ER (K-TAB) 20 MEQ CR tablet Take 1 tablet (20 mEq) by mouth daily       senna-docusate (SENOKOT-S/PERICOLACE) 8.6-50 MG tablet Take 1 tablet by mouth daily       thiamine (B-1) 100 MG tablet Take 1 tablet (100 mg) by mouth daily       vitamin C (ASCORBIC ACID) 500 MG tablet Take 500 mg by mouth daily        Vitamin D, Cholecalciferol, 25 MCG (1000 UT) TABS Take 1,000 Units by mouth daily       No current facility-administered medications for this visit.       REVIEW OF SYSTEMS:   10 point review of systems reviewed and pertinent positives in the HPI.     PHYSICAL EXAMINATION:  Physical Exam     Vital signs: /68   Pulse 81   Temp 97.1  F (36.2  C)   Resp 20   Wt 41.1 kg (90 lb 9.6 oz)   SpO2 93%   BMI 15.55 kg/m    General: Awake, Alert, oriented x3, sitting up in bedside chair, conversant  HEENT:Pink conjunctiva,moist oral mucosa  NECK: Supple  CVS:  S1  S2, without murmur or gallop.   LUNG: Clear to auscultation, No wheezes, rales or rhonci.  O2 at 1 L.  BACK: No kyphosis of the thoracic spine  ABDOMEN: Soft, nontender to palpation, with positive bowel sounds  EXTREMITIES: Moves both upper and lower extremities, no pedal edema, no calf tenderness  SKIN: Warm and dry  NEUROLOGIC: Intact, pulses palpable  PSYCHIATRIC: Pleasant affect.    Labs:  All labs reviewed in the nursing home record and Epic   @  Lab Results   Component Value Date    WBC 5.7 07/15/2024     Lab Results   Component Value Date    RBC 2.41 07/15/2024     Lab Results   Component Value Date    HGB 7.7 07/15/2024     Lab Results   Component Value Date    HCT 24.5 07/15/2024     Lab Results   Component Value Date     07/15/2024     Lab Results   Component Value Date    MCH 32.0 07/15/2024     Lab Results   Component Value Date    MCHC 31.4 07/15/2024     Lab Results   Component Value Date    RDW 15.4 07/15/2024     Lab Results   Component Value Date     07/15/2024        @Last Comprehensive Metabolic Panel:  Sodium   Date Value Ref Range Status   07/15/2024 135 135 - 145 mmol/L Final     Potassium   Date Value Ref Range Status   07/15/2024 3.7 3.4 - 5.3 mmol/L Final     Potassium Whole Blood   Date Value Ref Range Status   06/27/2024 2.6 (LL) 3.4 - 5.3 mmol/L Final     Chloride   Date Value Ref Range Status   07/15/2024 96  (L) 98 - 107 mmol/L Final   02/04/2022 98 98 - 107 mmol/L Final     Carbon Dioxide (CO2)   Date Value Ref Range Status   07/15/2024 29 22 - 29 mmol/L Final   02/04/2022 24 22 - 31 mmol/L Final     Anion Gap   Date Value Ref Range Status   07/15/2024 10 7 - 15 mmol/L Final   02/04/2022 13 5 - 18 mmol/L Final     Glucose   Date Value Ref Range Status   07/15/2024 87 70 - 99 mg/dL Final   02/04/2022 92 70 - 125 mg/dL Final     GLUCOSE BY METER POCT   Date Value Ref Range Status   07/04/2024 94 70 - 99 mg/dL Final     Urea Nitrogen   Date Value Ref Range Status   07/15/2024 20.3 8.0 - 23.0 mg/dL Final   02/04/2022 10 8 - 28 mg/dL Final     Creatinine   Date Value Ref Range Status   07/15/2024 0.78 0.51 - 0.95 mg/dL Final     GFR Estimate   Date Value Ref Range Status   07/15/2024 78 >60 mL/min/1.73m2 Final   05/20/2021 >60 >60 mL/min/1.73m2 Final     Calcium   Date Value Ref Range Status   07/15/2024 9.2 8.8 - 10.2 mg/dL Final     > 45 minutes spent preparing for this visit, reviewing hospital records, labs, imaging, meds as well as face to face time spent with pt.     This note has been dictated using voice recognition software. Any grammatical or context distortions are unintentional and inherent to the software    Electronically signed by: Cori Goyal CNP       Sincerely,        Cori Goyal NP

## 2024-07-16 NOTE — PROGRESS NOTES
[facility]    Code Status:  FULL CODE   Visit Type:   Chief Complaint   Patient presents with    TCU admit     Facility:  [facility] ([facility]) [[id number]]         HPI: Namita Viera is a 74 year old female who I am seeing today for re admit to the TCU.  Pt recently re hospitalized with GI bleed. Past medical history includes  COPD, alcoholism, frequent falls. Pt had recently been hospitalized 6/27-7/4 post fall. Pt found to have PE. Pt at TCU and had rectal bleeding. Pt found to have fecal impaction leading to rectal bleed. Also found to have CHF. BNP similar to previous around 900, however pt with bilateral moderate pleural effusions, 2+ pitting ankle edema that is new.  Suspicion for diastolic heart failure related to IV infusions received during last hospital stay. Pt given  IV Lasix 40 mg twice daily and changed to p.o. 40 mg twice daily at discharge. Hx of ETOH abuse. Declined treatment last hospitalization.    Transitional Care Course: Today patient sitting up in bedside chair.  She denies any further rectal bleeding.  She is having regular bowel movements.  She continues on oxygen at 1 L.  She was not on oxygen at home.  Will attempt to wean off.  Weights have been stable.  She denies any shortness of breath or chest pain.  No lower extremity edema.  She is eating well.  Recent T9, pelvic fracture and rib fractures due to recent fall.  Pain well-controlled.      Assessment/Plan:     Rectal bleeding  Fecal impaction  Opioid-induced constipation  -CT showed fecal impaction.  -Patient treated with laxatives; impaction improved.  -Initially anticoagulant held.  But resumed.  -Bleeding resolved since 7/9.  -Hemoglobin at discharge at 8.7  -Follow-up hemoglobin.  -Continue PTA senna and MiraLAX.     Acute on chronic diastolic heart failure  -LVEF 65 to 70% 2 weeks ago  -continues on lasix 40 mg BID.   -2 gm sodium diet.   -every day weights.   -peripheral edema resolved.      pulmonary  embolus  -Continue Eliquis 5 mg BID X 5 more days, then ASA 81 mg every day.      Chronic hypoxic respiratory failure  -known COPD, known pulmonary embolus,  acute on chronic CHF   -was not on O2 at home.   -Continue flutter valve.   -Attempt to wean off O2.      rib fractures  T9 compression fracture  Pelvic fractures   -Continue tylenol, low dose hydromorphone as needed (will attempt to wean off hydromorphone secondary to recent falls)     Recent COPD exacerbation  -Finished steroid taper     Elevated lipase  -Pancreas normal on CT.  No abdominal discomfort.    -Follow-up enzymes in 1 week.      Hypotension  -Continue prior to admit midodrine.  -Monitor blood pressure       -Okay for PT OT eval and treat.    -Follow-up CBC and BMP.      Active Ambulatory Problems     Diagnosis Date Noted    Esophageal reflux     Smoker 05/01/2018    Ear mass 06/15/2018    Age-related cataract of both eyes, unspecified age-related cataract type 11/27/2019    Alcohol dependence (H) 05/06/2021    Chronic cough 05/06/2021    Benign essential hypertension 05/06/2021    Vitamin D deficiency 05/06/2021    Right Intertroch Hip Fract 04/30/2018    Hypercalcemia 02/07/2022    Anemia, unspecified type 02/07/2022    Leg cramping 02/07/2022    Osteoporosis 02/07/2022    Nocturia 02/07/2022    COPD (chronic obstructive pulmonary disease) (H) 02/07/2022    UTI (urinary tract infection) 05/16/2023    Acute UTI 05/16/2023    Hip fracture, right, closed, initial encounter (H) 05/16/2023    Hyponatremia 05/16/2023    Hypokalemia 06/27/2024    COPD exacerbation (H) 06/27/2024    Acute respiratory failure with hypercapnia (H) 06/27/2024    Altered mental status, unspecified altered mental status type 06/27/2024    Other acute pulmonary embolism without acute cor pulmonale (H) 06/27/2024    Aspiration pneumonia, unspecified aspiration pneumonia type, unspecified laterality, unspecified part of lung (H) 06/27/2024    History of tobacco use 07/05/2024     Vaginitis and vulvovaginitis 04/29/2008    Sciatic pain 12/06/2021    Polyp of colon 07/09/2021    HTN (hypertension) 12/06/2021    History of colonic polyps 07/09/2021    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    Bilateral leg edema 12/06/2021    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    Acquired absence of organ, genital organs 04/29/2008    Rectal bleeding 07/07/2024    Fecal impaction (H) 07/07/2024    Acute on chronic diastolic congestive heart failure (H) 07/07/2024    Pelvic fracture (H) 07/08/2024     Resolved Ambulatory Problems     Diagnosis Date Noted    History of alcohol abuse 11/27/2019    Rib pain on left side 05/06/2021    SAH (subarachnoid hemorrhage) (H) 04/30/2018    Closed nondisplaced intertrochanteric fracture of left femur, initial encounter (H) 05/01/2018    Back pain 10/12/2021    Closed fracture of sacrum with routine healing 10/12/2021    Pyuria 10/12/2021    Acute cystitis without hematuria 10/13/2021    Weight loss 02/07/2022     Past Medical History:   Diagnosis Date    Acid reflux     Alcohol use     Closed fracture of left femur (H)     Emphysema of lung (H) 02/07/2022    Hip fracture requiring operative repair (H)     Hypertension 01/2021    Traumatic closed displaced fracture of distal end of radius      Allergies   Allergen Reactions    Penicillins Hives       All Meds and Allergies reviewed in the record at the facility and is the most up-to-date.    Post Discharge Medication Reconciliation Status: discharge medications reconciled, continue medications without change  Current Outpatient Medications   Medication Sig Dispense Refill    albuterol (PROAIR HFA/PROVENTIL HFA/VENTOLIN HFA) 108 (90 Base) MCG/ACT inhaler INHALE 1-2 PUFFS INTO THE LUNGS EVERY 6 HOURS AS NEEDED FOR COUGH OR SHORTNESS OF BREATH OR WHEEZE 18 g 0    alendronate (FOSAMAX) 70 MG tablet Take 70 mg by mouth every 7 days      apixaban ANTICOAGULANT (ELIQUIS) 5 MG tablet Take 1 tablet (5 mg) by mouth 2 times daily for 5  days 10 tablet 0    aspirin (ASA) 81 MG EC tablet Take 1 tablet (81 mg) by mouth daily      calcium carbonate (OS-PETER) 1500 (600 Ca) MG tablet Take 600 mg by mouth daily      folic acid (FOLVITE) 1 MG tablet Take 1 tablet (1 mg) by mouth daily      furosemide (LASIX) 40 MG tablet Take 1 tablet (40 mg) by mouth 2 times daily      HYDROmorphone (DILAUDID) 2 MG tablet Take 0.5 tablets (1 mg) by mouth every 4 hours as needed for severe pain 10 tablet 0    ibuprofen (ADVIL/MOTRIN) 200 MG tablet Take 200 mg by mouth every 6 hours as needed for pain      Lidocaine (LIDOCARE) 4 % Patch Place 2 patches onto the skin every 24 hours To prevent lidocaine toxicity, patient should be patch free for 12 hrs daily. Apply to left lower chest for rib fractures.      losartan (COZAAR) 25 MG tablet Take 1 tablet (25 mg) by mouth daily      melatonin 1 MG TABS tablet Take 1 tablet (1 mg) by mouth nightly as needed for sleep 90 tablet 3    miconazole (MICATIN) 2 % external powder Apply topically 2 times daily      midodrine (PROAMATINE) 2.5 MG tablet Take 2.5 mg by mouth 3 times daily (with meals) (Hold for SBP greater than 155)      multivitamin, therapeutic (THERA-VIT) TABS tablet Take 1 tablet by mouth daily      nicotine (NICODERM CQ) 14 MG/24HR 24 hr patch Place 1 patch onto the skin daily      omeprazole (PRILOSEC) 20 MG DR capsule Take 1 capsule (20 mg) by mouth daily 90 capsule 3    polyethylene glycol (MIRALAX) 17 GM/Dose powder Take 17 g by mouth daily      potassium chloride ER (K-TAB) 20 MEQ CR tablet Take 1 tablet (20 mEq) by mouth daily      senna-docusate (SENOKOT-S/PERICOLACE) 8.6-50 MG tablet Take 1 tablet by mouth daily      thiamine (B-1) 100 MG tablet Take 1 tablet (100 mg) by mouth daily      vitamin C (ASCORBIC ACID) 500 MG tablet Take 500 mg by mouth daily      Vitamin D, Cholecalciferol, 25 MCG (1000 UT) TABS Take 1,000 Units by mouth daily       No current facility-administered medications for this visit.        REVIEW OF SYSTEMS:   10 point review of systems reviewed and pertinent positives in the HPI.     PHYSICAL EXAMINATION:  Physical Exam     Vital signs: /68   Pulse 81   Temp 97.1  F (36.2  C)   Resp 20   Wt 41.1 kg (90 lb 9.6 oz)   SpO2 93%   BMI 15.55 kg/m    General: Awake, Alert, oriented x3, sitting up in bedside chair, conversant  HEENT:Pink conjunctiva,moist oral mucosa  NECK: Supple  CVS:  S1  S2, without murmur or gallop.   LUNG: Clear to auscultation, No wheezes, rales or rhonci.  O2 at 1 L.  BACK: No kyphosis of the thoracic spine  ABDOMEN: Soft, nontender to palpation, with positive bowel sounds  EXTREMITIES: Moves both upper and lower extremities, no pedal edema, no calf tenderness  SKIN: Warm and dry  NEUROLOGIC: Intact, pulses palpable  PSYCHIATRIC: Pleasant affect.    Labs:  All labs reviewed in the nursing home record and Epic   @  Lab Results   Component Value Date    WBC 5.7 07/15/2024     Lab Results   Component Value Date    RBC 2.41 07/15/2024     Lab Results   Component Value Date    HGB 7.7 07/15/2024     Lab Results   Component Value Date    HCT 24.5 07/15/2024     Lab Results   Component Value Date     07/15/2024     Lab Results   Component Value Date    MCH 32.0 07/15/2024     Lab Results   Component Value Date    MCHC 31.4 07/15/2024     Lab Results   Component Value Date    RDW 15.4 07/15/2024     Lab Results   Component Value Date     07/15/2024        @Last Comprehensive Metabolic Panel:  Sodium   Date Value Ref Range Status   07/15/2024 135 135 - 145 mmol/L Final     Potassium   Date Value Ref Range Status   07/15/2024 3.7 3.4 - 5.3 mmol/L Final     Potassium Whole Blood   Date Value Ref Range Status   06/27/2024 2.6 (LL) 3.4 - 5.3 mmol/L Final     Chloride   Date Value Ref Range Status   07/15/2024 96 (L) 98 - 107 mmol/L Final   02/04/2022 98 98 - 107 mmol/L Final     Carbon Dioxide (CO2)   Date Value Ref Range Status   07/15/2024 29 22 - 29 mmol/L Final    02/04/2022 24 22 - 31 mmol/L Final     Anion Gap   Date Value Ref Range Status   07/15/2024 10 7 - 15 mmol/L Final   02/04/2022 13 5 - 18 mmol/L Final     Glucose   Date Value Ref Range Status   07/15/2024 87 70 - 99 mg/dL Final   02/04/2022 92 70 - 125 mg/dL Final     GLUCOSE BY METER POCT   Date Value Ref Range Status   07/04/2024 94 70 - 99 mg/dL Final     Urea Nitrogen   Date Value Ref Range Status   07/15/2024 20.3 8.0 - 23.0 mg/dL Final   02/04/2022 10 8 - 28 mg/dL Final     Creatinine   Date Value Ref Range Status   07/15/2024 0.78 0.51 - 0.95 mg/dL Final     GFR Estimate   Date Value Ref Range Status   07/15/2024 78 >60 mL/min/1.73m2 Final   05/20/2021 >60 >60 mL/min/1.73m2 Final     Calcium   Date Value Ref Range Status   07/15/2024 9.2 8.8 - 10.2 mg/dL Final     > 45 minutes spent preparing for this visit, reviewing hospital records, labs, imaging, meds as well as face to face time spent with pt.     This note has been dictated using voice recognition software. Any grammatical or context distortions are unintentional and inherent to the software    Electronically signed by: Cori Goyal, CNP

## 2024-07-18 ENCOUNTER — TRANSITIONAL CARE UNIT VISIT (OUTPATIENT)
Dept: GERIATRICS | Facility: CLINIC | Age: 74
End: 2024-07-18
Payer: COMMERCIAL

## 2024-07-18 VITALS
WEIGHT: 90.6 LBS | RESPIRATION RATE: 14 BRPM | BODY MASS INDEX: 17.11 KG/M2 | OXYGEN SATURATION: 95 % | DIASTOLIC BLOOD PRESSURE: 67 MMHG | HEIGHT: 61 IN | HEART RATE: 92 BPM | TEMPERATURE: 98 F | SYSTOLIC BLOOD PRESSURE: 103 MMHG

## 2024-07-18 DIAGNOSIS — J43.9 PULMONARY EMPHYSEMA, UNSPECIFIED EMPHYSEMA TYPE (H): ICD-10-CM

## 2024-07-18 DIAGNOSIS — K59.03 DRUG-INDUCED CONSTIPATION: ICD-10-CM

## 2024-07-18 DIAGNOSIS — Z86.711 HISTORY OF PULMONARY EMBOLISM: ICD-10-CM

## 2024-07-18 DIAGNOSIS — I95.9 HYPOTENSION, UNSPECIFIED HYPOTENSION TYPE: ICD-10-CM

## 2024-07-18 DIAGNOSIS — K92.1 MELENA: Primary | ICD-10-CM

## 2024-07-18 DIAGNOSIS — K56.41 FECAL IMPACTION IN RECTUM (H): ICD-10-CM

## 2024-07-18 PROCEDURE — 99309 SBSQ NF CARE MODERATE MDM 30: CPT | Performed by: NURSE PRACTITIONER

## 2024-07-18 NOTE — LETTER
7/18/2024      Namita Viera  [address]  [place] [address]        [facility]    Code Status:  FULL CODE   Visit Type:   Chief Complaint   Patient presents with     TCU Follow Up     Facility:  [facility]) [[id number]]         HPI: Namita Viera is a 74 year old female who I am seeing today for re admit to the TCU.  Pt recently re hospitalized with GI bleed. Past medical history includes  COPD, alcoholism, frequent falls. Pt had recently been hospitalized 6/27-7/4 post fall. Pt found to have PE. Pt at TCU and had rectal bleeding. Pt found to have fecal impaction leading to rectal bleed. Also found to have CHF. BNP similar to previous around 900, however pt with bilateral moderate pleural effusions, 2+ pitting ankle edema that is new.  Suspicion for diastolic heart failure related to IV infusions received during last hospital stay. Pt given  IV Lasix 40 mg twice daily and changed to p.o. 40 mg twice daily at discharge. Hx of ETOH abuse. Declined treatment last hospitalization.    Transitional Care Course: Today patient lying in bed. No further evidence of bleed. Pt having regular bowel movements. Pt off oxygen today. O2 sats 92-95% on RA. Pt denies any SOB or CP. No LE edema. Recent T9, pelvic fracture and rib fractures due to recent fall.  Pain well-controlled.      Assessment/Plan:     Rectal bleeding  Fecal impaction  Opioid-induced constipation  -CT showed fecal impaction.  -Patient treated with laxatives; impaction improved.  -Initially anticoagulant held.  But resumed.  -Bleeding resolved since 7/9.  -Hemoglobin at discharge at 8.7  -Follow-up hemoglobin on Monday.   -Continue PTA senna and MiraLAX.     Acute on chronic diastolic heart failure  -LVEF 65 to 70% 2 weeks ago  -continues on lasix 40 mg BID.   -2 gm sodium diet.   -every day weights.   -peripheral edema resolved.      pulmonary embolus  -Completed Eliquis. ASA 81 mg every day.      Chronic hypoxic  respiratory failure  -known COPD, known pulmonary embolus,  acute on chronic CHF   -was not on O2 at home.   -Continue flutter valve.   -off O2 today. O2 92-95%.      rib fractures  T9 compression fracture  Pelvic fractures   -Continue tylenol, low dose hydromorphone as needed (will attempt to wean off hydromorphone secondary to recent falls)     Recent COPD exacerbation  -Finished steroid taper     Elevated lipase  -Pancreas normal on CT.  No abdominal discomfort.    -Follow-up enzymes in 1 week.      Hypotension  -Continue prior to admit midodrine.  -Monitor blood pressure     Active Ambulatory Problems     Diagnosis Date Noted     Esophageal reflux      Smoker 05/01/2018     Ear mass 06/15/2018     Age-related cataract of both eyes, unspecified age-related cataract type 11/27/2019     Alcohol dependence (H) 05/06/2021     Chronic cough 05/06/2021     Benign essential hypertension 05/06/2021     Vitamin D deficiency 05/06/2021     Right Intertroch Hip Fract 04/30/2018     Hypercalcemia 02/07/2022     Anemia, unspecified type 02/07/2022     Leg cramping 02/07/2022     Osteoporosis 02/07/2022     Nocturia 02/07/2022     COPD (chronic obstructive pulmonary disease) (H) 02/07/2022     UTI (urinary tract infection) 05/16/2023     Acute UTI 05/16/2023     Hip fracture, right, closed, initial encounter (H) 05/16/2023     Hyponatremia 05/16/2023     Hypokalemia 06/27/2024     COPD exacerbation (H) 06/27/2024     Acute respiratory failure with hypercapnia (H) 06/27/2024     Altered mental status, unspecified altered mental status type 06/27/2024     Other acute pulmonary embolism without acute cor pulmonale (H) 06/27/2024     Aspiration pneumonia, unspecified aspiration pneumonia type, unspecified laterality, unspecified part of lung (H) 06/27/2024     History of tobacco use 07/05/2024     Vaginitis and vulvovaginitis 04/29/2008     Sciatic pain 12/06/2021     Polyp of colon 07/09/2021     HTN (hypertension) 12/06/2021      History of colonic polyps 07/09/2021     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     Bilateral leg edema 12/06/2021     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     Acquired absence of organ, genital organs 04/29/2008     Rectal bleeding 07/07/2024     Fecal impaction (H) 07/07/2024     Acute on chronic diastolic congestive heart failure (H) 07/07/2024     Pelvic fracture (H) 07/08/2024     Resolved Ambulatory Problems     Diagnosis Date Noted     History of alcohol abuse 11/27/2019     Rib pain on left side 05/06/2021     SAH (subarachnoid hemorrhage) (H) 04/30/2018     Closed nondisplaced intertrochanteric fracture of left femur, initial encounter (H) 05/01/2018     Back pain 10/12/2021     Closed fracture of sacrum with routine healing 10/12/2021     Pyuria 10/12/2021     Acute cystitis without hematuria 10/13/2021     Weight loss 02/07/2022     Past Medical History:   Diagnosis Date     Acid reflux      Alcohol use      Closed fracture of left femur (H)      Emphysema of lung (H) 02/07/2022     Hip fracture requiring operative repair (H)      Hypertension 01/2021     Traumatic closed displaced fracture of distal end of radius      Allergies   Allergen Reactions     Penicillins Hives       All Meds and Allergies reviewed in the record at the facility and is the most up-to-date.    Current Outpatient Medications   Medication Sig Dispense Refill     albuterol (PROAIR HFA/PROVENTIL HFA/VENTOLIN HFA) 108 (90 Base) MCG/ACT inhaler INHALE 1-2 PUFFS INTO THE LUNGS EVERY 6 HOURS AS NEEDED FOR COUGH OR SHORTNESS OF BREATH OR WHEEZE 18 g 0     alendronate (FOSAMAX) 70 MG tablet Take 70 mg by mouth every 7 days       aspirin (ASA) 81 MG EC tablet Take 1 tablet (81 mg) by mouth daily       calcium carbonate (OS-PETER) 1500 (600 Ca) MG tablet Take 600 mg by mouth daily       folic acid (FOLVITE) 1 MG tablet Take 1 tablet (1 mg) by mouth daily       furosemide (LASIX) 40 MG tablet Take 1 tablet (40 mg) by mouth 2 times daily        "HYDROmorphone (DILAUDID) 2 MG tablet Take 0.5 tablets (1 mg) by mouth every 4 hours as needed for severe pain 10 tablet 0     ibuprofen (ADVIL/MOTRIN) 200 MG tablet Take 200 mg by mouth every 6 hours as needed for pain       Lidocaine (LIDOCARE) 4 % Patch Place 2 patches onto the skin every 24 hours To prevent lidocaine toxicity, patient should be patch free for 12 hrs daily. Apply to left lower chest for rib fractures.       losartan (COZAAR) 25 MG tablet Take 1 tablet (25 mg) by mouth daily       melatonin 1 MG TABS tablet Take 1 tablet (1 mg) by mouth nightly as needed for sleep 90 tablet 3     miconazole (MICATIN) 2 % external powder Apply topically 2 times daily       midodrine (PROAMATINE) 2.5 MG tablet Take 2.5 mg by mouth 3 times daily (with meals) (Hold for SBP greater than 155)       multivitamin, therapeutic (THERA-VIT) TABS tablet Take 1 tablet by mouth daily       nicotine (NICODERM CQ) 14 MG/24HR 24 hr patch Place 1 patch onto the skin daily       omeprazole (PRILOSEC) 20 MG DR capsule Take 1 capsule (20 mg) by mouth daily 90 capsule 3     polyethylene glycol (MIRALAX) 17 GM/Dose powder Take 17 g by mouth daily       potassium chloride ER (K-TAB) 20 MEQ CR tablet Take 1 tablet (20 mEq) by mouth daily       senna-docusate (SENOKOT-S/PERICOLACE) 8.6-50 MG tablet Take 1 tablet by mouth daily       thiamine (B-1) 100 MG tablet Take 1 tablet (100 mg) by mouth daily       vitamin C (ASCORBIC ACID) 500 MG tablet Take 500 mg by mouth daily       Vitamin D, Cholecalciferol, 25 MCG (1000 UT) TABS Take 1,000 Units by mouth daily       No current facility-administered medications for this visit.       REVIEW OF SYSTEMS:   10 point review of systems reviewed and pertinent positives in the HPI.     PHYSICAL EXAMINATION:  Physical Exam     Vital signs: /67   Pulse 92   Temp 98  F (36.7  C)   Resp 14   Ht 1.549 m (5' 1\")   Wt 41.1 kg (90 lb 9.6 oz)   SpO2 95%   BMI 17.12 kg/m    General: Awake, Alert, " oriented x3, conversant  HEENT:Pink conjunctiva,moist oral mucosa  NECK: Supple  CVS:  S1  S2, without murmur or gallop.   LUNG: Clear to auscultation, No wheezes, rales or rhonci. Off O2 now.   BACK: No kyphosis of the thoracic spine  ABDOMEN: Soft, nontender to palpation, with positive bowel sounds  EXTREMITIES: Moves both upper and lower extremities, no pedal edema, no calf tenderness  SKIN: Warm and dry  NEUROLOGIC: Intact, pulses palpable  PSYCHIATRIC: Pleasant affect.    Labs:  All labs reviewed in the nursing home record and Epic   @  Lab Results   Component Value Date    WBC 5.7 07/15/2024     Lab Results   Component Value Date    RBC 2.41 07/15/2024     Lab Results   Component Value Date    HGB 7.7 07/15/2024     Lab Results   Component Value Date    HCT 24.5 07/15/2024     Lab Results   Component Value Date     07/15/2024     Lab Results   Component Value Date    MCH 32.0 07/15/2024     Lab Results   Component Value Date    MCHC 31.4 07/15/2024     Lab Results   Component Value Date    RDW 15.4 07/15/2024     Lab Results   Component Value Date     07/15/2024        @Last Comprehensive Metabolic Panel:  Sodium   Date Value Ref Range Status   07/15/2024 135 135 - 145 mmol/L Final     Potassium   Date Value Ref Range Status   07/15/2024 3.7 3.4 - 5.3 mmol/L Final     Potassium Whole Blood   Date Value Ref Range Status   06/27/2024 2.6 (LL) 3.4 - 5.3 mmol/L Final     Chloride   Date Value Ref Range Status   07/15/2024 96 (L) 98 - 107 mmol/L Final   02/04/2022 98 98 - 107 mmol/L Final     Carbon Dioxide (CO2)   Date Value Ref Range Status   07/15/2024 29 22 - 29 mmol/L Final   02/04/2022 24 22 - 31 mmol/L Final     Anion Gap   Date Value Ref Range Status   07/15/2024 10 7 - 15 mmol/L Final   02/04/2022 13 5 - 18 mmol/L Final     Glucose   Date Value Ref Range Status   07/15/2024 87 70 - 99 mg/dL Final   02/04/2022 92 70 - 125 mg/dL Final     GLUCOSE BY METER POCT   Date Value Ref Range Status    07/04/2024 94 70 - 99 mg/dL Final     Urea Nitrogen   Date Value Ref Range Status   07/15/2024 20.3 8.0 - 23.0 mg/dL Final   02/04/2022 10 8 - 28 mg/dL Final     Creatinine   Date Value Ref Range Status   07/15/2024 0.78 0.51 - 0.95 mg/dL Final     GFR Estimate   Date Value Ref Range Status   07/15/2024 78 >60 mL/min/1.73m2 Final   05/20/2021 >60 >60 mL/min/1.73m2 Final     Calcium   Date Value Ref Range Status   07/15/2024 9.2 8.8 - 10.2 mg/dL Final       This note has been dictated using voice recognition software. Any grammatical or context distortions are unintentional and inherent to the software    Electronically signed by: Cori Goyal, CATRACHITO       Sincerely,        Cori Goyal, NP

## 2024-07-19 NOTE — PROGRESS NOTES
[facility]    Code Status:  FULL CODE   Visit Type:   Chief Complaint   Patient presents with    TCU Follow Up     Facility:  [facility]) [[id number]]         HPI: Namita Viera is a 74 year old female who I am seeing today for re admit to the TCU.  Pt recently re hospitalized with GI bleed. Past medical history includes  COPD, alcoholism, frequent falls. Pt had recently been hospitalized 6/27-7/4 post fall. Pt found to have PE. Pt at TCU and had rectal bleeding. Pt found to have fecal impaction leading to rectal bleed. Also found to have CHF. BNP similar to previous around 900, however pt with bilateral moderate pleural effusions, 2+ pitting ankle edema that is new.  Suspicion for diastolic heart failure related to IV infusions received during last hospital stay. Pt given  IV Lasix 40 mg twice daily and changed to p.o. 40 mg twice daily at discharge. Hx of ETOH abuse. Declined treatment last hospitalization.    Transitional Care Course: Today patient lying in bed. No further evidence of bleed. Pt having regular bowel movements. Pt off oxygen today. O2 sats 92-95% on RA. Pt denies any SOB or CP. No LE edema. Recent T9, pelvic fracture and rib fractures due to recent fall.  Pain well-controlled.      Assessment/Plan:     Rectal bleeding  Fecal impaction  Opioid-induced constipation  -CT showed fecal impaction.  -Patient treated with laxatives; impaction improved.  -Initially anticoagulant held.  But resumed.  -Bleeding resolved since 7/9.  -Hemoglobin at discharge at 8.7  -Follow-up hemoglobin on Monday.   -Continue PTA senna and MiraLAX.     Acute on chronic diastolic heart failure  -LVEF 65 to 70% 2 weeks ago  -continues on lasix 40 mg BID.   -2 gm sodium diet.   -every day weights.   -peripheral edema resolved.      pulmonary embolus  -Completed Eliquis. ASA 81 mg every day.      Chronic hypoxic respiratory failure  -known COPD, known pulmonary embolus,  acute on chronic CHF    -was not on O2 at home.   -Continue flutter valve.   -off O2 today. O2 92-95%.      rib fractures  T9 compression fracture  Pelvic fractures   -Continue tylenol, low dose hydromorphone as needed (will attempt to wean off hydromorphone secondary to recent falls)     Recent COPD exacerbation  -Finished steroid taper     Elevated lipase  -Pancreas normal on CT.  No abdominal discomfort.    -Follow-up enzymes in 1 week.      Hypotension  -Continue prior to admit midodrine.  -Monitor blood pressure     Active Ambulatory Problems     Diagnosis Date Noted    Esophageal reflux     Smoker 05/01/2018    Ear mass 06/15/2018    Age-related cataract of both eyes, unspecified age-related cataract type 11/27/2019    Alcohol dependence (H) 05/06/2021    Chronic cough 05/06/2021    Benign essential hypertension 05/06/2021    Vitamin D deficiency 05/06/2021    Right Intertroch Hip Fract 04/30/2018    Hypercalcemia 02/07/2022    Anemia, unspecified type 02/07/2022    Leg cramping 02/07/2022    Osteoporosis 02/07/2022    Nocturia 02/07/2022    COPD (chronic obstructive pulmonary disease) (H) 02/07/2022    UTI (urinary tract infection) 05/16/2023    Acute UTI 05/16/2023    Hip fracture, right, closed, initial encounter (H) 05/16/2023    Hyponatremia 05/16/2023    Hypokalemia 06/27/2024    COPD exacerbation (H) 06/27/2024    Acute respiratory failure with hypercapnia (H) 06/27/2024    Altered mental status, unspecified altered mental status type 06/27/2024    Other acute pulmonary embolism without acute cor pulmonale (H) 06/27/2024    Aspiration pneumonia, unspecified aspiration pneumonia type, unspecified laterality, unspecified part of lung (H) 06/27/2024    History of tobacco use 07/05/2024    Vaginitis and vulvovaginitis 04/29/2008    Sciatic pain 12/06/2021    Polyp of colon 07/09/2021    HTN (hypertension) 12/06/2021    History of colonic polyps 07/09/2021    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    Bilateral leg edema 12/06/2021     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    Acquired absence of organ, genital organs 04/29/2008    Rectal bleeding 07/07/2024    Fecal impaction (H) 07/07/2024    Acute on chronic diastolic congestive heart failure (H) 07/07/2024    Pelvic fracture (H) 07/08/2024     Resolved Ambulatory Problems     Diagnosis Date Noted    History of alcohol abuse 11/27/2019    Rib pain on left side 05/06/2021    SAH (subarachnoid hemorrhage) (H) 04/30/2018    Closed nondisplaced intertrochanteric fracture of left femur, initial encounter (H) 05/01/2018    Back pain 10/12/2021    Closed fracture of sacrum with routine healing 10/12/2021    Pyuria 10/12/2021    Acute cystitis without hematuria 10/13/2021    Weight loss 02/07/2022     Past Medical History:   Diagnosis Date    Acid reflux     Alcohol use     Closed fracture of left femur (H)     Emphysema of lung (H) 02/07/2022    Hip fracture requiring operative repair (H)     Hypertension 01/2021    Traumatic closed displaced fracture of distal end of radius      Allergies   Allergen Reactions    Penicillins Hives       All Meds and Allergies reviewed in the record at the facility and is the most up-to-date.    Current Outpatient Medications   Medication Sig Dispense Refill    albuterol (PROAIR HFA/PROVENTIL HFA/VENTOLIN HFA) 108 (90 Base) MCG/ACT inhaler INHALE 1-2 PUFFS INTO THE LUNGS EVERY 6 HOURS AS NEEDED FOR COUGH OR SHORTNESS OF BREATH OR WHEEZE 18 g 0    alendronate (FOSAMAX) 70 MG tablet Take 70 mg by mouth every 7 days      aspirin (ASA) 81 MG EC tablet Take 1 tablet (81 mg) by mouth daily      calcium carbonate (OS-PETER) 1500 (600 Ca) MG tablet Take 600 mg by mouth daily      folic acid (FOLVITE) 1 MG tablet Take 1 tablet (1 mg) by mouth daily      furosemide (LASIX) 40 MG tablet Take 1 tablet (40 mg) by mouth 2 times daily      HYDROmorphone (DILAUDID) 2 MG tablet Take 0.5 tablets (1 mg) by mouth every 4 hours as needed for severe pain 10 tablet 0    ibuprofen  "(ADVIL/MOTRIN) 200 MG tablet Take 200 mg by mouth every 6 hours as needed for pain      Lidocaine (LIDOCARE) 4 % Patch Place 2 patches onto the skin every 24 hours To prevent lidocaine toxicity, patient should be patch free for 12 hrs daily. Apply to left lower chest for rib fractures.      losartan (COZAAR) 25 MG tablet Take 1 tablet (25 mg) by mouth daily      melatonin 1 MG TABS tablet Take 1 tablet (1 mg) by mouth nightly as needed for sleep 90 tablet 3    miconazole (MICATIN) 2 % external powder Apply topically 2 times daily      midodrine (PROAMATINE) 2.5 MG tablet Take 2.5 mg by mouth 3 times daily (with meals) (Hold for SBP greater than 155)      multivitamin, therapeutic (THERA-VIT) TABS tablet Take 1 tablet by mouth daily      nicotine (NICODERM CQ) 14 MG/24HR 24 hr patch Place 1 patch onto the skin daily      omeprazole (PRILOSEC) 20 MG DR capsule Take 1 capsule (20 mg) by mouth daily 90 capsule 3    polyethylene glycol (MIRALAX) 17 GM/Dose powder Take 17 g by mouth daily      potassium chloride ER (K-TAB) 20 MEQ CR tablet Take 1 tablet (20 mEq) by mouth daily      senna-docusate (SENOKOT-S/PERICOLACE) 8.6-50 MG tablet Take 1 tablet by mouth daily      thiamine (B-1) 100 MG tablet Take 1 tablet (100 mg) by mouth daily      vitamin C (ASCORBIC ACID) 500 MG tablet Take 500 mg by mouth daily      Vitamin D, Cholecalciferol, 25 MCG (1000 UT) TABS Take 1,000 Units by mouth daily       No current facility-administered medications for this visit.       REVIEW OF SYSTEMS:   10 point review of systems reviewed and pertinent positives in the HPI.     PHYSICAL EXAMINATION:  Physical Exam     Vital signs: /67   Pulse 92   Temp 98  F (36.7  C)   Resp 14   Ht 1.549 m (5' 1\")   Wt 41.1 kg (90 lb 9.6 oz)   SpO2 95%   BMI 17.12 kg/m    General: Awake, Alert, oriented x3, conversant  HEENT:Pink conjunctiva,moist oral mucosa  NECK: Supple  CVS:  S1  S2, without murmur or gallop.   LUNG: Clear to auscultation, " No wheezes, rales or rhonci. Off O2 now.   BACK: No kyphosis of the thoracic spine  ABDOMEN: Soft, nontender to palpation, with positive bowel sounds  EXTREMITIES: Moves both upper and lower extremities, no pedal edema, no calf tenderness  SKIN: Warm and dry  NEUROLOGIC: Intact, pulses palpable  PSYCHIATRIC: Pleasant affect.    Labs:  All labs reviewed in the nursing home record and Epic   @  Lab Results   Component Value Date    WBC 5.7 07/15/2024     Lab Results   Component Value Date    RBC 2.41 07/15/2024     Lab Results   Component Value Date    HGB 7.7 07/15/2024     Lab Results   Component Value Date    HCT 24.5 07/15/2024     Lab Results   Component Value Date     07/15/2024     Lab Results   Component Value Date    MCH 32.0 07/15/2024     Lab Results   Component Value Date    MCHC 31.4 07/15/2024     Lab Results   Component Value Date    RDW 15.4 07/15/2024     Lab Results   Component Value Date     07/15/2024        @Last Comprehensive Metabolic Panel:  Sodium   Date Value Ref Range Status   07/15/2024 135 135 - 145 mmol/L Final     Potassium   Date Value Ref Range Status   07/15/2024 3.7 3.4 - 5.3 mmol/L Final     Potassium Whole Blood   Date Value Ref Range Status   06/27/2024 2.6 (LL) 3.4 - 5.3 mmol/L Final     Chloride   Date Value Ref Range Status   07/15/2024 96 (L) 98 - 107 mmol/L Final   02/04/2022 98 98 - 107 mmol/L Final     Carbon Dioxide (CO2)   Date Value Ref Range Status   07/15/2024 29 22 - 29 mmol/L Final   02/04/2022 24 22 - 31 mmol/L Final     Anion Gap   Date Value Ref Range Status   07/15/2024 10 7 - 15 mmol/L Final   02/04/2022 13 5 - 18 mmol/L Final     Glucose   Date Value Ref Range Status   07/15/2024 87 70 - 99 mg/dL Final   02/04/2022 92 70 - 125 mg/dL Final     GLUCOSE BY METER POCT   Date Value Ref Range Status   07/04/2024 94 70 - 99 mg/dL Final     Urea Nitrogen   Date Value Ref Range Status   07/15/2024 20.3 8.0 - 23.0 mg/dL Final   02/04/2022 10 8 - 28 mg/dL  Final     Creatinine   Date Value Ref Range Status   07/15/2024 0.78 0.51 - 0.95 mg/dL Final     GFR Estimate   Date Value Ref Range Status   07/15/2024 78 >60 mL/min/1.73m2 Final   05/20/2021 >60 >60 mL/min/1.73m2 Final     Calcium   Date Value Ref Range Status   07/15/2024 9.2 8.8 - 10.2 mg/dL Final       This note has been dictated using voice recognition software. Any grammatical or context distortions are unintentional and inherent to the software    Electronically signed by: Cori Goyal, CNP

## 2024-07-22 ENCOUNTER — TRANSITIONAL CARE UNIT VISIT (OUTPATIENT)
Dept: GERIATRICS | Facility: CLINIC | Age: 74
End: 2024-07-22
Payer: COMMERCIAL

## 2024-07-22 ENCOUNTER — LAB REQUISITION (OUTPATIENT)
Dept: LAB | Facility: CLINIC | Age: 74
End: 2024-07-22
Payer: COMMERCIAL

## 2024-07-22 VITALS
WEIGHT: 96.4 LBS | TEMPERATURE: 98.2 F | BODY MASS INDEX: 18.2 KG/M2 | DIASTOLIC BLOOD PRESSURE: 59 MMHG | HEART RATE: 86 BPM | RESPIRATION RATE: 14 BRPM | SYSTOLIC BLOOD PRESSURE: 105 MMHG | HEIGHT: 61 IN | OXYGEN SATURATION: 92 %

## 2024-07-22 DIAGNOSIS — D64.9 ANEMIA, UNSPECIFIED: ICD-10-CM

## 2024-07-22 DIAGNOSIS — F10.21 ALCOHOL DEPENDENCE IN REMISSION (H): ICD-10-CM

## 2024-07-22 DIAGNOSIS — S22.070D CLOSED WEDGE COMPRESSION FRACTURE OF T9 VERTEBRA WITH ROUTINE HEALING, SUBSEQUENT ENCOUNTER: ICD-10-CM

## 2024-07-22 DIAGNOSIS — D64.9 ANEMIA, UNSPECIFIED TYPE: Primary | ICD-10-CM

## 2024-07-22 DIAGNOSIS — I50.9 CHRONIC CONGESTIVE HEART FAILURE, UNSPECIFIED HEART FAILURE TYPE (H): ICD-10-CM

## 2024-07-22 PROCEDURE — 99309 SBSQ NF CARE MODERATE MDM 30: CPT | Performed by: NURSE PRACTITIONER

## 2024-07-22 NOTE — LETTER
" 7/22/2024      Namita Viera  [address]  [place] [address]        [facility] G[facility]IATR[facility]    Chief Complaint   Patient presents with     RECHECK     HPI:  Namita Viera is a 74 year old  (1950), who is being seen today for an episodic care visit at: [facility]) [[id number]]. Today's concern is: anemia, CHF, COPD, pain.     Namita has a hx of COPD, etoh abuse, frequent falls and recent GI bleed. She had a  PE last hospitalization, completed elequis and now on asa per primary NP notes. She di have rectal bleeding and fecal impaction and CHF exacerbation as well.     Today: Stephenie is seen lying in bed comfortably. She is thin appearing, alert, and appropriate. Reports her pain as well managed on current regimen. Hgb reviewed and recently 7.7, supposed to have a repeat today however not ordered so will order for tomorrow. LE without edema. Weight slighlty up at 96lbs, however no MANRIQUEZ or SOB. Breathing comfortably on RA.     Allergies, and PMH/PSH reviewed in EPIC today.  REVIEW OF SYSTEMS:  4 point ROS including Respiratory, CV, GI and , other than that noted in the HPI,  is negative    Objective:   /59   Pulse 86   Temp 98.2  F (36.8  C)   Resp 14   Ht 1.549 m (5' 1\")   Wt 43.7 kg (96 lb 6.4 oz)   SpO2 92%   BMI 18.21 kg/m    General appearance: alert, cooperative. Thin.   Lungs: respirations unlabored on RA.  Cardiovascular: Regular rate and rhythm.   ABDOMEN: Globular and soft, non tender.    Extremities: extremities normal, atraumatic, no cyanosis or edema  Skin: No rashes or lesions  Neurologic: oriented. No focal deficits.   Psych: interacts well with caregivers,  pleasant    Recent labs in EPIC reviewed by me today.     Assessment/Plan:  1. Anemia, unspecified type    2. Alcohol dependence in remission (H)    3. Chronic congestive heart failure, unspecified heart failure type (H)    4. Closed wedge compression fracture of T9 vertebra with routine healing, " subsequent encounter      Anemia: Last hgb 7.7; repeat tomorrow. No evidence of bleeding.  Alcohol dependence, History: no current concerns; follow up with PCP. Continues on thiamin, folic acid.   Chronic CHF: Weight up mildly, 90--> 96lbs. No swelling. No SOB/MANRIQUEZ. Continue furosemide 40mg bid.   Reports stable pain. Continues on dilaudid, prn ibuprofen.     MED REC REQUIRED  Post Medication Reconciliation Status:  Discharge medications reconciled and changed, see notes/orders      Orders:  -check hgb tomorrow.     Electronically signed by: Joyce Mcintyre CNP       Sincerely,        Joyce Mcintyre CNP

## 2024-07-22 NOTE — PROGRESS NOTES
"[facility] GERIATRICS    Chief Complaint   Patient presents with    RECHECK     HPI:  Namita Viera is a 74 year old  (1950), who is being seen today for an episodic care visit at: [facility]) [[id number]]. Today's concern is: anemia, CHF, COPD, pain.     Namita has a hx of COPD, etoh abuse, frequent falls and recent GI bleed. She had a  PE last hospitalization, completed elequis and now on asa per primary NP notes. She di have rectal bleeding and fecal impaction and CHF exacerbation as well.     Today: Stephenie is seen lying in bed comfortably. She is thin appearing, alert, and appropriate. Reports her pain as well managed on current regimen. Hgb reviewed and recently 7.7, supposed to have a repeat today however not ordered so will order for tomorrow. LE without edema. Weight slighlty up at 96lbs, however no MANRIQUEZ or SOB. Breathing comfortably on RA.     Allergies, and PMH/PSH reviewed in EPIC today.  REVIEW OF SYSTEMS:  4 point ROS including Respiratory, CV, GI and , other than that noted in the HPI,  is negative    Objective:   /59   Pulse 86   Temp 98.2  F (36.8  C)   Resp 14   Ht 1.549 m (5' 1\")   Wt 43.7 kg (96 lb 6.4 oz)   SpO2 92%   BMI 18.21 kg/m    General appearance: alert, cooperative. Thin.   Lungs: respirations unlabored on RA.  Cardiovascular: Regular rate and rhythm.   ABDOMEN: Globular and soft, non tender.    Extremities: extremities normal, atraumatic, no cyanosis or edema  Skin: No rashes or lesions  Neurologic: oriented. No focal deficits.   Psych: interacts well with caregivers,  pleasant    Recent labs in EPIC reviewed by me today.     Assessment/Plan:  1. Anemia, unspecified type    2. Alcohol dependence in remission (H)    3. Chronic congestive heart failure, unspecified heart failure type (H)    4. Closed wedge compression fracture of T9 vertebra with routine healing, subsequent encounter      Anemia: Last hgb 7.7; repeat tomorrow. No evidence of " bleeding.  Alcohol dependence, History: no current concerns; follow up with PCP. Continues on thiamin, folic acid.   Chronic CHF: Weight up mildly, 90--> 96lbs. No swelling. No SOB/MANRIQUEZ. Continue furosemide 40mg bid.   Reports stable pain. Continues on dilaudid, prn ibuprofen.     MED REC REQUIRED  Post Medication Reconciliation Status:  Discharge medications reconciled and changed, see notes/orders      Orders:  -check hgb tomorrow.     Electronically signed by: Joyce Mcintyre CNP

## 2024-07-23 LAB — HGB BLD-MCNC: 8.8 G/DL (ref 11.7–15.7)

## 2024-07-23 PROCEDURE — P9604 ONE-WAY ALLOW PRORATED TRIP: HCPCS | Mod: ORL | Performed by: NURSE PRACTITIONER

## 2024-07-23 PROCEDURE — 36415 COLL VENOUS BLD VENIPUNCTURE: CPT | Mod: ORL | Performed by: NURSE PRACTITIONER

## 2024-07-23 PROCEDURE — 85018 HEMOGLOBIN: CPT | Mod: ORL | Performed by: NURSE PRACTITIONER

## 2024-07-24 PROBLEM — I26.99 OTHER ACUTE PULMONARY EMBOLISM WITHOUT ACUTE COR PULMONALE (H): Status: RESOLVED | Noted: 2024-06-27 | Resolved: 2024-07-24

## 2024-07-30 ENCOUNTER — TRANSITIONAL CARE UNIT VISIT (OUTPATIENT)
Dept: GERIATRICS | Facility: CLINIC | Age: 74
End: 2024-07-30
Payer: COMMERCIAL

## 2024-07-30 VITALS
DIASTOLIC BLOOD PRESSURE: 63 MMHG | OXYGEN SATURATION: 91 % | HEART RATE: 102 BPM | WEIGHT: 87.8 LBS | HEIGHT: 61 IN | SYSTOLIC BLOOD PRESSURE: 109 MMHG | TEMPERATURE: 97.2 F | RESPIRATION RATE: 20 BRPM | BODY MASS INDEX: 16.58 KG/M2

## 2024-07-30 DIAGNOSIS — S22.070D CLOSED WEDGE COMPRESSION FRACTURE OF T9 VERTEBRA WITH ROUTINE HEALING, SUBSEQUENT ENCOUNTER: ICD-10-CM

## 2024-07-30 DIAGNOSIS — J43.9 PULMONARY EMPHYSEMA, UNSPECIFIED EMPHYSEMA TYPE (H): ICD-10-CM

## 2024-07-30 DIAGNOSIS — D64.9 ANEMIA, UNSPECIFIED TYPE: Primary | ICD-10-CM

## 2024-07-30 DIAGNOSIS — K92.1 MELENA: ICD-10-CM

## 2024-07-30 DIAGNOSIS — Z86.711 HISTORY OF PULMONARY EMBOLISM: ICD-10-CM

## 2024-07-30 DIAGNOSIS — I50.9 CHRONIC CONGESTIVE HEART FAILURE, UNSPECIFIED HEART FAILURE TYPE (H): ICD-10-CM

## 2024-07-30 DIAGNOSIS — K59.03 DRUG-INDUCED CONSTIPATION: ICD-10-CM

## 2024-07-30 PROCEDURE — 99309 SBSQ NF CARE MODERATE MDM 30: CPT | Performed by: NURSE PRACTITIONER

## 2024-07-30 NOTE — LETTER
7/30/2024      Namita Viera  [address]  [place] [address]        [facility]    Code Status:  FULL CODE   Visit Type:   Chief Complaint   Patient presents with     TCU Follow Up     Facility:  [facility]) [[id number]]         HPI: Namita Viera is a 74 year old female who I am seeing today for follow up on the TCU.  Pt recently re hospitalized with GI bleed. Past medical history includes  COPD, alcoholism, frequent falls. Pt had recently been hospitalized 6/27-7/4 post fall. Pt found to have PE. Pt at TCU and had rectal bleeding. Pt found to have fecal impaction leading to rectal bleed. Also found to have CHF. BNP similar to previous around 900, however pt with bilateral moderate pleural effusions, 2+ pitting ankle edema that is new.  Suspicion for diastolic heart failure related to IV infusions received during last hospital stay. Pt given  IV Lasix 40 mg twice daily and changed to p.o. 40 mg twice daily at discharge. Hx of ETOH abuse. Declined treatment last hospitalization.    Transitional Care Course: Today patient sitting up in wheelchair. Recent Melena. No further evidence of bleed. Pt is having regular bowel movements. Today pt is off her oxygen. Occasional wheezing. She denies any SOB or CP. She continues on home MDI for COPD. She is going out to smoke. She does not want to try patch, gum or lozenges. Recent T9, pelvic fracture and rib fractures due to recent fall.  Pain well-controlled.      Assessment/Plan:     Rectal bleeding  Fecal impaction  Opioid-induced constipation  -CT showed fecal impaction.  -No further evidence of bleed.   -Hgb 8.8.   -Continue PTA senna and MiraLAX.     Acute on chronic diastolic heart failure  -LVEF 65 to 70% 2 weeks ago  -continues on lasix 40 mg BID.   -2 gm sodium diet.   -every day weights.   -peripheral edema resolved.   -no SOB or CP.      pulmonary embolus  -Completed Eliquis. ASA 81 mg every day.      Chronic hypoxic  respiratory failure  COPD  -known COPD, known pulmonary embolus,  acute on chronic CHF   -was not on O2 at home.   -Continue flutter valve.   -off O2 today. O2 92-95%.   -Encourage cough and deep breath.   -Continue home MDI.      rib fractures  T9 compression fracture  Pelvic fractures   -Continue tylenol, low dose hydromorphone as needed (will attempt to wean off hydromorphone secondary to recent falls)     Recent COPD exacerbation  -Finished steroid taper     Hx of tobacco abuse  -denies use of cessation tools.      Active Ambulatory Problems     Diagnosis Date Noted     Esophageal reflux      Smoker 05/01/2018     Ear mass 06/15/2018     Age-related cataract of both eyes, unspecified age-related cataract type 11/27/2019     Alcohol dependence (H) 05/06/2021     Chronic cough 05/06/2021     Benign essential hypertension 05/06/2021     Vitamin D deficiency 05/06/2021     Right Intertroch Hip Fract 04/30/2018     Hypercalcemia 02/07/2022     Anemia, unspecified type 02/07/2022     Leg cramping 02/07/2022     Osteoporosis 02/07/2022     Nocturia 02/07/2022     COPD (chronic obstructive pulmonary disease) (H) 02/07/2022     UTI (urinary tract infection) 05/16/2023     Acute UTI 05/16/2023     Hip fracture, right, closed, initial encounter (H) 05/16/2023     Hyponatremia 05/16/2023     Hypokalemia 06/27/2024     COPD exacerbation (H) 06/27/2024     Acute respiratory failure with hypercapnia (H) 06/27/2024     Altered mental status, unspecified altered mental status type 06/27/2024     Aspiration pneumonia, unspecified aspiration pneumonia type, unspecified laterality, unspecified part of lung (H) 06/27/2024     History of tobacco use 07/05/2024     Vaginitis and vulvovaginitis 04/29/2008     Sciatic pain 12/06/2021     Polyp of colon 07/09/2021     HTN (hypertension) 12/06/2021     History of colonic polyps 07/09/2021     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     Bilateral leg edema 12/06/2021     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     Acquired absence of organ, genital organs 04/29/2008     Rectal bleeding 07/07/2024     Fecal impaction (H) 07/07/2024     Acute on chronic diastolic congestive heart failure (H) 07/07/2024     Pelvic fracture (H) 07/08/2024     Resolved Ambulatory Problems     Diagnosis Date Noted     History of alcohol abuse 11/27/2019     Rib pain on left side 05/06/2021     SAH (subarachnoid hemorrhage) (H) 04/30/2018     Closed nondisplaced intertrochanteric fracture of left femur, initial encounter (H) 05/01/2018     Back pain 10/12/2021     Closed fracture of sacrum with routine healing 10/12/2021     Pyuria 10/12/2021     Acute cystitis without hematuria 10/13/2021     Weight loss 02/07/2022     Other acute pulmonary embolism without acute cor pulmonale (H) 06/27/2024     Past Medical History:   Diagnosis Date     Acid reflux      Alcohol use      Closed fracture of left femur (H)      Emphysema of lung (H) 02/07/2022     Hip fracture requiring operative repair (H)      Hypertension 01/2021     Traumatic closed displaced fracture of distal end of radius      Allergies   Allergen Reactions     Penicillins Hives       All Meds and Allergies reviewed in the record at the facility and is the most up-to-date.    Current Outpatient Medications   Medication Sig Dispense Refill     albuterol (PROAIR HFA/PROVENTIL HFA/VENTOLIN HFA) 108 (90 Base) MCG/ACT inhaler INHALE 1-2 PUFFS INTO THE LUNGS EVERY 6 HOURS AS NEEDED FOR COUGH OR SHORTNESS OF BREATH OR WHEEZE 18 g 0     alendronate (FOSAMAX) 70 MG tablet Take 70 mg by mouth every 7 days       aspirin (ASA) 81 MG EC tablet Take 1 tablet (81 mg) by mouth daily       calcium carbonate (OS-PETER) 1500 (600 Ca) MG tablet Take 600 mg by mouth daily       folic acid (FOLVITE) 1 MG tablet Take 1 tablet (1 mg) by mouth daily       furosemide (LASIX) 40 MG tablet Take 1 tablet (40 mg) by mouth 2 times daily       HYDROmorphone (DILAUDID) 2 MG tablet Take 0.5 tablets (1 mg)  "by mouth every 4 hours as needed for severe pain 10 tablet 0     ibuprofen (ADVIL/MOTRIN) 200 MG tablet Take 200 mg by mouth every 6 hours as needed for pain       Lidocaine (LIDOCARE) 4 % Patch Place 2 patches onto the skin every 24 hours To prevent lidocaine toxicity, patient should be patch free for 12 hrs daily. Apply to left lower chest for rib fractures.       losartan (COZAAR) 25 MG tablet Take 1 tablet (25 mg) by mouth daily       melatonin 1 MG TABS tablet Take 1 tablet (1 mg) by mouth nightly as needed for sleep 90 tablet 3     miconazole (MICATIN) 2 % external powder Apply topically 2 times daily       midodrine (PROAMATINE) 2.5 MG tablet Take 2.5 mg by mouth 3 times daily (with meals) (Hold for SBP greater than 155)       multivitamin, therapeutic (THERA-VIT) TABS tablet Take 1 tablet by mouth daily       nicotine (NICODERM CQ) 14 MG/24HR 24 hr patch Place 1 patch onto the skin daily       omeprazole (PRILOSEC) 20 MG DR capsule Take 1 capsule (20 mg) by mouth daily 90 capsule 3     polyethylene glycol (MIRALAX) 17 GM/Dose powder Take 17 g by mouth daily       potassium chloride ER (K-TAB) 20 MEQ CR tablet Take 1 tablet (20 mEq) by mouth daily       senna-docusate (SENOKOT-S/PERICOLACE) 8.6-50 MG tablet Take 1 tablet by mouth daily       thiamine (B-1) 100 MG tablet Take 1 tablet (100 mg) by mouth daily       vitamin C (ASCORBIC ACID) 500 MG tablet Take 500 mg by mouth daily       Vitamin D, Cholecalciferol, 25 MCG (1000 UT) TABS Take 1,000 Units by mouth daily       No current facility-administered medications for this visit.       REVIEW OF SYSTEMS:   10 point review of systems reviewed and pertinent positives in the HPI.     PHYSICAL EXAMINATION:  Physical Exam     Vital signs: /63   Pulse 102   Temp 97.2  F (36.2  C)   Resp 20   Ht 1.549 m (5' 1\")   Wt 39.8 kg (87 lb 12.8 oz)   SpO2 91%   BMI 16.59 kg/m    General: Awake, Alert, oriented x3, conversant  HEENT:Pink conjunctiva,moist oral " mucosa, lesion to lower lip.   NECK: Supple  CVS:  S1  S2, without murmur or gallop.   LUNG: Occ expiratory wheezing today. No cough. Off O2 now.   BACK: No kyphosis of the thoracic spine  ABDOMEN: Soft, nontender to palpation, with positive bowel sounds  EXTREMITIES: Moves both upper and lower extremities, no pedal edema, no calf tenderness  SKIN: Warm and dry  NEUROLOGIC: Intact, pulses palpable  PSYCHIATRIC: Flat affect.    Labs:  All labs reviewed in the nursing home record and Epic   @  Lab Results   Component Value Date    WBC 5.7 07/15/2024     Lab Results   Component Value Date    RBC 2.41 07/15/2024     Lab Results   Component Value Date    HGB 7.7 07/15/2024     Lab Results   Component Value Date    HCT 24.5 07/15/2024     Lab Results   Component Value Date     07/15/2024     Lab Results   Component Value Date    MCH 32.0 07/15/2024     Lab Results   Component Value Date    MCHC 31.4 07/15/2024     Lab Results   Component Value Date    RDW 15.4 07/15/2024     Lab Results   Component Value Date     07/15/2024        @Last Comprehensive Metabolic Panel:  Sodium   Date Value Ref Range Status   07/15/2024 135 135 - 145 mmol/L Final     Potassium   Date Value Ref Range Status   07/15/2024 3.7 3.4 - 5.3 mmol/L Final     Potassium Whole Blood   Date Value Ref Range Status   06/27/2024 2.6 (LL) 3.4 - 5.3 mmol/L Final     Chloride   Date Value Ref Range Status   07/15/2024 96 (L) 98 - 107 mmol/L Final   02/04/2022 98 98 - 107 mmol/L Final     Carbon Dioxide (CO2)   Date Value Ref Range Status   07/15/2024 29 22 - 29 mmol/L Final   02/04/2022 24 22 - 31 mmol/L Final     Anion Gap   Date Value Ref Range Status   07/15/2024 10 7 - 15 mmol/L Final   02/04/2022 13 5 - 18 mmol/L Final     Glucose   Date Value Ref Range Status   07/15/2024 87 70 - 99 mg/dL Final   02/04/2022 92 70 - 125 mg/dL Final     GLUCOSE BY METER POCT   Date Value Ref Range Status   07/04/2024 94 70 - 99 mg/dL Final     Urea Nitrogen    Date Value Ref Range Status   07/15/2024 20.3 8.0 - 23.0 mg/dL Final   02/04/2022 10 8 - 28 mg/dL Final     Creatinine   Date Value Ref Range Status   07/15/2024 0.78 0.51 - 0.95 mg/dL Final     GFR Estimate   Date Value Ref Range Status   07/15/2024 78 >60 mL/min/1.73m2 Final   05/20/2021 >60 >60 mL/min/1.73m2 Final     Calcium   Date Value Ref Range Status   07/15/2024 9.2 8.8 - 10.2 mg/dL Final       This note has been dictated using voice recognition software. Any grammatical or context distortions are unintentional and inherent to the software    Electronically signed by: Cori Goyal CNP       Sincerely,        Cori Goyal, NP

## 2024-07-31 NOTE — PROGRESS NOTES
[facility] GERIATRIC SERVICES    Code Status:  FULL CODE   Visit Type:   Chief Complaint   Patient presents with    TCU Follow Up     Facility:  [facility] ([facility]) [[id number]]         HPI: Nmaita Viera is a 74 year old female who I am seeing today for follow up on the TCU.  Pt recently re hospitalized with GI bleed. Past medical history includes  COPD, alcoholism, frequent falls. Pt had recently been hospitalized 6/27-7/4 post fall. Pt found to have PE. Pt at TCU and had rectal bleeding. Pt found to have fecal impaction leading to rectal bleed. Also found to have CHF. BNP similar to previous around 900, however pt with bilateral moderate pleural effusions, 2+ pitting ankle edema that is new.  Suspicion for diastolic heart failure related to IV infusions received during last hospital stay. Pt given  IV Lasix 40 mg twice daily and changed to p.o. 40 mg twice daily at discharge. Hx of ETOH abuse. Declined treatment last hospitalization.    Transitional Care Course: Today patient sitting up in wheelchair. Recent Melena. No further evidence of bleed. Pt is having regular bowel movements. Today pt is off her oxygen. Occasional wheezing. She denies any SOB or CP. She continues on home MDI for COPD. She is going out to smoke. She does not want to try patch, gum or lozenges. Recent T9, pelvic fracture and rib fractures due to recent fall.  Pain well-controlled.      Assessment/Plan:     Rectal bleeding  Fecal impaction  Opioid-induced constipation  -CT showed fecal impaction.  -No further evidence of bleed.   -Hgb 8.8.   -Continue PTA senna and MiraLAX.     Acute on chronic diastolic heart failure  -LVEF 65 to 70% 2 weeks ago  -continues on lasix 40 mg BID.   -2 gm sodium diet.   -every day weights.   -peripheral edema resolved.   -no SOB or CP.      pulmonary embolus  -Completed Eliquis. ASA 81 mg every day.      Chronic hypoxic respiratory failure  COPD  -known COPD, known pulmonary embolus,  acute on chronic  CHF   -was not on O2 at home.   -Continue flutter valve.   -off O2 today. O2 92-95%.   -Encourage cough and deep breath.   -Continue home MDI.      rib fractures  T9 compression fracture  Pelvic fractures   -Continue tylenol, low dose hydromorphone as needed (will attempt to wean off hydromorphone secondary to recent falls)     Recent COPD exacerbation  -Finished steroid taper     Hx of tobacco abuse  -denies use of cessation tools.      Active Ambulatory Problems     Diagnosis Date Noted    Esophageal reflux     Smoker 05/01/2018    Ear mass 06/15/2018    Age-related cataract of both eyes, unspecified age-related cataract type 11/27/2019    Alcohol dependence (H) 05/06/2021    Chronic cough 05/06/2021    Benign essential hypertension 05/06/2021    Vitamin D deficiency 05/06/2021    Right Intertroch Hip Fract 04/30/2018    Hypercalcemia 02/07/2022    Anemia, unspecified type 02/07/2022    Leg cramping 02/07/2022    Osteoporosis 02/07/2022    Nocturia 02/07/2022    COPD (chronic obstructive pulmonary disease) (H) 02/07/2022    UTI (urinary tract infection) 05/16/2023    Acute UTI 05/16/2023    Hip fracture, right, closed, initial encounter (H) 05/16/2023    Hyponatremia 05/16/2023    Hypokalemia 06/27/2024    COPD exacerbation (H) 06/27/2024    Acute respiratory failure with hypercapnia (H) 06/27/2024    Altered mental status, unspecified altered mental status type 06/27/2024    Aspiration pneumonia, unspecified aspiration pneumonia type, unspecified laterality, unspecified part of lung (H) 06/27/2024    History of tobacco use 07/05/2024    Vaginitis and vulvovaginitis 04/29/2008    Sciatic pain 12/06/2021    Polyp of colon 07/09/2021    HTN (hypertension) 12/06/2021    History of colonic polyps 07/09/2021    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    Bilateral leg edema 12/06/2021    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    Acquired absence of organ, genital organs 04/29/2008    Rectal bleeding 07/07/2024    Fecal  impaction (H) 07/07/2024    Acute on chronic diastolic congestive heart failure (H) 07/07/2024    Pelvic fracture (H) 07/08/2024     Resolved Ambulatory Problems     Diagnosis Date Noted    History of alcohol abuse 11/27/2019    Rib pain on left side 05/06/2021    SAH (subarachnoid hemorrhage) (H) 04/30/2018    Closed nondisplaced intertrochanteric fracture of left femur, initial encounter (H) 05/01/2018    Back pain 10/12/2021    Closed fracture of sacrum with routine healing 10/12/2021    Pyuria 10/12/2021    Acute cystitis without hematuria 10/13/2021    Weight loss 02/07/2022    Other acute pulmonary embolism without acute cor pulmonale (H) 06/27/2024     Past Medical History:   Diagnosis Date    Acid reflux     Alcohol use     Closed fracture of left femur (H)     Emphysema of lung (H) 02/07/2022    Hip fracture requiring operative repair (H)     Hypertension 01/2021    Traumatic closed displaced fracture of distal end of radius      Allergies   Allergen Reactions    Penicillins Hives       All Meds and Allergies reviewed in the record at the facility and is the most up-to-date.    Current Outpatient Medications   Medication Sig Dispense Refill    albuterol (PROAIR HFA/PROVENTIL HFA/VENTOLIN HFA) 108 (90 Base) MCG/ACT inhaler INHALE 1-2 PUFFS INTO THE LUNGS EVERY 6 HOURS AS NEEDED FOR COUGH OR SHORTNESS OF BREATH OR WHEEZE 18 g 0    alendronate (FOSAMAX) 70 MG tablet Take 70 mg by mouth every 7 days      aspirin (ASA) 81 MG EC tablet Take 1 tablet (81 mg) by mouth daily      calcium carbonate (OS-PETER) 1500 (600 Ca) MG tablet Take 600 mg by mouth daily      folic acid (FOLVITE) 1 MG tablet Take 1 tablet (1 mg) by mouth daily      furosemide (LASIX) 40 MG tablet Take 1 tablet (40 mg) by mouth 2 times daily      HYDROmorphone (DILAUDID) 2 MG tablet Take 0.5 tablets (1 mg) by mouth every 4 hours as needed for severe pain 10 tablet 0    ibuprofen (ADVIL/MOTRIN) 200 MG tablet Take 200 mg by mouth every 6 hours as  "needed for pain      Lidocaine (LIDOCARE) 4 % Patch Place 2 patches onto the skin every 24 hours To prevent lidocaine toxicity, patient should be patch free for 12 hrs daily. Apply to left lower chest for rib fractures.      losartan (COZAAR) 25 MG tablet Take 1 tablet (25 mg) by mouth daily      melatonin 1 MG TABS tablet Take 1 tablet (1 mg) by mouth nightly as needed for sleep 90 tablet 3    miconazole (MICATIN) 2 % external powder Apply topically 2 times daily      midodrine (PROAMATINE) 2.5 MG tablet Take 2.5 mg by mouth 3 times daily (with meals) (Hold for SBP greater than 155)      multivitamin, therapeutic (THERA-VIT) TABS tablet Take 1 tablet by mouth daily      nicotine (NICODERM CQ) 14 MG/24HR 24 hr patch Place 1 patch onto the skin daily      omeprazole (PRILOSEC) 20 MG DR capsule Take 1 capsule (20 mg) by mouth daily 90 capsule 3    polyethylene glycol (MIRALAX) 17 GM/Dose powder Take 17 g by mouth daily      potassium chloride ER (K-TAB) 20 MEQ CR tablet Take 1 tablet (20 mEq) by mouth daily      senna-docusate (SENOKOT-S/PERICOLACE) 8.6-50 MG tablet Take 1 tablet by mouth daily      thiamine (B-1) 100 MG tablet Take 1 tablet (100 mg) by mouth daily      vitamin C (ASCORBIC ACID) 500 MG tablet Take 500 mg by mouth daily      Vitamin D, Cholecalciferol, 25 MCG (1000 UT) TABS Take 1,000 Units by mouth daily       No current facility-administered medications for this visit.       REVIEW OF SYSTEMS:   10 point review of systems reviewed and pertinent positives in the HPI.     PHYSICAL EXAMINATION:  Physical Exam     Vital signs: /63   Pulse 102   Temp 97.2  F (36.2  C)   Resp 20   Ht 1.549 m (5' 1\")   Wt 39.8 kg (87 lb 12.8 oz)   SpO2 91%   BMI 16.59 kg/m    General: Awake, Alert, oriented x3, conversant  HEENT:Pink conjunctiva,moist oral mucosa, lesion to lower lip.   NECK: Supple  CVS:  S1  S2, without murmur or gallop.   LUNG: Occ expiratory wheezing today. No cough. Off O2 now.   BACK: No " kyphosis of the thoracic spine  ABDOMEN: Soft, nontender to palpation, with positive bowel sounds  EXTREMITIES: Moves both upper and lower extremities, no pedal edema, no calf tenderness  SKIN: Warm and dry  NEUROLOGIC: Intact, pulses palpable  PSYCHIATRIC: Flat affect.    Labs:  All labs reviewed in the nursing home record and Epic   @  Lab Results   Component Value Date    WBC 5.7 07/15/2024     Lab Results   Component Value Date    RBC 2.41 07/15/2024     Lab Results   Component Value Date    HGB 7.7 07/15/2024     Lab Results   Component Value Date    HCT 24.5 07/15/2024     Lab Results   Component Value Date     07/15/2024     Lab Results   Component Value Date    MCH 32.0 07/15/2024     Lab Results   Component Value Date    MCHC 31.4 07/15/2024     Lab Results   Component Value Date    RDW 15.4 07/15/2024     Lab Results   Component Value Date     07/15/2024        @Last Comprehensive Metabolic Panel:  Sodium   Date Value Ref Range Status   07/15/2024 135 135 - 145 mmol/L Final     Potassium   Date Value Ref Range Status   07/15/2024 3.7 3.4 - 5.3 mmol/L Final     Potassium Whole Blood   Date Value Ref Range Status   06/27/2024 2.6 (LL) 3.4 - 5.3 mmol/L Final     Chloride   Date Value Ref Range Status   07/15/2024 96 (L) 98 - 107 mmol/L Final   02/04/2022 98 98 - 107 mmol/L Final     Carbon Dioxide (CO2)   Date Value Ref Range Status   07/15/2024 29 22 - 29 mmol/L Final   02/04/2022 24 22 - 31 mmol/L Final     Anion Gap   Date Value Ref Range Status   07/15/2024 10 7 - 15 mmol/L Final   02/04/2022 13 5 - 18 mmol/L Final     Glucose   Date Value Ref Range Status   07/15/2024 87 70 - 99 mg/dL Final   02/04/2022 92 70 - 125 mg/dL Final     GLUCOSE BY METER POCT   Date Value Ref Range Status   07/04/2024 94 70 - 99 mg/dL Final     Urea Nitrogen   Date Value Ref Range Status   07/15/2024 20.3 8.0 - 23.0 mg/dL Final   02/04/2022 10 8 - 28 mg/dL Final     Creatinine   Date Value Ref Range Status    07/15/2024 0.78 0.51 - 0.95 mg/dL Final     GFR Estimate   Date Value Ref Range Status   07/15/2024 78 >60 mL/min/1.73m2 Final   05/20/2021 >60 >60 mL/min/1.73m2 Final     Calcium   Date Value Ref Range Status   07/15/2024 9.2 8.8 - 10.2 mg/dL Final       This note has been dictated using voice recognition software. Any grammatical or context distortions are unintentional and inherent to the software    Electronically signed by: Cori Goyal, CNP

## 2024-08-01 ENCOUNTER — TRANSITIONAL CARE UNIT VISIT (OUTPATIENT)
Dept: GERIATRICS | Facility: CLINIC | Age: 74
End: 2024-08-01
Payer: COMMERCIAL

## 2024-08-01 VITALS
HEART RATE: 83 BPM | WEIGHT: 89 LBS | BODY MASS INDEX: 16.8 KG/M2 | HEIGHT: 61 IN | OXYGEN SATURATION: 91 % | RESPIRATION RATE: 16 BRPM | DIASTOLIC BLOOD PRESSURE: 71 MMHG | SYSTOLIC BLOOD PRESSURE: 117 MMHG | TEMPERATURE: 97.8 F

## 2024-08-01 DIAGNOSIS — D64.9 ANEMIA, UNSPECIFIED TYPE: Primary | ICD-10-CM

## 2024-08-01 DIAGNOSIS — K92.1 MELENA: ICD-10-CM

## 2024-08-01 DIAGNOSIS — J43.9 PULMONARY EMPHYSEMA, UNSPECIFIED EMPHYSEMA TYPE (H): ICD-10-CM

## 2024-08-01 DIAGNOSIS — I50.9 CHRONIC CONGESTIVE HEART FAILURE, UNSPECIFIED HEART FAILURE TYPE (H): ICD-10-CM

## 2024-08-01 DIAGNOSIS — Z86.711 HISTORY OF PULMONARY EMBOLISM: ICD-10-CM

## 2024-08-01 DIAGNOSIS — K59.03 DRUG-INDUCED CONSTIPATION: ICD-10-CM

## 2024-08-01 DIAGNOSIS — S22.070D CLOSED WEDGE COMPRESSION FRACTURE OF T9 VERTEBRA WITH ROUTINE HEALING, SUBSEQUENT ENCOUNTER: ICD-10-CM

## 2024-08-01 PROCEDURE — 99309 SBSQ NF CARE MODERATE MDM 30: CPT | Performed by: NURSE PRACTITIONER

## 2024-08-01 NOTE — LETTER
8/1/2024      Namita Viera  [address]  [place] [address]        [facility]    Code Status:  FULL CODE   Visit Type:   Chief Complaint   Patient presents with     TCU Follow Up     Facility:  [facility]) [[id number]]         HPI: Namita Viera is a 74 year old female who I am seeing today for follow up on the TCU.  Pt recently re hospitalized with GI bleed. Past medical history includes COPD, alcoholism, frequent falls. Pt had recently been hospitalized 6/27-7/4 post fall. Pt found to have PE. Pt at TCU and had rectal bleeding. Pt found to have fecal impaction leading to rectal bleed. Also found to have CHF. BNP similar to previous around 900, however pt with bilateral moderate pleural effusions, 2+ pitting ankle edema that is new.  Suspicion for diastolic heart failure related to IV infusions received during last hospital stay. Pt given  IV Lasix 40 mg twice daily and changed to p.o. 40 mg twice daily at discharge. Hx of ETOH abuse. Declined treatment last hospitalization.    Transitional Care Course: Today patient sitting up in wheelchair. No further evidence of bleed. Bowels moving regularly. Pt with underlying COPD. Pt with occasional wheezing. Pt continues to smoke. She denies any SOB or CP. Pt no longer on oxygen. Pt refuses smoking cessation. Pt continues on MID. Recent T9, pelvic fracture and rib fractures due to recent fall.  Pain well-controlled.      Assessment/Plan:     Rectal bleeding  Fecal impaction  Opioid-induced constipation  -CT showed fecal impaction.  -No further evidence of bleed.   -Hgb 8.8.   -Monitor.   -Continue PTA senna and MiraLAX.     Acute on chronic diastolic heart failure  -LVEF 65 to 70% 2 weeks ago  -continues on lasix 40 mg BID.   -2 gm sodium diet.   -every day weights.   -peripheral edema resolved.   -no SOB or CP.      pulmonary embolus  -Completed Eliquis. ASA 81 mg every day.      Chronic hypoxic respiratory  failure  COPD  -known COPD, known pulmonary embolus,  acute on chronic CHF   -was not on O2 at home.   -Continue flutter valve.   -No longer on oxygen.   -Steroids complete.    -Encourage cough and deep breath.   -Continue home MDI.      rib fractures  T9 compression fracture  Pelvic fractures   -Continue tylenol, low dose hydromorphone as needed (will attempt to wean off hydromorphone secondary to recent falls)    Hx of tobacco abuse  -denies use of cessation tools.      Active Ambulatory Problems     Diagnosis Date Noted     Esophageal reflux      Smoker 05/01/2018     Ear mass 06/15/2018     Age-related cataract of both eyes, unspecified age-related cataract type 11/27/2019     Alcohol dependence (H) 05/06/2021     Chronic cough 05/06/2021     Benign essential hypertension 05/06/2021     Vitamin D deficiency 05/06/2021     Right Intertroch Hip Fract 04/30/2018     Hypercalcemia 02/07/2022     Anemia, unspecified type 02/07/2022     Leg cramping 02/07/2022     Osteoporosis 02/07/2022     Nocturia 02/07/2022     COPD (chronic obstructive pulmonary disease) (H) 02/07/2022     UTI (urinary tract infection) 05/16/2023     Acute UTI 05/16/2023     Hip fracture, right, closed, initial encounter (H) 05/16/2023     Hyponatremia 05/16/2023     Hypokalemia 06/27/2024     COPD exacerbation (H) 06/27/2024     Acute respiratory failure with hypercapnia (H) 06/27/2024     Altered mental status, unspecified altered mental status type 06/27/2024     Aspiration pneumonia, unspecified aspiration pneumonia type, unspecified laterality, unspecified part of lung (H) 06/27/2024     History of tobacco use 07/05/2024     Vaginitis and vulvovaginitis 04/29/2008     Sciatic pain 12/06/2021     Polyp of colon 07/09/2021     HTN (hypertension) 12/06/2021     History of colonic polyps 07/09/2021     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     Bilateral leg edema 12/06/2021     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     Acquired absence of organ,  genital organs 04/29/2008     Rectal bleeding 07/07/2024     Fecal impaction (H) 07/07/2024     Acute on chronic diastolic congestive heart failure (H) 07/07/2024     Pelvic fracture (H) 07/08/2024     Resolved Ambulatory Problems     Diagnosis Date Noted     History of alcohol abuse 11/27/2019     Rib pain on left side 05/06/2021     SAH (subarachnoid hemorrhage) (H) 04/30/2018     Closed nondisplaced intertrochanteric fracture of left femur, initial encounter (H) 05/01/2018     Back pain 10/12/2021     Closed fracture of sacrum with routine healing 10/12/2021     Pyuria 10/12/2021     Acute cystitis without hematuria 10/13/2021     Weight loss 02/07/2022     Other acute pulmonary embolism without acute cor pulmonale (H) 06/27/2024     Past Medical History:   Diagnosis Date     Acid reflux      Alcohol use      Closed fracture of left femur (H)      Emphysema of lung (H) 02/07/2022     Hip fracture requiring operative repair (H)      Hypertension 01/2021     Traumatic closed displaced fracture of distal end of radius      Allergies   Allergen Reactions     Penicillins Hives       All Meds and Allergies reviewed in the record at the facility and is the most up-to-date.    Current Outpatient Medications   Medication Sig Dispense Refill     albuterol (PROAIR HFA/PROVENTIL HFA/VENTOLIN HFA) 108 (90 Base) MCG/ACT inhaler INHALE 1-2 PUFFS INTO THE LUNGS EVERY 6 HOURS AS NEEDED FOR COUGH OR SHORTNESS OF BREATH OR WHEEZE 18 g 0     alendronate (FOSAMAX) 70 MG tablet Take 70 mg by mouth every 7 days       aspirin (ASA) 81 MG EC tablet Take 1 tablet (81 mg) by mouth daily       calcium carbonate (OS-PETER) 1500 (600 Ca) MG tablet Take 600 mg by mouth daily       folic acid (FOLVITE) 1 MG tablet Take 1 tablet (1 mg) by mouth daily       furosemide (LASIX) 40 MG tablet Take 1 tablet (40 mg) by mouth 2 times daily       HYDROmorphone (DILAUDID) 2 MG tablet Take 0.5 tablets (1 mg) by mouth every 4 hours as needed for severe pain  "10 tablet 0     ibuprofen (ADVIL/MOTRIN) 200 MG tablet Take 200 mg by mouth every 6 hours as needed for pain       Lidocaine (LIDOCARE) 4 % Patch Place 2 patches onto the skin every 24 hours To prevent lidocaine toxicity, patient should be patch free for 12 hrs daily. Apply to left lower chest for rib fractures.       losartan (COZAAR) 25 MG tablet Take 1 tablet (25 mg) by mouth daily       melatonin 1 MG TABS tablet Take 1 tablet (1 mg) by mouth nightly as needed for sleep 90 tablet 3     miconazole (MICATIN) 2 % external powder Apply topically 2 times daily       midodrine (PROAMATINE) 2.5 MG tablet Take 2.5 mg by mouth 3 times daily (with meals) (Hold for SBP greater than 155)       multivitamin, therapeutic (THERA-VIT) TABS tablet Take 1 tablet by mouth daily       nicotine (NICODERM CQ) 14 MG/24HR 24 hr patch Place 1 patch onto the skin daily       omeprazole (PRILOSEC) 20 MG DR capsule Take 1 capsule (20 mg) by mouth daily 90 capsule 3     polyethylene glycol (MIRALAX) 17 GM/Dose powder Take 17 g by mouth daily       potassium chloride ER (K-TAB) 20 MEQ CR tablet Take 1 tablet (20 mEq) by mouth daily       senna-docusate (SENOKOT-S/PERICOLACE) 8.6-50 MG tablet Take 1 tablet by mouth daily       thiamine (B-1) 100 MG tablet Take 1 tablet (100 mg) by mouth daily       vitamin C (ASCORBIC ACID) 500 MG tablet Take 500 mg by mouth daily       Vitamin D, Cholecalciferol, 25 MCG (1000 UT) TABS Take 1,000 Units by mouth daily       No current facility-administered medications for this visit.       REVIEW OF SYSTEMS:   10 point review of systems reviewed and pertinent positives in the HPI.     PHYSICAL EXAMINATION:  Physical Exam     Vital signs: /71   Pulse 83   Temp 97.8  F (36.6  C)   Resp 16   Ht 1.549 m (5' 1\")   Wt 40.4 kg (89 lb)   SpO2 91%   BMI 16.82 kg/m    General: Awake, Alert, oriented x3, conversant  HEENT:Pink conjunctiva,moist oral mucosa, lesion to lower lip.   NECK: Supple  CVS:  S1  S2, " without murmur or gallop.   LUNG: Occ expiratory wheezing today. No cough. Off O2 now.   BACK: No kyphosis of the thoracic spine  ABDOMEN: Soft, nontender to palpation, with positive bowel sounds  EXTREMITIES: Moves both upper and lower extremities, no pedal edema, no calf tenderness  SKIN: Warm and dry  NEUROLOGIC: Intact, pulses palpable  PSYCHIATRIC: Flat affect.    Labs:  All labs reviewed in the nursing home record and Epic   @  Lab Results   Component Value Date    WBC 5.7 07/15/2024     Lab Results   Component Value Date    RBC 2.41 07/15/2024     Lab Results   Component Value Date    HGB 7.7 07/15/2024     Lab Results   Component Value Date    HCT 24.5 07/15/2024     Lab Results   Component Value Date     07/15/2024     Lab Results   Component Value Date    MCH 32.0 07/15/2024     Lab Results   Component Value Date    MCHC 31.4 07/15/2024     Lab Results   Component Value Date    RDW 15.4 07/15/2024     Lab Results   Component Value Date     07/15/2024        @Last Comprehensive Metabolic Panel:  Sodium   Date Value Ref Range Status   07/15/2024 135 135 - 145 mmol/L Final     Potassium   Date Value Ref Range Status   07/15/2024 3.7 3.4 - 5.3 mmol/L Final     Potassium Whole Blood   Date Value Ref Range Status   06/27/2024 2.6 (LL) 3.4 - 5.3 mmol/L Final     Chloride   Date Value Ref Range Status   07/15/2024 96 (L) 98 - 107 mmol/L Final   02/04/2022 98 98 - 107 mmol/L Final     Carbon Dioxide (CO2)   Date Value Ref Range Status   07/15/2024 29 22 - 29 mmol/L Final   02/04/2022 24 22 - 31 mmol/L Final     Anion Gap   Date Value Ref Range Status   07/15/2024 10 7 - 15 mmol/L Final   02/04/2022 13 5 - 18 mmol/L Final     Glucose   Date Value Ref Range Status   07/15/2024 87 70 - 99 mg/dL Final   02/04/2022 92 70 - 125 mg/dL Final     GLUCOSE BY METER POCT   Date Value Ref Range Status   07/04/2024 94 70 - 99 mg/dL Final     Urea Nitrogen   Date Value Ref Range Status   07/15/2024 20.3 8.0 - 23.0  mg/dL Final   02/04/2022 10 8 - 28 mg/dL Final     Creatinine   Date Value Ref Range Status   07/15/2024 0.78 0.51 - 0.95 mg/dL Final     GFR Estimate   Date Value Ref Range Status   07/15/2024 78 >60 mL/min/1.73m2 Final   05/20/2021 >60 >60 mL/min/1.73m2 Final     Calcium   Date Value Ref Range Status   07/15/2024 9.2 8.8 - 10.2 mg/dL Final       This note has been dictated using voice recognition software. Any grammatical or context distortions are unintentional and inherent to the software    Electronically signed by: Cori Goyal CNP       Sincerely,        Cori Goyal, NP

## 2024-08-02 ENCOUNTER — PATIENT OUTREACH (OUTPATIENT)
Dept: CARE COORDINATION | Facility: CLINIC | Age: 74
End: 2024-08-02
Payer: COMMERCIAL

## 2024-08-02 NOTE — PROGRESS NOTES
Clinic Care Coordination Contact    Clinical Data: Patient was hospitalized at [facility] from 7/7/24 to 7/12/24 with diagnosis of   Rectal bleeding  # Fecal impaction  # Opioid-induced constipation  # Acute on chronic diastolic heart failure  # Contraction alkalosis  # Chronic hypoxic respiratory failure  # Alcoholism    # Recent rib fractures  # T9 compression fracture  # Pelvic fractures   # Recent COPD exacerbation   # Elevated lipase   # Hypokalemia  # Hypomagnesemia  # Hypotension # Sacral decubitus ulcer  # Relatively extensive candidal intertrigo  #Cachexia  #Underweight       Clinical Data: Patient was hospitalized at [facility] from 6/27/24 to 7/4/24 with diagnosis of Acute respiratory failure with hypercapnia (H)  Active Problems:    Hypokalemia    COPD exacerbation (H)    Altered mental status, unspecified altered mental status type    Other acute pulmonary embolism without acute cor pulmonale (H)    Aspiration pneumonia, unspecified aspiration pneumonia type, unspecified laterality, unspecified part of lung (H)   .      Assessment: Patient has transitioned to TCU/ARU for short term rehabilitation:     Facility Name: [facility]  Transition Communication:  Notified facility of Primary Care- Care Coordination support via Epic In-Basket message.     Plan: Care Coordinator will await notification from facility staff informing of patient's discharge plans/needs. Care Coordinator will review chart and outreach to facility staff every 4 weeks and as needed.      See SW note 6/27/24

## 2024-08-02 NOTE — PROGRESS NOTES
[facility]    Code Status:  FULL CODE   Visit Type:   Chief Complaint   Patient presents with    TCU Follow Up     Facility:  [facility] ([facility]) [[id number]]         HPI: Namita Viera is a 74 year old female who I am seeing today for follow up on the TCU.  Pt recently re hospitalized with GI bleed. Past medical history includes COPD, alcoholism, frequent falls. Pt had recently been hospitalized 6/27-7/4 post fall. Pt found to have PE. Pt at TCU and had rectal bleeding. Pt found to have fecal impaction leading to rectal bleed. Also found to have CHF. BNP similar to previous around 900, however pt with bilateral moderate pleural effusions, 2+ pitting ankle edema that is new.  Suspicion for diastolic heart failure related to IV infusions received during last hospital stay. Pt given  IV Lasix 40 mg twice daily and changed to p.o. 40 mg twice daily at discharge. Hx of ETOH abuse. Declined treatment last hospitalization.    Transitional Care Course: Today patient sitting up in wheelchair. No further evidence of bleed. Bowels moving regularly. Pt with underlying COPD. Pt with occasional wheezing. Pt continues to smoke. She denies any SOB or CP. Pt no longer on oxygen. Pt refuses smoking cessation. Pt continues on MID. Recent T9, pelvic fracture and rib fractures due to recent fall.  Pain well-controlled.      Assessment/Plan:     Rectal bleeding  Fecal impaction  Opioid-induced constipation  -CT showed fecal impaction.  -No further evidence of bleed.   -Hgb 8.8.   -Monitor.   -Continue PTA senna and MiraLAX.     Acute on chronic diastolic heart failure  -LVEF 65 to 70% 2 weeks ago  -continues on lasix 40 mg BID.   -2 gm sodium diet.   -every day weights.   -peripheral edema resolved.   -no SOB or CP.      pulmonary embolus  -Completed Eliquis. ASA 81 mg every day.      Chronic hypoxic respiratory failure  COPD  -known COPD, known pulmonary embolus,  acute on chronic CHF   -was not on O2 at  home.   -Continue flutter valve.   -No longer on oxygen.   -Steroids complete.    -Encourage cough and deep breath.   -Continue home MDI.      rib fractures  T9 compression fracture  Pelvic fractures   -Continue tylenol, low dose hydromorphone as needed (will attempt to wean off hydromorphone secondary to recent falls)    Hx of tobacco abuse  -denies use of cessation tools.      Active Ambulatory Problems     Diagnosis Date Noted    Esophageal reflux     Smoker 05/01/2018    Ear mass 06/15/2018    Age-related cataract of both eyes, unspecified age-related cataract type 11/27/2019    Alcohol dependence (H) 05/06/2021    Chronic cough 05/06/2021    Benign essential hypertension 05/06/2021    Vitamin D deficiency 05/06/2021    Right Intertroch Hip Fract 04/30/2018    Hypercalcemia 02/07/2022    Anemia, unspecified type 02/07/2022    Leg cramping 02/07/2022    Osteoporosis 02/07/2022    Nocturia 02/07/2022    COPD (chronic obstructive pulmonary disease) (H) 02/07/2022    UTI (urinary tract infection) 05/16/2023    Acute UTI 05/16/2023    Hip fracture, right, closed, initial encounter (H) 05/16/2023    Hyponatremia 05/16/2023    Hypokalemia 06/27/2024    COPD exacerbation (H) 06/27/2024    Acute respiratory failure with hypercapnia (H) 06/27/2024    Altered mental status, unspecified altered mental status type 06/27/2024    Aspiration pneumonia, unspecified aspiration pneumonia type, unspecified laterality, unspecified part of lung (H) 06/27/2024    History of tobacco use 07/05/2024    Vaginitis and vulvovaginitis 04/29/2008    Sciatic pain 12/06/2021    Polyp of colon 07/09/2021    HTN (hypertension) 12/06/2021    History of colonic polyps 07/09/2021    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    Bilateral leg edema 12/06/2021    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    Acquired absence of organ, genital organs 04/29/2008    Rectal bleeding 07/07/2024    Fecal impaction (H) 07/07/2024    Acute on chronic diastolic congestive  heart failure (H) 07/07/2024    Pelvic fracture (H) 07/08/2024     Resolved Ambulatory Problems     Diagnosis Date Noted    History of alcohol abuse 11/27/2019    Rib pain on left side 05/06/2021    SAH (subarachnoid hemorrhage) (H) 04/30/2018    Closed nondisplaced intertrochanteric fracture of left femur, initial encounter (H) 05/01/2018    Back pain 10/12/2021    Closed fracture of sacrum with routine healing 10/12/2021    Pyuria 10/12/2021    Acute cystitis without hematuria 10/13/2021    Weight loss 02/07/2022    Other acute pulmonary embolism without acute cor pulmonale (H) 06/27/2024     Past Medical History:   Diagnosis Date    Acid reflux     Alcohol use     Closed fracture of left femur (H)     Emphysema of lung (H) 02/07/2022    Hip fracture requiring operative repair (H)     Hypertension 01/2021    Traumatic closed displaced fracture of distal end of radius      Allergies   Allergen Reactions    Penicillins Hives       All Meds and Allergies reviewed in the record at the facility and is the most up-to-date.    Current Outpatient Medications   Medication Sig Dispense Refill    albuterol (PROAIR HFA/PROVENTIL HFA/VENTOLIN HFA) 108 (90 Base) MCG/ACT inhaler INHALE 1-2 PUFFS INTO THE LUNGS EVERY 6 HOURS AS NEEDED FOR COUGH OR SHORTNESS OF BREATH OR WHEEZE 18 g 0    alendronate (FOSAMAX) 70 MG tablet Take 70 mg by mouth every 7 days      aspirin (ASA) 81 MG EC tablet Take 1 tablet (81 mg) by mouth daily      calcium carbonate (OS-PETER) 1500 (600 Ca) MG tablet Take 600 mg by mouth daily      folic acid (FOLVITE) 1 MG tablet Take 1 tablet (1 mg) by mouth daily      furosemide (LASIX) 40 MG tablet Take 1 tablet (40 mg) by mouth 2 times daily      HYDROmorphone (DILAUDID) 2 MG tablet Take 0.5 tablets (1 mg) by mouth every 4 hours as needed for severe pain 10 tablet 0    ibuprofen (ADVIL/MOTRIN) 200 MG tablet Take 200 mg by mouth every 6 hours as needed for pain      Lidocaine (LIDOCARE) 4 % Patch Place 2 patches  "onto the skin every 24 hours To prevent lidocaine toxicity, patient should be patch free for 12 hrs daily. Apply to left lower chest for rib fractures.      losartan (COZAAR) 25 MG tablet Take 1 tablet (25 mg) by mouth daily      melatonin 1 MG TABS tablet Take 1 tablet (1 mg) by mouth nightly as needed for sleep 90 tablet 3    miconazole (MICATIN) 2 % external powder Apply topically 2 times daily      midodrine (PROAMATINE) 2.5 MG tablet Take 2.5 mg by mouth 3 times daily (with meals) (Hold for SBP greater than 155)      multivitamin, therapeutic (THERA-VIT) TABS tablet Take 1 tablet by mouth daily      nicotine (NICODERM CQ) 14 MG/24HR 24 hr patch Place 1 patch onto the skin daily      omeprazole (PRILOSEC) 20 MG DR capsule Take 1 capsule (20 mg) by mouth daily 90 capsule 3    polyethylene glycol (MIRALAX) 17 GM/Dose powder Take 17 g by mouth daily      potassium chloride ER (K-TAB) 20 MEQ CR tablet Take 1 tablet (20 mEq) by mouth daily      senna-docusate (SENOKOT-S/PERICOLACE) 8.6-50 MG tablet Take 1 tablet by mouth daily      thiamine (B-1) 100 MG tablet Take 1 tablet (100 mg) by mouth daily      vitamin C (ASCORBIC ACID) 500 MG tablet Take 500 mg by mouth daily      Vitamin D, Cholecalciferol, 25 MCG (1000 UT) TABS Take 1,000 Units by mouth daily       No current facility-administered medications for this visit.       REVIEW OF SYSTEMS:   10 point review of systems reviewed and pertinent positives in the HPI.     PHYSICAL EXAMINATION:  Physical Exam     Vital signs: /71   Pulse 83   Temp 97.8  F (36.6  C)   Resp 16   Ht 1.549 m (5' 1\")   Wt 40.4 kg (89 lb)   SpO2 91%   BMI 16.82 kg/m    General: Awake, Alert, oriented x3, conversant  HEENT:Pink conjunctiva,moist oral mucosa, lesion to lower lip.   NECK: Supple  CVS:  S1  S2, without murmur or gallop.   LUNG: Occ expiratory wheezing today. No cough. Off O2 now.   BACK: No kyphosis of the thoracic spine  ABDOMEN: Soft, nontender to palpation, with " positive bowel sounds  EXTREMITIES: Moves both upper and lower extremities, no pedal edema, no calf tenderness  SKIN: Warm and dry  NEUROLOGIC: Intact, pulses palpable  PSYCHIATRIC: Flat affect.    Labs:  All labs reviewed in the nursing home record and Epic   @  Lab Results   Component Value Date    WBC 5.7 07/15/2024     Lab Results   Component Value Date    RBC 2.41 07/15/2024     Lab Results   Component Value Date    HGB 7.7 07/15/2024     Lab Results   Component Value Date    HCT 24.5 07/15/2024     Lab Results   Component Value Date     07/15/2024     Lab Results   Component Value Date    MCH 32.0 07/15/2024     Lab Results   Component Value Date    MCHC 31.4 07/15/2024     Lab Results   Component Value Date    RDW 15.4 07/15/2024     Lab Results   Component Value Date     07/15/2024        @Last Comprehensive Metabolic Panel:  Sodium   Date Value Ref Range Status   07/15/2024 135 135 - 145 mmol/L Final     Potassium   Date Value Ref Range Status   07/15/2024 3.7 3.4 - 5.3 mmol/L Final     Potassium Whole Blood   Date Value Ref Range Status   06/27/2024 2.6 (LL) 3.4 - 5.3 mmol/L Final     Chloride   Date Value Ref Range Status   07/15/2024 96 (L) 98 - 107 mmol/L Final   02/04/2022 98 98 - 107 mmol/L Final     Carbon Dioxide (CO2)   Date Value Ref Range Status   07/15/2024 29 22 - 29 mmol/L Final   02/04/2022 24 22 - 31 mmol/L Final     Anion Gap   Date Value Ref Range Status   07/15/2024 10 7 - 15 mmol/L Final   02/04/2022 13 5 - 18 mmol/L Final     Glucose   Date Value Ref Range Status   07/15/2024 87 70 - 99 mg/dL Final   02/04/2022 92 70 - 125 mg/dL Final     GLUCOSE BY METER POCT   Date Value Ref Range Status   07/04/2024 94 70 - 99 mg/dL Final     Urea Nitrogen   Date Value Ref Range Status   07/15/2024 20.3 8.0 - 23.0 mg/dL Final   02/04/2022 10 8 - 28 mg/dL Final     Creatinine   Date Value Ref Range Status   07/15/2024 0.78 0.51 - 0.95 mg/dL Final     GFR Estimate   Date Value Ref Range  Status   07/15/2024 78 >60 mL/min/1.73m2 Final   05/20/2021 >60 >60 mL/min/1.73m2 Final     Calcium   Date Value Ref Range Status   07/15/2024 9.2 8.8 - 10.2 mg/dL Final       This note has been dictated using voice recognition software. Any grammatical or context distortions are unintentional and inherent to the software    Electronically signed by: Cori Goyal CNP

## 2024-08-03 ENCOUNTER — TELEPHONE (OUTPATIENT)
Dept: GERIATRICS | Facility: CLINIC | Age: 74
End: 2024-08-03
Payer: COMMERCIAL

## 2024-08-03 NOTE — TELEPHONE ENCOUNTER
[facility] TELEPHONE ENCOUNTER    Chief Complaint   Patient presents with    Pain       Namita Viera is a 74 year old  (1950),Nurse called today to report: Right hand was reported to be swollen a few days ago, and a Registered Nurse wrapped the hand with ace wrap. Ace wrap has been on hand per Occupational therapy somewhat tight. This was removed and since removal, right hand has been more painful.  Currently has orders for PRN dilaudid, ibuprofen and lidocaine.     ASSESSMENT/PLAN    X ray of right hand   Continue current pain regimen without change  Recommend no ace wrap to hand for now    Electronically signed by:   LONNIE Baker CNP

## 2024-08-06 ENCOUNTER — TRANSITIONAL CARE UNIT VISIT (OUTPATIENT)
Dept: GERIATRICS | Facility: CLINIC | Age: 74
End: 2024-08-06
Payer: COMMERCIAL

## 2024-08-06 VITALS
WEIGHT: 88.4 LBS | RESPIRATION RATE: 20 BRPM | SYSTOLIC BLOOD PRESSURE: 116 MMHG | HEIGHT: 61 IN | BODY MASS INDEX: 16.69 KG/M2 | DIASTOLIC BLOOD PRESSURE: 77 MMHG | OXYGEN SATURATION: 90 % | TEMPERATURE: 97.8 F | HEART RATE: 104 BPM

## 2024-08-06 DIAGNOSIS — K92.1 MELENA: ICD-10-CM

## 2024-08-06 DIAGNOSIS — K59.03 DRUG-INDUCED CONSTIPATION: ICD-10-CM

## 2024-08-06 DIAGNOSIS — S22.070D CLOSED WEDGE COMPRESSION FRACTURE OF T9 VERTEBRA WITH ROUTINE HEALING, SUBSEQUENT ENCOUNTER: ICD-10-CM

## 2024-08-06 DIAGNOSIS — I50.9 CHRONIC CONGESTIVE HEART FAILURE, UNSPECIFIED HEART FAILURE TYPE (H): ICD-10-CM

## 2024-08-06 DIAGNOSIS — D64.9 ANEMIA, UNSPECIFIED TYPE: Primary | ICD-10-CM

## 2024-08-06 DIAGNOSIS — Z86.711 HISTORY OF PULMONARY EMBOLISM: ICD-10-CM

## 2024-08-06 DIAGNOSIS — J43.9 PULMONARY EMPHYSEMA, UNSPECIFIED EMPHYSEMA TYPE (H): ICD-10-CM

## 2024-08-06 PROCEDURE — 99309 SBSQ NF CARE MODERATE MDM 30: CPT | Performed by: NURSE PRACTITIONER

## 2024-08-06 NOTE — LETTER
8/6/2024      Namita Viera  [address]  [place] [address]        [facility]    Code Status:  FULL CODE   Visit Type:   Chief Complaint   Patient presents with     TCU Follow Up     Facility:  [facility]) [[id number]]         HPI: Namita Viera is a 74 year old female who I am seeing today for follow up on the TCU.  Pt recently re hospitalized with GI bleed. Past medical history includes COPD, alcoholism, frequent falls. Pt had recently been hospitalized 6/27-7/4 post fall. Pt found to have PE. Pt at TCU and had rectal bleeding. Pt found to have fecal impaction leading to rectal bleed. Also found to have CHF. BNP similar to previous around 900, however pt with bilateral moderate pleural effusions, 2+ pitting ankle edema that is new.  Suspicion for diastolic heart failure related to IV infusions received during last hospital stay. Pt given  IV Lasix 40 mg twice daily and changed to p.o. 40 mg twice daily at discharge. Hx of ETOH abuse. Declined treatment last hospitalization.    Transitional Care Course: Today patient sitting up in wheelchair.  Over the weekend patient had increased swelling and pain in her right hand.  DJD of the hands noted.  An x-ray was obtained which only showed degenerative changes.  No acute fracture.  Today swelling is down.  She denies any pain she is moving her hands per usual.  Underlying COPD.  She continues with occasional wheezing.  She is smoking 2-3 times daily.  She denies any shortness of breath or chest pain.  She has been weaned off oxygen.  She refuses smoking cessation devices.  Diastolic heart failure.  No lower extremity edema.  She continues on Lasix.  Constipation improved.  No further evidence of bleeding.  T9 and pelvic fracture due to recent fall.  Pain well-controlled with Tylenol.        Assessment/Plan:     Rectal bleeding  Fecal impaction  Opioid-induced constipation  -CT showed fecal impaction.  -No further evidence  of bleed.   -Hgb 8.8.  Repeat hemoglobin.  -Continue PTA senna and MiraLAX.     Acute on chronic diastolic heart failure  -LVEF 65 to 70% 2 weeks ago  -continues on lasix 40 mg BID.   -2 gm sodium diet.   -every day weights.   -peripheral edema resolved.   -no SOB or CP.      pulmonary embolus  -Completed Eliquis. ASA 81 mg every day.      Chronic hypoxic respiratory failure  COPD  -known COPD, known pulmonary embolus,  acute on chronic CHF   -was not on O2 at home.   -Continue flutter valve.   -No longer on oxygen.   -Steroids complete.    -Encourage cough and deep breath.   -Continue home MDI.      rib fractures  T9 compression fracture  Pelvic fractures   -Continue tylenol, low dose hydromorphone as needed (will attempt to wean off hydromorphone secondary to recent falls)    Hx of tobacco abuse  -denies use of cessation tools.      Active Ambulatory Problems     Diagnosis Date Noted     Esophageal reflux      Smoker 05/01/2018     Ear mass 06/15/2018     Age-related cataract of both eyes, unspecified age-related cataract type 11/27/2019     Alcohol dependence (H) 05/06/2021     Chronic cough 05/06/2021     Benign essential hypertension 05/06/2021     Vitamin D deficiency 05/06/2021     Right Intertroch Hip Fract 04/30/2018     Hypercalcemia 02/07/2022     Anemia, unspecified type 02/07/2022     Leg cramping 02/07/2022     Osteoporosis 02/07/2022     Nocturia 02/07/2022     COPD (chronic obstructive pulmonary disease) (H) 02/07/2022     UTI (urinary tract infection) 05/16/2023     Acute UTI 05/16/2023     Hip fracture, right, closed, initial encounter (H) 05/16/2023     Hyponatremia 05/16/2023     Hypokalemia 06/27/2024     COPD exacerbation (H) 06/27/2024     Acute respiratory failure with hypercapnia (H) 06/27/2024     Altered mental status, unspecified altered mental status type 06/27/2024     Aspiration pneumonia, unspecified aspiration pneumonia type, unspecified laterality, unspecified part of lung (H)  06/27/2024     History of tobacco use 07/05/2024     Vaginitis and vulvovaginitis 04/29/2008     Sciatic pain 12/06/2021     Polyp of colon 07/09/2021     HTN (hypertension) 12/06/2021     History of colonic polyps 07/09/2021     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     Bilateral leg edema 12/06/2021     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     Acquired absence of organ, genital organs 04/29/2008     Rectal bleeding 07/07/2024     Fecal impaction (H) 07/07/2024     Acute on chronic diastolic congestive heart failure (H) 07/07/2024     Pelvic fracture (H) 07/08/2024     Resolved Ambulatory Problems     Diagnosis Date Noted     History of alcohol abuse 11/27/2019     Rib pain on left side 05/06/2021     SAH (subarachnoid hemorrhage) (H) 04/30/2018     Closed nondisplaced intertrochanteric fracture of left femur, initial encounter (H) 05/01/2018     Back pain 10/12/2021     Closed fracture of sacrum with routine healing 10/12/2021     Pyuria 10/12/2021     Acute cystitis without hematuria 10/13/2021     Weight loss 02/07/2022     Other acute pulmonary embolism without acute cor pulmonale (H) 06/27/2024     Past Medical History:   Diagnosis Date     Acid reflux      Alcohol use      Closed fracture of left femur (H)      Emphysema of lung (H) 02/07/2022     Hip fracture requiring operative repair (H)      Hypertension 01/2021     Traumatic closed displaced fracture of distal end of radius      Allergies   Allergen Reactions     Penicillins Hives       All Meds and Allergies reviewed in the record at the facility and is the most up-to-date.    Current Outpatient Medications   Medication Sig Dispense Refill     albuterol (PROAIR HFA/PROVENTIL HFA/VENTOLIN HFA) 108 (90 Base) MCG/ACT inhaler INHALE 1-2 PUFFS INTO THE LUNGS EVERY 6 HOURS AS NEEDED FOR COUGH OR SHORTNESS OF BREATH OR WHEEZE 18 g 0     alendronate (FOSAMAX) 70 MG tablet Take 70 mg by mouth every 7 days       aspirin (ASA) 81 MG EC tablet Take 1 tablet (81 mg)  by mouth daily       calcium carbonate (OS-PETER) 1500 (600 Ca) MG tablet Take 600 mg by mouth daily       folic acid (FOLVITE) 1 MG tablet Take 1 tablet (1 mg) by mouth daily       furosemide (LASIX) 40 MG tablet Take 1 tablet (40 mg) by mouth 2 times daily       HYDROmorphone (DILAUDID) 2 MG tablet Take 0.5 tablets (1 mg) by mouth every 4 hours as needed for severe pain 10 tablet 0     ibuprofen (ADVIL/MOTRIN) 200 MG tablet Take 200 mg by mouth every 6 hours as needed for pain       Lidocaine (LIDOCARE) 4 % Patch Place 2 patches onto the skin every 24 hours To prevent lidocaine toxicity, patient should be patch free for 12 hrs daily. Apply to left lower chest for rib fractures.       losartan (COZAAR) 25 MG tablet Take 1 tablet (25 mg) by mouth daily       melatonin 1 MG TABS tablet Take 1 tablet (1 mg) by mouth nightly as needed for sleep 90 tablet 3     miconazole (MICATIN) 2 % external powder Apply topically 2 times daily       midodrine (PROAMATINE) 2.5 MG tablet Take 2.5 mg by mouth 3 times daily (with meals) (Hold for SBP greater than 155)       multivitamin, therapeutic (THERA-VIT) TABS tablet Take 1 tablet by mouth daily       nicotine (NICODERM CQ) 14 MG/24HR 24 hr patch Place 1 patch onto the skin daily       omeprazole (PRILOSEC) 20 MG DR capsule Take 1 capsule (20 mg) by mouth daily 90 capsule 3     polyethylene glycol (MIRALAX) 17 GM/Dose powder Take 17 g by mouth daily       potassium chloride ER (K-TAB) 20 MEQ CR tablet Take 1 tablet (20 mEq) by mouth daily       senna-docusate (SENOKOT-S/PERICOLACE) 8.6-50 MG tablet Take 1 tablet by mouth daily       thiamine (B-1) 100 MG tablet Take 1 tablet (100 mg) by mouth daily       vitamin C (ASCORBIC ACID) 500 MG tablet Take 500 mg by mouth daily       Vitamin D, Cholecalciferol, 25 MCG (1000 UT) TABS Take 1,000 Units by mouth daily       No current facility-administered medications for this visit.       REVIEW OF SYSTEMS:   10 point review of systems  "reviewed and pertinent positives in the HPI.     PHYSICAL EXAMINATION:  Physical Exam     Vital signs: /77   Pulse 104   Temp 97.8  F (36.6  C)   Resp 20   Ht 1.549 m (5' 1\")   Wt 40.1 kg (88 lb 6.4 oz)   SpO2 90%   BMI 16.70 kg/m    General: Awake, Alert, oriented x3, conversant  HEENT:Pink conjunctiva,moist oral mucosa, lesion to lower lip.   NECK: Supple  CVS:  S1  S2, without murmur or gallop.   LUNG: Occ expiratory wheezing today. No cough. Off O2 now.   BACK: No kyphosis of the thoracic spine  ABDOMEN: Soft, nontender to palpation, with positive bowel sounds  EXTREMITIES: Moves both upper and lower extremities, no pedal edema, no calf tenderness.  Right hand without swelling.  DJD of both hands noted with bony prominence.  No redness or warmth.  SKIN: Warm and dry  NEUROLOGIC: Intact, pulses palpable  PSYCHIATRIC: Flat affect.    Labs:  All labs reviewed in the nursing home record and Epic   @  Lab Results   Component Value Date    WBC 5.7 07/15/2024     Lab Results   Component Value Date    RBC 2.41 07/15/2024     Lab Results   Component Value Date    HGB 7.7 07/15/2024     Lab Results   Component Value Date    HCT 24.5 07/15/2024     Lab Results   Component Value Date     07/15/2024     Lab Results   Component Value Date    MCH 32.0 07/15/2024     Lab Results   Component Value Date    MCHC 31.4 07/15/2024     Lab Results   Component Value Date    RDW 15.4 07/15/2024     Lab Results   Component Value Date     07/15/2024        @Last Comprehensive Metabolic Panel:  Sodium   Date Value Ref Range Status   07/15/2024 135 135 - 145 mmol/L Final     Potassium   Date Value Ref Range Status   07/15/2024 3.7 3.4 - 5.3 mmol/L Final     Potassium Whole Blood   Date Value Ref Range Status   06/27/2024 2.6 (LL) 3.4 - 5.3 mmol/L Final     Chloride   Date Value Ref Range Status   07/15/2024 96 (L) 98 - 107 mmol/L Final   02/04/2022 98 98 - 107 mmol/L Final     Carbon Dioxide (CO2)   Date Value Ref " Range Status   07/15/2024 29 22 - 29 mmol/L Final   02/04/2022 24 22 - 31 mmol/L Final     Anion Gap   Date Value Ref Range Status   07/15/2024 10 7 - 15 mmol/L Final   02/04/2022 13 5 - 18 mmol/L Final     Glucose   Date Value Ref Range Status   07/15/2024 87 70 - 99 mg/dL Final   02/04/2022 92 70 - 125 mg/dL Final     GLUCOSE BY METER POCT   Date Value Ref Range Status   07/04/2024 94 70 - 99 mg/dL Final     Urea Nitrogen   Date Value Ref Range Status   07/15/2024 20.3 8.0 - 23.0 mg/dL Final   02/04/2022 10 8 - 28 mg/dL Final     Creatinine   Date Value Ref Range Status   07/15/2024 0.78 0.51 - 0.95 mg/dL Final     GFR Estimate   Date Value Ref Range Status   07/15/2024 78 >60 mL/min/1.73m2 Final   05/20/2021 >60 >60 mL/min/1.73m2 Final     Calcium   Date Value Ref Range Status   07/15/2024 9.2 8.8 - 10.2 mg/dL Final       This note has been dictated using voice recognition software. Any grammatical or context distortions are unintentional and inherent to the software    Electronically signed by: Cori Goyal CNP       Sincerely,        Cori Goyal, NP

## 2024-08-07 ENCOUNTER — LAB REQUISITION (OUTPATIENT)
Dept: LAB | Facility: CLINIC | Age: 74
End: 2024-08-07
Payer: COMMERCIAL

## 2024-08-07 DIAGNOSIS — D64.9 ANEMIA, UNSPECIFIED: ICD-10-CM

## 2024-08-07 NOTE — PROGRESS NOTES
[facility]    Code Status:  FULL CODE   Visit Type:   Chief Complaint   Patient presents with    TCU Follow Up     Facility:  [facility]) [[id number]]         HPI: Namita Viera is a 74 year old female who I am seeing today for follow up on the TCU.  Pt recently re hospitalized with GI bleed. Past medical history includes COPD, alcoholism, frequent falls. Pt had recently been hospitalized 6/27-7/4 post fall. Pt found to have PE. Pt at TCU and had rectal bleeding. Pt found to have fecal impaction leading to rectal bleed. Also found to have CHF. BNP similar to previous around 900, however pt with bilateral moderate pleural effusions, 2+ pitting ankle edema that is new.  Suspicion for diastolic heart failure related to IV infusions received during last hospital stay. Pt given  IV Lasix 40 mg twice daily and changed to p.o. 40 mg twice daily at discharge. Hx of ETOH abuse. Declined treatment last hospitalization.    Transitional Care Course: Today patient sitting up in wheelchair.  Over the weekend patient had increased swelling and pain in her right hand.  DJD of the hands noted.  An x-ray was obtained which only showed degenerative changes.  No acute fracture.  Today swelling is down.  She denies any pain she is moving her hands per usual.  Underlying COPD.  She continues with occasional wheezing.  She is smoking 2-3 times daily.  She denies any shortness of breath or chest pain.  She has been weaned off oxygen.  She refuses smoking cessation devices.  Diastolic heart failure.  No lower extremity edema.  She continues on Lasix.  Constipation improved.  No further evidence of bleeding.  T9 and pelvic fracture due to recent fall.  Pain well-controlled with Tylenol.        Assessment/Plan:     Rectal bleeding  Fecal impaction  Opioid-induced constipation  -CT showed fecal impaction.  -No further evidence of bleed.   -Hgb 8.8.  Repeat hemoglobin.  -Continue PTA senna and MiraLAX.      Acute on chronic diastolic heart failure  -LVEF 65 to 70% 2 weeks ago  -continues on lasix 40 mg BID.   -2 gm sodium diet.   -every day weights.   -peripheral edema resolved.   -no SOB or CP.      pulmonary embolus  -Completed Eliquis. ASA 81 mg every day.      Chronic hypoxic respiratory failure  COPD  -known COPD, known pulmonary embolus,  acute on chronic CHF   -was not on O2 at home.   -Continue flutter valve.   -No longer on oxygen.   -Steroids complete.    -Encourage cough and deep breath.   -Continue home MDI.      rib fractures  T9 compression fracture  Pelvic fractures   -Continue tylenol, low dose hydromorphone as needed (will attempt to wean off hydromorphone secondary to recent falls)    Hx of tobacco abuse  -denies use of cessation tools.      Active Ambulatory Problems     Diagnosis Date Noted    Esophageal reflux     Smoker 05/01/2018    Ear mass 06/15/2018    Age-related cataract of both eyes, unspecified age-related cataract type 11/27/2019    Alcohol dependence (H) 05/06/2021    Chronic cough 05/06/2021    Benign essential hypertension 05/06/2021    Vitamin D deficiency 05/06/2021    Right Intertroch Hip Fract 04/30/2018    Hypercalcemia 02/07/2022    Anemia, unspecified type 02/07/2022    Leg cramping 02/07/2022    Osteoporosis 02/07/2022    Nocturia 02/07/2022    COPD (chronic obstructive pulmonary disease) (H) 02/07/2022    UTI (urinary tract infection) 05/16/2023    Acute UTI 05/16/2023    Hip fracture, right, closed, initial encounter (H) 05/16/2023    Hyponatremia 05/16/2023    Hypokalemia 06/27/2024    COPD exacerbation (H) 06/27/2024    Acute respiratory failure with hypercapnia (H) 06/27/2024    Altered mental status, unspecified altered mental status type 06/27/2024    Aspiration pneumonia, unspecified aspiration pneumonia type, unspecified laterality, unspecified part of lung (H) 06/27/2024    History of tobacco use 07/05/2024    Vaginitis and vulvovaginitis 04/29/2008    Sciatic pain  12/06/2021    Polyp of colon 07/09/2021    HTN (hypertension) 12/06/2021    History of colonic polyps 07/09/2021    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    Bilateral leg edema 12/06/2021    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    Acquired absence of organ, genital organs 04/29/2008    Rectal bleeding 07/07/2024    Fecal impaction (H) 07/07/2024    Acute on chronic diastolic congestive heart failure (H) 07/07/2024    Pelvic fracture (H) 07/08/2024     Resolved Ambulatory Problems     Diagnosis Date Noted    History of alcohol abuse 11/27/2019    Rib pain on left side 05/06/2021    SAH (subarachnoid hemorrhage) (H) 04/30/2018    Closed nondisplaced intertrochanteric fracture of left femur, initial encounter (H) 05/01/2018    Back pain 10/12/2021    Closed fracture of sacrum with routine healing 10/12/2021    Pyuria 10/12/2021    Acute cystitis without hematuria 10/13/2021    Weight loss 02/07/2022    Other acute pulmonary embolism without acute cor pulmonale (H) 06/27/2024     Past Medical History:   Diagnosis Date    Acid reflux     Alcohol use     Closed fracture of left femur (H)     Emphysema of lung (H) 02/07/2022    Hip fracture requiring operative repair (H)     Hypertension 01/2021    Traumatic closed displaced fracture of distal end of radius      Allergies   Allergen Reactions    Penicillins Hives       All Meds and Allergies reviewed in the record at the facility and is the most up-to-date.    Current Outpatient Medications   Medication Sig Dispense Refill    albuterol (PROAIR HFA/PROVENTIL HFA/VENTOLIN HFA) 108 (90 Base) MCG/ACT inhaler INHALE 1-2 PUFFS INTO THE LUNGS EVERY 6 HOURS AS NEEDED FOR COUGH OR SHORTNESS OF BREATH OR WHEEZE 18 g 0    alendronate (FOSAMAX) 70 MG tablet Take 70 mg by mouth every 7 days      aspirin (ASA) 81 MG EC tablet Take 1 tablet (81 mg) by mouth daily      calcium carbonate (OS-PETER) 1500 (600 Ca) MG tablet Take 600 mg by mouth daily      folic acid (FOLVITE) 1 MG tablet Take 1  tablet (1 mg) by mouth daily      furosemide (LASIX) 40 MG tablet Take 1 tablet (40 mg) by mouth 2 times daily      HYDROmorphone (DILAUDID) 2 MG tablet Take 0.5 tablets (1 mg) by mouth every 4 hours as needed for severe pain 10 tablet 0    ibuprofen (ADVIL/MOTRIN) 200 MG tablet Take 200 mg by mouth every 6 hours as needed for pain      Lidocaine (LIDOCARE) 4 % Patch Place 2 patches onto the skin every 24 hours To prevent lidocaine toxicity, patient should be patch free for 12 hrs daily. Apply to left lower chest for rib fractures.      losartan (COZAAR) 25 MG tablet Take 1 tablet (25 mg) by mouth daily      melatonin 1 MG TABS tablet Take 1 tablet (1 mg) by mouth nightly as needed for sleep 90 tablet 3    miconazole (MICATIN) 2 % external powder Apply topically 2 times daily      midodrine (PROAMATINE) 2.5 MG tablet Take 2.5 mg by mouth 3 times daily (with meals) (Hold for SBP greater than 155)      multivitamin, therapeutic (THERA-VIT) TABS tablet Take 1 tablet by mouth daily      nicotine (NICODERM CQ) 14 MG/24HR 24 hr patch Place 1 patch onto the skin daily      omeprazole (PRILOSEC) 20 MG DR capsule Take 1 capsule (20 mg) by mouth daily 90 capsule 3    polyethylene glycol (MIRALAX) 17 GM/Dose powder Take 17 g by mouth daily      potassium chloride ER (K-TAB) 20 MEQ CR tablet Take 1 tablet (20 mEq) by mouth daily      senna-docusate (SENOKOT-S/PERICOLACE) 8.6-50 MG tablet Take 1 tablet by mouth daily      thiamine (B-1) 100 MG tablet Take 1 tablet (100 mg) by mouth daily      vitamin C (ASCORBIC ACID) 500 MG tablet Take 500 mg by mouth daily      Vitamin D, Cholecalciferol, 25 MCG (1000 UT) TABS Take 1,000 Units by mouth daily       No current facility-administered medications for this visit.       REVIEW OF SYSTEMS:   10 point review of systems reviewed and pertinent positives in the HPI.     PHYSICAL EXAMINATION:  Physical Exam     Vital signs: /77   Pulse 104   Temp 97.8  F (36.6  C)   Resp 20   Ht  "1.549 m (5' 1\")   Wt 40.1 kg (88 lb 6.4 oz)   SpO2 90%   BMI 16.70 kg/m    General: Awake, Alert, oriented x3, conversant  HEENT:Pink conjunctiva,moist oral mucosa, lesion to lower lip.   NECK: Supple  CVS:  S1  S2, without murmur or gallop.   LUNG: Occ expiratory wheezing today. No cough. Off O2 now.   BACK: No kyphosis of the thoracic spine  ABDOMEN: Soft, nontender to palpation, with positive bowel sounds  EXTREMITIES: Moves both upper and lower extremities, no pedal edema, no calf tenderness.  Right hand without swelling.  DJD of both hands noted with bony prominence.  No redness or warmth.  SKIN: Warm and dry  NEUROLOGIC: Intact, pulses palpable  PSYCHIATRIC: Flat affect.    Labs:  All labs reviewed in the nursing home record and [employer]   @  Lab Results   Component Value Date    WBC 5.7 07/15/2024     Lab Results   Component Value Date    RBC 2.41 07/15/2024     Lab Results   Component Value Date    HGB 7.7 07/15/2024     Lab Results   Component Value Date    HCT 24.5 07/15/2024     Lab Results   Component Value Date     07/15/2024     Lab Results   Component Value Date    MCH 32.0 07/15/2024     Lab Results   Component Value Date    MCHC 31.4 07/15/2024     Lab Results   Component Value Date    RDW 15.4 07/15/2024     Lab Results   Component Value Date     07/15/2024        @Last Comprehensive Metabolic Panel:  Sodium   Date Value Ref Range Status   07/15/2024 135 135 - 145 mmol/L Final     Potassium   Date Value Ref Range Status   07/15/2024 3.7 3.4 - 5.3 mmol/L Final     Potassium Whole Blood   Date Value Ref Range Status   06/27/2024 2.6 (LL) 3.4 - 5.3 mmol/L Final     Chloride   Date Value Ref Range Status   07/15/2024 96 (L) 98 - 107 mmol/L Final   02/04/2022 98 98 - 107 mmol/L Final     Carbon Dioxide (CO2)   Date Value Ref Range Status   07/15/2024 29 22 - 29 mmol/L Final   02/04/2022 24 22 - 31 mmol/L Final     Anion Gap   Date Value Ref Range Status   07/15/2024 10 7 - 15 mmol/L Final "   02/04/2022 13 5 - 18 mmol/L Final     Glucose   Date Value Ref Range Status   07/15/2024 87 70 - 99 mg/dL Final   02/04/2022 92 70 - 125 mg/dL Final     GLUCOSE BY METER POCT   Date Value Ref Range Status   07/04/2024 94 70 - 99 mg/dL Final     Urea Nitrogen   Date Value Ref Range Status   07/15/2024 20.3 8.0 - 23.0 mg/dL Final   02/04/2022 10 8 - 28 mg/dL Final     Creatinine   Date Value Ref Range Status   07/15/2024 0.78 0.51 - 0.95 mg/dL Final     GFR Estimate   Date Value Ref Range Status   07/15/2024 78 >60 mL/min/1.73m2 Final   05/20/2021 >60 >60 mL/min/1.73m2 Final     Calcium   Date Value Ref Range Status   07/15/2024 9.2 8.8 - 10.2 mg/dL Final       This note has been dictated using voice recognition software. Any grammatical or context distortions are unintentional and inherent to the software    Electronically signed by: Cori Goyal, CNP

## 2024-08-08 ENCOUNTER — TRANSITIONAL CARE UNIT VISIT (OUTPATIENT)
Dept: GERIATRICS | Facility: CLINIC | Age: 74
End: 2024-08-08
Payer: COMMERCIAL

## 2024-08-08 VITALS
HEART RATE: 98 BPM | HEIGHT: 61 IN | DIASTOLIC BLOOD PRESSURE: 68 MMHG | TEMPERATURE: 97.9 F | BODY MASS INDEX: 16.92 KG/M2 | WEIGHT: 89.6 LBS | SYSTOLIC BLOOD PRESSURE: 128 MMHG | RESPIRATION RATE: 20 BRPM | OXYGEN SATURATION: 90 %

## 2024-08-08 DIAGNOSIS — D64.9 ANEMIA, UNSPECIFIED TYPE: Primary | ICD-10-CM

## 2024-08-08 DIAGNOSIS — Z86.711 HISTORY OF PULMONARY EMBOLISM: ICD-10-CM

## 2024-08-08 DIAGNOSIS — S22.070D CLOSED WEDGE COMPRESSION FRACTURE OF T9 VERTEBRA WITH ROUTINE HEALING, SUBSEQUENT ENCOUNTER: ICD-10-CM

## 2024-08-08 DIAGNOSIS — K92.1 MELENA: ICD-10-CM

## 2024-08-08 DIAGNOSIS — J43.9 PULMONARY EMPHYSEMA, UNSPECIFIED EMPHYSEMA TYPE (H): ICD-10-CM

## 2024-08-08 DIAGNOSIS — K59.03 DRUG-INDUCED CONSTIPATION: ICD-10-CM

## 2024-08-08 DIAGNOSIS — I50.9 CHRONIC CONGESTIVE HEART FAILURE, UNSPECIFIED HEART FAILURE TYPE (H): ICD-10-CM

## 2024-08-08 LAB — HGB BLD-MCNC: 9.5 G/DL (ref 11.7–15.7)

## 2024-08-08 PROCEDURE — 99309 SBSQ NF CARE MODERATE MDM 30: CPT | Performed by: NURSE PRACTITIONER

## 2024-08-08 PROCEDURE — P9604 ONE-WAY ALLOW PRORATED TRIP: HCPCS | Mod: ORL | Performed by: NURSE PRACTITIONER

## 2024-08-08 PROCEDURE — 85018 HEMOGLOBIN: CPT | Mod: ORL | Performed by: NURSE PRACTITIONER

## 2024-08-08 PROCEDURE — 36415 COLL VENOUS BLD VENIPUNCTURE: CPT | Mod: ORL | Performed by: NURSE PRACTITIONER

## 2024-08-08 NOTE — LETTER
8/8/2024      Namita Viera  [address]  [place] [address]        [facility]    Code Status:  FULL CODE   Visit Type:   Chief Complaint   Patient presents with     TCU Follow Up     Facility:  [facility]) [[id number]]         HPI: Namita Viera is a 74 year old female who I am seeing today for follow up on the TCU.  Pt recently re hospitalized with GI bleed. Past medical history includes COPD, alcoholism, frequent falls. Pt had recently been hospitalized 6/27-7/4 post fall. Pt found to have PE. Pt at TCU and had rectal bleeding. Pt found to have fecal impaction leading to rectal bleed. Also found to have CHF. BNP similar to previous around 900, however pt with bilateral moderate pleural effusions, 2+ pitting ankle edema that is new.  Suspicion for diastolic heart failure related to IV infusions received during last hospital stay. Pt given  IV Lasix 40 mg twice daily and changed to p.o. 40 mg twice daily at discharge. Hx of ETOH abuse. Declined treatment last hospitalization.    Transitional Care Course: Today patient sitting up in wheelchair. Pt continues to smoke. She denies cessation tools. She continues with occasional wheezing. She is off oxygen. Underlying COPD. No cough on exam. CHF compensated with lasix. She denies any SOB or CP. Pt having regular bowel movements. No further melena. T9 and pelvic fracture due to recent fall.  Pain well-controlled with Tylenol.        Assessment/Plan:     Rectal bleeding  Fecal impaction  Opioid-induced constipation  -CT showed fecal impaction.  -No further evidence of bleed.   -Today Hgb 9.5.   -Continue PTA senna and MiraLAX.     Acute on chronic diastolic heart failure  -LVEF 65 to 70% 2 weeks ago  -continues on lasix 40 mg BID.   -2 gm sodium diet.   -every day weights.   -peripheral edema resolved.   -no SOB or CP.      pulmonary embolus  -Completed Eliquis. ASA 81 mg every day.      Chronic hypoxic respiratory  failure  COPD  -known COPD, known pulmonary embolus,  acute on chronic CHF   -was not on O2 at home.   -Continue flutter valve.   -No longer on oxygen.   -Steroids complete.    -Encourage cough and deep breath.   -Continue home MDI.      rib fractures  T9 compression fracture  Pelvic fractures   -Continue tylenol, low dose hydromorphone as needed (will attempt to wean off hydromorphone secondary to recent falls)    Hx of tobacco abuse  -denies use of cessation tools.      Active Ambulatory Problems     Diagnosis Date Noted     Esophageal reflux      Smoker 05/01/2018     Ear mass 06/15/2018     Age-related cataract of both eyes, unspecified age-related cataract type 11/27/2019     Alcohol dependence (H) 05/06/2021     Chronic cough 05/06/2021     Benign essential hypertension 05/06/2021     Vitamin D deficiency 05/06/2021     Right Intertroch Hip Fract 04/30/2018     Hypercalcemia 02/07/2022     Anemia, unspecified type 02/07/2022     Leg cramping 02/07/2022     Osteoporosis 02/07/2022     Nocturia 02/07/2022     COPD (chronic obstructive pulmonary disease) (H) 02/07/2022     UTI (urinary tract infection) 05/16/2023     Acute UTI 05/16/2023     Hip fracture, right, closed, initial encounter (H) 05/16/2023     Hyponatremia 05/16/2023     Hypokalemia 06/27/2024     COPD exacerbation (H) 06/27/2024     Acute respiratory failure with hypercapnia (H) 06/27/2024     Altered mental status, unspecified altered mental status type 06/27/2024     Aspiration pneumonia, unspecified aspiration pneumonia type, unspecified laterality, unspecified part of lung (H) 06/27/2024     History of tobacco use 07/05/2024     Vaginitis and vulvovaginitis 04/29/2008     Sciatic pain 12/06/2021     Polyp of colon 07/09/2021     HTN (hypertension) 12/06/2021     History of colonic polyps 07/09/2021     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     Bilateral leg edema 12/06/2021     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     Acquired absence of organ,  genital organs 04/29/2008     Rectal bleeding 07/07/2024     Fecal impaction (H) 07/07/2024     Acute on chronic diastolic congestive heart failure (H) 07/07/2024     Pelvic fracture (H) 07/08/2024     Resolved Ambulatory Problems     Diagnosis Date Noted     History of alcohol abuse 11/27/2019     Rib pain on left side 05/06/2021     SAH (subarachnoid hemorrhage) (H) 04/30/2018     Closed nondisplaced intertrochanteric fracture of left femur, initial encounter (H) 05/01/2018     Back pain 10/12/2021     Closed fracture of sacrum with routine healing 10/12/2021     Pyuria 10/12/2021     Acute cystitis without hematuria 10/13/2021     Weight loss 02/07/2022     Other acute pulmonary embolism without acute cor pulmonale (H) 06/27/2024     Past Medical History:   Diagnosis Date     Acid reflux      Alcohol use      Closed fracture of left femur (H)      Emphysema of lung (H) 02/07/2022     Hip fracture requiring operative repair (H)      Hypertension 01/2021     Traumatic closed displaced fracture of distal end of radius      Allergies   Allergen Reactions     Penicillins Hives       All Meds and Allergies reviewed in the record at the facility and is the most up-to-date.    Current Outpatient Medications   Medication Sig Dispense Refill     albuterol (PROAIR HFA/PROVENTIL HFA/VENTOLIN HFA) 108 (90 Base) MCG/ACT inhaler INHALE 1-2 PUFFS INTO THE LUNGS EVERY 6 HOURS AS NEEDED FOR COUGH OR SHORTNESS OF BREATH OR WHEEZE 18 g 0     alendronate (FOSAMAX) 70 MG tablet Take 70 mg by mouth every 7 days       aspirin (ASA) 81 MG EC tablet Take 1 tablet (81 mg) by mouth daily       calcium carbonate (OS-PETER) 1500 (600 Ca) MG tablet Take 600 mg by mouth daily       folic acid (FOLVITE) 1 MG tablet Take 1 tablet (1 mg) by mouth daily       furosemide (LASIX) 40 MG tablet Take 1 tablet (40 mg) by mouth 2 times daily       HYDROmorphone (DILAUDID) 2 MG tablet Take 0.5 tablets (1 mg) by mouth every 4 hours as needed for severe pain  "10 tablet 0     ibuprofen (ADVIL/MOTRIN) 200 MG tablet Take 200 mg by mouth every 6 hours as needed for pain       Lidocaine (LIDOCARE) 4 % Patch Place 2 patches onto the skin every 24 hours To prevent lidocaine toxicity, patient should be patch free for 12 hrs daily. Apply to left lower chest for rib fractures.       losartan (COZAAR) 25 MG tablet Take 1 tablet (25 mg) by mouth daily       melatonin 1 MG TABS tablet Take 1 tablet (1 mg) by mouth nightly as needed for sleep 90 tablet 3     miconazole (MICATIN) 2 % external powder Apply topically 2 times daily       midodrine (PROAMATINE) 2.5 MG tablet Take 2.5 mg by mouth 3 times daily (with meals) (Hold for SBP greater than 155)       multivitamin, therapeutic (THERA-VIT) TABS tablet Take 1 tablet by mouth daily       nicotine (NICODERM CQ) 14 MG/24HR 24 hr patch Place 1 patch onto the skin daily       omeprazole (PRILOSEC) 20 MG DR capsule Take 1 capsule (20 mg) by mouth daily 90 capsule 3     polyethylene glycol (MIRALAX) 17 GM/Dose powder Take 17 g by mouth daily       potassium chloride ER (K-TAB) 20 MEQ CR tablet Take 1 tablet (20 mEq) by mouth daily       senna-docusate (SENOKOT-S/PERICOLACE) 8.6-50 MG tablet Take 1 tablet by mouth daily       thiamine (B-1) 100 MG tablet Take 1 tablet (100 mg) by mouth daily       vitamin C (ASCORBIC ACID) 500 MG tablet Take 500 mg by mouth daily       Vitamin D, Cholecalciferol, 25 MCG (1000 UT) TABS Take 1,000 Units by mouth daily       No current facility-administered medications for this visit.       REVIEW OF SYSTEMS:   10 point review of systems reviewed and pertinent positives in the HPI.     PHYSICAL EXAMINATION:  Physical Exam     Vital signs: /68   Pulse 98   Temp 97.9  F (36.6  C)   Resp 20   Ht 1.549 m (5' 1\")   Wt 40.6 kg (89 lb 9.6 oz)   SpO2 90%   BMI 16.93 kg/m    General: Awake, Alert, oriented x3, conversant  HEENT:Pink conjunctiva,moist oral mucosa, lesion to lower lip.   NECK: Supple  CVS:  " S1  S2, without murmur or gallop.   LUNG: Occ expiratory wheezing today. No cough. Off O2 now.   BACK: No kyphosis of the thoracic spine  ABDOMEN: Soft, nontender to palpation, with positive bowel sounds  EXTREMITIES: Moves both upper and lower extremities, no pedal edema, no calf tenderness. DJD of both hands noted with bony prominence.  No redness or warmth.  SKIN: Warm and dry  NEUROLOGIC: Intact, pulses palpable  PSYCHIATRIC: Flat affect.    Labs:  All labs reviewed in the nursing home record and Epic   @  Lab Results   Component Value Date    WBC 5.7 07/15/2024     Lab Results   Component Value Date    RBC 2.41 07/15/2024     Lab Results   Component Value Date    HGB 7.7 07/15/2024     Lab Results   Component Value Date    HCT 24.5 07/15/2024     Lab Results   Component Value Date     07/15/2024     Lab Results   Component Value Date    MCH 32.0 07/15/2024     Lab Results   Component Value Date    MCHC 31.4 07/15/2024     Lab Results   Component Value Date    RDW 15.4 07/15/2024     Lab Results   Component Value Date     07/15/2024        @Last Comprehensive Metabolic Panel:  Sodium   Date Value Ref Range Status   07/15/2024 135 135 - 145 mmol/L Final     Potassium   Date Value Ref Range Status   07/15/2024 3.7 3.4 - 5.3 mmol/L Final     Potassium Whole Blood   Date Value Ref Range Status   06/27/2024 2.6 (LL) 3.4 - 5.3 mmol/L Final     Chloride   Date Value Ref Range Status   07/15/2024 96 (L) 98 - 107 mmol/L Final   02/04/2022 98 98 - 107 mmol/L Final     Carbon Dioxide (CO2)   Date Value Ref Range Status   07/15/2024 29 22 - 29 mmol/L Final   02/04/2022 24 22 - 31 mmol/L Final     Anion Gap   Date Value Ref Range Status   07/15/2024 10 7 - 15 mmol/L Final   02/04/2022 13 5 - 18 mmol/L Final     Glucose   Date Value Ref Range Status   07/15/2024 87 70 - 99 mg/dL Final   02/04/2022 92 70 - 125 mg/dL Final     GLUCOSE BY METER POCT   Date Value Ref Range Status   07/04/2024 94 70 - 99 mg/dL Final      Urea Nitrogen   Date Value Ref Range Status   07/15/2024 20.3 8.0 - 23.0 mg/dL Final   02/04/2022 10 8 - 28 mg/dL Final     Creatinine   Date Value Ref Range Status   07/15/2024 0.78 0.51 - 0.95 mg/dL Final     GFR Estimate   Date Value Ref Range Status   07/15/2024 78 >60 mL/min/1.73m2 Final   05/20/2021 >60 >60 mL/min/1.73m2 Final     Calcium   Date Value Ref Range Status   07/15/2024 9.2 8.8 - 10.2 mg/dL Final       This note has been dictated using voice recognition software. Any grammatical or context distortions are unintentional and inherent to the software    Electronically signed by: Cori Goyal CNP       Sincerely,        Cori Goyal, NP

## 2024-08-09 NOTE — PROGRESS NOTES
[facility]    Code Status:  FULL CODE   Visit Type:   Chief Complaint   Patient presents with    TCU Follow Up     Facility:  [facility] ([facility]) [[id number]]         HPI: Namita Viera is a 74 year old female who I am seeing today for follow up on the TCU.  Pt recently re hospitalized with GI bleed. Past medical history includes COPD, alcoholism, frequent falls. Pt had recently been hospitalized 6/27-7/4 post fall. Pt found to have PE. Pt at TCU and had rectal bleeding. Pt found to have fecal impaction leading to rectal bleed. Also found to have CHF. BNP similar to previous around 900, however pt with bilateral moderate pleural effusions, 2+ pitting ankle edema that is new.  Suspicion for diastolic heart failure related to IV infusions received during last hospital stay. Pt given  IV Lasix 40 mg twice daily and changed to p.o. 40 mg twice daily at discharge. Hx of ETOH abuse. Declined treatment last hospitalization.    Transitional Care Course: Today patient sitting up in wheelchair. Pt continues to smoke. She denies cessation tools. She continues with occasional wheezing. She is off oxygen. Underlying COPD. No cough on exam. CHF compensated with lasix. She denies any SOB or CP. Pt having regular bowel movements. No further melena. T9 and pelvic fracture due to recent fall.  Pain well-controlled with Tylenol.        Assessment/Plan:     Rectal bleeding  Fecal impaction  Opioid-induced constipation  -CT showed fecal impaction.  -No further evidence of bleed.   -Today Hgb 9.5.   -Continue PTA senna and MiraLAX.     Acute on chronic diastolic heart failure  -LVEF 65 to 70% 2 weeks ago  -continues on lasix 40 mg BID.   -2 gm sodium diet.   -every day weights.   -peripheral edema resolved.   -no SOB or CP.      pulmonary embolus  -Completed Eliquis. ASA 81 mg every day.      Chronic hypoxic respiratory failure  COPD  -known COPD, known pulmonary embolus,  acute on chronic CHF   -was not on O2  at home.   -Continue flutter valve.   -No longer on oxygen.   -Steroids complete.    -Encourage cough and deep breath.   -Continue home MDI.      rib fractures  T9 compression fracture  Pelvic fractures   -Continue tylenol, low dose hydromorphone as needed (will attempt to wean off hydromorphone secondary to recent falls)    Hx of tobacco abuse  -denies use of cessation tools.      Active Ambulatory Problems     Diagnosis Date Noted    Esophageal reflux     Smoker 05/01/2018    Ear mass 06/15/2018    Age-related cataract of both eyes, unspecified age-related cataract type 11/27/2019    Alcohol dependence (H) 05/06/2021    Chronic cough 05/06/2021    Benign essential hypertension 05/06/2021    Vitamin D deficiency 05/06/2021    Right Intertroch Hip Fract 04/30/2018    Hypercalcemia 02/07/2022    Anemia, unspecified type 02/07/2022    Leg cramping 02/07/2022    Osteoporosis 02/07/2022    Nocturia 02/07/2022    COPD (chronic obstructive pulmonary disease) (H) 02/07/2022    UTI (urinary tract infection) 05/16/2023    Acute UTI 05/16/2023    Hip fracture, right, closed, initial encounter (H) 05/16/2023    Hyponatremia 05/16/2023    Hypokalemia 06/27/2024    COPD exacerbation (H) 06/27/2024    Acute respiratory failure with hypercapnia (H) 06/27/2024    Altered mental status, unspecified altered mental status type 06/27/2024    Aspiration pneumonia, unspecified aspiration pneumonia type, unspecified laterality, unspecified part of lung (H) 06/27/2024    History of tobacco use 07/05/2024    Vaginitis and vulvovaginitis 04/29/2008    Sciatic pain 12/06/2021    Polyp of colon 07/09/2021    HTN (hypertension) 12/06/2021    History of colonic polyps 07/09/2021    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    Bilateral leg edema 12/06/2021    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    Acquired absence of organ, genital organs 04/29/2008    Rectal bleeding 07/07/2024    Fecal impaction (H) 07/07/2024    Acute on chronic diastolic  congestive heart failure (H) 07/07/2024    Pelvic fracture (H) 07/08/2024     Resolved Ambulatory Problems     Diagnosis Date Noted    History of alcohol abuse 11/27/2019    Rib pain on left side 05/06/2021    SAH (subarachnoid hemorrhage) (H) 04/30/2018    Closed nondisplaced intertrochanteric fracture of left femur, initial encounter (H) 05/01/2018    Back pain 10/12/2021    Closed fracture of sacrum with routine healing 10/12/2021    Pyuria 10/12/2021    Acute cystitis without hematuria 10/13/2021    Weight loss 02/07/2022    Other acute pulmonary embolism without acute cor pulmonale (H) 06/27/2024     Past Medical History:   Diagnosis Date    Acid reflux     Alcohol use     Closed fracture of left femur (H)     Emphysema of lung (H) 02/07/2022    Hip fracture requiring operative repair (H)     Hypertension 01/2021    Traumatic closed displaced fracture of distal end of radius      Allergies   Allergen Reactions    Penicillins Hives       All Meds and Allergies reviewed in the record at the facility and is the most up-to-date.    Current Outpatient Medications   Medication Sig Dispense Refill    albuterol (PROAIR HFA/PROVENTIL HFA/VENTOLIN HFA) 108 (90 Base) MCG/ACT inhaler INHALE 1-2 PUFFS INTO THE LUNGS EVERY 6 HOURS AS NEEDED FOR COUGH OR SHORTNESS OF BREATH OR WHEEZE 18 g 0    alendronate (FOSAMAX) 70 MG tablet Take 70 mg by mouth every 7 days      aspirin (ASA) 81 MG EC tablet Take 1 tablet (81 mg) by mouth daily      calcium carbonate (OS-PETER) 1500 (600 Ca) MG tablet Take 600 mg by mouth daily      folic acid (FOLVITE) 1 MG tablet Take 1 tablet (1 mg) by mouth daily      furosemide (LASIX) 40 MG tablet Take 1 tablet (40 mg) by mouth 2 times daily      HYDROmorphone (DILAUDID) 2 MG tablet Take 0.5 tablets (1 mg) by mouth every 4 hours as needed for severe pain 10 tablet 0    ibuprofen (ADVIL/MOTRIN) 200 MG tablet Take 200 mg by mouth every 6 hours as needed for pain      Lidocaine (LIDOCARE) 4 % Patch Place  "2 patches onto the skin every 24 hours To prevent lidocaine toxicity, patient should be patch free for 12 hrs daily. Apply to left lower chest for rib fractures.      losartan (COZAAR) 25 MG tablet Take 1 tablet (25 mg) by mouth daily      melatonin 1 MG TABS tablet Take 1 tablet (1 mg) by mouth nightly as needed for sleep 90 tablet 3    miconazole (MICATIN) 2 % external powder Apply topically 2 times daily      midodrine (PROAMATINE) 2.5 MG tablet Take 2.5 mg by mouth 3 times daily (with meals) (Hold for SBP greater than 155)      multivitamin, therapeutic (THERA-VIT) TABS tablet Take 1 tablet by mouth daily      nicotine (NICODERM CQ) 14 MG/24HR 24 hr patch Place 1 patch onto the skin daily      omeprazole (PRILOSEC) 20 MG DR capsule Take 1 capsule (20 mg) by mouth daily 90 capsule 3    polyethylene glycol (MIRALAX) 17 GM/Dose powder Take 17 g by mouth daily      potassium chloride ER (K-TAB) 20 MEQ CR tablet Take 1 tablet (20 mEq) by mouth daily      senna-docusate (SENOKOT-S/PERICOLACE) 8.6-50 MG tablet Take 1 tablet by mouth daily      thiamine (B-1) 100 MG tablet Take 1 tablet (100 mg) by mouth daily      vitamin C (ASCORBIC ACID) 500 MG tablet Take 500 mg by mouth daily      Vitamin D, Cholecalciferol, 25 MCG (1000 UT) TABS Take 1,000 Units by mouth daily       No current facility-administered medications for this visit.       REVIEW OF SYSTEMS:   10 point review of systems reviewed and pertinent positives in the HPI.     PHYSICAL EXAMINATION:  Physical Exam     Vital signs: /68   Pulse 98   Temp 97.9  F (36.6  C)   Resp 20   Ht 1.549 m (5' 1\")   Wt 40.6 kg (89 lb 9.6 oz)   SpO2 90%   BMI 16.93 kg/m    General: Awake, Alert, oriented x3, conversant  HEENT:Pink conjunctiva,moist oral mucosa, lesion to lower lip.   NECK: Supple  CVS:  S1  S2, without murmur or gallop.   LUNG: Occ expiratory wheezing today. No cough. Off O2 now.   BACK: No kyphosis of the thoracic spine  ABDOMEN: Soft, nontender to " palpation, with positive bowel sounds  EXTREMITIES: Moves both upper and lower extremities, no pedal edema, no calf tenderness. DJD of both hands noted with bony prominence.  No redness or warmth.  SKIN: Warm and dry  NEUROLOGIC: Intact, pulses palpable  PSYCHIATRIC: Flat affect.    Labs:  All labs reviewed in the nursing home record and Epic   @  Lab Results   Component Value Date    WBC 5.7 07/15/2024     Lab Results   Component Value Date    RBC 2.41 07/15/2024     Lab Results   Component Value Date    HGB 7.7 07/15/2024     Lab Results   Component Value Date    HCT 24.5 07/15/2024     Lab Results   Component Value Date     07/15/2024     Lab Results   Component Value Date    MCH 32.0 07/15/2024     Lab Results   Component Value Date    MCHC 31.4 07/15/2024     Lab Results   Component Value Date    RDW 15.4 07/15/2024     Lab Results   Component Value Date     07/15/2024        @Last Comprehensive Metabolic Panel:  Sodium   Date Value Ref Range Status   07/15/2024 135 135 - 145 mmol/L Final     Potassium   Date Value Ref Range Status   07/15/2024 3.7 3.4 - 5.3 mmol/L Final     Potassium Whole Blood   Date Value Ref Range Status   06/27/2024 2.6 (LL) 3.4 - 5.3 mmol/L Final     Chloride   Date Value Ref Range Status   07/15/2024 96 (L) 98 - 107 mmol/L Final   02/04/2022 98 98 - 107 mmol/L Final     Carbon Dioxide (CO2)   Date Value Ref Range Status   07/15/2024 29 22 - 29 mmol/L Final   02/04/2022 24 22 - 31 mmol/L Final     Anion Gap   Date Value Ref Range Status   07/15/2024 10 7 - 15 mmol/L Final   02/04/2022 13 5 - 18 mmol/L Final     Glucose   Date Value Ref Range Status   07/15/2024 87 70 - 99 mg/dL Final   02/04/2022 92 70 - 125 mg/dL Final     GLUCOSE BY METER POCT   Date Value Ref Range Status   07/04/2024 94 70 - 99 mg/dL Final     Urea Nitrogen   Date Value Ref Range Status   07/15/2024 20.3 8.0 - 23.0 mg/dL Final   02/04/2022 10 8 - 28 mg/dL Final     Creatinine   Date Value Ref Range  Status   07/15/2024 0.78 0.51 - 0.95 mg/dL Final     GFR Estimate   Date Value Ref Range Status   07/15/2024 78 >60 mL/min/1.73m2 Final   05/20/2021 >60 >60 mL/min/1.73m2 Final     Calcium   Date Value Ref Range Status   07/15/2024 9.2 8.8 - 10.2 mg/dL Final       This note has been dictated using voice recognition software. Any grammatical or context distortions are unintentional and inherent to the software    Electronically signed by: Cori Goyal, CNP

## 2024-08-12 ENCOUNTER — DISCHARGE SUMMARY NURSING HOME (OUTPATIENT)
Dept: GERIATRICS | Facility: CLINIC | Age: 74
End: 2024-08-12
Payer: COMMERCIAL

## 2024-08-12 VITALS
WEIGHT: 89 LBS | TEMPERATURE: 97.9 F | SYSTOLIC BLOOD PRESSURE: 105 MMHG | DIASTOLIC BLOOD PRESSURE: 73 MMHG | OXYGEN SATURATION: 93 % | RESPIRATION RATE: 16 BRPM | HEART RATE: 103 BPM | HEIGHT: 61 IN | BODY MASS INDEX: 16.8 KG/M2

## 2024-08-12 DIAGNOSIS — K59.03 DRUG-INDUCED CONSTIPATION: ICD-10-CM

## 2024-08-12 DIAGNOSIS — Z86.711 HISTORY OF PULMONARY EMBOLISM: ICD-10-CM

## 2024-08-12 DIAGNOSIS — S22.070D CLOSED WEDGE COMPRESSION FRACTURE OF T9 VERTEBRA WITH ROUTINE HEALING, SUBSEQUENT ENCOUNTER: ICD-10-CM

## 2024-08-12 DIAGNOSIS — K92.1 MELENA: ICD-10-CM

## 2024-08-12 DIAGNOSIS — D64.9 ANEMIA, UNSPECIFIED TYPE: Primary | ICD-10-CM

## 2024-08-12 DIAGNOSIS — I50.9 CHRONIC CONGESTIVE HEART FAILURE, UNSPECIFIED HEART FAILURE TYPE (H): ICD-10-CM

## 2024-08-12 DIAGNOSIS — J43.9 PULMONARY EMPHYSEMA, UNSPECIFIED EMPHYSEMA TYPE (H): ICD-10-CM

## 2024-08-12 PROCEDURE — 99316 NF DSCHRG MGMT 30 MIN+: CPT | Performed by: NURSE PRACTITIONER

## 2024-08-12 NOTE — LETTER
8/12/2024      Namita Viera  [address]  [place] [address]        [facility]    Code Status:  FULL CODE   Visit Type:   Chief Complaint   Patient presents with     TCU Discharge     Facility:  [facility]) [[id number]]         HPI: Namita Viera is a 74 year old female who I am seeing today for discharge from the TCU.  Pt recently re hospitalized with GI bleed. Past medical history includes COPD, alcoholism, frequent falls. Pt had recently been hospitalized 6/27-7/4 post fall. Pt found to have PE. Pt at TCU and had rectal bleeding. Pt found to have fecal impaction leading to rectal bleed. Also found to have CHF. BNP similar to previous around 900, however pt with bilateral moderate pleural effusions, 2+ pitting ankle edema that is new.  Suspicion for diastolic heart failure related to IV infusions received during last hospital stay. Pt given  IV Lasix 40 mg twice daily and changed to p.o. 40 mg twice daily at discharge. Hx of ETOH abuse. Declined treatment last hospitalization.    Transitional Care Course: Today patient sitting up in wheelchair. Pt recently hospitalized with GI bleed due to fecal impaction. She continues on bowel meds. Bowels moving adequately. No further evidence of bleed. Hgb stable. Pt with underlying COPD. She continues to smoke 2-3 times daily. She denies smoking cessation. She has been weaned off oxygen. She continues with occasional expiratory wheezing. No evidence of acute exacerbation. She denies SOB or CP. T9 and pelvic fracture due to recent fall.  Pain well-controlled with Tylenol.        Assessment/Plan:     Rectal bleeding  Fecal impaction  Opioid-induced constipation  -CT showed fecal impaction.  -No further evidence of bleed.   -Hgb now 9.5.   -Continue PTA senna and MiraLAX.     Acute on chronic diastolic heart failure  -LVEF 65 to 70% 2 weeks ago  -continues on lasix 40 mg BID.   -2 gm sodium diet.   -every day weights.    -peripheral edema resolved.   -no SOB or CP.      pulmonary embolus  -Completed Eliquis. ASA 81 mg every day.      Chronic hypoxic respiratory failure  COPD  -known COPD, known pulmonary embolus,  acute on chronic CHF   -was not on O2 at home.   -Continue flutter valve.   -No longer on oxygen.   -Steroids complete.    -Encourage cough and deep breath.   -Continue home MDI.      rib fractures  T9 compression fracture  Pelvic fractures   -Continue tylenol. Discontinue hydromorphone.     Hx of tobacco abuse  -denies use of cessation tools.     -ok to discharge to [facility] with current meds and treatments.   -Home PT, OT, HHA and RN for medication management.   -Follow up with PCP in 1-2 weeks.        Active Ambulatory Problems     Diagnosis Date Noted     Esophageal reflux      Smoker 05/01/2018     Ear mass 06/15/2018     Age-related cataract of both eyes, unspecified age-related cataract type 11/27/2019     Alcohol dependence (H) 05/06/2021     Chronic cough 05/06/2021     Benign essential hypertension 05/06/2021     Vitamin D deficiency 05/06/2021     Right Intertroch Hip Fract 04/30/2018     Hypercalcemia 02/07/2022     Anemia, unspecified type 02/07/2022     Leg cramping 02/07/2022     Osteoporosis 02/07/2022     Nocturia 02/07/2022     COPD (chronic obstructive pulmonary disease) (H) 02/07/2022     UTI (urinary tract infection) 05/16/2023     Acute UTI 05/16/2023     Hip fracture, right, closed, initial encounter (H) 05/16/2023     Hyponatremia 05/16/2023     Hypokalemia 06/27/2024     COPD exacerbation (H) 06/27/2024     Acute respiratory failure with hypercapnia (H) 06/27/2024     Altered mental status, unspecified altered mental status type 06/27/2024     Aspiration pneumonia, unspecified aspiration pneumonia type, unspecified laterality, unspecified part of lung (H) 06/27/2024     History of tobacco use 07/05/2024     Vaginitis and vulvovaginitis 04/29/2008     Sciatic pain 12/06/2021     Polyp of colon  07/09/2021     HTN (hypertension) 12/06/2021     History of colonic polyps 07/09/2021     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     Bilateral leg edema 12/06/2021     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     Acquired absence of organ, genital organs 04/29/2008     Rectal bleeding 07/07/2024     Fecal impaction (H) 07/07/2024     Acute on chronic diastolic congestive heart failure (H) 07/07/2024     Pelvic fracture (H) 07/08/2024     Resolved Ambulatory Problems     Diagnosis Date Noted     History of alcohol abuse 11/27/2019     Rib pain on left side 05/06/2021     SAH (subarachnoid hemorrhage) (H) 04/30/2018     Closed nondisplaced intertrochanteric fracture of left femur, initial encounter (H) 05/01/2018     Back pain 10/12/2021     Closed fracture of sacrum with routine healing 10/12/2021     Pyuria 10/12/2021     Acute cystitis without hematuria 10/13/2021     Weight loss 02/07/2022     Other acute pulmonary embolism without acute cor pulmonale (H) 06/27/2024     Past Medical History:   Diagnosis Date     Acid reflux      Alcohol use      Closed fracture of left femur (H)      Emphysema of lung (H) 02/07/2022     Hip fracture requiring operative repair (H)      Hypertension 01/2021     Traumatic closed displaced fracture of distal end of radius      Allergies   Allergen Reactions     Penicillins Hives       All Meds and Allergies reviewed in the record at the facility and is the most up-to-date.    Current Outpatient Medications   Medication Sig Dispense Refill     albuterol (PROAIR HFA/PROVENTIL HFA/VENTOLIN HFA) 108 (90 Base) MCG/ACT inhaler INHALE 1-2 PUFFS INTO THE LUNGS EVERY 6 HOURS AS NEEDED FOR COUGH OR SHORTNESS OF BREATH OR WHEEZE 18 g 0     alendronate (FOSAMAX) 70 MG tablet Take 70 mg by mouth every 7 days       aspirin (ASA) 81 MG EC tablet Take 1 tablet (81 mg) by mouth daily       calcium carbonate (OS-PETER) 1500 (600 Ca) MG tablet Take 600 mg by mouth daily       folic acid (FOLVITE) 1 MG tablet  Take 1 tablet (1 mg) by mouth daily       furosemide (LASIX) 40 MG tablet Take 1 tablet (40 mg) by mouth 2 times daily       HYDROmorphone (DILAUDID) 2 MG tablet Take 0.5 tablets (1 mg) by mouth every 4 hours as needed for severe pain 10 tablet 0     ibuprofen (ADVIL/MOTRIN) 200 MG tablet Take 200 mg by mouth every 6 hours as needed for pain       Lidocaine (LIDOCARE) 4 % Patch Place 2 patches onto the skin every 24 hours To prevent lidocaine toxicity, patient should be patch free for 12 hrs daily. Apply to left lower chest for rib fractures.       losartan (COZAAR) 25 MG tablet Take 1 tablet (25 mg) by mouth daily       melatonin 1 MG TABS tablet Take 1 tablet (1 mg) by mouth nightly as needed for sleep 90 tablet 3     miconazole (MICATIN) 2 % external powder Apply topically 2 times daily       midodrine (PROAMATINE) 2.5 MG tablet Take 2.5 mg by mouth 3 times daily (with meals) (Hold for SBP greater than 155)       multivitamin, therapeutic (THERA-VIT) TABS tablet Take 1 tablet by mouth daily       nicotine (NICODERM CQ) 14 MG/24HR 24 hr patch Place 1 patch onto the skin daily       omeprazole (PRILOSEC) 20 MG DR capsule Take 1 capsule (20 mg) by mouth daily 90 capsule 3     polyethylene glycol (MIRALAX) 17 GM/Dose powder Take 17 g by mouth daily       potassium chloride ER (K-TAB) 20 MEQ CR tablet Take 1 tablet (20 mEq) by mouth daily       senna-docusate (SENOKOT-S/PERICOLACE) 8.6-50 MG tablet Take 1 tablet by mouth daily       thiamine (B-1) 100 MG tablet Take 1 tablet (100 mg) by mouth daily       vitamin C (ASCORBIC ACID) 500 MG tablet Take 500 mg by mouth daily       Vitamin D, Cholecalciferol, 25 MCG (1000 UT) TABS Take 1,000 Units by mouth daily       No current facility-administered medications for this visit.       REVIEW OF SYSTEMS:   10 point review of systems reviewed and pertinent positives in the HPI.     PHYSICAL EXAMINATION:  Physical Exam     Vital signs: /73   Pulse 103   Temp 97.9  F  "(36.6  C)   Resp 16   Ht 1.549 m (5' 1\")   Wt 40.4 kg (89 lb)   SpO2 93%   BMI 16.82 kg/m    General: Awake, Alert, oriented x3, conversant  HEENT:Pink conjunctiva,moist oral mucosa, lesion to lower lip.   NECK: Supple  CVS:  S1  S2, without murmur or gallop.   LUNG: Occ expiratory wheezing today. No cough. Off O2 now.   BACK: No kyphosis of the thoracic spine  ABDOMEN: Soft, nontender to palpation, with positive bowel sounds  EXTREMITIES: Moves both upper and lower extremities, no pedal edema, no calf tenderness. DJD of both hands noted with bony prominence.  No redness or warmth.  SKIN: Warm and dry  NEUROLOGIC: Intact, pulses palpable  PSYCHIATRIC: Pleasant affect.    Labs:  All labs reviewed in the nursing home record and [facility]   @  Lab Results   Component Value Date    WBC 5.7 07/15/2024     Lab Results   Component Value Date    RBC 2.41 07/15/2024     Lab Results   Component Value Date    HGB 7.7 07/15/2024     Lab Results   Component Value Date    HCT 24.5 07/15/2024     Lab Results   Component Value Date     07/15/2024     Lab Results   Component Value Date    MCH 32.0 07/15/2024     Lab Results   Component Value Date    MCHC 31.4 07/15/2024     Lab Results   Component Value Date    RDW 15.4 07/15/2024     Lab Results   Component Value Date     07/15/2024        @Last Comprehensive Metabolic Panel:  Sodium   Date Value Ref Range Status   07/15/2024 135 135 - 145 mmol/L Final     Potassium   Date Value Ref Range Status   07/15/2024 3.7 3.4 - 5.3 mmol/L Final     Potassium Whole Blood   Date Value Ref Range Status   06/27/2024 2.6 (LL) 3.4 - 5.3 mmol/L Final     Chloride   Date Value Ref Range Status   07/15/2024 96 (L) 98 - 107 mmol/L Final   02/04/2022 98 98 - 107 mmol/L Final     Carbon Dioxide (CO2)   Date Value Ref Range Status   07/15/2024 29 22 - 29 mmol/L Final   02/04/2022 24 22 - 31 mmol/L Final     Anion Gap   Date Value Ref Range Status   07/15/2024 10 7 - 15 mmol/L Final "   02/04/2022 13 5 - 18 mmol/L Final     Glucose   Date Value Ref Range Status   07/15/2024 87 70 - 99 mg/dL Final   02/04/2022 92 70 - 125 mg/dL Final     GLUCOSE BY METER POCT   Date Value Ref Range Status   07/04/2024 94 70 - 99 mg/dL Final     Urea Nitrogen   Date Value Ref Range Status   07/15/2024 20.3 8.0 - 23.0 mg/dL Final   02/04/2022 10 8 - 28 mg/dL Final     Creatinine   Date Value Ref Range Status   07/15/2024 0.78 0.51 - 0.95 mg/dL Final     GFR Estimate   Date Value Ref Range Status   07/15/2024 78 >60 mL/min/1.73m2 Final   05/20/2021 >60 >60 mL/min/1.73m2 Final     Calcium   Date Value Ref Range Status   07/15/2024 9.2 8.8 - 10.2 mg/dL Final     DISCHARGE PLAN/FACE TO FACE:  I certify that this patient is under my care and that I, or a nurse practitioner or physician's assistant working with me, had a face-to-face encounter that meets the physician face-to-face encounter requirements with this patient.       I certify that, based on my findings, the following services are medically necessary home health services.    My clinical findings support the need for the above skilled services.    This patient is homebound because: Recent GI bleed.     The patient is, or has been, under my care and I have initiated the establishment of the plan of care. This patient will be followed by a physician who will periodically review the plan of care.    > 35 minutes spent reviewing discharge medications, home care services and follow ups as well as collaborating with nursing and .     This note has been dictated using voice recognition software. Any grammatical or context distortions are unintentional and inherent to the software    Electronically signed by: Cori Goyal CNP       Sincerely,        Cori Goyal NP

## 2024-08-12 NOTE — PROGRESS NOTES
[facility]    Code Status:  FULL CODE   Visit Type:   Chief Complaint   Patient presents with    TCU Discharge     Facility:  [facility] ([facility]) [[id number]]         HPI: Namita Viera is a 74 year old female who I am seeing today for discharge from the TCU.  Pt recently re hospitalized with GI bleed. Past medical history includes COPD, alcoholism, frequent falls. Pt had recently been hospitalized 6/27-7/4 post fall. Pt found to have PE. Pt at TCU and had rectal bleeding. Pt found to have fecal impaction leading to rectal bleed. Also found to have CHF. BNP similar to previous around 900, however pt with bilateral moderate pleural effusions, 2+ pitting ankle edema that is new.  Suspicion for diastolic heart failure related to IV infusions received during last hospital stay. Pt given  IV Lasix 40 mg twice daily and changed to p.o. 40 mg twice daily at discharge. Hx of ETOH abuse. Declined treatment last hospitalization.    Transitional Care Course: Today patient sitting up in wheelchair. Pt recently hospitalized with GI bleed due to fecal impaction. She continues on bowel meds. Bowels moving adequately. No further evidence of bleed. Hgb stable. Pt with underlying COPD. She continues to smoke 2-3 times daily. She denies smoking cessation. She has been weaned off oxygen. She continues with occasional expiratory wheezing. No evidence of acute exacerbation. She denies SOB or CP. T9 and pelvic fracture due to recent fall.  Pain well-controlled with Tylenol.        Assessment/Plan:     Rectal bleeding  Fecal impaction  Opioid-induced constipation  -CT showed fecal impaction.  -No further evidence of bleed.   -Hgb now 9.5.   -Continue PTA senna and MiraLAX.     Acute on chronic diastolic heart failure  -LVEF 65 to 70% 2 weeks ago  -continues on lasix 40 mg BID.   -2 gm sodium diet.   -every day weights.   -peripheral edema resolved.   -no SOB or CP.      pulmonary embolus  -Completed Eliquis. ASA  81 mg every day.      Chronic hypoxic respiratory failure  COPD  -known COPD, known pulmonary embolus,  acute on chronic CHF   -was not on O2 at home.   -Continue flutter valve.   -No longer on oxygen.   -Steroids complete.    -Encourage cough and deep breath.   -Continue home MDI.      rib fractures  T9 compression fracture  Pelvic fractures   -Continue tylenol. Discontinue hydromorphone.     Hx of tobacco abuse  -denies use of cessation tools.     -ok to discharge to [facility] with current meds and treatments.   -Home PT, OT, HHA and RN for medication management.   -Follow up with PCP in 1-2 weeks.        Active Ambulatory Problems     Diagnosis Date Noted    Esophageal reflux     Smoker 05/01/2018    Ear mass 06/15/2018    Age-related cataract of both eyes, unspecified age-related cataract type 11/27/2019    Alcohol dependence (H) 05/06/2021    Chronic cough 05/06/2021    Benign essential hypertension 05/06/2021    Vitamin D deficiency 05/06/2021    Right Intertroch Hip Fract 04/30/2018    Hypercalcemia 02/07/2022    Anemia, unspecified type 02/07/2022    Leg cramping 02/07/2022    Osteoporosis 02/07/2022    Nocturia 02/07/2022    COPD (chronic obstructive pulmonary disease) (H) 02/07/2022    UTI (urinary tract infection) 05/16/2023    Acute UTI 05/16/2023    Hip fracture, right, closed, initial encounter (H) 05/16/2023    Hyponatremia 05/16/2023    Hypokalemia 06/27/2024    COPD exacerbation (H) 06/27/2024    Acute respiratory failure with hypercapnia (H) 06/27/2024    Altered mental status, unspecified altered mental status type 06/27/2024    Aspiration pneumonia, unspecified aspiration pneumonia type, unspecified laterality, unspecified part of lung (H) 06/27/2024    History of tobacco use 07/05/2024    Vaginitis and vulvovaginitis 04/29/2008    Sciatic pain 12/06/2021    Polyp of colon 07/09/2021    HTN (hypertension) 12/06/2021    History of colonic polyps 07/09/2021    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     Bilateral leg edema 12/06/2021    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    Acquired absence of organ, genital organs 04/29/2008    Rectal bleeding 07/07/2024    Fecal impaction (H) 07/07/2024    Acute on chronic diastolic congestive heart failure (H) 07/07/2024    Pelvic fracture (H) 07/08/2024     Resolved Ambulatory Problems     Diagnosis Date Noted    History of alcohol abuse 11/27/2019    Rib pain on left side 05/06/2021    SAH (subarachnoid hemorrhage) (H) 04/30/2018    Closed nondisplaced intertrochanteric fracture of left femur, initial encounter (H) 05/01/2018    Back pain 10/12/2021    Closed fracture of sacrum with routine healing 10/12/2021    Pyuria 10/12/2021    Acute cystitis without hematuria 10/13/2021    Weight loss 02/07/2022    Other acute pulmonary embolism without acute cor pulmonale (H) 06/27/2024     Past Medical History:   Diagnosis Date    Acid reflux     Alcohol use     Closed fracture of left femur (H)     Emphysema of lung (H) 02/07/2022    Hip fracture requiring operative repair (H)     Hypertension 01/2021    Traumatic closed displaced fracture of distal end of radius      Allergies   Allergen Reactions    Penicillins Hives       All Meds and Allergies reviewed in the record at the facility and is the most up-to-date.    Current Outpatient Medications   Medication Sig Dispense Refill    albuterol (PROAIR HFA/PROVENTIL HFA/VENTOLIN HFA) 108 (90 Base) MCG/ACT inhaler INHALE 1-2 PUFFS INTO THE LUNGS EVERY 6 HOURS AS NEEDED FOR COUGH OR SHORTNESS OF BREATH OR WHEEZE 18 g 0    alendronate (FOSAMAX) 70 MG tablet Take 70 mg by mouth every 7 days      aspirin (ASA) 81 MG EC tablet Take 1 tablet (81 mg) by mouth daily      calcium carbonate (OS-PETER) 1500 (600 Ca) MG tablet Take 600 mg by mouth daily      folic acid (FOLVITE) 1 MG tablet Take 1 tablet (1 mg) by mouth daily      furosemide (LASIX) 40 MG tablet Take 1 tablet (40 mg) by mouth 2 times daily      HYDROmorphone (DILAUDID) 2 MG tablet  "Take 0.5 tablets (1 mg) by mouth every 4 hours as needed for severe pain 10 tablet 0    ibuprofen (ADVIL/MOTRIN) 200 MG tablet Take 200 mg by mouth every 6 hours as needed for pain      Lidocaine (LIDOCARE) 4 % Patch Place 2 patches onto the skin every 24 hours To prevent lidocaine toxicity, patient should be patch free for 12 hrs daily. Apply to left lower chest for rib fractures.      losartan (COZAAR) 25 MG tablet Take 1 tablet (25 mg) by mouth daily      melatonin 1 MG TABS tablet Take 1 tablet (1 mg) by mouth nightly as needed for sleep 90 tablet 3    miconazole (MICATIN) 2 % external powder Apply topically 2 times daily      midodrine (PROAMATINE) 2.5 MG tablet Take 2.5 mg by mouth 3 times daily (with meals) (Hold for SBP greater than 155)      multivitamin, therapeutic (THERA-VIT) TABS tablet Take 1 tablet by mouth daily      nicotine (NICODERM CQ) 14 MG/24HR 24 hr patch Place 1 patch onto the skin daily      omeprazole (PRILOSEC) 20 MG DR capsule Take 1 capsule (20 mg) by mouth daily 90 capsule 3    polyethylene glycol (MIRALAX) 17 GM/Dose powder Take 17 g by mouth daily      potassium chloride ER (K-TAB) 20 MEQ CR tablet Take 1 tablet (20 mEq) by mouth daily      senna-docusate (SENOKOT-S/PERICOLACE) 8.6-50 MG tablet Take 1 tablet by mouth daily      thiamine (B-1) 100 MG tablet Take 1 tablet (100 mg) by mouth daily      vitamin C (ASCORBIC ACID) 500 MG tablet Take 500 mg by mouth daily      Vitamin D, Cholecalciferol, 25 MCG (1000 UT) TABS Take 1,000 Units by mouth daily       No current facility-administered medications for this visit.       REVIEW OF SYSTEMS:   10 point review of systems reviewed and pertinent positives in the HPI.     PHYSICAL EXAMINATION:  Physical Exam     Vital signs: /73   Pulse 103   Temp 97.9  F (36.6  C)   Resp 16   Ht 1.549 m (5' 1\")   Wt 40.4 kg (89 lb)   SpO2 93%   BMI 16.82 kg/m    General: Awake, Alert, oriented x3, conversant  HEENT:Pink conjunctiva,moist oral " mucosa, lesion to lower lip.   NECK: Supple  CVS:  S1  S2, without murmur or gallop.   LUNG: Occ expiratory wheezing today. No cough. Off O2 now.   BACK: No kyphosis of the thoracic spine  ABDOMEN: Soft, nontender to palpation, with positive bowel sounds  EXTREMITIES: Moves both upper and lower extremities, no pedal edema, no calf tenderness. DJD of both hands noted with bony prominence.  No redness or warmth.  SKIN: Warm and dry  NEUROLOGIC: Intact, pulses palpable  PSYCHIATRIC: Pleasant affect.    Labs:  All labs reviewed in the nursing home record and [facility]   @  Lab Results   Component Value Date    WBC 5.7 07/15/2024     Lab Results   Component Value Date    RBC 2.41 07/15/2024     Lab Results   Component Value Date    HGB 7.7 07/15/2024     Lab Results   Component Value Date    HCT 24.5 07/15/2024     Lab Results   Component Value Date     07/15/2024     Lab Results   Component Value Date    MCH 32.0 07/15/2024     Lab Results   Component Value Date    MCHC 31.4 07/15/2024     Lab Results   Component Value Date    RDW 15.4 07/15/2024     Lab Results   Component Value Date     07/15/2024        @Last Comprehensive Metabolic Panel:  Sodium   Date Value Ref Range Status   07/15/2024 135 135 - 145 mmol/L Final     Potassium   Date Value Ref Range Status   07/15/2024 3.7 3.4 - 5.3 mmol/L Final     Potassium Whole Blood   Date Value Ref Range Status   06/27/2024 2.6 (LL) 3.4 - 5.3 mmol/L Final     Chloride   Date Value Ref Range Status   07/15/2024 96 (L) 98 - 107 mmol/L Final   02/04/2022 98 98 - 107 mmol/L Final     Carbon Dioxide (CO2)   Date Value Ref Range Status   07/15/2024 29 22 - 29 mmol/L Final   02/04/2022 24 22 - 31 mmol/L Final     Anion Gap   Date Value Ref Range Status   07/15/2024 10 7 - 15 mmol/L Final   02/04/2022 13 5 - 18 mmol/L Final     Glucose   Date Value Ref Range Status   07/15/2024 87 70 - 99 mg/dL Final   02/04/2022 92 70 - 125 mg/dL Final     GLUCOSE BY METER POCT   Date  Value Ref Range Status   07/04/2024 94 70 - 99 mg/dL Final     Urea Nitrogen   Date Value Ref Range Status   07/15/2024 20.3 8.0 - 23.0 mg/dL Final   02/04/2022 10 8 - 28 mg/dL Final     Creatinine   Date Value Ref Range Status   07/15/2024 0.78 0.51 - 0.95 mg/dL Final     GFR Estimate   Date Value Ref Range Status   07/15/2024 78 >60 mL/min/1.73m2 Final   05/20/2021 >60 >60 mL/min/1.73m2 Final     Calcium   Date Value Ref Range Status   07/15/2024 9.2 8.8 - 10.2 mg/dL Final     DISCHARGE PLAN/FACE TO FACE:  I certify that this patient is under my care and that I, or a nurse practitioner or physician's assistant working with me, had a face-to-face encounter that meets the physician face-to-face encounter requirements with this patient.       I certify that, based on my findings, the following services are medically necessary home health services.    My clinical findings support the need for the above skilled services.    This patient is homebound because: Recent GI bleed.     The patient is, or has been, under my care and I have initiated the establishment of the plan of care. This patient will be followed by a physician who will periodically review the plan of care.    > 35 minutes spent reviewing discharge medications, home care services and follow ups as well as collaborating with nursing and .     This note has been dictated using voice recognition software. Any grammatical or context distortions are unintentional and inherent to the software    Electronically signed by: Cori Goyal CNP

## 2024-08-15 ENCOUNTER — DOCUMENTATION ONLY (OUTPATIENT)
Dept: GERIATRICS | Facility: CLINIC | Age: 74
End: 2024-08-15
Payer: COMMERCIAL

## 2024-08-21 ENCOUNTER — NURSING HOME VISIT (OUTPATIENT)
Dept: GERIATRICS | Facility: CLINIC | Age: 74
End: 2024-08-21
Payer: COMMERCIAL

## 2024-08-21 VITALS
BODY MASS INDEX: 16.8 KG/M2 | OXYGEN SATURATION: 94 % | RESPIRATION RATE: 16 BRPM | TEMPERATURE: 97.7 F | WEIGHT: 89 LBS | SYSTOLIC BLOOD PRESSURE: 120 MMHG | DIASTOLIC BLOOD PRESSURE: 74 MMHG | HEIGHT: 61 IN | HEART RATE: 92 BPM

## 2024-08-21 DIAGNOSIS — F10.21 ALCOHOL DEPENDENCE IN REMISSION (H): ICD-10-CM

## 2024-08-21 DIAGNOSIS — K92.1 MELENA: ICD-10-CM

## 2024-08-21 DIAGNOSIS — F17.200 SMOKER: ICD-10-CM

## 2024-08-21 DIAGNOSIS — K59.03 DRUG-INDUCED CONSTIPATION: ICD-10-CM

## 2024-08-21 DIAGNOSIS — I50.21 ACUTE SYSTOLIC HEART FAILURE (H): ICD-10-CM

## 2024-08-21 DIAGNOSIS — D64.9 ANEMIA, UNSPECIFIED TYPE: Primary | ICD-10-CM

## 2024-08-21 DIAGNOSIS — J43.9 PULMONARY EMPHYSEMA, UNSPECIFIED EMPHYSEMA TYPE (H): ICD-10-CM

## 2024-08-21 DIAGNOSIS — Z72.0 TOBACCO ABUSE: ICD-10-CM

## 2024-08-21 DIAGNOSIS — I50.9 CHRONIC CONGESTIVE HEART FAILURE, UNSPECIFIED HEART FAILURE TYPE (H): ICD-10-CM

## 2024-08-21 DIAGNOSIS — S22.070D CLOSED WEDGE COMPRESSION FRACTURE OF T9 VERTEBRA WITH ROUTINE HEALING, SUBSEQUENT ENCOUNTER: ICD-10-CM

## 2024-08-21 DIAGNOSIS — K56.41 FECAL IMPACTION IN RECTUM (H): ICD-10-CM

## 2024-08-21 PROCEDURE — 99305 1ST NF CARE MODERATE MDM 35: CPT | Performed by: FAMILY MEDICINE

## 2024-08-21 NOTE — PROGRESS NOTES
[facility] GERIATRIC [facility]       Patient Namita Viera  MRN: [id number]        Reason for Visit     Chief Complaint   Patient presents with    senior living Regulatory       Code Status     CPR/Full code     Assessment     RECTAL BLEEDING  Opoid induced constipation with faecal impaction  Acute on chronic CHF  H/O OF RT hip intertrochanteric fracture s/p ORIF  H/o of recent rib/T9 comp fractrures  H/o of mechanical fall/ recurrent falls  COPD  TOBACCO use disorder  ETOH use disorder  Hyponatremia  PCM  cachexia  ABLA status post 1 unit of blood transfusion-r/c 9.5  Acute metabolic encephalopathy  Hypertension  Osteoporosis  Generalized weakness    Plan     Pt is admitted to LTC  Patient has a history of doing poorly with multiple hospitalizations related to falls as well as overall decline.  She readmitted with rectal bleeding associated with opioid-induced constipation and fecal impaction.  Taken off dilaudid.  Also noted to be in CHF exacerbation and discharged on p.o. Lasix  Minimal edema noted today  Staff reports poor compliance with diet restrictions  .  She was admitted to TCU and underwent rehab and now has been down moved to long-term care  She reports this is a short Arrangement and she plans to discharge as soon as possible  Noted to be actively smoking with a cigarette packet in her hand.  Has no desire to quit smoking  She also has a history of alcoholism and has refused any dependency treatments.  Has had multiple falls related injuries including rib fracture compression fractures and pelvic fracture and a hip fracture.  She is currently wheelchair-bound   with a prior history of PE she is on Eliquis and now switched to asa.  COPD is controlled with no new concerns.  Continue with her current care plan  Advise follow-up with  to discuss discharge planning      History     Patient is a very pleasant 73 year old female who is admitted to LTC  Patient presented with rectal bleeding'.   This was associated with opioid-induced constipation and fecal impaction.  She has been discharged on laxatives.  No bleeding concerns reported.    She also had CHF with ankle edema as well as pleural effusion noted.  She was given IV diuretics and discharged on oral Lasix with improvement minimal edema noted today  Has  a h/o of multiple rehospitalization's recently  Actively smoking and has no desire to quit per pt  Wants to discharge from facility soon.      Past Medical & Surgical History     PAST MEDICAL HISTORY:   Past Medical History:   Diagnosis Date    Acid reflux     Alcohol use     Closed fracture of left femur (H)     Closed fracture of sacrum with routine healing 10/12/2021    Emphysema of lung (H) 02/07/2022    Hip fracture requiring operative repair (H)     Hypertension 01/2021    Rib pain on left side 05/06/2021    SAH (subarachnoid hemorrhage) (H)     Traumatic closed displaced fracture of distal end of radius       PAST SURGICAL HISTORY:   has a past surgical history that includes Hysterectomy total abdominal, bilateral salpingo-oophorectomy, combined (N/A, 2008); Bladder Suspension (2008); orthopedic surgery (Left, 2018); Open reduction internal fixation rodding intramedullary femur (Right, 5/17/2023); and PICC/Midline Placement (6/27/2024).      Past Social History     Reviewed,  reports that she has been smoking cigarettes. She started smoking about 62 years ago. She has a 60 pack-year smoking history. She has never used smokeless tobacco. She reports current alcohol use of about 14.0 standard drinks of alcohol per week. She reports that she does not use drugs.    Family History     Reviewed, and family history includes Cancer in her sister; Esophageal Cancer in her mother; Heart Disease in her father; Rectal Cancer in her sister; Rheumatoid Arthritis in her brother.    Medication List     Current Outpatient Medications   Medication Sig Dispense Refill    albuterol (PROAIR HFA/PROVENTIL  HFA/VENTOLIN HFA) 108 (90 Base) MCG/ACT inhaler INHALE 1-2 PUFFS INTO THE LUNGS EVERY 6 HOURS AS NEEDED FOR COUGH OR SHORTNESS OF BREATH OR WHEEZE 18 g 0    alendronate (FOSAMAX) 70 MG tablet Take 70 mg by mouth every 7 days      aspirin (ASA) 81 MG EC tablet Take 1 tablet (81 mg) by mouth daily      calcium carbonate (OS-PETER) 1500 (600 Ca) MG tablet Take 600 mg by mouth daily      folic acid (FOLVITE) 1 MG tablet Take 1 tablet (1 mg) by mouth daily      furosemide (LASIX) 40 MG tablet Take 1 tablet (40 mg) by mouth 2 times daily      ibuprofen (ADVIL/MOTRIN) 200 MG tablet Take 200 mg by mouth every 6 hours as needed for pain      Lidocaine (LIDOCARE) 4 % Patch Place 2 patches onto the skin every 24 hours To prevent lidocaine toxicity, patient should be patch free for 12 hrs daily. Apply to left lower chest for rib fractures.      losartan (COZAAR) 25 MG tablet Take 1 tablet (25 mg) by mouth daily      melatonin 1 MG TABS tablet Take 1 tablet (1 mg) by mouth nightly as needed for sleep 90 tablet 3    miconazole (MICATIN) 2 % external powder Apply topically 2 times daily      midodrine (PROAMATINE) 2.5 MG tablet Take 2.5 mg by mouth 3 times daily (with meals) (Hold for SBP greater than 155)      multivitamin, therapeutic (THERA-VIT) TABS tablet Take 1 tablet by mouth daily      nicotine (NICODERM CQ) 14 MG/24HR 24 hr patch Place 1 patch onto the skin daily      omeprazole (PRILOSEC) 20 MG DR capsule Take 1 capsule (20 mg) by mouth daily 90 capsule 3    polyethylene glycol (MIRALAX) 17 GM/Dose powder Take 17 g by mouth daily      potassium chloride ER (K-TAB) 20 MEQ CR tablet Take 1 tablet (20 mEq) by mouth daily      senna-docusate (SENOKOT-S/PERICOLACE) 8.6-50 MG tablet Take 1 tablet by mouth daily      thiamine (B-1) 100 MG tablet Take 1 tablet (100 mg) by mouth daily      vitamin C (ASCORBIC ACID) 500 MG tablet Take 500 mg by mouth daily      Vitamin D, Cholecalciferol, 25 MCG (1000 UT) TABS Take 1,000 Units by  "mouth daily       No current facility-administered medications for this visit.      MED REC REQUIRED  Post Medication Reconciliation Status: discharge medications reconciled, continue medications without change       Allergies     Allergies   Allergen Reactions    Penicillins Hives       Review of Systems   A comprehensive review of 14 systems was done. Pertinent findings noted here and in history of present illness. All the rest negative.  Constitutional: Negative.  Negative for fever, chills, she has  activity change, appetite change and fatigue.   HENT: Negative for congestion and facial swelling.    Eyes: Negative for photophobia, redness and visual disturbance.   Respiratory: Negative for cough and chest tightness.  Has an occasional wheeze from her COPD has ongoing smoking issues  Cardiovascular: Negative for chest pain, palpitations and leg swelling.   Gastrointestinal: Negative for nausea, diarrhea, constipation, blood in stool and abdominal distention.   Genitourinary: Negative.    Musculoskeletal: Reporting minimal pain at rest however she is noticing decreased endurance and increased fatigue with minimal ambulation  Skin: Negative.    Neurological: Negative for dizziness, tremors, syncope, weakness, light-headedness and headaches.   Hematological: Does not bruise/bleed easily.   Psychiatric/Behavioral: Negative.        Physical Exam   /74   Pulse 92   Temp 97.7  F (36.5  C)   Resp 16   Ht 1.549 m (5' 1\")   Wt 40.4 kg (89 lb)   SpO2 94%   BMI 16.82 kg/m       Constitutional: Oriented to person, place, and time and appears well-developed.   Frail with low BMI  HEENT:  Normocephalic and atraumatic.  Eyes: Conjunctivae and EOM are normal. Pupils are equal, round, and reactive to light. No discharge.  No scleral icterus. Nose normal. Mouth/Throat: Oropharynx is clear and moist. No oropharyngeal exudate.    NECK: Normal range of motion. Neck supple. No JVD present. No tracheal deviation present. No " thyromegaly present.   CARDIOVASCULAR: Normal rate, regular rhythm and intact distal pulses.  Exam reveals no gallop and no friction rub.  Systolic murmur present.  PULMONARY: Effort normal and breath sounds normal. No respiratory distress occasional expiratory  wheezing or rales.  ABDOMEN: Soft. Bowel sounds are normal. No distension and no mass.  There is no tenderness. There is no rebound and no guarding. No HSM.  MUSCULOSKELETAL: Normal range of motion. Mild kyphosis, no tenderness.  Using a wheelchair due to difficulty ambulating  LYMPH NODES: Has no cervical, supraclavicular, axillary and groin adenopathy.   NEUROLOGICAL: Alert and oriented to person, place, and time. No cranial nerve deficit.  Normal muscle tone. Coordination normal.   GENITOURINARY: Deferred exam.  SKIN: Skin is warm and dry. No rash noted. No erythema. No pallor.   EXTREMITIES: No cyanosis, no clubbing, no edema other than some minimal edema around her ankles. No Deformity.  PSYCHIATRIC: Normal mood, affect and behavior.      Lab Results     Last Comprehensive Metabolic Panel:  Lab Results   Component Value Date     07/15/2024    POTASSIUM 3.7 07/15/2024    CHLORIDE 96 (L) 07/15/2024    CO2 29 07/15/2024    ANIONGAP 10 07/15/2024    GLC 87 07/15/2024    BUN 20.3 07/15/2024    CR 0.78 07/15/2024    GFRESTIMATED 78 07/15/2024    PETER 9.2 07/15/2024                       Electronically signed by    Jeniffer Cannon MD

## 2024-08-21 NOTE — LETTER
8/21/2024      Namita Viera  [address]  [place] [address]        [facility]       Patient Namita Viera  MRN: [id number]        Reason for Visit     Chief Complaint   Patient presents with     correction Regulatory       Code Status     CPR/Full code     Assessment     RECTAL BLEEDING  Opoid induced constipation with faecal impaction  Acute on chronic CHF  H/O OF RT hip intertrochanteric fracture s/p ORIF  H/o of recent rib/T9 comp fractrures  H/o of mechanical fall/ recurrent falls  COPD  TOBACCO use disorder  ETOH use disorder  Hyponatremia  PCM  cachexia  ABLA status post 1 unit of blood transfusion-r/c 9.5  Acute metabolic encephalopathy  Hypertension  Osteoporosis  Generalized weakness    Plan     Pt is admitted to LTC  Patient has a history of doing poorly with multiple hospitalizations related to falls as well as overall decline.  She readmitted with rectal bleeding associated with opioid-induced constipation and fecal impaction.  Taken off dilaudid.  Also noted to be in CHF exacerbation and discharged on p.o. Lasix  Minimal edema noted today  Staff reports poor compliance with diet restrictions  .  She was admitted to TCU and underwent rehab and now has been down moved to long-term care  She reports this is a short Arrangement and she plans to discharge as soon as possible  Noted to be actively smoking with a cigarette packet in her hand.  Has no desire to quit smoking  She also has a history of alcoholism and has refused any dependency treatments.  Has had multiple falls related injuries including rib fracture compression fractures and pelvic fracture and a hip fracture.  She is currently wheelchair-bound   with a prior history of PE she is on Eliquis and now switched to asa.  COPD is controlled with no new concerns.  Continue with her current care plan  Advise follow-up with  to discuss discharge planning      History     Patient is a very pleasant 73  year old female who is admitted to LTC  Patient presented with rectal bleeding'.  This was associated with opioid-induced constipation and fecal impaction.  She has been discharged on laxatives.  No bleeding concerns reported.    She also had CHF with ankle edema as well as pleural effusion noted.  She was given IV diuretics and discharged on oral Lasix with improvement minimal edema noted today  Has  a h/o of multiple rehospitalization's recently  Actively smoking and has no desire to quit per pt  Wants to discharge from facility soon.      Past Medical & Surgical History     PAST MEDICAL HISTORY:   Past Medical History:   Diagnosis Date     Acid reflux      Alcohol use      Closed fracture of left femur (H)      Closed fracture of sacrum with routine healing 10/12/2021     Emphysema of lung (H) 02/07/2022     Hip fracture requiring operative repair (H)      Hypertension 01/2021     Rib pain on left side 05/06/2021     SAH (subarachnoid hemorrhage) (H)      Traumatic closed displaced fracture of distal end of radius       PAST SURGICAL HISTORY:   has a past surgical history that includes Hysterectomy total abdominal, bilateral salpingo-oophorectomy, combined (N/A, 2008); Bladder Suspension (2008); orthopedic surgery (Left, 2018); Open reduction internal fixation rodding intramedullary femur (Right, 5/17/2023); and PICC/Midline Placement (6/27/2024).      Past Social History     Reviewed,  reports that she has been smoking cigarettes. She started smoking about 62 years ago. She has a 60 pack-year smoking history. She has never used smokeless tobacco. She reports current alcohol use of about 14.0 standard drinks of alcohol per week. She reports that she does not use drugs.    Family History     Reviewed, and family history includes Cancer in her sister; Esophageal Cancer in her mother; Heart Disease in her father; Rectal Cancer in her sister; Rheumatoid Arthritis in her brother.    Medication List     Current  Outpatient Medications   Medication Sig Dispense Refill     albuterol (PROAIR HFA/PROVENTIL HFA/VENTOLIN HFA) 108 (90 Base) MCG/ACT inhaler INHALE 1-2 PUFFS INTO THE LUNGS EVERY 6 HOURS AS NEEDED FOR COUGH OR SHORTNESS OF BREATH OR WHEEZE 18 g 0     alendronate (FOSAMAX) 70 MG tablet Take 70 mg by mouth every 7 days       aspirin (ASA) 81 MG EC tablet Take 1 tablet (81 mg) by mouth daily       calcium carbonate (OS-PETER) 1500 (600 Ca) MG tablet Take 600 mg by mouth daily       folic acid (FOLVITE) 1 MG tablet Take 1 tablet (1 mg) by mouth daily       furosemide (LASIX) 40 MG tablet Take 1 tablet (40 mg) by mouth 2 times daily       ibuprofen (ADVIL/MOTRIN) 200 MG tablet Take 200 mg by mouth every 6 hours as needed for pain       Lidocaine (LIDOCARE) 4 % Patch Place 2 patches onto the skin every 24 hours To prevent lidocaine toxicity, patient should be patch free for 12 hrs daily. Apply to left lower chest for rib fractures.       losartan (COZAAR) 25 MG tablet Take 1 tablet (25 mg) by mouth daily       melatonin 1 MG TABS tablet Take 1 tablet (1 mg) by mouth nightly as needed for sleep 90 tablet 3     miconazole (MICATIN) 2 % external powder Apply topically 2 times daily       midodrine (PROAMATINE) 2.5 MG tablet Take 2.5 mg by mouth 3 times daily (with meals) (Hold for SBP greater than 155)       multivitamin, therapeutic (THERA-VIT) TABS tablet Take 1 tablet by mouth daily       nicotine (NICODERM CQ) 14 MG/24HR 24 hr patch Place 1 patch onto the skin daily       omeprazole (PRILOSEC) 20 MG DR capsule Take 1 capsule (20 mg) by mouth daily 90 capsule 3     polyethylene glycol (MIRALAX) 17 GM/Dose powder Take 17 g by mouth daily       potassium chloride ER (K-TAB) 20 MEQ CR tablet Take 1 tablet (20 mEq) by mouth daily       senna-docusate (SENOKOT-S/PERICOLACE) 8.6-50 MG tablet Take 1 tablet by mouth daily       thiamine (B-1) 100 MG tablet Take 1 tablet (100 mg) by mouth daily       vitamin C (ASCORBIC ACID) 500 MG  "tablet Take 500 mg by mouth daily       Vitamin D, Cholecalciferol, 25 MCG (1000 UT) TABS Take 1,000 Units by mouth daily       No current facility-administered medications for this visit.      MED REC REQUIRED  Post Medication Reconciliation Status: discharge medications reconciled, continue medications without change       Allergies     Allergies   Allergen Reactions     Penicillins Hives       Review of Systems   A comprehensive review of 14 systems was done. Pertinent findings noted here and in history of present illness. All the rest negative.  Constitutional: Negative.  Negative for fever, chills, she has  activity change, appetite change and fatigue.   HENT: Negative for congestion and facial swelling.    Eyes: Negative for photophobia, redness and visual disturbance.   Respiratory: Negative for cough and chest tightness.  Has an occasional wheeze from her COPD has ongoing smoking issues  Cardiovascular: Negative for chest pain, palpitations and leg swelling.   Gastrointestinal: Negative for nausea, diarrhea, constipation, blood in stool and abdominal distention.   Genitourinary: Negative.    Musculoskeletal: Reporting minimal pain at rest however she is noticing decreased endurance and increased fatigue with minimal ambulation  Skin: Negative.    Neurological: Negative for dizziness, tremors, syncope, weakness, light-headedness and headaches.   Hematological: Does not bruise/bleed easily.   Psychiatric/Behavioral: Negative.        Physical Exam   /74   Pulse 92   Temp 97.7  F (36.5  C)   Resp 16   Ht 1.549 m (5' 1\")   Wt 40.4 kg (89 lb)   SpO2 94%   BMI 16.82 kg/m       Constitutional: Oriented to person, place, and time and appears well-developed.   Frail with low BMI  HEENT:  Normocephalic and atraumatic.  Eyes: Conjunctivae and EOM are normal. Pupils are equal, round, and reactive to light. No discharge.  No scleral icterus. Nose normal. Mouth/Throat: Oropharynx is clear and moist. No " oropharyngeal exudate.    NECK: Normal range of motion. Neck supple. No JVD present. No tracheal deviation present. No thyromegaly present.   CARDIOVASCULAR: Normal rate, regular rhythm and intact distal pulses.  Exam reveals no gallop and no friction rub.  Systolic murmur present.  PULMONARY: Effort normal and breath sounds normal. No respiratory distress occasional expiratory  wheezing or rales.  ABDOMEN: Soft. Bowel sounds are normal. No distension and no mass.  There is no tenderness. There is no rebound and no guarding. No HSM.  MUSCULOSKELETAL: Normal range of motion. Mild kyphosis, no tenderness.  Using a wheelchair due to difficulty ambulating  LYMPH NODES: Has no cervical, supraclavicular, axillary and groin adenopathy.   NEUROLOGICAL: Alert and oriented to person, place, and time. No cranial nerve deficit.  Normal muscle tone. Coordination normal.   GENITOURINARY: Deferred exam.  SKIN: Skin is warm and dry. No rash noted. No erythema. No pallor.   EXTREMITIES: No cyanosis, no clubbing, no edema other than some minimal edema around her ankles. No Deformity.  PSYCHIATRIC: Normal mood, affect and behavior.      Lab Results     Last Comprehensive Metabolic Panel:  Lab Results   Component Value Date     07/15/2024    POTASSIUM 3.7 07/15/2024    CHLORIDE 96 (L) 07/15/2024    CO2 29 07/15/2024    ANIONGAP 10 07/15/2024    GLC 87 07/15/2024    BUN 20.3 07/15/2024    CR 0.78 07/15/2024    GFRESTIMATED 78 07/15/2024    PETER 9.2 07/15/2024                       Electronically signed by    Jeniffer Cannon MD                           Sincerely,        SHOSHANA Cornelius

## 2024-08-26 ENCOUNTER — DISCHARGE SUMMARY NURSING HOME (OUTPATIENT)
Dept: GERIATRICS | Facility: CLINIC | Age: 74
End: 2024-08-26
Payer: COMMERCIAL

## 2024-08-26 VITALS
OXYGEN SATURATION: 94 % | WEIGHT: 89 LBS | TEMPERATURE: 97.7 F | DIASTOLIC BLOOD PRESSURE: 76 MMHG | HEIGHT: 61 IN | HEART RATE: 92 BPM | RESPIRATION RATE: 16 BRPM | BODY MASS INDEX: 16.8 KG/M2 | SYSTOLIC BLOOD PRESSURE: 127 MMHG

## 2024-08-26 DIAGNOSIS — S22.070D CLOSED WEDGE COMPRESSION FRACTURE OF T9 VERTEBRA WITH ROUTINE HEALING, SUBSEQUENT ENCOUNTER: ICD-10-CM

## 2024-08-26 DIAGNOSIS — K59.03 DRUG-INDUCED CONSTIPATION: ICD-10-CM

## 2024-08-26 DIAGNOSIS — K92.1 MELENA: ICD-10-CM

## 2024-08-26 DIAGNOSIS — K21.00 GASTROESOPHAGEAL REFLUX DISEASE WITH ESOPHAGITIS WITHOUT HEMORRHAGE: Chronic | ICD-10-CM

## 2024-08-26 DIAGNOSIS — J41.8 MIXED SIMPLE AND MUCOPURULENT CHRONIC BRONCHITIS (H): ICD-10-CM

## 2024-08-26 DIAGNOSIS — Z72.0 TOBACCO ABUSE: ICD-10-CM

## 2024-08-26 DIAGNOSIS — D64.9 ANEMIA, UNSPECIFIED TYPE: Primary | ICD-10-CM

## 2024-08-26 DIAGNOSIS — I50.9 CHRONIC CONGESTIVE HEART FAILURE, UNSPECIFIED HEART FAILURE TYPE (H): ICD-10-CM

## 2024-08-26 DIAGNOSIS — Z86.711 HISTORY OF PULMONARY EMBOLISM: ICD-10-CM

## 2024-08-26 PROCEDURE — 99316 NF DSCHRG MGMT 30 MIN+: CPT | Performed by: NURSE PRACTITIONER

## 2024-08-26 NOTE — LETTER
8/26/2024      Namita Viera  [address]  [place] [address]        [facility]    Code Status:  FULL CODE   Visit Type:   Chief Complaint   Patient presents with     LTC Discharge     Facility:  [facility] () [[id number]]         HPI: Namita Viera is a 74 year old female who I am seeing today for discharge from the TCU.  Pt recently re hospitalized with GI bleed. Past medical history includes COPD, alcoholism, frequent falls. Pt had recently been hospitalized 6/27-7/4 post fall. Pt found to have PE. Pt at TCU and had rectal bleeding. Pt found to have fecal impaction leading to rectal bleed. Also found to have CHF. BNP similar to previous around 900, however pt with bilateral moderate pleural effusions, 2+ pitting ankle edema that is new.  Suspicion for diastolic heart failure related to IV infusions received during last hospital stay. Pt given  IV Lasix 40 mg twice daily and changed to p.o. 40 mg twice daily at discharge. Hx of ETOH abuse. Declined treatment last hospitalization.    Transitional Care Course: Today patient sitting up in wheelchair out side smoking. She does not wish to quit. Underlying COPD without evidence of exacerbation. Pt recently hospitalized with GI bleed due to fecal impaction. She continues on bowel meds. Bowels moving adequately. No further evidence of bleed. Hgb stable at 9.5.  PE. She denies SOB or CP. T9 and pelvic fracture. Pain well-controlled with Tylenol.        Assessment/Plan:     Rectal bleeding  Fecal impaction  Opioid-induced constipation  -CT showed fecal impaction.  -No further evidence of bleed.   -Hgb now 9.5.   -Continue PTA senna and MiraLAX.     Acute on chronic diastolic heart failure  -LVEF 65 to 70% 2 weeks ago  -continues on lasix 40 mg BID.   -2 gm sodium diet.   -every day weights.   -peripheral edema resolved.   -no SOB or CP.      pulmonary embolus  -Completed Eliquis.   -ASA 81 mg every day.      Chronic hypoxic  respiratory failure  COPD  -known COPD, known pulmonary embolus,  acute on chronic CHF   -was not on O2 at home.   -Continue flutter valve.   -No longer on oxygen.   -Steroids complete.    -Encourage cough and deep breath.   -Continue home MDI.      rib fractures  T9 compression fracture  Pelvic fractures   -Continue tylenol.     Hx of tobacco abuse  -denies use of cessation tools.     -ok to discharge to [facility] with current meds and treatments.   -Home PT, OT, HHA and RN for medication management.   -Follow up with PCP in 1-2 weeks.        Active Ambulatory Problems     Diagnosis Date Noted     Esophageal reflux      Smoker 05/01/2018     Ear mass 06/15/2018     Age-related cataract of both eyes, unspecified age-related cataract type 11/27/2019     Alcohol dependence (H) 05/06/2021     Chronic cough 05/06/2021     Benign essential hypertension 05/06/2021     Vitamin D deficiency 05/06/2021     Right Intertroch Hip Fract 04/30/2018     Hypercalcemia 02/07/2022     Anemia, unspecified type 02/07/2022     Leg cramping 02/07/2022     Osteoporosis 02/07/2022     Nocturia 02/07/2022     COPD (chronic obstructive pulmonary disease) (H) 02/07/2022     UTI (urinary tract infection) 05/16/2023     Acute UTI 05/16/2023     Hip fracture, right, closed, initial encounter (H) 05/16/2023     Hyponatremia 05/16/2023     Hypokalemia 06/27/2024     COPD exacerbation (H) 06/27/2024     Acute respiratory failure with hypercapnia (H) 06/27/2024     Altered mental status, unspecified altered mental status type 06/27/2024     Aspiration pneumonia, unspecified aspiration pneumonia type, unspecified laterality, unspecified part of lung (H) 06/27/2024     History of tobacco use 07/05/2024     Vaginitis and vulvovaginitis 04/29/2008     Sciatic pain 12/06/2021     Polyp of colon 07/09/2021     HTN (hypertension) 12/06/2021     History of colonic polyps 07/09/2021     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     Bilateral leg edema 12/06/2021      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     Acquired absence of organ, genital organs 04/29/2008     Rectal bleeding 07/07/2024     Fecal impaction (H) 07/07/2024     Acute on chronic diastolic congestive heart failure (H) 07/07/2024     Pelvic fracture (H) 07/08/2024     Resolved Ambulatory Problems     Diagnosis Date Noted     History of alcohol abuse 11/27/2019     Rib pain on left side 05/06/2021     SAH (subarachnoid hemorrhage) (H) 04/30/2018     Closed nondisplaced intertrochanteric fracture of left femur, initial encounter (H) 05/01/2018     Back pain 10/12/2021     Closed fracture of sacrum with routine healing 10/12/2021     Pyuria 10/12/2021     Acute cystitis without hematuria 10/13/2021     Weight loss 02/07/2022     Other acute pulmonary embolism without acute cor pulmonale (H) 06/27/2024     Past Medical History:   Diagnosis Date     Acid reflux      Alcohol use      Closed fracture of left femur (H)      Emphysema of lung (H) 02/07/2022     Hip fracture requiring operative repair (H)      Hypertension 01/2021     Traumatic closed displaced fracture of distal end of radius      Allergies   Allergen Reactions     Penicillins Hives       All Meds and Allergies reviewed in the record at the facility and is the most up-to-date.    Current Outpatient Medications   Medication Sig Dispense Refill     albuterol (PROAIR HFA/PROVENTIL HFA/VENTOLIN HFA) 108 (90 Base) MCG/ACT inhaler INHALE 1-2 PUFFS INTO THE LUNGS EVERY 6 HOURS AS NEEDED FOR COUGH OR SHORTNESS OF BREATH OR WHEEZE 18 g 0     alendronate (FOSAMAX) 70 MG tablet Take 70 mg by mouth every 7 days       aspirin (ASA) 81 MG EC tablet Take 1 tablet (81 mg) by mouth daily       calcium carbonate (OS-PETER) 1500 (600 Ca) MG tablet Take 600 mg by mouth daily       folic acid (FOLVITE) 1 MG tablet Take 1 tablet (1 mg) by mouth daily       furosemide (LASIX) 40 MG tablet Take 1 tablet (40 mg) by mouth 2 times daily       ibuprofen (ADVIL/MOTRIN) 200 MG tablet Take  "200 mg by mouth every 6 hours as needed for pain       Lidocaine (LIDOCARE) 4 % Patch Place 2 patches onto the skin every 24 hours To prevent lidocaine toxicity, patient should be patch free for 12 hrs daily. Apply to left lower chest for rib fractures.       losartan (COZAAR) 25 MG tablet Take 1 tablet (25 mg) by mouth daily       melatonin 1 MG TABS tablet Take 1 tablet (1 mg) by mouth nightly as needed for sleep 90 tablet 3     miconazole (MICATIN) 2 % external powder Apply topically 2 times daily       midodrine (PROAMATINE) 2.5 MG tablet Take 2.5 mg by mouth 3 times daily (with meals) (Hold for SBP greater than 155)       multivitamin, therapeutic (THERA-VIT) TABS tablet Take 1 tablet by mouth daily       nicotine (NICODERM CQ) 14 MG/24HR 24 hr patch Place 1 patch onto the skin daily       omeprazole (PRILOSEC) 20 MG DR capsule Take 1 capsule (20 mg) by mouth daily 90 capsule 3     polyethylene glycol (MIRALAX) 17 GM/Dose powder Take 17 g by mouth daily       potassium chloride ER (K-TAB) 20 MEQ CR tablet Take 1 tablet (20 mEq) by mouth daily       senna-docusate (SENOKOT-S/PERICOLACE) 8.6-50 MG tablet Take 1 tablet by mouth daily       thiamine (B-1) 100 MG tablet Take 1 tablet (100 mg) by mouth daily       vitamin C (ASCORBIC ACID) 500 MG tablet Take 500 mg by mouth daily       Vitamin D, Cholecalciferol, 25 MCG (1000 UT) TABS Take 1,000 Units by mouth daily       No current facility-administered medications for this visit.       REVIEW OF SYSTEMS:   10 point review of systems reviewed and pertinent positives in the HPI.     PHYSICAL EXAMINATION:  Physical Exam     Vital signs: /76   Pulse 92   Temp 97.7  F (36.5  C)   Resp 16   Ht 1.549 m (5' 1\")   Wt 40.4 kg (89 lb)   SpO2 94%   BMI 16.82 kg/m    General: Awake, Alert, oriented x3, conversant  HEENT:Pink conjunctiva,moist oral mucosa, lesion to lower lip.   NECK: Supple  CVS:  S1  S2, without murmur or gallop.   LUNG: Occ expiratory wheezing " today. No cough. Off O2 now.   BACK: No kyphosis of the thoracic spine  ABDOMEN: Soft, nontender to palpation, with positive bowel sounds  EXTREMITIES: Moves both upper and lower extremities, no pedal edema, no calf tenderness. DJD of both hands noted with bony prominence.  No redness or warmth.  SKIN: Warm and dry  NEUROLOGIC: Intact, pulses palpable  PSYCHIATRIC: Pleasant affect.    Labs:  All labs reviewed in the nursing home record and Epic   @  Lab Results   Component Value Date    WBC 5.7 07/15/2024     Lab Results   Component Value Date    RBC 2.41 07/15/2024     Lab Results   Component Value Date    HGB 7.7 07/15/2024     Lab Results   Component Value Date    HCT 24.5 07/15/2024     Lab Results   Component Value Date     07/15/2024     Lab Results   Component Value Date    MCH 32.0 07/15/2024     Lab Results   Component Value Date    MCHC 31.4 07/15/2024     Lab Results   Component Value Date    RDW 15.4 07/15/2024     Lab Results   Component Value Date     07/15/2024        @Last Comprehensive Metabolic Panel:  Sodium   Date Value Ref Range Status   07/15/2024 135 135 - 145 mmol/L Final     Potassium   Date Value Ref Range Status   07/15/2024 3.7 3.4 - 5.3 mmol/L Final     Potassium Whole Blood   Date Value Ref Range Status   06/27/2024 2.6 (LL) 3.4 - 5.3 mmol/L Final     Chloride   Date Value Ref Range Status   07/15/2024 96 (L) 98 - 107 mmol/L Final   02/04/2022 98 98 - 107 mmol/L Final     Carbon Dioxide (CO2)   Date Value Ref Range Status   07/15/2024 29 22 - 29 mmol/L Final   02/04/2022 24 22 - 31 mmol/L Final     Anion Gap   Date Value Ref Range Status   07/15/2024 10 7 - 15 mmol/L Final   02/04/2022 13 5 - 18 mmol/L Final     Glucose   Date Value Ref Range Status   07/15/2024 87 70 - 99 mg/dL Final   02/04/2022 92 70 - 125 mg/dL Final     GLUCOSE BY METER POCT   Date Value Ref Range Status   07/04/2024 94 70 - 99 mg/dL Final     Urea Nitrogen   Date Value Ref Range Status   07/15/2024  20.3 8.0 - 23.0 mg/dL Final   02/04/2022 10 8 - 28 mg/dL Final     Creatinine   Date Value Ref Range Status   07/15/2024 0.78 0.51 - 0.95 mg/dL Final     GFR Estimate   Date Value Ref Range Status   07/15/2024 78 >60 mL/min/1.73m2 Final   05/20/2021 >60 >60 mL/min/1.73m2 Final     Calcium   Date Value Ref Range Status   07/15/2024 9.2 8.8 - 10.2 mg/dL Final     DISCHARGE PLAN/FACE TO FACE:  I certify that this patient is under my care and that I, or a nurse practitioner or physician's assistant working with me, had a face-to-face encounter that meets the physician face-to-face encounter requirements with this patient.       I certify that, based on my findings, the following services are medically necessary home health services.    My clinical findings support the need for the above skilled services.    This patient is homebound because: Recent GI bleed.     The patient is, or has been, under my care and I have initiated the establishment of the plan of care. This patient will be followed by a physician who will periodically review the plan of care.    > 35 minutes spent reviewing discharge medications, home care services and follow ups as well as collaborating with nursing and .     This note has been dictated using voice recognition software. Any grammatical or context distortions are unintentional and inherent to the software    Electronically signed by: Cori Goyal CNP       Sincerely,        Cori Goyal NP

## 2024-08-26 NOTE — PROGRESS NOTES
[facility]    Code Status:  FULL CODE   Visit Type:   Chief Complaint   Patient presents with    LTC Discharge     Facility:  [facility] () [[id number]]         HPI: Namita Veira is a 74 year old female who I am seeing today for discharge from the TCU.  Pt recently re hospitalized with GI bleed. Past medical history includes COPD, alcoholism, frequent falls. Pt had recently been hospitalized 6/27-7/4 post fall. Pt found to have PE. Pt at TCU and had rectal bleeding. Pt found to have fecal impaction leading to rectal bleed. Also found to have CHF. BNP similar to previous around 900, however pt with bilateral moderate pleural effusions, 2+ pitting ankle edema that is new.  Suspicion for diastolic heart failure related to IV infusions received during last hospital stay. Pt given  IV Lasix 40 mg twice daily and changed to p.o. 40 mg twice daily at discharge. Hx of ETOH abuse. Declined treatment last hospitalization.    Transitional Care Course: Today patient sitting up in wheelchair out side smoking. She does not wish to quit. Underlying COPD without evidence of exacerbation. Pt recently hospitalized with GI bleed due to fecal impaction. She continues on bowel meds. Bowels moving adequately. No further evidence of bleed. Hgb stable at 9.5.  PE. She denies SOB or CP. T9 and pelvic fracture. Pain well-controlled with Tylenol.        Assessment/Plan:     Rectal bleeding  Fecal impaction  Opioid-induced constipation  -CT showed fecal impaction.  -No further evidence of bleed.   -Hgb now 9.5.   -Continue PTA senna and MiraLAX.     Acute on chronic diastolic heart failure  -LVEF 65 to 70% 2 weeks ago  -continues on lasix 40 mg BID.   -2 gm sodium diet.   -every day weights.   -peripheral edema resolved.   -no SOB or CP.      pulmonary embolus  -Completed Eliquis.   -ASA 81 mg every day.      Chronic hypoxic respiratory failure  COPD  -known COPD, known pulmonary embolus,  acute on chronic CHF    -was not on O2 at home.   -Continue flutter valve.   -No longer on oxygen.   -Steroids complete.    -Encourage cough and deep breath.   -Continue home MDI.      rib fractures  T9 compression fracture  Pelvic fractures   -Continue tylenol.     Hx of tobacco abuse  -denies use of cessation tools.     -ok to discharge to [facility] with current meds and treatments.   -Home PT, OT, HHA and RN for medication management.   -Follow up with PCP in 1-2 weeks.        Active Ambulatory Problems     Diagnosis Date Noted    Esophageal reflux     Smoker 05/01/2018    Ear mass 06/15/2018    Age-related cataract of both eyes, unspecified age-related cataract type 11/27/2019    Alcohol dependence (H) 05/06/2021    Chronic cough 05/06/2021    Benign essential hypertension 05/06/2021    Vitamin D deficiency 05/06/2021    Right Intertroch Hip Fract 04/30/2018    Hypercalcemia 02/07/2022    Anemia, unspecified type 02/07/2022    Leg cramping 02/07/2022    Osteoporosis 02/07/2022    Nocturia 02/07/2022    COPD (chronic obstructive pulmonary disease) (H) 02/07/2022    UTI (urinary tract infection) 05/16/2023    Acute UTI 05/16/2023    Hip fracture, right, closed, initial encounter (H) 05/16/2023    Hyponatremia 05/16/2023    Hypokalemia 06/27/2024    COPD exacerbation (H) 06/27/2024    Acute respiratory failure with hypercapnia (H) 06/27/2024    Altered mental status, unspecified altered mental status type 06/27/2024    Aspiration pneumonia, unspecified aspiration pneumonia type, unspecified laterality, unspecified part of lung (H) 06/27/2024    History of tobacco use 07/05/2024    Vaginitis and vulvovaginitis 04/29/2008    Sciatic pain 12/06/2021    Polyp of colon 07/09/2021    HTN (hypertension) 12/06/2021    History of colonic polyps 07/09/2021    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    Bilateral leg edema 12/06/2021    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    Acquired absence of organ, genital organs 04/29/2008    Rectal bleeding 07/07/2024     Fecal impaction (H) 07/07/2024    Acute on chronic diastolic congestive heart failure (H) 07/07/2024    Pelvic fracture (H) 07/08/2024     Resolved Ambulatory Problems     Diagnosis Date Noted    History of alcohol abuse 11/27/2019    Rib pain on left side 05/06/2021    SAH (subarachnoid hemorrhage) (H) 04/30/2018    Closed nondisplaced intertrochanteric fracture of left femur, initial encounter (H) 05/01/2018    Back pain 10/12/2021    Closed fracture of sacrum with routine healing 10/12/2021    Pyuria 10/12/2021    Acute cystitis without hematuria 10/13/2021    Weight loss 02/07/2022    Other acute pulmonary embolism without acute cor pulmonale (H) 06/27/2024     Past Medical History:   Diagnosis Date    Acid reflux     Alcohol use     Closed fracture of left femur (H)     Emphysema of lung (H) 02/07/2022    Hip fracture requiring operative repair (H)     Hypertension 01/2021    Traumatic closed displaced fracture of distal end of radius      Allergies   Allergen Reactions    Penicillins Hives       All Meds and Allergies reviewed in the record at the facility and is the most up-to-date.    Current Outpatient Medications   Medication Sig Dispense Refill    albuterol (PROAIR HFA/PROVENTIL HFA/VENTOLIN HFA) 108 (90 Base) MCG/ACT inhaler INHALE 1-2 PUFFS INTO THE LUNGS EVERY 6 HOURS AS NEEDED FOR COUGH OR SHORTNESS OF BREATH OR WHEEZE 18 g 0    alendronate (FOSAMAX) 70 MG tablet Take 70 mg by mouth every 7 days      aspirin (ASA) 81 MG EC tablet Take 1 tablet (81 mg) by mouth daily      calcium carbonate (OS-PETER) 1500 (600 Ca) MG tablet Take 600 mg by mouth daily      folic acid (FOLVITE) 1 MG tablet Take 1 tablet (1 mg) by mouth daily      furosemide (LASIX) 40 MG tablet Take 1 tablet (40 mg) by mouth 2 times daily      ibuprofen (ADVIL/MOTRIN) 200 MG tablet Take 200 mg by mouth every 6 hours as needed for pain      Lidocaine (LIDOCARE) 4 % Patch Place 2 patches onto the skin every 24 hours To prevent lidocaine  "toxicity, patient should be patch free for 12 hrs daily. Apply to left lower chest for rib fractures.      losartan (COZAAR) 25 MG tablet Take 1 tablet (25 mg) by mouth daily      melatonin 1 MG TABS tablet Take 1 tablet (1 mg) by mouth nightly as needed for sleep 90 tablet 3    miconazole (MICATIN) 2 % external powder Apply topically 2 times daily      midodrine (PROAMATINE) 2.5 MG tablet Take 2.5 mg by mouth 3 times daily (with meals) (Hold for SBP greater than 155)      multivitamin, therapeutic (THERA-VIT) TABS tablet Take 1 tablet by mouth daily      nicotine (NICODERM CQ) 14 MG/24HR 24 hr patch Place 1 patch onto the skin daily      omeprazole (PRILOSEC) 20 MG DR capsule Take 1 capsule (20 mg) by mouth daily 90 capsule 3    polyethylene glycol (MIRALAX) 17 GM/Dose powder Take 17 g by mouth daily      potassium chloride ER (K-TAB) 20 MEQ CR tablet Take 1 tablet (20 mEq) by mouth daily      senna-docusate (SENOKOT-S/PERICOLACE) 8.6-50 MG tablet Take 1 tablet by mouth daily      thiamine (B-1) 100 MG tablet Take 1 tablet (100 mg) by mouth daily      vitamin C (ASCORBIC ACID) 500 MG tablet Take 500 mg by mouth daily      Vitamin D, Cholecalciferol, 25 MCG (1000 UT) TABS Take 1,000 Units by mouth daily       No current facility-administered medications for this visit.       REVIEW OF SYSTEMS:   10 point review of systems reviewed and pertinent positives in the HPI.     PHYSICAL EXAMINATION:  Physical Exam     Vital signs: /76   Pulse 92   Temp 97.7  F (36.5  C)   Resp 16   Ht 1.549 m (5' 1\")   Wt 40.4 kg (89 lb)   SpO2 94%   BMI 16.82 kg/m    General: Awake, Alert, oriented x3, conversant  HEENT:Pink conjunctiva,moist oral mucosa, lesion to lower lip.   NECK: Supple  CVS:  S1  S2, without murmur or gallop.   LUNG: Occ expiratory wheezing today. No cough. Off O2 now.   BACK: No kyphosis of the thoracic spine  ABDOMEN: Soft, nontender to palpation, with positive bowel sounds  EXTREMITIES: Moves both " upper and lower extremities, no pedal edema, no calf tenderness. DJD of both hands noted with bony prominence.  No redness or warmth.  SKIN: Warm and dry  NEUROLOGIC: Intact, pulses palpable  PSYCHIATRIC: Pleasant affect.    Labs:  All labs reviewed in the nursing home record and Epic   @  Lab Results   Component Value Date    WBC 5.7 07/15/2024     Lab Results   Component Value Date    RBC 2.41 07/15/2024     Lab Results   Component Value Date    HGB 7.7 07/15/2024     Lab Results   Component Value Date    HCT 24.5 07/15/2024     Lab Results   Component Value Date     07/15/2024     Lab Results   Component Value Date    MCH 32.0 07/15/2024     Lab Results   Component Value Date    MCHC 31.4 07/15/2024     Lab Results   Component Value Date    RDW 15.4 07/15/2024     Lab Results   Component Value Date     07/15/2024        @Last Comprehensive Metabolic Panel:  Sodium   Date Value Ref Range Status   07/15/2024 135 135 - 145 mmol/L Final     Potassium   Date Value Ref Range Status   07/15/2024 3.7 3.4 - 5.3 mmol/L Final     Potassium Whole Blood   Date Value Ref Range Status   06/27/2024 2.6 (LL) 3.4 - 5.3 mmol/L Final     Chloride   Date Value Ref Range Status   07/15/2024 96 (L) 98 - 107 mmol/L Final   02/04/2022 98 98 - 107 mmol/L Final     Carbon Dioxide (CO2)   Date Value Ref Range Status   07/15/2024 29 22 - 29 mmol/L Final   02/04/2022 24 22 - 31 mmol/L Final     Anion Gap   Date Value Ref Range Status   07/15/2024 10 7 - 15 mmol/L Final   02/04/2022 13 5 - 18 mmol/L Final     Glucose   Date Value Ref Range Status   07/15/2024 87 70 - 99 mg/dL Final   02/04/2022 92 70 - 125 mg/dL Final     GLUCOSE BY METER POCT   Date Value Ref Range Status   07/04/2024 94 70 - 99 mg/dL Final     Urea Nitrogen   Date Value Ref Range Status   07/15/2024 20.3 8.0 - 23.0 mg/dL Final   02/04/2022 10 8 - 28 mg/dL Final     Creatinine   Date Value Ref Range Status   07/15/2024 0.78 0.51 - 0.95 mg/dL Final     GFR  Estimate   Date Value Ref Range Status   07/15/2024 78 >60 mL/min/1.73m2 Final   05/20/2021 >60 >60 mL/min/1.73m2 Final     Calcium   Date Value Ref Range Status   07/15/2024 9.2 8.8 - 10.2 mg/dL Final     DISCHARGE PLAN/FACE TO FACE:  I certify that this patient is under my care and that I, or a nurse practitioner or physician's assistant working with me, had a face-to-face encounter that meets the physician face-to-face encounter requirements with this patient.       I certify that, based on my findings, the following services are medically necessary home health services.    My clinical findings support the need for the above skilled services.    This patient is homebound because: Recent GI bleed.     The patient is, or has been, under my care and I have initiated the establishment of the plan of care. This patient will be followed by a physician who will periodically review the plan of care.    > 35 minutes spent reviewing discharge medications, home care services and follow ups as well as collaborating with nursing and .     This note has been dictated using voice recognition software. Any grammatical or context distortions are unintentional and inherent to the software    Electronically signed by: Cori Goyal CNP

## 2024-09-03 ENCOUNTER — PATIENT OUTREACH (OUTPATIENT)
Dept: CARE COORDINATION | Facility: CLINIC | Age: 74
End: 2024-09-03
Payer: COMMERCIAL

## 2024-09-03 NOTE — PROGRESS NOTES
Clinic Care Coordination Contact  Care Coordination Clinician Chart Review    Situation: Patient chart reviewed by care coordinator.    Background: Patient was discharged from the hospital to [facility], and RN/SW CC has been monitoring for TCU discharge to identify any outstanding Clinic Care Coordination needs/concerns. Refer to initial patient outreach encounter by this writer dated 8/2/24 for additional details.    Assessment: Upon chart review, patient is not a candidate for Primary Care Clinic Care Coordination enrollment due to reason stated below:  Patient transitioned to Long Term Care.    Plan/Recommendations: Clinic Care Coordination Referral/order cancelled. RN/SW CC will perform no further monitoring/outreaches at this time and will remain available as needed. If new needs arise, a new Care Coordination Referral may be placed.

## 2024-09-22 ENCOUNTER — HEALTH MAINTENANCE LETTER (OUTPATIENT)
Age: 74
End: 2024-09-22

## 2024-11-24 ENCOUNTER — APPOINTMENT (OUTPATIENT)
Dept: RADIOLOGY | Facility: HOSPITAL | Age: 74
DRG: 177 | End: 2024-11-24
Attending: EMERGENCY MEDICINE
Payer: COMMERCIAL

## 2024-11-24 ENCOUNTER — APPOINTMENT (OUTPATIENT)
Dept: CT IMAGING | Facility: HOSPITAL | Age: 74
DRG: 177 | End: 2024-11-24
Attending: EMERGENCY MEDICINE
Payer: COMMERCIAL

## 2024-11-24 ENCOUNTER — HOSPITAL ENCOUNTER (INPATIENT)
Facility: HOSPITAL | Age: 74
LOS: 1 days | Discharge: HOME HEALTH | DRG: 177 | End: 2024-11-25
Attending: EMERGENCY MEDICINE | Admitting: STUDENT IN AN ORGANIZED HEALTH CARE EDUCATION/TRAINING PROGRAM
Payer: COMMERCIAL

## 2024-11-24 DIAGNOSIS — J69.0 ASPIRATION PNEUMONIA OF LEFT UPPER LOBE, UNSPECIFIED ASPIRATION PNEUMONIA TYPE (H): ICD-10-CM

## 2024-11-24 DIAGNOSIS — J43.8 OTHER EMPHYSEMA (H): ICD-10-CM

## 2024-11-24 DIAGNOSIS — R09.02 HYPOXIA: ICD-10-CM
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
## 2024-11-24 LAB
ALBUMIN SERPL BCG-MCNC: 4.6 G/DL (ref 3.5–5.2)
ALP SERPL-CCNC: 120 U/L (ref 40–150)
ALT SERPL W P-5'-P-CCNC: 19 U/L (ref 0–50)
ANION GAP SERPL CALCULATED.3IONS-SCNC: 15 MMOL/L (ref 7–15)
AST SERPL W P-5'-P-CCNC: 39 U/L (ref 0–45)
BASOPHILS # BLD AUTO: 0 10E3/UL (ref 0–0.2)
BASOPHILS NFR BLD AUTO: 0 %
BILIRUB DIRECT SERPL-MCNC: <0.2 MG/DL (ref 0–0.3)
BILIRUB SERPL-MCNC: 0.5 MG/DL
BUN SERPL-MCNC: 39.7 MG/DL (ref 8–23)
CALCIUM SERPL-MCNC: 9.9 MG/DL (ref 8.8–10.4)
CHLORIDE SERPL-SCNC: 95 MMOL/L (ref 98–107)
CK SERPL-CCNC: 264 U/L (ref 26–192)
CREAT SERPL-MCNC: 0.95 MG/DL (ref 0.51–0.95)
EGFRCR SERPLBLD CKD-EPI 2021: 62 ML/MIN/1.73M2
EOSINOPHIL # BLD AUTO: 0 10E3/UL (ref 0–0.7)
EOSINOPHIL NFR BLD AUTO: 0 %
ERYTHROCYTE [DISTWIDTH] IN BLOOD BY AUTOMATED COUNT: 14.7 % (ref 10–15)
ETHANOL SERPL-MCNC: <0.01 G/DL
FLUAV RNA SPEC QL NAA+PROBE: NEGATIVE
FLUBV RNA RESP QL NAA+PROBE: NEGATIVE
GLUCOSE SERPL-MCNC: 102 MG/DL (ref 70–99)
HCO3 SERPL-SCNC: 24 MMOL/L (ref 22–29)
HCT VFR BLD AUTO: 35.3 % (ref 35–47)
HGB BLD-MCNC: 12 G/DL (ref 11.7–15.7)
IMM GRANULOCYTES # BLD: 0 10E3/UL
IMM GRANULOCYTES NFR BLD: 0 %
LIPASE SERPL-CCNC: 21 U/L (ref 13–60)
LYMPHOCYTES # BLD AUTO: 0.8 10E3/UL (ref 0.8–5.3)
LYMPHOCYTES NFR BLD AUTO: 8 %
MCH RBC QN AUTO: 28.8 PG (ref 26.5–33)
MCHC RBC AUTO-ENTMCNC: 34 G/DL (ref 31.5–36.5)
MCV RBC AUTO: 85 FL (ref 78–100)
MONOCYTES # BLD AUTO: 0.6 10E3/UL (ref 0–1.3)
MONOCYTES NFR BLD AUTO: 6 %
NEUTROPHILS # BLD AUTO: 8.9 10E3/UL (ref 1.6–8.3)
NEUTROPHILS NFR BLD AUTO: 86 %
NRBC # BLD AUTO: 0 10E3/UL
NRBC BLD AUTO-RTO: 0 /100
PLATELET # BLD AUTO: 357 10E3/UL (ref 150–450)
POTASSIUM SERPL-SCNC: 5 MMOL/L (ref 3.4–5.3)
PROCALCITONIN SERPL IA-MCNC: 0.05 NG/ML
PROT SERPL-MCNC: 8.1 G/DL (ref 6.4–8.3)
RBC # BLD AUTO: 4.16 10E6/UL (ref 3.8–5.2)
RSV RNA SPEC NAA+PROBE: NEGATIVE
SARS-COV-2 RNA RESP QL NAA+PROBE: NEGATIVE
SODIUM SERPL-SCNC: 134 MMOL/L (ref 135–145)
TROPONIN T SERPL HS-MCNC: 27 NG/L
TROPONIN T SERPL HS-MCNC: 29 NG/L
WBC # BLD AUTO: 10.4 10E3/UL (ref 4–11)

## 2024-11-24 PROCEDURE — 250N000011 HC RX IP 250 OP 636: Performed by: EMERGENCY MEDICINE

## 2024-11-24 PROCEDURE — 82248 BILIRUBIN DIRECT: CPT | Performed by: EMERGENCY MEDICINE

## 2024-11-24 PROCEDURE — 74177 CT ABD & PELVIS W/CONTRAST: CPT

## 2024-11-24 PROCEDURE — 72125 CT NECK SPINE W/O DYE: CPT

## 2024-11-24 PROCEDURE — 999N000157 HC STATISTIC RCP TIME EA 10 MIN

## 2024-11-24 PROCEDURE — 73562 X-RAY EXAM OF KNEE 3: CPT | Mod: LT

## 2024-11-24 PROCEDURE — 258N000003 HC RX IP 258 OP 636

## 2024-11-24 PROCEDURE — 85025 COMPLETE CBC W/AUTO DIFF WBC: CPT | Performed by: EMERGENCY MEDICINE

## 2024-11-24 PROCEDURE — 94640 AIRWAY INHALATION TREATMENT: CPT

## 2024-11-24 PROCEDURE — 82077 ASSAY SPEC XCP UR&BREATH IA: CPT | Performed by: EMERGENCY MEDICINE

## 2024-11-24 PROCEDURE — 99223 1ST HOSP IP/OBS HIGH 75: CPT | Mod: FS | Performed by: STUDENT IN AN ORGANIZED HEALTH CARE EDUCATION/TRAINING PROGRAM

## 2024-11-24 PROCEDURE — 99285 EMERGENCY DEPT VISIT HI MDM: CPT | Mod: 25

## 2024-11-24 PROCEDURE — 84484 ASSAY OF TROPONIN QUANT: CPT | Performed by: EMERGENCY MEDICINE

## 2024-11-24 PROCEDURE — 93005 ELECTROCARDIOGRAM TRACING: CPT | Performed by: EMERGENCY MEDICINE

## 2024-11-24 PROCEDURE — 250N000011 HC RX IP 250 OP 636

## 2024-11-24 PROCEDURE — 120N000001 HC R&B MED SURG/OB

## 2024-11-24 PROCEDURE — 99222 1ST HOSP IP/OBS MODERATE 55: CPT | Performed by: INTERNAL MEDICINE

## 2024-11-24 PROCEDURE — 94799 UNLISTED PULMONARY SVC/PX: CPT

## 2024-11-24 PROCEDURE — 87637 SARSCOV2&INF A&B&RSV AMP PRB: CPT | Performed by: EMERGENCY MEDICINE

## 2024-11-24 PROCEDURE — 70450 CT HEAD/BRAIN W/O DYE: CPT

## 2024-11-24 PROCEDURE — 250N000011 HC RX IP 250 OP 636: Mod: JZ | Performed by: EMERGENCY MEDICINE

## 2024-11-24 PROCEDURE — 250N000012 HC RX MED GY IP 250 OP 636 PS 637: Performed by: INTERNAL MEDICINE

## 2024-11-24 PROCEDURE — 84145 PROCALCITONIN (PCT): CPT

## 2024-11-24 PROCEDURE — 96374 THER/PROPH/DIAG INJ IV PUSH: CPT

## 2024-11-24 PROCEDURE — 82310 ASSAY OF CALCIUM: CPT | Performed by: EMERGENCY MEDICINE

## 2024-11-24 PROCEDURE — 250N000013 HC RX MED GY IP 250 OP 250 PS 637

## 2024-11-24 PROCEDURE — 80053 COMPREHEN METABOLIC PANEL: CPT | Performed by: EMERGENCY MEDICINE

## 2024-11-24 PROCEDURE — 83690 ASSAY OF LIPASE: CPT | Performed by: EMERGENCY MEDICINE

## 2024-11-24 PROCEDURE — 250N000013 HC RX MED GY IP 250 OP 250 PS 637: Performed by: STUDENT IN AN ORGANIZED HEALTH CARE EDUCATION/TRAINING PROGRAM

## 2024-11-24 PROCEDURE — 85014 HEMATOCRIT: CPT | Performed by: EMERGENCY MEDICINE

## 2024-11-24 PROCEDURE — 250N000009 HC RX 250: Performed by: INTERNAL MEDICINE

## 2024-11-24 PROCEDURE — 250N000009 HC RX 250: Performed by: STUDENT IN AN ORGANIZED HEALTH CARE EDUCATION/TRAINING PROGRAM

## 2024-11-24 PROCEDURE — 82550 ASSAY OF CK (CPK): CPT

## 2024-11-24 PROCEDURE — 71260 CT THORAX DX C+: CPT

## 2024-11-24 PROCEDURE — 36415 COLL VENOUS BLD VENIPUNCTURE: CPT | Performed by: EMERGENCY MEDICINE

## 2024-11-24 PROCEDURE — 87040 BLOOD CULTURE FOR BACTERIA: CPT | Performed by: EMERGENCY MEDICINE

## 2024-11-24 PROCEDURE — 99207 PR APP CREDIT; MD BILLING SHARED VISIT: CPT | Mod: FS

## 2024-11-24 RX ORDER — NALOXONE HYDROCHLORIDE 0.4 MG/ML
0.2 INJECTION, SOLUTION INTRAMUSCULAR; INTRAVENOUS; SUBCUTANEOUS
Status: DISCONTINUED | OUTPATIENT
Start: 2024-11-24 | End: 2024-11-25 | Stop reason: HOSPADM

## 2024-11-24 RX ORDER — FLUTICASONE FUROATE AND VILANTEROL 200; 25 UG/1; UG/1
1 POWDER RESPIRATORY (INHALATION) DAILY
Status: DISCONTINUED | OUTPATIENT
Start: 2024-11-24 | End: 2024-11-25 | Stop reason: HOSPADM

## 2024-11-24 RX ORDER — MORPHINE SULFATE 4 MG/ML
4 INJECTION, SOLUTION INTRAMUSCULAR; INTRAVENOUS ONCE
Status: COMPLETED | OUTPATIENT
Start: 2024-11-24 | End: 2024-11-24

## 2024-11-24 RX ORDER — IPRATROPIUM BROMIDE AND ALBUTEROL SULFATE 2.5; .5 MG/3ML; MG/3ML
3 SOLUTION RESPIRATORY (INHALATION)
Status: DISCONTINUED | OUTPATIENT
Start: 2024-11-24 | End: 2024-11-25 | Stop reason: HOSPADM

## 2024-11-24 RX ORDER — POLYETHYLENE GLYCOL 3350 17 G/17G
17 POWDER, FOR SOLUTION ORAL 2 TIMES DAILY PRN
Status: DISCONTINUED | OUTPATIENT
Start: 2024-11-24 | End: 2024-11-25 | Stop reason: HOSPADM

## 2024-11-24 RX ORDER — HYDROMORPHONE HCL IN WATER/PF 6 MG/30 ML
0.4 PATIENT CONTROLLED ANALGESIA SYRINGE INTRAVENOUS
Status: DISCONTINUED | OUTPATIENT
Start: 2024-11-24 | End: 2024-11-25 | Stop reason: HOSPADM

## 2024-11-24 RX ORDER — AMPICILLIN AND SULBACTAM 2; 1 G/1; G/1
3 INJECTION, POWDER, FOR SOLUTION INTRAMUSCULAR; INTRAVENOUS EVERY 6 HOURS
Status: DISCONTINUED | OUTPATIENT
Start: 2024-11-24 | End: 2024-11-25 | Stop reason: HOSPADM

## 2024-11-24 RX ORDER — FUROSEMIDE 20 MG/1
40 TABLET ORAL
Status: DISCONTINUED | OUTPATIENT
Start: 2024-11-25 | End: 2024-11-25 | Stop reason: HOSPADM

## 2024-11-24 RX ORDER — AMPICILLIN AND SULBACTAM 2; 1 G/1; G/1
3 INJECTION, POWDER, FOR SOLUTION INTRAMUSCULAR; INTRAVENOUS ONCE
Status: COMPLETED | OUTPATIENT
Start: 2024-11-24 | End: 2024-11-24

## 2024-11-24 RX ORDER — NALOXONE HYDROCHLORIDE 0.4 MG/ML
0.4 INJECTION, SOLUTION INTRAMUSCULAR; INTRAVENOUS; SUBCUTANEOUS
Status: DISCONTINUED | OUTPATIENT
Start: 2024-11-24 | End: 2024-11-25 | Stop reason: HOSPADM

## 2024-11-24 RX ORDER — MULTIVITAMIN,THERAPEUTIC
1 TABLET ORAL DAILY
Status: DISCONTINUED | OUTPATIENT
Start: 2024-11-24 | End: 2024-11-25 | Stop reason: HOSPADM

## 2024-11-24 RX ORDER — AMOXICILLIN 250 MG
2 CAPSULE ORAL 2 TIMES DAILY PRN
Status: DISCONTINUED | OUTPATIENT
Start: 2024-11-24 | End: 2024-11-25 | Stop reason: HOSPADM

## 2024-11-24 RX ORDER — ACETYLCYSTEINE 200 MG/ML
2 SOLUTION ORAL; RESPIRATORY (INHALATION) EVERY 4 HOURS
Status: DISCONTINUED | OUTPATIENT
Start: 2024-11-24 | End: 2024-11-24

## 2024-11-24 RX ORDER — CALCIUM CARBONATE 500 MG/1
1000 TABLET, CHEWABLE ORAL 4 TIMES DAILY PRN
Status: DISCONTINUED | OUTPATIENT
Start: 2024-11-24 | End: 2024-11-25 | Stop reason: HOSPADM

## 2024-11-24 RX ORDER — IOPAMIDOL 755 MG/ML
43 INJECTION, SOLUTION INTRAVASCULAR ONCE
Status: COMPLETED | OUTPATIENT
Start: 2024-11-24 | End: 2024-11-24

## 2024-11-24 RX ORDER — ASPIRIN 81 MG/1
81 TABLET ORAL DAILY
Status: DISCONTINUED | OUTPATIENT
Start: 2024-11-24 | End: 2024-11-25 | Stop reason: HOSPADM

## 2024-11-24 RX ORDER — PREDNISONE 20 MG/1
40 TABLET ORAL DAILY
Status: DISCONTINUED | OUTPATIENT
Start: 2024-11-24 | End: 2024-11-25 | Stop reason: HOSPADM

## 2024-11-24 RX ORDER — OXYCODONE HYDROCHLORIDE 5 MG/1
5 TABLET ORAL EVERY 4 HOURS PRN
Status: DISCONTINUED | OUTPATIENT
Start: 2024-11-24 | End: 2024-11-25 | Stop reason: HOSPADM

## 2024-11-24 RX ORDER — AMOXICILLIN 250 MG
1 CAPSULE ORAL 2 TIMES DAILY PRN
Status: DISCONTINUED | OUTPATIENT
Start: 2024-11-24 | End: 2024-11-25 | Stop reason: HOSPADM

## 2024-11-24 RX ORDER — LIDOCAINE 40 MG/G
CREAM TOPICAL
Status: DISCONTINUED | OUTPATIENT
Start: 2024-11-24 | End: 2024-11-24

## 2024-11-24 RX ORDER — ALBUTEROL SULFATE 90 UG/1
1-2 INHALANT RESPIRATORY (INHALATION) EVERY 6 HOURS
Status: DISCONTINUED | OUTPATIENT
Start: 2024-11-24 | End: 2024-11-25 | Stop reason: HOSPADM

## 2024-11-24 RX ORDER — ONDANSETRON 2 MG/ML
4 INJECTION INTRAMUSCULAR; INTRAVENOUS EVERY 6 HOURS PRN
Status: DISCONTINUED | OUTPATIENT
Start: 2024-11-24 | End: 2024-11-25 | Stop reason: HOSPADM

## 2024-11-24 RX ORDER — ONDANSETRON 4 MG/1
4 TABLET, ORALLY DISINTEGRATING ORAL EVERY 6 HOURS PRN
Status: DISCONTINUED | OUTPATIENT
Start: 2024-11-24 | End: 2024-11-25 | Stop reason: HOSPADM

## 2024-11-24 RX ORDER — MIDODRINE HYDROCHLORIDE 2.5 MG/1
2.5 TABLET ORAL
Status: DISCONTINUED | OUTPATIENT
Start: 2024-11-24 | End: 2024-11-25 | Stop reason: HOSPADM

## 2024-11-24 RX ORDER — ACETYLCYSTEINE 200 MG/ML
2 SOLUTION ORAL; RESPIRATORY (INHALATION) 4 TIMES DAILY
Status: DISCONTINUED | OUTPATIENT
Start: 2024-11-24 | End: 2024-11-25 | Stop reason: HOSPADM

## 2024-11-24 RX ORDER — ACETAMINOPHEN 325 MG/1
650 TABLET ORAL EVERY 4 HOURS PRN
Status: DISCONTINUED | OUTPATIENT
Start: 2024-11-24 | End: 2024-11-25

## 2024-11-24 RX ORDER — LOSARTAN POTASSIUM 25 MG/1
25 TABLET ORAL DAILY
Status: DISCONTINUED | OUTPATIENT
Start: 2024-11-25 | End: 2024-11-25 | Stop reason: HOSPADM

## 2024-11-24 RX ORDER — PROCHLORPERAZINE MALEATE 5 MG/1
5 TABLET ORAL EVERY 6 HOURS PRN
Status: DISCONTINUED | OUTPATIENT
Start: 2024-11-24 | End: 2024-11-25 | Stop reason: HOSPADM

## 2024-11-24 RX ORDER — SODIUM CHLORIDE 9 MG/ML
INJECTION, SOLUTION INTRAVENOUS CONTINUOUS
Status: ACTIVE | OUTPATIENT
Start: 2024-11-24 | End: 2024-11-25

## 2024-11-24 RX ORDER — PANTOPRAZOLE SODIUM 40 MG/1
40 TABLET, DELAYED RELEASE ORAL
Status: DISCONTINUED | OUTPATIENT
Start: 2024-11-24 | End: 2024-11-25 | Stop reason: HOSPADM

## 2024-11-24 RX ORDER — NICOTINE 21 MG/24HR
1 PATCH, TRANSDERMAL 24 HOURS TRANSDERMAL DAILY PRN
Status: DISCONTINUED | OUTPATIENT
Start: 2024-11-24 | End: 2024-11-25 | Stop reason: HOSPADM

## 2024-11-24 RX ORDER — FOLIC ACID 1 MG/1
1 TABLET ORAL DAILY
Status: DISCONTINUED | OUTPATIENT
Start: 2024-11-24 | End: 2024-11-25 | Stop reason: HOSPADM

## 2024-11-24 RX ORDER — ACETAMINOPHEN 650 MG/1
650 SUPPOSITORY RECTAL EVERY 4 HOURS PRN
Status: DISCONTINUED | OUTPATIENT
Start: 2024-11-24 | End: 2024-11-25

## 2024-11-24 RX ORDER — HYDROMORPHONE HCL IN WATER/PF 6 MG/30 ML
0.5 PATIENT CONTROLLED ANALGESIA SYRINGE INTRAVENOUS ONCE
Status: DISCONTINUED | OUTPATIENT
Start: 2024-11-24 | End: 2024-11-24

## 2024-11-24 RX ADMIN — FLUTICASONE FUROATE AND VILANTEROL TRIFENATATE 1 PUFF: 200; 25 POWDER RESPIRATORY (INHALATION) at 17:49

## 2024-11-24 RX ADMIN — IPRATROPIUM BROMIDE AND ALBUTEROL SULFATE 3 ML: .5; 3 SOLUTION RESPIRATORY (INHALATION) at 15:25

## 2024-11-24 RX ADMIN — HYDROMORPHONE HYDROCHLORIDE 0.4 MG: 0.2 INJECTION, SOLUTION INTRAMUSCULAR; INTRAVENOUS; SUBCUTANEOUS at 14:47

## 2024-11-24 RX ADMIN — ALBUTEROL SULFATE 2 PUFF: 90 AEROSOL, METERED RESPIRATORY (INHALATION) at 21:18

## 2024-11-24 RX ADMIN — ALBUTEROL SULFATE 2 PUFF: 90 AEROSOL, METERED RESPIRATORY (INHALATION) at 16:05

## 2024-11-24 RX ADMIN — PANTOPRAZOLE SODIUM 40 MG: 20 TABLET, DELAYED RELEASE ORAL at 16:01

## 2024-11-24 RX ADMIN — MORPHINE SULFATE 4 MG: 4 INJECTION, SOLUTION INTRAMUSCULAR; INTRAVENOUS at 10:02

## 2024-11-24 RX ADMIN — APIXABAN 5 MG: 5 TABLET, FILM COATED ORAL at 19:19

## 2024-11-24 RX ADMIN — IOPAMIDOL 43 ML: 755 INJECTION, SOLUTION INTRAVENOUS at 11:31

## 2024-11-24 RX ADMIN — ASPIRIN 81 MG: 81 TABLET, COATED ORAL at 16:01

## 2024-11-24 RX ADMIN — PREDNISONE 40 MG: 20 TABLET ORAL at 17:21

## 2024-11-24 RX ADMIN — SODIUM CHLORIDE: 9 INJECTION, SOLUTION INTRAVENOUS at 15:20

## 2024-11-24 RX ADMIN — ACETYLCYSTEINE 2 ML: 200 SOLUTION ORAL; RESPIRATORY (INHALATION) at 15:25

## 2024-11-24 RX ADMIN — ACETYLCYSTEINE 2 ML: 200 SOLUTION ORAL; RESPIRATORY (INHALATION) at 19:50

## 2024-11-24 RX ADMIN — UMECLIDINIUM 1 PUFF: 62.5 AEROSOL, POWDER ORAL at 17:48

## 2024-11-24 RX ADMIN — THIAMINE HCL TAB 100 MG 100 MG: 100 TAB at 16:01

## 2024-11-24 RX ADMIN — AMPICILLIN SODIUM AND SULBACTAM SODIUM 3 G: 2; 1 INJECTION, POWDER, FOR SOLUTION INTRAMUSCULAR; INTRAVENOUS at 19:10

## 2024-11-24 RX ADMIN — IPRATROPIUM BROMIDE AND ALBUTEROL SULFATE 3 ML: .5; 3 SOLUTION RESPIRATORY (INHALATION) at 19:50

## 2024-11-24 RX ADMIN — AMPICILLIN SODIUM AND SULBACTAM SODIUM 3 G: 2; 1 INJECTION, POWDER, FOR SOLUTION INTRAMUSCULAR; INTRAVENOUS at 13:46

## 2024-11-24 RX ADMIN — FOLIC ACID 1 MG: 1 TABLET ORAL at 16:01

## 2024-11-24 RX ADMIN — THERA TABS 1 TABLET: TAB at 16:01

## 2024-11-24 ASSESSMENT — LIFESTYLE VARIABLES
ORIENTATION AND CLOUDING OF SENSORIUM: ORIENTED AND CAN DO SERIAL ADDITIONS
HEADACHE, FULLNESS IN HEAD: NOT PRESENT
VISUAL DISTURBANCES: NOT PRESENT
NAUSEA AND VOMITING: NO NAUSEA AND NO VOMITING
TREMOR: NO TREMOR
ORIENTATION AND CLOUDING OF SENSORIUM: ORIENTED AND CAN DO SERIAL ADDITIONS
TOTAL SCORE: 1
PAROXYSMAL SWEATS: NO SWEAT VISIBLE
AGITATION: NORMAL ACTIVITY
HEADACHE, FULLNESS IN HEAD: NOT PRESENT
ANXIETY: MILDLY ANXIOUS
AUDITORY DISTURBANCES: NOT PRESENT
TREMOR: NO TREMOR
TOTAL SCORE: 1
AUDITORY DISTURBANCES: NOT PRESENT
PAROXYSMAL SWEATS: NO SWEAT VISIBLE
ANXIETY: MILDLY ANXIOUS
NAUSEA AND VOMITING: NO NAUSEA AND NO VOMITING
VISUAL DISTURBANCES: NOT PRESENT
AGITATION: NORMAL ACTIVITY

## 2024-11-24 ASSESSMENT — ACTIVITIES OF DAILY LIVING (ADL)
ADLS_ACUITY_SCORE: 0

## 2024-11-24 ASSESSMENT — COLUMBIA-SUICIDE SEVERITY RATING SCALE - C-SSRS
1. IN THE PAST MONTH, HAVE YOU WISHED YOU WERE DEAD OR WISHED YOU COULD GO TO SLEEP AND NOT WAKE UP?: NO
2. HAVE YOU ACTUALLY HAD ANY THOUGHTS OF KILLING YOURSELF IN THE PAST MONTH?: NO
6. HAVE YOU EVER DONE ANYTHING, STARTED TO DO ANYTHING, OR PREPARED TO DO ANYTHING TO END YOUR LIFE?: NO

## 2024-11-24 NOTE — ED NOTES
Daughter Tiara updated on POC.  No further questions at this time, but would like updates on diagnosis and disposition.

## 2024-11-24 NOTE — ED NOTES
[facility] ED Handoff Report    ED Chief Complaint: left knee and left rib pain s/p fall last night at home (assisted living)    ED Diagnosis:  (W19.XXXA,  Y92.009) Fall at home, initial encounter  Comment: neg for fracture  Plan: falls precautions, continue to monitor.     (J69.0) Aspiration pneumonia of left upper lobe, unspecified aspiration pneumonia type (H)  Comment: IV antibiotics  Plan: continue to monitor    (J43.8) Other emphysema (H)  Comment: nebs ordered  Plan: continue to monitor    (R09.02) Hypoxia  Comment: 2LNC  Plan: continue to monitor       PMH:    Past Medical History:   Diagnosis Date    Acid reflux     Alcohol use     Closed fracture of left femur (H)     Closed fracture of sacrum with routine healing 10/12/2021    Emphysema of lung (H) 02/07/2022    Hip fracture requiring operative repair (H)     Hypertension 01/2021    Rib pain on left side 05/06/2021    SAH (subarachnoid hemorrhage) (H)     Traumatic closed displaced fracture of distal end of radius         Code Status:  Full Code     Falls Risk: Yes Band: Applied    Current Living Situation/Residence: lives in an assisted living facility     Elimination Status: Continent: Yes - purwick in place at this time    Activity Level: 2 assist    Patients Preferred Language:  English     Needed: No    Vital Signs:  /58   Pulse 108   Temp 98  F (36.7  C) (Oral)   Resp 20   SpO2 95%      Cardiac Rhythm: SR/ST    Pain Score: 0/10- see mar for meds given    Is the Patient Confused:  No    Last Food or Drink: 11/24/24 at 1400    Tests Performed: Done: Labs and Imaging    Treatments Provided:  see mar    Family Dynamics/Concerns: No    Family Updated On Visitor Policy: Yes    Plan of Care Communicated to Family: Yes    Who Was Updated about Plan of Care: daughter    Belongings Checklist Done and Signed by Patient: Yes    Medications sent with patient: n/a    Covid: asymptomatic, negative    Additional Information: PT/OT  consult order.  Pulm consult    RN: Eula Graham RN 11/24/2024 3:39 PM

## 2024-11-24 NOTE — PROGRESS NOTES
[facility] Treatment Plan    Patient Score: 9  Patient Acuity: 4    Clinical Indication for Therapy: unable to cough spontaneously and history of mucous producing disease    Cxray: occlusion of the left upper lobe bronchus with debris causing downstream airspace opacities compatible with lobar collapse. Moderate emphysema. pulmonary nodules.    Therapy Ordered: Duoneb/MM QID, FV & Volara QID    Assessment Summary: Patient will continue to receive scheduled Duoneb's/ MM QID, with Volara / FV QID per [facility] protocol. Patient complained the Volara, pressure reduced from 15 cmH2O - 10 cmH2O. BS dim/coarse, nonproductive cough. On 2LNC sat 96%. RT will continue to follow and reassess.     Becky Rosas, RT  11/24/2024

## 2024-11-24 NOTE — H&P
[facility]    History and Physical - Hospitalist Service       Date of Admission:  11/24/2024    Assessment & Plan     Namita Viera is a 74 year old female with PMHx of COPD, AUD, recent PE, CHF, frequent falls and physical deconditioning who presented to the ER on 11/24/2024 after fall at home with left knee and left-sided rib pain.  Admitted for acute hypoxic respiratory failure and concern for aspiration pneumonia.    Acute Hypoxic Respiratory Failure   Left Upper Lobar Collapse / Concern for Aspiration PNA   COPD  -- CT Chest: Moderate amount retained debris within the bronchus intermedius, essentially complete occlusion of the left upper   lobar bronchus with multifocal downstream consolidative and groundglass opacities within the left upper lobe along with volume loss   -- ED d/w with pulm; recommending tx for aspiration PNA  -- Started on IV Unasyn in the ED ; continue for now   -- Checking procal, sputum culture   -- Currently requiring 2 L supp O2 in the ER ; continue and wean as able   -- Continue PTA inhalers   -- Duonebs   -- Prednisone 40 mg x 5 days   -- Pulm consult; agree with Unasyn for now   -- SLP consult  -- Formal pulm consult placed     Fall at Home   Left Knee and Rib Pain   Physical Deconditioning / Cachexia   -- CT head/Cspine unremarkable   -- CT Chest/A/P with unchanged previous healing fractures, no acute fx   -- PT/OT/CM   -- Pain control with Tylenol, PRN oxycodone/Dilaudid   -- Fall precautions     H/o of PE   Diagnosed 6/27/2024 and started on Eliquis.  Was discharged from hospital on 7/12/24 with 5 more doses and does not appear that patient took Eliquis beyond this?   -- Nonvisualization of the previously seen subsegmental right lower lobe pulmonary embolism, possibly due to the contrast timing  -- I will restart patient on BID Eliquis dosing as appears she only received a few weeks of treatment back in July   -- needs PCP follow-up for determination  "appropriate duration of treatment       Hyponatremia   Hypochloremia   Mild   -- Gentle IVF overnight   -- Trend BMP     Chronic Diastolic Congestive Heart Failure   Euvolemic ; actually appears on the drier side   -- Echo 6/2024; EF 65-70%, no significant valvular abnormality   -- continue PTA Lasix in the AM    Alcohol Use Disorder   Noted chart history of \"alcoholism.\" Endorses 2-3 glasses of wine nightly.  -- EtOH level in the ED normal   -- Slightly tachy in the ED; CIWAs and monitor for signs of withdrawal   -- Vitamins     Hypotension / Hypertension   On PTA losartan and midodrine   -- Continue PTA losartan and HOLD midodrine for now   -- Monitor ; ? Discontinue both     Pulmonary Nodules - unchanged on CT ; continue outpatient follow-up     Tobacco Use   Not interested in cessation, declining NRT     Osteopenia - holding Fosamax while inpatient          Diet: Combination Diet Regular Diet Adult  DVT Prophylaxis: Pneumatic Compression Devices  Delgado Catheter: Not present  Lines: None     Cardiac Monitoring: None  Code Status: Full Code    Clinically Significant Risk Factors Present on Admission         # Hyponatremia: Lowest Na = 134 mmol/L in last 2 days, will monitor as appropriate  # Hypochloremia: Lowest Cl = 95 mmol/L in last 2 days, will monitor as appropriate        # Drug Induced Platelet Defect: home medication list includes an antiplatelet medication   # Hypertension: Noted on problem list               # Financial/Environmental Concerns:           Disposition Plan     Medically Ready for Discharge: Anticipated in 2-4 Days        The patient's care was discussed with the Attending Physician, Dr. Saad Gondal who independently met with and assessed the patient and is in agreement with the assessment and plan     Abby Del Castillo PA-C  Hospitalist Service  [facility]  Securely message with [employer] info)  Text page via [employer] Paging/Directory " "    ______________________________________________________________________    Chief Complaint   Fall at home   Knee and rib pain     History is obtained from the patient    History of Present Illness   Namita Viera is a 74 year old female with PMHx of COPD, AUD, recent PE, CHF, frequent falls and physical deconditioning who presented to the ED on 11/24/2024 for evaluation of left sided knee and rib pain after a fall last night. Patient states she tripped on a throw rug that was askew and landed on her left knee and left side. Was able to get herself to her walker and then chair. Denies any head injury or loss of consciousness. States she had some improvement in pain with morphine given in the ED but now it is \"creeping back up.\" States she had a glass of wine last night before she fell; endorses 2-3 glasses of wine every evening. Currently in [facility].     Reports chronic baseline shortness of breath and productive cough but denies any acute changes. No chest pain. Denies any baseline supplemental O2 use.     Past Medical History    Past Medical History:   Diagnosis Date    Acid reflux     Alcohol use     Closed fracture of left femur (H)     Closed fracture of sacrum with routine healing 10/12/2021    Emphysema of lung (H) 02/07/2022    Hip fracture requiring operative repair (H)     Hypertension 01/2021    Rib pain on left side 05/06/2021    SAH (subarachnoid hemorrhage) (H)     Traumatic closed displaced fracture of distal end of radius        Past Surgical History   Past Surgical History:   Procedure Laterality Date    BLADDER SUSPENSION  2008    HYSTERECTOMY TOTAL ABDOMINAL, BILATERAL SALPINGO-OOPHORECTOMY, COMBINED N/A 2008    OPEN REDUCTION INTERNAL FIXATION RODDING INTRAMEDULLARY FEMUR Right 5/17/2023    Procedure: OPEN REDUCTION INTERNAL FIXATION, FRACTURE, FEMUR, USING INTRAMEDULLARY NOEL;  Surgeon: Ollie Tucker MD;  Location: [facility] OR    ORTHOPEDIC SURGERY Left 2018    Left Hip " fracture, S/P ORIF    PICC TRIPLE LUMEN PLACEMENT  6/27/2024       Prior to Admission Medications   Prior to Admission Medications   Prescriptions Last Dose Informant Patient Reported? Taking?   Lidocaine (LIDOCARE) 4 % Patch  Nursing Home No No   Sig: Place 2 patches onto the skin every 24 hours To prevent lidocaine toxicity, patient should be patch free for 12 hrs daily. Apply to left lower chest for rib fractures.   Vitamin D, Cholecalciferol, 25 MCG (1000 UT) TABS  Nursing Home Yes No   Sig: Take 1,000 Units by mouth daily   albuterol (PROAIR HFA/PROVENTIL HFA/VENTOLIN HFA) 108 (90 Base) MCG/ACT inhaler  Nursing Home No No   Sig: INHALE 1-2 PUFFS INTO THE LUNGS EVERY 6 HOURS AS NEEDED FOR COUGH OR SHORTNESS OF BREATH OR WHEEZE   alendronate (FOSAMAX) 70 MG tablet  Nursing Home Yes No   Sig: Take 70 mg by mouth every 7 days   aspirin (ASA) 81 MG EC tablet  Nursing Home No No   Sig: Take 1 tablet (81 mg) by mouth daily   calcium carbonate (OS-PETER) 1500 (600 Ca) MG tablet  Nursing Home Yes No   Sig: Take 600 mg by mouth daily   folic acid (FOLVITE) 1 MG tablet  Nursing Home No No   Sig: Take 1 tablet (1 mg) by mouth daily   furosemide (LASIX) 40 MG tablet   No No   Sig: Take 1 tablet (40 mg) by mouth 2 times daily   ibuprofen (ADVIL/MOTRIN) 200 MG tablet  Nursing Home Yes No   Sig: Take 200 mg by mouth every 6 hours as needed for pain   losartan (COZAAR) 25 MG tablet   No No   Sig: Take 1 tablet (25 mg) by mouth daily   melatonin 1 MG TABS tablet  Nursing Home No No   Sig: Take 1 tablet (1 mg) by mouth nightly as needed for sleep   miconazole (MICATIN) 2 % external powder  Nursing Home No No   Sig: Apply topically 2 times daily   midodrine (PROAMATINE) 2.5 MG tablet  Nursing Home Yes No   Sig: Take 2.5 mg by mouth 3 times daily (with meals) (Hold for SBP greater than 155)   multivitamin, therapeutic (THERA-VIT) TABS tablet  Nursing Home Yes No   Sig: Take 1 tablet by mouth daily   nicotine (NICODERM CQ) 14 MG/24HR  24 hr patch  Nursing Home No No   Sig: Place 1 patch onto the skin daily   omeprazole (PRILOSEC) 20 MG DR capsule  Nursing Home No No   Sig: Take 1 capsule (20 mg) by mouth daily   polyethylene glycol (MIRALAX) 17 GM/Dose powder   No No   Sig: Take 17 g by mouth daily   potassium chloride ER (K-TAB) 20 MEQ CR tablet   No No   Sig: Take 1 tablet (20 mEq) by mouth daily   senna-docusate (SENOKOT-S/PERICOLACE) 8.6-50 MG tablet   No No   Sig: Take 1 tablet by mouth daily   thiamine (B-1) 100 MG tablet  Nursing Home No No   Sig: Take 1 tablet (100 mg) by mouth daily   vitamin C (ASCORBIC ACID) 500 MG tablet  Nursing Home Yes No   Sig: Take 500 mg by mouth daily      Facility-Administered Medications: None           Physical Exam   Vital Signs: Temp: 98  F (36.7  C) Temp src: Oral BP: 125/61 Pulse: 97   Resp: 18 SpO2: 96 % O2 Device: Nasal cannula Oxygen Delivery: 2 LPM  Weight: 0 lbs 0 oz    Constitutional: awake, alert, no apparent distress, and appears stated age  Respiratory: No increased work of breathing, no accessory muscle use, decreased breath sounds bilaterally   Cardiovascular: Regular rate and rhythm, normal S1 and S2, no S3 or S4, and no murmur noted  GI: Soft, non-distended, non-tender, normal bowel sounds, no masses palpated, no hepatosplenomegaly  Skin: There is a large, soft, non-tender mass to the right superior scapula (very chronic per patient). There are several small abrasions over left knee.  Musculoskeletal: Declining palpation of left ribcage and knee on exam. No left knee swelling. No lower extremity pitting edema present. 2+ DP pulses  Neurologic: Awake, alert.   Neuropsychiatric: Appropriate mood, affect and eye contact. Cooperative.    Medical Decision Making             Data     I have personally reviewed the following data over the past 24 hrs:    10.4  \   12.0   / 357     134 (L) 95 (L) 39.7 (H) /  102 (H)   5.0 24 0.95 \     ALT: 19 AST: 39 AP: 120 TBILI: 0.5   ALB: 4.6 TOT PROTEIN: 8.1  LIPASE: 21     Trop: 27 (H) BNP: N/A     Procal: 0.05 CRP: N/A Lactic Acid: N/A         Imaging results reviewed over the past 24 hrs:   Recent Results (from the past 24 hours)   Head CT w/o contrast    Narrative    EXAM: CT HEAD W/O CONTRAST, CT CERVICAL SPINE W/O CONTRAST  LOCATION: [facility]  DATE: 11/24/2024    INDICATION: trauma  COMPARISON: Head CT: 6/27/2024.  TECHNIQUE:   1) Routine CT Head without IV contrast. Multiplanar reformats. Dose reduction techniques were used.  2) Routine CT Cervical Spine without IV contrast. Multiplanar reformats. Dose reduction techniques were used.    FINDINGS:   HEAD CT:   INTRACRANIAL CONTENTS: No intracranial hemorrhage, extraaxial collection, or mass effect.  No CT evidence of acute infarct. Mild to moderate presumed chronic small vessel ischemic changes. Mild to moderate generalized volume loss. No hydrocephalus.   Calcified intracranial atherosclerosis.    VISUALIZED ORBITS/SINUSES/MASTOIDS: No intraorbital abnormality. Mild mucosal thickening scattered about the paranasal sinuses. No middle ear or mastoid effusion.    BONES/SOFT TISSUES: No acute abnormality.    CERVICAL SPINE CT:   VERTEBRA: No acute fracture or traumatic malalignment involving the cervical spine. Vertebral body heights are maintained. Osteopenia.    CANAL/FORAMINA/DEGENERATIVE CHANGES: Multilevel degenerative disc disease, greatest and moderate at C5-C6. Multilevel facet arthropathy, greatest and advanced on the left at C3-C4 and on the right at C4-C5. No high-grade spinal canal stenosis. Neural   foraminal stenosis is greatest and moderate to advanced on the left at C5-C6.    PARASPINAL: No prevertebral soft tissue edema. Calcified atherosclerosis of the cervical carotid arteries. Emphysema and scattered scarring throughout the imaged lung apices.      Impression    IMPRESSION:  HEAD CT:  1.  No evidence of acute traumatic intracranial abnormality.  2.  Brain atrophy and  presumed chronic microvascular ischemic changes as detailed above.    CERVICAL SPINE CT:  1.  No CT evidence for acute fracture or traumatic malalignment involving the cervical spine.  2.  Multilevel cervical spondylosis without high-grade spinal canal stenosis. Neural foraminal stenosis is greatest and moderate to advanced on the left at C5-C6.   CT Cervical Spine w/o Contrast    Narrative    EXAM: CT HEAD W/O CONTRAST, CT CERVICAL SPINE W/O CONTRAST  LOCATION: [facility]  DATE: 11/24/2024    INDICATION: trauma  COMPARISON: Head CT: 6/27/2024.  TECHNIQUE:   1) Routine CT Head without IV contrast. Multiplanar reformats. Dose reduction techniques were used.  2) Routine CT Cervical Spine without IV contrast. Multiplanar reformats. Dose reduction techniques were used.    FINDINGS:   HEAD CT:   INTRACRANIAL CONTENTS: No intracranial hemorrhage, extraaxial collection, or mass effect.  No CT evidence of acute infarct. Mild to moderate presumed chronic small vessel ischemic changes. Mild to moderate generalized volume loss. No hydrocephalus.   Calcified intracranial atherosclerosis.    VISUALIZED ORBITS/SINUSES/MASTOIDS: No intraorbital abnormality. Mild mucosal thickening scattered about the paranasal sinuses. No middle ear or mastoid effusion.    BONES/SOFT TISSUES: No acute abnormality.    CERVICAL SPINE CT:   VERTEBRA: No acute fracture or traumatic malalignment involving the cervical spine. Vertebral body heights are maintained. Osteopenia.    CANAL/FORAMINA/DEGENERATIVE CHANGES: Multilevel degenerative disc disease, greatest and moderate at C5-C6. Multilevel facet arthropathy, greatest and advanced on the left at C3-C4 and on the right at C4-C5. No high-grade spinal canal stenosis. Neural   foraminal stenosis is greatest and moderate to advanced on the left at C5-C6.    PARASPINAL: No prevertebral soft tissue edema. Calcified atherosclerosis of the cervical carotid arteries. Emphysema and  scattered scarring throughout the imaged lung apices.      Impression    IMPRESSION:  HEAD CT:  1.  No evidence of acute traumatic intracranial abnormality.  2.  Brain atrophy and presumed chronic microvascular ischemic changes as detailed above.    CERVICAL SPINE CT:  1.  No CT evidence for acute fracture or traumatic malalignment involving the cervical spine.  2.  Multilevel cervical spondylosis without high-grade spinal canal stenosis. Neural foraminal stenosis is greatest and moderate to advanced on the left at C5-C6.   CT Chest/Abdomen/Pelvis w Contrast    Narrative    EXAM: CT CHEST/ABDOMEN/PELVIS W CONTRAST  LOCATION: [facility]  DATE: 11/24/2024    INDICATION: Fall.  Fell on the left side.  Evaluate for rib fractures, pneumothorax. Lung injury.  COMPARISON: Multiple priors with the most recent dated 7/7/2024  TECHNIQUE: CT scan of the chest, abdomen, and pelvis was performed following injection of IV contrast. Multiplanar reformats were obtained. Dose reduction techniques were used.   CONTRAST: 43ml [id number]    FINDINGS:   LUNGS AND PLEURA: Patent central trachea. Moderate apical predominant centrilobular emphysema. Moderate amount retained debris within the bronchus intermedius extending into the right lower lobe branches. Essentially complete occlusion of the left upper   lobar bronchus with multifocal downstream consolidative and groundglass opacities within the left upper lobe along with volume loss. Multiple scattered noncalcified solid pulmonary nodules along the periphery of the lungs measuring up to 0.4 cm remain   unchanged compared to at least 2021. Multiple calcified granulomas. No new or growing pulmonary nodules. No pleural effusions or pneumothorax. Calcified pleural plaques are compatible with prior asbestos exposure.    MEDIASTINUM/AXILLAE: Given that the examination is not optimized for pulmonary arterial imaging, the previously seen subsegmental right lower lobe  pulmonary embolism is not definitively identified. No large central saddle pulmonary embolus at this time.   Calcified in the thoracic lymph nodes are compatible with prior granulomatous infection. No noncalcified intrathoracic lymphadenopathy by size criteria. Normal caliber thoracic aorta. Normal heart size. No pericardial effusion. Moderate sized hiatal   hernia, increased in size compared to prior.    CORONARY ARTERY CALCIFICATION: Moderate.    HEPATOBILIARY: No focal hepatic mass, laceration, or contusion. No biliary dilatation or calcified gallstones.    PANCREAS: No focal pancreatic mass, peripancreatic inflammation, or pancreatic ductal dilatation.    SPLEEN: Normal size. No perisplenic free fluid.    ADRENAL GLANDS: No nodules.    KIDNEYS/BLADDER: No evidence for renal laceration, contusion, or a definite solid mass. No hydronephrosis. No urinary bladder wall thickening. No perivesical hematoma.    BOWEL: No dilated loops of small bowel are present to suggest an obstruction. Minimal herniation of the small bowel loops into the bilateral deep inguinal rings. Extensive colonic diverticulosis without pericolonic inflammation. No evidence for ascites,   hemoperitoneum, or pneumoperitoneum.    LYMPH NODES: No abdominal or pelvic lymphadenopathy is identified by size criteria.    VASCULATURE: Normal caliber abdominal aorta. Scattered atherosclerotic plaques. Patent portal, splenic, and mesenteric veins. Patent bilateral renal veins.    PELVIC ORGANS: No large pelvic mass.    MUSCULOSKELETAL: Osteopenia. Healed bilateral rib fractures. No acute appearing rib fracture. Unchanged ill-defined areas of sclerosis with mixed lucencies in the medial left iliac wing and the sacrum, which are likely due to prior trauma. Partly   visualized bilateral femoral fixation. Unchanged appearance of the nonunited left pubic rami fractures. Unchanged mid thoracic compression fractures.      Impression    IMPRESSION:  1.  Essentially  complete occlusion of the left upper lobe bronchus with debris causing downstream airspace opacities compatible with lobar collapse. Superimposed infection cannot be excluded.  2.  Moderate emphysema.  3.  Unchanged scattered pulmonary nodules measuring up to 0.4 cm. Continued lung cancer screening surveillance should be considered.  4.  Moderate size hiatal hernia has increased in size compared to prior.  5.  Nonvisualization of the previously seen subsegmental right lower lobe pulmonary embolism, possibly due to the contrast timing.  6.  Extensive colonic diverticulosis.  7.  Unchanged appearance of the multiple previous fractures.   XR Knee Left 3 Views    Narrative    EXAM: XR KNEE LEFT 3 VIEWS  LOCATION: [facility]  DATE: 11/24/2024    INDICATION: trauma  COMPARISON: None.      Impression    IMPRESSION: Osteopenia. No fracture or effusion. Chondrocalcinosis in the medial and lateral compartments.

## 2024-11-24 NOTE — PHARMACY-ADMISSION MEDICATION HISTORY
Pharmacy Intern Admission Medication History    Admission medication history is complete. The information provided in this note is only as accurate as the sources available at the time of the update.    Information Source(s): Patient and CareEverywhere/SureScripts via in-person    Pertinent Information: Failed to get [facility]CU to send a MAR, but patient was knowledgeable with Rx meds and vitamins. Also dispensing data comes from [facility] [place] [facility] site.    Changes made to PTA medication list:  Added: CLIVE Zamorano  Deleted: None  Changed: None    Allergies reviewed with patient and updates made in EHR: yes    Medication History Completed By: RUSSELL GRANADOS 11/24/2024 2:53 PM    PTA Med List   Medication Sig Last Dose/Taking    albuterol (PROAIR HFA/PROVENTIL HFA/VENTOLIN HFA) 108 (90 Base) MCG/ACT inhaler INHALE 1-2 PUFFS INTO THE LUNGS EVERY 6 HOURS AS NEEDED FOR COUGH OR SHORTNESS OF BREATH OR WHEEZE 11/23/2024    alendronate (FOSAMAX) 70 MG tablet Take 70 mg by mouth every 7 days Past Week    aspirin (ASA) 81 MG EC tablet Take 1 tablet (81 mg) by mouth daily 11/23/2024 Morning    calcium carbonate (OS-PETER) 1500 (600 Ca) MG tablet Take 600 mg by mouth daily 11/23/2024 Morning    Fluticasone-Umeclidin-Vilanterol (TRELEGY ELLIPTA) 200-62.5-25 MCG/ACT oral inhaler Inhale 2 puffs into the lungs daily. 11/23/2024 Morning    folic acid (FOLVITE) 1 MG tablet Take 1 tablet (1 mg) by mouth daily 11/23/2024 Morning    furosemide (LASIX) 40 MG tablet Take 1 tablet (40 mg) by mouth 2 times daily 11/23/2024 Evening    ibuprofen (ADVIL/MOTRIN) 200 MG tablet Take 200 mg by mouth every 6 hours as needed for pain Taking As Needed    Lidocaine (LIDOCARE) 4 % Patch Place 2 patches onto the skin every 24 hours To prevent lidocaine toxicity, patient should be patch free for 12 hrs daily. Apply to left lower chest for rib fractures. Unknown    losartan (COZAAR) 25 MG tablet Take 1 tablet (25 mg) by mouth daily 11/23/2024 Morning    melatonin 1 MG TABS  tablet Take 1 tablet (1 mg) by mouth nightly as needed for sleep Taking As Needed    miconazole (MICATIN) 2 % external powder Apply topically 2 times daily Unknown    midodrine (PROAMATINE) 2.5 MG tablet Take 2.5 mg by mouth 3 times daily (with meals) (Hold for SBP greater than 155) 11/23/2024 Evening    multivitamin, therapeutic (THERA-VIT) TABS tablet Take 1 tablet by mouth daily 11/23/2024 Morning    nicotine (NICODERM CQ) 14 MG/24HR 24 hr patch Place 1 patch onto the skin daily Unknown    omeprazole (PRILOSEC) 20 MG DR capsule Take 1 capsule (20 mg) by mouth daily 11/23/2024 Morning    polyethylene glycol (MIRALAX) 17 GM/Dose powder Take 17 g by mouth daily Unknown    potassium chloride ER (K-TAB) 20 MEQ CR tablet Take 1 tablet (20 mEq) by mouth daily 11/23/2024 Morning    senna-docusate (SENOKOT-S/PERICOLACE) 8.6-50 MG tablet Take 1 tablet by mouth daily Unknown    thiamine (B-1) 100 MG tablet Take 1 tablet (100 mg) by mouth daily 11/23/2024 Morning    vitamin C (ASCORBIC ACID) 500 MG tablet Take 500 mg by mouth daily 11/23/2024 Morning    Vitamin D, Cholecalciferol, 25 MCG (1000 UT) TABS Take 1,000 Units by mouth daily 11/23/2024 Morning

## 2024-11-24 NOTE — CONSULTS
"Care Management Initial Consult    General Information  Assessment completed with: PatientNamita  Type of CM/SW Visit: Initial Assessment    Primary Care Provider verified and updated as needed: No (does not have a PCP)   Readmission within the last 30 days: no previous admission in last 30 days      Reason for Consult: discharge planning  Advance Care Planning: Advance Care Planning Reviewed: patient feels like she's made a document but can't quite remember. \"Call my daughter Tiara, she would know.\"       Communication Assessment  Patient's communication style: spoken language (English or Bilingual)             Cognitive  Cognitive/Neuro/Behavioral: WDL                      Living Environment:   People in home: facility resident - lives in her own apartment within [facility]  Current living Arrangements: assisted living  Name of Facility: [facility]   Able to return to prior arrangements: TBD       Family/Social Support:  Care provided by: self, other (see comments) (facility staff)  Provides care for: no one, unable/limited ability to care for self  Marital Status:   Support system: Children, Facility resident(s)/Staff          Description of Support System: Supportive    Support Assessment: Patient communicates needs well met    Current Resources:   Patient receiving home care services: No        Community Resources: None  Equipment currently used at home: walker, rolling  Supplies currently used at home: Incontinence Supplies (has some mild bladder leakage)    Employment/Financial:  Employment Status: retired     Employment/ Comments: no  background  Financial Concerns: other (see comments) (\"my daughter applied for some sort of assistance for me with my rent but I don't know much about it.\")   Referral to Financial Worker: No       Does the patient's insurance plan have a 3 day qualifying hospital stay waiver?  Yes     Which insurance plan 3 day waiver is " available? Alternative insurance waiver    Will the waiver be used for post-acute placement? Undetermined at this time    Lifestyle & Psychosocial Needs:  Social Drivers of Health     Food Insecurity: Not on file   Depression: Not at risk (12/5/2023)    [facility]    PHQ-2     PHQ-2 TOTAL SCORE: 0   Housing Stability: Not on file   Tobacco Use: High Risk (8/21/2024)    Patient History     Smoking Tobacco Use: Every Day     Smokeless Tobacco Use: Never     Passive Exposure: Not on file   Financial Resource Strain: Not on file   Alcohol Use: Not on file   Transportation Needs: Not on file   Physical Activity: Not on file   Interpersonal Safety: Not on file   Stress: Not on file   Social Connections: Unknown (12/5/2023)    [facility]    Social Connections     Frequency of Communication with Friends and Family: Not on file   Health Literacy: Not on file       Functional Status:  Prior to admission patient needed assistance:   Dependent ADLs:: Ambulation-walker  Dependent IADLs:: Cooking, Shopping, Meal Preparation, Medication Management, Money Management, Transportation, Cleaning (She reports TONY does her cleaning for her. She eats most meals at the facility though dose have kitchenette in her apartment)  Assesssment of Functional Status: Not at baseline with mobility (painful)    Mental Health Status:  Mental Health Status: No Current Concerns       Chemical Dependency Status:  Chemical Dependency Status: Past Concern  Chemical Dependency Management: Other (see comment) (denies concerns or active treatment. Reports she drinks 1-2 glasses of wine every night.)          Values/Beliefs:  Spiritual, Cultural Beliefs, Anglican Practices, Values that affect care: no               Discussed  Partnership in Safe Discharge Planning  document  "with patient/family: No    Additional Information:  Namita lives at [facility] (Assisted Living). She has her own apartment. She usually walks with a walker because of balance impairment. She states last night she did not have her walker \"and look, now I fell.\" She feels adequately supported at her assisted living facility. They do her med set up, they do her cleaning, she eats most meals at facility rather than cooking in her apartment. She has four children. From what she describes, kena Menjivar is the most involved. She is unsure if she's ever formally made advanced healthcare directives - told me to ask Tiara. Does not have a PCP - \"I haven't been to a doctor in years\" - she asked me to ask Tiara.  When asked about financial concerns she said, \"I think Tiara applied for some sort of rent assistance but I don't know much about that - ask Tiara.      Next Steps:   I spoke with kena Menjivar (ph. [phone]) - ask her about the following:  ACP docs - Tiara knows her wishes but they have not made any formal paperwork  Getting a PCP for Namita - per Tiara, she does have a PCP -- it's the doctor for the nursing home  Any financial concerns - as of this Friday, Namita was recently approved for Medical Assistance with Elderly waiver  Registration notified. They will contact Tiara to add this information to her chart.   PT, OT and SLP evals  Determine discharge recommendations  Keep in communication with [facility] about discharge plans  Was unable to add them to \"Destinations\" tab in Epic as that [facility] is not listed in [facility].  Will need to call them (ph. [phone]) when discharge plan has been established to inform them of discharge date and time.           Keri Johnson RN  MICHELLE Care Manager  [phone]    "

## 2024-11-24 NOTE — ED PROVIDER NOTES
EMERGENCY DEPARTMENT ENCOUNTER      NAME: Namita Viera  AGE: 74 year old female  YOB: 1950  MRN: [id number]  EVALUATION DATE & TIME: 11/24/2024  9:07 AM    PCP: No Ref-Primary, Physician    ED PROVIDER: Shannon Caal M.D.      CHIEF COMPLAINT     Chief Complaint   Patient presents with    Fall         FINAL IMPRESSION:     1. Fall at home, initial encounter    2. Aspiration pneumonia of left upper lobe, unspecified aspiration pneumonia type (H)    3. Other emphysema (H)    4. Hypoxia          MEDICAL DECISION MAKING:     ED Course as of 11/24/24 1453   Sun Nov 24, 2024   1048 74-year-old female presents via EMS after a fall.  Patient states she tripped and fell landing on the left side.  She lives in assisted living and she mentioned that she had fallen and about her here for evaluation.   1048 Admits to having some alcohol yesterday.  States this was accidental.  Did not hit her head did not lose any consciousness.  States the whole left side of her body hurts.   1048 Exam she is chronically ill-appearing nontoxic no evidence of head trauma.  She has left-sided chest wall tenderness to palpation and a small abrasion to the left knee with full range of motion hemarthrosis.  There is no midline cervical thoracic lumbar tenderness palpation GCS of 15.   1048 Differential diagnosis include but not limited to trauma electrolyte abnormality sepsis arrhythmia acute coronary syndrome among others.   1049 IV established.  Patient given morphine for pain.   1049 Labs with cell count hemoglobin and platelets.  Normal liver function test and lipase.  Slightly low sodium of 134 with normal bicarb.   1049 alcohol negative.  Initial troponin is 29.   1051 External records.  Patient with history of alcohol use congestive heart failure history of rectal bleeding secondary to opioid-induced constipation develop a PE while she was in the hospital note on August said that she completed Eliquis treatment.   1158  Trauma evaluation included a CT head independently interpreted by me no evidence of intracranial hemorrhage formal read by radiologist.   1159 No Acute cervical spine fracture.   1159 CT chest abdomen and pelvis interpreted by me reveals no free air formal read by radiologist.   1336 CT chest revealed complete collapse of the left upper lobe.  Patient was slightly hypoxic at 88 to 89%.  Given the history of fall alcohol use pneumonia cannot be excluded.  Therefore blood culture was done and started on aspiration pneumonia protocol this was discussed with pharmacist.   1337 Admitted for aspiration pneumonia and fall.  Patient updated in agreement with plan.   1338 Clinical impression and decision making 74-year-old female presents here after fall.  Does admit to drinking alcohol.  Evaluation very dry mucous membranes appear fragile very tender on the left side of the ribs and a small abrasion to the left knee.  Trauma evaluation is negative for intracranial bleeding no cervical fracture and no new rib fractures but she has Colobreathe collapse of the left upper lobe.  Concern for aspiration.  Started on aspiration pneumonia antibiotics.  Discussed with pulmonology and hospitalist.  Admitted in stable condition.   1453 I spoke with patient regarding smoking cessation.       Medical Decision Making    History:  Supplemental history from: daughter   External Record(s) reviewed: [facility] 8/26/2024 COPD alcoholism frequent falls PE    Work Up:  Chart documentation includes differential considered and any EKGs or imaging independently interpreted by provider, where specified.  In additional to work up documented, I considered the following work up: CT thoracic and lumbar spine patient has no localized thoracic lumbar tenderness palpation.    External consultation:  Discussion of management with another provider: Pulmonology (Dr. Wright), Hospitalist (Dr. Gondal), Pharmacist    Complicating factors:  Care  impacted by chronic illness:       Disposition considerations: Admit.    Adult Minor Head Trauma:Age 65 years or older        ED COURSE     12:40 PM I introduced myself to the patient, obtained patient history, performed a physical exam, and discussed plan for ED workup including potential diagnostic laboratory/imaging studies and interventions.  9:20 AM Rechecked and updated the patient.  1:05 PM Rechecked the patient. Plan for admission. Call placed to pulmonologist and hospitalist.  1:06 PM Discussed medication recommendations with pharmacist.  1:12 PM Spoke with Dr. Wright, pulmonology. Discussed the patient's case and recommendations.  1:18 PM I spoke with the hospitalist, Dr. Gondal. We discussed the patient's case and they agree to admit the patient.     At the conclusion of the encounter I discussed the results of all of the tests and the disposition. The questions were answered. The patient acknowledged understanding and was agreeable with the care plan.         MEDICATIONS GIVEN IN THE EMERGENCY:     Medications   ampicillin-sulbactam (UNASYN) 3 g vial to attach to  mL bag (has no administration in time range)   lidocaine 1 % 0.1-1 mL (has no administration in time range)   lidocaine (LMX4) cream (has no administration in time range)   sodium chloride (PF) 0.9% PF flush 3 mL (has no administration in time range)   sodium chloride (PF) 0.9% PF flush 3 mL (has no administration in time range)   senna-docusate (SENOKOT-S/PERICOLACE) 8.6-50 MG per tablet 1 tablet (has no administration in time range)     Or   senna-docusate (SENOKOT-S/PERICOLACE) 8.6-50 MG per tablet 2 tablet (has no administration in time range)   calcium carbonate (TUMS) chewable tablet 1,000 mg (has no administration in time range)   acetaminophen (TYLENOL) tablet 650 mg (has no administration in time range)     Or   acetaminophen (TYLENOL) Suppository 650 mg (has no administration in time range)   melatonin tablet 1 mg (has no  administration in time range)   polyethylene glycol (MIRALAX) Packet 17 g (has no administration in time range)   ondansetron (ZOFRAN ODT) ODT tab 4 mg (has no administration in time range)     Or   ondansetron (ZOFRAN) injection 4 mg (has no administration in time range)   prochlorperazine (COMPAZINE) injection 5 mg (has no administration in time range)     Or   prochlorperazine (COMPAZINE) tablet 5 mg (has no administration in time range)   oxyCODONE IR (ROXICODONE) half-tab 2.5 mg (has no administration in time range)     Or   oxyCODONE (ROXICODONE) tablet 5 mg (has no administration in time range)   HYDROmorphone (DILAUDID) injection 0.4 mg (0.4 mg Intravenous $Given 11/24/24 1447)   naloxone (NARCAN) injection 0.2 mg (has no administration in time range)     Or   naloxone (NARCAN) injection 0.4 mg (has no administration in time range)     Or   naloxone (NARCAN) injection 0.2 mg (has no administration in time range)     Or   naloxone (NARCAN) injection 0.4 mg (has no administration in time range)   sodium chloride 0.9 % infusion (has no administration in time range)   acetylcysteine (MUCOMYST) 20 % nebulizer solution 2 mL (has no administration in time range)   ipratropium - albuterol 0.5 mg/2.5 mg/3 mL (DUONEB) neb solution 3 mL (has no administration in time range)   morphine (PF) injection 4 mg (4 mg Intravenous $Given 11/24/24 1002)   iopamidol (ISOVUE-370) solution 43 mL (43 mLs Intravenous $Given 11/24/24 1131)   ampicillin-sulbactam (UNASYN) 3 g vial to attach to  mL bag (0 g Intravenous Stopped 11/24/24 1437)       NEW PRESCRIPTIONS STARTED AT TODAY'S ER VISIT     New Prescriptions    No medications on file          =================================================================    HPI     Patient information was obtained from: Patient    Use of : N/A       Namita Viera is a 74 year old female who presents by EMS for evaluation after a fall.    The patient is presenting from  assisted living. Last night she tripped on a throw rug and fell to the floor. She landed on her left knee and then onto her left side. She was able to crawl to a chair and then reported to staff this morning that she had fallen. She denies hitting her head or losing consciousness. She endorses left knee pain and left chest wall pain. She also reports increased shortness of breath but attributes this to the pain. She has not had any new or worsening cough. She denies any vomiting, diarrhea, or bloody or black stools. She reports having a glass of wine last night before the fall.      REVIEW OF SYSTEMS   Review of Systems   SEE HPI    PAST MEDICAL HISTORY:     Past Medical History:   Diagnosis Date    Acid reflux     Alcohol use     Closed fracture of left femur (H)     Closed fracture of sacrum with routine healing 10/12/2021    Emphysema of lung (H) 02/07/2022    Hip fracture requiring operative repair (H)     Hypertension 01/2021    Rib pain on left side 05/06/2021    SAH (subarachnoid hemorrhage) (H)     Traumatic closed displaced fracture of distal end of radius        PAST SURGICAL HISTORY:     Past Surgical History:   Procedure Laterality Date    BLADDER SUSPENSION  2008    HYSTERECTOMY TOTAL ABDOMINAL, BILATERAL SALPINGO-OOPHORECTOMY, COMBINED N/A 2008    OPEN REDUCTION INTERNAL FIXATION RODDING INTRAMEDULLARY FEMUR Right 5/17/2023    Procedure: OPEN REDUCTION INTERNAL FIXATION, FRACTURE, FEMUR, USING INTRAMEDULLARY NOEL;  Surgeon: Ollie Tucker MD;  [place] [facility] [place]    ORTHOPEDIC SURGERY Left 2018    Left Hip fracture, S/P ORIF    PICC TRIPLE LUMEN PLACEMENT  6/27/2024         CURRENT MEDICATIONS:   albuterol (PROAIR HFA/PROVENTIL HFA/VENTOLIN HFA) 108 (90 Base) MCG/ACT inhaler  alendronate (FOSAMAX) 70 MG tablet  aspirin (ASA) 81 MG EC tablet  calcium carbonate (OS-PETER) 1500 (600 Ca) MG tablet  Fluticasone-Umeclidin-Vilanterol (TRELEGY ELLIPTA) 200-62.5-25 MCG/ACT oral inhaler  folic  acid (FOLVITE) 1 MG tablet  furosemide (LASIX) 40 MG tablet  ibuprofen (ADVIL/MOTRIN) 200 MG tablet  Lidocaine (LIDOCARE) 4 % Patch  losartan (COZAAR) 25 MG tablet  melatonin 1 MG TABS tablet  miconazole (MICATIN) 2 % external powder  midodrine (PROAMATINE) 2.5 MG tablet  multivitamin, therapeutic (THERA-VIT) TABS tablet  nicotine (NICODERM CQ) 14 MG/24HR 24 hr patch  omeprazole (PRILOSEC) 20 MG DR capsule  polyethylene glycol (MIRALAX) 17 GM/Dose powder  potassium chloride ER (K-TAB) 20 MEQ CR tablet  senna-docusate (SENOKOT-S/PERICOLACE) 8.6-50 MG tablet  thiamine (B-1) 100 MG tablet  vitamin C (ASCORBIC ACID) 500 MG tablet  Vitamin D, Cholecalciferol, 25 MCG (1000 UT) TABS         ALLERGIES:     Allergies   Allergen Reactions    Penicillins Hives       FAMILY HISTORY:     Family History   Problem Relation Age of Onset    Esophageal Cancer Mother     Heart Disease Father     Rectal Cancer Sister     Cancer Sister         lump in neck.     Rheumatoid Arthritis Brother        SOCIAL HISTORY:     Social History     Socioeconomic History    Marital status:     Number of children: 4   Tobacco Use    Smoking status: Every Day     Current packs/day: 0.00     Average packs/day: 1 pack/day for 60.0 years (60.0 ttl pk-yrs)     Types: Cigarettes     Start date: 1962     Last attempt to quit: 2018     Years since quittin.4    Smokeless tobacco: Never   Substance and Sexual Activity    Alcohol use: Yes     Alcohol/week: 14.0 standard drinks of alcohol     Types: 14 Standard drinks or equivalent per week     Comment: admits to 2 glasses of wine a night    Drug use: No    Sexual activity: Not Currently   Other Topics Concern    Parent/sibling w/ CABG, MI or angioplasty before 65F 55M? No   Social History Narrative    , 4 children, lives by herself in an apartment.  Worked as a seamstress and continues to sew.  Is full code.  (last updated 2023)      Social Drivers of Health      Received from  [facility], [facility]    Social Connections       VITALS:   /64   Pulse 102   Temp 98  F (36.7  C) (Oral)   Resp 22   SpO2 97%     PHYSICAL EXAM     Physical Exam  Vitals and nursing note reviewed. Exam conducted with a chaperone present.   Constitutional:       Appearance: She is ill-appearing.   Cardiovascular:      Rate and Rhythm: Tachycardia present.         Physical Exam   Constitutional: Elderly female.    Head: Atraumatic.     Nose: Nose normal.     Mouth/Throat: Oropharynx is clear dry mucous membrane    Eyes: EOM are normal. Pupils are equal, round, and reactive to light. No hyphema.    Ears: Bilateral pearly white tympanic membranes. No hemotympanum.    Neck: Normal range of motion. Neck supple.     Cardiovascular: Normal rate, regular rhythm and normal heart sounds.  2+ femoral pulses/radial/DP pulses B    Pulmonary/Chest: Kidney and decreased breath sounds bilaterally.  Left-sided chest wall tenderness to palpation.    Abdominal: soft nontender.    Musculoskeletal: Normal range of motion.     Neurological: Moves upper and lower extremities equally.    Lymphatics: no edema, no calves pain, no palpable cords.    : NA    Skin: Skin is warm and dry. Abrasion to the left knee.  There was no ecchymosis.    Psychiatric: Normal mood and affect. Behavior is normal.       LAB:     All pertinent labs reviewed and interpreted.  Labs Ordered and Resulted from Time of ED Arrival to Time of ED Departure   BASIC METABOLIC PANEL - Abnormal       Result Value    Sodium 134 (*)     Potassium 5.0      Chloride 95 (*)     Carbon Dioxide (CO2) 24      Anion Gap 15      Urea Nitrogen 39.7 (*)     Creatinine 0.95      GFR Estimate 62      Calcium 9.9      Glucose 102 (*)    CBC WITH PLATELETS AND DIFFERENTIAL - Abnormal    WBC Count 10.4      RBC Count 4.16      Hemoglobin 12.0      Hematocrit 35.3      MCV 85      MCH 28.8      MCHC 34.0      RDW  14.7      Platelet Count 357      % Neutrophils 86      % Lymphocytes 8      % Monocytes 6      % Eosinophils 0      % Basophils 0      % Immature Granulocytes 0      NRBCs per 100 WBC 0      Absolute Neutrophils 8.9 (*)     Absolute Lymphocytes 0.8      Absolute Monocytes 0.6      Absolute Eosinophils 0.0      Absolute Basophils 0.0      Absolute Immature Granulocytes 0.0      Absolute NRBCs 0.0     TROPONIN T, HIGH SENSITIVITY - Abnormal    Troponin T, High Sensitivity 29 (*)    TROPONIN T, HIGH SENSITIVITY - Abnormal    Troponin T, High Sensitivity 27 (*)    CK TOTAL - Abnormal     (*)    HEPATIC FUNCTION PANEL - Normal    Protein Total 8.1      Albumin 4.6      Bilirubin Total 0.5      Alkaline Phosphatase 120      AST 39      ALT 19      Bilirubin Direct <0.20     LIPASE - Normal    Lipase 21     ETHYL ALCOHOL LEVEL - Normal    Alcohol ethyl <0.01     INFLUENZA A/B, RSV AND SARS-COV2 PCR - Normal    Influenza A PCR Negative      Influenza B PCR Negative      RSV PCR Negative      SARS CoV2 PCR Negative     PROCALCITONIN - Normal    Procalcitonin 0.05     ROUTINE UA WITH MICROSCOPIC REFLEX TO CULTURE   BLOOD CULTURE   RESPIRATORY AEROBIC BACTERIAL CULTURE        RADIOLOGY:     Reviewed all pertinent imaging. Please see official radiology report.  XR Knee Left 3 Views   Final Result   IMPRESSION: Osteopenia. No fracture or effusion. Chondrocalcinosis in the medial and lateral compartments.      CT Chest/Abdomen/Pelvis w Contrast   Final Result   IMPRESSION:   1.  Essentially complete occlusion of the left upper lobe bronchus with debris causing downstream airspace opacities compatible with lobar collapse. Superimposed infection cannot be excluded.   2.  Moderate emphysema.   3.  Unchanged scattered pulmonary nodules measuring up to 0.4 cm. Continued lung cancer screening surveillance should be considered.   4.  Moderate size hiatal hernia has increased in size compared to prior.   5.  Nonvisualization of  the previously seen subsegmental right lower lobe pulmonary embolism, possibly due to the contrast timing.   6.  Extensive colonic diverticulosis.   7.  Unchanged appearance of the multiple previous fractures.      CT Cervical Spine w/o Contrast   Final Result   IMPRESSION:   HEAD CT:   1.  No evidence of acute traumatic intracranial abnormality.   2.  Brain atrophy and presumed chronic microvascular ischemic changes as detailed above.      CERVICAL SPINE CT:   1.  No CT evidence for acute fracture or traumatic malalignment involving the cervical spine.   2.  Multilevel cervical spondylosis without high-grade spinal canal stenosis. Neural foraminal stenosis is greatest and moderate to advanced on the left at C5-C6.      Head CT w/o contrast   Final Result   IMPRESSION:   HEAD CT:   1.  No evidence of acute traumatic intracranial abnormality.   2.  Brain atrophy and presumed chronic microvascular ischemic changes as detailed above.      CERVICAL SPINE CT:   1.  No CT evidence for acute fracture or traumatic malalignment involving the cervical spine.   2.  Multilevel cervical spondylosis without high-grade spinal canal stenosis. Neural foraminal stenosis is greatest and moderate to advanced on the left at C5-C6.           EKG:     EKG #1  Sinus tachycardia normal anterior progression normal axis    Time:[id number]    Ventricular rate 103 bmp  Axis normal  ME interval 126 ms  QRS duration 82 ms  QT//453 ms    Compared to previous EKG on June 27, 2024 heart rate 100.  No significant changes.  I have independently reviewed and interpreted the EKG(s) documented above.      PROCEDURES:     Procedures      I, Missael Allen, am serving as a scribe to document services personally performed by Dr. Caal based on my observation and the provider's statements to me. I, Shannon Caal MD attest that Missael Allen is acting in a scribe capacity, has observed my performance of the services and has documented them in  accordance with my direction.    Shannon Caal M.D.  Emergency Medicine  [facility] EMERGENCY DEPARTMENT  [facility] [facility] [id number] [phone]  Dept: [phone]       Shannon Caal MD  11/24/24 4507

## 2024-11-24 NOTE — DISCHARGE INSTRUCTIONS
The telephone number for our [facility] appointment line, if you would like to choose a [facility] primary care physician, is [phone]. They can check availability and arrange an appointment for you.

## 2024-11-24 NOTE — ED NOTES
Missed call from daughter.  Pt gave permission for writer to call back and give update in via phone or message.  Daughter did not answer, message left.

## 2024-11-24 NOTE — ED NOTES
Bed: JNED-27  Expected date: 11/24/24  Expected time: 8:53 AM  Means of arrival:   Comments:  Fall/allina\ 74 female

## 2024-11-24 NOTE — ED TRIAGE NOTES
Pt here via EMS from assisted living.  Fall was last night.  Pt reports knee pain and left sided rib pain.  No bruising to abdomen or back noted.  Pt endorses ETOH last night.  Pt crawled to chair and was there overnight.  Staff found this morning and called EMS.  Pt arrives with shortness of breath which she reports is worse than baseline.  Congested, nonproductive cough.  States she cannot clear secretions due to pain.  Dr. Cr in to assess pt.  No C collar needed at the time per MD.   Triage Assessment (Adult)       Row Name 11/24/24 0914          Triage Assessment    Airway WDL WDL        Respiratory WDL    Respiratory WDL X;rhythm/pattern;cough     Rhythm/Pattern, Respiratory shortness of breath     Cough Frequency frequent     Cough Type congested;nonproductive        Skin Circulation/Temperature WDL    Skin Circulation/Temperature WDL WDL        Cardiac WDL    Cardiac WDL X;rhythm     Pulse Rate & Regularity tachycardic        Peripheral/Neurovascular WDL    Peripheral Neurovascular WDL WDL        Cognitive/Neuro/Behavioral WDL    Cognitive/Neuro/Behavioral WDL WDL

## 2024-11-24 NOTE — CONSULTS
INITIAL PULMONARY   11/24/2024      Admit Date: 11/24/2024  Hospital Day: 0   CODE: Full Code    Reason for Consult: MOJGAN collapse from likely aspiration      Assessment/Plan:   Namita Viera is a 74 year old female with a past medical history significant for a GI bleed, COPD with emphysema, alcohol abuse with multiple falls (hospitalized 6/27-7/4 and subsequently at [facility]), PE, recently admitted for rectal bleeding from fecal impaction who presented to the ED on  admitted on 11/24  after a fall at home and likely aspiration pneumonia leading to atelectasis of the MOJGAN.    Recommend:  MOJGAN Atelectasis: Has a known medical history significant for ongoing alcohol abuse and was hospitalized in June for similar episode of falling.  Unclear if she had a mechanical or otherwise fall and likely aspirated related to her alcohol abuse.  Is on minimal supplemental oxygen presently.  I placed orders for DuoNebs every 4 hourly, Mucomyst every 4 hourly and percussion therapy.  Agree with Unasyn for aspiration.  COPD/Emphysema: Is noted to have emphysema on imaging although I am unable to find any pulmonary function testing.  The patient continues to smoke and has previously been noted to be uninterested in tobacco cessation.  She does not appear to be on supplemental oxygen at baseline.  Is on Trelegy Ellipta as an outpatient and has been prescribed Breo Ellipta and Incruse Ellipta while an inpatient presently.  Has been prescribed scheduled DuoNebs as noted above.  Prednisone 40mg every day for 5 days.  Maintain oxygen saturations greater than 92%.  Alcohol abuse: Has a known medical history significant for alcohol abuse and has had previous admissions related to falls from alcohol abuse.  It is noted that she admitted to alcohol consumption prior to presenting to the hospital.  Was counseled about alcohol cessation.    Thank you for involving us in the care of this patient.    Nimisha Arechiga MD  Pulmonary and  Critical Care  [facility]      HPI:   CCx:MOJGAN Atelectasis    HPI:Ms. Viera is a 74 year old female with a past medical history significant for a GI bleed, COPD with emphysema, alcohol abuse with multiple falls (hospitalized 6/27-7/4 and subsequently at [facility]), PE, recently admitted for rectal bleeding from fecal impaction who presented to the emergency department earlier today from her assisted living facility after a fall last night.  She reported knee pain and left-sided rib pain.  It is noted that she had been consuming alcohol at night and had to crawl to her chair which is where she was found by the staff this morning.  MS was called and at the time of arrival to the emergency department she was noted to be short of breath with a congested cough and was unable to clear secretions due to pain.  She was noted to be hypoxic.  Subsequent imaging demonstrated left upper lobe atelectasis and some mucus impaction throughout the airways.    ROS: Pertinent positives alluded to in the HPI. Remainder of 10 point ROS is negative.                                                                                                                                                       Medical/Surgical history:  Reviewed in epic.    Allergies:  Reviewed in Epic    PTA medications:  (Not in a hospital admission)      Family Hx:  Reviewed in epic.    Social Hx:  Reviewed in epic.    Exam/Data:   ROS: 10 point ROS obtained, pertinant positives alluded to in HPI    Vitals  /58   Pulse 108   Temp 98  F (36.7  C) (Oral)   Resp 20   SpO2 95%   BP - Mean:  [] 84  No intake/output data recorded.  Weight change:   [unfilled]  EXAM:  Physical Exam  Constitutional:       Comments: Disheveled appearing lady.  NAD.   HENT:      Mouth/Throat:      Mouth: Mucous membranes are dry.      Comments: Very poor oral dentition.  Cardiovascular:      Rate and Rhythm: Regular rhythm. Tachycardia present.      Heart sounds:  Normal heart sounds.   Pulmonary:      Comments: Diminished, coarse BS in the MOJGAN.  Normal BS on the right.  Abdominal:      General: Abdomen is flat.   Skin:     General: Skin is warm.   Neurological:      General: No focal deficit present.      Mental Status: She is alert and oriented to person, place, and time.           Medications:     Current Facility-Administered Medications   Medication Dose Route Frequency Provider Last Rate Last Admin    sodium chloride 0.9 % infusion   Intravenous Continuous Abby Del Castillo PA-C 50 mL/hr at 11/24/24 1520 $Started at 11/24/24 1520     Current Facility-Administered Medications   Medication Dose Route Frequency Provider Last Rate Last Admin    acetylcysteine (MUCOMYST) 20 % nebulizer solution 2 mL  2 mL Nebulization Q4H Nimisha Arechiga MD   2 mL at 11/24/24 1525    albuterol (PROVENTIL HFA/VENTOLIN HFA) inhaler  1-2 puff Inhalation Q6H Abby Del Castillo PA-C        ampicillin-sulbactam (UNASYN) 3 g vial to attach to  mL bag  3 g Intravenous Q6H Abby Del Castillo PA-C        aspirin EC tablet 81 mg  81 mg Oral Daily Abby Del Castillo PA-C        fluticasone-vilanterol (BREO ELLIPTA) 200-25 MCG/ACT inhaler 1 puff  1 puff Inhalation Daily Abby Del Castillo PA-C        And    umeclidinium (INCRUSE ELLIPTA) 62.5 MCG/ACT inhaler 1 puff  1 puff Inhalation Daily Abby Del Castillo PA-C        folic acid (FOLVITE) tablet 1 mg  1 mg Oral Daily Abby Del Castillo PA-C        [START ON 11/25/2024] furosemide (LASIX) tablet 40 mg  40 mg Oral BID Abby Del Castillo PA-C        ipratropium - albuterol 0.5 mg/2.5 mg/3 mL (DUONEB) neb solution 3 mL  3 mL Nebulization 4x daily Nimisha Arechiga MD   3 mL at 11/24/24 1525    [START ON 11/25/2024] losartan (COZAAR) tablet 25 mg  25 mg Oral Daily Abby Del Castillo PA-C        [Held by provider] midodrine (PROAMATINE) tablet 2.5 mg  2.5 mg Oral TID w/meals Abby Del Castillo PA-C        multivitamin, therapeutic (THERA-VIT) tablet 1 tablet  1 tablet Oral Daily Abby Del Castillo PA-C         pantoprazole (PROTONIX) EC tablet 40 mg  40 mg Oral QAM AC Gondal, Saad J, MD        sodium chloride (PF) 0.9% PF flush 3 mL  3 mL Intracatheter Q8H Abby Del Castillo PA-C   3 mL at 11/24/24 1520    thiamine (B-1) tablet 100 mg  100 mg Oral Daily Abby Del Castillo PA-C             DATA  All laboratory and radiology has been personally reviewed by myself today.  Recent Labs   Lab 11/24/24  1008   WBC 10.4   HGB 12.0   HCT 35.3        Recent Labs   Lab 11/24/24  0944   *   CO2 24   BUN 39.7*   ALKPHOS 120   ALT 19   AST 39       MICRO:  Date Source Organism   11/24 NP Swab Negative for Influenza A, B, RSV and Sars CoV2    Blood NGTD     Organism Antibiotic Antibiotic Start Date Antibiotic End Date   NGTD Ampicillin-Sulbactam 11/24 Ongoing     IMAGING:   CT CAP 11/24/24:  1.  Essentially complete occlusion of the left upper lobe bronchus with debris causing downstream airspace opacities compatible with lobar collapse. Superimposed infection cannot be excluded.  2.  Moderate emphysema.  3.  Unchanged scattered pulmonary nodules measuring up to 0.4 cm. Continued lung cancer screening surveillance should be considered.  4.  Moderate size hiatal hernia has increased in size compared to prior.  5.  Nonvisualization of the previously seen subsegmental right lower lobe pulmonary embolism, possibly due to the contrast timing.  6.  Extensive colonic diverticulosis.  7.  Unchanged appearance of the multiple previous fractures.    CT Head w/o Contrast 11/24/24:  HEAD CT:  1.  No evidence of acute traumatic intracranial abnormality.  2.  Brain atrophy and presumed chronic microvascular ischemic changes as detailed above.     CERVICAL SPINE CT:  1.  No CT evidence for acute fracture or traumatic malalignment involving the cervical spine.  2.  Multilevel cervical spondylosis without high-grade spinal canal stenosis. Neural foraminal stenosis is greatest and moderate to advanced on the left at C5-C6.       Nimisha Arechiga,  MD  Pulmonary and Critical Care  [facility]

## 2024-11-24 NOTE — PLAN OF CARE
Goal Outcome Evaluation:      Plan of Care Reviewed With: patient    Outcome Evaluation: Discharge back to assisted living facility once medically ready for discharge.

## 2024-11-25 ENCOUNTER — APPOINTMENT (OUTPATIENT)
Dept: OCCUPATIONAL THERAPY | Facility: HOSPITAL | Age: 74
DRG: 177 | End: 2024-11-25
Payer: COMMERCIAL

## 2024-11-25 ENCOUNTER — APPOINTMENT (OUTPATIENT)
Dept: PHYSICAL THERAPY | Facility: HOSPITAL | Age: 74
DRG: 177 | End: 2024-11-25
Payer: COMMERCIAL

## 2024-11-25 ENCOUNTER — APPOINTMENT (OUTPATIENT)
Dept: RADIOLOGY | Facility: HOSPITAL | Age: 74
DRG: 177 | End: 2024-11-25
Attending: INTERNAL MEDICINE
Payer: COMMERCIAL

## 2024-11-25 ENCOUNTER — APPOINTMENT (OUTPATIENT)
Dept: SPEECH THERAPY | Facility: HOSPITAL | Age: 74
DRG: 177 | End: 2024-11-25
Payer: COMMERCIAL

## 2024-11-25 VITALS
HEART RATE: 91 BPM | RESPIRATION RATE: 18 BRPM | SYSTOLIC BLOOD PRESSURE: 127 MMHG | OXYGEN SATURATION: 91 % | TEMPERATURE: 98.2 F | DIASTOLIC BLOOD PRESSURE: 63 MMHG

## 2024-11-25 LAB
ANION GAP SERPL CALCULATED.3IONS-SCNC: 11 MMOL/L (ref 7–15)
ATRIAL RATE - MUSE: 103 BPM
BUN SERPL-MCNC: 37.8 MG/DL (ref 8–23)
CALCIUM SERPL-MCNC: 8.9 MG/DL (ref 8.8–10.4)
CHLORIDE SERPL-SCNC: 100 MMOL/L (ref 98–107)
CREAT SERPL-MCNC: 0.94 MG/DL (ref 0.51–0.95)
DIASTOLIC BLOOD PRESSURE - MUSE: 67 MMHG
EGFRCR SERPLBLD CKD-EPI 2021: 63 ML/MIN/1.73M2
ERYTHROCYTE [DISTWIDTH] IN BLOOD BY AUTOMATED COUNT: 14.9 % (ref 10–15)
GLUCOSE SERPL-MCNC: 150 MG/DL (ref 70–99)
HCO3 SERPL-SCNC: 23 MMOL/L (ref 22–29)
HCT VFR BLD AUTO: 32.4 % (ref 35–47)
HGB BLD-MCNC: 10.6 G/DL (ref 11.7–15.7)
INTERPRETATION ECG - MUSE: NORMAL
MCH RBC QN AUTO: 28.3 PG (ref 26.5–33)
MCHC RBC AUTO-ENTMCNC: 32.7 G/DL (ref 31.5–36.5)
MCV RBC AUTO: 86 FL (ref 78–100)
P AXIS - MUSE: 80 DEGREES
PLATELET # BLD AUTO: 309 10E3/UL (ref 150–450)
POTASSIUM SERPL-SCNC: 4.1 MMOL/L (ref 3.4–5.3)
PR INTERVAL - MUSE: 126 MS
QRS DURATION - MUSE: 82 MS
QT - MUSE: 346 MS
QTC - MUSE: 453 MS
R AXIS - MUSE: 87 DEGREES
RBC # BLD AUTO: 3.75 10E6/UL (ref 3.8–5.2)
SODIUM SERPL-SCNC: 134 MMOL/L (ref 135–145)
SYSTOLIC BLOOD PRESSURE - MUSE: 146 MMHG
T AXIS - MUSE: 69 DEGREES
VENTRICULAR RATE- MUSE: 103 BPM
WBC # BLD AUTO: 8.8 10E3/UL (ref 4–11)

## 2024-11-25 PROCEDURE — 250N000013 HC RX MED GY IP 250 OP 250 PS 637

## 2024-11-25 PROCEDURE — 94799 UNLISTED PULMONARY SVC/PX: CPT

## 2024-11-25 PROCEDURE — 97161 PT EVAL LOW COMPLEX 20 MIN: CPT | Mod: GP

## 2024-11-25 PROCEDURE — 71045 X-RAY EXAM CHEST 1 VIEW: CPT

## 2024-11-25 PROCEDURE — 85018 HEMOGLOBIN: CPT

## 2024-11-25 PROCEDURE — 250N000013 HC RX MED GY IP 250 OP 250 PS 637: Performed by: STUDENT IN AN ORGANIZED HEALTH CARE EDUCATION/TRAINING PROGRAM

## 2024-11-25 PROCEDURE — 97535 SELF CARE MNGMENT TRAINING: CPT | Mod: GO

## 2024-11-25 PROCEDURE — 250N000011 HC RX IP 250 OP 636: Performed by: STUDENT IN AN ORGANIZED HEALTH CARE EDUCATION/TRAINING PROGRAM

## 2024-11-25 PROCEDURE — 85049 AUTOMATED PLATELET COUNT: CPT

## 2024-11-25 PROCEDURE — 250N000012 HC RX MED GY IP 250 OP 636 PS 637: Performed by: INTERNAL MEDICINE

## 2024-11-25 PROCEDURE — 94640 AIRWAY INHALATION TREATMENT: CPT

## 2024-11-25 PROCEDURE — 36415 COLL VENOUS BLD VENIPUNCTURE: CPT

## 2024-11-25 PROCEDURE — 999N000157 HC STATISTIC RCP TIME EA 10 MIN

## 2024-11-25 PROCEDURE — 250N000011 HC RX IP 250 OP 636

## 2024-11-25 PROCEDURE — 82310 ASSAY OF CALCIUM: CPT

## 2024-11-25 PROCEDURE — 92610 EVALUATE SWALLOWING FUNCTION: CPT | Mod: GN | Performed by: REHABILITATION PRACTITIONER

## 2024-11-25 PROCEDURE — 250N000009 HC RX 250: Performed by: STUDENT IN AN ORGANIZED HEALTH CARE EDUCATION/TRAINING PROGRAM

## 2024-11-25 PROCEDURE — 97116 GAIT TRAINING THERAPY: CPT | Mod: GP

## 2024-11-25 PROCEDURE — 99232 SBSQ HOSP IP/OBS MODERATE 35: CPT | Performed by: INTERNAL MEDICINE

## 2024-11-25 PROCEDURE — 97166 OT EVAL MOD COMPLEX 45 MIN: CPT | Mod: GO

## 2024-11-25 PROCEDURE — 97530 THERAPEUTIC ACTIVITIES: CPT | Mod: GP

## 2024-11-25 PROCEDURE — 99239 HOSP IP/OBS DSCHRG MGMT >30: CPT | Performed by: STUDENT IN AN ORGANIZED HEALTH CARE EDUCATION/TRAINING PROGRAM

## 2024-11-25 PROCEDURE — 80048 BASIC METABOLIC PNL TOTAL CA: CPT

## 2024-11-25 RX ORDER — ACETAMINOPHEN 325 MG/1
975 TABLET ORAL 3 TIMES DAILY
Status: DISCONTINUED | OUTPATIENT
Start: 2024-11-25 | End: 2024-11-25 | Stop reason: HOSPADM

## 2024-11-25 RX ORDER — ACETAMINOPHEN 500 MG
1000 TABLET ORAL 3 TIMES DAILY
Qty: 180 TABLET | Refills: 0 | Status: SHIPPED | OUTPATIENT
Start: 2024-11-25

## 2024-11-25 RX ORDER — PREDNISONE 20 MG/1
40 TABLET ORAL DAILY
Qty: 6 TABLET | Refills: 0 | Status: SHIPPED | OUTPATIENT
Start: 2024-11-26

## 2024-11-25 RX ORDER — ACETAMINOPHEN 650 MG/1
650 SUPPOSITORY RECTAL 3 TIMES DAILY
Status: DISCONTINUED | OUTPATIENT
Start: 2024-11-25 | End: 2024-11-25

## 2024-11-25 RX ADMIN — AMPICILLIN SODIUM AND SULBACTAM SODIUM 3 G: 2; 1 INJECTION, POWDER, FOR SOLUTION INTRAMUSCULAR; INTRAVENOUS at 00:39

## 2024-11-25 RX ADMIN — THIAMINE HCL TAB 100 MG 100 MG: 100 TAB at 08:36

## 2024-11-25 RX ADMIN — AMPICILLIN SODIUM AND SULBACTAM SODIUM 3 G: 2; 1 INJECTION, POWDER, FOR SOLUTION INTRAMUSCULAR; INTRAVENOUS at 12:30

## 2024-11-25 RX ADMIN — ALBUTEROL SULFATE 2 PUFF: 90 AEROSOL, METERED RESPIRATORY (INHALATION) at 08:38

## 2024-11-25 RX ADMIN — IPRATROPIUM BROMIDE AND ALBUTEROL SULFATE 3 ML: .5; 3 SOLUTION RESPIRATORY (INHALATION) at 11:29

## 2024-11-25 RX ADMIN — ASPIRIN 81 MG: 81 TABLET, COATED ORAL at 08:36

## 2024-11-25 RX ADMIN — ACETAMINOPHEN 975 MG: 325 TABLET ORAL at 12:31

## 2024-11-25 RX ADMIN — AMPICILLIN SODIUM AND SULBACTAM SODIUM 3 G: 2; 1 INJECTION, POWDER, FOR SOLUTION INTRAMUSCULAR; INTRAVENOUS at 06:28

## 2024-11-25 RX ADMIN — FLUTICASONE FUROATE AND VILANTEROL TRIFENATATE 1 PUFF: 200; 25 POWDER RESPIRATORY (INHALATION) at 08:37

## 2024-11-25 RX ADMIN — LOSARTAN POTASSIUM 25 MG: 25 TABLET, FILM COATED ORAL at 08:36

## 2024-11-25 RX ADMIN — OXYCODONE HYDROCHLORIDE 5 MG: 5 TABLET ORAL at 01:23

## 2024-11-25 RX ADMIN — PREDNISONE 40 MG: 20 TABLET ORAL at 08:36

## 2024-11-25 RX ADMIN — UMECLIDINIUM 1 PUFF: 62.5 AEROSOL, POWDER ORAL at 08:37

## 2024-11-25 RX ADMIN — APIXABAN 5 MG: 5 TABLET, FILM COATED ORAL at 08:36

## 2024-11-25 RX ADMIN — FUROSEMIDE 40 MG: 20 TABLET ORAL at 08:36

## 2024-11-25 RX ADMIN — THERA TABS 1 TABLET: TAB at 08:36

## 2024-11-25 RX ADMIN — FOLIC ACID 1 MG: 1 TABLET ORAL at 08:36

## 2024-11-25 RX ADMIN — PANTOPRAZOLE SODIUM 40 MG: 20 TABLET, DELAYED RELEASE ORAL at 06:27

## 2024-11-25 ASSESSMENT — ACTIVITIES OF DAILY LIVING (ADL)
ADLS_ACUITY_SCORE: 0
ADLS_ACUITY_SCORE: 48
ADLS_ACUITY_SCORE: 0
ADLS_ACUITY_SCORE: 48
ADLS_ACUITY_SCORE: 48

## 2024-11-25 NOTE — PROGRESS NOTES
Speech Language Pathology- Clinical swallow evaluation       11/25/24 1036   Appointment Info   Signing Clinician's Name / Credentials (SLP) Jes Aguiar MS CCC SLP   General Information   Onset of Illness/Injury or Date of Surgery 11/24/24   Referring Physician Abby Del Castillo PA-C   Patient/Family Therapy Goal Statement (SLP) Pt hoping to discharge home later today. She denies any dysphagia.   Pertinent History of Current Problem Namita Viera is a 74 year old female with PMHx of COPD, AUD, recent PE, CHF, frequent falls and physical deconditioning who presented to the ER on 11/24/2024 after fall at home with left knee and left-sided rib pain.  Admitted for acute hypoxic respiratory failure and concern for aspiration pneumonia. SLP consulted for swallow evaluation.   General Observations Pt is pleasant/coooperative/alert.   Type of Evaluation   Type of Evaluation Swallow Evaluation   Oral Motor   Oral Musculature generally intact   Dentition (Oral Motor)   Dentition (Oral Motor) some missing teeth   Facial Symmetry (Oral Motor)   Facial Symmetry (Oral Motor) WNL   Lip Function (Oral Motor)   Lip Range of Motion (Oral Motor) WNL   Lip Strength (Oral Motor) WFL   Tongue Function (Oral Motor)   Tongue Strength (Oral Motor) WFL   Tongue Coordination/Speed (Oral Motor) WNL   Tongue ROM (Oral Motor) WNL   Jaw Function (Oral Motor)   Jaw Function (Oral Motor) WNL   Cough/Swallow/Gag Reflex (Oral Motor)   Volitional Swallow (Oral Motor) WNL   Vocal Quality/Secretion Management (Oral Motor)   Vocal Quality (Oral Motor) hoarse;harsh   Secretion Management (Oral Motor) WNL   General Swallowing Observations   Past History of Dysphagia Clinical swallow evaluation 7/1/24 with low concern for chronic or ongoing aspiration. A regular diet/thin liquids was recommended.   Respiratory Support room air   Current Diet/Method of Nutritional Intake (General Swallowing Observations, NIS) thin liquids (level 0);regular diet   Swallowing  Evaluation Clinical swallow evaluation   Clinical Swallow Evaluation   Feeding Assistance no assistance needed   Clinical Swallow Evaluation Textures Trialed thin liquids;pureed;solid foods   Clinical Swallow Eval: Thin Liquid Texture Trial   Mode of Presentation, Thin Liquids cup;self-fed   Volume of Liquid or Food Presented 4 oz   Oral Phase of Swallow WFL   Pharyngeal Phase of Swallow   (no overt s/s aspiration)   Clinical Swallow Evaluation: Puree Solid Texture Trial   Mode of Presentation, Puree spoon;self-fed   Volume of Puree Presented 2 bites   Oral Phase, Puree WFL   Pharyngeal Phase, Puree   (no overt s/s aspiration)   Clinical Swallow Evaluation: Solid Food Texture Trial   Mode of Presentation self-fed   Volume Presented sunil crackers   Oral Phase WFL   Pharyngeal Phase   (no overt s/s aspiration)   Esophageal Phase of Swallow   Patient reports or presents with symptoms of esophageal dysphagia No   Swallowing Recommendations   Diet Consistency Recommendations thin liquids (level 0);regular diet   Supervision Level for Intake patient independent   Recommended Feeding/Eating Techniques (Swallow Eval) maintain upright sitting position for eating   Medication Administration Recommendations, Swallowing (SLP) per pt preference   Instrumental Assessment Recommendations   (Recommend consider VFSS to rule out silent aspiration. Pt declines VFSS.)   Clinical Impression   Criteria for Skilled Therapeutic Interventions Met (SLP Eval) Evaluation only;No problems identified which require skilled intervention   Risks & Benefits of therapy have been explained evaluation/treatment results reviewed;patient;participants included;participants voiced agreement with care plan   Clinical Impression Comments Clinical swallow evaluation completed. Oral mech exam generally unremarkable except for some missing teeth. Pt demonstrated no overt s/s aspiration with thin liquids, pureed solids and regular solids. Oral clearance  adequate. Discussed consideration of VFSS to rule out silent aspiration. Pt declined VFSS at this time. Recommend continue regular solids/thin liquids. Upright posture for meals. SLP will sign off. Please re-order SLP if concerns arise or if patient decides to pursue VFSS.   SLP Total Evaluation Time   Eval: oral/pharyngeal swallow function, clinical swallow Minutes (98864) 20   SLP Discharge Planning   SLP Plan No further SLP warranted.   SLP Discharge Recommendation home   SLP Brief overview of current status  Discussed consideration of VFSS to rule out silent aspiration. Pt declined VFSS at this time. Recommend continue regular solids/thin liquids. Upright posture for meals. SLP will sign off. Please re-order SLP if concerns arise.   SLP Time and Intention   Total Session Time (sum of timed and untimed services) 20

## 2024-11-25 NOTE — PROGRESS NOTES
Physical Therapy Discharge Summary    Reason for therapy discharge:    Discharged to home with home therapy.    Progress towards therapy goal(s). See goals on Care Plan in [facility] electronic health record for goal details.  Goals not met.  Barriers to achieving goals:   discharge on same date as initial evaluation.    Therapy recommendation(s):    Continued therapy is recommended.  Rationale/Recommendations:  Home PT.

## 2024-11-25 NOTE — PLAN OF CARE
Occupational Therapy Discharge Summary    Reason for therapy discharge:    Discharged to home with home therapy.    Progress towards therapy goal(s). See goals on Care Plan in [facility] electronic health record for goal details.  Goals partially met.  Barriers to achieving goals:   discharge on same date as initial evaluation.    Therapy recommendation(s):    Continued therapy is recommended.  Rationale/Recommendations:  recommend home care OT services to promote independence and safety with ADLs and functional mobility.    Pastora Cano, OTR/L 11/25/24

## 2024-11-25 NOTE — DISCHARGE SUMMARY
[facility] [place]  Hospitalist Discharge Summary      Date of Admission:  11/24/2024  Date of Discharge:  11/25/2024  Discharging Provider: Kashmir Crowley DO  Discharge Service: Hospitalist Service    Discharge Diagnoses   Acute Hypoxic Respiratory Failure   Left Upper Lobar Collapse / Concern for Aspiration PNA   COPD  Fall at Home   Left Knee and Rib Pain   Physical Deconditioning / Cachexia   H/o of PE   Hyponatremia   Hypochloremia   Chronic Diastolic Congestive Heart Failure   Alcohol Use Disorder   Hypotension / Hypertension   Pulmonary Nodules  Tobacco Use   Osteopenia    Clinically Significant Risk Factors          Follow-ups Needed After Discharge   Follow-up Appointments       Hospital to Primary Care - Establish PCP Referral      Please be aware that coverage of these services is subject to the terms and limitations of your health insurance plan.  Call member services at your health plan with any benefit or coverage questions.    Schedule Primary Care visit within: 7 Days             Unresulted Labs Ordered in the Past 30 Days of this Admission       Date and Time Order Name Status Description    11/24/2024  1:06 PM Blood Culture Peripheral Blood Preliminary         These results will be followed up by PCP    Discharge Disposition   Discharged to home  Condition at discharge: Stable    Hospital Course   Namita Viera is a 74 year old female with PMHx of COPD, AUD, recent PE, CHF, frequent falls and physical deconditioning who presented to the ER on 11/24/2024 after fall at home with left knee and left-sided rib pain.  Admitted for acute hypoxic respiratory failure and aspiration pneumonia.     Acute Hypoxic Respiratory Failure   Left Upper Lobar Collapse / Concern for Aspiration PNA   COPD  CT Chest was done and showed a moderate amount retained debris within the bronchus intermedius, essentially complete occlusion of the left upper   lobar bronchus with multifocal downstream  "consolidative and groundglass opacities within the left upper lobe along with volume loss. The ER provider discussed with pulmonology who recommended treating for aspiration pneumonia. Treated with IV Unasyn and then transitioned to oral Augmentin. Treated with O2, then weaned to room air. Continued on  home inhalers, treated with Duonebs, and prednisone. SLP consulted and did not find any concerns around swallowing.       Fall at Home   Left Knee and Rib Pain   Physical Deconditioning / Cachexia   CT head/Cspine unremarkable   CT Chest/A/P with unchanged previous healing fractures, no acute fx   Evaluated and treated by PT/OT. Pain controlled with Tylenol, discharged with diclofenac cream and tylenol.      H/o of PE   Diagnosed 6/27/2024 and started on Eliquis.  Was discharged from hospital on 7/12/24 with 5 more doses and does not appear that patient took Eliquis beyond this. CT with Nonvisualization of the previously seen subsegmental right lower lobe pulmonary embolism. Eliquis started and then discontinued.       Hyponatremia   Hypochloremia   Mild, improved with IV fluids.       Chronic Diastolic Congestive Heart Failure   Euvolemic ; actually appears on the drier side, Echo 6/2024; EF 65-70%, no significant valvular abnormality, continued on PTA Lasix.      Alcohol Use Disorder   Noted chart history of \"alcoholism.\" Endorses 2-3 glasses of wine nightly. EtOH level in the ED normal. On admission was slightly tachycardic, no signs of withdrawal.      Hypotension / Hypertension   On PTA losartan and midodrine. Discontinued losartan and midodrine. BP controlled without them.      Pulmonary Nodules   Unchanged on CT; continue outpatient follow-up.       Tobacco Use   Not interested in cessation, declining NRT.       Osteopenia   Held Fosamax while inpatient    Consultations This Hospital Stay   SPEECH LANGUAGE PATH ADULT IP CONSULT  CARE MANAGEMENT / SOCIAL WORK IP CONSULT  PHYSICAL THERAPY ADULT IP " CONSULT  OCCUPATIONAL THERAPY ADULT IP CONSULT  PULMONARY IP CONSULT  CARE MANAGEMENT / SOCIAL WORK IP CONSULT    Code Status   Full Code    Time Spent on this Encounter   I, Kashmir Crowley DO, personally saw the patient today and spent greater than 30 minutes discharging this patient.       Kashmir Crowley DO  [address] [address]  Phone: [phone]  Fax: [phone]  ______________________________________________________________________    Physical Exam   Vital Signs: Temp: 98.2  F (36.8  C) Temp src: Oral BP: 127/63 Pulse: 91   Resp: 18 SpO2: 91 % O2 Device: None (Room air) Oxygen Delivery: 1 LPM  Weight: 0 lbs 0 oz  Constitutional: awake, alert, no apparent distress, and appears stated age  Respiratory: No increased work of breathing, no accessory muscle use, decreased breath sounds bilaterally   Cardiovascular: Regular rate and rhythm, normal S1 and S2, no S3 or S4, and no murmur noted  GI: Soft, non-distended, non-tender, normal bowel sounds, no masses palpated, no hepatosplenomegaly  Skin: There is a large, soft, non-tender mass to the right superior scapula (chronic per patient). There are several small abrasions over left knee.  Musculoskeletal: left ribcage tenderness and knee on exam. No left knee swelling. No lower extremity pitting edema present. 2+ DP pulses  Neurologic: Awake, alert.   Neuropsychiatric: Appropriate mood, affect and eye contact. Cooperative.       Primary Care Physician   Physician No Ref-Primary    Discharge Orders      Hospital to Primary Care - Establish PCP Referral      Home Care Referral      Reason for your hospital stay    Fall, aspiration pneumonia     Activity    Your activity upon discharge: activity as tolerated     Diet    Follow this diet upon discharge: Current Diet:Orders Placed This Encounter      Combination Diet Regular Diet Adult       Significant Results and Procedures   Most Recent 3  CBC's:  Recent Labs   Lab Test 11/25/24  0606 11/24/24  1008 08/08/24  0624 07/23/24  0500 07/15/24  0651   WBC 8.8 10.4  --   --  5.7   HGB 10.6* 12.0 9.5*   < > 7.7*   MCV 86 85  --   --  102*    357  --   --  271    < > = values in this interval not displayed.     Most Recent 3 BMP's:  Recent Labs   Lab Test 11/25/24  0606 11/24/24  0944 07/15/24  0651   * 134* 135   POTASSIUM 4.1 5.0 3.7   CHLORIDE 100 95* 96*   CO2 23 24 29   BUN 37.8* 39.7* 20.3   CR 0.94 0.95 0.78   ANIONGAP 11 15 10   PETER 8.9 9.9 9.2   * 102* 87     Most Recent 2 LFT's:  Recent Labs   Lab Test 11/24/24  0944 07/07/24  1911   AST 39 20   ALT 19 16   ALKPHOS 120 96   BILITOTAL 0.5 <0.2       Discharge Medications   Current Discharge Medication List        START taking these medications    Details   acetaminophen (TYLENOL) 500 MG tablet Take 2 tablets (1,000 mg) by mouth 3 times daily.  Qty: 180 tablet, Refills: 0    Associated Diagnoses: Fall at home, initial encounter      amoxicillin-clavulanate (AUGMENTIN) 875-125 MG tablet Take 1 tablet by mouth 2 times daily for 5 days.  Qty: 10 tablet, Refills: 0    Associated Diagnoses: Aspiration pneumonia of left upper lobe, unspecified aspiration pneumonia type (H)      diclofenac (VOLTAREN) 1 % topical gel Apply 2 g topically 4 times daily as needed for moderate pain. Apply to left knee and left chest wall  Qty: 100 g, Refills: 0    Associated Diagnoses: Fall at home, initial encounter      predniSONE (DELTASONE) 20 MG tablet Take 2 tablets (40 mg) by mouth daily.  Qty: 6 tablet, Refills: 0    Associated Diagnoses: Aspiration pneumonia of left upper lobe, unspecified aspiration pneumonia type (H)           CONTINUE these medications which have NOT CHANGED    Details   albuterol (PROAIR HFA/PROVENTIL HFA/VENTOLIN HFA) 108 (90 Base) MCG/ACT inhaler INHALE 1-2 PUFFS INTO THE LUNGS EVERY 6 HOURS AS NEEDED FOR COUGH OR SHORTNESS OF BREATH OR WHEEZE  Qty: 18 g, Refills: 0    Comments:  Pharmacy may dispense brand covered by insurance (Proair, or proventil or ventolin or generic albuterol inhaler)  Associated Diagnoses: Cough      alendronate (FOSAMAX) 70 MG tablet Take 70 mg by mouth every 7 days      aspirin (ASA) 81 MG EC tablet Take 1 tablet (81 mg) by mouth daily    Associated Diagnoses: Closed fracture of sacrum with routine healing, unspecified portion of sacrum, subsequent encounter      calcium carbonate (OS-PETER) 1500 (600 Ca) MG tablet Take 600 mg by mouth daily      Fluticasone-Umeclidin-Vilanterol (TRELEGY ELLIPTA) 200-62.5-25 MCG/ACT oral inhaler Inhale 2 puffs into the lungs daily.      folic acid (FOLVITE) 1 MG tablet Take 1 tablet (1 mg) by mouth daily    Associated Diagnoses: Alcohol use      furosemide (LASIX) 40 MG tablet Take 1 tablet (40 mg) by mouth 2 times daily    Associated Diagnoses: Acute on chronic diastolic congestive heart failure (H)      Lidocaine (LIDOCARE) 4 % Patch Place 2 patches onto the skin every 24 hours To prevent lidocaine toxicity, patient should be patch free for 12 hrs daily. Apply to left lower chest for rib fractures.    Associated Diagnoses: Closed nondisplaced fracture of pelvis, unspecified part of pelvis, initial encounter (H); Closed fracture of multiple ribs, unspecified laterality, initial encounter      melatonin 1 MG TABS tablet Take 1 tablet (1 mg) by mouth nightly as needed for sleep  Qty: 90 tablet, Refills: 3    Associated Diagnoses: Persistent insomnia      miconazole (MICATIN) 2 % external powder Apply topically 2 times daily    Associated Diagnoses: Acute UTI      multivitamin, therapeutic (THERA-VIT) TABS tablet Take 1 tablet by mouth daily      nicotine (NICODERM CQ) 14 MG/24HR 24 hr patch Place 1 patch onto the skin daily    Associated Diagnoses: Encounter for tobacco use cessation counseling      omeprazole (PRILOSEC) 20 MG DR capsule Take 1 capsule (20 mg) by mouth daily  Qty: 90 capsule, Refills: 3    Associated Diagnoses:  Gastroesophageal reflux disease without esophagitis      polyethylene glycol (MIRALAX) 17 GM/Dose powder Take 17 g by mouth daily    Associated Diagnoses: Fecal impaction (H)      potassium chloride ER (K-TAB) 20 MEQ CR tablet Take 1 tablet (20 mEq) by mouth daily    Associated Diagnoses: Acute on chronic diastolic congestive heart failure (H)      senna-docusate (SENOKOT-S/PERICOLACE) 8.6-50 MG tablet Take 1 tablet by mouth daily    Associated Diagnoses: Fecal impaction (H)      thiamine (B-1) 100 MG tablet Take 1 tablet (100 mg) by mouth daily    Associated Diagnoses: Alcohol use      vitamin C (ASCORBIC ACID) 500 MG tablet Take 500 mg by mouth daily      Vitamin D, Cholecalciferol, 25 MCG (1000 UT) TABS Take 1,000 Units by mouth daily           STOP taking these medications       ibuprofen (ADVIL/MOTRIN) 200 MG tablet Comments:   Reason for Stopping:         losartan (COZAAR) 25 MG tablet Comments:   Reason for Stopping:         midodrine (PROAMATINE) 2.5 MG tablet Comments:   Reason for Stopping:             Allergies   Allergies   Allergen Reactions    Penicillins Hives

## 2024-11-25 NOTE — PROGRESS NOTES
Pulmonary Progress Note  11/25/2024      Admit Date: 11/24/2024  Hospital Day: 1   CODE: Full Code    Reason for Consult: MOJGAN atelectasis      Interim Events:     No acute issues.     Assessment/Plan:   Namita Viera is a 74 year old female with a past medical history significant for a GI bleed, COPD with emphysema, alcohol abuse with multiple falls (hospitalized 6/27-7/4 and subsequently at [facility]), PE, recently admitted for rectal bleeding from fecal impaction who presented to the ED on  admitted on 11/24  after a fall at home and likely aspiration pneumonia leading to atelectasis of the MOJGAN.     Recommend:  MOJGAN Atelectasis: Has a known medical history significant for ongoing alcohol abuse and was hospitalized in June for similar episode of falling.  Unclear if she had a mechanical or otherwise fall and likely aspirated related to her alcohol abuse.  Is on minimal supplemental oxygen presently.  DuoNebs every 4 hourly, Mucomyst every 4 hourly and percussion therapy. Should be discharged on this therapy given ongoing MOJGAN atelectasis.  Agree with Unasyn for aspiration.  COPD/Emphysema: Is noted to have emphysema on imaging although I am unable to find any pulmonary function testing.  The patient continues to smoke and has previously been noted to be uninterested in tobacco cessation.  She does not appear to be on supplemental oxygen at baseline.  Is on Trelegy Ellipta as an outpatient and has been prescribed Breo Ellipta and Incruse Ellipta while an inpatient presently.  Has been prescribed scheduled DuoNebs as noted above.  Prednisone 40mg every day for 5 days.  Maintain oxygen saturations greater than 92%.  Alcohol abuse: Has a known medical history significant for alcohol abuse and has had previous admissions related to falls from alcohol abuse.  It is noted that she admitted to alcohol consumption prior to presenting to the hospital.  Was counseled about alcohol cessation.      Thank you for involving  us in the care of this patient. We will sign off and arrange for outpatient followup in our clinic.    Nimisha Arechiga MD  Pulmonary and Critical Care  [facility]           Medications:     Current Facility-Administered Medications   Medication Dose Route Frequency Provider Last Rate Last Admin     Current Facility-Administered Medications   Medication Dose Route Frequency Provider Last Rate Last Admin    acetaminophen (TYLENOL) tablet 975 mg  975 mg Oral TID Kashmir Crowley DO        acetylcysteine (MUCOMYST) 20 % nebulizer solution 2 mL  2 mL Nebulization 4x Daily Gondal, Saad J, MD   2 mL at 11/24/24 1950    albuterol (PROVENTIL HFA/VENTOLIN HFA) inhaler  1-2 puff Inhalation Q6H Abby Del Castillo PA-C   2 puff at 11/25/24 0838    ampicillin-sulbactam (UNASYN) 3 g vial to attach to  mL bag  3 g Intravenous Q6H Abby Del Castillo PA-C   3 g at 11/25/24 0628    apixaban ANTICOAGULANT (ELIQUIS) tablet 5 mg  5 mg Oral BID Abby Del Castillo PA-C   5 mg at 11/25/24 0836    aspirin EC tablet 81 mg  81 mg Oral Daily Abby Del Castillo PA-C   81 mg at 11/25/24 0836    fluticasone-vilanterol (BREO ELLIPTA) 200-25 MCG/ACT inhaler 1 puff  1 puff Inhalation Daily Abby Del Castillo PA-C   1 puff at 11/25/24 0837    And    umeclidinium (INCRUSE ELLIPTA) 62.5 MCG/ACT inhaler 1 puff  1 puff Inhalation Daily Abby Del Castillo PA-C   1 puff at 11/25/24 0837    folic acid (FOLVITE) tablet 1 mg  1 mg Oral Daily Abby Del Castillo PA-C   1 mg at 11/25/24 0836    furosemide (LASIX) tablet 40 mg  40 mg Oral BID Abby Del Castillo PA-C   40 mg at 11/25/24 0836    ipratropium - albuterol 0.5 mg/2.5 mg/3 mL (DUONEB) neb solution 3 mL  3 mL Nebulization 4x daily Nimisha Arechiga MD   3 mL at 11/24/24 1950    losartan (COZAAR) tablet 25 mg  25 mg Oral Daily Abby Del Castillo PA-C   25 mg at 11/25/24 0836    [Held by provider] midodrine (PROAMATINE) tablet 2.5 mg  2.5 mg Oral TID w/meals Abby Del Castillo PA-C        multivitamin, therapeutic (THERA-VIT) tablet 1 tablet  1 tablet  Oral Daily Abby Del Castillo PA-C   1 tablet at 11/25/24 0836    pantoprazole (PROTONIX) EC tablet 40 mg  40 mg Oral QAM AC Gondal, Saad J, MD   40 mg at 11/25/24 0627    predniSONE (DELTASONE) tablet 40 mg  40 mg Oral Daily Nimisha Arechiga MD   40 mg at 11/25/24 0836    sodium chloride (PF) 0.9% PF flush 3 mL  3 mL Intracatheter Q8H Abby Del Castillo PA-C   3 mL at 11/24/24 1520    thiamine (B-1) tablet 100 mg  100 mg Oral Daily Abby Del Castillo PA-C   100 mg at 11/25/24 0836         Exam/Data:   Vitals  /63 (BP Location: Right arm)   Pulse 91   Temp 98.2  F (36.8  C) (Oral)   Resp 18   SpO2 93%   BP - Mean:  [] 89  I/O last 3 completed shifts:  In: 120 [P.O.:120]  Out: 1000 [Urine:1000]  Weight change:   [unfilled]  Resp: 18    EXAM:  Physical Exam  Constitutional:       Appearance: Normal appearance.   HENT:      Head: Atraumatic.      Mouth/Throat:      Mouth: Mucous membranes are dry.   Cardiovascular:      Rate and Rhythm: Normal rate and regular rhythm.      Pulses: Normal pulses.   Pulmonary:      Effort: Pulmonary effort is normal.      Breath sounds: Normal breath sounds.   Abdominal:      General: Abdomen is flat.   Skin:     General: Skin is warm.   Neurological:      Mental Status: She is alert.             DATA:    Date Source Organism   11/24 NP Swab Negative for Influenza A, B, RSV and Sars CoV2     Blood NGTD      Organism Antibiotic Antibiotic Start Date Antibiotic End Date   NGTD Ampicillin-Sulbactam 11/24 Ongoing      IMAGING:   CXR 11/25/24:  No change in the area of atelectasis inferiorly in the left upper lobe abutting the fissure. Lungs are hyperexpanded. Calcified granulomas and scattered, calcified pleural plaques again noted. No new parenchymal disease. Heart and pulmonary   vascularity are normal. Bilateral old rib fractures noted.     CT CAP 11/24/24:  1.  Essentially complete occlusion of the left upper lobe bronchus with debris causing downstream airspace opacities compatible with  lobar collapse. Superimposed infection cannot be excluded.  2.  Moderate emphysema.  3.  Unchanged scattered pulmonary nodules measuring up to 0.4 cm. Continued lung cancer screening surveillance should be considered.  4.  Moderate size hiatal hernia has increased in size compared to prior.  5.  Nonvisualization of the previously seen subsegmental right lower lobe pulmonary embolism, possibly due to the contrast timing.  6.  Extensive colonic diverticulosis.  7.  Unchanged appearance of the multiple previous fractures.     CT Head w/o Contrast 11/24/24:  HEAD CT:  1.  No evidence of acute traumatic intracranial abnormality.  2.  Brain atrophy and presumed chronic microvascular ischemic changes as detailed above.     CERVICAL SPINE CT:  1.  No CT evidence for acute fracture or traumatic malalignment involving the cervical spine.  2.  Multilevel cervical spondylosis without high-grade spinal canal stenosis. Neural foraminal stenosis is greatest and moderate to advanced on the left at C5-C6.       Nimisha Arechiga MD  Pulmonary and Critical Care  [facility]

## 2024-11-25 NOTE — CONSULTS
SDOH consult placed for housing. RNCM spoke to pt, pt has housing lives at [facility], and is satisfied with [facility].       Barbie Cruz RN on 11/25/2024 at 11:03 AM

## 2024-11-25 NOTE — PROGRESS NOTES
Patient discharged to [facility] at this time. Taken by [facility] transportation. Discharge packet, along with discharge medications taken with patient at time of discharge. Patient dressed in clothing, no other belongings noted to take with patient. Patient stable at time of discharge.

## 2024-11-25 NOTE — PLAN OF CARE
"  Problem: Adult Inpatient Plan of Care  Goal: Plan of Care Review  Description: The Plan of Care Review/Shift note should be completed every shift.  The Outcome Evaluation is a brief statement about your assessment that the patient is improving, declining, or no change.  This information will be displayed automatically on your shift  note.  Outcome: Progressing     Problem: Adult Inpatient Plan of Care  Goal: Patient-Specific Goal (Individualized)  Description: You can add care plan individualizations to a care plan. Examples of Individualization might be:  \"Parent requests to be called daily at 9am for status\", \"I have a hard time hearing out of my right ear\", or \"Do not touch me to wake me up as it startles  me\".  Outcome: Progressing     Problem: Mobility Impairment  Goal: Optimal Mobility  Outcome: Progressing     Problem: Pain Acute  Goal: Optimal Pain Control and Function  Outcome: Progressing   Goal Outcome Evaluation:  Pt is alert and oriented x4. VS stable. O2 sat 95% at 1LPM. Not in respiratory distress.  Does have pain on movements but able to tolerate repositioning. Requested pain meds at 1AM pain of 8/10 that disturbs her sleep. CIWA score 0. IV antibiotics infusing. Comfortable and slept well.                      "

## 2024-11-25 NOTE — PLAN OF CARE
"Problem: Adult Inpatient Plan of Care  Goal: Plan of Care Review  Description: The Plan of Care Review/Shift note should be completed every shift.  The Outcome Evaluation is a brief statement about your assessment that the patient is improving, declining, or no change.  This information will be displayed automatically on your shift  note.  Outcome: Adequate for Care Transition  Goal: Patient-Specific Goal (Individualized)  Description: You can add care plan individualizations to a care plan. Examples of Individualization might be:  \"Parent requests to be called daily at 9am for status\", \"I have a hard time hearing out of my right ear\", or \"Do not touch me to wake me up as it startles  me\".  Outcome: Adequate for Care Transition  Goal: Absence of Hospital-Acquired Illness or Injury  Outcome: Adequate for Care Transition  Intervention: Identify and Manage Fall Risk  Recent Flowsheet Documentation  Taken 11/25/2024 0800 by Rylee Cuba RN  Safety Promotion/Fall Prevention:   activity supervised   assistive device/personal items within reach   clutter free environment maintained   increased rounding and observation   increase visualization of patient   lighting adjusted   mobility aid in reach   nonskid shoes/slippers when out of bed   patient and family education   room door open   room near nurse's station   room organization consistent   safety round/check completed   supervised activity  Intervention: Prevent and Manage VTE (Venous Thromboembolism) Risk  Recent Flowsheet Documentation  Taken 11/25/2024 0800 by Rylee Cuba RN  VTE Prevention/Management: SCDs on (sequential compression devices)  Intervention: Prevent Infection  Recent Flowsheet Documentation  Taken 11/25/2024 0800 by Rylee Cuba RN  Infection Prevention:   environmental surveillance performed   equipment surfaces disinfected   hand hygiene promoted   personal protective equipment utilized   rest/sleep promoted   single " patient room provided   visitors restricted/screened  Goal: Optimal Comfort and Wellbeing  Outcome: Adequate for Care Transition  Intervention: Provide Person-Centered Care  Recent Flowsheet Documentation  Taken 11/25/2024 0800 by Rylee Cuba RN  Trust Relationship/Rapport:   care explained   choices provided   emotional support provided   empathic listening provided   questions answered   questions encouraged   reassurance provided   thoughts/feelings acknowledged  Goal: Readiness for Transition of Care  Outcome: Adequate for Care Transition

## 2024-11-25 NOTE — PROGRESS NOTES
"   11/25/24 0833   Appointment Info   Signing Clinician's Name / Credentials (PT) Jadyn Peterson, PT, DPT   Living Environment   People in Home facility resident   Current Living Arrangements assisted living   Home Accessibility no concerns  (has an elevator)   Self-Care   Equipment Currently Used at Home cane, straight;walker, rolling   Fall history within last six months yes   Number of times patient has fallen within last six months 1   Activity/Exercise/Self-Care Comment pt reports being independent with functional mobility at baseline. Uses 4WW typically in [facility] & cane when outdoors   General Information   Onset of Illness/Injury or Date of Surgery 11/24/24   Referring Physician Abby Del Castillo PA-C   Patient/Family Therapy Goals Statement (PT) Return to Home   Pertinent History of Current Problem (include personal factors and/or comorbidities that impact the POC) Per Chart Review -\"74 year old female with PMHx of COPD, AUD, recent PE, CHF, frequent falls and physical deconditioning who presented to the ER on 11/24/2024 after fall at home with left knee and left-sided rib pain.  Admitted for acute hypoxic respiratory failure and concern for aspiration pneumonia\"   Existing Precautions/Restrictions fall   Range of Motion (ROM)   Range of Motion ROM deficits secondary to pain   Strength (Manual Muscle Testing)   Strength (Manual Muscle Testing) Deficits observed during functional mobility   Bed Mobility   Bed Mobility supine-sit   Supine-Sit [place] (Bed Mobility) supervision   Transfers   Transfers sit-stand transfer   Sit-Stand Transfer   Sit-Stand [place] (Transfers) contact guard   Assistive Device (Sit-Stand Transfers) walker, front-wheeled   Gait/Stairs (Locomotion)   [place] Level (Gait) contact guard   Assistive Device (Gait) walker, front-wheeled   Distance in Feet (Gait) 15   Pattern (Gait) step-through;swing-through   Deviations/Abnormal Patterns (Gait) gait speed decreased;rob " decreased;antalgic   Clinical Impression   Criteria for Skilled Therapeutic Intervention Yes, treatment indicated   PT Diagnosis (PT) Impaired Functional Mobility   Influenced by the following impairments weakness, decreased ROM, impaired balance   Functional limitations due to impairments gait, transfers   Clinical Presentation (PT Evaluation Complexity) stable   Clinical Presentation Rationale pt presents as medically diagnosed   Clinical Decision Making (Complexity) low complexity   Planned Therapy Interventions (PT) gait training;home exercise program;neuromuscular re-education;stair training;strengthening;transfer training;home program guidelines;balance training;bed mobility training   Risk & Benefits of therapy have been explained evaluation/treatment results reviewed;patient   PT Total Evaluation Time   PT Eval, Low Complexity Minutes (64613) 10   Physical Therapy Goals   PT Frequency Daily   PT Predicted Duration/Target Date for Goal Attainment 12/02/24   PT Goals Bed Mobility;Transfers;Gait   PT: Bed Mobility Independent;Supine to/from sit   PT: Transfers Modified independent;Sit to/from stand;Assistive device   PT: Gait Modified independent;Assistive device;100 feet   Interventions   Interventions Quick Adds Gait Training;Therapeutic Activity   Therapeutic Activity   Therapeutic Activities: dynamic activities to improve functional performance Minutes (94424) 8   Symptoms Noted During/After Treatment Fatigue   Treatment Detail/Skilled Intervention Additional sit to/from stand: cueing for safety/technique/hand placement, SBA/CGA; cueing for 4WW management with transfers - keeping device with through entire transfer, locking brakes, not pulling up on device; Education and review of walker sizing. RN approve RA trial for session. pt O2 sats 92 at rest, 90 after first walk and 88 after second walk (recovered in < 20 seconds to above 90). pt return to above 90 at end of session. Increased time for monitoring  vitals. Standing balance: cueing for safety/posture, CGA. Min A brief management. pt seated in chair with alarm on and call light within reach, no further concerns reported   Gait Training   Gait Training Minutes (84629) 9   Symptoms Noted During/After Treatment (Gait Training) fatigue;increased pain   Treatment Detail/Skilled Intervention pt ambulated in hallway with 4WW. cueing for safety/technique/posture/4WW management. No loss of balance noted. Reviewed 4WW compared to FWW - pt preference for 4WW. seated rest break   Distance in Feet 80,100   [place] Level (Gait Training)   (CGA/SBA)   Physical Assistance Level (Gait Training) supervision;verbal cues   Weight Bearing (Gait Training) full weight-bearing   Assistive Device (Gait Training) rolling walker  (4WW)   Pattern Analysis (Gait Training) swing-through gait   Gait Analysis Deviations decreased rob;decreased step length   Impairments (Gait Analysis/Training) balance impaired;strength decreased   PT Discharge Planning   PT Plan gait with LRAD, LE strengthening/balance, sit to/from stand transfers   PT Discharge Recommendation (DC Rec) home with assist;home with home care physical therapy   PT Rationale for DC Rec Pending pain management, anticipate pt will be able to complete required functional mobility at home with assist as needed. Recommend use of 4WW for added stability. Home PT for further progressing strength/balance/activity tolerance   PT Brief overview of current status CGA/SBA gait with 4WW & transfers, SBA bed mobility   PT Equipment Needed at Discharge walker, rolling  (4WW - pt has access to)   Physical Therapy Time and Intention   Timed Code Treatment Minutes 17   Total Session Time (sum of timed and untimed services) 27

## 2024-11-25 NOTE — PROGRESS NOTES
"   11/25/24 0935   Appointment Info   Signing Clinician's Name / Credentials (OT) Pastora Rachael, OTR/L   Living Environment   People in Home facility resident   Current Living Arrangements assisted living   Home Accessibility no concerns   Self-Care   Equipment Currently Used at Home cane, straight;walker, rolling  (4WW)   Fall history within last six months yes   Number of times patient has fallen within last six months 1   Activity/Exercise/Self-Care Comment Pt independent with functional mobility and ADLs at baseline.   Instrumental Activities of Daily Living (IADL)   IADL Comments Pt gets assist with meds at [facility], 3x daily checks from staff.   General Information   Onset of Illness/Injury or Date of Surgery 11/24/24   Referring Physician Kashmir Crowley DO   Patient/Family Therapy Goal Statement (OT) none stated   Additional Occupational Profile Info/Pertinent History of Current Problem Per chart review, pt \"is a 74 year old female with PMHx of COPD, AUD, recent PE, CHF, frequent falls and physical deconditioning who presented to the ER on 11/24/2024 after fall at home with left knee and left-sided rib pain.  Admitted for acute hypoxic respiratory failure and concern for aspiration pneumonia.\"   Existing Precautions/Restrictions fall   Cognitive Status Examination   Orientation Status orientation to person, place and time  (A&Ox4)   Pain Assessment   Patient Currently in Pain Yes, see Vital Sign flowsheet  (pain in L knee and L-side ribs)   Range of Motion Comprehensive   General Range of Motion no range of motion deficits identified   Strength Comprehensive (MMT)   Comment, General Manual Muscle Testing (MMT) Assessment Generalized weakness noted functionally.   Bed Mobility   Bed Mobility supine-sit;sit-supine;scooting/bridging   Scooting/Bridging [place] (Bed Mobility) supervision;verbal cues   Supine-Sit [place] (Bed Mobility) supervision;verbal cues   Sit-Supine [place] (Bed Mobility) minimum " assist (75% patient effort);verbal cues  (lifting BLEs into the bed)   Assistive Device (Bed Mobility) bed rails   Comment (Bed Mobility) increased pain   Transfers   Transfers sit-stand transfer;toilet transfer   Sit-Stand Transfer   Sit-Stand [place] (Transfers) contact guard   Assistive Device (Sit-Stand Transfers) walker, front-wheeled   Toilet Transfer   [place] Level (Toilet Transfer) not tested   Toilet Transfer Comments Pt declined performing, per clinical reasoning anticipate pt would require SBA-CGA for toilet transfer.   Balance   Balance Comments No LOB, slightly unsteady d/t pain, CGA using FWW.   Activities of Daily Living   BADL Assessment/Intervention grooming;upper body dressing;lower body dressing;bathing   Bathing Assessment/Intervention   [place] Level (Bathing) not tested   Comment, (Bathing) Per clinical reasoning, anticipate this will be impacted by pain.   Upper Body Dressing Assessment/Training   Comment, (Upper Body Dressing) Per clinical reasoning, anticipate this will be impacted by pain.   [place] Level (Upper Body Dressing) not tested   Lower Body Dressing Assessment/Training   Comment, (Lower Body Dressing) Per clinical reasoning, anticipate this will be impacted by pain.   [place] Level (Lower Body Dressing) not tested   Grooming Assessment/Training   Position (Grooming) edge of bed sitting;unsupported standing   [place] Level (Grooming) set up;contact guard assist   Comment, (Grooming) increased pain   Clinical Impression   Criteria for Skilled Therapeutic Interventions Met (OT) Yes, treatment indicated   OT Diagnosis Impaired ability to perform ADLs, IADLs, and functional mobility.   Influenced by the following impairments s/p fall   OT Problem List-Impairments impacting ADL problems related to;activity tolerance impaired;balance;mobility;strength;pain   Assessment of Occupational Performance 3-5 Performance Deficits   Identified Performance Deficits  toileting, grooming/hygiene, bathing, upper body dressing, lower body dressing   Planned Therapy Interventions (OT) ADL retraining;balance training;bed mobility training;strengthening;transfer training;home program guidelines;progressive activity/exercise;risk factor education   Clinical Decision Making Complexity (OT) detailed assessment/moderate complexity   Risk & Benefits of therapy have been explained evaluation/treatment results reviewed;care plan/treatment goals reviewed;risks/benefits reviewed;current/potential barriers reviewed;participants voiced agreement with care plan;participants included;patient   OT Total Evaluation Time   OT Eval, Moderate Complexity Minutes (21172) 14   OT Goals   Therapy Frequency (OT) Daily   OT Predicted Duration/Target Date for Goal Attainment 12/02/24   OT Goals Hygiene/Grooming;Upper Body Dressing;Lower Body Dressing;Upper Body Bathing;Lower Body Bathing;Toilet Transfer/Toileting   OT: Hygiene/Grooming modified independent;using adaptive equipment;while standing   OT: Upper Body Dressing Modified independent;using adaptive equipment   OT: Lower Body Dressing Modified independent;using adaptive equipment;including set-up/clothing retrieval   OT: Upper Body Bathing Modified independent;using adaptive equipment   OT: Lower Body Bathing Modified independent;using adaptive equipment   OT: Toilet Transfer/Toileting Modified independent;toilet transfer;cleaning and garment management;using adaptive equipment   Self-Care/Home Management   Self-Care/Home Mgmt/ADL, Compensatory, Meal Prep Minutes (43660) 8   Symptoms Noted During/After Treatment (Meal Preparation/Planning Training) fatigue;increased pain;shortness of breath   Treatment Detail/Skilled Intervention Pt required seated rest break at EOB d/t increased pain in L-side/SOB from rib pain. Provided cueing for PLB and adjusted bed height to improve ergonomic positioning. After seated rest break, pt able to tolerate functional  mobility in room 12' x 2 trials with CGA, slightly unsteady and slow pace d/t pain. Pt tolerated standing at sink with SBA-CGA for >4 min, cueing for positioning at sink to improve stance/safety d/t pt leaning forward d/t pain. Pt left in bed at end of session with bed alarm on and call light in reach.   OT Discharge Planning   OT Plan all ambulatory ADLs, activity tolerance/pain management   OT Discharge Recommendation (DC Rec) home with assist;home with home care occupational therapy   OT Rationale for DC Rec Pt moving/completing ADLs with SBA-CGA, mostly limited by pain. Anticipate pt will be able to manage safely at [facility], recommend  OT services as pt having more difficulty d/t pain following fall.   OT Brief overview of current status SBA-CGA functional mobility and ADLs   Total Session Time   Timed Code Treatment Minutes 8   Total Session Time (sum of timed and untimed services) 22

## 2024-11-25 NOTE — PROGRESS NOTES
Care Management Discharge Note    Discharge Date: 11/26/2024       Discharge Disposition: Assisted Living    Discharge Services:  [facility]     Discharge DME:  None     Discharge Transportation: health plan transportation    Private pay costs discussed: transportation costs, pt's daughter Tiara agreeable to cost of w/c transport.       Does the patient's insurance plan have a 3 day qualifying hospital stay waiver?  No    PAS Confirmation Code:  NA  Patient/family educated on Medicare website which has current facility and service quality ratings:  Yes    Education Provided on the Discharge Plan: Per Care Team      Persons Notified of Discharge Plans: Yes, pt, pt's daughter Marcie, [facility] facility, and provider. And Bedside RN.     Patient/Family in Agreement with the Plan: yes    Handoff Referral Completed: Yes, non-[facility] PCP: External handoff communication completed    Additional Information:    Final discharge plan is for pt to go back to [facility] in [place] ([address] [place] [address] [id number], [facility] [address]) today 11/25. [facility] w/c transport set up for 2:00pm-3:00pm. Pt's [facility] will provide home PT/OT/Speech services.       Orders to be faxed to [phone].        Barbie Cruz RN

## 2024-11-25 NOTE — PLAN OF CARE
"  Problem: Adult Inpatient Plan of Care  Goal: Plan of Care Review  Description: The Plan of Care Review/Shift note should be completed every shift.  The Outcome Evaluation is a brief statement about your assessment that the patient is improving, declining, or no change.  This information will be displayed automatically on your shift  note.  Outcome: Progressing  Goal: Patient-Specific Goal (Individualized)  Description: You can add care plan individualizations to a care plan. Examples of Individualization might be:  \"Parent requests to be called daily at 9am for status\", \"I have a hard time hearing out of my right ear\", or \"Do not touch me to wake me up as it startles  me\".  Outcome: Progressing  Goal: Absence of Hospital-Acquired Illness or Injury  Outcome: Progressing  Intervention: Prevent Infection  Recent Flowsheet Documentation  Taken 11/24/2024 1925 by Segundo Ordaz RN  Infection Prevention: hand hygiene promoted  Goal: Optimal Comfort and Wellbeing  Outcome: Progressing  Goal: Readiness for Transition of Care  Outcome: Progressing  Intervention: Mutually Develop Transition Plan  Recent Flowsheet Documentation  Taken 11/24/2024 1900 by Segundo Ordaz RN  Equipment Currently Used at Home:   cane, straight   walker, rolling     Problem: Mobility Impairment  Goal: Optimal Mobility  Outcome: Progressing     Problem: Pain Acute  Goal: Optimal Pain Control and Function  Outcome: Progressing   Goal Outcome Evaluation:         Pt is alert and oriented x4, pain with movement only, using pure wick voiding well, O2 is wean down to 1L sating at 95. VSS and will continue to monitor.                "

## 2024-11-27 ENCOUNTER — PATIENT OUTREACH (OUTPATIENT)
Dept: CARE COORDINATION | Facility: CLINIC | Age: 74
End: 2024-11-27
Payer: COMMERCIAL

## 2024-11-27 NOTE — PROGRESS NOTES
[facility] Contact  [facility]/Voicemail     Clinical Data: Post-Discharge Outreach     Outreach attempted x 2.  Left message on patient's voicemail, providing [facility]'s central phone number of [phone] ([phone]) for questions/concerns and/or to schedule an appt with an [facility] provider, if they do not have a PCP.      Plan:  [facility] will do no further outreaches at this time.     MICHAEL Buchanan  [phone]  [facility]     *Connected Care Resource Team does NOT follow patient ongoing. Referrals are identified based on internal discharge reports and the outreach is to ensure patient has an understanding of their discharge instructions.

## 2024-11-28 LAB — BACTERIA BLD CULT: NORMAL

## 2024-11-29 LAB — BACTERIA BLD CULT: NO GROWTH

## (undated) DEVICE — DRSG PRIMAPORE 03 1/8X6" 66000318

## (undated) DEVICE — GLOVE BIOGEL PI ULTRATOUCH G SZ 6.5 42165

## (undated) DEVICE — DRAPE STERI U 1015

## (undated) DEVICE — PREP CHLORAPREP 26ML TINTED HI-LITE ORANGE 930815

## (undated) DEVICE — PADDING CAST 4IN WEBRIL STRL 2502

## (undated) DEVICE — DRAPE C-ARM 60X42" 1013

## (undated) DEVICE — ROD RM 950MM 3MM 3.8MM BALL TIP STRL

## (undated) DEVICE — CUSTOM PACK TOTAL KNEE SOP5BTKHEC

## (undated) DEVICE — SUTURE VICRYL+ 2-0 27IN CT-1 UND VCP259H

## (undated) DEVICE — DRILL BIT QUICK COUPLING 3 FLUTE 4.2MMX145MM NDL  POINT

## (undated) DEVICE — PACK MINOR SINGLE BASIN SSK3001

## (undated) DEVICE — GLOVE BIOGEL PI ULTRATOUCH G SZ 7.5 42175

## (undated) DEVICE — WIRE GUIDE 3.2X400MM  357.399

## (undated) DEVICE — SUTURE VICRYL+ 0 27IN CT-1 UND VCP260H

## (undated) DEVICE — DRAPE C-ARMOR 5 SIDED 5523

## (undated) DEVICE — CUSTOM PACK GEN MAJOR SBA5BGMHEA

## (undated) DEVICE — GLOVE BIOGEL INDICATOR 7.5 LF 41675

## (undated) DEVICE — CAST PADDING 6" STERILE 9046S

## (undated) DEVICE — SU MONOCRYL 3-0 PS-2 18" UND MCP497G

## (undated) DEVICE — KIT PATIENT CARE HANA TABLE PROFX SUPINE 6855

## (undated) DEVICE — DRAPE IOBAN ISOLATION VERTICAL 320X21CM 6617

## (undated) DEVICE — MAT FLOOR SURGICAL 40X38 0702140238

## (undated) DEVICE — BANDAGE ELASTIC 6X550 LF DBL 593-96LF

## (undated) DEVICE — SUCTION MANIFOLD NEPTUNE 2 SYS 1 PORT 702-025-000

## (undated) DEVICE — SUCTION TIP YANKAUER FLEX ORTHO K62

## (undated) DEVICE — DRSG GAUZE 4X4" TRAY 6939

## (undated) DEVICE — SUTURE VICRYL+ 1 27IN CT-1 UND VCP261H

## (undated) DEVICE — SOL WATER IRRIG 1000ML BOTTLE 2F7114

## (undated) DEVICE — GLOVE UNDER INDICATOR PI SZ 7.0 LF 41670

## (undated) DEVICE — DRESSING XEROFORM PETROLATUM 5X9 33605

## (undated) DEVICE — PLATE GROUNDING ADULT W/CORD 9165L

## (undated) DEVICE — STPL SKIN PROXIMATE 35 WIDE PMW35

## (undated) DEVICE — HOLDER LIMB VELCRO OR 0814-1533

## (undated) DEVICE — SOL NACL 0.9% IRRIG 1000ML BOTTLE 2F7124

## (undated) DEVICE — SU DERMABOND ADVANCED .7ML DNX12

## (undated) DEVICE — DRSG PRIMAPORE 02X3"

## (undated) DEVICE — SUTURE VICRYL+ 2-0 CT-2 27" UND VCP269H

## (undated) DEVICE — STPL SKIN 35W 6.9MM  PXW35

## (undated) DEVICE — DRAPE CONVERTORS U-DRAPE 60X72" 8476

## (undated) RX ORDER — VASOPRESSIN IN 0.9 % NACL 2 UNIT/2ML
SYRINGE (ML) INTRAVENOUS
Status: DISPENSED
Start: 2023-05-17

## (undated) RX ORDER — VANCOMYCIN HYDROCHLORIDE 1 G/20ML
INJECTION, POWDER, LYOPHILIZED, FOR SOLUTION INTRAVENOUS
Status: DISPENSED
Start: 2023-05-17

## (undated) RX ORDER — BUPIVACAINE HYDROCHLORIDE AND EPINEPHRINE 2.5; 5 MG/ML; UG/ML
INJECTION, SOLUTION EPIDURAL; INFILTRATION; INTRACAUDAL; PERINEURAL
Status: DISPENSED
Start: 2023-05-17

## (undated) RX ORDER — FENTANYL CITRATE 50 UG/ML
INJECTION, SOLUTION INTRAMUSCULAR; INTRAVENOUS
Status: DISPENSED
Start: 2023-05-17

## (undated) RX ORDER — FENTANYL CITRATE-0.9 % NACL/PF 10 MCG/ML
PLASTIC BAG, INJECTION (ML) INTRAVENOUS
Status: DISPENSED
Start: 2023-05-17